# Patient Record
Sex: MALE | Race: WHITE | Employment: OTHER | URBAN - METROPOLITAN AREA
[De-identification: names, ages, dates, MRNs, and addresses within clinical notes are randomized per-mention and may not be internally consistent; named-entity substitution may affect disease eponyms.]

---

## 2017-01-19 RX ORDER — PREDNISONE 1 MG/1
5 TABLET ORAL DAILY
Qty: 30 TABLET | Refills: 0 | Status: SHIPPED | OUTPATIENT
Start: 2017-01-19 | End: 2017-02-18

## 2017-01-19 RX ORDER — ALLOPURINOL 100 MG/1
100 TABLET ORAL DAILY
Qty: 30 TABLET | Refills: 0 | Status: SHIPPED | OUTPATIENT
Start: 2017-01-19 | End: 2017-12-28 | Stop reason: SDUPTHER

## 2017-01-19 RX ORDER — FUROSEMIDE 40 MG/1
40 TABLET ORAL 2 TIMES DAILY
Qty: 60 TABLET | Refills: 0 | Status: SHIPPED | OUTPATIENT
Start: 2017-01-19 | End: 2017-10-27 | Stop reason: SDUPTHER

## 2017-04-05 ENCOUNTER — OFFICE VISIT (OUTPATIENT)
Dept: PULMONOLOGY | Age: 72
End: 2017-04-05

## 2017-04-05 VITALS
SYSTOLIC BLOOD PRESSURE: 111 MMHG | HEART RATE: 109 BPM | DIASTOLIC BLOOD PRESSURE: 64 MMHG | BODY MASS INDEX: 41.82 KG/M2 | OXYGEN SATURATION: 88 % | WEIGHT: 315 LBS

## 2017-04-05 DIAGNOSIS — J44.9 COPD WITHOUT EXACERBATION (HCC): ICD-10-CM

## 2017-04-05 DIAGNOSIS — I10 ESSENTIAL HYPERTENSION: Chronic | ICD-10-CM

## 2017-04-05 DIAGNOSIS — J44.9 COPD, VERY SEVERE (HCC): Primary | ICD-10-CM

## 2017-04-05 DIAGNOSIS — G47.33 OSA (OBSTRUCTIVE SLEEP APNEA): Chronic | ICD-10-CM

## 2017-04-05 DIAGNOSIS — E11.9 TYPE 2 DIABETES MELLITUS WITHOUT COMPLICATION, WITHOUT LONG-TERM CURRENT USE OF INSULIN (HCC): ICD-10-CM

## 2017-04-05 PROCEDURE — 99214 OFFICE O/P EST MOD 30 MIN: CPT | Performed by: INTERNAL MEDICINE

## 2017-04-05 RX ORDER — ALBUTEROL SULFATE 90 UG/1
2 AEROSOL, METERED RESPIRATORY (INHALATION) EVERY 6 HOURS PRN
Qty: 1 INHALER | Refills: 6 | Status: SHIPPED | OUTPATIENT
Start: 2017-04-05 | End: 2018-02-20 | Stop reason: SDUPTHER

## 2017-05-10 ENCOUNTER — TELEPHONE (OUTPATIENT)
Dept: ADMINISTRATIVE | Age: 72
End: 2017-05-10

## 2017-05-25 RX ORDER — IPRATROPIUM BROMIDE AND ALBUTEROL SULFATE 2.5; .5 MG/3ML; MG/3ML
SOLUTION RESPIRATORY (INHALATION)
Qty: 1080 ML | Refills: 11 | Status: SHIPPED | OUTPATIENT
Start: 2017-05-25 | End: 2018-04-06 | Stop reason: SDUPTHER

## 2017-06-05 ENCOUNTER — TELEPHONE (OUTPATIENT)
Dept: PULMONOLOGY | Age: 72
End: 2017-06-05

## 2017-06-30 ENCOUNTER — OFFICE VISIT (OUTPATIENT)
Dept: INTERNAL MEDICINE CLINIC | Age: 72
End: 2017-06-30

## 2017-06-30 VITALS
OXYGEN SATURATION: 92 % | BODY MASS INDEX: 41.03 KG/M2 | DIASTOLIC BLOOD PRESSURE: 66 MMHG | SYSTOLIC BLOOD PRESSURE: 114 MMHG | HEART RATE: 110 BPM | WEIGHT: 311 LBS

## 2017-06-30 DIAGNOSIS — J44.9 COPD, VERY SEVERE (HCC): Primary | ICD-10-CM

## 2017-06-30 DIAGNOSIS — Z13.9 SCREENING: ICD-10-CM

## 2017-06-30 DIAGNOSIS — E78.5 HYPERLIPIDEMIA, UNSPECIFIED HYPERLIPIDEMIA TYPE: ICD-10-CM

## 2017-06-30 DIAGNOSIS — E11.9 TYPE 2 DIABETES MELLITUS WITHOUT COMPLICATION, WITHOUT LONG-TERM CURRENT USE OF INSULIN (HCC): ICD-10-CM

## 2017-06-30 PROCEDURE — 99204 OFFICE O/P NEW MOD 45 MIN: CPT | Performed by: INTERNAL MEDICINE

## 2017-07-02 ASSESSMENT — ENCOUNTER SYMPTOMS
HOARSE VOICE: 0
EYE PAIN: 0
EYE REDNESS: 0
FACIAL SWELLING: 0
COUGH: 1

## 2017-07-02 ASSESSMENT — COPD QUESTIONNAIRES: COPD: 1

## 2017-07-13 DIAGNOSIS — E11.9 TYPE 2 DIABETES MELLITUS WITHOUT COMPLICATION, WITHOUT LONG-TERM CURRENT USE OF INSULIN (HCC): ICD-10-CM

## 2017-07-13 DIAGNOSIS — Z13.9 SCREENING: ICD-10-CM

## 2017-07-13 DIAGNOSIS — E78.5 HYPERLIPIDEMIA, UNSPECIFIED HYPERLIPIDEMIA TYPE: ICD-10-CM

## 2017-07-13 LAB
A/G RATIO: 2.1 (ref 1.1–2.2)
ALBUMIN SERPL-MCNC: 4.2 G/DL (ref 3.4–5)
ALP BLD-CCNC: 43 U/L (ref 40–129)
ALT SERPL-CCNC: 15 U/L (ref 10–40)
ANION GAP SERPL CALCULATED.3IONS-SCNC: 14 MMOL/L (ref 3–16)
AST SERPL-CCNC: 12 U/L (ref 15–37)
BILIRUB SERPL-MCNC: 0.4 MG/DL (ref 0–1)
BUN BLDV-MCNC: 25 MG/DL (ref 7–20)
CALCIUM SERPL-MCNC: 9 MG/DL (ref 8.3–10.6)
CHLORIDE BLD-SCNC: 100 MMOL/L (ref 99–110)
CHOLESTEROL, TOTAL: 146 MG/DL (ref 0–199)
CO2: 27 MMOL/L (ref 21–32)
CREAT SERPL-MCNC: 1 MG/DL (ref 0.8–1.3)
CREATININE URINE: 61.9 MG/DL (ref 39–259)
GFR AFRICAN AMERICAN: >60
GFR NON-AFRICAN AMERICAN: >60
GLOBULIN: 2 G/DL
GLUCOSE BLD-MCNC: 88 MG/DL (ref 70–99)
HDLC SERPL-MCNC: 62 MG/DL (ref 40–60)
LDL CHOLESTEROL CALCULATED: 61 MG/DL
MICROALBUMIN UR-MCNC: <1.2 MG/DL
MICROALBUMIN/CREAT UR-RTO: NORMAL MG/G (ref 0–30)
POTASSIUM SERPL-SCNC: 4.5 MMOL/L (ref 3.5–5.1)
SODIUM BLD-SCNC: 141 MMOL/L (ref 136–145)
TOTAL PROTEIN: 6.2 G/DL (ref 6.4–8.2)
TRIGL SERPL-MCNC: 114 MG/DL (ref 0–150)
VLDLC SERPL CALC-MCNC: 23 MG/DL

## 2017-07-14 LAB
ESTIMATED AVERAGE GLUCOSE: 105.4 MG/DL
HBA1C MFR BLD: 5.3 %
HEPATITIS C ANTIBODY INTERPRETATION: NORMAL

## 2017-07-14 RX ORDER — LANCETS
EACH MISCELLANEOUS
Qty: 102 EACH | Refills: 11 | Status: SHIPPED | OUTPATIENT
Start: 2017-07-14 | End: 2018-10-07 | Stop reason: SDUPTHER

## 2017-07-25 RX ORDER — PREDNISONE 1 MG/1
5 TABLET ORAL DAILY
Qty: 30 TABLET | Refills: 3 | Status: SHIPPED | OUTPATIENT
Start: 2017-07-25 | End: 2017-11-17 | Stop reason: SDUPTHER

## 2017-08-04 RX ORDER — ALBUTEROL SULFATE 90 UG/1
2 AEROSOL, METERED RESPIRATORY (INHALATION) EVERY 6 HOURS PRN
Qty: 1 INHALER | Refills: 3 | Status: SHIPPED | OUTPATIENT
Start: 2017-08-04 | End: 2018-02-28 | Stop reason: ALTCHOICE

## 2017-09-14 LAB — DIABETIC RETINOPATHY: NEGATIVE

## 2017-10-06 ENCOUNTER — OFFICE VISIT (OUTPATIENT)
Dept: PULMONOLOGY | Age: 72
End: 2017-10-06

## 2017-10-06 VITALS — DIASTOLIC BLOOD PRESSURE: 78 MMHG | SYSTOLIC BLOOD PRESSURE: 133 MMHG | OXYGEN SATURATION: 91 % | HEART RATE: 100 BPM

## 2017-10-06 DIAGNOSIS — E11.9 TYPE 2 DIABETES MELLITUS WITHOUT COMPLICATION, WITHOUT LONG-TERM CURRENT USE OF INSULIN (HCC): ICD-10-CM

## 2017-10-06 DIAGNOSIS — E66.01 MORBID OBESITY DUE TO EXCESS CALORIES (HCC): Chronic | ICD-10-CM

## 2017-10-06 DIAGNOSIS — G47.33 OSA (OBSTRUCTIVE SLEEP APNEA): Chronic | ICD-10-CM

## 2017-10-06 DIAGNOSIS — J44.9 COPD, VERY SEVERE (HCC): Primary | ICD-10-CM

## 2017-10-06 DIAGNOSIS — J96.11 CHRONIC RESPIRATORY FAILURE WITH HYPOXIA (HCC): ICD-10-CM

## 2017-10-06 PROCEDURE — 99214 OFFICE O/P EST MOD 30 MIN: CPT | Performed by: INTERNAL MEDICINE

## 2017-10-06 NOTE — PROGRESS NOTES
[facility]  Progress Note  Destinee Schultz MD       Emely Tobar   YOB: 1945    Date of Visit:  10/6/2017    Assessment/Plan:  1. COPD, very severe ([address]  PFT 3/13:  INTERPRETATION: Spirometry showed decreased FVC of 3.32 L, 65% predicted and  decreased FEV1 of 1.52 L 37% predicted. FEV1:FVC ratio was decreased. No  response to bronchodilators demonstrated. Lung volumes showed normal total  lung capacity of 94% predicted. Diffusion capacity showed decreased DLCO of  49% predicted.      IMPRESSION: Very severe obstructive defect with no response to  bronchodilators. Normal lung volumes with moderate to severe decrease in  diffusion capacity noted. In comparison to the test that was done in  February of 2011 no significant change in FVC noted but FEV1 has decreased by  22% and total lung capacity by 20% as well as DLCO by 10%. Lauren Vivas    Now has a scooter which has helped greatly with his mobility. 2. Chronic respiratory failure with hypoxia (HCC)  O2 sats are acceptable on supplemental O2. The patient benefits from the use of supplemental O2.      3. Type 2 diabetes mellitus without complication, without long-term current use of insulin (HCC)  Well controlled    4. STEF (obstructive sleep apnea)  On CPAP    5. Morbid obesity due to excess calories ([facility])  Contributes to his SOB    6. Essential hypertension  BP is ok      FOLLOW UP: 6 months for pulmonary. Dr Manish Calderon is now his PCP      Chief Complaint   Patient presents with    6 Month Follow-Up       HPI  The patient presents with a chief complaint of DUNCAN which has been stable. He takes Advair and duoneb for very severe COPD of many years duration. He does use O2 to sleep. No Chest pain, Nausea or vomiting reported      Review of Systems  As documented in HPI     Physical Exam:  Well developed, well nourished  Alert and oriented  Sclera is clear  No cervical adenopathy  No JVD.   Chest BP Readings from Last 3 Encounters:   10/06/17 133/78   06/30/17 114/66   04/05/17 111/64        History   Smoking Status    Former Smoker    Packs/day: 1.00    Years: 50.00    Types: Cigarettes    Quit date: 2/1/2012   Smokeless Tobacco    Never Used     Comment: H.O. smoking 1 p.p.d. Quit 9 months ago

## 2017-10-13 RX ORDER — GLYBURIDE 2.5 MG/1
TABLET ORAL
Qty: 180 TABLET | Refills: 2 | OUTPATIENT
Start: 2017-10-13

## 2017-10-16 RX ORDER — LISINOPRIL 2.5 MG/1
2.5 TABLET ORAL DAILY
Qty: 90 TABLET | Refills: 3 | Status: SHIPPED | OUTPATIENT
Start: 2017-10-16 | End: 2018-10-07 | Stop reason: SDUPTHER

## 2017-10-16 RX ORDER — GLYBURIDE 2.5 MG/1
2.5 TABLET ORAL 2 TIMES DAILY WITH MEALS
Qty: 180 TABLET | Refills: 3 | Status: SHIPPED | OUTPATIENT
Start: 2017-10-16 | End: 2018-10-07 | Stop reason: SDUPTHER

## 2017-10-16 NOTE — TELEPHONE ENCOUNTER
Patient called and is requesting refills on the following medications:    metFORMIN (GLUCOPHAGE) 1000 MG tablet    glyBURIDE (DIABETA) 2.5 MG tablet    lisinopril (PRINIVIL;ZESTRIL) 2.5 MG tablet     [address], [address] [place] [address] -  [phone]    Chloe had sent refill request to the wrong Doctor and know the patient is going to be out of medication after today.

## 2017-10-30 ENCOUNTER — OFFICE VISIT (OUTPATIENT)
Dept: INTERNAL MEDICINE CLINIC | Age: 72
End: 2017-10-30

## 2017-10-30 VITALS
BODY MASS INDEX: 42.09 KG/M2 | DIASTOLIC BLOOD PRESSURE: 60 MMHG | SYSTOLIC BLOOD PRESSURE: 120 MMHG | HEART RATE: 100 BPM | OXYGEN SATURATION: 86 % | WEIGHT: 315 LBS

## 2017-10-30 DIAGNOSIS — I10 ESSENTIAL HYPERTENSION: Chronic | ICD-10-CM

## 2017-10-30 DIAGNOSIS — J44.9 CHRONIC OBSTRUCTIVE PULMONARY DISEASE, UNSPECIFIED COPD TYPE (HCC): Chronic | ICD-10-CM

## 2017-10-30 DIAGNOSIS — Z13.6 SCREENING FOR AAA (ABDOMINAL AORTIC ANEURYSM): ICD-10-CM

## 2017-10-30 DIAGNOSIS — E11.9 TYPE 2 DIABETES MELLITUS WITHOUT COMPLICATION, WITHOUT LONG-TERM CURRENT USE OF INSULIN (HCC): Primary | ICD-10-CM

## 2017-10-30 PROCEDURE — 99214 OFFICE O/P EST MOD 30 MIN: CPT | Performed by: INTERNAL MEDICINE

## 2017-10-30 RX ORDER — FUROSEMIDE 40 MG/1
40 TABLET ORAL 2 TIMES DAILY
Qty: 180 TABLET | Refills: 0 | Status: SHIPPED | OUTPATIENT
Start: 2017-10-30 | End: 2018-02-12 | Stop reason: SDUPTHER

## 2017-10-31 NOTE — PROGRESS NOTES
Date    APPENDECTOMY      TOTAL HIP ARTHROPLASTY      bilateral     Family History   Problem Relation Age of Onset    High Blood Pressure Mother     High Cholesterol Mother     Heart Disease Mother     Stroke Mother     Diabetes Mother     Depression Mother     Mental Illness Mother     Cancer Father      Social History     Social History    Marital status:      Spouse name:     Number of children: 2    Years of education: N/A     Occupational History    retired      Social History Main Topics    Smoking status: Former Smoker     Packs/day: 1.00     Years: 50.00     Types: Cigarettes     Quit date: 2012    Smokeless tobacco: Never Used      Comment: H.O. smoking 1 p.p.d. Quit 9 months ago     Alcohol use 8.4 oz/week     14 Shots of liquor per week      Comment: 3 drinks a evening    Drug use: Unknown    Sexual activity: Not Currently     Other Topics Concern    Not on file     Social History Narrative    No narrative on file      Vitals:    10/30/17 1545   BP: 120/60   Pulse: 100   SpO2: (!) 86%   Weight: (!) 319 lb (144.7 kg)      Wt Readings from Last 3 Encounters:   10/30/17 (!) 319 lb (144.7 kg)   17 (!) 311 lb (141.1 kg)   17 (!) 317 lb (143.8 kg)     BP Readings from Last 3 Encounters:   10/30/17 120/60   10/06/17 133/78   17 114/66     Body mass index is 42.09 kg/m². Facility age limit for growth percentiles is 20 years. Objective:   Physical Exam   Constitutional: He appears well-nourished. No distress. HENT:   Right Ear: External ear normal.   Left Ear: External ear normal.   Mouth/Throat: No oropharyngeal exudate. Eyes: EOM are normal. Pupils are equal, round, and reactive to light. Right eye exhibits no discharge. Left eye exhibits no discharge. Neck: Normal range of motion. Neck supple. No tracheal deviation present. No thyromegaly present. Cardiovascular: Normal rate. Exam reveals no friction rub. No murmur heard.   Pulmonary/Chest: No respiratory distress. He has no wheezes. Abdominal: Soft. Bowel sounds are normal. He exhibits no distension. There is no tenderness. There is no rebound. Musculoskeletal: Normal range of motion. He exhibits no edema or deformity. Assessment/Plan:  Kt Nuno was seen today for diabetes. Diagnoses and all orders for this visit:    Type 2 diabetes mellitus without complication, without long-term current use of insulin ([facility])  -     Comprehensive Metabolic Panel  -     Hemoglobin A1C    Essential hypertension    Chronic obstructive pulmonary disease, unspecified COPD type ([facility]    Screening for AAA (abdominal aortic aneurysm)  -     US Abdominal Aorta Limited; Future      Return in about 4 months (around 2/28/2018) for diabetes 30 min.

## 2017-11-14 LAB
A/G RATIO: 2.2 (ref 1.1–2.2)
ALBUMIN SERPL-MCNC: 4.4 G/DL (ref 3.4–5)
ALP BLD-CCNC: 47 U/L (ref 40–129)
ALT SERPL-CCNC: 15 U/L (ref 10–40)
ANION GAP SERPL CALCULATED.3IONS-SCNC: 15 MMOL/L (ref 3–16)
AST SERPL-CCNC: 14 U/L (ref 15–37)
BILIRUB SERPL-MCNC: 0.3 MG/DL (ref 0–1)
BUN BLDV-MCNC: 29 MG/DL (ref 7–20)
CALCIUM SERPL-MCNC: 9.5 MG/DL (ref 8.3–10.6)
CHLORIDE BLD-SCNC: 102 MMOL/L (ref 99–110)
CO2: 28 MMOL/L (ref 21–32)
CREAT SERPL-MCNC: 1.2 MG/DL (ref 0.8–1.3)
GFR AFRICAN AMERICAN: >60
GFR NON-AFRICAN AMERICAN: 60
GLOBULIN: 2 G/DL
GLUCOSE BLD-MCNC: 131 MG/DL (ref 70–99)
POTASSIUM SERPL-SCNC: 5.3 MMOL/L (ref 3.5–5.1)
SODIUM BLD-SCNC: 145 MMOL/L (ref 136–145)
TOTAL PROTEIN: 6.4 G/DL (ref 6.4–8.2)

## 2017-11-15 LAB
ESTIMATED AVERAGE GLUCOSE: 111.2 MG/DL
HBA1C MFR BLD: 5.5 %

## 2017-11-20 RX ORDER — PREDNISONE 1 MG/1
5 TABLET ORAL DAILY
Qty: 90 TABLET | Refills: 3 | Status: SHIPPED | OUTPATIENT
Start: 2017-11-20 | End: 2018-11-16 | Stop reason: SDUPTHER

## 2017-12-28 ENCOUNTER — TELEPHONE (OUTPATIENT)
Dept: INTERNAL MEDICINE CLINIC | Age: 72
End: 2017-12-28

## 2017-12-28 RX ORDER — ALLOPURINOL 100 MG/1
100 TABLET ORAL DAILY
Qty: 30 TABLET | Refills: 5 | Status: SHIPPED | OUTPATIENT
Start: 2017-12-28 | End: 2018-02-28 | Stop reason: SDUPTHER

## 2018-02-14 RX ORDER — FUROSEMIDE 40 MG/1
TABLET ORAL
Qty: 180 TABLET | Refills: 0 | Status: SHIPPED | OUTPATIENT
Start: 2018-02-14 | End: 2018-05-24 | Stop reason: SDUPTHER

## 2018-02-19 DIAGNOSIS — J44.9 COPD WITHOUT EXACERBATION (HCC): ICD-10-CM

## 2018-02-21 RX ORDER — ALBUTEROL SULFATE 90 UG/1
2 AEROSOL, METERED RESPIRATORY (INHALATION) EVERY 6 HOURS PRN
Qty: 1 INHALER | Refills: 6 | Status: SHIPPED | OUTPATIENT
Start: 2018-02-21 | End: 2018-02-22 | Stop reason: SDUPTHER

## 2018-02-22 ENCOUNTER — TELEPHONE (OUTPATIENT)
Dept: PHARMACY | Facility: CLINIC | Age: 73
End: 2018-02-22

## 2018-02-22 ENCOUNTER — SCHEDULED TELEPHONE ENCOUNTER (OUTPATIENT)
Dept: PHARMACY | Facility: CLINIC | Age: 73
End: 2018-02-22

## 2018-02-28 ENCOUNTER — OFFICE VISIT (OUTPATIENT)
Dept: INTERNAL MEDICINE CLINIC | Age: 73
End: 2018-02-28

## 2018-02-28 VITALS
SYSTOLIC BLOOD PRESSURE: 118 MMHG | WEIGHT: 315 LBS | BODY MASS INDEX: 41.69 KG/M2 | HEART RATE: 105 BPM | OXYGEN SATURATION: 90 % | DIASTOLIC BLOOD PRESSURE: 66 MMHG

## 2018-02-28 DIAGNOSIS — E11.9 TYPE 2 DIABETES MELLITUS WITHOUT COMPLICATION, WITHOUT LONG-TERM CURRENT USE OF INSULIN (HCC): Primary | ICD-10-CM

## 2018-02-28 DIAGNOSIS — E78.5 HYPERLIPIDEMIA, UNSPECIFIED HYPERLIPIDEMIA TYPE: ICD-10-CM

## 2018-02-28 DIAGNOSIS — I10 ESSENTIAL HYPERTENSION: Chronic | ICD-10-CM

## 2018-02-28 PROCEDURE — 99214 OFFICE O/P EST MOD 30 MIN: CPT | Performed by: INTERNAL MEDICINE

## 2018-02-28 RX ORDER — ALBUTEROL SULFATE 90 UG/1
2 AEROSOL, METERED RESPIRATORY (INHALATION) EVERY 8 HOURS
COMMUNITY
End: 2020-02-11

## 2018-02-28 RX ORDER — ALLOPURINOL 100 MG/1
100 TABLET ORAL DAILY
Qty: 90 TABLET | Refills: 3 | Status: SHIPPED | OUTPATIENT
Start: 2018-02-28 | End: 2019-04-25 | Stop reason: SDUPTHER

## 2018-02-28 ASSESSMENT — PATIENT HEALTH QUESTIONNAIRE - PHQ9
2. FEELING DOWN, DEPRESSED OR HOPELESS: 0
SUM OF ALL RESPONSES TO PHQ QUESTIONS 1-9: 0
1. LITTLE INTEREST OR PLEASURE IN DOING THINGS: 0
SUM OF ALL RESPONSES TO PHQ9 QUESTIONS 1 & 2: 0

## 2018-03-01 NOTE — PROGRESS NOTES
Subjective:      Patient ID: Clarice Servin is a 81 y.o. male. HPI Patient comes in for diabetes, htn, and hdl    Treatment Adherence:   Medication compliance:  compliant most of the time  Diet compliance:  compliant most of the time  Weight trend: stable  Current exercise: no regular exercise  Barriers: lack of motivation    Diabetes Mellitus Type 2: Current symptoms/problems include none. Home blood sugar records: fasting range:   Any episodes of hypoglycemia? no  Eye exam current (within one year): yes  Tobacco history: He  reports that he quit smoking about 6 years ago. His smoking use included Cigarettes. He has a 50.00 pack-year smoking history. He has never used smokeless tobacco.   Daily Aspirin? Yes    Hypertension:  Home blood pressure monitoring: No.  He is adherent to a low sodium diet. Patient denies chest pain, lightheadedness and blurred vision. Antihypertensive medication side effects: no medication side effects noted. Use of agents associated with hypertension: none. Hyperlipidemia:  No new myalgias or GI upset on no lipid lowering therapy.        Lab Results   Component Value Date    LABA1C 5.5 11/14/2017    LABA1C 5.3 07/13/2017    LABA1C 5.0 05/11/2015     Lab Results   Component Value Date    LABMICR <1.20 07/13/2017    CREATININE 1.2 11/14/2017     Lab Results   Component Value Date    ALT 15 11/14/2017    AST 14 (L) 11/14/2017     Lab Results   Component Value Date    CHOL 146 07/13/2017    TRIG 114 07/13/2017    HDL 62 (H) 07/13/2017    LDLCALC 61 07/13/2017          Review of Systems   Past Medical History:   Diagnosis Date    Anemia     COPD (chronic obstructive pulmonary disease) ([address]     Diabetes mellitus (HCC)     Edema     GI (gastrointestinal bleed)     Gout     Hypertension     Morbid obesity (HCC)     Neuropathy ([address]     Obstructive sleep apnea     Peripheral vascular disease ([facility]      Past Surgical History:   Procedure Laterality Date    APPENDECTOMY

## 2018-03-22 ENCOUNTER — OFFICE VISIT (OUTPATIENT)
Dept: CARDIOLOGY CLINIC | Age: 73
End: 2018-03-22

## 2018-03-22 VITALS
DIASTOLIC BLOOD PRESSURE: 70 MMHG | HEART RATE: 110 BPM | WEIGHT: 315 LBS | HEIGHT: 73 IN | SYSTOLIC BLOOD PRESSURE: 126 MMHG | BODY MASS INDEX: 41.75 KG/M2 | OXYGEN SATURATION: 86 %

## 2018-03-22 DIAGNOSIS — G47.33 OSA (OBSTRUCTIVE SLEEP APNEA): Chronic | ICD-10-CM

## 2018-03-22 DIAGNOSIS — I10 ESSENTIAL HYPERTENSION: Primary | Chronic | ICD-10-CM

## 2018-03-22 DIAGNOSIS — I87.2 VENOUS INSUFFICIENCY: ICD-10-CM

## 2018-03-22 DIAGNOSIS — I51.89 DIASTOLIC DYSFUNCTION: ICD-10-CM

## 2018-03-22 PROCEDURE — 99212 OFFICE O/P EST SF 10 MIN: CPT | Performed by: INTERNAL MEDICINE

## 2018-03-22 RX ORDER — ACETAMINOPHEN 325 MG/1
650 TABLET ORAL EVERY 6 HOURS PRN
COMMUNITY

## 2018-04-06 ENCOUNTER — OFFICE VISIT (OUTPATIENT)
Dept: PULMONOLOGY | Age: 73
End: 2018-04-06

## 2018-04-06 VITALS
OXYGEN SATURATION: 90 % | WEIGHT: 315 LBS | HEART RATE: 107 BPM | SYSTOLIC BLOOD PRESSURE: 138 MMHG | DIASTOLIC BLOOD PRESSURE: 73 MMHG | BODY MASS INDEX: 42.09 KG/M2

## 2018-04-06 DIAGNOSIS — G47.33 OSA (OBSTRUCTIVE SLEEP APNEA): Chronic | ICD-10-CM

## 2018-04-06 DIAGNOSIS — J96.11 CHRONIC RESPIRATORY FAILURE WITH HYPOXIA (HCC): ICD-10-CM

## 2018-04-06 DIAGNOSIS — E66.01 MORBID OBESITY (HCC): Chronic | ICD-10-CM

## 2018-04-06 DIAGNOSIS — J44.9 COPD, VERY SEVERE (HCC): Primary | ICD-10-CM

## 2018-04-06 PROCEDURE — 99214 OFFICE O/P EST MOD 30 MIN: CPT | Performed by: INTERNAL MEDICINE

## 2018-04-06 RX ORDER — IPRATROPIUM BROMIDE AND ALBUTEROL SULFATE 2.5; .5 MG/3ML; MG/3ML
SOLUTION RESPIRATORY (INHALATION)
Qty: 1080 ML | Refills: 11 | Status: SHIPPED | OUTPATIENT
Start: 2018-04-06 | End: 2018-08-01 | Stop reason: SDUPTHER

## 2018-04-30 ENCOUNTER — TELEPHONE (OUTPATIENT)
Dept: PULMONOLOGY | Age: 73
End: 2018-04-30

## 2018-04-30 RX ORDER — FLUTICASONE FUROATE AND VILANTEROL 200; 25 UG/1; UG/1
1 POWDER RESPIRATORY (INHALATION) DAILY
COMMUNITY
End: 2019-01-23 | Stop reason: SDUPTHER

## 2018-05-17 ENCOUNTER — OFFICE VISIT (OUTPATIENT)
Dept: INTERNAL MEDICINE CLINIC | Age: 73
End: 2018-05-17

## 2018-05-17 VITALS
SYSTOLIC BLOOD PRESSURE: 124 MMHG | DIASTOLIC BLOOD PRESSURE: 60 MMHG | OXYGEN SATURATION: 93 % | WEIGHT: 315 LBS | BODY MASS INDEX: 42.22 KG/M2 | HEART RATE: 103 BPM

## 2018-05-17 DIAGNOSIS — L02.232: Primary | ICD-10-CM

## 2018-05-17 PROCEDURE — 99213 OFFICE O/P EST LOW 20 MIN: CPT | Performed by: INTERNAL MEDICINE

## 2018-05-17 RX ORDER — SULFAMETHOXAZOLE AND TRIMETHOPRIM 800; 160 MG/1; MG/1
1 TABLET ORAL 2 TIMES DAILY
Qty: 20 TABLET | Refills: 0 | Status: SHIPPED | OUTPATIENT
Start: 2018-05-17 | End: 2018-05-27

## 2018-05-17 RX ORDER — CHLORHEXIDINE GLUCONATE 4 G/100ML
SOLUTION TOPICAL
Qty: 236 ML | Refills: 0 | Status: SHIPPED | OUTPATIENT
Start: 2018-05-17 | End: 2018-05-31

## 2018-05-17 RX ORDER — DICLOFENAC SODIUM 75 MG/1
75 TABLET, DELAYED RELEASE ORAL 2 TIMES DAILY
Qty: 60 TABLET | Refills: 1 | Status: SHIPPED | OUTPATIENT
Start: 2018-05-17 | End: 2019-05-19 | Stop reason: SDUPTHER

## 2018-05-18 ASSESSMENT — ENCOUNTER SYMPTOMS: SHORTNESS OF BREATH: 0

## 2018-05-21 ENCOUNTER — OFFICE VISIT (OUTPATIENT)
Dept: INTERNAL MEDICINE CLINIC | Age: 73
End: 2018-05-21

## 2018-05-21 ENCOUNTER — OFFICE VISIT (OUTPATIENT)
Dept: SURGERY | Age: 73
End: 2018-05-21

## 2018-05-21 VITALS
DIASTOLIC BLOOD PRESSURE: 66 MMHG | WEIGHT: 315 LBS | OXYGEN SATURATION: 90 % | TEMPERATURE: 98.3 F | HEART RATE: 84 BPM | BODY MASS INDEX: 42.88 KG/M2 | SYSTOLIC BLOOD PRESSURE: 120 MMHG

## 2018-05-21 VITALS — WEIGHT: 315 LBS | BODY MASS INDEX: 42.88 KG/M2

## 2018-05-21 DIAGNOSIS — L72.3 INFECTED SEBACEOUS CYST: Primary | ICD-10-CM

## 2018-05-21 DIAGNOSIS — L08.9 INFECTED SEBACEOUS CYST: Primary | ICD-10-CM

## 2018-05-21 DIAGNOSIS — L02.212 ABSCESS OF BACK: Primary | ICD-10-CM

## 2018-05-21 PROCEDURE — 99213 OFFICE O/P EST LOW 20 MIN: CPT | Performed by: INTERNAL MEDICINE

## 2018-05-21 PROCEDURE — 10060 I&D ABSCESS SIMPLE/SINGLE: CPT | Performed by: SURGERY

## 2018-05-21 ASSESSMENT — ENCOUNTER SYMPTOMS
EYE PAIN: 0
COUGH: 0
CHOKING: 0

## 2018-05-25 RX ORDER — FUROSEMIDE 40 MG/1
TABLET ORAL
Qty: 180 TABLET | Refills: 0 | Status: SHIPPED | OUTPATIENT
Start: 2018-05-25 | End: 2018-09-11 | Stop reason: SDUPTHER

## 2018-06-04 ENCOUNTER — OFFICE VISIT (OUTPATIENT)
Dept: SURGERY | Age: 73
End: 2018-06-04

## 2018-06-04 VITALS — DIASTOLIC BLOOD PRESSURE: 64 MMHG | SYSTOLIC BLOOD PRESSURE: 118 MMHG

## 2018-06-04 DIAGNOSIS — L72.3 SEBACEOUS CYST: Primary | ICD-10-CM

## 2018-06-04 PROCEDURE — 11403 EXC TR-EXT B9+MARG 2.1-3CM: CPT | Performed by: SURGERY

## 2018-06-04 PROCEDURE — 12032 INTMD RPR S/A/T/EXT 2.6-7.5: CPT | Performed by: SURGERY

## 2018-06-18 ENCOUNTER — OFFICE VISIT (OUTPATIENT)
Dept: SURGERY | Age: 73
End: 2018-06-18

## 2018-06-18 VITALS — DIASTOLIC BLOOD PRESSURE: 72 MMHG | SYSTOLIC BLOOD PRESSURE: 122 MMHG

## 2018-06-18 DIAGNOSIS — L72.3 SEBACEOUS CYST: Primary | ICD-10-CM

## 2018-06-18 PROCEDURE — 99024 POSTOP FOLLOW-UP VISIT: CPT | Performed by: SURGERY

## 2018-06-25 ENCOUNTER — CARE COORDINATION (OUTPATIENT)
Dept: CARE COORDINATION | Age: 73
End: 2018-06-25

## 2018-07-05 ENCOUNTER — CARE COORDINATION (OUTPATIENT)
Dept: CARE COORDINATION | Age: 73
End: 2018-07-05

## 2018-07-16 NOTE — PROGRESS NOTES
Abnormalities Noted  Neurological/Psychiatric:  · Alert and oriented in all spheres  · Moves all extremities well  · Exhibits normal gait balance and coordination  · No abnormalities of mood, affect, memory, mentation, or behavior are noted    Labs: - 7/2017 TC- 146, TG- 114, HDL- 62, LDL- 61. Liver enzymes normal 11/2017. No statin    Cardiac cath 2/13/09:  Normal coronaries  LVEF 70%    RA-mean 12  RV-45/2, 10  PA-56/10 mean 35  PW-mean 30  LV-128/-9, 13  Ao-121/71    Echocardiogram 1/13/09:  Sub-optimal images due to body habitus. Probable normal LVEF, sub-optimal images to evaluate valves, possible AR enlargement    Assessment:     1. Essential hypertension     2. Diastolic dysfunction     3. Venous insufficiency     4. STEF (obstructive sleep apnea)           Hypertension:  - Blood pressure 126/70, pulse 110, height 6' 1\" (1.854 m), weight (!) 316 lb (143.3 kg), SpO2 (!) 86 %. - stable on current medications    Diastolic heart failure:  - admitted with heart failure symptoms 2009. C showed normal coronaries. Has been medically managed since then with no further decompensation   - treated with furosemide and Lisinopril  - compensated on exam    Venous insufficiency:  - on furosemide, has trace lower extremity edema. STEF:  - treated with CPAP    COPD:  - short of breath today walking without oxygen supplementation (left in car)  - wheezing on exam - is due to take pulmonary inhalers  - follows with Dr. Dawayne Kocher:  1. No change in medications  2. Labs monitored by PCP  3. Follow up in one year or sooner if needed. Thank you for allowing me to participate in the care of this individual.      Pat Alan.  Kuldeep Franco M.D., [facility] [place]
7/2018

## 2018-07-18 ENCOUNTER — CARE COORDINATION (OUTPATIENT)
Dept: CARE COORDINATION | Age: 73
End: 2018-07-18

## 2018-07-18 ENCOUNTER — OFFICE VISIT (OUTPATIENT)
Dept: INTERNAL MEDICINE CLINIC | Age: 73
End: 2018-07-18

## 2018-07-18 VITALS
OXYGEN SATURATION: 94 % | DIASTOLIC BLOOD PRESSURE: 68 MMHG | HEART RATE: 74 BPM | BODY MASS INDEX: 41.96 KG/M2 | WEIGHT: 315 LBS | SYSTOLIC BLOOD PRESSURE: 130 MMHG

## 2018-07-18 DIAGNOSIS — Z23 NEED FOR VACCINATION AGAINST STREPTOCOCCUS PNEUMONIAE: ICD-10-CM

## 2018-07-18 DIAGNOSIS — J44.9 COPD, VERY SEVERE (HCC): ICD-10-CM

## 2018-07-18 DIAGNOSIS — E11.9 TYPE 2 DIABETES MELLITUS WITHOUT COMPLICATION, WITHOUT LONG-TERM CURRENT USE OF INSULIN (HCC): Primary | ICD-10-CM

## 2018-07-18 DIAGNOSIS — E13.40 NEUROPATHY DUE TO SECONDARY DIABETES (HCC): ICD-10-CM

## 2018-07-18 DIAGNOSIS — I10 ESSENTIAL HYPERTENSION: Chronic | ICD-10-CM

## 2018-07-18 LAB
A/G RATIO: 2 (ref 1.1–2.2)
ALBUMIN SERPL-MCNC: 4.3 G/DL (ref 3.4–5)
ALP BLD-CCNC: 42 U/L (ref 40–129)
ALT SERPL-CCNC: 14 U/L (ref 10–40)
ANION GAP SERPL CALCULATED.3IONS-SCNC: 12 MMOL/L (ref 3–16)
AST SERPL-CCNC: 13 U/L (ref 15–37)
BILIRUB SERPL-MCNC: 0.4 MG/DL (ref 0–1)
BUN BLDV-MCNC: 30 MG/DL (ref 7–20)
CALCIUM SERPL-MCNC: 9 MG/DL (ref 8.3–10.6)
CHLORIDE BLD-SCNC: 102 MMOL/L (ref 99–110)
CHOLESTEROL, TOTAL: 165 MG/DL (ref 0–199)
CO2: 26 MMOL/L (ref 21–32)
CREAT SERPL-MCNC: 1 MG/DL (ref 0.8–1.3)
CREATININE URINE: 79.5 MG/DL (ref 39–259)
ESTIMATED AVERAGE GLUCOSE: 99.7 MG/DL
GFR AFRICAN AMERICAN: >60
GFR NON-AFRICAN AMERICAN: >60
GLOBULIN: 2.2 G/DL
GLUCOSE BLD-MCNC: 95 MG/DL (ref 70–99)
HBA1C MFR BLD: 5.1 %
HDLC SERPL-MCNC: 77 MG/DL (ref 40–60)
LDL CHOLESTEROL CALCULATED: 70 MG/DL
MICROALBUMIN UR-MCNC: <1.2 MG/DL
MICROALBUMIN/CREAT UR-RTO: NORMAL MG/G (ref 0–30)
POTASSIUM SERPL-SCNC: 4.9 MMOL/L (ref 3.5–5.1)
SODIUM BLD-SCNC: 140 MMOL/L (ref 136–145)
TOTAL PROTEIN: 6.5 G/DL (ref 6.4–8.2)
TRIGL SERPL-MCNC: 88 MG/DL (ref 0–150)
VLDLC SERPL CALC-MCNC: 18 MG/DL

## 2018-07-18 PROCEDURE — 99214 OFFICE O/P EST MOD 30 MIN: CPT | Performed by: INTERNAL MEDICINE

## 2018-07-18 PROCEDURE — G0009 ADMIN PNEUMOCOCCAL VACCINE: HCPCS | Performed by: INTERNAL MEDICINE

## 2018-07-18 PROCEDURE — 90732 PPSV23 VACC 2 YRS+ SUBQ/IM: CPT | Performed by: INTERNAL MEDICINE

## 2018-07-18 RX ORDER — GABAPENTIN 300 MG/1
300 CAPSULE ORAL 3 TIMES DAILY
Qty: 90 CAPSULE | Refills: 2 | Status: SHIPPED | OUTPATIENT
Start: 2018-07-18 | End: 2018-10-16 | Stop reason: SDUPTHER

## 2018-07-18 ASSESSMENT — ENCOUNTER SYMPTOMS
SHORTNESS OF BREATH: 0
COUGH: 0
EYE REDNESS: 0
EYE PAIN: 0

## 2018-08-01 DIAGNOSIS — J44.9 CHRONIC OBSTRUCTIVE PULMONARY DISEASE, UNSPECIFIED COPD TYPE (HCC): Primary | ICD-10-CM

## 2018-08-01 RX ORDER — IPRATROPIUM BROMIDE AND ALBUTEROL SULFATE 2.5; .5 MG/3ML; MG/3ML
SOLUTION RESPIRATORY (INHALATION)
Qty: 1080 ML | Refills: 3 | Status: SHIPPED | OUTPATIENT
Start: 2018-08-01 | End: 2019-07-22 | Stop reason: SDUPTHER

## 2018-08-02 ENCOUNTER — CARE COORDINATION (OUTPATIENT)
Dept: CARE COORDINATION | Age: 73
End: 2018-08-02

## 2018-08-02 RX ORDER — IPRATROPIUM BROMIDE AND ALBUTEROL SULFATE 2.5; .5 MG/3ML; MG/3ML
SOLUTION RESPIRATORY (INHALATION)
Qty: 1080 ML | Refills: 11 | Status: SHIPPED | OUTPATIENT
Start: 2018-08-02 | End: 2020-08-17 | Stop reason: SDUPTHER

## 2018-08-16 ENCOUNTER — CARE COORDINATION (OUTPATIENT)
Dept: CARE COORDINATION | Age: 73
End: 2018-08-16

## 2018-08-16 NOTE — CARE COORDINATION
Ambulatory Care Coordination Note  8/16/2018  CM Risk Score: 3  Diana Mortality Risk Score: 7.97    ACC: Allyssa Sebastian RN    Summary Note: Met with patient to establish care. Ambulatory Care Coordination Assessment    Care Coordination Protocol  Program Enrollment:  Rising Risk  Referral from Primary Care Provider:  No  Week 1 - Initial Assessment     Do you have all of your prescriptions and are they filled?:  Yes  Barriers to medication adherence:  None  Are you able to afford your medications?:  Yes  How often do you have trouble taking your medications the way you have been told to take them?:  I always take them as prescribed. Do you have Home O2 Therapy?:  Yes   Oxygen Regimen:  Continuous Flow - Enter rate/FIO2:  3   Method of Delivery:  Nasal Cannula   CPAP Use:  CPAP      Ability to seek help/take action for Emergent Urgent situations i.e. fire, crime, inclement weather or health crisis. :  Independent  Ability to ambulate to restroom:  Independent  Ability handle personal hygeine needs (bathing/dressing/grooming): Independent  Ability to manage Medications: Independent  Ability to prepare Food Preparation:  Independent  Ability to maintain home (clean home, laundry): Independent  Ability to drive and/or has transportation:  Independent  Ability to do shopping:  Independent  Ability to manage finances:   Independent  Is patient able to live independently?:  Yes     Current Housing:  Private Residence        Per the Fall Risk Screening, did the patient have 2 or more falls or 1 fall with injury in the past year?:  No     Frequent urination at night?:  No  Do you use rails/bars?:  Yes  Do you have a non-slip tub mat?:  Yes     Are you experiencing loss of meaning?:  No  Are you experiencing loss of hope and peace?:  No     Thinking about your patient's physical health needs, are there any symptoms or problems (risk indicators) you are unsure about that require further investigation?:  Mild vague physical symptoms or problems; but do not impact on daily life or are not of concern to patient   Are the patients physical health problems impacting on their mental well-being?:  No identified areas of concern   Are there any problems with your patients lifestyle behaviors (alcohol, drugs, diet, exercise) that are impacting on physical or mental well-being?:  No identified areas of concern   Do you have any other concerns about your patients mental well-being? How would you rate their severity and impact on the patient?:  No identified areas of concern   How would you rate their home environment in terms of safety and stability (including domestic violence, insecure housing, neighbor harassment)?:  Consistently safe, supportive, stable, no identified problems   How do daily activities impact on the patient's well-being? (include current or anticipated unemployment, work, caregiving, access to transportation or other):  No identified problems or perceived positive benefits   How would you rate their social network (family, work, friends)?:  Good participation with social networks   How would you rate their financial resources (including ability to afford all required medical care)?:  Financially secure, resources adequate, no identified problems   How wells does the patient now understand their health and well-being (symptoms, signs or risk factors) and what they need to do to manage their health?:  Reasonable to good understanding and already engages in managing health or is willing to undertake better management   How well do you think your patient can engage in healthcare discussions?  (Barriers include language, deafness, aphasia, alcohol or drug problems, learning difficulties, concentration):  Clear and open communication, no identified barriers   Do other services need to be involved to help this patient?:  Other care/services not required at this time   Are current services involved with this patient tablet by mouth daily 10/16/17   Alla Membreno MD   glucose blood VI test strips (ACCU-CHEK CLARISSA PLUS) strip Check blood sugars daily 9/8/17   Alla Membreno MD   Accu-Chek Multiclix Lancets MISC Check blood sugars twice daily 7/14/17   Alla Membreno MD       Future Appointments  Date Time Provider Lizzie Marifer   10/10/2018 1:00 PM Shannan Landon MD PULM & CC [facility]   11/21/2018 11:15 AM Alla Membreno MD [facility]

## 2018-09-12 RX ORDER — FUROSEMIDE 40 MG/1
TABLET ORAL
Qty: 180 TABLET | Refills: 0 | Status: SHIPPED | OUTPATIENT
Start: 2018-09-12 | End: 2018-12-27 | Stop reason: SDUPTHER

## 2018-10-01 ENCOUNTER — CARE COORDINATION (OUTPATIENT)
Dept: CARE COORDINATION | Age: 73
End: 2018-10-01

## 2018-10-08 RX ORDER — LISINOPRIL 2.5 MG/1
TABLET ORAL
Qty: 90 TABLET | Refills: 2 | Status: SHIPPED | OUTPATIENT
Start: 2018-10-08 | End: 2019-07-05 | Stop reason: SDUPTHER

## 2018-10-08 RX ORDER — LANCETS
EACH MISCELLANEOUS
Qty: 102 EACH | Refills: 10 | Status: SHIPPED | OUTPATIENT
Start: 2018-10-08 | End: 2019-12-23

## 2018-10-08 RX ORDER — GLYBURIDE 2.5 MG/1
TABLET ORAL
Qty: 180 TABLET | Refills: 2 | Status: SHIPPED | OUTPATIENT
Start: 2018-10-08 | End: 2019-03-03 | Stop reason: ALTCHOICE

## 2018-10-08 RX ORDER — BLOOD SUGAR DIAGNOSTIC
STRIP MISCELLANEOUS
Qty: 100 STRIP | Refills: 9 | Status: SHIPPED | OUTPATIENT
Start: 2018-10-08 | End: 2019-11-08 | Stop reason: SDUPTHER

## 2018-10-10 ENCOUNTER — OFFICE VISIT (OUTPATIENT)
Dept: PULMONOLOGY | Age: 73
End: 2018-10-10
Payer: MEDICARE

## 2018-10-10 VITALS
HEART RATE: 109 BPM | OXYGEN SATURATION: 90 % | DIASTOLIC BLOOD PRESSURE: 79 MMHG | BODY MASS INDEX: 42.75 KG/M2 | WEIGHT: 315 LBS | SYSTOLIC BLOOD PRESSURE: 133 MMHG

## 2018-10-10 DIAGNOSIS — I10 ESSENTIAL HYPERTENSION: Chronic | ICD-10-CM

## 2018-10-10 DIAGNOSIS — G47.33 OSA (OBSTRUCTIVE SLEEP APNEA): Chronic | ICD-10-CM

## 2018-10-10 DIAGNOSIS — J96.11 CHRONIC RESPIRATORY FAILURE WITH HYPOXIA (HCC): ICD-10-CM

## 2018-10-10 DIAGNOSIS — J44.9 COPD, VERY SEVERE (HCC): Primary | ICD-10-CM

## 2018-10-10 DIAGNOSIS — E66.01 MORBID OBESITY (HCC): Chronic | ICD-10-CM

## 2018-10-10 PROCEDURE — 99214 OFFICE O/P EST MOD 30 MIN: CPT | Performed by: INTERNAL MEDICINE

## 2018-10-10 NOTE — PROGRESS NOTES
inhaler Inhale 2 puffs into the lungs every 8 hours  Historical Provider, MD   allopurinol (ZYLOPRIM) 100 MG tablet Take 1 tablet by mouth daily  Ana Forbes MD   predniSONE (DELTASONE) 5 MG tablet Take 1 tablet by mouth daily  Ana Forbes MD       Vitals:    10/10/18 1303   BP: 133/79   Pulse: 109   SpO2: 90%   Weight: (!) 324 lb (147 kg)     Body mass index is 42.75 kg/m².      Wt Readings from Last 3 Encounters:   10/10/18 (!) 324 lb (147 kg)   07/18/18 (!) 318 lb (144.2 kg)   05/21/18 (!) 325 lb (147.4 kg)     BP Readings from Last 3 Encounters:   10/10/18 133/79   07/18/18 130/68   06/18/18 122/72        History   Smoking Status    Former Smoker    Packs/day: 1.00    Years: 50.00    Types: Cigarettes    Quit date: 2/1/2012   Smokeless Tobacco    Never Used     Comment: H.O. smoking 1 p.p.d. Quit 9 months ago

## 2018-10-16 DIAGNOSIS — E13.40 NEUROPATHY DUE TO SECONDARY DIABETES (HCC): ICD-10-CM

## 2018-10-16 RX ORDER — GABAPENTIN 300 MG/1
CAPSULE ORAL
Qty: 90 CAPSULE | Refills: 2 | Status: SHIPPED | OUTPATIENT
Start: 2018-10-16 | End: 2019-02-17 | Stop reason: SDUPTHER

## 2018-11-01 ENCOUNTER — CARE COORDINATION (OUTPATIENT)
Dept: CARE COORDINATION | Age: 73
End: 2018-11-01

## 2018-11-12 ENCOUNTER — TELEPHONE (OUTPATIENT)
Dept: PULMONOLOGY | Age: 73
End: 2018-11-12

## 2018-11-23 RX ORDER — PREDNISONE 1 MG/1
TABLET ORAL
Qty: 90 TABLET | Refills: 2 | Status: SHIPPED | OUTPATIENT
Start: 2018-11-23 | End: 2019-08-13 | Stop reason: SDUPTHER

## 2018-12-07 ENCOUNTER — CARE COORDINATION (OUTPATIENT)
Dept: CARE COORDINATION | Age: 73
End: 2018-12-07

## 2018-12-20 ENCOUNTER — CARE COORDINATION (OUTPATIENT)
Dept: CARE COORDINATION | Age: 73
End: 2018-12-20

## 2018-12-28 RX ORDER — FUROSEMIDE 40 MG/1
TABLET ORAL
Qty: 180 TABLET | Refills: 0 | Status: SHIPPED | OUTPATIENT
Start: 2018-12-28 | End: 2019-04-16 | Stop reason: SDUPTHER

## 2019-01-18 ENCOUNTER — CARE COORDINATION (OUTPATIENT)
Dept: CARE COORDINATION | Age: 74
End: 2019-01-18

## 2019-02-06 ENCOUNTER — TELEPHONE (OUTPATIENT)
Dept: RHEUMATOLOGY | Age: 74
End: 2019-02-06

## 2019-02-13 ENCOUNTER — CARE COORDINATION (OUTPATIENT)
Dept: CARE COORDINATION | Age: 74
End: 2019-02-13

## 2019-02-17 DIAGNOSIS — E13.40 NEUROPATHY DUE TO SECONDARY DIABETES (HCC): ICD-10-CM

## 2019-02-25 ENCOUNTER — TELEPHONE (OUTPATIENT)
Dept: INTERNAL MEDICINE CLINIC | Age: 74
End: 2019-02-25

## 2019-02-25 RX ORDER — GABAPENTIN 300 MG/1
CAPSULE ORAL
Qty: 90 CAPSULE | Refills: 1 | Status: SHIPPED | OUTPATIENT
Start: 2019-02-25 | End: 2019-04-28 | Stop reason: SDUPTHER

## 2019-03-12 ENCOUNTER — CARE COORDINATION (OUTPATIENT)
Dept: CARE COORDINATION | Age: 74
End: 2019-03-12

## 2019-03-21 ENCOUNTER — CARE COORDINATION (OUTPATIENT)
Dept: CARE COORDINATION | Age: 74
End: 2019-03-21

## 2019-03-21 ENCOUNTER — OFFICE VISIT (OUTPATIENT)
Dept: INTERNAL MEDICINE CLINIC | Age: 74
End: 2019-03-21
Payer: MEDICARE

## 2019-03-21 VITALS
DIASTOLIC BLOOD PRESSURE: 60 MMHG | BODY MASS INDEX: 42.66 KG/M2 | HEIGHT: 72 IN | TEMPERATURE: 98.1 F | SYSTOLIC BLOOD PRESSURE: 120 MMHG | HEART RATE: 87 BPM | OXYGEN SATURATION: 94 % | WEIGHT: 315 LBS

## 2019-03-21 DIAGNOSIS — Z00.00 ROUTINE GENERAL MEDICAL EXAMINATION AT A HEALTH CARE FACILITY: ICD-10-CM

## 2019-03-21 DIAGNOSIS — Z13.6 SCREENING FOR AAA (ABDOMINAL AORTIC ANEURYSM): ICD-10-CM

## 2019-03-21 DIAGNOSIS — Z23 NEED FOR PROPHYLACTIC VACCINATION AND INOCULATION AGAINST VARICELLA: ICD-10-CM

## 2019-03-21 DIAGNOSIS — Z87.891 PERSONAL HISTORY OF TOBACCO USE: ICD-10-CM

## 2019-03-21 PROCEDURE — G0439 PPPS, SUBSEQ VISIT: HCPCS | Performed by: INTERNAL MEDICINE

## 2019-03-21 ASSESSMENT — PATIENT HEALTH QUESTIONNAIRE - PHQ9
SUM OF ALL RESPONSES TO PHQ QUESTIONS 1-9: 2
SUM OF ALL RESPONSES TO PHQ QUESTIONS 1-9: 2

## 2019-03-21 ASSESSMENT — LIFESTYLE VARIABLES
HOW OFTEN DURING THE LAST YEAR HAVE YOU HAD A FEELING OF GUILT OR REMORSE AFTER DRINKING: 0
HAS A RELATIVE, FRIEND, DOCTOR, OR ANOTHER HEALTH PROFESSIONAL EXPRESSED CONCERN ABOUT YOUR DRINKING OR SUGGESTED YOU CUT DOWN: 0
HOW OFTEN DURING THE LAST YEAR HAVE YOU FOUND THAT YOU WERE NOT ABLE TO STOP DRINKING ONCE YOU HAD STARTED: 0
AUDIT TOTAL SCORE: 5
HOW OFTEN DO YOU HAVE SIX OR MORE DRINKS ON ONE OCCASION: 0
HAVE YOU OR SOMEONE ELSE BEEN INJURED AS A RESULT OF YOUR DRINKING: 0
HOW OFTEN DURING THE LAST YEAR HAVE YOU FAILED TO DO WHAT WAS NORMALLY EXPECTED FROM YOU BECAUSE OF DRINKING: 0
HOW OFTEN DURING THE LAST YEAR HAVE YOU NEEDED AN ALCOHOLIC DRINK FIRST THING IN THE MORNING TO GET YOURSELF GOING AFTER A NIGHT OF HEAVY DRINKING: 0
AUDIT-C TOTAL SCORE: 5
HOW MANY STANDARD DRINKS CONTAINING ALCOHOL DO YOU HAVE ON A TYPICAL DAY: 1
HOW OFTEN DO YOU HAVE A DRINK CONTAINING ALCOHOL: 4
HOW OFTEN DURING THE LAST YEAR HAVE YOU BEEN UNABLE TO REMEMBER WHAT HAPPENED THE NIGHT BEFORE BECAUSE YOU HAD BEEN DRINKING: 0

## 2019-03-21 ASSESSMENT — ANXIETY QUESTIONNAIRES: GAD7 TOTAL SCORE: 1

## 2019-04-10 ENCOUNTER — TELEPHONE (OUTPATIENT)
Dept: INTERNAL MEDICINE CLINIC | Age: 74
End: 2019-04-10

## 2019-04-10 DIAGNOSIS — J44.9 CHRONIC OBSTRUCTIVE PULMONARY DISEASE, UNSPECIFIED COPD TYPE (HCC): Primary | Chronic | ICD-10-CM

## 2019-04-10 DIAGNOSIS — E08.00 DIABETES MELLITUS DUE TO UNDERLYING CONDITION WITH HYPEROSMOLARITY WITHOUT COMA, WITHOUT LONG-TERM CURRENT USE OF INSULIN (HCC): ICD-10-CM

## 2019-04-10 DIAGNOSIS — E78.2 MIXED HYPERLIPIDEMIA: ICD-10-CM

## 2019-04-11 ENCOUNTER — HOSPITAL ENCOUNTER (OUTPATIENT)
Dept: CT IMAGING | Age: 74
Discharge: HOME | End: 2019-04-11
Payer: MEDICARE

## 2019-04-11 DIAGNOSIS — Z87.891 PERSONAL HISTORY OF TOBACCO USE: ICD-10-CM

## 2019-04-11 PROCEDURE — G0297 LDCT FOR LUNG CA SCREEN: HCPCS

## 2019-04-11 PROCEDURE — 76706 US ABDL AORTA SCREEN AAA: CPT

## 2019-04-15 NOTE — PROGRESS NOTES
jewelry or piercings on day of surgery. All body piercing jewelry must be removed. 11. If you have ___dentures, they will be removed before going to the OR; we will provide you a container. If you wear ___contact lenses or ___glasses, they will be removed; please bring a case for them. 12. Please see your family doctor/pediatrician for a history & physical and/or concerning medications. Bring any test results/reports from your physician's office. PCP__________________Phone___________H&P Appt. Date________             13 If you  have a Living Will and Durable Power of  for Healthcare, please bring in a copy. 15. Notify your Surgeon if you develop any illness between now and surgery  time, cough, cold, fever, sore throat, nausea, vomiting, etc.  Please notify your surgeon if you experience dizziness, shortness of breath or blurred vision between now & the time of your surgery             15. DO NOT shave your operative site 96 hours prior to surgery. For face & neck surgery, men may use an electric razor 48 hours prior to surgery. 16. Shower the night before surgery with ___Antibacterial soap ___Hibiclens             17. To provide excellent care visitors will be limited to one in the room at any given time. 18.  Please bring picture ID and insurance card. 19.  Visit our web site for additional information:  [email or web address]              20.During flu season no children under the age of 15 are permitted in the hospital for the safety of all patients. 21. If you take a long acting insulin in the evening only  take half of your usual  dose the night  before your procedure              22. If you use a c-pap please bring DOS if staying overnight,             23.For your convenience [facility] has a pharmacy on site to fill your prescriptions.              24. If you use oxygen and have a portable tank please bring it  with you the DOS             25. Bring a complete list of all your medications with name and dose include any supplements. 26. Other__________________________________________   *Please call pre admission testing if you any further questions   [facility]   [address]    [place]  [phone]   [address] [place] [address]       All above information reviewed with patient in person or by phone. Patient verbalizes understanding. All questions and concerns addressed.                                                                                                  Patient/Rep____________________                                                                                                                                    PRE OP INSTRUCTIONS

## 2019-04-16 ENCOUNTER — HOSPITAL ENCOUNTER (OUTPATIENT)
Dept: ULTRASOUND IMAGING | Age: 74
Discharge: HOME | End: 2019-04-11
Payer: MEDICARE

## 2019-04-16 ENCOUNTER — ANESTHESIA EVENT (OUTPATIENT)
Dept: ENDOSCOPY | Age: 74
End: 2019-04-16
Payer: MEDICARE

## 2019-04-16 DIAGNOSIS — Z13.6 SCREENING FOR AAA (ABDOMINAL AORTIC ANEURYSM): ICD-10-CM

## 2019-04-16 NOTE — PROGRESS NOTES
Per DR Mila Garvey patient needs to be moved to IP Endo R/T pulmonary status office called Stella Benavides at [facility] made aware

## 2019-04-18 ENCOUNTER — OFFICE VISIT (OUTPATIENT)
Dept: PULMONOLOGY | Age: 74
End: 2019-04-18
Payer: MEDICARE

## 2019-04-18 VITALS
BODY MASS INDEX: 43.94 KG/M2 | DIASTOLIC BLOOD PRESSURE: 77 MMHG | SYSTOLIC BLOOD PRESSURE: 127 MMHG | OXYGEN SATURATION: 93 % | HEART RATE: 92 BPM | WEIGHT: 315 LBS

## 2019-04-18 DIAGNOSIS — I10 ESSENTIAL HYPERTENSION: Chronic | ICD-10-CM

## 2019-04-18 DIAGNOSIS — G47.33 OSA (OBSTRUCTIVE SLEEP APNEA): Chronic | ICD-10-CM

## 2019-04-18 DIAGNOSIS — J96.11 CHRONIC RESPIRATORY FAILURE WITH HYPOXIA (HCC): ICD-10-CM

## 2019-04-18 DIAGNOSIS — J44.9 COPD, VERY SEVERE (HCC): Primary | ICD-10-CM

## 2019-04-18 PROCEDURE — 99213 OFFICE O/P EST LOW 20 MIN: CPT | Performed by: INTERNAL MEDICINE

## 2019-04-18 RX ORDER — FUROSEMIDE 40 MG/1
TABLET ORAL
Qty: 180 TABLET | Refills: 0 | Status: SHIPPED | OUTPATIENT
Start: 2019-04-18 | End: 2019-08-05 | Stop reason: SDUPTHER

## 2019-04-18 NOTE — PROGRESS NOTES
[facility] [place]  Progress Note  Naty Jean MD       Marycarmen Riley   YOB: 1945    Date of Visit:  4/18/2019    Assessment/Plan:  1. COPD, very severe ([address]  PFT 3/13:  INTERPRETATION: Spirometry showed decreased FVC of 3.32 L, 65% predicted and  decreased FEV1 of 1.52 L 37% predicted. FEV1:FVC ratio was decreased. No  response to bronchodilators demonstrated. Lung volumes showed normal total  lung capacity of 94% predicted. Diffusion capacity showed decreased DLCO of  49% predicted.      IMPRESSION: Very severe obstructive defect with no response to  bronchodilators. Normal lung volumes with moderate to severe decrease in  diffusion capacity noted. In comparison to the test that was done in  February of 2011 no significant change in FVC noted but FEV1 has decreased by  22% and total lung capacity by 20% as well as DLCO by 10%. Breo -- he likes this better than Advair  Has Ventolin which he likes  Duoneb    He had LDCT chest which shows emphysema but no worrisome lesions. Recommend next imaging in one year. 2. Chronic respiratory failure with hypoxia (HCC)  O2 sats are acceptable on supplemental O2. The patient benefits from the use of supplemental O2.    3. Type 2 diabetes mellitus without complication, without long-term current use of insulin (HCC)  Well controlled    4. STEF (obstructive sleep apnea)  On CPAP    5. Morbid obesity due to excess calories (HCC)  Contributes to his SOB    6. Essential hypertension  BP is ok      FOLLOW UP: 6 months for pulmonary. Chief Complaint   Patient presents with    COPD     6 months    Results     ct screening       HPI  The patient presents with a chief complaint of DUNCAN which has been stable. He takes Breo (which he finds better than Advair) and duoneb for very severe COPD of many years duration. He does use O2 to sleep.  No Chest pain, Nausea or vomiting reported      Review of Systems  As documented in HPI     Physical Exam:  Well developed, well nourished  Alert and oriented  Sclera is clear  No cervical adenopathy  No JVD. Chest examination is fine wheezing. Cardiac examination reveals regular rate and rhythm without murmur, gallop or rub. The abdomen is soft, nontender and nondistended. There is no clubbing, cyanosis or edema of the extremities. There is no obvious skin rash. No focal neuro deficicts  Normal mood and affect    No Known Allergies  Prior to Visit Medications    Medication Sig Taking? Authorizing Provider   furosemide (LASIX) 40 MG tablet TAKE ONE TABLET BY MOUTH TWICE A DAY Yes Laura Martinez MD   gabapentin (NEURONTIN) 300 MG capsule TAKE ONE CAPSULE BY MOUTH THREE TIMES A DAY Yes Laura Martinez MD   BREO ELLIPTA 200-25 MCG/INH AEPB INHALE ONE DOSE BY MOUTH DAILY Yes Milagro Hassan MD   predniSONE (DELTASONE) 5 MG tablet TAKE ONE TABLET BY MOUTH DAILY Yes Laura Martinez MD   metFORMIN (GLUCOPHAGE) 1000 MG tablet TAKE ONE TABLET BY MOUTH TWICE A DAY WITH MEALS Yes Laura Martinez MD   lisinopril (PRINIVIL;ZESTRIL) 2.5 MG tablet TAKE ONE TABLET BY MOUTH DAILY Yes Laura Martinez MD   ACCU-CHEK CLARISSA PLUS strip USE TO CHECK BLOOD SUGAR DAILY Yes Laura Martinez MD   [facility] CHECK BLOOD SUGAR TWO TIMES A DAY Yes Laura Martinez MD   ipratropium-albuterol (DUONEB) 0.5-2.5 (3) MG/3ML SOLN nebulizer solution INHALE 3 MILLILITERS BY NEBULIZATION FOUR TIMES A DAY.  DX:COPD J44.9 Yes Laura Martinez MD   ipratropium-albuterol (DUONEB) 0.5-2.5 (3) MG/3ML SOLN nebulizer solution INHALE ONE VIAL (3MLS) VIA NEBULIZATION BY MOUTH FOUR TIMES A DAY Yes [place] Notice, APRN - CNP   diclofenac (VOLTAREN) 75 MG EC tablet Take 1 tablet by mouth 2 times daily Yes Laura Martinez MD   acetaminophen (TYLENOL) 325 MG tablet Take 650 mg by mouth every 6 hours as needed for Pain Yes Historical Provider, MD   albuterol sulfate HFA (VENTOLIN HFA) 108 (90 Base) MCG/ACT inhaler Inhale 2 puffs into the lungs every 8 hours Yes Historical Provider, MD   allopurinol (ZYLOPRIM) 100 MG tablet Take 1 tablet by mouth daily  Jayden Nuñez MD       Vitals:    19 1340   BP: 127/77   Pulse: 92   SpO2: 93%   Weight: (!) 324 lb (147 kg)     Body mass index is 43.94 kg/m².      Wt Readings from Last 3 Encounters:   19 (!) 324 lb (147 kg)   19 (!) 322 lb (146.1 kg)   10/10/18 (!) 324 lb (147 kg)     BP Readings from Last 3 Encounters:   19 127/77   19 120/60   10/10/18 133/79        Social History     Tobacco Use   Smoking Status Former Smoker    Packs/day: 1.00    Years: 50.00    Pack years: 50.00    Types: Cigarettes    Last attempt to quit: 2012    Years since quittin.2   Smokeless Tobacco Never Used   Tobacco Comment    H.O. smoking 1 p.p.d. Quit 9 months ago

## 2019-04-23 ENCOUNTER — CARE COORDINATION (OUTPATIENT)
Dept: CARE COORDINATION | Age: 74
End: 2019-04-23

## 2019-04-23 ASSESSMENT — ENCOUNTER SYMPTOMS: DYSPNEA ASSOCIATED WITH: EXERTION

## 2019-04-23 NOTE — CARE COORDINATION
Ambulatory Care Coordination Note  4/23/2019  CM Risk Score: 3  Diana Mortality Risk Score: 9    ACC: Philipp Artis RN    Summary Note: Called patient to follow up with care. Patient says he is doing well. Discussed with patient bs, patient says his bs are doing well. He says he was concerned because he is no longer taking the glyburide. Patient says his blood sugars are about the same. Diabetes Assessment    Medic Alert ID:  No  Meal Planning:  Avoidance of concentrated sweets   How often do you test your blood sugar?:  Bedtime (Comment: 100-120)       No patient-reported symptoms        COPD Assessment    Does the patient understand envrionmental exposure?:  Yes  Is the patient able to verbalize Rescue vs. Long Acting medications?:  Yes  Does the patient have a nebulizer?:  Yes  Does the patient use a space with inhaled medications?:  Yes     No patient-reported symptoms         Symptoms:      Breathlessness:  exertion  Change in chronic cough?:  No/At Baseline  Change in sputum?:  No/At Baseline       Care Coordination Interventions    Program Enrollment:  Rising Risk  Referral from Primary Care Provider:  No  Suggested Interventions and Community Resources  Zone Management Tools:  Completed  Other Services or Interventions:  Literature sent to patient:  COPD Exacerbation Plan, COPD Flare-Ups:  Care Instructions and Zone Management Tools       Goals Addressed                 This Visit's Progress       Patient Stated     Patient Stated (pt-stated)   On track     Continue to be active with the [employer]. Healthy    Barriers: impairment:  physical: COPD  Plan for overcoming my barriers: Breo inhaler has helped   Confidence: 7/10  Anticipated Goal Completion Date:  On going            Prior to Admission medications    Medication Sig Start Date End Date Taking?  Authorizing Provider   furosemide (LASIX) 40 MG tablet TAKE ONE TABLET BY MOUTH TWICE A DAY 4/18/19   Janis Casanova MD gabapentin (NEURONTIN) 300 MG capsule TAKE ONE CAPSULE BY MOUTH THREE TIMES A DAY 2/25/19 5/28/19  Jayden Nuñez MD   BREO ELLIPTA 200-25 MCG/INH AEPB INHALE ONE DOSE BY MOUTH DAILY 1/24/19   Alphonso Genao MD   predniSONE (DELTASONE) 5 MG tablet TAKE ONE TABLET BY MOUTH DAILY 11/23/18   Jayden Nuñez MD   metFORMIN (GLUCOPHAGE) 1000 MG tablet TAKE ONE TABLET BY MOUTH TWICE A DAY WITH MEALS 10/8/18   Jayden Nuñez MD   lisinopril (PRINIVIL;ZESTRIL) 2.5 MG tablet TAKE ONE TABLET BY MOUTH DAILY 10/8/18   Jayden Nuñez MD   ACCU-CHEK CLARISSA PLUS strip USE TO CHECK BLOOD SUGAR DAILY 10/8/18   Jayden Nuñez MD   ACCU-CHEK MULTICLIX LANCETS MISC CHECK BLOOD SUGAR TWO TIMES A DAY 10/8/18   Jayden Nuñez MD   ipratropium-albuterol (DUONEB) 0.5-2.5 (3) MG/3ML SOLN nebulizer solution INHALE 3 MILLILITERS BY NEBULIZATION FOUR TIMES A DAY.  DX:COPD J44.9 8/2/18   Jayden Nuñez MD   ipratropium-albuterol (DUONEB) 0.5-2.5 (3) MG/3ML SOLN nebulizer solution INHALE ONE VIAL (3MLS) VIA NEBULIZATION BY MOUTH FOUR TIMES A DAY 8/1/18   Yaz Drop, APRN - CNP   diclofenac (VOLTAREN) 75 MG EC tablet Take 1 tablet by mouth 2 times daily 5/17/18   Jayden Nuñez MD   acetaminophen (TYLENOL) 325 MG tablet Take 650 mg by mouth every 6 hours as needed for Pain    Historical Provider, MD   albuterol sulfate HFA (VENTOLIN HFA) 108 (90 Base) MCG/ACT inhaler Inhale 2 puffs into the lungs every 8 hours    Historical Provider, MD   allopurinol (ZYLOPRIM) 100 MG tablet Take 1 tablet by mouth daily 2/28/18 7/18/18  Jayden Nuñez MD       Future Appointments   Date Time Provider Lizzie Caicedo   5/1/2019 11:30 AM Jeanette Rubinstein, MD [facility]   8/22/2019 10:15 AM MD EBENEZER Leon  MMA   10/21/2019  1:15 PM Alphonso Genao MD PULM & [facility]

## 2019-04-25 RX ORDER — ALLOPURINOL 100 MG/1
TABLET ORAL
Qty: 90 TABLET | Refills: 2 | Status: SHIPPED | OUTPATIENT
Start: 2019-04-25 | End: 2020-01-24

## 2019-04-28 DIAGNOSIS — E13.40 NEUROPATHY DUE TO SECONDARY DIABETES (HCC): ICD-10-CM

## 2019-04-28 PROBLEM — I71.40 ANEURYSM OF ABDOMINAL AORTA (HCC): Status: ACTIVE | Noted: 2019-04-28

## 2019-04-29 NOTE — PROGRESS NOTES
[address]    2019    Joie Gonzales (:  1945) is a 69 y.o. male who is here for follow up on his history of hypertension, hyperlipidemia, and diastolic dysfunction. Referring Provider: Usha Molina MD    HISTORY:  Mr. Paul Buchanan has a history of HTN, Hyperlipidemia, diastolic dysfunction/heart failure, sleep apnea treated with CPAP. He has a history of DM and severe COPD (followed by Dr. Jesus Ritter). He quit smoking in  (he was up to three packs a day). In  he had an angiogram that showed normal coronaries with an EF of 70%. He was at that time diuresed and treated with natrecor for diastolic heart failure. He has since remained very stable with medical management. 2019 screening ultrasound of abdominal aorta measured proximal aorta at 3.2 cm. Today, he is doing well from the cardiac standpoint. He denies exertional chest pain, palpitations, dizziness, or light headedness. He has a stable pattern of shortness of breath, part of which he believes is related to seasonal allergies. He uses supplemental oxygen when he is active, but he did not bring oxygen with him to the visit today. He has trace lower extremity edema which is stable for him. He was seeing a podiatrist weekly for a small scrape on his foot, stopped seeing him as he felt he was not being treated appropriately (frequent visits for very small wound). He is interested in getting established with another podiatrist  in the area. Patient is compliant with medications for HTN and HLD and is tolerating them well without side effects. He is also compliant with CPAP machine. REVIEW OF SYSTEMS:  A complete review of systems was reviewed and is negative except as noted in the history of present illness. Prior to Visit Medications    Medication Sig Taking?  Authorizing Provider   allopurinol (ZYLOPRIM) 100 MG tablet TAKE ONE TABLET BY MOUTH DAILY Yes Usha Molina MD   furosemide (LASIX) 40 MG tablet TAKE ONE TABLET BY MOUTH TWICE A DAY Yes Jessica Brito MD   gabapentin (NEURONTIN) 300 MG capsule TAKE ONE CAPSULE BY MOUTH THREE TIMES A DAY Yes Jessica Brito MD   BREO ELLIPTA 200-25 MCG/INH AEPB INHALE ONE DOSE BY MOUTH DAILY Yes Nick Teague MD   predniSONE (DELTASONE) 5 MG tablet TAKE ONE TABLET BY MOUTH DAILY Yes Jessica Brito MD   metFORMIN (GLUCOPHAGE) 1000 MG tablet TAKE ONE TABLET BY MOUTH TWICE A DAY WITH MEALS Yes Jessica Brito MD   lisinopril (PRINIVIL;ZESTRIL) 2.5 MG tablet TAKE ONE TABLET BY MOUTH DAILY Yes Jessica Brito MD   ACCU-CHEK CLARISSA PLUS strip USE TO CHECK BLOOD SUGAR DAILY Yes MD Emeka Cantu Everardo LANCETS MISC CHECK BLOOD SUGAR TWO TIMES A DAY Yes Jessica Brito MD   ipratropium-albuterol (DUONEB) 0.5-2.5 (3) MG/3ML SOLN nebulizer solution INHALE 3 MILLILITERS BY NEBULIZATION FOUR TIMES A DAY.  DX:COPD J44.9 Yes Jessica Brito MD   ipratropium-albuterol (DUONEB) 0.5-2.5 (3) MG/3ML SOLN nebulizer solution INHALE ONE VIAL (3MLS) VIA NEBULIZATION BY MOUTH FOUR TIMES A DAY Yes THU Bergeron - MARYLU   diclofenac (VOLTAREN) 75 MG EC tablet Take 1 tablet by mouth 2 times daily Yes Jessica Brito MD   acetaminophen (TYLENOL) 325 MG tablet Take 650 mg by mouth every 6 hours as needed for Pain Yes Historical Provider, MD   albuterol sulfate HFA (VENTOLIN HFA) 108 (90 Base) MCG/ACT inhaler Inhale 2 puffs into the lungs every 8 hours Yes Historical Provider, MD        No Known Allergies    Past Medical History:   Diagnosis Date    Anemia     [facility]     [facility]     Edema     GI (gastrointestinal bleed)     Gout     Hypertension     [facility]     Neuropathy     Obstructive sleep apnea     uses CPAP    Peripheral vascular disease [facility]        Past Surgical History:   Procedure Laterality Date    APPENDECTOMY      TOTAL HIP ARTHROPLASTY      bilateral       Social History     Tobacco Use    Smoking status: Former Smoker     Packs/day: 1.00     Years: 50.00     Pack years: 50.00     Types: Cigarettes     Last attempt to quit: 2012     Years since quittin.2    Smokeless tobacco: Never Used    Tobacco comment: H.O. smoking 1 p.p.d. Quit 9 months ago    Substance Use Topics    Alcohol use: Yes     Alcohol/week: 8.4 oz     Types: 14 Shots of liquor per week     Comment: 3 drinks a evening        Family History   Problem Relation Age of Onset    High Blood Pressure Mother     High Cholesterol Mother     Heart Disease Mother     Stroke Mother     Diabetes Mother     Depression Mother     Mental Illness Mother     Cancer Father        PHYSICAL EXAMINATION:  Vitals:    19 1136   BP: 120/82   Site: Right Upper Arm   Position: Sitting   Cuff Size: Large Adult   Pulse: 92   SpO2: 93%   Weight: (!) 325 lb 12.8 oz (147.8 kg)   Height: 6' 1\" (1.854 m)     Estimated body mass index is 42.98 kg/m² as calculated from the following:    Height as of this encounter: 6' 1\" (1.854 m). Weight as of this encounter: 325 lb 12.8 oz (147.8 kg). General Appearance: No apparent distress, obese  Eyes:  · Conjunctiva clear  · Pupils equal, round, reactive to light  ENT:  · External Ears and Nose unremarkable  · Oral mucosa is moist  Respiratory:  · Normal excursion and expansion without use of accessory muscles  · Resp Auscultation: Normal breath sounds without dullness  Cardiovascular:  · JVD is normal  · The carotid upstroke is normal in amplitude and contour without delay or bruit  · Apical impulse is not displaced  · Normal S1, S2. No S3.   No Murmur  · Trace edema below the knee bilaterally   · Pedal Pulses: 2+ and equal   Abdomen:  · No masses or tenderness  · Liver/Spleen: No Abnormalities Noted  Musculoskeletal:  · Fingers without clubbing or cyanosis  · Normal Gait  Skin:  · No rash  · Normal skin che   Neurologic/Psychiatric:  · Alert and oriented in all spheres  · Normal mood and affect  · Memory and mentation intact     I have reviewed all pertinent lab results and diagnostic testing. Lab Results   Component Value Date    CHOL 165 07/18/2018    TRIG 88 07/18/2018    HDL 77 07/18/2018    HDL 76 10/25/2010    LDLCALC 70 07/18/2018     Lab Results   Component Value Date     07/18/2018    K 4.9 07/18/2018     07/18/2018    CO2 26 07/18/2018    GLUCOSE 95 07/18/2018    BUN 30 07/18/2018    CREATININE 1.0 07/18/2018    CALCIUM 9.0 07/18/2018    GFR >60 04/17/2013    GFRAA >60 07/18/2018    GFRAA >60 04/17/2013     Lab Results   Component Value Date    ALT 14 07/18/2018    AST 13 07/18/2018       Ultrasound screening for AAA 4/16/19:  Study somewhat limited secondary to patient body habitus and motion.       Proximal aorta measures 3.2 cm in size.           Cardiac cath 2/13/09:  Normal coronaries  LVEF 70%     RA-mean 12  RV-45/2, 10  PA-56/10 mean 35  PW-mean 30  LV-128/-9, 13  Ao-121/71     Echocardiogram 1/13/09:  Sub-optimal images due to body habitus. Probable normal LVEF, sub-optimal images to evaluate valves, possible AR enlargement      ASSESSMENT/PLAN:    Hypertension:  ~ Blood pressure 120/82, pulse 92, height 6' 1\" (1.854 m), weight (!) 325 lb 12.8 oz (147.8 kg), SpO2 93 %.  ~ tx with Lisinopril. 7/2018 -- K+ 4.9, BUN- 30, Creat 1.0    Plan > stable, no change in medication. Diastolic heart failure:  - admitted with heart failure symptoms 2009. C showed normal coronaries. Has been medically managed since then with no further decompensation   - treated with furosemide and Lisinopril  ~ stable pattern of shortness of breath and appears compensated on exam    Plan > continue present management, monitor for worsening shortness of breath or edema    Hyperlipidemia:  - 7/2017 TC- 146, TG- 114, HDL- 62, LDL- 61. Liver enzymes normal 11/2017.   No statin    Plan> suggest

## 2019-05-01 ENCOUNTER — OFFICE VISIT (OUTPATIENT)
Dept: CARDIOLOGY CLINIC | Age: 74
End: 2019-05-01
Payer: MEDICARE

## 2019-05-01 VITALS
HEIGHT: 73 IN | BODY MASS INDEX: 41.75 KG/M2 | WEIGHT: 315 LBS | OXYGEN SATURATION: 93 % | SYSTOLIC BLOOD PRESSURE: 120 MMHG | DIASTOLIC BLOOD PRESSURE: 82 MMHG | HEART RATE: 92 BPM

## 2019-05-01 DIAGNOSIS — I51.89 DIASTOLIC DYSFUNCTION: ICD-10-CM

## 2019-05-01 DIAGNOSIS — E78.5 HYPERLIPIDEMIA, UNSPECIFIED HYPERLIPIDEMIA TYPE: ICD-10-CM

## 2019-05-01 DIAGNOSIS — I10 ESSENTIAL HYPERTENSION: Primary | Chronic | ICD-10-CM

## 2019-05-01 DIAGNOSIS — I71.40 ABDOMINAL AORTIC ANEURYSM (AAA) WITHOUT RUPTURE: ICD-10-CM

## 2019-05-01 DIAGNOSIS — I87.2 VENOUS INSUFFICIENCY: ICD-10-CM

## 2019-05-01 PROCEDURE — 93000 ELECTROCARDIOGRAM COMPLETE: CPT | Performed by: INTERNAL MEDICINE

## 2019-05-01 PROCEDURE — 99212 OFFICE O/P EST SF 10 MIN: CPT | Performed by: INTERNAL MEDICINE

## 2019-05-01 NOTE — PATIENT INSTRUCTIONS
Dr. Rangel Smart - [phone] (podiatrist)     1. No change in medications  2. Fasting lipids and liver enzymes in July-August 2019 with Dr. Jose Mathews  3.   Follow up in one year

## 2019-05-01 NOTE — LETTER
[address] [address] [address] [address]  Phone: [phone]  Fax: [phone]    Michael Castillo MD        May 5, 2019     Jasson Wyman, [address] [facility] [address]    Patient: Siobhan Cid  MR Number: [id number]  YOB: 1945  Date of Visit: 2019    Dear Dr. Jasson Wyman:    Below are the relevant portions of my assessment and plan of care. [address]    2019    Siobhan Cid (:  1945) is a 69 y.o. male who is here for follow up on his history of hypertension, hyperlipidemia, and diastolic dysfunction. Referring Provider: Jasson Wyman MD    HISTORY:  Mr. Olimpia Torres has a history of HTN, Hyperlipidemia, diastolic dysfunction/heart failure, sleep apnea treated with CPAP. He has a history of DM and severe COPD (followed by Dr. Christy Wesley). He quit smoking in  (he was up to three packs a day). In  he had an angiogram that showed normal coronaries with an EF of 70%. He was at that time diuresed and treated with natrecor for diastolic heart failure. He has since remained very stable with medical management. 2019 screening ultrasound of abdominal aorta measured proximal aorta at 3.2 cm. Today, he is doing well from the cardiac standpoint. He denies exertional chest pain, palpitations, dizziness, or light headedness. He has a stable pattern of shortness of breath, part of which he believes is related to seasonal allergies. He uses supplemental oxygen when he is active, but he did not bring oxygen with him to the visit today. He has trace lower extremity edema which is stable for him. He was seeing a podiatrist weekly for a small scrape on his foot, stopped seeing him as he felt he was not being treated appropriately (frequent visits for very small wound).    He is interested in getting established with another podiatrist in the area. Patient is compliant with medications for HTN and HLD and is tolerating them well without side effects. He is also compliant with CPAP machine. REVIEW OF SYSTEMS:  A complete review of systems was reviewed and is negative except as noted in the history of present illness. Prior to Visit Medications    Medication Sig Taking? Authorizing Provider   allopurinol (ZYLOPRIM) 100 MG tablet TAKE ONE TABLET BY MOUTH DAILY Yes August Dunn MD   furosemide (LASIX) 40 MG tablet TAKE ONE TABLET BY MOUTH TWICE A DAY Yes August Dunn MD   gabapentin (NEURONTIN) 300 MG capsule TAKE ONE CAPSULE BY MOUTH THREE TIMES A DAY Yes August Dunn MD   BREO ELLIPTA 200-25 MCG/INH AEPB INHALE ONE DOSE BY MOUTH DAILY Yes Jason Parrish MD   predniSONE (DELTASONE) 5 MG tablet TAKE ONE TABLET BY MOUTH DAILY Yes August Dunn MD   metFORMIN (GLUCOPHAGE) 1000 MG tablet TAKE ONE TABLET BY MOUTH TWICE A DAY WITH MEALS Yes August Dunn MD   lisinopril (PRINIVIL;ZESTRIL) 2.5 MG tablet TAKE ONE TABLET BY MOUTH DAILY Yes August Dunn MD   ACCU-CHEK CLARISSA PLUS strip USE TO CHECK BLOOD SUGAR DAILY Yes August Dunn MD   [facility] CHECK BLOOD SUGAR TWO TIMES A DAY Yes August Dunn MD   ipratropium-albuterol (DUONEB) 0.5-2.5 (3) MG/3ML SOLN nebulizer solution INHALE 3 MILLILITERS BY NEBULIZATION FOUR TIMES A DAY.  DX:COPD J44.9 Yes August Dunn MD   ipratropium-albuterol (DUONEB) 0.5-2.5 (3) MG/3ML SOLN nebulizer solution INHALE ONE VIAL (3MLS) VIA NEBULIZATION BY MOUTH FOUR TIMES A DAY Yes THU De Luna - CNP   diclofenac (VOLTAREN) 75 MG EC tablet Take 1 tablet by mouth 2 times daily Yes August Dunn MD   acetaminophen (TYLENOL) 325 MG tablet Take 650 mg by mouth every 6 hours as needed for Pain Yes Radha Frazier MD   albuterol sulfate HFA (VENTOLIN HFA) 108 (90 Base) MCG/ACT inhaler Inhale 2 puffs into the lungs every 8 hours Yes Historical Provider, MD        No Known Allergies    Past Medical History:   Diagnosis Date    Anemia     [facility]     Diabetes mellitus [facility]     Edema     GI (gastrointestinal bleed)     Gout     Hypertension     Morbid obesity [facility]     Neuropathy     Obstructive sleep apnea     uses CPAP    Peripheral vascular disease ([facility])        Past Surgical History:   Procedure Laterality Date    APPENDECTOMY      TOTAL HIP ARTHROPLASTY      bilateral       Social History     Tobacco Use    Smoking status: Former Smoker     Packs/day: 1.00     Years: 50.00     Pack years: 50.00     Types: Cigarettes     Last attempt to quit: 2012     Years since quittin.2    Smokeless tobacco: Never Used    Tobacco comment: H.O. smoking 1 p.p.d. Quit 9 months ago    Substance Use Topics    Alcohol use: Yes     Alcohol/week: 8.4 oz     Types: 14 Shots of liquor per week     Comment: 3 drinks a evening        Family History   Problem Relation Age of Onset    High Blood Pressure Mother     High Cholesterol Mother     Heart Disease Mother     Stroke Mother     Diabetes Mother     Depression Mother     Mental Illness Mother     Cancer Father        PHYSICAL EXAMINATION:  Vitals:    19 1136   BP: 120/82   Site: Right Upper Arm   Position: Sitting   Cuff Size: Large Adult   Pulse: 92   SpO2: 93%   Weight: (!) 325 lb 12.8 oz (147.8 kg)   Height: 6' 1\" (1.854 m)     Estimated body mass index is 42.98 kg/m² as calculated from the following:    Height as of this encounter: 6' 1\" (1.854 m). Weight as of this encounter: 325 lb 12.8 oz (147.8 kg).     General Appearance: No apparent distress, obese  Eyes:  · Conjunctiva clear  · Pupils equal, round, reactive to light  ENT:  · External Ears and Nose unremarkable  · Oral mucosa is moist  Respiratory:  · Normal excursion and expansion without use of accessory muscles · Resp Auscultation: Normal breath sounds without dullness  Cardiovascular:  · JVD is normal  · The carotid upstroke is normal in amplitude and contour without delay or bruit  · Apical impulse is not displaced  · Normal S1, S2. No S3. No Murmur  · Trace edema below the knee bilaterally   · Pedal Pulses: 2+ and equal   Abdomen:  · No masses or tenderness  · Liver/Spleen: No Abnormalities Noted  Musculoskeletal:  · Fingers without clubbing or cyanosis  · Normal Gait  Skin:  · No rash  · Normal skin turgor   Neurologic/Psychiatric:  · Alert and oriented in all spheres  · Normal mood and affect  · Memory and mentation intact     I have reviewed all pertinent lab results and diagnostic testing. Lab Results   Component Value Date    CHOL 165 07/18/2018    TRIG 88 07/18/2018    HDL 77 07/18/2018    HDL 76 10/25/2010    LDLCALC 70 07/18/2018     Lab Results   Component Value Date     07/18/2018    K 4.9 07/18/2018     07/18/2018    CO2 26 07/18/2018    GLUCOSE 95 07/18/2018    BUN 30 07/18/2018    CREATININE 1.0 07/18/2018    CALCIUM 9.0 07/18/2018    GFR >60 04/17/2013    GFRAA >60 07/18/2018    GFRAA >60 04/17/2013     Lab Results   Component Value Date    ALT 14 07/18/2018    AST 13 07/18/2018       Ultrasound screening for AAA 4/16/19:  Study somewhat limited secondary to patient body habitus and motion.       Proximal aorta measures 3.2 cm in size.           Cardiac cath 2/13/09:  Normal coronaries  LVEF 70%     RA-mean 12  RV-45/2, 10  PA-56/10 mean 35  PW-mean 30  LV-128/-9, 13  Ao-121/71     Echocardiogram 1/13/09:  Sub-optimal images due to body habitus. Probable normal LVEF, sub-optimal images to evaluate valves, possible AR enlargement      ASSESSMENT/PLAN:    Hypertension:  ~ Blood pressure 120/82, pulse 92, height 6' 1\" (1.854 m), weight (!) 325 lb 12.8 oz (147.8 kg), SpO2 93 %.  ~ tx with Lisinopril. 7/2018 -- K+ 4.9, BUN- 30, Creat 1.0    Plan > stable, no change in medication. Diastolic heart failure:  - admitted with heart failure symptoms 2009. [facility] showed normal coronaries. Has been medically managed since then with no further decompensation   - treated with furosemide and Lisinopril  ~ stable pattern of shortness of breath and appears compensated on exam    Plan > continue present management, monitor for worsening shortness of breath or edema    Hyperlipidemia:  - 7/2017 TC- 146, TG- 114, HDL- 62, LDL- 61. Liver enzymes normal 11/2017. No statin    Plan> suggest fasting lipids and liver enzymes with other labs from Dr. Petey Mckay (sees him in August)     Venous insufficiency:  ~ on furosemide, has trace edema lower extremities    Plan > continue present management. STEF:  ~ treated with CPAP    COPD:  ~ followed by Dr. Odilia Mai  ~ uses oxygen supplementation      Plan :  1. No change in medications  2. Fasting lipids and liver enzymes in July-August 2019 with Dr. Petey Mckay  3. Follow up in one year or sooner if having a change in symptoms. Scribe's attestation: This note was scribed in the presence of Pio Meyer M.D. by Chuckie Cardenas RN    Physician Attestation: The scribe's documentation has been prepared under my direction and personally reviewed by me in its entirety. I confirm that the note above accurately reflects all work, treatment, procedures, and medical decision making performed by me. An  electronic signature was used to authenticate this note. Tona Damico MD, Kiara Ba      If you have questions, please do not hesitate to call me. I look forward to following Layne Hilton along with you.     Sincerely,        Pio Meyer MD

## 2019-05-06 ENCOUNTER — ANESTHESIA (OUTPATIENT)
Dept: ENDOSCOPY | Age: 74
End: 2019-05-06
Payer: MEDICARE

## 2019-05-07 ENCOUNTER — HOSPITAL ENCOUNTER (OUTPATIENT)
Age: 74
Setting detail: OUTPATIENT SURGERY
Discharge: HOME | End: 2019-05-07
Attending: INTERNAL MEDICINE | Admitting: INTERNAL MEDICINE
Payer: MEDICARE

## 2019-05-07 VITALS
RESPIRATION RATE: 16 BRPM | BODY MASS INDEX: 42.53 KG/M2 | DIASTOLIC BLOOD PRESSURE: 70 MMHG | HEART RATE: 77 BPM | TEMPERATURE: 97.7 F | HEIGHT: 72 IN | WEIGHT: 314 LBS | OXYGEN SATURATION: 97 % | SYSTOLIC BLOOD PRESSURE: 119 MMHG

## 2019-05-07 VITALS
DIASTOLIC BLOOD PRESSURE: 57 MMHG | RESPIRATION RATE: 14 BRPM | SYSTOLIC BLOOD PRESSURE: 117 MMHG | OXYGEN SATURATION: 98 %

## 2019-05-07 DIAGNOSIS — E13.40 NEUROPATHY DUE TO SECONDARY DIABETES (HCC): ICD-10-CM

## 2019-05-07 LAB
GLUCOSE BLD-MCNC: 100 MG/DL (ref 70–99)
GLUCOSE BLD-MCNC: 109 MG/DL (ref 70–99)
PERFORMED ON: ABNORMAL
PERFORMED ON: ABNORMAL

## 2019-05-07 PROCEDURE — 2580000003 HC RX 258: Performed by: ANESTHESIOLOGY

## 2019-05-07 PROCEDURE — 3700000001 HC ADD 15 MINUTES (ANESTHESIA): Performed by: INTERNAL MEDICINE

## 2019-05-07 PROCEDURE — 7100000011 HC PHASE II RECOVERY - ADDTL 15 MIN: Performed by: INTERNAL MEDICINE

## 2019-05-07 PROCEDURE — 2709999900 HC NON-CHARGEABLE SUPPLY: Performed by: INTERNAL MEDICINE

## 2019-05-07 PROCEDURE — 7100000000 HC PACU RECOVERY - FIRST 15 MIN: Performed by: INTERNAL MEDICINE

## 2019-05-07 PROCEDURE — 7100000001 HC PACU RECOVERY - ADDTL 15 MIN: Performed by: INTERNAL MEDICINE

## 2019-05-07 PROCEDURE — 2500000003 HC RX 250 WO HCPCS: Performed by: NURSE ANESTHETIST, CERTIFIED REGISTERED

## 2019-05-07 PROCEDURE — 7100000010 HC PHASE II RECOVERY - FIRST 15 MIN: Performed by: INTERNAL MEDICINE

## 2019-05-07 PROCEDURE — 3700000000 HC ANESTHESIA ATTENDED CARE: Performed by: INTERNAL MEDICINE

## 2019-05-07 PROCEDURE — 3609009500 HC COLONOSCOPY DIAGNOSTIC OR SCREENING: Performed by: INTERNAL MEDICINE

## 2019-05-07 PROCEDURE — 6360000002 HC RX W HCPCS: Performed by: NURSE ANESTHETIST, CERTIFIED REGISTERED

## 2019-05-07 PROCEDURE — 6370000000 HC RX 637 (ALT 250 FOR IP): Performed by: INTERNAL MEDICINE

## 2019-05-07 PROCEDURE — 2500000003 HC RX 250 WO HCPCS: Performed by: INTERNAL MEDICINE

## 2019-05-07 RX ORDER — GABAPENTIN 300 MG/1
CAPSULE ORAL
Qty: 90 CAPSULE | Refills: 0 | Status: SHIPPED | OUTPATIENT
Start: 2019-05-07 | End: 2019-05-07 | Stop reason: SDUPTHER

## 2019-05-07 RX ORDER — SODIUM CHLORIDE 0.9 % (FLUSH) 0.9 %
10 SYRINGE (ML) INJECTION PRN
Status: DISCONTINUED | OUTPATIENT
Start: 2019-05-07 | End: 2019-05-07 | Stop reason: HOSPADM

## 2019-05-07 RX ORDER — SODIUM CHLORIDE 9 MG/ML
INJECTION, SOLUTION INTRAVENOUS CONTINUOUS
Status: DISCONTINUED | OUTPATIENT
Start: 2019-05-07 | End: 2019-05-07 | Stop reason: HOSPADM

## 2019-05-07 RX ORDER — PROPOFOL 10 MG/ML
INJECTION, EMULSION INTRAVENOUS PRN
Status: DISCONTINUED | OUTPATIENT
Start: 2019-05-07 | End: 2019-05-07 | Stop reason: SDUPTHER

## 2019-05-07 RX ORDER — LIDOCAINE HYDROCHLORIDE 10 MG/ML
1 INJECTION, SOLUTION EPIDURAL; INFILTRATION; INTRACAUDAL; PERINEURAL
Status: DISCONTINUED | OUTPATIENT
Start: 2019-05-07 | End: 2019-05-07 | Stop reason: HOSPADM

## 2019-05-07 RX ORDER — SODIUM CHLORIDE 0.9 % (FLUSH) 0.9 %
10 SYRINGE (ML) INJECTION EVERY 12 HOURS SCHEDULED
Status: DISCONTINUED | OUTPATIENT
Start: 2019-05-07 | End: 2019-05-07 | Stop reason: HOSPADM

## 2019-05-07 RX ORDER — LIDOCAINE HYDROCHLORIDE 20 MG/ML
INJECTION, SOLUTION EPIDURAL; INFILTRATION; INTRACAUDAL; PERINEURAL PRN
Status: DISCONTINUED | OUTPATIENT
Start: 2019-05-07 | End: 2019-05-07 | Stop reason: SDUPTHER

## 2019-05-07 RX ADMIN — PROPOFOL 50 MG: 10 INJECTION, EMULSION INTRAVENOUS at 09:12

## 2019-05-07 RX ADMIN — PROPOFOL 50 MG: 10 INJECTION, EMULSION INTRAVENOUS at 09:08

## 2019-05-07 RX ADMIN — PROPOFOL 50 MG: 10 INJECTION, EMULSION INTRAVENOUS at 09:05

## 2019-05-07 RX ADMIN — SODIUM CHLORIDE: 9 INJECTION, SOLUTION INTRAVENOUS at 08:53

## 2019-05-07 RX ADMIN — PROPOFOL 50 MG: 10 INJECTION, EMULSION INTRAVENOUS at 09:02

## 2019-05-07 RX ADMIN — PROPOFOL 50 MG: 10 INJECTION, EMULSION INTRAVENOUS at 09:00

## 2019-05-07 RX ADMIN — PROPOFOL 50 MG: 10 INJECTION, EMULSION INTRAVENOUS at 08:57

## 2019-05-07 RX ADMIN — LIDOCAINE HYDROCHLORIDE 100 MG: 20 INJECTION, SOLUTION EPIDURAL; INFILTRATION; INTRACAUDAL; PERINEURAL at 08:56

## 2019-05-07 RX ADMIN — PROPOFOL 50 MG: 10 INJECTION, EMULSION INTRAVENOUS at 08:56

## 2019-05-07 RX ADMIN — SODIUM CHLORIDE: 9 INJECTION, SOLUTION INTRAVENOUS at 07:53

## 2019-05-07 ASSESSMENT — PULMONARY FUNCTION TESTS
PIF_VALUE: 0

## 2019-05-07 ASSESSMENT — COPD QUESTIONNAIRES: CAT_SEVERITY: MODERATE

## 2019-05-07 ASSESSMENT — PAIN - FUNCTIONAL ASSESSMENT: PAIN_FUNCTIONAL_ASSESSMENT: 0-10

## 2019-05-07 NOTE — ANESTHESIA PRE PROCEDURE
Refill    gabapentin (NEURONTIN) 300 MG capsule TAKE ONE CAPSULE BY MOUTH THREE TIMES A DAY 90 capsule 1    BREO ELLIPTA 200-25 MCG/INH AEPB INHALE ONE DOSE BY MOUTH DAILY 1 each 5    predniSONE (DELTASONE) 5 MG tablet TAKE ONE TABLET BY MOUTH DAILY 90 tablet 2    metFORMIN (GLUCOPHAGE) 1000 MG tablet TAKE ONE TABLET BY MOUTH TWICE A DAY WITH MEALS 180 tablet 2    lisinopril (PRINIVIL;ZESTRIL) 2.5 MG tablet TAKE ONE TABLET BY MOUTH DAILY 90 tablet 2    ipratropium-albuterol (DUONEB) 0.5-2.5 (3) MG/3ML SOLN nebulizer solution INHALE 3 MILLILITERS BY NEBULIZATION FOUR TIMES A DAY.  DX:COPD J44.9 1080 mL 11    acetaminophen (TYLENOL) 325 MG tablet Take 650 mg by mouth every 6 hours as needed for Pain      albuterol sulfate HFA (VENTOLIN HFA) 108 (90 Base) MCG/ACT inhaler Inhale 2 puffs into the lungs every 8 hours      ACCU-CHEK CLARISSA PLUS strip USE TO CHECK BLOOD SUGAR DAILY 100 strip 9    ACCU-CHEK MULTICLIX LANCETS MISC CHECK BLOOD SUGAR TWO TIMES A  each 10    ipratropium-albuterol (DUONEB) 0.5-2.5 (3) MG/3ML SOLN nebulizer solution INHALE ONE VIAL (3MLS) VIA NEBULIZATION BY MOUTH FOUR TIMES A DAY 1080 mL 3    diclofenac (VOLTAREN) 75 MG EC tablet Take 1 tablet by mouth 2 times daily 60 tablet 1     Current Facility-Administered Medications   Medication Dose Route Frequency Provider Last Rate Last Dose    sodium chloride flush 0.9 % injection 10 mL  10 mL Intravenous 2 times per day Lisa Aragon MD        sodium chloride flush 0.9 % injection 10 mL  10 mL Intravenous PRN Lisa Aragon MD        lidocaine PF 1 % injection 1 mL  1 mL Intradermal Once PRN Lisa Aragon MD        0.9 % sodium chloride infusion   Intravenous Continuous Lisa Aragon MD         Vital Signs (Current)   Vitals:    19 0728   BP: (!) 140/84   Pulse: 88   Resp: 18   Temp: 96.8 °F (36 °C)   SpO2: 97%     Vital Signs Statistics (for past 48 hrs)     Temp  Av.8 °F (36 °C)  Min: 96.8 °F (36 °C)   Min taken time: 19  Max: 96.8 °F (36 °C)   Max taken time: 19  Pulse  Av  Min: 88   Min taken time: 19  Max: 88   Max taken time: 19  Resp  Av  Min: 25   Min taken time: 19  Max: 18   Max taken time: 19  BP  Min: 140/84   Min taken time: 19  Max: 140/84   Max taken time: 19  SpO2  Av %  Min: 97 %   Min taken time: 19  Max: 97 %   Max taken time: 19    BP Readings from Last 3 Encounters:   19 (!) 140/84   19 120/82   19 127/77     BMI  Body mass index is 42.59 kg/m². Estimated body mass index is 42.59 kg/m² as calculated from the following:    Height as of this encounter: 6' (1.829 m). Weight as of this encounter: 314 lb (142.4 kg). CBC   Lab Results   Component Value Date    WBC 6.7 2016    RBC 4.01 2016    HGB 13.8 2016    HCT 41.2 2016    .6 2016    RDW 13.4 2016     2016     CMP    Lab Results   Component Value Date     2018    K 4.9 2018     2018    CO2 26 2018    BUN 30 2018    CREATININE 1.0 2018    GFRAA >60 2018    GFRAA >60 2013    AGRATIO 2.0 2018    LABGLOM >60 2018    GLUCOSE 95 2018    PROT 6.5 2018    PROT 7.0 10/25/2010    CALCIUM 9.0 2018    BILITOT 0.4 2018    ALKPHOS 42 2018    AST 13 2018    ALT 14 2018     BMP    Lab Results   Component Value Date     2018    K 4.9 2018     2018    CO2 26 2018    BUN 30 2018    CREATININE 1.0 2018    CALCIUM 9.0 2018    GFRAA >60 2018    GFRAA >60 2013    LABGLOM >60 2018    GLUCOSE 95 2018     POCGlucose  No results for input(s): GLUCOSE in the last 72 hours.    Coags  No results found for: PROTIME, INR, APTT  HCG (If Applicable) No results found for: PREGTESTUR, PREGSERUM, HCG, HCGQUANT   ABGs No results found for: PHART, PO2ART, ZOV1BVF, VBC1TNN, BEART, E4VQBKGZ   Type & Screen (If Applicable)  No results found for: LABABO, LABRH                         BMI: Wt Readings from Last 3 Encounters:       NPO Status:   Date of last liquid consumption: 05/07/19   Time of last liquid consumption: 0330   Date of last solid food consumption: 05/05/19      Time of last solid consumption: 2300       Anesthesia Evaluation  Patient summary reviewed no history of anesthetic complications:   Airway: Mallampati: III  TM distance: >3 FB   Neck ROM: full   Dental: normal exam         Pulmonary:normal exam    (+) COPD (2L continuous): moderate,  sleep apnea:                            ROS comment: Prednisone yesterday   Cardiovascular:  Exercise tolerance: poor (<4 METS),   (+) hypertension:,         Rhythm: regular  Rate: normal           Beta Blocker:  Not on Beta Blocker         Neuro/Psych:               GI/Hepatic/Renal:   (+) GERD:, PUD, morbid obesity          Endo/Other:    (+) DiabetesType II DM, , .                 Abdominal:   (+) obese,         Vascular:   + PVD, aortic or cerebral (AAA), . Anesthesia Plan      general     ASA 3       Induction: intravenous. Anesthetic plan and risks discussed with patient. Plan discussed with CRNA. This pre-anesthesia assessment may be used as a history and physical.    DOS STAFF ADDENDUM:    Pt seen and examined, chart reviewed (including anesthesia, drug and allergy history). No interval changes to history and physical examination. Anesthetic plan, risks, benefits, alternatives, and personnel involved discussed with patient. Patient verbalized an understanding and agrees to proceed.       Chyna Jerez MD  May 7, 2019  7:50 AM

## 2019-05-07 NOTE — PROGRESS NOTES
Awake alert & oriented. SOB with exertion. Home O2 on at 2lpm. Patient discharged per wheelchair to the care of responsible party. No additional questions voiced related to discharge information. Patient discharged with all personal items.

## 2019-05-07 NOTE — ANESTHESIA POSTPROCEDURE EVALUATION
[facility] Department of Anesthesiology  Post-Anesthesia Note       Name:  Gary Andrea                                  Age:  69 y.o. MRN:  [id number]     Last Vitals & Oxygen Saturation: /70   Pulse 77   Temp 97.7 °F (36.5 °C) (Temporal)   Resp 16   Ht 6' (1.829 m)   Wt (!) 314 lb (142.4 kg)   SpO2 97%   BMI 42.59 kg/m²   Patient Vitals for the past 4 hrs:   BP Temp Temp src Pulse Resp SpO2 Height Weight   05/07/19 0945 119/70 -- -- 77 16 97 % -- --   05/07/19 0935 -- 97.7 °F (36.5 °C) Temporal -- 15 -- -- --   05/07/19 0930 111/79 -- -- 78 16 96 % -- --   05/07/19 0925 105/65 -- -- 80 16 97 % -- --   05/07/19 0923 (!) 99/55 97.3 °F (36.3 °C) Temporal 79 16 96 % -- --   05/07/19 0728 (!) 140/84 96.8 °F (36 °C) Temporal 88 18 97 % 6' (1.829 m) (!) 314 lb (142.4 kg)       Level of consciousness:  Awake, alert to baseline    Respiratory: Respirations easy, no distress. Stable. Cardiovascular: Hemodynamically stable. Hydration: Adequate. PONV: Adequately managed. Post-op pain: Adequately controlled. Post-op assessment: Tolerated anesthetic well without complication. Complications:  None.     Sisi Morris MD  May 7, 2019   9:54 AM

## 2019-05-07 NOTE — H&P
[address] [place] [address]    Pre-operative History and Physical    Patient: Gary Andrea  : 1945  CSN:     History Obtained From:  patient, electronic medical record    HISTORY OF PRESENT ILLNESS:    The patient is a 69 y.o. male with significant past medical history of COPD who presents with previous adenomatous polyp for high risk colon cancer screening. Last colonoscopy in . Past Medical History:        Diagnosis Date    Anemia     [facility]ronic obstructive pulmonary [facility]) [facility]     Diabetes mellitus [facility] [address]     Edema     GI (gastrointestinal bleed)     Gout     Hypertension     Morbid obesity ([facility] [address]     Neuropathy     Obstructive sleep apnea     uses CPAP    Peripheral vascular disease (HCC)      Past Surgical History:        Procedure Laterality Date    APPENDECTOMY      TOTAL HIP ARTHROPLASTY      bilateral     Medications Prior to Admission:   No current facility-administered medications on file prior to encounter. Current Outpatient Medications on File Prior to Encounter   Medication Sig Dispense Refill    gabapentin (NEURONTIN) 300 MG capsule TAKE ONE CAPSULE BY MOUTH THREE TIMES A DAY 90 capsule 1    BREO ELLIPTA 200-25 MCG/INH AEPB INHALE ONE DOSE BY MOUTH DAILY 1 each 5    predniSONE (DELTASONE) 5 MG tablet TAKE ONE TABLET BY MOUTH DAILY 90 tablet 2    metFORMIN (GLUCOPHAGE) 1000 MG tablet TAKE ONE TABLET BY MOUTH TWICE A DAY WITH MEALS 180 tablet 2    lisinopril (PRINIVIL;ZESTRIL) 2.5 MG tablet TAKE ONE TABLET BY MOUTH DAILY 90 tablet 2    ACCU-CHEK CLARISSA PLUS strip USE TO CHECK BLOOD SUGAR DAILY 100 strip 9    ACCU-CHEK MULTICLIX LANCETS MISC CHECK BLOOD SUGAR TWO TIMES A  each 10    ipratropium-albuterol (DUONEB) 0.5-2.5 (3) MG/3ML SOLN nebulizer solution INHALE 3 MILLILITERS BY NEBULIZATION FOUR TIMES A DAY.  DX:COPD J44.9 1080 mL 11    ipratropium-albuterol (DUONEB) 0.5-2.5 (3) MG/3ML SOLN nebulizer solution INHALE ONE VIAL (3MLS) VIA NEBULIZATION BY MOUTH FOUR TIMES A DAY 1080 mL 3    diclofenac (VOLTAREN) 75 MG EC tablet Take 1 tablet by mouth 2 times daily 60 tablet 1    acetaminophen (TYLENOL) 325 MG tablet Take 650 mg by mouth every 6 hours as needed for Pain      albuterol sulfate HFA (VENTOLIN HFA) 108 (90 Base) MCG/ACT inhaler Inhale 2 puffs into the lungs every 8 hours          Allergies:  Patient has no known allergies. History of allergic reaction to anesthesia:  No    Social History:   TOBACCO:   reports that he quit smoking about 7 years ago. His smoking use included cigarettes. He has a 50.00 pack-year smoking history. He has never used smokeless tobacco.  ETOH:   reports that he drinks about 8.4 oz of alcohol per week. DRUGS:   has no drug history on file. Family History:       Problem Relation Age of Onset    High Blood Pressure Mother     High Cholesterol Mother     Heart Disease Mother     Stroke Mother     Diabetes Mother     Depression Mother     Mental Illness Mother     Cancer Father        PHYSICAL EXAM:      /70   Pulse 77   Temp 97.7 °F (36.5 °C) (Temporal)   Resp 16   Ht 6' (1.829 m)   Wt (!) 314 lb (142.4 kg)   SpO2 97%   BMI 42.59 kg/m²  I        Heart:  Normal apical impulse, regular rate and rhythm, normal S1 and S2, no S3 or S4, and no murmur noted    Lungs:  No increased work of breathing, good air exchange, clear to auscultation bilaterally, no crackles or wheezing    Abdomen:  Normal bowel sounds, soft, non-distended, non-tender, no masses palpated, no hepatosplenomegally      ASA Grade:  ASA 3 - Patient with moderate systemic disease with functional limitations    Mallampati Class:  Class I: Soft palate, uvula, fauces, pillars visible  __________  Class II: Soft palate, uvula, fauces visible  __________   Class III: Soft palate, base of uvula visible  ____X_____  Class IV: Hard palate only visible   __________        ASSESSMENT AND PLAN:    1.   Patient is a 69 y.o. male here for colonoscopy with anesthesia  2. Procedure options, risks and benefits reviewed with patient. Patient expresses understanding.      Rekha Gaytan MD  [address]  5/7/2019

## 2019-05-07 NOTE — OP NOTE
[address] [place] [address]  Endoscopy Note    Patient: Red Renner  : 1945  CSN: [id number]    Procedure: Colonoscopy with terminal ileum intubation    Date:  2019    Surgeon:  Bridger Carbajal MD    Referring Physician:   Tom Velasquez    Preoperative Diagnosis:   High risk colon cancer screening    Postoperative Diagnosis:   Moderate left colon diverticulosis    Anesthesia:  TIVA/MAC per anesthesia     EBL: <50 mL    Indications: This is a 69y.o. year old male who presents today with previous adenomatous polyp for high risk screening for colon cancer. Procedure: An informed consent was obtained from the patient after explanation of indications, benefits, possible risks and complications of the procedure. The patient was then taken to the endoscopy suite, placed in the left lateral decubitus position, and the above IV anesthesia was administered. A digital rectal examination was performed and was negative without mass, lesions or tenderness. The Olympus CFQ-180-AL video colonoscope was placed in the patient's rectum under digital direction and advanced to the cecum. The cecum was identified by characteristic anatomy and ballottment. The prep was fair. The ileocecal valve was identified and intubated     Visualization of the terminal ileum demonstrated normal mucosa. The scope was then withdrawn back through the cecum, ascending, transverse, descending and sigmoid colons. Carefull circumferential examination of the mucosa in these areas demonstrated:  1) Multiple diverticula noted throughout distal transverse colon, descending and sigmoid colons. 2) Otherwise, normal colonic mucosa throughout. The scope was then withdrawn into the rectum and retroflexed. The retroflexed view of the anal verge and rectum demonstrates no abnormalities. The scope was straightened, the colon was decompressed and the scope was withdrawn from the patient.   The patient tolerated the procedure well and was taken to the PACU in good condition. Impression:    1) Moderate left colon diverticulosis     2) Otherwise normal colon. 3) Normal terminal ileum. Recommendations:   1) Resume meds and diet. 2) Recall colonoscopy in 5 years.      3) Follow up with referring MD.    Nadira Blanchard, [address] [facility] [address]  5/7/2019

## 2019-05-08 RX ORDER — GABAPENTIN 300 MG/1
CAPSULE ORAL
Qty: 90 CAPSULE | Refills: 2 | Status: SHIPPED | OUTPATIENT
Start: 2019-05-08 | End: 2019-08-30 | Stop reason: SDUPTHER

## 2019-05-19 DIAGNOSIS — L02.232: ICD-10-CM

## 2019-05-20 RX ORDER — DICLOFENAC SODIUM 75 MG/1
TABLET, DELAYED RELEASE ORAL
Qty: 60 TABLET | Refills: 0 | Status: SHIPPED | OUTPATIENT
Start: 2019-05-20 | End: 2020-03-13

## 2019-05-30 ENCOUNTER — CARE COORDINATION (OUTPATIENT)
Dept: CARE COORDINATION | Age: 74
End: 2019-05-30

## 2019-06-14 ENCOUNTER — CARE COORDINATION (OUTPATIENT)
Dept: CARE COORDINATION | Age: 74
End: 2019-06-14

## 2019-07-05 RX ORDER — LISINOPRIL 2.5 MG/1
TABLET ORAL
Qty: 90 TABLET | Refills: 1 | Status: SHIPPED | OUTPATIENT
Start: 2019-07-05 | End: 2019-12-31

## 2019-07-08 ENCOUNTER — CARE COORDINATION (OUTPATIENT)
Dept: CARE COORDINATION | Age: 74
End: 2019-07-08

## 2019-07-22 DIAGNOSIS — J44.9 CHRONIC OBSTRUCTIVE PULMONARY DISEASE, UNSPECIFIED COPD TYPE (HCC): ICD-10-CM

## 2019-07-22 RX ORDER — IPRATROPIUM BROMIDE AND ALBUTEROL SULFATE 2.5; .5 MG/3ML; MG/3ML
1 SOLUTION RESPIRATORY (INHALATION) EVERY 6 HOURS PRN
Qty: 1080 ML | Refills: 3 | Status: SHIPPED | OUTPATIENT
Start: 2019-07-22 | End: 2020-07-16

## 2019-08-05 ENCOUNTER — CARE COORDINATION (OUTPATIENT)
Dept: CARE COORDINATION | Age: 74
End: 2019-08-05

## 2019-08-05 RX ORDER — FUROSEMIDE 40 MG/1
TABLET ORAL
Qty: 180 TABLET | Refills: 0 | Status: SHIPPED | OUTPATIENT
Start: 2019-08-05 | End: 2019-11-29 | Stop reason: SDUPTHER

## 2019-08-22 ENCOUNTER — OFFICE VISIT (OUTPATIENT)
Dept: INTERNAL MEDICINE CLINIC | Age: 74
End: 2019-08-22
Payer: MEDICARE

## 2019-08-22 VITALS
BODY MASS INDEX: 44.35 KG/M2 | HEART RATE: 94 BPM | DIASTOLIC BLOOD PRESSURE: 80 MMHG | SYSTOLIC BLOOD PRESSURE: 130 MMHG | OXYGEN SATURATION: 94 % | WEIGHT: 315 LBS

## 2019-08-22 DIAGNOSIS — E78.5 HYPERLIPIDEMIA, UNSPECIFIED HYPERLIPIDEMIA TYPE: ICD-10-CM

## 2019-08-22 DIAGNOSIS — E11.9 TYPE 2 DIABETES MELLITUS WITHOUT COMPLICATION, WITHOUT LONG-TERM CURRENT USE OF INSULIN (HCC): Primary | ICD-10-CM

## 2019-08-22 DIAGNOSIS — I10 ESSENTIAL HYPERTENSION: ICD-10-CM

## 2019-08-22 PROCEDURE — 99214 OFFICE O/P EST MOD 30 MIN: CPT | Performed by: INTERNAL MEDICINE

## 2019-08-22 ASSESSMENT — ENCOUNTER SYMPTOMS
VOMITING: 0
SHORTNESS OF BREATH: 0
DIARRHEA: 0
COUGH: 0
WHEEZING: 1
ABDOMINAL PAIN: 1
NAUSEA: 0
BLOOD IN STOOL: 0

## 2019-08-22 NOTE — PROGRESS NOTES
CAPSULE BY MOUTH THREE TIMES A DAY  Barron Whitten MD        Social History     Tobacco Use    Smoking status: Former Smoker     Packs/day: 1.00     Years: 50.00     Pack years: 50.00     Types: Cigarettes     Last attempt to quit: 2012     Years since quittin.5    Smokeless tobacco: Never Used    Tobacco comment: H.O. smoking 1 p.p.d. Quit 9 months ago    Substance Use Topics    Alcohol use: Yes     Alcohol/week: 14.0 standard drinks     Types: 14 Shots of liquor per week     Comment: 3 drinks a evening            Vitals:    19 1028   BP: 130/80   Site: Left Upper Arm   Position: Sitting   Cuff Size: Large Adult   Pulse: 94   SpO2: 94%   Weight: (!) 327 lb (148.3 kg)     Body mass index is 44.35 kg/m². Wt Readings from Last 3 Encounters:   19 (!) 327 lb (148.3 kg)   19 (!) 314 lb (142.4 kg)   19 (!) 325 lb 12.8 oz (147.8 kg)     BP Readings from Last 3 Encounters:   19 130/80   19 119/70   19 (!) 117/57       Physical Exam   Constitutional: He appears well-developed and well-nourished. No distress. HENT:   Head: Normocephalic. Right Ear: External ear normal.   Left Ear: External ear normal.   Mouth/Throat: No oropharyngeal exudate. Eyes: Pupils are equal, round, and reactive to light. Conjunctivae are normal. Right eye exhibits no discharge. Neck: Normal range of motion. No tracheal deviation present. No thyromegaly present. Cardiovascular: Normal rate, regular rhythm, normal heart sounds and intact distal pulses. Exam reveals no gallop and no friction rub. No murmur heard. Pulmonary/Chest: Effort normal and breath sounds normal. No stridor. No respiratory distress. He has no wheezes. Abdominal: Soft. He exhibits no mass. There is no tenderness. There is no guarding. Musculoskeletal: Normal range of motion. Skin: He is not diaphoretic. ASSESSMENT/PLAN:   70 yo male presents for follow up for diabetes.     1. Diabetes Mellitus  - Patient is compliant with medications and measures fasting and postprandial blood glucose. - Will return for  A1C check    2. Hypertension  - Patient is compliant with medications. - BP within normal limits  - Continue medication as prescribed. - Will return for lipid panel      3. Leg Cramps  - Likely hypokalemia  - Will return for CMP     4. Hyperlipidemia  -  Currently not on a statin  -  Cholesterol is at goal  -  Check levels today - if elevated, start a statin. An electronic signature was used to authenticate this note.     --Enrike Combs MD on 8/22/2019 at 10:29 AM

## 2019-08-23 LAB
A/G RATIO: 2 (ref 1.1–2.2)
ALBUMIN SERPL-MCNC: 4.3 G/DL (ref 3.4–5)
ALP BLD-CCNC: 41 U/L (ref 40–129)
ALT SERPL-CCNC: 14 U/L (ref 10–40)
ANION GAP SERPL CALCULATED.3IONS-SCNC: 13 MMOL/L (ref 3–16)
AST SERPL-CCNC: 12 U/L (ref 15–37)
BILIRUB SERPL-MCNC: 0.3 MG/DL (ref 0–1)
BUN BLDV-MCNC: 20 MG/DL (ref 7–20)
CALCIUM SERPL-MCNC: 9.1 MG/DL (ref 8.3–10.6)
CHLORIDE BLD-SCNC: 102 MMOL/L (ref 99–110)
CHOLESTEROL, TOTAL: 160 MG/DL (ref 0–199)
CO2: 26 MMOL/L (ref 21–32)
CREAT SERPL-MCNC: 1.1 MG/DL (ref 0.8–1.3)
CREATININE URINE: 75.8 MG/DL (ref 39–259)
GFR AFRICAN AMERICAN: >60
GFR NON-AFRICAN AMERICAN: >60
GLOBULIN: 2.1 G/DL
GLUCOSE BLD-MCNC: 93 MG/DL (ref 70–99)
HDLC SERPL-MCNC: 73 MG/DL (ref 40–60)
LDL CHOLESTEROL CALCULATED: 68 MG/DL
MICROALBUMIN UR-MCNC: <1.2 MG/DL
MICROALBUMIN/CREAT UR-RTO: NORMAL MG/G (ref 0–30)
POTASSIUM SERPL-SCNC: 4.6 MMOL/L (ref 3.5–5.1)
SODIUM BLD-SCNC: 141 MMOL/L (ref 136–145)
TOTAL PROTEIN: 6.4 G/DL (ref 6.4–8.2)
TRIGL SERPL-MCNC: 93 MG/DL (ref 0–150)
VLDLC SERPL CALC-MCNC: 19 MG/DL

## 2019-08-24 LAB
ESTIMATED AVERAGE GLUCOSE: 114 MG/DL
HBA1C MFR BLD: 5.6 %

## 2019-08-30 DIAGNOSIS — E13.40 NEUROPATHY DUE TO SECONDARY DIABETES (HCC): ICD-10-CM

## 2019-08-30 RX ORDER — GABAPENTIN 300 MG/1
CAPSULE ORAL
Qty: 90 CAPSULE | Refills: 1 | Status: SHIPPED | OUTPATIENT
Start: 2019-08-30 | End: 2019-11-05 | Stop reason: SDUPTHER

## 2019-09-18 ENCOUNTER — CARE COORDINATION (OUTPATIENT)
Dept: CARE COORDINATION | Age: 74
End: 2019-09-18

## 2019-10-25 ENCOUNTER — OFFICE VISIT (OUTPATIENT)
Dept: PULMONOLOGY | Age: 74
End: 2019-10-25
Payer: MEDICARE

## 2019-10-25 ENCOUNTER — CARE COORDINATION (OUTPATIENT)
Dept: CARE COORDINATION | Age: 74
End: 2019-10-25

## 2019-10-25 VITALS — DIASTOLIC BLOOD PRESSURE: 79 MMHG | SYSTOLIC BLOOD PRESSURE: 132 MMHG | HEART RATE: 94 BPM | OXYGEN SATURATION: 94 %

## 2019-10-25 DIAGNOSIS — J44.9 COPD, VERY SEVERE (HCC): Primary | ICD-10-CM

## 2019-10-25 DIAGNOSIS — G47.33 OSA (OBSTRUCTIVE SLEEP APNEA): Chronic | ICD-10-CM

## 2019-10-25 DIAGNOSIS — E66.01 MORBID OBESITY (HCC): Chronic | ICD-10-CM

## 2019-10-25 DIAGNOSIS — J96.11 CHRONIC RESPIRATORY FAILURE WITH HYPOXIA (HCC): ICD-10-CM

## 2019-10-25 PROCEDURE — 99213 OFFICE O/P EST LOW 20 MIN: CPT | Performed by: INTERNAL MEDICINE

## 2019-11-05 DIAGNOSIS — E13.40 NEUROPATHY DUE TO SECONDARY DIABETES (HCC): ICD-10-CM

## 2019-11-05 RX ORDER — GABAPENTIN 300 MG/1
CAPSULE ORAL
Qty: 90 CAPSULE | Refills: 3 | Status: SHIPPED | OUTPATIENT
Start: 2019-11-05 | End: 2020-03-10

## 2019-11-11 RX ORDER — BLOOD SUGAR DIAGNOSTIC
STRIP MISCELLANEOUS
Qty: 50 STRIP | Refills: 8 | Status: SHIPPED | OUTPATIENT
Start: 2019-11-11 | End: 2019-12-23 | Stop reason: SDUPTHER

## 2019-12-04 RX ORDER — FUROSEMIDE 40 MG/1
TABLET ORAL
Qty: 180 TABLET | Refills: 0 | Status: SHIPPED | OUTPATIENT
Start: 2019-12-04 | End: 2020-03-13

## 2019-12-23 ENCOUNTER — OFFICE VISIT (OUTPATIENT)
Dept: INTERNAL MEDICINE CLINIC | Age: 74
End: 2019-12-23
Payer: MEDICARE

## 2019-12-23 VITALS — DIASTOLIC BLOOD PRESSURE: 60 MMHG | SYSTOLIC BLOOD PRESSURE: 118 MMHG | HEART RATE: 96 BPM | OXYGEN SATURATION: 96 %

## 2019-12-23 DIAGNOSIS — J44.9 COPD, VERY SEVERE (HCC): Primary | ICD-10-CM

## 2019-12-23 DIAGNOSIS — E11.9 TYPE 2 DIABETES MELLITUS WITHOUT COMPLICATION, WITHOUT LONG-TERM CURRENT USE OF INSULIN (HCC): ICD-10-CM

## 2019-12-23 PROCEDURE — 99213 OFFICE O/P EST LOW 20 MIN: CPT | Performed by: INTERNAL MEDICINE

## 2019-12-23 RX ORDER — BLOOD-GLUCOSE METER
1 EACH MISCELLANEOUS DAILY
Qty: 1 KIT | Refills: 0 | Status: SHIPPED | OUTPATIENT
Start: 2019-12-23

## 2019-12-23 RX ORDER — BLOOD-GLUCOSE METER
EACH MISCELLANEOUS
COMMUNITY
End: 2019-12-23 | Stop reason: SDUPTHER

## 2019-12-23 ASSESSMENT — ENCOUNTER SYMPTOMS
EYE PAIN: 0
COUGH: 0
EYE REDNESS: 0
CHOKING: 0

## 2019-12-31 RX ORDER — LISINOPRIL 2.5 MG/1
TABLET ORAL
Qty: 90 TABLET | Refills: 0 | Status: SHIPPED | OUTPATIENT
Start: 2019-12-31 | End: 2020-01-02

## 2020-01-02 RX ORDER — LISINOPRIL 2.5 MG/1
TABLET ORAL
Qty: 90 TABLET | Refills: 0 | Status: SHIPPED | OUTPATIENT
Start: 2020-01-02 | End: 2020-03-30

## 2020-01-26 RX ORDER — ALLOPURINOL 100 MG/1
TABLET ORAL
Qty: 90 TABLET | Refills: 3 | Status: SHIPPED
Start: 2020-01-26 | End: 2020-04-23 | Stop reason: DRUGHIGH

## 2020-03-02 RX ORDER — PREDNISONE 1 MG/1
TABLET ORAL
Qty: 90 TABLET | Refills: 0 | Status: SHIPPED | OUTPATIENT
Start: 2020-03-02 | End: 2020-06-01

## 2020-03-10 RX ORDER — GABAPENTIN 300 MG/1
CAPSULE ORAL
Qty: 90 CAPSULE | Refills: 5 | Status: SHIPPED | OUTPATIENT
Start: 2020-03-10 | End: 2020-09-09

## 2020-03-13 RX ORDER — FUROSEMIDE 40 MG/1
TABLET ORAL
Qty: 180 TABLET | Refills: 3 | Status: SHIPPED | OUTPATIENT
Start: 2020-03-13 | End: 2020-03-18

## 2020-03-13 RX ORDER — DICLOFENAC SODIUM 75 MG/1
TABLET, DELAYED RELEASE ORAL
Qty: 180 TABLET | Refills: 0 | Status: ON HOLD | OUTPATIENT
Start: 2020-03-13 | End: 2022-09-04

## 2020-03-18 RX ORDER — FUROSEMIDE 40 MG/1
TABLET ORAL
Qty: 180 TABLET | Refills: 0 | Status: SHIPPED | OUTPATIENT
Start: 2020-03-18 | End: 2021-04-15

## 2020-03-31 RX ORDER — LISINOPRIL 2.5 MG/1
TABLET ORAL
Qty: 90 TABLET | Refills: 1 | Status: SHIPPED | OUTPATIENT
Start: 2020-03-31 | End: 2020-06-26

## 2020-04-21 ENCOUNTER — TELEPHONE (OUTPATIENT)
Dept: INTERNAL MEDICINE CLINIC | Age: 75
End: 2020-04-21

## 2020-04-21 ENCOUNTER — OFFICE VISIT (OUTPATIENT)
Dept: INTERNAL MEDICINE CLINIC | Age: 75
End: 2020-04-21
Payer: MEDICARE

## 2020-04-21 VITALS
SYSTOLIC BLOOD PRESSURE: 130 MMHG | HEART RATE: 80 BPM | DIASTOLIC BLOOD PRESSURE: 70 MMHG | TEMPERATURE: 97.5 F | BODY MASS INDEX: 45.16 KG/M2 | OXYGEN SATURATION: 96 % | WEIGHT: 315 LBS

## 2020-04-21 DIAGNOSIS — M1A.09X0 CHRONIC GOUT OF MULTIPLE SITES, UNSPECIFIED CAUSE: ICD-10-CM

## 2020-04-21 DIAGNOSIS — R60.0 LEG EDEMA, RIGHT: ICD-10-CM

## 2020-04-21 LAB
ALBUMIN SERPL-MCNC: 4.4 G/DL (ref 3.4–5)
ANION GAP SERPL CALCULATED.3IONS-SCNC: 12 MMOL/L (ref 3–16)
BANDED NEUTROPHILS RELATIVE PERCENT: 1 % (ref 0–7)
BASOPHILS ABSOLUTE: 0.1 K/UL (ref 0–0.2)
BASOPHILS RELATIVE PERCENT: 1 %
BUN BLDV-MCNC: 26 MG/DL (ref 7–20)
CALCIUM SERPL-MCNC: 9.4 MG/DL (ref 8.3–10.6)
CHLORIDE BLD-SCNC: 103 MMOL/L (ref 99–110)
CO2: 28 MMOL/L (ref 21–32)
CREAT SERPL-MCNC: 1.2 MG/DL (ref 0.8–1.3)
D DIMER: <200 NG/ML DDU (ref 0–229)
EOSINOPHILS ABSOLUTE: 0.2 K/UL (ref 0–0.6)
EOSINOPHILS RELATIVE PERCENT: 4 %
GFR AFRICAN AMERICAN: >60
GFR NON-AFRICAN AMERICAN: 59
GLUCOSE BLD-MCNC: 111 MG/DL (ref 70–99)
HCT VFR BLD CALC: 42.6 % (ref 40.5–52.5)
HEMOGLOBIN: 13.8 G/DL (ref 13.5–17.5)
LYMPHOCYTES ABSOLUTE: 1.2 K/UL (ref 1–5.1)
LYMPHOCYTES RELATIVE PERCENT: 21 %
MCH RBC QN AUTO: 33.9 PG (ref 26–34)
MCHC RBC AUTO-ENTMCNC: 32.3 G/DL (ref 31–36)
MCV RBC AUTO: 104.8 FL (ref 80–100)
MONOCYTES ABSOLUTE: 0.3 K/UL (ref 0–1.3)
MONOCYTES RELATIVE PERCENT: 5 %
NEUTROPHILS ABSOLUTE: 3.9 K/UL (ref 1.7–7.7)
NEUTROPHILS RELATIVE PERCENT: 68 %
PDW BLD-RTO: 13.5 % (ref 12.4–15.4)
PHOSPHORUS: 3.6 MG/DL (ref 2.5–4.9)
PLATELET # BLD: 266 K/UL (ref 135–450)
PMV BLD AUTO: 7.6 FL (ref 5–10.5)
POTASSIUM SERPL-SCNC: 4.8 MMOL/L (ref 3.5–5.1)
RBC # BLD: 4.06 M/UL (ref 4.2–5.9)
RBC # BLD: NORMAL 10*6/UL
SEDIMENTATION RATE, ERYTHROCYTE: 27 MM/HR (ref 0–20)
SODIUM BLD-SCNC: 143 MMOL/L (ref 136–145)
URIC ACID, SERUM: 7.5 MG/DL (ref 3.5–7.2)
WBC # BLD: 5.7 K/UL (ref 4–11)

## 2020-04-21 PROCEDURE — 99213 OFFICE O/P EST LOW 20 MIN: CPT | Performed by: INTERNAL MEDICINE

## 2020-04-21 ASSESSMENT — PATIENT HEALTH QUESTIONNAIRE - PHQ9
1. LITTLE INTEREST OR PLEASURE IN DOING THINGS: 0
SUM OF ALL RESPONSES TO PHQ QUESTIONS 1-9: 0
SUM OF ALL RESPONSES TO PHQ QUESTIONS 1-9: 0
2. FEELING DOWN, DEPRESSED OR HOPELESS: 0
SUM OF ALL RESPONSES TO PHQ9 QUESTIONS 1 & 2: 0

## 2020-04-21 NOTE — PATIENT INSTRUCTIONS
Check blood work for blood clots, signs of infection, gout flare  Your symptoms are likely a result of venous stasis  To help this, take an extra dose of lasix tomorrow and the next day (spaced by 6 hours)   Keep legs elevated  No salt!   Limit beers to two per day

## 2020-04-23 RX ORDER — ALLOPURINOL 100 MG/1
200 TABLET ORAL DAILY
Qty: 180 TABLET | Refills: 1 | Status: SHIPPED | OUTPATIENT
Start: 2020-04-23 | End: 2020-10-16

## 2020-06-01 RX ORDER — PREDNISONE 1 MG/1
TABLET ORAL
Qty: 90 TABLET | Refills: 0 | Status: SHIPPED | OUTPATIENT
Start: 2020-06-01 | End: 2020-08-31

## 2020-06-26 RX ORDER — LISINOPRIL 2.5 MG/1
TABLET ORAL
Qty: 90 TABLET | Refills: 3 | Status: SHIPPED | OUTPATIENT
Start: 2020-06-26 | End: 2021-09-28 | Stop reason: SDUPTHER

## 2020-07-16 ENCOUNTER — OFFICE VISIT (OUTPATIENT)
Dept: INTERNAL MEDICINE CLINIC | Age: 75
End: 2020-07-16
Payer: MEDICARE

## 2020-07-16 VITALS
OXYGEN SATURATION: 93 % | TEMPERATURE: 97.5 F | SYSTOLIC BLOOD PRESSURE: 110 MMHG | DIASTOLIC BLOOD PRESSURE: 70 MMHG | HEART RATE: 89 BPM

## 2020-07-16 PROCEDURE — 99214 OFFICE O/P EST MOD 30 MIN: CPT | Performed by: INTERNAL MEDICINE

## 2020-07-16 RX ORDER — IPRATROPIUM BROMIDE AND ALBUTEROL SULFATE 2.5; .5 MG/3ML; MG/3ML
1 SOLUTION RESPIRATORY (INHALATION) EVERY 6 HOURS PRN
Qty: 120 VIAL | Refills: 6
Start: 2020-07-16 | End: 2021-05-13

## 2020-07-16 NOTE — PROGRESS NOTES
2020     Renita Faulkner (:  1945) is a 70 y.o. male, here for evaluation of the following medical concerns:    HPI  Treatment Adherence:   Medication compliance:  compliant most of the time  Diet compliance:  compliant most of the time  Weight trend: increasing  Current exercise: no regular exercise  Barriers: none    Diabetes Mellitus Type 2: Current symptoms/problems include none. Home blood sugar records: fasting range: 100-130  Any episodes of hypoglycemia? no  Eye exam current (within one year): no  Tobacco history: He  reports that he quit smoking about 8 years ago. His smoking use included cigarettes. He has a 50.00 pack-year smoking history. He has never used smokeless tobacco.   Daily Aspirin? Yes    Hypertension:  Home blood pressure monitoring: No.  He is adherent to a low sodium diet. Patient denies chest pain, shortness of breath, headache and lightheadedness. Antihypertensive medication side effects: no medication side effects noted. Use of agents associated with hypertension: none. Hyperlipidemia:  No new myalgias or GI upset on no medications. Lab Results   Component Value Date    LABA1C 5.6 2019    LABA1C 5.1 2018    LABA1C 5.5 2017     Lab Results   Component Value Date    LABMICR <1.20 2019    CREATININE 1.2 2020     Lab Results   Component Value Date    ALT 14 2019    AST 12 (L) 2019     Lab Results   Component Value Date    CHOL 160 2019    TRIG 93 2019    HDL 73 (H) 2019    LDLCALC 68 2019          Review of Systems   Constitutional: Negative for diaphoresis and fatigue. HENT: Negative for drooling and ear discharge. Eyes: Negative for pain and redness. Respiratory: Positive for shortness of breath. Negative for choking and chest tightness. Prior to Visit Medications    Medication Sig Taking?  Authorizing Provider   ipratropium-albuterol (DUONEB) 0.5-2.5 (3) MG/3ML SOLN nebulizer Neurological:      Mental Status: He is alert. Gait: Gait normal.         ASSESSMENT/PLAN:  1. Type 2 diabetes mellitus without complication, without long-term current use of insulin (HCC)  Stable  -  Continue metformin  -  Reduce carbohydrate intake    2. Hyperlipidemia, unspecified hyperlipidemia type  Stable  -  Currently not on a statin  -  Lipids are stable off therapy  -  Patient wants to hold off on statin for now    3. Essential hypertension  Stable  -  Continue ace and lasix      Return in about 5 months (around 12/16/2020) for Medicare Wellness 45 min. An electronic signature was used to authenticate this note.     --Geo Santiago MD on 7/26/2020 at 9:39 PM

## 2020-07-26 ASSESSMENT — ENCOUNTER SYMPTOMS
EYE REDNESS: 0
CHEST TIGHTNESS: 0
SHORTNESS OF BREATH: 1
EYE PAIN: 0
CHOKING: 0

## 2020-08-17 ENCOUNTER — OFFICE VISIT (OUTPATIENT)
Dept: PULMONOLOGY | Age: 75
End: 2020-08-17
Payer: MEDICARE

## 2020-08-17 VITALS — DIASTOLIC BLOOD PRESSURE: 74 MMHG | HEART RATE: 94 BPM | OXYGEN SATURATION: 92 % | SYSTOLIC BLOOD PRESSURE: 137 MMHG

## 2020-08-17 PROBLEM — Z87.891 PERSONAL HISTORY OF TOBACCO USE, PRESENTING HAZARDS TO HEALTH: Status: ACTIVE | Noted: 2020-08-17

## 2020-08-17 PROCEDURE — 99214 OFFICE O/P EST MOD 30 MIN: CPT | Performed by: INTERNAL MEDICINE

## 2020-08-17 RX ORDER — IPRATROPIUM BROMIDE AND ALBUTEROL SULFATE 2.5; .5 MG/3ML; MG/3ML
SOLUTION RESPIRATORY (INHALATION)
Qty: 1080 ML | Refills: 2 | Status: SHIPPED
Start: 2020-08-17 | End: 2020-08-17 | Stop reason: CLARIF

## 2020-08-17 RX ORDER — IPRATROPIUM BROMIDE AND ALBUTEROL SULFATE 2.5; .5 MG/3ML; MG/3ML
1 SOLUTION RESPIRATORY (INHALATION) EVERY 4 HOURS
Qty: 1080 ML | Refills: 3 | Status: SHIPPED | OUTPATIENT
Start: 2020-08-17 | End: 2021-05-13

## 2020-08-17 NOTE — PROGRESS NOTES
[facility]  Progress Note  Fariba Hampton MD       Lisbeth Sherman   YOB: 1945    Date of Visit:  8/17/2020    Assessment/Plan:  1. COPD, very severe ([address]  PFT 3/13:  INTERPRETATION: Spirometry showed decreased FVC of 3.32 L, 65% predicted and  decreased FEV1 of 1.52 L 37% predicted. FEV1:FVC ratio was decreased. No  response to bronchodilators demonstrated. Lung volumes showed normal total  lung capacity of 94% predicted. Diffusion capacity showed decreased DLCO of  49% predicted.      IMPRESSION: Very severe obstructive defect with no response to  bronchodilators. Normal lung volumes with moderate to severe decrease in  diffusion capacity noted. In comparison to the test that was done in  February of 2011 no significant change in FVC noted but FEV1 has decreased by  22% and total lung capacity by 20% as well as DLCO by 10%. Breo -- he likes this better than Advair  Has Ventolin which he likes  Duoneb    He had LDCT chest which shows emphysema but no worrisome lesions. Recommend repeat imaging now. 2. Chronic respiratory failure with hypoxia (HCC)  O2 sats are acceptable on supplemental O2. The patient benefits from the use of supplemental O2.    3. Type 2 diabetes mellitus without complication, without long-term current use of insulin (HCC)  Well controlled    4. STEF (obstructive sleep apnea)  On CPAP    5. Morbid obesity due to excess calories (HCC)  Contributes to his SOB    6. Essential hypertension  BP is ok      FOLLOW UP: 6 months for pulmonary. Chief Complaint   Patient presents with    Shortness of Breath     6 month f.u.       HPI  The patient presents with a chief complaint of moderate shortness of breath related to very severe COPD of many years duration. He has mild associated cough. Exertion is a modifying factor. He takes Breo (which he finds better than Advair) and duoneb for very severe COPD of many years duration.  He does use O2 to sleep. No Chest pain, Nausea or vomiting reported. Review of Systems  As documented in HPI     Physical Exam:  Well developed, well nourished  Alert and oriented  Sclera is clear  No cervical adenopathy  No JVD. Chest examination is fine wheezing. Cardiac examination reveals regular rate and rhythm without murmur, gallop or rub. The abdomen is soft, nontender and nondistended. There is no clubbing, cyanosis or edema of the extremities. There is no obvious skin rash. No focal neuro deficicts  Normal mood and affect    No Known Allergies  Prior to Visit Medications    Medication Sig Taking? Authorizing Provider   metFORMIN (GLUCOPHAGE) 1000 MG tablet TAKE ONE TABLET BY MOUTH TWICE A DAY WITH MEALS  Brandee Roman MD   ipratropium-albuterol (DUONEB) 0.5-2.5 (3) MG/3ML SOLN nebulizer solution Take 3 mLs by nebulization every 6 hours as needed for Shortness of Breath Dx: COPD   ICD-10: J44.9  Fariba Hampton MD   lisinopril (PRINIVIL;ZESTRIL) 2.5 MG tablet TAKE ONE TABLET BY MOUTH DAILY  Brandee Roman MD   predniSONE (DELTASONE) 5 MG tablet TAKE ONE TABLET BY MOUTH DAILY  Brandee Roman MD   allopurinol (ZYLOPRIM) 100 MG tablet Take 2 tablets by mouth daily  Gildardo Mackay MD   BREO ELLIPTA 200-25 MCG/INH AEPB inhaler INHALE ONE DOSE BY MOUTH DAILY  Fariba Hampton MD   furosemide (LASIX) 40 MG tablet TAKE ONE TABLET BY MOUTH TWICE A DAY  Brandee Roman MD   diclofenac (VOLTAREN) 75 MG EC tablet TAKE ONE TABLET BY MOUTH TWICE A DAY  Brandee Roman MD   gabapentin (NEURONTIN) 300 MG capsule TAKE ONE CAPSULE BY MOUTH THREE TIMES A DAY  Brandee Roman MD   VENTOLIN  (90 Base) MCG/ACT inhaler INHALE TWO PUFFS BY MOUTH EVERY 6 HOURS AS NEEDED  Fariba Hampton MD   blood glucose test strips (ACCU-CHEK CLARISSA PLUS) strip USE TO CHECK BLOOD SUGAR DAILY.  Please dispense Dedrick Hams test strips  Brandee Roman MD   Lancets 30G MISC 1 each by Does not apply route daily Please dispense Mayank Salas lancmaci Rincon MD   Blood Glucose Monitoring Suppl (ACCU-CHEK CLARISSA PLUS) w/Device KIT 1 each by Does not apply route daily  Minor Rincon MD   ipratropium-albuterol (DUONEB) 0.5-2.5 (3) MG/3ML SOLN nebulizer solution INHALE 3 MILLILITERS BY NEBULIZATION FOUR TIMES A DAY. DX:COPD J44.9  Minor Rincon MD   acetaminophen (TYLENOL) 325 MG tablet Take 650 mg by mouth every 6 hours as needed for Pain  Historical Provider, MD       Vitals:    20 1314   BP: 137/74   Pulse: 94   SpO2: 92%     There is no height or weight on file to calculate BMI.      Wt Readings from Last 3 Encounters:   20 (!) 333 lb (151 kg)   19 (!) 327 lb (148.3 kg)   19 (!) 314 lb (142.4 kg)     BP Readings from Last 3 Encounters:   20 137/74   20 110/70   20 130/70        Social History     Tobacco Use   Smoking Status Former Smoker    Packs/day: 1.00    Years: 50.00    Pack years: 50.00    Types: Cigarettes    Last attempt to quit: 2012    Years since quittin.5   Smokeless Tobacco Never Used   Tobacco Comment    H.O. smoking 1 p.p.d. Quit 9 months ago

## 2020-08-31 RX ORDER — PREDNISONE 1 MG/1
TABLET ORAL
Qty: 90 TABLET | Refills: 0 | Status: SHIPPED | OUTPATIENT
Start: 2020-08-31 | End: 2020-11-30

## 2020-09-09 RX ORDER — GABAPENTIN 300 MG/1
CAPSULE ORAL
Qty: 90 CAPSULE | Refills: 5 | Status: SHIPPED | OUTPATIENT
Start: 2020-09-09 | End: 2021-03-05

## 2020-10-08 ENCOUNTER — TELEPHONE (OUTPATIENT)
Dept: PULMONOLOGY | Age: 75
End: 2020-10-08

## 2020-10-08 NOTE — TELEPHONE ENCOUNTER
Lissette with [facility] called in stating that Pt is coming in for a mask fitting and to get new supplies on 10/15. Preet Albarado is needing updated office notes and an order for supplies.      Preet Albarado fax # [phone]

## 2020-10-15 ENCOUNTER — TELEPHONE (OUTPATIENT)
Dept: PULMONOLOGY | Age: 75
End: 2020-10-15

## 2020-10-15 NOTE — TELEPHONE ENCOUNTER
Angeles Ba with [facility] called in stating that Pt's insurance is set-up that he needs a download every time he needs PAP supplies. Angeles Ba stated the problem is Pt's machine is so old the SD card will no longer allow them to get the downloads. Pt is needing an appt for notes that a new machine is needed and he would like this done sooner than when Spike Garcia is back in office. Can Pt be seen by Angela Roth or  to get this done?     Pt # [phone]

## 2020-10-15 NOTE — TELEPHONE ENCOUNTER
I spoke with the pt to offer him an appt with Dr Rylee Chappell or Dr Hershel Halsted but he wants to wait until Dr Vargas Fitting returns so he made an appt on 10/30/20 at 4 pm - pt states that he does not need supplies at this time

## 2020-10-16 RX ORDER — ALLOPURINOL 100 MG/1
TABLET ORAL
Qty: 180 TABLET | Refills: 1 | Status: SHIPPED | OUTPATIENT
Start: 2020-10-16 | End: 2020-10-19

## 2020-10-19 RX ORDER — ALLOPURINOL 100 MG/1
TABLET ORAL
Qty: 180 TABLET | Refills: 1 | Status: SHIPPED | OUTPATIENT
Start: 2020-10-19 | End: 2021-10-13

## 2020-11-10 ENCOUNTER — OFFICE VISIT (OUTPATIENT)
Dept: PULMONOLOGY | Age: 75
End: 2020-11-10
Payer: MEDICARE

## 2020-11-10 VITALS — DIASTOLIC BLOOD PRESSURE: 85 MMHG | OXYGEN SATURATION: 95 % | HEART RATE: 81 BPM | SYSTOLIC BLOOD PRESSURE: 138 MMHG

## 2020-11-10 PROCEDURE — 99213 OFFICE O/P EST LOW 20 MIN: CPT | Performed by: INTERNAL MEDICINE

## 2020-11-10 NOTE — PROGRESS NOTES
[facility]  Progress Note  Maria Victoria Galvan MD       Manish Flatness   YOB: 1945    Date of Visit:  11/10/2020    Assessment/Plan:  1. COPD, very severe ([address]  PFT 3/13:  INTERPRETATION: Spirometry showed decreased FVC of 3.32 L, 65% predicted and  decreased FEV1 of 1.52 L 37% predicted. FEV1:FVC ratio was decreased. No  response to bronchodilators demonstrated. Lung volumes showed normal total  lung capacity of 94% predicted. Diffusion capacity showed decreased DLCO of  49% predicted.      IMPRESSION: Very severe obstructive defect with no response to  bronchodilators. Normal lung volumes with moderate to severe decrease in  diffusion capacity noted. In comparison to the test that was done in  February of 2011 no significant change in FVC noted but FEV1 has decreased by  22% and total lung capacity by 20% as well as DLCO by 10%. Breo -- he likes this better than Advair  Has Ventolin which he likes  Duoneb  His HHN is broken beyond repair and needs to be replaced  He benefits from [facility]    He had LDCT chest which shows emphysema but no worrisome lesions. Recommend repeat imaging now. 2. Chronic respiratory failure with hypoxia (HCC)  O2 sats are acceptable on supplemental O2. The patient benefits from the use of supplemental O2.    3. Type 2 diabetes mellitus without complication, without long-term current use of insulin (HCC)  Well controlled    4. STEF (obstructive sleep apnea)  His CPAP is broken beyond repair  This is >8years old  Get his CPAP repalced. He benefits from CPAP. [place] 5. Morbid obesity due to excess calories (HCC)  Contributes to his SOB    6. Essential hypertension  BP is ok    He has had his flu shot    FOLLOW UP: 6 months for pulmonary. Chief Complaint   Patient presents with    Shortness of Breath     in need of a new nebulizer    Sleep Apnea     told by [facility] he needed to be seen in order to get a new cpap and supplies. his machine in > 8years old. HPI  The patient presents with a chief complaint of moderate shortness of breath related to very severe COPD of many years duration. He has mild associated cough. Exertion is a modifying factor. He takes Breo (which he finds better than Advair) and duoneb for very severe COPD of many years duration. He does use O2 to sleep. No Chest pain, Nausea or vomiting reported. Review of Systems  As documented in HPI     Physical Exam:  Well developed, well nourished  Alert and oriented  Sclera is clear  No cervical adenopathy  No JVD. Chest examination is fine wheezing. Cardiac examination reveals regular rate and rhythm without murmur, gallop or rub. The abdomen is soft, nontender and nondistended. There is no clubbing, cyanosis or edema of the extremities. There is no obvious skin rash. No focal neuro deficicts  Normal mood and affect    No Known Allergies  Prior to Visit Medications    Medication Sig Taking?  Authorizing Provider   allopurinol (ZYLOPRIM) 100 MG tablet TAKE TWO TABLETS BY MOUTH DAILY  Mati Tam MD   gabapentin (NEURONTIN) 300 MG capsule TAKE ONE CAPSULE BY MOUTH THREE TIMES A DAY  Mati Tam MD   predniSONE (DELTASONE) 5 MG tablet TAKE ONE TABLET BY MOUTH DAILY  Mati Tam MD   metFORMIN (GLUCOPHAGE) 1000 MG tablet TAKE ONE TABLET BY MOUTH TWICE A DAY WITH MEALS  Mati Tam MD   ipratropium-albuterol (DUONEB) 0.5-2.5 (3) MG/3ML SOLN nebulizer solution Inhale 3 mLs into the lungs every 4 hours DX:COPD J44.9  Marcela Acuña MD   ipratropium-albuterol (DUONEB) 0.5-2.5 (3) MG/3ML SOLN nebulizer solution Take 3 mLs by nebulization every 6 hours as needed for Shortness of Breath Dx: COPD   ICD-10: J44.9  Marcela Acuña MD   lisinopril (PRINIVIL;ZESTRIL) 2.5 MG tablet TAKE ONE TABLET BY MOUTH DAILY  Mati Tam MD   BREO ELLIPTA 200-25 MCG/INH AEPB inhaler INHALE ONE DOSE BY MOUTH DAILY  Serenity Judge MD Primo   furosemide (LASIX) 40 MG tablet TAKE ONE TABLET BY MOUTH TWICE A DAY  Yinka Boucher MD   diclofenac (VOLTAREN) 75 MG EC tablet TAKE ONE TABLET BY MOUTH TWICE A DAY  Yinka Boucher MD   VENTOLIN  (90 Base) MCG/ACT inhaler INHALE TWO PUFFS BY MOUTH EVERY 6 HOURS AS NEEDED  Martita Anne MD   blood glucose test strips (ACCU-CHEK CLARISSA PLUS) strip USE TO CHECK BLOOD SUGAR DAILY. Please dispense Tomás Wesley test strips  Yinka Boucher MD   Lancets 30G MISC 1 each by Does not apply route daily Please dispense Tomás Wesley lancets  Yinka Boucher MD   Blood Glucose Monitoring Suppl (ACCU-CHEK CLARISSA PLUS) w/Device KIT 1 each by Does not apply route daily  Yinka Boucher MD   acetaminophen (TYLENOL) 325 MG tablet Take 650 mg by mouth every 6 hours as needed for Pain  Historical Provider, MD       Vitals:    11/10/20 1131   BP: 138/85   Pulse: 81   SpO2: 95%     There is no height or weight on file to calculate BMI.      Wt Readings from Last 3 Encounters:   20 (!) 333 lb (151 kg)   19 (!) 327 lb (148.3 kg)   19 (!) 314 lb (142.4 kg)     BP Readings from Last 3 Encounters:   11/10/20 138/85   20 137/74   20 110/70        Social History     Tobacco Use   Smoking Status Former Smoker    Packs/day: 1.00    Years: 50.00    Pack years: 50.00    Types: Cigarettes    Last attempt to quit: 2012    Years since quittin.7   Smokeless Tobacco Never Used   Tobacco Comment    H.O. smoking 1 p.p.d. Quit 9 months ago

## 2020-11-30 RX ORDER — PREDNISONE 1 MG/1
TABLET ORAL
Qty: 90 TABLET | Refills: 0 | Status: SHIPPED | OUTPATIENT
Start: 2020-11-30 | End: 2021-02-23

## 2020-12-16 ENCOUNTER — OFFICE VISIT (OUTPATIENT)
Dept: INTERNAL MEDICINE CLINIC | Age: 75
End: 2020-12-16
Payer: MEDICARE

## 2020-12-16 VITALS
HEART RATE: 94 BPM | OXYGEN SATURATION: 94 % | DIASTOLIC BLOOD PRESSURE: 72 MMHG | WEIGHT: 315 LBS | BODY MASS INDEX: 42.99 KG/M2 | TEMPERATURE: 97.4 F | SYSTOLIC BLOOD PRESSURE: 124 MMHG

## 2020-12-16 PROCEDURE — G0439 PPPS, SUBSEQ VISIT: HCPCS | Performed by: INTERNAL MEDICINE

## 2020-12-16 ASSESSMENT — PATIENT HEALTH QUESTIONNAIRE - PHQ9
SUM OF ALL RESPONSES TO PHQ9 QUESTIONS 1 & 2: 0
SUM OF ALL RESPONSES TO PHQ QUESTIONS 1-9: 0
2. FEELING DOWN, DEPRESSED OR HOPELESS: 0
1. LITTLE INTEREST OR PLEASURE IN DOING THINGS: 0

## 2020-12-16 ASSESSMENT — LIFESTYLE VARIABLES
HOW MANY STANDARD DRINKS CONTAINING ALCOHOL DO YOU HAVE ON A TYPICAL DAY: 1
AUDIT-C TOTAL SCORE: 5
HOW OFTEN DURING THE LAST YEAR HAVE YOU NEEDED AN ALCOHOLIC DRINK FIRST THING IN THE MORNING TO GET YOURSELF GOING AFTER A NIGHT OF HEAVY DRINKING: 0
HAVE YOU OR SOMEONE ELSE BEEN INJURED AS A RESULT OF YOUR DRINKING: 0
HAS A RELATIVE, FRIEND, DOCTOR, OR ANOTHER HEALTH PROFESSIONAL EXPRESSED CONCERN ABOUT YOUR DRINKING OR SUGGESTED YOU CUT DOWN: 0
HOW OFTEN DO YOU HAVE SIX OR MORE DRINKS ON ONE OCCASION: 0
HOW OFTEN DURING THE LAST YEAR HAVE YOU FOUND THAT YOU WERE NOT ABLE TO STOP DRINKING ONCE YOU HAD STARTED: 0
AUDIT TOTAL SCORE: 5
HOW OFTEN DURING THE LAST YEAR HAVE YOU BEEN UNABLE TO REMEMBER WHAT HAPPENED THE NIGHT BEFORE BECAUSE YOU HAD BEEN DRINKING: 0
HOW OFTEN DO YOU HAVE A DRINK CONTAINING ALCOHOL: 4
HOW OFTEN DURING THE LAST YEAR HAVE YOU FAILED TO DO WHAT WAS NORMALLY EXPECTED FROM YOU BECAUSE OF DRINKING: 0
HOW OFTEN DURING THE LAST YEAR HAVE YOU HAD A FEELING OF GUILT OR REMORSE AFTER DRINKING: 0

## 2020-12-16 NOTE — PROGRESS NOTES
Advance Care Planning   Advanced Care Planning: Discussed the patients choices for care and treatment in case of a health event that adversely affects decision-making abilities. Also discussed the patients long-term treatment options. Reviewed with the patient the [address] [place] [address] of [address] [place] [address] Declaration forms  Reviewed the process of designating a competent adult as an Agent (or -in-fact) that could take make health care decisions for the patient if incompetent. Patient was asked to complete the declaration forms, either acknowledge the forms by a public notary or an eligible witness and provide a signed copy to the practice office. Time spent (minutes): 5 min     Medicare Annual Wellness Visit  Name: Geri Grief Date: 2020   MRN: [id number] Sex: Male   Age: 70 y.o. Ethnicity: Non-/Non    : 1945 Race: Colleen Goldsmith is here for Medicare AWV    Screenings for behavioral, psychosocial and functional/safety risks, and cognitive dysfunction are all negative except as indicated below. These results, as well as other patient data from the [address] form, are documented in Flowsheets linked to this Encounter. No Known Allergies      Prior to Visit Medications    Medication Sig Taking?  Authorizing Provider   BREO ELLIPTA 200-25 MCG/INH AEPB inhaler INHALE ONE DOSE BY MOUTH DAILY Yes Martita Anne MD   predniSONE (DELTASONE) 5 MG tablet TAKE ONE TABLET BY MOUTH DAILY Yes Yinka Boucher MD   allopurinol (ZYLOPRIM) 100 MG tablet TAKE TWO TABLETS BY MOUTH DAILY Yes Yinka Boucher MD   gabapentin (NEURONTIN) 300 MG capsule TAKE ONE CAPSULE BY MOUTH THREE TIMES A DAY Yes Yinka Boucher MD   metFORMIN (GLUCOPHAGE) 1000 MG tablet Shira Alfredo Yes Yinka Boucher MD ipratropium-albuterol (DUONEB) 0.5-2.5 (3) MG/3ML SOLN nebulizer solution Inhale 3 mLs into the lungs every 4 hours DX:COPD J44.9 Yes Gregg Drake MD   ipratropium-albuterol (DUONEB) 0.5-2.5 (3) MG/3ML SOLN nebulizer solution Take 3 mLs by nebulization every 6 hours as needed for Shortness of Breath Dx: COPD   ICD-10: J44.9 Yes Gregg Drake MD   lisinopril (PRINIVIL;ZESTRIL) 2.5 MG tablet TAKE ONE TABLET BY MOUTH DAILY Yes Say Bonilla MD   furosemide (LASIX) 40 MG tablet TAKE ONE TABLET BY MOUTH TWICE A DAY Yes Say Bonilla MD   diclofenac (VOLTAREN) 75 MG EC tablet TAKE ONE TABLET BY MOUTH TWICE A DAY Yes Say Bonilla MD   VENTOLIN  (90 Base) MCG/ACT inhaler INHALE TWO PUFFS BY MOUTH EVERY 6 HOURS AS NEEDED Yes Gregg Drake MD   blood glucose test strips (ACCU-CHEK LULU PLUS) strip USE TO CHECK BLOOD SUGAR DAILY.  Please dispense Accu-chek Lulu Plus test strips Yes Say Bonilla MD   Lancets 30G MISC 1 each by Does not apply route daily Please dispense Accu-chek Lulu Plus lancets Yes Say Bonilla MD   Blood Glucose Monitoring Suppl (ACCU-CHEK LULU PLUS) w/Device KIT 1 each by Does not apply route daily Yes Say Bonilla MD   acetaminophen (TYLENOL) 325 MG tablet Take 650 mg by mouth every 6 hours as needed for Pain Yes Radha Frazier MD         Past Medical History:   Diagnosis Date    Anemia     [facility] obstructive [facility] disease) [facility]     Diabetes mellitus ([facility]     Edema     GI (gastrointestinal bleed)     Gout     Hypertension     Morbid obesity ([facility]     Neuropathy     Obstructive sleep apnea     uses CPAP    Peripheral vascular disease ([facility]        Past Surgical History:   Procedure Laterality Date    APPENDECTOMY      COLONOSCOPY N/A 5/7/2019    COLONOSCOPY DIAGNOSTIC performed by Devon Lema MD at [facility]      bilateral         Family History Problem Relation Age of Onset    High Blood Pressure Mother     High Cholesterol Mother     Heart Disease Mother     Stroke Mother     Diabetes Mother     Depression Mother     Mental Illness Mother     Cancer Father        CareTeam (Including outside providers/suppliers regularly involved in providing care):   Patient Care Team:  Jami Greer MD as PCP - General (Pediatrics)  Jami Greer MD as PCP - REHABILITATION [facility] Empaneled Provider  Rocio Buchanan MD (Internal Medicine)  Kenia Ruiz MD as Consulting Physician (Cardiology)  Noemi Lowe MD (Bariatrics)  [place] [id number] Chandrakant Galvan (Physician Assistant)  Ulysses Stade, MD as Consulting Physician (General Surgery)    Wt Readings from Last 3 Encounters:   12/16/20 (!) 317 lb (143.8 kg)   04/21/20 (!) 333 lb (151 kg)   08/22/19 (!) 327 lb (148.3 kg)     Vitals:    12/16/20 1209   BP: 124/72   Site: Left Upper Arm   Position: Sitting   Cuff Size: Large Adult   Pulse: 94   Temp: 97.4 °F (36.3 °C)   TempSrc: Infrared   SpO2: 94%   Weight: (!) 317 lb (143.8 kg)     Body mass index is 42.99 kg/m². Based upon direct observation of the patient, evaluation of cognition reveals recent and remote memory intact.     General Appearance: alert and oriented to person, place and time, well developed and well- nourished, in no acute distress  Skin: warm and dry, no rash or erythema  Head: normocephalic and atraumatic  Eyes: pupils equal, round, and reactive to light, extraocular eye movements intact, conjunctivae normal  ENT: tympanic membrane, external ear and ear canal normal bilaterally, nose without deformity, nasal mucosa and turbinates normal without polyps  Neck: supple and non-tender without mass, no thyromegaly or thyroid nodules, no cervical lymphadenopathy  Pulmonary/Chest: clear to auscultation bilaterally- no wheezes, rales or rhonchi, normal air movement, no respiratory distress Cardiovascular: normal rate, regular rhythm, normal S1 and S2, no murmurs, rubs, clicks, or gallops, distal pulses intact, no carotid bruits  Abdomen: soft, non-tender, non-distended, normal bowel sounds, no masses or organomegaly  Extremities: no cyanosis, clubbing or edema  Musculoskeletal: normal range of motion, no joint swelling, deformity or tenderness  Neurologic: reflexes normal and symmetric, no cranial nerve deficit, gait, coordination and speech normal    Patient's complete Health Risk Assessment and screening values have been reviewed and are found in Flowsheets. The following problems were reviewed today and where indicated follow up appointments were made and/or referrals ordered. Positive Risk Factor Screenings with Interventions:            General Health and ACP:  General  In general, how would you say your health is?: Good  In the past 7 days, have you experienced any of the following?  New or Increased Pain, New or Increased Fatigue, Loneliness, Social Isolation, Stress or Anger?: (!) Loneliness  Do you get the social and emotional support that you need?: Yes  Do you have a Living Will?: Yes  Advance Directives     Power of [address] [place] [address] Will ACP-Advance Directive ACP-Power of     Not on File Not on File Not on File Not on File      General Health Risk Interventions:  · Loneliness: patient declines any further intervention for this issue    Health Habits/Nutrition:  Health Habits/Nutrition  Do you exercise for at least 20 minutes 2-3 times per week?: (!) No  Have you lost any weight without trying in the past 3 months?: No  Do you eat fewer than 2 meals per day?: No  Have you seen a dentist within the past year?: Yes     Health Habits/Nutrition Interventions:  · Inadequate physical activity:  patient agrees to exercise for at least 150 minutes/week       Personalized Preventive Plan   Current Health Maintenance Status  Immunization History   Administered Date(s) Administered  DTaP 10/31/2014    Influenza Vaccine, unspecified formulation 09/06/2016    Influenza Virus Vaccine 09/06/2016    Influenza, High Dose (Fluzone 65 yrs and older) 10/04/2017, 10/04/2019    Influenza, High-dose, Quadv, 65 yrs +, IM (Fluzone) 10/21/2020    Pneumococcal Conjugate 13-valent ([id number]) 04/21/2015    Pneumococcal Polysaccharide ([id number]) 07/18/2018    Zoster Live (Zostavax) 09/06/2016        Health Maintenance   Topic Date Due    Diabetic foot exam  03/07/1955    Shingles Vaccine (2 of 3) 11/01/2016    Annual Wellness Visit (AWV)  05/29/2019    Low dose CT lung screening  04/11/2020    A1C test (Diabetic or Prediabetic)  08/23/2020    Diabetic microalbuminuria test  08/23/2020    Lipid screen  08/23/2020    Potassium monitoring  04/21/2021    Creatinine monitoring  04/21/2021    Diabetic retinal exam  09/25/2021    DTaP/Tdap/Td vaccine (2 - Tdap) 10/31/2024    Colon cancer screen colonoscopy  05/07/2029    Flu vaccine  Completed    Pneumococcal 65+ years Vaccine  Completed    Hepatitis C screen  Completed    Hepatitis A vaccine  Aged Out    Hib vaccine  Aged Out    Meningococcal (ACWY) vaccine  Aged Out     Recommendations for [employer] Due: see orders and patient instructions/AVS.  . Recommended screening schedule for the next 5-10 years is provided to the patient in written form: see Patient Instructions/AVS.    Yanna Morales was seen today for medicare awv.     Diagnoses and all orders for this visit:    Routine general medical examination at a health care facility    Type 2 diabetes mellitus without complication, without long-term current use of insulin (HCC)  -     Comprehensive Metabolic Panel  -     Hemoglobin A1C    Hyperlipidemia, unspecified hyperlipidemia type  -     Lipid Panel    Need for prophylactic vaccination and inoculation against varicella

## 2020-12-16 NOTE — PATIENT INSTRUCTIONS
A heart-healthy diet is one that limits sodium , certain types of fat , and cholesterol . This type of diet is recommended for:   People with any form of cardiovascular disease (eg, coronary heart disease , peripheral vascular disease , previous heart attack , previous stroke )   People with risk factors for cardiovascular disease, such as high blood pressure , high cholesterol , or diabetes   Anyone who wants to lower their risk of developing cardiovascular disease   Sodium    Sodium is a mineral found in many foods. In general, most people consume much more sodium than they need. Diets high in sodium can increase blood pressure and lead to edema (water retention). On a heart-healthy diet, you should consume no more than 2,300 mg (milligrams) of sodium per dayabout the amount in one teaspoon of table salt. The foods highest in sodium include table salt (about 50% sodium), processed foods, convenience foods, and preserved foods. Cholesterol    Cholesterol is a fat-like, waxy substance in your blood. Our bodies make some cholesterol. It is also found in animal products, with the highest amounts in fatty meat, egg yolks, whole milk, cheese, shellfish, and organ meats. On a heart-healthy diet, you should limit your cholesterol intake to less than 200 mg per day. It is normal and important to have some cholesterol in your bloodstream. But too much cholesterol can cause plaque to build up within your arteries, which can eventually lead to a heart attack or stroke. The two types of cholesterol that are most commonly referred to are:   Low-density lipoprotein (LDL) cholesterol  Also known as bad cholesterol, this is the cholesterol that tends to build up along your arteries. Bad cholesterol levels are increased by eating fats that are saturated or hydrogenated. Optimal level of this cholesterol is less than 100. Over 130 starts to get risky for heart disease. High-density lipoprotein (HDL) cholesterol  Also known as good cholesterol, this type of cholesterol actually carries cholesterol away from your arteries and may, therefore, help lower your risk of having a heart attack. You want this level to be high (ideally greater than 60). It is a risk to have a level less than 40. You can raise this good cholesterol by eating olive oil, canola oil, avocados, or nuts. Exercise raises this level, too. Fat    Fat is calorie dense and packs a lot of calories into a small amount of food. Even though fats should be limited due to their high calorie content, not all fats are bad. In fact, some fats are quite healthful. Fat can be broken down into four main types.    The good-for-you fats are:   Monounsaturated fat  found in oils such as olive and canola, avocados, and nuts and natural nut butters; can decrease cholesterol levels, while keeping levels of HDL cholesterol high   Polyunsaturated fat  found in oils such as safflower, sunflower, soybean, corn, and sesame; can decrease total cholesterol and LDL cholesterol   Omega-3 fatty acids  particularly those found in fatty fish (such as salmon, trout, tuna, mackerel, herring, and sardines); can decrease risk of arrhythmias, decrease triglyceride levels, and slightly lower blood pressure   The fats that you want to limit are:   Saturated fat  found in animal products, many fast foods, and a few vegetables; increases total blood cholesterol, including LDL levels   Animal fats that are saturated include: butter, lard, whole-milk dairy products, meat fat, and poultry skin   Vegetable fats that are saturated include: hydrogenated shortening, palm oil, coconut oil, cocoa butter   Hydrogenated or trans fat  found in margarine and vegetable shortening, most shelf stable snack foods, and fried foods; increases LDL and decreases HDL Lean cuts of fresh or frozen beef, veal, lamb, or pork (look for the word loin) Fresh or frozen poultry without the skin Fresh or frozen fish and some shellfish Egg whites and egg substitutes (Limit whole eggs to three per week) Tofu Nuts or seeds (unsalted, dry-roasted), low-sodium peanut butter Dried peas, beans, and lentils   Any smoked, cured, salted, or canned meat, fish, or poultry (including leo, chipped beef, cold cuts, hot dogs, sausages, sardines, and anchovies) Poultry skins Breaded and/or fried fish or meats Canned peas, beans, and lentils Salted nuts   Fats and Oils   Olive oil and canola oil Low-sodium, low-fat salad dressings and mayonnaise   Butter, margarine, coconut and palm oils, leo fat   Snacks, Sweets, and Condiments   Low-sodium or unsalted versions of broths, soups, soy sauce, and condiments Pepper, herbs, and spices; vinegar, lemon, or lime juice Low-fat frozen desserts (yogurt, sherbet, fruit bars) Sugar, cocoa powder, honey, syrup, jam, and preserves Low-fat, trans-fat free cookies, cakes, and pies Neal and animal crackers, fig bars, inocente snaps   High-fat desserts Broth, soups, gravies, and sauces, made from instant mixes or other high-sodium ingredients Salted snack foods Canned olives Meat tenderizers, seasoning salt, and most flavored vinegars   Beverages   Low-sodium carbonated beverages Tea and coffee in moderation Soy milk   Commercially softened water   Suggestions   Make whole grains, fruits, and vegetables the base of your diet. Choose heart-healthy fats such as canola, olive, and flaxseed oil, and foods high in heart-healthy fats, such as nuts, seeds, soybeans, tofu, and fish. Eat fish at least twice per week; the fish highest in omega-3 fatty acids and lowest in mercury include salmon, herring, mackerel, sardines, and canned chunk light tuna. If you eat fish less than twice per week or have high triglycerides, talk to your doctor about taking fish oil supplements. Read food labels. For products low in fat and cholesterol, look for fat free, low-fat, cholesterol free, saturated fat free, and trans fat freeAlso scan the Nutrition Facts Label, which lists saturated fat, trans fat, and cholesterol amounts. For products low in sodium, look for sodium free, very low sodium, low sodium, no added salt, and unsalted   Skip the salt when cooking or at the table; if food needs more flavor, get creative and try out different herbs and spices. Garlic and onion also add substantial flavor to foods. Trim any visible fat off meat and poultry before cooking, and drain the fat off after quiñonez. Use cooking methods that require little or no added fat, such as grilling, boiling, baking, poaching, broiling, roasting, steaming, stir-frying, and sauting. Avoid fast food and convenience food. They tend to be high in saturated and trans fat and have a lot of added salt. Talk to a registered dietitian for individualized diet advice. Last Reviewed: March 2011 Hilton Bo MS, MPH, RD   Updated: 3/29/2011   ·     Heart-Healthy Diet   Sodium, Fat, and Cholesterol Controlled Diet       What Is a Heart Healthy Diet? A heart-healthy diet is one that limits sodium , certain types of fat , and cholesterol .  This type of diet is recommended for:   People with any form of cardiovascular disease (eg, coronary heart disease , peripheral vascular disease , previous heart attack , previous stroke )   People with risk factors for cardiovascular disease, such as high blood pressure , high cholesterol , or diabetes   Anyone who wants to lower their risk of developing cardiovascular disease   Sodium Sodium is a mineral found in many foods. In general, most people consume much more sodium than they need. Diets high in sodium can increase blood pressure and lead to edema (water retention). On a heart-healthy diet, you should consume no more than 2,300 mg (milligrams) of sodium per dayabout the amount in one teaspoon of table salt. The foods highest in sodium include table salt (about 50% sodium), processed foods, convenience foods, and preserved foods. Cholesterol    Cholesterol is a fat-like, waxy substance in your blood. Our bodies make some cholesterol. It is also found in animal products, with the highest amounts in fatty meat, egg yolks, whole milk, cheese, shellfish, and organ meats. On a heart-healthy diet, you should limit your cholesterol intake to less than 200 mg per day. It is normal and important to have some cholesterol in your bloodstream. But too much cholesterol can cause plaque to build up within your arteries, which can eventually lead to a heart attack or stroke. The two types of cholesterol that are most commonly referred to are:   Low-density lipoprotein (LDL) cholesterol  Also known as bad cholesterol, this is the cholesterol that tends to build up along your arteries. Bad cholesterol levels are increased by eating fats that are saturated or hydrogenated. Optimal level of this cholesterol is less than 100. Over 130 starts to get risky for heart disease. High-density lipoprotein (HDL) cholesterol  Also known as good cholesterol, this type of cholesterol actually carries cholesterol away from your arteries and may, therefore, help lower your risk of having a heart attack. You want this level to be high (ideally greater than 60). It is a risk to have a level less than 40. You can raise this good cholesterol by eating olive oil, canola oil, avocados, or nuts. Exercise raises this level, too.    Fat Fat is calorie dense and packs a lot of calories into a small amount of food. Even though fats should be limited due to their high calorie content, not all fats are bad. In fact, some fats are quite healthful. Fat can be broken down into four main types. The good-for-you fats are:   Monounsaturated fat  found in oils such as olive and canola, avocados, and nuts and natural nut butters; can decrease cholesterol levels, while keeping levels of HDL cholesterol high   Polyunsaturated fat  found in oils such as safflower, sunflower, soybean, corn, and sesame; can decrease total cholesterol and LDL cholesterol   Omega-3 fatty acids  particularly those found in fatty fish (such as salmon, trout, tuna, mackerel, herring, and sardines); can decrease risk of arrhythmias, decrease triglyceride levels, and slightly lower blood pressure   The fats that you want to limit are:   Saturated fat  found in animal products, many fast foods, and a few vegetables; increases total blood cholesterol, including LDL levels   Animal fats that are saturated include: butter, lard, whole-milk dairy products, meat fat, and poultry skin   Vegetable fats that are saturated include: hydrogenated shortening, palm oil, coconut oil, cocoa butter   Hydrogenated or trans fat  found in margarine and vegetable shortening, most shelf stable snack foods, and fried foods; increases LDL and decreases HDL     It is generally recommended that you limit your total fat for the day to less than 30% of your total calories. If you follow an 1800-calorie heart healthy diet, for example, this would mean 60 grams of fat or less per day. Saturated fat and trans fat in your diet raises your blood cholesterol the most, much more than dietary cholesterol does. For this reason, on a heart-healthy diet, less than 7% of your calories should come from saturated fat and ideally 0% from trans fat. On an 1800-calorie diet, this translates into less than 14 grams of saturated fat per day, leaving 46 grams of fat to come from mono- and polyunsaturated fats.    Food Choices on a Heart Healthy Diet   Food Category   Foods Recommended   Foods to Avoid   Grains   Breads and rolls without salted tops Most dry and cooked cereals Unsalted crackers and breadsticks Low-sodium or homemade breadcrumbs or stuffing All rice and pastas   Breads, rolls, and crackers with salted tops High-fat baked goods (eg, muffins, donuts, pastries) Quick breads, self-rising flour, and biscuit mixes Regular bread crumbs Instant hot cereals Commercially prepared rice, pasta, or stuffing mixes   Vegetables   Most fresh, frozen, and low-sodium canned vegetables Low-sodium and salt-free vegetable juices Canned vegetables if unsalted or rinsed   Regular canned vegetables and juices, including sauerkraut and pickled vegetables Frozen vegetables with sauces Commercially prepared potato and vegetable mixes   Fruits   Most fresh, frozen, and canned fruits All fruit juices   Fruits processed with salt or sodium   Milk   Nonfat or low-fat (1%) milk Nonfat or low-fat yogurt Cottage cheese, low-fat ricotta, cheeses labeled as low-fat and low-sodium   Whole milk Reduced-fat (2%) milk Malted and chocolate milk Full fat yogurt Most cheeses (unless low-fat and low salt) Buttermilk (no more than 1 cup per week)   Meats and Beans Eat fish at least twice per week; the fish highest in omega-3 fatty acids and lowest in mercury include salmon, herring, mackerel, sardines, and canned chunk light tuna. If you eat fish less than twice per week or have high triglycerides, talk to your doctor about taking fish oil supplements. Read food labels. For products low in fat and cholesterol, look for fat free, low-fat, cholesterol free, saturated fat free, and trans fat freeAlso scan the Nutrition Facts Label, which lists saturated fat, trans fat, and cholesterol amounts. For products low in sodium, look for sodium free, very low sodium, low sodium, no added salt, and unsalted   Skip the salt when cooking or at the table; if food needs more flavor, get creative and try out different herbs and spices. Garlic and onion also add substantial flavor to foods. Trim any visible fat off meat and poultry before cooking, and drain the fat off after quiñonez. Use cooking methods that require little or no added fat, such as grilling, boiling, baking, poaching, broiling, roasting, steaming, stir-frying, and sauting. Avoid fast food and convenience food. They tend to be high in saturated and trans fat and have a lot of added salt. Talk to a registered dietitian for individualized diet advice. Last Reviewed: March 2011 Beth Christianson MS, MPH, RD   Updated: 3/29/2011   ·     Preventing Osteoporosis: After Your Visit  Your Care Instructions  Osteoporosis means the bones are weak and thin enough that they can break easily. The older you are, the more likely you are to get osteoporosis. But with plenty of calcium, vitamin D, and exercise, you can help prevent osteoporosis. The preteen and teen years are a key time for bone building. With the help of calcium, vitamin D, and exercise in those early years and beyond, the bones reach their peak density and strength by age 27. After age 27, your bones naturally start to thin and weaken. The stronger your bones are at around age 27, the lower your risk for osteoporosis. But no matter what your age and risk are, your bones still need calcium, vitamin D, and exercise to stay strong. Also avoid smoking, and limit alcohol. Smoking and heavy alcohol use can make your bones thinner. Talk to your doctor about any special risks you might have, such as having a close relative with osteoporosis or taking a medicine that can weaken bones. Your doctor can tell you the best ways to protect your bones from thinning. Follow-up care is a key part of your treatment and safety. Be sure to make and go to all appointments, and call your doctor if you are having problems. It's also a good idea to know your test results and keep a list of the medicines you take. How can you care for yourself at home? Get enough calcium and vitamin D. The [place] of [employer] recommends adults younger than age 46 need 1,000 mg of calcium and 600 IU of vitamin D each day. Women ages 46 to 79 need 1,200 mg of calcium and 600 IU of vitamin D each day. Men ages 46 to 79 need 1,000 mg of calcium and 600 IU of vitamin D each day. Adults 71 and older need 1,200 mg of calcium and 800 IU of vitamin D each day. Eat foods rich in calcium, like yogurt, cheese, milk, and dark green vegetables. Eat foods rich in vitamin D, like eggs, fatty fish, cereal, and fortified milk. Get some sunshine. Your body uses sunshine to make its own vitamin D. The safest time to be out in the sun is before 10 a.m. or after 3 p.m. Avoid getting sunburned. Sunburn can increase your risk of skin cancer. Talk to your doctor about taking a calcium plus vitamin D supplement. Ask about what type of calcium is right for you, and how much to take at a time. Adults ages 23 to 48 should not get more than 2,500 mg of calcium and 4,000 IU of vitamin D each day, whether it is from supplements and/or food. Adults ages 46 and older should not get more than 2,000 mg of calcium and 4,000 IU of vitamin D each day from supplements and/or food. Get regular bone-building exercise. Weight-bearing and resistance exercises keep bones healthy by working the muscles and bones against gravity. Start out at an exercise level that feels right for you. Add a little at a time until you can do the following:  Do 30 minutes of weight-bearing exercise on most days of the week. Walking, jogging, stair climbing, and dancing are good choices. Do resistance exercises with weights or elastic bands 2 to 3 days a week. Limit alcohol. Drink no more than 1 alcohol drink a day if you are a woman. Drink no more than 2 alcohol drinks a day if you are a man. Do not smoke. Smoking can make bones thin faster. If you need help quitting, talk to your doctor about stop-smoking programs and medicines. These can increase your chances of quitting for good. When should you call for help? Watch closely for changes in your health, and be sure to contact your doctor if:  You need help with a healthy eating plan. You need help with an exercise plan    © 1586-2055 [employer]. Care instructions adapted under license by [facility]. This care instruction is for use with your licensed healthcare professional. If you have questions about a medical condition or this instruction, always ask your healthcare professional. [address] any warranty or liability for your use of this information. Content Version: 9.4.91370;  Last Revised: June 20, 2011              ·     DASH Diet: After Your Visit  Your Care Instructions Eat 7 to 8 servings of grains each day. A serving is 1 slice of bread, 1 ounce of dry cereal, or ½ cup of cooked rice, pasta, or cooked cereal. Try to choose whole-grain products as much as possible. Limit lean meat, poultry, and fish to 6 ounces each day. Six ounces is about the size of two decks of cards. Eat 4 to 5 servings of nuts, seeds, and legumes (cooked dried beans, lentils, and split peas) each week. A serving is 1/3 cup of nuts, 2 tablespoons of seeds, or ½ cup cooked dried beans or peas. Limit sweets and added sugars to 5 servings or less a week. A serving is 1 tablespoon jelly or jam, ½ cup sorbet, or 1 cup of lemonade. Tips for success  Start small. Do not try to make dramatic changes to your diet all at once. You might feel that you are missing out on your favorite foods and then be more likely to not follow the plan. Make small changes, and stick with them. Once those changes become habit, add a few more changes. Try some of the following:  Make it a goal to eat a fruit or vegetable at every meal and at snacks. This will make it easy to get the recommended amount of fruits and vegetables each day. Try yogurt topped with fruit and nuts for a snack or healthy dessert. Add lettuce, tomato, cucumber, and onion to sandwiches. Combine a ready-made pizza crust with low-fat mozzarella cheese and lots of vegetable toppings. Try using tomatoes, squash, spinach, broccoli, carrots, cauliflower, and onions. Have a variety of cut-up vegetables with a low-fat dip as an appetizer instead of chips and dip. Sprinkle sunflower seeds or chopped almonds over salads. Or try adding chopped walnuts or almonds to cooked vegetables. Try some vegetarian meals using beans and peas. Add garbanzo or kidney beans to salads.  Make burritos and tacos with mashed low beans or black beans © 9918-1386 [employer]. Care instructions adapted under license by [facility]. This care instruction is for use with your licensed healthcare professional. If you have questions about a medical condition or this instruction, always ask your healthcare professional. [address] any warranty or liability for your use of this information. Content Version: 9.4.60518; Last Revised: March 15, 2012              ·     Keep Your Memory Teresa Duverney       Many factors can affect your ability to remembera hectic lifestyle, aging, stress, chronic disease, and certain medicines. But, there are steps you can take to sharpen your mind and help preserve your memory. Challenge Your Brain   Regularly challenging your mind may help keeps it in top shape. Good mental exercises include:   Crossword puzzlesUse a dictionary if you need it; you will learn more that way. Brainteasers Try some! Crafts, such as wood working and sewing   Hobbies, such as gardening and building model airplanes   SocializingVisit old friends or join groups to meet new ones. Reading   Learning a new language   Taking a class, whether it be art history or asiya chi   TravelingExperience the food, history, and culture of your destination   Learning to use a computer   Going to museums, the theater, or thought-provoking movies   Changing things in your daily life, such as reversing your pattern in the grocery store or brushing your teeth using your nondominant hand   Use Memory Aids   There is no need to remember every detail on your own. These memory aids can help:   Calendars and day planners   Electronic organizers to store all sorts of helpful informationThese devices can \"beep\" to remind you of appointments.    A book of days to record birthdays, anniversaries, and other occasions that occur on the same date every year   Detailed \"to-do\" lists and strategically placed sticky notes Quick \"study\" sessionsBefore a gathering, review who will be there so their names will be fresh in your mind. Establish routinesFor example, keep your keys, wallet, and umbrella in the same place all the time or take medicine with your 8:00 AM glass of juice   Live a Healthy Life   Many actions that will keep your body strong will do the same for your mind. For example:   Talk to Your Doctor About Herbs and Supplements    Malnutrition and vitamin deficiencies can impair your mental function. For example, vitamin B12 deficiency can cause a range of symptoms, including confusion. But, what if your nutritional needs are being met? Can herbs and supplements still offer a benefit? Researchers have investigated a range of natural remedies, such as ginkgo , ginseng , and the supplement phosphatidylserine (PS). So far, though, the evidence is inconsistent as to whether these products can improve memory or thinking. If you are interested in taking herbs and supplements, talk to your doctor first because they may interact with other medicines that you are taking. Exercise Regularly    Among the many benefits of regular exercise are increased blood flow to the brain and decreased risk of certain diseases that can interfere with memory function. One study found that even moderate exercise has a beneficial effect. Examples of \"moderate\" exercise include:   Playing 18 holes of golf once a week, without a cart   Playing tennis twice a week   Walking one mile per day   Manage Stress    It can be tough to remember what is important when your mind is cluttered. Make time for relaxation. Choose activities that calm you down, and make it routine.    Manage Chronic Conditions After 72 hours, the body is virtually free of nicotine. Bronchial tubes relax and breathing becomes easier. After 2 to 12 weeks, lungs can hold more air. Exercise becomes easier and circulation improves. Quitting will reduce your risk of having a heart attack, stroke, cancer, or lung disease:  After 1 year, the risk of coronary heart disease is cut in half. After 5 years, the risk of stroke falls to the same as a nonsmoker. After 10 years, the risk of lung cancer is cut in half and the risk of other cancers decreases significantly. After 15 years, the risk of coronary heart disease drops, usually to the level of a nonsmoker. If you are pregnant, quitting smoking will improve your chances of having a healthy baby. The people you live with, especially your children, will be healthier. You will have extra money to spend on things other than cigarettes. FIVE KEYS TO QUITTING  Studies have shown that these 5 steps will help you quit smoking and quit for good. You have the best chances of quitting if you use them together:  Get ready. Get support and encouragement. Learn new skills and behaviors. Get medicine to reduce your nicotine addiction and use it correctly. Be prepared for relapse or difficult situations. Be determined to continue trying to quit, even if you do not succeed at first.  1. GET READY  Set a quit date. Change your environment. Get rid of ALL cigarettes, ashtrays, matches, and lighters in your home, car, and place of work. Do not let people smoke in your home. Review your past attempts to quit. Think about what worked and what did not. Once you quit, do not smoke. NOT EVEN A PUFF! 2. GET SUPPORT AND ENCOURAGEMENT  Studies have shown that you have a better chance of being successful if you have help. You can get support in many ways. Tell your family, friends, and coworkers that you are going to quit and need their support. Ask them not to smoke around you. Nicotine skin patches (transdermal): Available by prescription and over-the-counter. Antidepressant medicine (helps people abstain from smoking, but how this works is unknown): Bupropion sustained-release (SR) tablets: Available by prescription. Nicotinic receptor partial agonist (simulates the effect of nicotine in your brain):  Varenicline tartrate tablets: Available by prescription. Ask your caregiver for advice about which medicines to use and how to use them. Carefully read the information on the package. Everyone who is trying to quit may benefit from using a medicine. If you are pregnant or trying to become pregnant, nursing an infant, you are under age 25, or you smoke fewer than 10 cigarettes per day, talk to your caregiver before taking any nicotine replacement medicines. You should stop using a nicotine replacement product and call your caregiver if you experience nausea, dizziness, weakness, vomiting, fast or irregular heartbeat, mouth problems with the lozenge or gum, or redness or swelling of the skin around the patch that does not go away. Do not use any other product containing nicotine while using a nicotine replacement product. Talk to your caregiver before using these products if you have diabetes, heart disease, asthma, stomach ulcers, you had a recent heart attack, you have high blood pressure that is not controlled with medicine, a history of irregular heartbeat, or you have been prescribed medicine to help you quit smoking. 5. BE PREPARED FOR RELAPSE OR DIFFICULT SITUATIONS  Most relapses occur within the first 3 months after quitting. Do not be discouraged if you start smoking again. Remember, most people try several times before they finally quit. You may have symptoms of withdrawal because your body is used to nicotine. You may crave cigarettes, be irritable, feel very hungry, cough often, get headaches, or have difficulty concentrating. The withdrawal symptoms are only temporary. They are strongest when you first quit, but they will go away within 10 to 14 days. Here are some difficult situations to watch for:  Alcohol. Avoid drinking alcohol. Drinking lowers your chances of successfully quitting. Caffeine. Try to reduce the amount of caffeine you consume. It also lowers your chances of successfully quitting. Other smokers. Being around smoking can make you want to smoke. Avoid smokers. Weight gain. Many smokers will gain weight when they quit, usually less than 10 pounds. Eat a healthy diet and stay active. Do not let weight gain distract you from your main goal, quitting smoking. Some medicines that help you quit smoking may also help delay weight gain. You can always lose the weight gained after you quit. Bad mood or depression. There are a lot of ways to improve your mood other than smoking. If you are having problems with any of these situations, talk to your caregiver. SPECIAL SITUATIONS AND CONDITIONS  Studies suggest that everyone can quit smoking. Your situation or condition can give you a special reason to quit. Pregnant women/new mothers: By quitting, you protect your baby's health and your own. Hospitalized patients: By quitting, you reduce health problems and help healing. Heart attack patients: By quitting, you reduce your risk of a second heart attack. Lung, head, and neck cancer patients: By quitting, you reduce your chance of a second cancer. Parents of children and adolescents: By quitting, you protect your children from illnesses caused by secondhand smoke. QUESTIONS TO THINK ABOUT  Think about the following questions before you try to stop smoking. You may want to talk about your answers with your caregiver. Why do you want to quit? If you tried to quit in the past, what helped and what did not? What will be the most difficult situations for you after you quit? How will you plan to handle them? Who can help you through the tough times? Your family? Friends? Caregiver? What pleasures do you get from smoking? What ways can you still get pleasure if you quit? Here are some questions to ask your caregiver: How can you help me to be successful at quitting? What medicine do you think would be best for me and how should I take it? What should I do if I need more help? What is smoking withdrawal like? How can I get information on withdrawal?  Quitting takes hard work and a lot of effort, but you can quit smoking. FOR MORE INFORMATION   Smokefree. gov ([employer]) provides free, accurate, evidence-based information and professional assistance to help support the immediate and long-term needs of people trying to quit smoking. Document Released: 12/12/2002 Document Revised: 12/06/2012 Document Reviewed: 10/04/2010  [facility] Patient Information ©2012 [employer].     ·

## 2020-12-18 LAB
A/G RATIO: 1.8 (ref 1.1–2.2)
ALBUMIN SERPL-MCNC: 4.2 G/DL (ref 3.4–5)
ALP BLD-CCNC: 52 U/L (ref 40–129)
ALT SERPL-CCNC: 27 U/L (ref 10–40)
ANION GAP SERPL CALCULATED.3IONS-SCNC: 9 MMOL/L (ref 3–16)
AST SERPL-CCNC: 16 U/L (ref 15–37)
BILIRUB SERPL-MCNC: 0.4 MG/DL (ref 0–1)
BUN BLDV-MCNC: 30 MG/DL (ref 7–20)
CALCIUM SERPL-MCNC: 9.3 MG/DL (ref 8.3–10.6)
CHLORIDE BLD-SCNC: 101 MMOL/L (ref 99–110)
CHOLESTEROL, TOTAL: 179 MG/DL (ref 0–199)
CO2: 32 MMOL/L (ref 21–32)
CREAT SERPL-MCNC: 1.3 MG/DL (ref 0.8–1.3)
GFR AFRICAN AMERICAN: >60
GFR NON-AFRICAN AMERICAN: 54
GLOBULIN: 2.3 G/DL
GLUCOSE BLD-MCNC: 98 MG/DL (ref 70–99)
HDLC SERPL-MCNC: 88 MG/DL (ref 40–60)
LDL CHOLESTEROL CALCULATED: 76 MG/DL
POTASSIUM SERPL-SCNC: 4.6 MMOL/L (ref 3.5–5.1)
SODIUM BLD-SCNC: 142 MMOL/L (ref 136–145)
TOTAL PROTEIN: 6.5 G/DL (ref 6.4–8.2)
TRIGL SERPL-MCNC: 76 MG/DL (ref 0–150)
VLDLC SERPL CALC-MCNC: 15 MG/DL

## 2020-12-19 LAB
ESTIMATED AVERAGE GLUCOSE: 111.2 MG/DL
HBA1C MFR BLD: 5.5 %

## 2020-12-28 RX ORDER — LISINOPRIL 2.5 MG/1
TABLET ORAL
Qty: 90 TABLET | Refills: 0 | OUTPATIENT
Start: 2020-12-28

## 2021-02-04 DIAGNOSIS — E11.9 TYPE 2 DIABETES MELLITUS WITHOUT COMPLICATION, WITHOUT LONG-TERM CURRENT USE OF INSULIN (HCC): ICD-10-CM

## 2021-02-05 RX ORDER — BLOOD SUGAR DIAGNOSTIC
STRIP MISCELLANEOUS
Qty: 100 STRIP | Refills: 2 | Status: SHIPPED | OUTPATIENT
Start: 2021-02-05 | End: 2022-01-20

## 2021-02-05 RX ORDER — LANCETS
EACH MISCELLANEOUS
Qty: 100 EACH | Refills: 2 | Status: SHIPPED | OUTPATIENT
Start: 2021-02-05

## 2021-02-18 ENCOUNTER — VIRTUAL VISIT (OUTPATIENT)
Dept: PULMONOLOGY | Age: 76
End: 2021-02-18
Payer: MEDICARE

## 2021-02-18 DIAGNOSIS — J96.11 CHRONIC RESPIRATORY FAILURE WITH HYPOXIA (HCC): Primary | ICD-10-CM

## 2021-02-18 DIAGNOSIS — J44.9 COPD, VERY SEVERE (HCC): ICD-10-CM

## 2021-02-18 DIAGNOSIS — E11.9 TYPE 2 DIABETES MELLITUS WITHOUT COMPLICATION, WITHOUT LONG-TERM CURRENT USE OF INSULIN (HCC): ICD-10-CM

## 2021-02-18 DIAGNOSIS — G47.33 OSA (OBSTRUCTIVE SLEEP APNEA): ICD-10-CM

## 2021-02-18 PROCEDURE — 99214 OFFICE O/P EST MOD 30 MIN: CPT | Performed by: NURSE PRACTITIONER

## 2021-02-18 ASSESSMENT — ENCOUNTER SYMPTOMS
COLOR CHANGE: 0
CONSTIPATION: 0
COUGH: 0
ABDOMINAL PAIN: 0
SHORTNESS OF BREATH: 1

## 2021-02-18 NOTE — PROGRESS NOTES
[facility] Follow Up Note  Pulmonology Video Visit    Pursuant to the emergency declaration under the [facility] waiver authority and the Coronavirus Preparedness and Response Supplemental Appropriations Act this Video Visit was insisted, with patient's consent, to reduce the patient's risk of exposure to COVID-19 and provide continuity of care for an established patient. The patient was at home, while the provider was at the clinic. Services were provided through a synchronous discussion through a Video Visit to substitute for in-person clinic visit, and coded as such. Subjective:   CHIEF COMPLAINT / HPI: VS OLD, STEF   The patient is 70 y.o. male who presents today for a routine follow up visit related to the above mentioned issues. There is a PMH significant for other conditions including DM, HTN, obesity and PVD. He was last evaluated by Dr. Aliya Francois in November and at that time his pulmonary symptoms were relatively stable. Presently he reports his DUNCAN has been stable. He notices this most when he walks a distance. There is no associated cough. He denies LE swelling. He does feel like he has gained some weight since his last visit but reports his HgbA1C remains in good range. Presently he reports compliance with nighttime CPAP. He is wearing O2 at 2-3L with activity and is on RA at rest. He reports compliance with daily Breo and is using Duoneb QID and PRN. He denies daytime sleepiness or morning headache.      Past Medical History:   Diagnosis Date    Anemia     [facility]     Diabetes mellitus [facility]     Edema     GI (gastrointestinal bleed)     Gout     Hypertension     Morbid obesity [facility] [facility]     Neuropathy     Obstructive sleep apnea     uses CPAP    Peripheral vascular disease (HCC)      Social History:    Social History     Tobacco Use   Smoking Status Former Smoker    Packs/day: 1.00 Review of Systems   Constitutional: Negative for chills and fever. HENT: Negative for congestion and postnasal drip. Respiratory: Positive for shortness of breath. Negative for cough. Cardiovascular: Negative for chest pain and leg swelling. Gastrointestinal: Negative for abdominal pain and constipation. Musculoskeletal: Negative for arthralgias and joint swelling. Skin: Negative for color change and pallor. Allergic/Immunologic: Negative for environmental allergies and food allergies. Psychiatric/Behavioral: Negative for agitation and confusion. Objective:       VITALS:  There were no vitals taken for this visit. on RA    Physical exam:  No in-personal physical exam was conducted as visit was done via video. The patient was alert and oriented throughout our conversation. He had good coloration. He was not SOB with speaking or otherwise distressed. He did not cough while we spoke. DATA:      Radiology Review:  Pertinent images / reports were reviewed as a part of this visit. LDCT chest done 2019 reveals the following:  RECOMMENDATIONS: Per ACR Lung-RADS Version 1.0   Category 1, Negative (No nodules and definitely benign nodules). Management:Continue annual lung screening with LDCT in 12 months.(probability of malignancy <1%). Last PFTs done 2013:  Spirometry showed decreased FVC of 3.32 L, 65% predicted and  decreased FEV1 of 1.52 L 37% predicted. FEV1:FVC ratio was decreased. No  response to bronchodilators demonstrated. Lung volumes showed normal total  lung capacity of 94% predicted. Diffusion capacity showed decreased DLCO of  49% predicted. IMPRESSION:  Very severe obstructive defect with no response to  bronchodilators. Normal lung volumes with moderate to severe decrease in  diffusion capacity noted.   In comparison to the test that was done in  February of 2011 no significant change in FVC noted but FEV1 has decreased by  22% and total lung capacity by 20% as well as DLCO by 10%. Assessment / Plan:   1. Chronic respiratory failure with hypoxia (HCC)  - Reports good benefit from supplemental O2 when he is walking  - He knows when to titrate O2 up based on sats and symptoms    2. COPD, very severe ([address]  - His SOB has been well controlled on Breo, continue this  - Continue Duoneb  - He has gotten good benefit from his new nebulizer device  - LDCT was done in 2019 and did not reveal suspicious nodules, recommend f/u LDCT when COVID less of a concern  - He plans to get COVID vaccination when this is available to him    3. STEF (obstructive sleep apnea)  - He reports compliance with CPAP and continues to benefit from the device  - Download of his machine also shows compliance with average use exceeding 5 hours per night  - AHI average is 3  - He denies worsening daytime sleepiness    4. Type 2 diabetes mellitus without complication, without long-term current use of insulin ([facility])  - He reports he has gained some weight but reports A1C remains in range  - He continues Prednisone at 5 mg daily to manage his COPD and feels this helps him  - Monitor sugar, if becomes difficult to control while SOB remains stable consider weaning Prednisone down or off    Return in about 6 months (around 8/18/2021). RTC sooner if symptoms worsen.     Mathieu Man MSN APRN-ACNP CCRN

## 2021-02-23 RX ORDER — PREDNISONE 1 MG/1
TABLET ORAL
Qty: 90 TABLET | Refills: 0 | Status: SHIPPED | OUTPATIENT
Start: 2021-02-23 | End: 2021-05-28

## 2021-03-05 DIAGNOSIS — E13.40 NEUROPATHY DUE TO SECONDARY DIABETES (HCC): ICD-10-CM

## 2021-03-05 RX ORDER — GABAPENTIN 300 MG/1
CAPSULE ORAL
Qty: 90 CAPSULE | Refills: 4 | Status: SHIPPED | OUTPATIENT
Start: 2021-03-05 | End: 2021-08-04

## 2021-04-16 RX ORDER — FUROSEMIDE 40 MG/1
TABLET ORAL
Qty: 180 TABLET | Refills: 1 | Status: SHIPPED | OUTPATIENT
Start: 2021-04-16 | End: 2021-11-11 | Stop reason: SDUPTHER

## 2021-05-13 RX ORDER — IPRATROPIUM BROMIDE AND ALBUTEROL SULFATE 2.5; .5 MG/3ML; MG/3ML
SOLUTION RESPIRATORY (INHALATION)
Qty: 120 VIAL | Refills: 5 | Status: SHIPPED | OUTPATIENT
Start: 2021-05-13 | End: 2021-11-05

## 2021-05-27 NOTE — TELEPHONE ENCOUNTER
Patient is requesting a refill of their prescription.     Recent Visits  Date Type Provider Dept   12/16/20 Office Visit aRmo Luo MD [facility]   07/16/20 Office Visit Ramo Luo MD [facility]   04/21/20 Office Visit David Horvath MD [facility]   12/23/19 Office Visit Ramo Luo MD [facility]   Showing recent visits within past 540 days with a meds authorizing provider and meeting all other requirements  Future Appointments  Date Type Provider Dept   06/29/21 Appointment Ramo Luo MD [facility]   Showing future appointments within next 150 days with a meds authorizing provider and meeting all other requirements     12/16/2020     Requested Prescriptions     Pending Prescriptions Disp Refills    predniSONE (DELTASONE) 5 MG tablet [Pharmacy Med Name: predniSONE 5 MG TABLET] 90 tablet 0     Sig: TAKE ONE TABLET BY MOUTH DAILY

## 2021-05-28 RX ORDER — PREDNISONE 1 MG/1
TABLET ORAL
Qty: 90 TABLET | Refills: 0 | Status: SHIPPED | OUTPATIENT
Start: 2021-05-28 | End: 2021-08-24

## 2021-06-01 ENCOUNTER — TELEPHONE (OUTPATIENT)
Dept: PULMONOLOGY | Age: 76
End: 2021-06-01

## 2021-06-01 NOTE — TELEPHONE ENCOUNTER
Pt called and said he to get his Duoneb and they said it can't be filled until Thursday and is will be out today. He's not sure what happened but he needs it to be filled today. Call him to discuss.

## 2021-06-29 ENCOUNTER — OFFICE VISIT (OUTPATIENT)
Dept: INTERNAL MEDICINE CLINIC | Age: 76
End: 2021-06-29
Payer: MEDICARE

## 2021-06-29 VITALS
HEART RATE: 97 BPM | WEIGHT: 315 LBS | DIASTOLIC BLOOD PRESSURE: 62 MMHG | OXYGEN SATURATION: 93 % | TEMPERATURE: 98 F | SYSTOLIC BLOOD PRESSURE: 132 MMHG | BODY MASS INDEX: 44.08 KG/M2

## 2021-06-29 DIAGNOSIS — E78.5 HYPERLIPIDEMIA, UNSPECIFIED HYPERLIPIDEMIA TYPE: ICD-10-CM

## 2021-06-29 DIAGNOSIS — J44.9 COPD, VERY SEVERE (HCC): ICD-10-CM

## 2021-06-29 DIAGNOSIS — E11.9 TYPE 2 DIABETES MELLITUS WITHOUT COMPLICATION, WITHOUT LONG-TERM CURRENT USE OF INSULIN (HCC): Primary | ICD-10-CM

## 2021-06-29 DIAGNOSIS — I10 ESSENTIAL HYPERTENSION: ICD-10-CM

## 2021-06-29 LAB
A/G RATIO: 1.8 (ref 1.1–2.2)
ALBUMIN SERPL-MCNC: 4.2 G/DL (ref 3.4–5)
ALP BLD-CCNC: 71 U/L (ref 40–129)
ALT SERPL-CCNC: 10 U/L (ref 10–40)
ANION GAP SERPL CALCULATED.3IONS-SCNC: 14 MMOL/L (ref 3–16)
AST SERPL-CCNC: 12 U/L (ref 15–37)
BILIRUB SERPL-MCNC: 0.4 MG/DL (ref 0–1)
BUN BLDV-MCNC: 37 MG/DL (ref 7–20)
CALCIUM SERPL-MCNC: 9.1 MG/DL (ref 8.3–10.6)
CHLORIDE BLD-SCNC: 98 MMOL/L (ref 99–110)
CHOLESTEROL, TOTAL: 184 MG/DL (ref 0–199)
CO2: 28 MMOL/L (ref 21–32)
CREAT SERPL-MCNC: 1.3 MG/DL (ref 0.8–1.3)
GFR AFRICAN AMERICAN: >60
GFR NON-AFRICAN AMERICAN: 54
GLOBULIN: 2.4 G/DL
GLUCOSE BLD-MCNC: 108 MG/DL (ref 70–99)
HDLC SERPL-MCNC: 82 MG/DL (ref 40–60)
LDL CHOLESTEROL CALCULATED: 86 MG/DL
POTASSIUM SERPL-SCNC: 4.9 MMOL/L (ref 3.5–5.1)
SODIUM BLD-SCNC: 140 MMOL/L (ref 136–145)
TOTAL PROTEIN: 6.6 G/DL (ref 6.4–8.2)
TRIGL SERPL-MCNC: 80 MG/DL (ref 0–150)
VLDLC SERPL CALC-MCNC: 16 MG/DL

## 2021-06-29 PROCEDURE — 99214 OFFICE O/P EST MOD 30 MIN: CPT | Performed by: INTERNAL MEDICINE

## 2021-06-29 ASSESSMENT — PATIENT HEALTH QUESTIONNAIRE - PHQ9
2. FEELING DOWN, DEPRESSED OR HOPELESS: 1
SUM OF ALL RESPONSES TO PHQ QUESTIONS 1-9: 1
1. LITTLE INTEREST OR PLEASURE IN DOING THINGS: 0
SUM OF ALL RESPONSES TO PHQ QUESTIONS 1-9: 1
SUM OF ALL RESPONSES TO PHQ9 QUESTIONS 1 & 2: 1
SUM OF ALL RESPONSES TO PHQ QUESTIONS 1-9: 1

## 2021-06-29 ASSESSMENT — ENCOUNTER SYMPTOMS
FACIAL SWELLING: 0
COUGH: 0
EYE PAIN: 0
CHOKING: 0
EYE REDNESS: 0

## 2021-06-29 NOTE — PROGRESS NOTES
Renetta Aponte (:  1945) is a 78 y.o. male,Established patient, here for evaluation of the following chief complaint(s):  Diabetes         ASSESSMENT/PLAN:  1. Type 2 diabetes mellitus without complication, without long-term current use of insulin (HCC)  -     Hemoglobin A1C  2. Essential hypertension  -     Comprehensive Metabolic Panel  3. Hyperlipidemia, unspecified hyperlipidemia type  -     Lipid Panel  4. COPD, very severe ([address]      No follow-ups on file. Subjective   SUBJECTIVE/OBJECTIVE:  HPI   COPD:  Current treatment includes combined beta agonist/steroid inhaler, combined beta agonist/antichoinergic inhaler. Residual symptoms: chronic dyspnea, inability to climb stairs and wheezing. He denies any other symptoms. He requires his rescue inhaler 1 time(s) per day. Treatment Adherence:   Medication compliance:  compliant most of the time  Diet compliance:  compliant most of the time  Weight trend: increasing  Current exercise: no regular exercise  Barriers: none    Diabetes Mellitus Type 2: Current symptoms/problems include none. Home blood sugar records: fasting range:   Any episodes of hypoglycemia? no  Eye exam current (within one year): no  Tobacco history: He  reports that he quit smoking about 9 years ago. His smoking use included cigarettes. He has a 50.00 pack-year smoking history. He has never used smokeless tobacco.   Daily Aspirin? Yes    Hypertension:  Home blood pressure monitoring: No.  He is adherent to a low sodium diet. Patient denies chest pain, shortness of breath and headache. Antihypertensive medication side effects: no medication side effects noted. Use of agents associated with hypertension: none. Hyperlipidemia:  No new myalgias or GI upset on no medications.        Lab Results   Component Value Date    LABA1C 5.5 2020    LABA1C 5.6 2019    LABA1C 5.1 2018     Lab Results   Component Value Date    LABMICR <1.20 2019 CREATININE 1.3 12/18/2020     Lab Results   Component Value Date    ALT 27 12/18/2020    AST 16 12/18/2020     Lab Results   Component Value Date    CHOL 179 12/18/2020    TRIG 76 12/18/2020    HDL 88 (H) 12/18/2020    LDLCALC 76 12/18/2020          Review of Systems   Constitutional: Negative for diaphoresis and fatigue. HENT: Negative for facial swelling and hearing loss. Eyes: Negative for pain and redness. Respiratory: Negative for cough and choking. Cardiovascular: Negative for chest pain. Objective    Vitals:    06/29/21 0859 06/29/21 0901   BP:  132/62   Site:  Left Upper Arm   Position:  Sitting   Cuff Size:  Large Adult   Pulse:  97   Temp:  98 °F (36.7 °C)   TempSrc:  Infrared   SpO2:  93%   Weight: (!) 325 lb (147.4 kg) (!) 325 lb (147.4 kg)      Wt Readings from Last 3 Encounters:   06/29/21 (!) 325 lb (147.4 kg)   12/16/20 (!) 317 lb (143.8 kg)   04/21/20 (!) 333 lb (151 kg)     BP Readings from Last 3 Encounters:   06/29/21 132/62   12/16/20 124/72   11/10/20 138/85     Body mass index is 44.08 kg/m². Facility age limit for growth percentiles is 20 years. Physical Exam  Constitutional:       General: He is not in acute distress. Appearance: Normal appearance. He is not ill-appearing. HENT:      Right Ear: Tympanic membrane and ear canal normal.      Nose: Nose normal. No congestion or rhinorrhea. Mouth/Throat:      Mouth: Mucous membranes are moist.      Pharynx: No oropharyngeal exudate or posterior oropharyngeal erythema. Eyes:      General:         Right eye: No discharge. Left eye: No discharge. Extraocular Movements: Extraocular movements intact. Pupils: Pupils are equal, round, and reactive to light. Cardiovascular:      Rate and Rhythm: Normal rate and regular rhythm. Pulmonary:      Effort: Prolonged expiration present. No bradypnea or retractions. Breath sounds: Examination of the right-upper field reveals wheezing.  Examination of the left-upper field reveals wheezing. Examination of the right-lower field reveals decreased breath sounds. Examination of the left-lower field reveals decreased breath sounds. Decreased breath sounds and wheezing present. Abdominal:      General: Abdomen is flat. There is no distension. Palpations: Abdomen is soft. There is no mass. Tenderness: There is no abdominal tenderness. There is no guarding. Hernia: No hernia is present. Musculoskeletal:      Cervical back: Neck supple. Neurological:      Mental Status: He is alert. An electronic signature was used to authenticate this note.     --Kenny Matta MD

## 2021-06-30 LAB
ESTIMATED AVERAGE GLUCOSE: 114 MG/DL
HBA1C MFR BLD: 5.6 %

## 2021-08-03 DIAGNOSIS — E13.40 NEUROPATHY DUE TO SECONDARY DIABETES (HCC): ICD-10-CM

## 2021-08-04 RX ORDER — GABAPENTIN 300 MG/1
CAPSULE ORAL
Qty: 90 CAPSULE | Refills: 3 | Status: SHIPPED | OUTPATIENT
Start: 2021-08-04 | End: 2021-11-30

## 2021-08-19 ENCOUNTER — OFFICE VISIT (OUTPATIENT)
Dept: PULMONOLOGY | Age: 76
End: 2021-08-19
Payer: MEDICARE

## 2021-08-19 VITALS — OXYGEN SATURATION: 92 % | HEART RATE: 78 BPM

## 2021-08-19 DIAGNOSIS — Z87.891 PERSONAL HISTORY OF TOBACCO USE: Primary | ICD-10-CM

## 2021-08-19 DIAGNOSIS — J44.9 COPD, VERY SEVERE (HCC): ICD-10-CM

## 2021-08-19 DIAGNOSIS — J96.11 CHRONIC RESPIRATORY FAILURE WITH HYPOXIA (HCC): ICD-10-CM

## 2021-08-19 PROCEDURE — 99213 OFFICE O/P EST LOW 20 MIN: CPT | Performed by: NURSE PRACTITIONER

## 2021-08-19 ASSESSMENT — ENCOUNTER SYMPTOMS
SHORTNESS OF BREATH: 1
COLOR CHANGE: 0
COUGH: 0
ABDOMINAL PAIN: 0
CONSTIPATION: 0

## 2021-08-19 NOTE — PROGRESS NOTES
[facility] Follow Up Note    Subjective:   CHIEF COMPLAINT / HPI: VS OLD, STEF   The patient is 78 y.o. male who presents today for a routine follow up visit related to the above mentioned issues. There is a PMH significant for other conditions including DM, HTN, obesity and PVD. He was last evaluated by me in  and at that time his pulmonary symptoms were relatively stable. Presently he reports his DUNCAN has been stable. He notices this most when he walks a distance. There is no associated cough. He denies LE swelling. HgbA1C remains in good range as do daily BG measurements. Presently he reports compliance with nighttime CPAP. He is wearing O2 at 2-3L with activity and is on RA at rest. He reports compliance with daily Breo and is using Duoneb QID and PRN. He denies daytime sleepiness or morning headache.      Past Medical History:   Diagnosis Date    Anemia     [facility]     Diabetes mellitus [facility] [facility]     Edema     GI (gastrointestinal bleed)     Gout     Hypertension     Morbid obesity [facility] [facility]     Neuropathy     Obstructive sleep apnea     uses CPAP    Peripheral vascular disease (HCC)      Social History:    Social History     Tobacco Use   Smoking Status Former Smoker    Packs/day: 1.00    Years: 50.00    Pack years: 50.00    Types: Cigarettes    Quit date: 2012    Years since quittin.5   Smokeless Tobacco Never Used   Tobacco Comment    H.O. smoking 1 p.p.d. Quit 9 months ago      Current Medications:     Current Outpatient Medications on File Prior to Visit   Medication Sig Dispense Refill    gabapentin (NEURONTIN) 300 MG capsule TAKE ONE CAPSULE BY MOUTH THREE TIMES A DAY 90 capsule 3    predniSONE (DELTASONE) 5 MG tablet TAKE ONE TABLET BY MOUTH DAILY 90 tablet 0    ipratropium-albuterol (DUONEB) 0.5-2.5 (3) MG/3ML SOLN nebulizer solution INHALE THREE MILLILITERS VIA NEBULIZATION BY MOUTH FOUR TIMES A DAY 120 vial 5    furosemide (LASIX) 40 MG tablet TAKE ONE TABLET BY MOUTH TWICE A  tablet 1    blood glucose test strips (ACCU-CHEK CLARISSA PLUS) strip USE ONE STRIP TO TEST DAILY 100 strip 2    Accu-Chek Softclix Lancets MISC USE ONE LANCET TO TEST DAILY 100 each 2    BREO ELLIPTA 200-25 MCG/INH AEPB inhaler INHALE ONE DOSE BY MOUTH DAILY 1 each 11    allopurinol (ZYLOPRIM) 100 MG tablet TAKE TWO TABLETS BY MOUTH DAILY 180 tablet 1    metFORMIN (GLUCOPHAGE) 1000 MG tablet TAKE ONE TABLET BY MOUTH TWICE A DAY WITH MEALS 180 tablet 3    lisinopril (PRINIVIL;ZESTRIL) 2.5 MG tablet TAKE ONE TABLET BY MOUTH DAILY 90 tablet 3    diclofenac (VOLTAREN) 75 MG EC tablet TAKE ONE TABLET BY MOUTH TWICE A  tablet 0    VENTOLIN  (90 Base) MCG/ACT inhaler INHALE TWO PUFFS BY MOUTH EVERY 6 HOURS AS NEEDED 18 g 11    Blood Glucose Monitoring Suppl (ACCU-CHEK CLARISSA PLUS) w/Device KIT 1 each by Does not apply route daily 1 kit 0    acetaminophen (TYLENOL) 325 MG tablet Take 650 mg by mouth every 6 hours as needed for Pain       No current facility-administered medications on file prior to visit. Review of Systems   Constitutional: Negative for chills and fever. HENT: Negative for congestion and postnasal drip. Respiratory: Positive for shortness of breath. Negative for cough. Cardiovascular: Negative for chest pain and leg swelling. Gastrointestinal: Negative for abdominal pain and constipation. Musculoskeletal: Negative for arthralgias and joint swelling. Skin: Negative for color change and pallor. Allergic/Immunologic: Negative for environmental allergies and food allergies. Psychiatric/Behavioral: Negative for agitation and confusion. Objective:       VITALS:  Pulse 78   SpO2 92% Comment: 2 liters at rest on RA    Physical Exam  Vitals reviewed. Constitutional:       General: He is not in acute distress. Appearance: He is well-developed. He is not diaphoretic.    HENT: Head: Normocephalic and atraumatic. Eyes:      General:         Right eye: No discharge. Left eye: No discharge. Neck:      Trachea: No tracheal deviation. Cardiovascular:      Rate and Rhythm: Normal rate and regular rhythm. Pulmonary:      Effort: No respiratory distress. Breath sounds: No stridor. Wheezing present. No rales. Chest:      Chest wall: No tenderness. Abdominal:      General: There is no distension. Tenderness: There is no abdominal tenderness. Skin:     General: Skin is warm and dry. Findings: No rash. Neurological:      Mental Status: He is alert and oriented to person, place, and time. Psychiatric:         Behavior: Behavior normal.       DATA:      Radiology Review:  Pertinent images / reports were reviewed as a part of this visit. LDCT chest done 2019 reveals the following:  RECOMMENDATIONS: Per ACR Lung-RADS Version 1.0   Category 1, Negative (No nodules and definitely benign nodules). Management:Continue annual lung screening with LDCT in 12 months.(probability of malignancy <1%). Last PFTs done 2013:  Spirometry showed decreased FVC of 3.32 L, 65% predicted and  decreased FEV1 of 1.52 L 37% predicted. FEV1:FVC ratio was decreased. No  response to bronchodilators demonstrated. Lung volumes showed normal total  lung capacity of 94% predicted. Diffusion capacity showed decreased DLCO of  49% predicted. IMPRESSION:  Very severe obstructive defect with no response to  bronchodilators. Normal lung volumes with moderate to severe decrease in  diffusion capacity noted. In comparison to the test that was done in  February of 2011 no significant change in FVC noted but FEV1 has decreased by  22% and total lung capacity by 20% as well as DLCO by 10%. Assessment / Plan:   1.  Chronic respiratory failure with hypoxia (HCC) / STEF  - He continues to benefit from supplemental O2 with activity  - He continues to benefit from CPAP with sleep    2. COPD, very severe ([address]  - Stable but significant DUNCAN  - Continue Breo, samples provided   - He does utilize 5 mg of Prednisone daily  - This has helped him and does not appear to be negatively impacted glucose control    3. Personal history of tobacco use  - He is a former heavy smoker  - Last CT from 2019 reviewed and without nodular change  - Recommend repeat Low Dose Chest CT--pulmonologist/radiologist use only; now    Return in about 6 months (around 2/19/2022). RTC sooner if symptoms worsen.     [place] MSN APRN-ACNP CCRN

## 2021-08-24 RX ORDER — PREDNISONE 1 MG/1
TABLET ORAL
Qty: 90 TABLET | Refills: 0 | Status: SHIPPED | OUTPATIENT
Start: 2021-08-24 | End: 2021-11-11 | Stop reason: SDUPTHER

## 2021-09-28 DIAGNOSIS — I10 ESSENTIAL HYPERTENSION: ICD-10-CM

## 2021-09-28 NOTE — TELEPHONE ENCOUNTER
Patient is requesting a refill of their prescription.     Requested Prescriptions     Pending Prescriptions Disp Refills    lisinopril (PRINIVIL;ZESTRIL) 2.5 MG tablet 90 tablet 3     Sig: TAKE ONE TABLET BY MOUTH DAILY        Recent Visits  Date Type Provider Dept   06/29/21 Office Visit Rachael Martin MD [facility]   12/16/20 Office Visit [facility]   07/16/20 Office Visit R[facility]ael Martin [facility]   04/21/20 Office Visit Josef Seaman MD [facility]   Showing recent visits within past 540 days with a meds authorizing provider and meeting all other requirements  Future Appointments  Date Type Provider Dept   11/11/21 Appointment Rachael Martin MD [facility]   Showing future appointments within next 150 days with a meds authorizing provider and meeting all other requirements     6/29/2021

## 2021-09-28 NOTE — TELEPHONE ENCOUNTER
Patient is requesting a refill of their prescription.     Requested Prescriptions     Pending Prescriptions Disp Refills    lisinopril (PRINIVIL;ZESTRIL) 2.5 MG tablet [Pharmacy Med Name: LISINOPRIL 2.5 MG TABLET] 90 tablet 3     Sig: TAKE ONE TABLET BY MOUTH DAILY        Recent Visits  Date Type Provider Dept   06/29/21 Office Visit Vandana Guevara MD [facility]   12/16/20 Office Visit Vandana Guevara MD [facility]   07/16/20 Office Visit Vandana Guevara MD [facility]   04/21/20 Office Visit Debbie Moon MD [facility]   Showing recent visits within past 540 days with a meds authorizing provider and meeting all other requirements  Future Appointments  Date Type Provider Dept   11/11/21 Appointment Vandana Guevara MD [facility]   Showing future appointments within next 150 days with a meds authorizing provider and meeting all other requirements     6/29/2021

## 2021-09-29 RX ORDER — LISINOPRIL 2.5 MG/1
TABLET ORAL
Qty: 90 TABLET | Refills: 3 | Status: SHIPPED
Start: 2021-09-29 | End: 2022-04-11 | Stop reason: SDUPTHER

## 2021-09-29 RX ORDER — LISINOPRIL 2.5 MG/1
TABLET ORAL
Qty: 90 TABLET | Refills: 3 | Status: SHIPPED | OUTPATIENT
Start: 2021-09-29 | End: 2022-10-26 | Stop reason: SDUPTHER

## 2021-09-30 NOTE — TELEPHONE ENCOUNTER
Patient is requesting a refill of their prescription.     Requested Prescriptions     Pending Prescriptions Disp Refills    metFORMIN (GLUCOPHAGE) 1000 MG tablet [Pharmacy Med Name: metFORMIN HCL 1,000 MG TABLET] 180 tablet 3     Sig: TAKE ONE TABLET BY MOUTH TWICE A DAY WITH MEALS        Recent Visits  Date Type Provider Dept   06/29/21 Office Visit James Felix MD [facility]   12/16/20 Office Visit James Felix MD [facility]   07/16/20 Office Visit James Felix MD [facility]   04/21/20 Office Visit Rani Reis MD [facility]   Showing recent visits within past 540 days with a meds authorizing provider and meeting all other requirements  Future Appointments  Date Type Provider Dept   11/11/21 Appointment James Felix MD [facility]   Showing future appointments within next 150 days with a meds authorizing provider and meeting all other requirements     6/29/2021

## 2021-10-12 RX ORDER — ALBUTEROL SULFATE 90 UG/1
AEROSOL, METERED RESPIRATORY (INHALATION)
Qty: 18 G | Refills: 11 | Status: SHIPPED | OUTPATIENT
Start: 2021-10-12

## 2021-10-13 RX ORDER — ALLOPURINOL 100 MG/1
TABLET ORAL
Qty: 180 TABLET | Refills: 1 | Status: SHIPPED | OUTPATIENT
Start: 2021-10-13 | End: 2022-04-11

## 2021-10-13 NOTE — TELEPHONE ENCOUNTER
Patient is requesting a refill of their prescription.     Requested Prescriptions     Pending Prescriptions Disp Refills    allopurinol (ZYLOPRIM) 100 MG tablet [Pharmacy Med Name: ALLOPURINOL 100 MG TABLET] 180 tablet 1     Sig: TAKE TWO TABLETS BY MOUTH DAILY        Recent Visits  Date Type Provider Dept   06/29/21 Office Visit Gilmar Germain MD [facility]   12/16/20 Office Visit Gilmar Germain MD [facility]   07/16/20 Office Visit Gilmar Germain MD [facility]   04/21/20 Office Visit Najma De Guzman MD [facility]   Showing recent visits within past 540 days with a meds authorizing provider and meeting all other requirements  Future Appointments  Date Type Provider Dept   11/11/21 Appointment Gilmar Germain MD [facility]   Showing future appointments within next 150 days with a meds authorizing provider and meeting all other requirements     6/29/2021

## 2021-11-10 ENCOUNTER — TELEPHONE (OUTPATIENT)
Dept: INTERNAL MEDICINE CLINIC | Age: 76
End: 2021-11-10

## 2021-11-10 NOTE — TELEPHONE ENCOUNTER
----- Message from Fatuma Mayer sent at 11/10/2021 12:42 PM EST -----  Subject: Message to Provider    QUESTIONS  Information for Provider? pt states he has COPD and usually when he comes   he cannot wear a mask, he usually [place]osa in the first handicap spot and   calls the  and they bring him straight back to the room. pt   requesting a call back to make sure this is still acceptable for his appt   tomorrow at 2:15 PM.   ---------------------------------------------------------------------------  --------------  CALL BACK INFO  What is the best way for the office to contact you? OK to leave message on   voicemail  Preferred Call Back Phone Number? [phone]  ---------------------------------------------------------------------------  --------------  SCRIPT ANSWERS  Relationship to Patient?  Self

## 2021-11-11 ENCOUNTER — OFFICE VISIT (OUTPATIENT)
Dept: INTERNAL MEDICINE CLINIC | Age: 76
End: 2021-11-11
Payer: MEDICARE

## 2021-11-11 VITALS
SYSTOLIC BLOOD PRESSURE: 136 MMHG | BODY MASS INDEX: 42.66 KG/M2 | DIASTOLIC BLOOD PRESSURE: 84 MMHG | TEMPERATURE: 97.9 F | HEART RATE: 98 BPM | OXYGEN SATURATION: 95 % | WEIGHT: 315 LBS | HEIGHT: 72 IN

## 2021-11-11 DIAGNOSIS — I10 PRIMARY HYPERTENSION: ICD-10-CM

## 2021-11-11 DIAGNOSIS — J44.9 COPD, VERY SEVERE (HCC): ICD-10-CM

## 2021-11-11 DIAGNOSIS — E11.9 TYPE 2 DIABETES MELLITUS WITHOUT COMPLICATION, WITHOUT LONG-TERM CURRENT USE OF INSULIN (HCC): Primary | ICD-10-CM

## 2021-11-11 PROCEDURE — 99214 OFFICE O/P EST MOD 30 MIN: CPT | Performed by: INTERNAL MEDICINE

## 2021-11-11 RX ORDER — PREDNISONE 1 MG/1
TABLET ORAL
Qty: 90 TABLET | Refills: 1 | Status: SHIPPED | OUTPATIENT
Start: 2021-11-11 | End: 2022-05-20

## 2021-11-11 RX ORDER — FUROSEMIDE 40 MG/1
TABLET ORAL
Qty: 180 TABLET | Refills: 1 | Status: SHIPPED | OUTPATIENT
Start: 2021-11-11 | End: 2022-06-10

## 2021-11-11 NOTE — PROGRESS NOTES
Jelena Morales (:  1945) is a 78 y.o. male,Established patient, here for evaluation of the following chief complaint(s):  Diabetes         ASSESSMENT/PLAN:  1. Type 2 diabetes mellitus without complication, without long-term current use of insulin ([facility])  -     Comprehensive Metabolic Panel; Future  -     Hemoglobin A1C; Future  2. COPD, very severe ([address]  -     predniSONE (DELTASONE) 5 MG tablet; TAKE ONE TABLET BY MOUTH DAILY, Disp-90 tablet, R-1Normal  3. Primary hypertension  -     furosemide (LASIX) 40 MG tablet; TAKE ONE TABLET BY MOUTH TWICE A DAY, Disp-180 tablet, R-1Normal  -     Lipid Panel; Future      Return in about 5 months (around 2022) for Medicare Wellness 45 min (fasting labs prior to visti). Subjective   SUBJECTIVE/OBJECTIVE:  HPI   Treatment Adherence:   Medication compliance:  compliant most of the time  Diet compliance:  compliant most of the time  Weight trend: stable  Current exercise: no regular exercise  Barriers: none    Diabetes Mellitus Type 2: Current symptoms/problems include none. Home blood sugar records: patient does not test  Any episodes of hypoglycemia? no  Eye exam current (within one year): no  Tobacco history: He  reports that he quit smoking about 9 years ago. His smoking use included cigarettes. He has a 50.00 pack-year smoking history. He has never used smokeless tobacco.   Daily Aspirin? Yes    Hypertension:  Home blood pressure monitoring: No.  He is adherent to a low sodium diet. Patient denies chest pain, shortness of breath and headache. Antihypertensive medication side effects: no medication side effects noted. Use of agents associated with hypertension: none. Hyperlipidemia:  No new myalgias or GI upset on no medications.        Lab Results   Component Value Date    LABA1C 5.6 2021    LABA1C 5.5 2020    LABA1C 5.6 2019     Lab Results   Component Value Date    LABMICR <1.20 2019    CREATININE 1.3 2021 Lab Results   Component Value Date    ALT 10 06/29/2021    AST 12 (L) 06/29/2021     Lab Results   Component Value Date    CHOL 184 06/29/2021    TRIG 80 06/29/2021    HDL 82 (H) 06/29/2021    LDLCALC 86 06/29/2021          Review of Systems       Objective    Vitals:    11/11/21 1424   BP: 136/84   Site: Right Upper Arm   Position: Sitting   Cuff Size: Large Adult   Pulse: 98   Temp: 97.9 °F (36.6 °C)   TempSrc: Infrared   SpO2: 95%   Weight: (!) 325 lb (147.4 kg)   Height: 6' (1.829 m)      Wt Readings from Last 3 Encounters:   11/11/21 (!) 325 lb (147.4 kg)   06/29/21 (!) 325 lb (147.4 kg)   12/16/20 (!) 317 lb (143.8 kg)     BP Readings from Last 3 Encounters:   11/11/21 136/84   06/29/21 132/62   12/16/20 124/72     Body mass index is 44.08 kg/m². Facility age limit for growth percentiles is 20 years. Physical Exam  Constitutional:       General: He is not in acute distress. Appearance: Normal appearance. HENT:      Head: Normocephalic. Right Ear: Tympanic membrane normal.      Left Ear: Tympanic membrane normal.      Nose: Nose normal. No congestion. Mouth/Throat:      Mouth: Mucous membranes are moist.      Pharynx: No oropharyngeal exudate or posterior oropharyngeal erythema. Eyes:      General:         Right eye: No discharge. Left eye: No discharge. Pupils: Pupils are equal, round, and reactive to light. Cardiovascular:      Rate and Rhythm: Normal rate and regular rhythm. Pulmonary:      Effort: Pulmonary effort is normal. No respiratory distress. Breath sounds: No stridor. No wheezing or rhonchi. Abdominal:      General: Abdomen is flat. There is no distension. Palpations: There is no mass. Tenderness: There is no abdominal tenderness. Hernia: No hernia is present. Musculoskeletal:      Cervical back: Normal range of motion. Neurological:      Mental Status: He is alert.               An electronic signature was used to authenticate this note.    --Franc Martinez MD

## 2021-11-30 DIAGNOSIS — E13.40 NEUROPATHY DUE TO SECONDARY DIABETES (HCC): ICD-10-CM

## 2021-11-30 RX ORDER — GABAPENTIN 300 MG/1
CAPSULE ORAL
Qty: 90 CAPSULE | Refills: 3 | Status: SHIPPED | OUTPATIENT
Start: 2021-11-30 | End: 2022-04-01

## 2021-11-30 NOTE — TELEPHONE ENCOUNTER
Patient is requesting a refill of their prescription.     Requested Prescriptions     Pending Prescriptions Disp Refills    gabapentin (NEURONTIN) 300 MG capsule [Pharmacy Med Name: GABAPENTIN 300 MG CAPSULE] 90 capsule 3     Sig: TAKE ONE CAPSULE BY MOUTH THREE TIMES A DAY        Recent Visits  Date Type Provider Dept   11/11/21 Office Visit Jimmy Kiser MD [facility]   06/29/21 Office Visit Jimmy Kiser MD [facility]   12/16/20 Office Visit Jimmy Kiser MD [facility]   07/16/20 Office Visit Jimmy Kiser MD [facility]   Showing recent visits within past 540 days with a meds authorizing provider and meeting all other requirements  Future Appointments  Date Type Provider Dept   04/11/22 Appointment Jimmy Kiser MD [facility]   Showing future appointments within next 150 days with a meds authorizing provider and meeting all other requirements     11/11/2021

## 2022-01-20 DIAGNOSIS — E11.9 TYPE 2 DIABETES MELLITUS WITHOUT COMPLICATION, WITHOUT LONG-TERM CURRENT USE OF INSULIN (HCC): ICD-10-CM

## 2022-01-20 RX ORDER — BLOOD SUGAR DIAGNOSTIC
STRIP MISCELLANEOUS
Qty: 50 STRIP | Refills: 5 | Status: SHIPPED | OUTPATIENT
Start: 2022-01-20

## 2022-01-20 NOTE — TELEPHONE ENCOUNTER
Recent Visits  Date Type Provider Dept   11/11/21 Office Visit Ramírez Valerio MD [facility]   06/29/21 Office Visit [facility]   12/16/20 Office Visit Ramírez Valerio MD [facility]   Showing recent visits within past 540 days with a meds authorizing provider and meeting all other requirements  Future Appointments  Date Type Provider Dept   04/11/22 Appointment Ramírez Valerio MD [facility]   Showing future appointments within next 150 days with a meds authorizing provider and meeting all other requirements     11/11/2021

## 2022-02-25 ENCOUNTER — OFFICE VISIT (OUTPATIENT)
Dept: PULMONOLOGY | Age: 77
End: 2022-02-25
Payer: MEDICARE

## 2022-02-25 VITALS — OXYGEN SATURATION: 91 % | HEART RATE: 79 BPM

## 2022-02-25 DIAGNOSIS — G47.33 OSA (OBSTRUCTIVE SLEEP APNEA): Chronic | ICD-10-CM

## 2022-02-25 DIAGNOSIS — J96.11 CHRONIC RESPIRATORY FAILURE WITH HYPOXIA (HCC): ICD-10-CM

## 2022-02-25 DIAGNOSIS — J44.9 COPD, VERY SEVERE (HCC): Primary | ICD-10-CM

## 2022-02-25 DIAGNOSIS — E66.01 MORBID OBESITY (HCC): Chronic | ICD-10-CM

## 2022-02-25 PROCEDURE — 99213 OFFICE O/P EST LOW 20 MIN: CPT | Performed by: INTERNAL MEDICINE

## 2022-02-25 NOTE — PROGRESS NOTES
[facility]  Progress Note  MD Angela Solorio   YOB: 1945    Date of Visit:  2/25/2022    Assessment/Plan:  1. COPD, very severe ([address]  PFT 3/13:  INTERPRETATION: Spirometry showed decreased FVC of 3.32 L, 65% predicted and  decreased FEV1 of 1.52 L 37% predicted. FEV1:FVC ratio was decreased. No  response to bronchodilators demonstrated. Lung volumes showed normal total  lung capacity of 94% predicted. Diffusion capacity showed decreased DLCO of  49% predicted.      IMPRESSION: Very severe obstructive defect with no response to  bronchodilators. Normal lung volumes with moderate to severe decrease in  diffusion capacity noted. In comparison to the test that was done in  February of 2011 no significant change in FVC noted but FEV1 has decreased by  22% and total lung capacity by 20% as well as DLCO by 10%. Breo -- he likes this better than Advair  Has Ventolin which he likes  Duoneb  Has a new N    LDCT chest ordered bu he has not had this done. 2. Chronic respiratory failure with hypoxia (HCC)  O2 sats are acceptable on supplemental O2. The patient benefits from the use of supplemental O2. Not really using this now. 3. Type 2 diabetes mellitus without complication, without long-term current use of insulin (HCC)  Well controlled    4. STEF (obstructive sleep apnea)  His CPAP is broken beyond repair  This is >8years old  Get his CPAP repalced. He benefits from CPAP. Ever Brittle 5. Morbid obesity due to excess calories (HCC)  Contributes to his SOB      FOLLOW UP: 6 months for pulmonary. Chief Complaint   Patient presents with    Shortness of Breath     6 month       HPI  The patient presents with a chief complaint of moderate shortness of breath related to very severe COPD of many years duration. He has mild associated cough. Exertion is a modifying factor.  He takes Breo (which he finds better than Advair) and duoneb for very severe COPD of many years duration. He does use O2 to sleep. No Chest pain, Nausea or vomiting reported. Review of Systems  As documented in HPI     Physical Exam:  Well developed, well nourished  Alert and oriented  Sclera is clear  No cervical adenopathy  No JVD. Chest examination is fine wheezing. Cardiac examination reveals regular rate and rhythm without murmur, gallop or rub. The abdomen is soft, nontender and nondistended. There is no clubbing, cyanosis or edema of the extremities. There is no obvious skin rash. No focal neuro deficicts  Normal mood and affect    No Known Allergies  Prior to Visit Medications    Medication Sig Taking?  Authorizing Provider   lisinopril (PRINIVIL;ZESTRIL) 2.5 MG tablet TAKE ONE TABLET BY MOUTH DAILY  Geovanna Cabrales MD   BREO ELLIPTA 200-25 MCG/INH AEPB inhaler INHALE ONE DOSE BY MOUTH DAILY  Urbano Clancy MD   [address] [facility] strip USE ONE STRIP TO TEST DAILY  Geovanna Cabrales MD   gabapentin (NEURONTIN) 300 MG capsule TAKE ONE CAPSULE BY MOUTH THREE TIMES A DAY  Geovanna Cabrales MD   predniSONE (DELTASONE) 5 MG tablet TAKE ONE TABLET BY MOUTH DAILY  Geovanna Cabrales MD   furosemide (LASIX) 40 MG tablet TAKE ONE TABLET BY MOUTH TWICE A DAY  Geovanna Cabrales MD   ipratropium-albuterol (Arnold Seton) 0.5-2.5 (3) MG/3ML SOLN nebulizer solution INHALE ONE VIAL (3MLS) VIA NEBULIZATION BY MOUTH FOUR TIMES A DAY  Urbano Clancy MD   allopurinol (ZYLOPRIM) 100 MG tablet TAKE TWO TABLETS BY MOUTH DAILY  Geovanna Cabrales MD   albuterol sulfate  (90 Base) MCG/ACT inhaler INHALE TWO PUFFS BY MOUTH EVERY 6 HOURS AS NEEDED  Urbano Clancy MD   metFORMIN (GLUCOPHAGE) 1000 MG tablet TAKE ONE TABLET BY MOUTH TWICE A DAY WITH MEALS  Geovanna Cabrales MD   lisinopril (PRINIVIL;ZESTRIL) 2.5 MG tablet TAKE ONE TABLET BY MOUTH DAILY  Geovanna Cabrales MD   Accu-Chek Softclix Lancets MISC USE ONE LANCET TO TEST DAILY  Genita Searing Claus De Jesus MD   diclofenac (VOLTAREN) 75 MG EC tablet TAKE ONE TABLET BY MOUTH TWICE A DAY  Minal Wade MD   Blood Glucose Monitoring Suppl (ACCU-CHEK CLARISSA PLUS) w/Device KIT 1 each by Does not apply route daily  Minal Wade MD   acetaminophen (TYLENOL) 325 MG tablet Take 650 mg by mouth every 6 hours as needed for Pain  Historical Provider, MD       Vitals:    02/25/22 1300   Pulse: 79   SpO2: 91%     There is no height or weight on file to calculate BMI.      Wt Readings from Last 3 Encounters:   11/11/21 (!) 325 lb (147.4 kg)   06/29/21 (!) 325 lb (147.4 kg)   12/16/20 (!) 317 lb (143.8 kg)     BP Readings from Last 3 Encounters:   11/11/21 136/84   06/29/21 132/62   12/16/20 124/72        Social History     Tobacco Use   Smoking Status Former Smoker    Packs/day: 1.00    Years: 50.00    Pack years: 50.00    Types: Cigarettes    Quit date: 2/1/2012    Years since quitting: 10.0   Smokeless Tobacco Never Used   Tobacco Comment    H.O. smoking 1 p.p.d. Quit 9 months ago

## 2022-04-01 DIAGNOSIS — E13.40 NEUROPATHY DUE TO SECONDARY DIABETES (HCC): ICD-10-CM

## 2022-04-01 RX ORDER — GABAPENTIN 300 MG/1
CAPSULE ORAL
Qty: 90 CAPSULE | Refills: 3 | Status: SHIPPED | OUTPATIENT
Start: 2022-04-01 | End: 2022-08-01

## 2022-04-01 NOTE — TELEPHONE ENCOUNTER
Patient is requesting a refill of their prescription.     Requested Prescriptions     Pending Prescriptions Disp Refills    gabapentin (NEURONTIN) 300 MG capsule [Pharmacy Med Name: GABAPENTIN 300 MG CAPSULE] 90 capsule 3     Sig: TAKE ONE CAPSULE BY MOUTH THREE TIMES A DAY        Recent Visits  Date Type Provider Dept   11/11/21 Office Visit Arcelia Chaparro MD [facility]   06/29/21 Office Visit Arcelia Chaparro MD [facility]   12/16/20 Office Visit Arcelia Chaparro MD [facility]   Showing recent visits within past 540 days with a meds authorizing provider and meeting all other requirements  Future Appointments  Date Type Provider Dept   04/11/22 Appointment Arcelia Chaparro MD [facility]   Showing future appointments within next 150 days with a meds authorizing provider and meeting all other requirements     11/11/2021

## 2022-04-04 DIAGNOSIS — I10 PRIMARY HYPERTENSION: ICD-10-CM

## 2022-04-04 DIAGNOSIS — E11.9 TYPE 2 DIABETES MELLITUS WITHOUT COMPLICATION, WITHOUT LONG-TERM CURRENT USE OF INSULIN (HCC): ICD-10-CM

## 2022-04-04 LAB
A/G RATIO: 1.8 (ref 1.1–2.2)
ALBUMIN SERPL-MCNC: 4.2 G/DL (ref 3.4–5)
ALP BLD-CCNC: 59 U/L (ref 40–129)
ALT SERPL-CCNC: 12 U/L (ref 10–40)
ANION GAP SERPL CALCULATED.3IONS-SCNC: 17 MMOL/L (ref 3–16)
AST SERPL-CCNC: 11 U/L (ref 15–37)
BILIRUB SERPL-MCNC: 0.4 MG/DL (ref 0–1)
BUN BLDV-MCNC: 46 MG/DL (ref 7–20)
CALCIUM SERPL-MCNC: 9.2 MG/DL (ref 8.3–10.6)
CHLORIDE BLD-SCNC: 103 MMOL/L (ref 99–110)
CHOLESTEROL, TOTAL: 168 MG/DL (ref 0–199)
CO2: 23 MMOL/L (ref 21–32)
CREAT SERPL-MCNC: 1.7 MG/DL (ref 0.8–1.3)
GFR AFRICAN AMERICAN: 48
GFR NON-AFRICAN AMERICAN: 39
GLUCOSE BLD-MCNC: 88 MG/DL (ref 70–99)
HDLC SERPL-MCNC: 81 MG/DL (ref 40–60)
LDL CHOLESTEROL CALCULATED: 70 MG/DL
POTASSIUM SERPL-SCNC: 5.4 MMOL/L (ref 3.5–5.1)
SODIUM BLD-SCNC: 143 MMOL/L (ref 136–145)
TOTAL PROTEIN: 6.6 G/DL (ref 6.4–8.2)
TRIGL SERPL-MCNC: 87 MG/DL (ref 0–150)
VLDLC SERPL CALC-MCNC: 17 MG/DL

## 2022-04-05 LAB
ESTIMATED AVERAGE GLUCOSE: 119.8 MG/DL
HBA1C MFR BLD: 5.8 %

## 2022-04-11 ENCOUNTER — OFFICE VISIT (OUTPATIENT)
Dept: INTERNAL MEDICINE CLINIC | Age: 77
End: 2022-04-11
Payer: MEDICARE

## 2022-04-11 VITALS
OXYGEN SATURATION: 94 % | BODY MASS INDEX: 42.66 KG/M2 | TEMPERATURE: 97.8 F | HEIGHT: 72 IN | DIASTOLIC BLOOD PRESSURE: 78 MMHG | SYSTOLIC BLOOD PRESSURE: 124 MMHG | WEIGHT: 315 LBS | HEART RATE: 94 BPM

## 2022-04-11 DIAGNOSIS — M19.019 SHOULDER ARTHRITIS: ICD-10-CM

## 2022-04-11 DIAGNOSIS — Z00.00 MEDICARE ANNUAL WELLNESS VISIT, SUBSEQUENT: Primary | ICD-10-CM

## 2022-04-11 DIAGNOSIS — Z23 NEED FOR PROPHYLACTIC VACCINATION AND INOCULATION AGAINST VARICELLA: ICD-10-CM

## 2022-04-11 PROCEDURE — G0439 PPPS, SUBSEQ VISIT: HCPCS | Performed by: INTERNAL MEDICINE

## 2022-04-11 RX ORDER — CELECOXIB 100 MG/1
100 CAPSULE ORAL 2 TIMES DAILY
Qty: 60 CAPSULE | Refills: 5 | Status: ON HOLD
Start: 2022-04-11 | End: 2022-09-09 | Stop reason: HOSPADM

## 2022-04-11 RX ORDER — ALLOPURINOL 100 MG/1
TABLET ORAL
Qty: 180 TABLET | Refills: 1 | Status: SHIPPED | OUTPATIENT
Start: 2022-04-11 | End: 2022-10-06

## 2022-04-11 SDOH — ECONOMIC STABILITY: FOOD INSECURITY: WITHIN THE PAST 12 MONTHS, YOU WORRIED THAT YOUR FOOD WOULD RUN OUT BEFORE YOU GOT MONEY TO BUY MORE.: NEVER TRUE

## 2022-04-11 SDOH — ECONOMIC STABILITY: INCOME INSECURITY: IN THE LAST 12 MONTHS, WAS THERE A TIME WHEN YOU WERE NOT ABLE TO PAY THE MORTGAGE OR RENT ON TIME?: NO

## 2022-04-11 SDOH — ECONOMIC STABILITY: TRANSPORTATION INSECURITY
IN THE PAST 12 MONTHS, HAS THE LACK OF TRANSPORTATION KEPT YOU FROM MEDICAL APPOINTMENTS OR FROM GETTING MEDICATIONS?: NO

## 2022-04-11 SDOH — ECONOMIC STABILITY: HOUSING INSECURITY: IN THE LAST 12 MONTHS, HOW MANY PLACES HAVE YOU LIVED?: 1

## 2022-04-11 SDOH — ECONOMIC STABILITY: TRANSPORTATION INSECURITY
IN THE PAST 12 MONTHS, HAS LACK OF TRANSPORTATION KEPT YOU FROM MEETINGS, WORK, OR FROM GETTING THINGS NEEDED FOR DAILY LIVING?: NO

## 2022-04-11 SDOH — ECONOMIC STABILITY: FOOD INSECURITY: WITHIN THE PAST 12 MONTHS, THE FOOD YOU BOUGHT JUST DIDN'T LAST AND YOU DIDN'T HAVE MONEY TO GET MORE.: NEVER TRUE

## 2022-04-11 SDOH — ECONOMIC STABILITY: HOUSING INSECURITY
IN THE LAST 12 MONTHS, WAS THERE A TIME WHEN YOU DID NOT HAVE A STEADY PLACE TO SLEEP OR SLEPT IN A SHELTER (INCLUDING NOW)?: NO

## 2022-04-11 ASSESSMENT — LIFESTYLE VARIABLES
HOW OFTEN DURING THE LAST YEAR HAVE YOU FOUND THAT YOU WERE NOT ABLE TO STOP DRINKING ONCE YOU HAD STARTED: 0
HOW OFTEN DURING THE LAST YEAR HAVE YOU NEEDED AN ALCOHOLIC DRINK FIRST THING IN THE MORNING TO GET YOURSELF GOING AFTER A NIGHT OF HEAVY DRINKING: 0
HOW OFTEN DURING THE LAST YEAR HAVE YOU HAD A FEELING OF GUILT OR REMORSE AFTER DRINKING: 0
HAVE YOU OR SOMEONE ELSE BEEN INJURED AS A RESULT OF YOUR DRINKING: 0
HOW OFTEN DO YOU HAVE A DRINK CONTAINING ALCOHOL: 4 OR MORE TIMES A WEEK
HOW OFTEN DURING THE LAST YEAR HAVE YOU FAILED TO DO WHAT WAS NORMALLY EXPECTED FROM YOU BECAUSE OF DRINKING: 0
HOW OFTEN DURING THE LAST YEAR HAVE YOU BEEN UNABLE TO REMEMBER WHAT HAPPENED THE NIGHT BEFORE BECAUSE YOU HAD BEEN DRINKING: 0
HOW MANY STANDARD DRINKS CONTAINING ALCOHOL DO YOU HAVE ON A TYPICAL DAY: 1 OR 2
HAS A RELATIVE, FRIEND, DOCTOR, OR ANOTHER HEALTH PROFESSIONAL EXPRESSED CONCERN ABOUT YOUR DRINKING OR SUGGESTED YOU CUT DOWN: 0

## 2022-04-11 ASSESSMENT — PATIENT HEALTH QUESTIONNAIRE - PHQ9
SUM OF ALL RESPONSES TO PHQ QUESTIONS 1-9: 0
SUM OF ALL RESPONSES TO PHQ9 QUESTIONS 1 & 2: 0
2. FEELING DOWN, DEPRESSED OR HOPELESS: 0
1. LITTLE INTEREST OR PLEASURE IN DOING THINGS: 0
SUM OF ALL RESPONSES TO PHQ QUESTIONS 1-9: 0

## 2022-04-11 ASSESSMENT — SOCIAL DETERMINANTS OF HEALTH (SDOH): HOW HARD IS IT FOR YOU TO PAY FOR THE VERY BASICS LIKE FOOD, HOUSING, MEDICAL CARE, AND HEATING?: NOT VERY HARD

## 2022-04-11 NOTE — PATIENT INSTRUCTIONS
Personalized Preventive Plan for Irene Leonard - 4/11/2022  Medicare offers a range of preventive health benefits. Some of the tests and screenings are paid in full while other may be subject to a deductible, co-insurance, and/or copay. Some of these benefits include a comprehensive review of your medical history including lifestyle, illnesses that may run in your family, and various assessments and screenings as appropriate. After reviewing your medical record and screening and assessments performed today your provider may have ordered immunizations, labs, imaging, and/or referrals for you. A list of these orders (if applicable) as well as your Preventive Care list are included within your After Visit Summary for your review. Other Preventive Recommendations:    · A preventive eye exam performed by an eye specialist is recommended every 1-2 years to screen for glaucoma; cataracts, macular degeneration, and other eye disorders. · A preventive dental visit is recommended every 6 months. · Try to get at least 150 minutes of exercise per week or 10,000 steps per day on a pedometer . · Order or download the FREE \"Exercise & Physical Activity: Your Everyday Guide\" from [employer]. Call 5-407.773.3379 or search [employer] online. · You need 7465-7433 mg of calcium and 9336-8730 IU of vitamin D per day. It is possible to meet your calcium requirement with diet alone, but a vitamin D supplement is usually necessary to meet this goal.  · When exposed to the sun, use a sunscreen that protects against both UVA and UVB radiation with an SPF of 30 or greater. Reapply every 2 to 3 hours or after sweating, drying off with a towel, or swimming. · Always wear a seat belt when traveling in a car. Always wear a helmet when riding a bicycle or motorcycle. Heart-Healthy Diet   Sodium, Fat, and Cholesterol Controlled Diet       What Is a Heart Healthy Diet?    A heart-healthy diet is one that limits sodium , certain types of fat , and cholesterol . This type of diet is recommended for:   People with any form of cardiovascular disease (eg, coronary heart disease , peripheral vascular disease , previous heart attack , previous stroke )   People with risk factors for cardiovascular disease, such as high blood pressure , high cholesterol , or diabetes   Anyone who wants to lower their risk of developing cardiovascular disease   Sodium    Sodium is a mineral found in many foods. In general, most people consume much more sodium than they need. Diets high in sodium can increase blood pressure and lead to edema (water retention). On a heart-healthy diet, you should consume no more than 2,300 mg (milligrams) of sodium per dayabout the amount in one teaspoon of table salt. The foods highest in sodium include table salt (about 50% sodium), processed foods, convenience foods, and preserved foods. Cholesterol    Cholesterol is a fat-like, waxy substance in your blood. Our bodies make some cholesterol. It is also found in animal products, with the highest amounts in fatty meat, egg yolks, whole milk, cheese, shellfish, and organ meats. On a heart-healthy diet, you should limit your cholesterol intake to less than 200 mg per day. It is normal and important to have some cholesterol in your bloodstream. But too much cholesterol can cause plaque to build up within your arteries, which can eventually lead to a heart attack or stroke. The two types of cholesterol that are most commonly referred to are:   Low-density lipoprotein (LDL) cholesterol  Also known as bad cholesterol, this is the cholesterol that tends to build up along your arteries. Bad cholesterol levels are increased by eating fats that are saturated or hydrogenated. Optimal level of this cholesterol is less than 100. Over 130 starts to get risky for heart disease.    High-density lipoprotein (HDL) cholesterol  Also known as good cholesterol, this type of cholesterol actually carries cholesterol away from your arteries and may, therefore, help lower your risk of having a heart attack. You want this level to be high (ideally greater than 60). It is a risk to have a level less than 40. You can raise this good cholesterol by eating olive oil, canola oil, avocados, or nuts. Exercise raises this level, too. Fat    Fat is calorie dense and packs a lot of calories into a small amount of food. Even though fats should be limited due to their high calorie content, not all fats are bad. In fact, some fats are quite healthful. Fat can be broken down into four main types. The good-for-you fats are:   Monounsaturated fat  found in oils such as olive and canola, avocados, and nuts and natural nut butters; can decrease cholesterol levels, while keeping levels of HDL cholesterol high   Polyunsaturated fat  found in oils such as safflower, sunflower, soybean, corn, and sesame; can decrease total cholesterol and LDL cholesterol   Omega-3 fatty acids  particularly those found in fatty fish (such as salmon, trout, tuna, mackerel, herring, and sardines); can decrease risk of arrhythmias, decrease triglyceride levels, and slightly lower blood pressure   The fats that you want to limit are:   Saturated fat  found in animal products, many fast foods, and a few vegetables; increases total blood cholesterol, including LDL levels   Animal fats that are saturated include: butter, lard, whole-milk dairy products, meat fat, and poultry skin   Vegetable fats that are saturated include: hydrogenated shortening, palm oil, coconut oil, cocoa butter   Hydrogenated or trans fat  found in margarine and vegetable shortening, most shelf stable snack foods, and fried foods; increases LDL and decreases HDL     It is generally recommended that you limit your total fat for the day to less than 30% of your total calories.  If you follow an 1800-calorie heart healthy diet, for example, this would mean 60 grams of fat or less per day. Saturated fat and trans fat in your diet raises your blood cholesterol the most, much more than dietary cholesterol does. For this reason, on a heart-healthy diet, less than 7% of your calories should come from saturated fat and ideally 0% from trans fat. On an 1800-calorie diet, this translates into less than 14 grams of saturated fat per day, leaving 46 grams of fat to come from mono- and polyunsaturated fats.    Food Choices on a Heart Healthy Diet   Food Category   Foods Recommended   Foods to Avoid   Grains   Breads and rolls without salted tops Most dry and cooked cereals Unsalted crackers and breadsticks Low-sodium or homemade breadcrumbs or stuffing All rice and pastas   Breads, rolls, and crackers with salted tops High-fat baked goods (eg, muffins, donuts, pastries) Quick breads, self-rising flour, and biscuit mixes Regular bread crumbs Instant hot cereals Commercially prepared rice, pasta, or stuffing mixes   Vegetables   Most fresh, frozen, and low-sodium canned vegetables Low-sodium and salt-free vegetable juices Canned vegetables if unsalted or rinsed   Regular canned vegetables and juices, including sauerkraut and pickled vegetables Frozen vegetables with sauces Commercially prepared potato and vegetable mixes   Fruits   Most fresh, frozen, and canned fruits All fruit juices   Fruits processed with salt or sodium   Milk   Nonfat or low-fat (1%) milk Nonfat or low-fat yogurt Cottage cheese, low-fat ricotta, cheeses labeled as low-fat and low-sodium   Whole milk Reduced-fat (2%) milk Malted and chocolate milk Full fat yogurt Most cheeses (unless low-fat and low salt) Buttermilk (no more than 1 cup per week)   Meats and Beans   Lean cuts of fresh or frozen beef, veal, lamb, or pork (look for the word loin) Fresh or frozen poultry without the skin Fresh or frozen fish and some shellfish Egg whites and egg substitutes (Limit whole eggs to three per week) Tofu Nuts or seeds (unsalted, dry-roasted), low-sodium peanut butter Dried peas, beans, and lentils   Any smoked, cured, salted, or canned meat, fish, or poultry (including leo, chipped beef, cold cuts, hot dogs, sausages, sardines, and anchovies) Poultry skins Breaded and/or fried fish or meats Canned peas, beans, and lentils Salted nuts   Fats and Oils   Olive oil and canola oil Low-sodium, low-fat salad dressings and mayonnaise   Butter, margarine, coconut and palm oils, leo fat   Snacks, Sweets, and Condiments   Low-sodium or unsalted versions of broths, soups, soy sauce, and condiments Pepper, herbs, and spices; vinegar, lemon, or lime juice Low-fat frozen desserts (yogurt, sherbet, fruit bars) Sugar, cocoa powder, honey, syrup, jam, and preserves Low-fat, trans-fat free cookies, cakes, and pies Neal and animal crackers, fig bars, inocente snaps   High-fat desserts Broth, soups, gravies, and sauces, made from instant mixes or other high-sodium ingredients Salted snack foods Canned olives Meat tenderizers, seasoning salt, and most flavored vinegars   Beverages   Low-sodium carbonated beverages Tea and coffee in moderation Soy milk   Commercially softened water   Suggestions   Make whole grains, fruits, and vegetables the base of your diet. Choose heart-healthy fats such as canola, olive, and flaxseed oil, and foods high in heart-healthy fats, such as nuts, seeds, soybeans, tofu, and fish. Eat fish at least twice per week; the fish highest in omega-3 fatty acids and lowest in mercury include salmon, herring, mackerel, sardines, and canned chunk light tuna. If you eat fish less than twice per week or have high triglycerides, talk to your doctor about taking fish oil supplements. Read food labels.    For products low in fat and cholesterol, look for fat free, low-fat, cholesterol free, saturated fat free, and trans fat freeAlso scan the Nutrition Facts Label, which lists saturated fat, trans fat, and cholesterol amounts. For products low in sodium, look for sodium free, very low sodium, low sodium, no added salt, and unsalted   Skip the salt when cooking or at the table; if food needs more flavor, get creative and try out different herbs and spices. Garlic and onion also add substantial flavor to foods. Trim any visible fat off meat and poultry before cooking, and drain the fat off after quiñonez. Use cooking methods that require little or no added fat, such as grilling, boiling, baking, poaching, broiling, roasting, steaming, stir-frying, and sauting. Avoid fast food and convenience food. They tend to be high in saturated and trans fat and have a lot of added salt. Talk to a registered dietitian for individualized diet advice. Last Reviewed: March 2011 Arden Singh MS, MPH, RD   Updated: 3/29/2011   ·     Heart-Healthy Diet   Sodium, Fat, and Cholesterol Controlled Diet       What Is a Heart Healthy Diet? A heart-healthy diet is one that limits sodium , certain types of fat , and cholesterol . This type of diet is recommended for:   People with any form of cardiovascular disease (eg, coronary heart disease , peripheral vascular disease , previous heart attack , previous stroke )   People with risk factors for cardiovascular disease, such as high blood pressure , high cholesterol , or diabetes   Anyone who wants to lower their risk of developing cardiovascular disease   Sodium    Sodium is a mineral found in many foods. In general, most people consume much more sodium than they need. Diets high in sodium can increase blood pressure and lead to edema (water retention). On a heart-healthy diet, you should consume no more than 2,300 mg (milligrams) of sodium per dayabout the amount in one teaspoon of table salt. The foods highest in sodium include table salt (about 50% sodium), processed foods, convenience foods, and preserved foods.    Cholesterol    Cholesterol is a fat-like, waxy substance in your blood. Our bodies make some cholesterol. It is also found in animal products, with the highest amounts in fatty meat, egg yolks, whole milk, cheese, shellfish, and organ meats. On a heart-healthy diet, you should limit your cholesterol intake to less than 200 mg per day. It is normal and important to have some cholesterol in your bloodstream. But too much cholesterol can cause plaque to build up within your arteries, which can eventually lead to a heart attack or stroke. The two types of cholesterol that are most commonly referred to are:   Low-density lipoprotein (LDL) cholesterol  Also known as bad cholesterol, this is the cholesterol that tends to build up along your arteries. Bad cholesterol levels are increased by eating fats that are saturated or hydrogenated. Optimal level of this cholesterol is less than 100. Over 130 starts to get risky for heart disease. High-density lipoprotein (HDL) cholesterol  Also known as good cholesterol, this type of cholesterol actually carries cholesterol away from your arteries and may, therefore, help lower your risk of having a heart attack. You want this level to be high (ideally greater than 60). It is a risk to have a level less than 40. You can raise this good cholesterol by eating olive oil, canola oil, avocados, or nuts. Exercise raises this level, too. Fat    Fat is calorie dense and packs a lot of calories into a small amount of food. Even though fats should be limited due to their high calorie content, not all fats are bad. In fact, some fats are quite healthful. Fat can be broken down into four main types.    The good-for-you fats are:   Monounsaturated fat  found in oils such as olive and canola, avocados, and nuts and natural nut butters; can decrease cholesterol levels, while keeping levels of HDL cholesterol high   Polyunsaturated fat  found in oils such as safflower, sunflower, soybean, corn, and sesame; can decrease total cholesterol and LDL cholesterol   Omega-3 fatty acids  particularly those found in fatty fish (such as salmon, trout, tuna, mackerel, herring, and sardines); can decrease risk of arrhythmias, decrease triglyceride levels, and slightly lower blood pressure   The fats that you want to limit are:   Saturated fat  found in animal products, many fast foods, and a few vegetables; increases total blood cholesterol, including LDL levels   Animal fats that are saturated include: butter, lard, whole-milk dairy products, meat fat, and poultry skin   Vegetable fats that are saturated include: hydrogenated shortening, palm oil, coconut oil, cocoa butter   Hydrogenated or trans fat  found in margarine and vegetable shortening, most shelf stable snack foods, and fried foods; increases LDL and decreases HDL     It is generally recommended that you limit your total fat for the day to less than 30% of your total calories. If you follow an 1800-calorie heart healthy diet, for example, this would mean 60 grams of fat or less per day. Saturated fat and trans fat in your diet raises your blood cholesterol the most, much more than dietary cholesterol does. For this reason, on a heart-healthy diet, less than 7% of your calories should come from saturated fat and ideally 0% from trans fat. On an 1800-calorie diet, this translates into less than 14 grams of saturated fat per day, leaving 46 grams of fat to come from mono- and polyunsaturated fats.    Food Choices on a Heart Healthy Diet   Food Category   Foods Recommended   Foods to Avoid   Grains   Breads and rolls without salted tops Most dry and cooked cereals Unsalted crackers and breadsticks Low-sodium or homemade breadcrumbs or stuffing All rice and pastas   Breads, rolls, and crackers with salted tops High-fat baked goods (eg, muffins, donuts, pastries) Quick breads, self-rising flour, and biscuit mixes Regular bread crumbs Instant hot cereals Commercially prepared rice, pasta, or stuffing mixes Vegetables   Most fresh, frozen, and low-sodium canned vegetables Low-sodium and salt-free vegetable juices Canned vegetables if unsalted or rinsed   Regular canned vegetables and juices, including sauerkraut and pickled vegetables Frozen vegetables with sauces Commercially prepared potato and vegetable mixes   Fruits   Most fresh, frozen, and canned fruits All fruit juices   Fruits processed with salt or sodium   Milk   Nonfat or low-fat (1%) milk Nonfat or low-fat yogurt Cottage cheese, low-fat ricotta, cheeses labeled as low-fat and low-sodium   Whole milk Reduced-fat (2%) milk Malted and chocolate milk Full fat yogurt Most cheeses (unless low-fat and low salt) Buttermilk (no more than 1 cup per week)   Meats and Beans   Lean cuts of fresh or frozen beef, veal, lamb, or pork (look for the word loin) Fresh or frozen poultry without the skin Fresh or frozen fish and some shellfish Egg whites and egg substitutes (Limit whole eggs to three per week) Tofu Nuts or seeds (unsalted, dry-roasted), low-sodium peanut butter Dried peas, beans, and lentils   Any smoked, cured, salted, or canned meat, fish, or poultry (including leo, chipped beef, cold cuts, hot dogs, sausages, sardines, and anchovies) Poultry skins Breaded and/or fried fish or meats Canned peas, beans, and lentils Salted nuts   Fats and Oils   Olive oil and canola oil Low-sodium, low-fat salad dressings and mayonnaise   Butter, margarine, coconut and palm oils, leo fat   Snacks, Sweets, and Condiments   Low-sodium or unsalted versions of broths, soups, soy sauce, and condiments Pepper, herbs, and spices; vinegar, lemon, or lime juice Low-fat frozen desserts (yogurt, sherbet, fruit bars) Sugar, cocoa powder, honey, syrup, jam, and preserves Low-fat, trans-fat free cookies, cakes, and pies Neal and animal crackers, fig bars, inocente snaps   High-fat desserts Broth, soups, gravies, and sauces, made from instant mixes or other high-sodium ingredients Salted snack foods Canned olives Meat tenderizers, seasoning salt, and most flavored vinegars   Beverages   Low-sodium carbonated beverages Tea and coffee in moderation Soy milk   Commercially softened water   Suggestions   Make whole grains, fruits, and vegetables the base of your diet. Choose heart-healthy fats such as canola, olive, and flaxseed oil, and foods high in heart-healthy fats, such as nuts, seeds, soybeans, tofu, and fish. Eat fish at least twice per week; the fish highest in omega-3 fatty acids and lowest in mercury include salmon, herring, mackerel, sardines, and canned chunk light tuna. If you eat fish less than twice per week or have high triglycerides, talk to your doctor about taking fish oil supplements. Read food labels. For products low in fat and cholesterol, look for fat free, low-fat, cholesterol free, saturated fat free, and trans fat freeAlso scan the Nutrition Facts Label, which lists saturated fat, trans fat, and cholesterol amounts. For products low in sodium, look for sodium free, very low sodium, low sodium, no added salt, and unsalted   Skip the salt when cooking or at the table; if food needs more flavor, get creative and try out different herbs and spices. Garlic and onion also add substantial flavor to foods. Trim any visible fat off meat and poultry before cooking, and drain the fat off after quiñonez. Use cooking methods that require little or no added fat, such as grilling, boiling, baking, poaching, broiling, roasting, steaming, stir-frying, and sauting. Avoid fast food and convenience food. They tend to be high in saturated and trans fat and have a lot of added salt. Talk to a registered dietitian for individualized diet advice. Last Reviewed: March 2011 Natalia Preciado MS, MPH, RD   Updated: 3/29/2011   ·     DASH Diet: After Your Visit  Your Care Instructions  The DASH diet is an eating plan that can help lower your blood pressure.  DASH stands for Dietary Approaches to Stop Hypertension. Hypertension is high blood pressure. The DASH diet focuses on eating foods that are high in calcium, potassium, and magnesium. These nutrients can lower blood pressure. The foods that are highest in these nutrients are fruits, vegetables, low-fat dairy products, nuts, seeds, and legumes. But taking calcium, potassium, and magnesium supplements instead of eating foods that are high in those nutrients does not have the same effect. The DASH diet also includes whole grains, fish, and poultry. The DASH diet is one of several lifestyle changes your doctor may recommend to lower your high blood pressure. Your doctor may also want you to decrease the amount of sodium in your diet. Lowering sodium while following the DASH diet can lower blood pressure even further than just the DASH diet alone. Follow-up care is a key part of your treatment and safety. Be sure to make and go to all appointments, and call your doctor if you are having problems. Its also a good idea to know your test results and keep a list of the medicines you take. How can you care for yourself at home? Following the DASH diet  Eat 4 to 5 servings of fruit each day. A serving is 1 medium-sized piece of fruit, ½ cup chopped or canned fruit, 1/4 cup dried fruit, or 4 ounces (½ cup) of fruit juice. Choose fruit more often than fruit juice. Eat 4 to 5 servings of vegetables each day. A serving is 1 cup of lettuce or raw leafy vegetables, ½ cup of chopped or cooked vegetables, or 4 ounces (½ cup) of vegetable juice. Choose vegetables more often than vegetable juice. Get 2 to 3 servings of low-fat and fat-free dairy each day. A serving is 8 ounces of milk, 1 cup of yogurt, or 1 ½ ounces of cheese. Eat 7 to 8 servings of grains each day. A serving is 1 slice of bread, 1 ounce of dry cereal, or ½ cup of cooked rice, pasta, or cooked cereal. Try to choose whole-grain products as much as possible.   Limit lean meat, poultry, and fish to 6 ounces each day. Six ounces is about the size of two decks of cards. Eat 4 to 5 servings of nuts, seeds, and legumes (cooked dried beans, lentils, and split peas) each week. A serving is 1/3 cup of nuts, 2 tablespoons of seeds, or ½ cup cooked dried beans or peas. Limit sweets and added sugars to 5 servings or less a week. A serving is 1 tablespoon jelly or jam, ½ cup sorbet, or 1 cup of lemonade. Tips for success  Start small. Do not try to make dramatic changes to your diet all at once. You might feel that you are missing out on your favorite foods and then be more likely to not follow the plan. Make small changes, and stick with them. Once those changes become habit, add a few more changes. Try some of the following:  Make it a goal to eat a fruit or vegetable at every meal and at snacks. This will make it easy to get the recommended amount of fruits and vegetables each day. Try yogurt topped with fruit and nuts for a snack or healthy dessert. Add lettuce, tomato, cucumber, and onion to sandwiches. Combine a ready-made pizza crust with low-fat mozzarella cheese and lots of vegetable toppings. Try using tomatoes, squash, spinach, broccoli, carrots, cauliflower, and onions. Have a variety of cut-up vegetables with a low-fat dip as an appetizer instead of chips and dip. Sprinkle sunflower seeds or chopped almonds over salads. Or try adding chopped walnuts or almonds to cooked vegetables. Try some vegetarian meals using beans and peas. Add garbanzo or kidney beans to salads. Make burritos and tacos with mashed low beans or black beans    © 1524-6572 [employer]. Care instructions adapted under license by [facility].  This care instruction is for use with your licensed healthcare professional. If you have questions about a medical condition or this instruction, always ask your healthcare professional. [employer] disclaims any warranty or liability for your use of this information. Content Version: 9.4.96292; Last Revised: March 15, 2012              ·     Alcohol Abuse and Alcoholism   (Alcohol Dependence; Alcohol Use Disorder)       Definition   Alcohol abuse is excessive or problematic alcohol consumption. It can progress to alcoholism. Alcoholism is chronic alcohol abuse that results in a physical dependence on alcohol (withdrawal symptoms) and an inability to stop or limit drinking. Causes   Several factors can contribute to alcohol abuse and alcoholism, including:   Genes   Brain chemicals that may be different than normal   Social pressure   Emotional stress   Pain   Depression and other mental health problems   Problem drinking behaviors learned from family or friends   Risk Factors   These factors increase your chance of developing alcoholism. Tell your doctor if you have any of these risk factors:   Sex: male   Family members who abuse alcohol (especially men whose fathers or brothers are alcoholic)   Starting to use alcohol at an early age (younger than 15)   Using illicit drugs or non-medical use of prescription drugs   Peer pressure   Easy access to alcoholic beverages   Psychiatric disorders, such as depression or anxiety   Smoking   Symptoms   It is common to deny an alcohol problem. Alcohol abuse can occur without physical dependence.    Alcohol abuse symptoms include:   Repeated work, school, or home problems due to drinking   Risking physical safety   Recurring trouble with the law, often including drinking and driving   Continuing to drink despite alcohol-related difficulties   Symptoms of alcoholism include:   Craving a drink   Unable to stop or limit drinking   Needing greater amounts of alcohol to feel the same effect   Giving up activities in order to drink or recover from alcohol   Drinking that continues even when it causes or worsens health problems   Wanting to stop or reduce drinking, but not being able   Withdrawal symptoms if alcohol is stopped include:   Nausea   Sweating   Shaking   Anxiety   Increased blood pressure   Seizures ( delirium tremens [DTs])   The brain, nervous system, heart, liver, stomach, gastrointestinal tract, and pancreas can all be damaged by alcoholism. Some of the Organs Damaged in Alcohol Abuse        2011 [facility].   Diagnosis   Doctors ask a series of questions to assess possible alcohol-related problems, including:   Have you tried to reduce your drinking? Have you felt bad about drinking? Have you been annoyed by another person's criticism of your drinking? Do you drink in the morning to steady your nerves or cure a hangover? Do you have problems with a job, your family, or the law? Do you drive under the influence of alcohol? Blood tests may be done to:   Look at the size of your red blood cells and to check for a substance called carbohydrate-deficient transferrin   Check for alcohol-related liver disease and other health problems   Treatment   Treatment for alcohol abuse or dependence is aimed at teaching patients how to manage the disease. Most professionals believe that this means giving up alcohol completely and permanently. The first and most important step is recognizing a problem exists. Successful treatment depends on your desire to change. Your doctor can help you withdraw from alcohol safely. This could require hospitalization in a detoxification center. They will carefully monitor you for side effects. You may need medication while you are undergoing detoxification. Treatments include:   Medications    Drugs can help relieve some of the symptoms of withdrawal and help prevent relapse. The doctor may prescribe medication to reduce cravings for alcohol.    Medications used to treat alcoholism and to try to prevent drinking include:   Naltrexone (ReVia, Vivitrol)blocks the high that makes you crave alcohol   Disulfiram (Antabuse)makes you very sick if you drink alcohol   Acamprosate (Campral)reduces your craving for alcohol   A study showed that an anticonvulsant drug, topiramate (Topamax), may reduce alcohol dependence. Education and Counseling    Therapy helps you to recognize alcohol's dangers. Education raises awareness of underlying issues and lifestyles that promote drinking. In therapy, you work to improve coping skills and learn other ways of dealing with stress or pain. Mentoring and Community Help    Alcoholics Anonymous (AA) helps many people to stop drinking and stay sober. Members meet regularly and support each other. Your family members may also benefit from attending meetings of [employer]. Living with an alcoholic can be a painful, stressful situation. Here are some general statistics on treatment outcomes of individuals one year after attempting to stop drinkin/3 remained abstinent   1/3 resumed drinking but at a lower level   1/3 relapsed completely   If you are diagnosed with alcohol abuse or alcoholism, follow your doctor's instructions . Prevention   Realizing that alcohol causes problems helps some people avoid it. Suggestions to decrease the risk of alcohol abuse and dependence include:   Socialize without alcohol. Avoid going to bars. Do not keep alcohol in your home. Avoid situations and people that encourage drinking. Make new nondrinking friends. Do fun things that do not involve alcohol. Avoid reaching for a drink when stressed or upset. Limit your alcohol intake to a moderate level. Moderate is two or fewer drinks per day for men and one or fewer for women and older adults   A 12-ounce bottle of beer, a five-ounce glass of wine, or 1.5 ounces of liquor is considered one drink   If you are a parent, having a good relationship with your children may reduce their risk of alcohol abuse. Most professionals who treat alcohol abuse and dependence believe that complete abstinence is the only effective prevention. Last Reviewed: September 2010 Enid Gonzalez MD, PhD, MPH   Updated: 9/20/2010   ·

## 2022-04-11 NOTE — PROGRESS NOTES
Obesity Counseling: Assessed behavioral health risks and factors affecting choice of behavior. Suggested weight control approaches, including dietary changes behavioral modification and follow up plan. Provided educational and support documentation. Patient does walk occassionally  Time spent (minutes): 5 min    Medicare Annual Wellness Visit    Sujata Munoz is here for Medicare AWV (arthritis)    Assessment & Plan   Medicare annual wellness visit, subsequent  Need for prophylactic vaccination and inoculation against varicella  -     zoster recombinant adjuvanted vaccine (SHINGRIX) 50 MCG/0.5ML SUSR injection; 50 MCG IM then repeat 2-6 months., Disp-0.5 mL, R-1Normal  Shoulder arthritis  -     celecoxib (CELEBREX) 100 MG capsule; Take 1 capsule by mouth 2 times daily, Disp-60 capsule, R-5Normal      Recommendations for Preventive Services Due: see orders and patient instructions/AVS.  Recommended screening schedule for the next 5-10 years is provided to the patient in written form: see Patient Instructions/AVS.     Return in 6 months (on 10/11/2022) for type 2 diabetes 30 min. Subjective   Patient's complete Health Risk Assessment and screening values have been reviewed and are found in Flowsheets. The following problems were reviewed today and where indicated follow up appointments were made and/or referrals ordered. Positive Risk Factor Screenings with Interventions:        Alcohol Screening:  AUDIT Total Score: 4    A score of 8 or more is associated with harmful or hazardous drinking. A score of 13 or more in women, and 15 or more in men, is likely to indicate alcohol dependence.     Substance Abuse - Alcohol Interventions:  educational materials provided          General Health and ACP:  General  In general, how would you say your health is?: Fair  In the past 7 days, have you experienced any of the following: New or Increased Pain, New or Increased Fatigue, Loneliness, Social Isolation, Stress or Anger?: (!) Yes  Select all that apply: (!) New or Increased Pain  Do you get the social and emotional support that you need?: Yes  Do you have a Living Will?: (!) No    Advance Directives     Power of  Living Will ACP-Advance Directive ACP-Power of     Not on File Not on File Not on File Not on File      General Health Risk Interventions:  · Pain issues: home exercises provided, provided him with some celebrex  · No Living Will: patient has one and will bring it in    Health Habits/Nutrition:     Physical Activity: Unknown    Days of Exercise per Week: 0 days    Minutes of Exercise per Session: Not on file     Have you lost any weight without trying in the past 3 months?: No  Body mass index: (!) 43.69  Have you seen the dentist within the past year?: Yes    Health Habits/Nutrition Interventions:  · Nutritional issues:  educational materials for healthy, well-balanced diet provided, educational materials to promote weight loss provided             Objective   Vitals:    04/11/22 1310   BP: 124/78   Site: Right Upper Arm   Position: Sitting   Cuff Size: Large Adult   Pulse: 94   Temp: 97.8 °F (36.6 °C)   TempSrc: Infrared   SpO2: 94%   Weight: (!) 322 lb 3.2 oz (146.1 kg)   Height: 6' (1.829 m)      Body mass index is 43.7 kg/m². No Known Allergies  Prior to Visit Medications    Medication Sig Taking? Authorizing Provider   allopurinol (ZYLOPRIM) 100 MG tablet TAKE TWO TABLETS BY MOUTH DAILY Yes Rick Ricketts MD   zoster recombinant adjuvanted vaccine [facility]) 50 MCG/0.5ML SUSR injection 50 MCG IM then repeat 2-6 months.  Yes Rick Ricketts MD   celecoxib (CELEBREX) 100 MG capsule Take 1 capsule by mouth 2 times daily Yes Rick Ricketts MD   gabapentin (NEURONTIN) 300 MG capsule TAKE ONE CAPSULE BY MOUTH THREE TIMES A DAY Yes Rick Ricketts MD   BREO ELLIPTA 200-25 MCG/INH AEPB inhaler INHALE ONE DOSE BY MOUTH DAILY Yes Vita Ross MD   ACCU-CHEK CLARISSA PLUS strip USE ONE STRIP TO TEST DAILY Yes Marsha Greco MD   predniSONE (DELTASONE) 5 MG tablet TAKE ONE TABLET BY MOUTH DAILY Yes Marsha Greco MD   furosemide (LASIX) 40 MG tablet TAKE ONE TABLET BY MOUTH TWICE A DAY Yes Marsha Greco MD   ipratropium-albuterol (DUONEB) 0.5-2.5 (3) MG/3ML SOLN nebulizer solution INHALE ONE VIAL (3MLS) VIA NEBULIZATION BY MOUTH FOUR TIMES A DAY Yes Deni Jenkins MD   albuterol sulfate  (90 Base) MCG/ACT inhaler INHALE TWO PUFFS BY MOUTH EVERY 6 HOURS AS NEEDED Yes Deni Jenkins MD   metFORMIN (GLUCOPHAGE) 1000 MG tablet TAKE ONE TABLET BY MOUTH TWICE A DAY WITH MEALS Yes Marsha Greco MD   lisinopril (PRINIVIL;ZESTRIL) 2.5 MG tablet TAKE ONE TABLET BY MOUTH DAILY Yes Marsha Greco MD   Accu-Chek Softclix Lancets MISC USE ONE LANCET TO TEST DAILY Yes Marsha Greco MD   diclofenac (VOLTAREN) 75 MG EC tablet TAKE ONE TABLET BY MOUTH TWICE A DAY Yes Marsha Greco MD   Blood Glucose Monitoring Suppl (ACCU-CHEK CLARISSA PLUS) w/Device KIT 1 each by Does not apply route daily Yes Marsha Greco MD   acetaminophen (TYLENOL) 325 MG tablet Take 650 mg by mouth every 6 hours as needed for Pain Yes Historical Provider, MD Shaikh (Including outside providers/suppliers regularly involved in providing care):   Patient Care Team:  Marsha Greco MD as PCP - General (Pediatrics)  Marsha Greco MD as PCP - [facility]  Siobhan Whitehead MD (Internal Medicine)  Ashley Smith MD as Consulting Physician (Cardiology)  Megan Rodgers MD (Bariatrics)  [facility], [address] (Physician Assistant)  Lizzy Painter MD as Consulting Physician (General Surgery)    Reviewed and updated this visit:  Tobacco  Allergies  Meds  Med Hx  Surg Hx  Soc Hx  Fam Hx

## 2022-04-11 NOTE — TELEPHONE ENCOUNTER
Patient is requesting a refill of their prescription. Requested Prescriptions     Pending Prescriptions Disp Refills    allopurinol (ZYLOPRIM) 100 MG tablet [Pharmacy Med Name: ALLOPURINOL 100 MG TABLET] 180 tablet 1     Sig: TAKE TWO TABLETS BY MOUTH DAILY        Recent Visits  Date Type Provider Dept   11/11/21 Office Visit Liyah Sigala MD [facility]   06/29/21 Office Visit Liyah Sigala MD [facility]   12/16/20 Office Visit Liyah Sigala MD [facility]   Showing recent visits within past 540 days with a meds authorizing provider and meeting all other requirements  Today's Visits  Date Type Provider Dept   04/11/22 Office Visit Liyah Sigala MD [facility]   Showing today's visits with a meds authorizing provider and meeting all other requirements  Future Appointments  No visits were found meeting these conditions.   Showing future appointments within next 150 days with a meds authorizing provider and meeting all other requirements     11/11/2021

## 2022-04-12 ASSESSMENT — PATIENT HEALTH QUESTIONNAIRE - PHQ9
SUM OF ALL RESPONSES TO PHQ QUESTIONS 1-9: 0
1. LITTLE INTEREST OR PLEASURE IN DOING THINGS: 0
SUM OF ALL RESPONSES TO PHQ9 QUESTIONS 1 & 2: 0
SUM OF ALL RESPONSES TO PHQ QUESTIONS 1-9: 0
2. FEELING DOWN, DEPRESSED OR HOPELESS: 0

## 2022-05-19 DIAGNOSIS — J44.9 COPD, VERY SEVERE (HCC): ICD-10-CM

## 2022-05-20 RX ORDER — PREDNISONE 1 MG/1
TABLET ORAL
Qty: 90 TABLET | Refills: 1 | Status: SHIPPED | OUTPATIENT
Start: 2022-05-20 | End: 2022-10-17

## 2022-05-20 NOTE — TELEPHONE ENCOUNTER
Patient is requesting a refill of their prescription.     Requested Prescriptions     Pending Prescriptions Disp Refills    predniSONE (DELTASONE) 5 MG tablet [Pharmacy Med Name: predniSONE 5 MG TABLET] 90 tablet 1     Sig: TAKE ONE TABLET BY MOUTH DAILY        Recent Visits  Date Type Provider Dept   04/11/22 Office Visit Say Bonilla MD [facility]   11/11/21 Office Visit Say Bonilla MD [facility]   06/29/21 Office Visit Say Bonilla MD [facility]   12/16/20 Office Visit Say Bonilla MD [facility]   Showing recent visits within past 540 days with a meds authorizing provider and meeting all other requirements  Future Appointments  Date Type Provider Dept   10/03/22 Appointment Say Bonilla MD [facility]   Showing future appointments within next 150 days with a meds authorizing provider and meeting all other requirements     4/11/2022

## 2022-05-30 ENCOUNTER — TELEPHONE (OUTPATIENT)
Dept: PULMONOLOGY | Age: 77
End: 2022-05-30

## 2022-05-30 RX ORDER — DOXYCYCLINE HYCLATE 100 MG
100 TABLET ORAL 2 TIMES DAILY
Qty: 20 TABLET | Refills: 0 | Status: SHIPPED | OUTPATIENT
Start: 2022-05-30 | End: 2022-06-09

## 2022-05-30 NOTE — TELEPHONE ENCOUNTER
Patient feels he is coming down with a respiratory infection.    He is requesting Abx    Doxycycline 100mg, 10 days BID,

## 2022-06-10 DIAGNOSIS — I10 PRIMARY HYPERTENSION: ICD-10-CM

## 2022-06-10 RX ORDER — FUROSEMIDE 40 MG/1
TABLET ORAL
Qty: 180 TABLET | Refills: 1 | Status: ON HOLD
Start: 2022-06-10 | End: 2022-09-21 | Stop reason: HOSPADM

## 2022-06-10 NOTE — TELEPHONE ENCOUNTER
Recent Visits  Date Type Provider Dept   04/11/22 Office Visit Sandra Dewey MD [facility]   11/11/21 Office Visit Sandra Dewey MD [facility]   06/29/21 Office Visit Sandra Dewey MD [facility]   Showing recent visits within past 540 days with a meds authorizing provider and meeting all other requirements  Future Appointments  Date Type Provider Dept   10/03/22 Appointment Sandra Dewey MD [facility]   Showing future appointments within next 150 days with a meds authorizing provider and meeting all other requirements     4/11/2022

## 2022-07-31 DIAGNOSIS — E13.40 NEUROPATHY DUE TO SECONDARY DIABETES (HCC): ICD-10-CM

## 2022-08-01 RX ORDER — GABAPENTIN 300 MG/1
CAPSULE ORAL
Qty: 90 CAPSULE | Refills: 3 | Status: SHIPPED | OUTPATIENT
Start: 2022-08-01 | End: 2022-11-01

## 2022-08-01 NOTE — TELEPHONE ENCOUNTER
Recent Visits  Date Type Provider Dept   04/11/22 Office Visit Gilmar Germain MD [facility]   11/11/21 Office Visit Gilmar Germain MD [facility]   06/29/21 Office Visit Gilmar Germain MD [facility]   Showing recent visits within past 540 days with a meds authorizing provider and meeting all other requirements  Future Appointments  Date Type Provider Dept   10/03/22 Appointment Gilmar Germain MD [facility]   Showing future appointments within next 150 days with a meds authorizing provider and meeting all other requirements     4/11/2022

## 2022-08-26 ENCOUNTER — OFFICE VISIT (OUTPATIENT)
Dept: PULMONOLOGY | Age: 77
End: 2022-08-26
Payer: MEDICARE

## 2022-08-26 VITALS — HEART RATE: 94 BPM | OXYGEN SATURATION: 92 %

## 2022-08-26 DIAGNOSIS — J44.9 COPD, VERY SEVERE (HCC): Primary | ICD-10-CM

## 2022-08-26 DIAGNOSIS — J96.11 CHRONIC RESPIRATORY FAILURE WITH HYPOXIA (HCC): ICD-10-CM

## 2022-08-26 DIAGNOSIS — G47.33 OSA (OBSTRUCTIVE SLEEP APNEA): Chronic | ICD-10-CM

## 2022-08-26 DIAGNOSIS — E66.01 MORBID OBESITY (HCC): Chronic | ICD-10-CM

## 2022-08-26 PROCEDURE — 99214 OFFICE O/P EST MOD 30 MIN: CPT | Performed by: INTERNAL MEDICINE

## 2022-08-26 PROCEDURE — 1123F ACP DISCUSS/DSCN MKR DOCD: CPT | Performed by: INTERNAL MEDICINE

## 2022-08-26 NOTE — PROGRESS NOTES
[facility]  Progress Note  Aman Bird MD       Kristina Lucia   YOB: 1945    Date of Visit:  8/26/2022    Assessment/Plan:  1. COPD, very severe ([address]  PFT 3/13:  INTERPRETATION: Spirometry showed decreased FVC of 3.32 L, 65% predicted and  decreased FEV1 of 1.52 L 37% predicted. FEV1:FVC ratio was decreased. No  response to bronchodilators demonstrated. Lung volumes showed normal total  lung capacity of 94% predicted. Diffusion capacity showed decreased DLCO of  49% predicted. IMPRESSION: Very severe obstructive defect with no response to  bronchodilators. Normal lung volumes with moderate to severe decrease in  diffusion capacity noted. In comparison to the test that was done in  February of 2011 no significant change in FVC noted but FEV1 has decreased by  22% and total lung capacity by 20% as well as DLCO by 10%. Breo -- he likes this better than Advair  Has Ventolin which he likes  Allieb  Has a new N    LDCT chest ordered bu he has not had this done. 2. Chronic respiratory failure with hypoxia (HCC)  O2 sats are acceptable on supplemental O2. The patient benefits from the use of supplemental O2. Not really using this now. 3. Type 2 diabetes mellitus without complication, without long-term current use of insulin (HCC)  Well controlled    4. STEF (obstructive sleep apnea)  His CPAP is broken beyond repair  This is >8years old  Get his CPAP repalced. He benefits from CPAP. Alonzo Montiel 5. Morbid obesity due to excess calories (HCC)  Contributes to his SOB      FOLLOW UP: 6 months for pulmonary. Chief Complaint   Patient presents with    Shortness of Breath     6 month Did have COVID earlier this summer Did not have to go to the hospital       HPI  The patient presents with a chief complaint of moderate shortness of breath related to very severe COPD of many years duration. He has mild associated cough. Exertion is a modifying factor. He takes Breo (which he finds better than Advair) and duoneb for very severe COPD of many years duration. He does use O2. He had COVID in June '22. Seems to have recovered. Review of Systems  No Chest pain, Nausea or vomiting reported      Physical Exam:  Well developed, well nourished  Alert and oriented  Sclera is clear  No cervical adenopathy  No JVD. Chest examination is fine wheezing. Cardiac examination reveals regular rate and rhythm without murmur, gallop or rub. The abdomen is soft, nontender and nondistended. There is no clubbing, cyanosis or edema of the extremities. There is no obvious skin rash. No focal neuro deficicts  Normal mood and affect    No Known Allergies  Prior to Visit Medications    Medication Sig Taking? Authorizing Provider   gabapentin (NEURONTIN) 300 MG capsule TAKE ONE CAPSULE BY MOUTH THREE TIMES A DAY  Jimmy Kiser MD   furosemide (LASIX) 40 MG tablet TAKE ONE TABLET BY MOUTH TWICE A DAY  Jimmy Kiser MD   predniSONE (DELTASONE) 5 MG tablet TAKE ONE TABLET BY MOUTH DAILY  Jimmy Kiser MD   allopurinol (ZYLOPRIM) 100 MG tablet TAKE TWO TABLETS BY MOUTH DAILY  Jimmy Kiser MD   zoster recombinant adjuvanted vaccine [facility]) 50 MCG/0.5ML SUSR injection 50 MCG IM then repeat 2-6 months.   Jimmy Kiser MD   celecoxib (CELEBREX) 100 MG capsule Take 1 capsule by mouth 2 times daily  Jimmy Kiser MD   BREO ELLIPTA 200-25 MCG/INH AEPB inhaler INHALE ONE DOSE BY MOUTH DAILY  Kylee Early MD   ACCU-CHEK CLARISSA PLUS strip USE ONE STRIP TO TEST DAILY  Jimmy Kiser MD   ipratropium-albuterol (DUONEB) 0.5-2.5 (3) MG/3ML SOLN nebulizer solution INHALE ONE VIAL (3MLS) VIA NEBULIZATION BY MOUTH FOUR TIMES A DAY  Kylee Early MD   albuterol sulfate  (90 Base) MCG/ACT inhaler INHALE TWO PUFFS BY MOUTH EVERY 6 HOURS AS NEEDED  Kylee Early MD   metFORMIN (GLUCOPHAGE) 1000 MG tablet TAKE ONE TABLET BY MOUTH Adin Rashid MD   lisinopril (PRINIVIL;ZESTRIL) 2.5 MG tablet TAKE ONE TABLET BY MOUTH DAILY  Crow Nelson MD   Accu-Chek Softclix Lancets MISC USE ONE LANCET TO TEST DAILY  Crow Nelson MD   diclofenac (VOLTAREN) 75 MG EC tablet TAKE ONE TABLET BY MOUTH TWICE A DAY  Crow Nelson MD   Blood Glucose Monitoring Suppl (ACCU-CHEK CLARISSA PLUS) w/Device KIT 1 each by Does not apply route daily  Crow Nelson MD   acetaminophen (TYLENOL) 325 MG tablet Take 650 mg by mouth every 6 hours as needed for Pain  Historical Provider, MD       Vitals:    08/26/22 1317   Pulse: 94   SpO2: 92%     There is no height or weight on file to calculate BMI.      Wt Readings from Last 3 Encounters:   04/11/22 (!) 322 lb 3.2 oz (146.1 kg)   11/11/21 (!) 325 lb (147.4 kg)   06/29/21 (!) 325 lb (147.4 kg)     BP Readings from Last 3 Encounters:   04/11/22 124/78   11/11/21 136/84   06/29/21 132/62        Social History     Tobacco Use   Smoking Status Former    Packs/day: 1.00    Years: 50.00    Pack years: 50.00    Types: Cigarettes    Quit date: 2/1/2012    Years since quitting: 10.5   Smokeless Tobacco Never   Tobacco Comments    H.O. smoking 1 p.p.d. Quit 9 months ago

## 2022-09-03 ENCOUNTER — APPOINTMENT (OUTPATIENT)
Dept: GENERAL RADIOLOGY | Age: 77
DRG: 064 | End: 2022-09-03
Payer: MEDICARE

## 2022-09-03 ENCOUNTER — HOSPITAL ENCOUNTER (INPATIENT)
Age: 77
LOS: 6 days | Discharge: NURSING, REHAB OR LONG-TERM CARE | DRG: 064 | End: 2022-09-09
Attending: EMERGENCY MEDICINE | Admitting: FAMILY MEDICINE
Payer: MEDICARE

## 2022-09-03 ENCOUNTER — APPOINTMENT (OUTPATIENT)
Dept: CT IMAGING | Age: 77
DRG: 064 | End: 2022-09-03
Payer: MEDICARE

## 2022-09-03 DIAGNOSIS — R41.82 ALTERED MENTAL STATUS, UNSPECIFIED ALTERED MENTAL STATUS TYPE: Primary | ICD-10-CM

## 2022-09-03 DIAGNOSIS — G93.41 METABOLIC ENCEPHALOPATHY: ICD-10-CM

## 2022-09-03 PROBLEM — G93.40 ENCEPHALOPATHY: Status: ACTIVE | Noted: 2022-09-03

## 2022-09-03 LAB
A/G RATIO: 1.2 (ref 1.1–2.2)
ALBUMIN SERPL-MCNC: 3.8 G/DL (ref 3.4–5)
ALP BLD-CCNC: 65 U/L (ref 40–129)
ALT SERPL-CCNC: <5 U/L (ref 10–40)
AMMONIA: 11 UMOL/L (ref 16–60)
AMPHETAMINE SCREEN, URINE: NORMAL
ANION GAP SERPL CALCULATED.3IONS-SCNC: 9 MMOL/L (ref 3–16)
AST SERPL-CCNC: 8 U/L (ref 15–37)
BARBITURATE SCREEN URINE: NORMAL
BASE EXCESS VENOUS: 0.3 MMOL/L (ref -3–3)
BASOPHILS ABSOLUTE: 0 K/UL (ref 0–0.2)
BASOPHILS RELATIVE PERCENT: 0.5 %
BENZODIAZEPINE SCREEN, URINE: NORMAL
BILIRUB SERPL-MCNC: 0.4 MG/DL (ref 0–1)
BILIRUBIN URINE: NEGATIVE
BLOOD, URINE: NEGATIVE
BUN BLDV-MCNC: 35 MG/DL (ref 7–20)
CALCIUM SERPL-MCNC: 9.3 MG/DL (ref 8.3–10.6)
CANNABINOID SCREEN URINE: NORMAL
CARBOXYHEMOGLOBIN: 2.6 % (ref 0–1.5)
CHLORIDE BLD-SCNC: 102 MMOL/L (ref 99–110)
CLARITY: CLEAR
CO2: 28 MMOL/L (ref 21–32)
COCAINE METABOLITE SCREEN URINE: NORMAL
COLOR: YELLOW
CREAT SERPL-MCNC: 1.1 MG/DL (ref 0.8–1.3)
EOSINOPHILS ABSOLUTE: 0.1 K/UL (ref 0–0.6)
EOSINOPHILS RELATIVE PERCENT: 1.1 %
ETHANOL: NORMAL MG/DL (ref 0–0.08)
FENTANYL SCREEN, URINE: NORMAL
GFR AFRICAN AMERICAN: >60
GFR NON-AFRICAN AMERICAN: >60
GLUCOSE BLD-MCNC: 111 MG/DL (ref 70–99)
GLUCOSE URINE: NEGATIVE MG/DL
HCO3 VENOUS: 28.5 MMOL/L (ref 23–29)
HCT VFR BLD CALC: 38.6 % (ref 40.5–52.5)
HEMOGLOBIN, VEN, REDUCED: 6 %
HEMOGLOBIN: 12.5 G/DL (ref 13.5–17.5)
KETONES, URINE: NEGATIVE MG/DL
LACTIC ACID, SEPSIS: 1.2 MMOL/L (ref 0.4–1.9)
LEUKOCYTE ESTERASE, URINE: NEGATIVE
LYMPHOCYTES ABSOLUTE: 0.7 K/UL (ref 1–5.1)
LYMPHOCYTES RELATIVE PERCENT: 12.3 %
Lab: NORMAL
MCH RBC QN AUTO: 34 PG (ref 26–34)
MCHC RBC AUTO-ENTMCNC: 32.3 G/DL (ref 31–36)
MCV RBC AUTO: 105.1 FL (ref 80–100)
METHADONE SCREEN, URINE: NORMAL
METHEMOGLOBIN VENOUS: 0.2 %
MICROSCOPIC EXAMINATION: NORMAL
MONOCYTES ABSOLUTE: 0.4 K/UL (ref 0–1.3)
MONOCYTES RELATIVE PERCENT: 8.2 %
NEUTROPHILS ABSOLUTE: 4.3 K/UL (ref 1.7–7.7)
NEUTROPHILS RELATIVE PERCENT: 77.9 %
NITRITE, URINE: NEGATIVE
O2 CONTENT, VEN: 16 VOL %
O2 SAT, VEN: 94 %
O2 THERAPY: ABNORMAL
OPIATE SCREEN URINE: NORMAL
OXYCODONE URINE: NORMAL
PCO2, VEN: 62 MMHG (ref 40–50)
PDW BLD-RTO: 14.9 % (ref 12.4–15.4)
PH UA: 5
PH UA: 5 (ref 5–8)
PH VENOUS: 7.27 (ref 7.35–7.45)
PHENCYCLIDINE SCREEN URINE: NORMAL
PLATELET # BLD: 282 K/UL (ref 135–450)
PMV BLD AUTO: 6.8 FL (ref 5–10.5)
PO2, VEN: 78.7 MMHG (ref 25–40)
POTASSIUM REFLEX MAGNESIUM: 5 MMOL/L (ref 3.5–5.1)
PRO-BNP: 334 PG/ML (ref 0–449)
PROTEIN UA: NEGATIVE MG/DL
RBC # BLD: 3.67 M/UL (ref 4.2–5.9)
SARS-COV-2, NAAT: NOT DETECTED
SODIUM BLD-SCNC: 139 MMOL/L (ref 136–145)
SPECIFIC GRAVITY UA: 1.01 (ref 1–1.03)
TCO2 CALC VENOUS: 68 MMOL/L
TOTAL PROTEIN: 6.9 G/DL (ref 6.4–8.2)
TROPONIN: <0.01 NG/ML
URINE REFLEX TO CULTURE: NORMAL
URINE TYPE: NORMAL
UROBILINOGEN, URINE: 0.2 E.U./DL
WBC # BLD: 5.5 K/UL (ref 4–11)

## 2022-09-03 PROCEDURE — 82306 VITAMIN D 25 HYDROXY: CPT

## 2022-09-03 PROCEDURE — 80307 DRUG TEST PRSMV CHEM ANLYZR: CPT

## 2022-09-03 PROCEDURE — 36415 COLL VENOUS BLD VENIPUNCTURE: CPT

## 2022-09-03 PROCEDURE — 82803 BLOOD GASES ANY COMBINATION: CPT

## 2022-09-03 PROCEDURE — 83880 ASSAY OF NATRIURETIC PEPTIDE: CPT

## 2022-09-03 PROCEDURE — 82607 VITAMIN B-12: CPT

## 2022-09-03 PROCEDURE — 6370000000 HC RX 637 (ALT 250 FOR IP): Performed by: FAMILY MEDICINE

## 2022-09-03 PROCEDURE — 85025 COMPLETE CBC W/AUTO DIFF WBC: CPT

## 2022-09-03 PROCEDURE — 2500000003 HC RX 250 WO HCPCS: Performed by: EMERGENCY MEDICINE

## 2022-09-03 PROCEDURE — 99285 EMERGENCY DEPT VISIT HI MDM: CPT

## 2022-09-03 PROCEDURE — 81003 URINALYSIS AUTO W/O SCOPE: CPT

## 2022-09-03 PROCEDURE — 93005 ELECTROCARDIOGRAM TRACING: CPT | Performed by: PHYSICIAN ASSISTANT

## 2022-09-03 PROCEDURE — 5A09557 ASSISTANCE WITH RESPIRATORY VENTILATION, GREATER THAN 96 CONSECUTIVE HOURS, CONTINUOUS POSITIVE AIRWAY PRESSURE: ICD-10-PCS | Performed by: INTERNAL MEDICINE

## 2022-09-03 PROCEDURE — 83605 ASSAY OF LACTIC ACID: CPT

## 2022-09-03 PROCEDURE — 2580000003 HC RX 258: Performed by: EMERGENCY MEDICINE

## 2022-09-03 PROCEDURE — 84443 ASSAY THYROID STIM HORMONE: CPT

## 2022-09-03 PROCEDURE — 94640 AIRWAY INHALATION TREATMENT: CPT

## 2022-09-03 PROCEDURE — 84425 ASSAY OF VITAMIN B-1: CPT

## 2022-09-03 PROCEDURE — 70450 CT HEAD/BRAIN W/O DYE: CPT

## 2022-09-03 PROCEDURE — 82140 ASSAY OF AMMONIA: CPT

## 2022-09-03 PROCEDURE — 87040 BLOOD CULTURE FOR BACTERIA: CPT

## 2022-09-03 PROCEDURE — 2060000000 HC ICU INTERMEDIATE R&B

## 2022-09-03 PROCEDURE — 82077 ASSAY SPEC XCP UR&BREATH IA: CPT

## 2022-09-03 PROCEDURE — 96365 THER/PROPH/DIAG IV INF INIT: CPT

## 2022-09-03 PROCEDURE — 87635 SARS-COV-2 COVID-19 AMP PRB: CPT

## 2022-09-03 PROCEDURE — 6360000002 HC RX W HCPCS: Performed by: FAMILY MEDICINE

## 2022-09-03 PROCEDURE — 6360000002 HC RX W HCPCS: Performed by: EMERGENCY MEDICINE

## 2022-09-03 PROCEDURE — 84484 ASSAY OF TROPONIN QUANT: CPT

## 2022-09-03 PROCEDURE — 80053 COMPREHEN METABOLIC PANEL: CPT

## 2022-09-03 PROCEDURE — 71045 X-RAY EXAM CHEST 1 VIEW: CPT

## 2022-09-03 PROCEDURE — 82746 ASSAY OF FOLIC ACID SERUM: CPT

## 2022-09-03 PROCEDURE — 2580000003 HC RX 258: Performed by: FAMILY MEDICINE

## 2022-09-03 PROCEDURE — 94660 CPAP INITIATION&MGMT: CPT

## 2022-09-03 RX ORDER — FUROSEMIDE 40 MG/1
40 TABLET ORAL 2 TIMES DAILY
Status: DISCONTINUED | OUTPATIENT
Start: 2022-09-03 | End: 2022-09-07

## 2022-09-03 RX ORDER — ENOXAPARIN SODIUM 100 MG/ML
40 INJECTION SUBCUTANEOUS NIGHTLY
Status: DISCONTINUED | OUTPATIENT
Start: 2022-09-03 | End: 2022-09-09 | Stop reason: HOSPADM

## 2022-09-03 RX ORDER — ACETAMINOPHEN 650 MG/1
650 SUPPOSITORY RECTAL EVERY 6 HOURS PRN
Status: DISCONTINUED | OUTPATIENT
Start: 2022-09-03 | End: 2022-09-09 | Stop reason: HOSPADM

## 2022-09-03 RX ORDER — SODIUM CHLORIDE 9 MG/ML
INJECTION, SOLUTION INTRAVENOUS PRN
Status: DISCONTINUED | OUTPATIENT
Start: 2022-09-03 | End: 2022-09-09 | Stop reason: HOSPADM

## 2022-09-03 RX ORDER — POLYETHYLENE GLYCOL 3350 17 G/17G
17 POWDER, FOR SOLUTION ORAL DAILY PRN
Status: DISCONTINUED | OUTPATIENT
Start: 2022-09-03 | End: 2022-09-09 | Stop reason: HOSPADM

## 2022-09-03 RX ORDER — ALBUTEROL SULFATE 90 UG/1
1 AEROSOL, METERED RESPIRATORY (INHALATION) EVERY 6 HOURS PRN
Status: DISCONTINUED | OUTPATIENT
Start: 2022-09-03 | End: 2022-09-09 | Stop reason: HOSPADM

## 2022-09-03 RX ORDER — ACETAMINOPHEN 325 MG/1
650 TABLET ORAL EVERY 6 HOURS PRN
Status: DISCONTINUED | OUTPATIENT
Start: 2022-09-03 | End: 2022-09-09 | Stop reason: HOSPADM

## 2022-09-03 RX ORDER — SODIUM CHLORIDE 0.9 % (FLUSH) 0.9 %
5-40 SYRINGE (ML) INJECTION PRN
Status: DISCONTINUED | OUTPATIENT
Start: 2022-09-03 | End: 2022-09-09 | Stop reason: HOSPADM

## 2022-09-03 RX ORDER — IPRATROPIUM BROMIDE AND ALBUTEROL SULFATE 2.5; .5 MG/3ML; MG/3ML
1 SOLUTION RESPIRATORY (INHALATION) EVERY 4 HOURS PRN
Status: DISCONTINUED | OUTPATIENT
Start: 2022-09-03 | End: 2022-09-09 | Stop reason: HOSPADM

## 2022-09-03 RX ORDER — LISINOPRIL 5 MG/1
2.5 TABLET ORAL DAILY
Status: DISCONTINUED | OUTPATIENT
Start: 2022-09-04 | End: 2022-09-09 | Stop reason: HOSPADM

## 2022-09-03 RX ORDER — FLUTICASONE FUROATE AND VILANTEROL 200; 25 UG/1; UG/1
1 POWDER RESPIRATORY (INHALATION) DAILY
Status: DISCONTINUED | OUTPATIENT
Start: 2022-09-03 | End: 2022-09-03 | Stop reason: CLARIF

## 2022-09-03 RX ORDER — SODIUM CHLORIDE 0.9 % (FLUSH) 0.9 %
5-40 SYRINGE (ML) INJECTION EVERY 12 HOURS SCHEDULED
Status: DISCONTINUED | OUTPATIENT
Start: 2022-09-03 | End: 2022-09-09 | Stop reason: HOSPADM

## 2022-09-03 RX ORDER — ONDANSETRON 2 MG/ML
4 INJECTION INTRAMUSCULAR; INTRAVENOUS EVERY 6 HOURS PRN
Status: DISCONTINUED | OUTPATIENT
Start: 2022-09-03 | End: 2022-09-09 | Stop reason: HOSPADM

## 2022-09-03 RX ORDER — ONDANSETRON 4 MG/1
4 TABLET, ORALLY DISINTEGRATING ORAL EVERY 8 HOURS PRN
Status: DISCONTINUED | OUTPATIENT
Start: 2022-09-03 | End: 2022-09-09 | Stop reason: HOSPADM

## 2022-09-03 RX ADMIN — THIAMINE HYDROCHLORIDE: 100 INJECTION, SOLUTION INTRAMUSCULAR; INTRAVENOUS at 14:54

## 2022-09-03 RX ADMIN — FUROSEMIDE 40 MG: 40 TABLET ORAL at 21:23

## 2022-09-03 RX ADMIN — ENOXAPARIN SODIUM 40 MG: 100 INJECTION SUBCUTANEOUS at 21:23

## 2022-09-03 RX ADMIN — Medication 10 ML: at 21:23

## 2022-09-03 RX ADMIN — IPRATROPIUM BROMIDE AND ALBUTEROL SULFATE 1 AMPULE: 2.5; .5 SOLUTION RESPIRATORY (INHALATION) at 23:23

## 2022-09-03 ASSESSMENT — ENCOUNTER SYMPTOMS
VOMITING: 0
RHINORRHEA: 0
DIARRHEA: 0
NAUSEA: 0
SHORTNESS OF BREATH: 0
COUGH: 0
ABDOMINAL PAIN: 0
SORE THROAT: 0
BACK PAIN: 0
EYE PAIN: 0
CONSTIPATION: 0

## 2022-09-03 NOTE — ED PROVIDER NOTES
In addition to the advanced practice provider, I personally saw Ion Armendariz and performed a substantive portion of the visit including all aspects of the medical decision making. Medical Decision Making  72-year-old gentleman brought in by EMS today after being found by his home care nurse and with altered mental status. Not clear with last known well. Patient cannot him with going on he is just repeating himself and seems to be confabulating a bit. Heart regular lungs clear abdomen soft neuro is nonfocal save that he cannot tell me today's date or exact location or why he is here and is just repeating himself saying \"I am fine. \" He does not have any aphasia or dysarthria he is making good conversation and just does not last very long. Suspicion for stroke metabolic encephalopathy thiamine deficiency Warnicke's hyponatremia or other derangements. Given banana bag to start with some thiamine. Get a stroke work-up and admit him. Labs reviewed and are fairly benign. CT is negative as well. Will admit for metabolic encephalopathy. [unfilled]    EKG  EKG is read by myself. Dated today 56. Rate 92 sinus rhythm with a first-degree blockade. I see no change from 5/1/2019. SEP-1  Is this patient to be included in the SEP-1 Core Measure due to severe sepsis or septic shock? No   Exclusion criteria - the patient is NOT to be included for SEP-1 Core Measure due to: Infection is not suspected    Screenings  NIH Stroke Scale  Interval: Baseline  Level of Consciousness (1a): Alert  LOC Questions (1b): Answers neither question correctly  LOC Commands (1c): Performs one task correctly  Best Gaze (2): Normal  Visual (3): No visual loss  Facial Palsy (4): Normal symmetrical movement  Motor Arm, Left (5a): No drift  Motor Arm, Right (5b): No drift  Motor Leg, Left (6a): No drift  Motor Leg, Right (6b):  No drift  Limb Ataxia (7): Absent  Sensory (8): Normal  Best Language (9): Severe

## 2022-09-03 NOTE — PROGRESS NOTES
Pt admitted to room 3364. Assessment done, VSS. Admission done with pt family. Currently oriented to person, keeps saying, \"I'm fine\" to any questions. Bed alarm on, camera in room for safety.

## 2022-09-03 NOTE — ED PROVIDER NOTES
[address]        Pt Name: Orly Hicks  MRN: [id number]  [place] 1945  Date of evaluation: 9/3/2022  Provider: LYN Flores  PCP: Maryann Johnson MD  Note Started: 2:15 PM EDT        I have seen and evaluated this patient with my supervising physician Elton Reagan MD.    [address] [place] [address]       Chief Complaint   Patient presents with    Altered Mental Status     Found Altered by caregiver who called [facility]. NIH was 5 on exam; Lives by himself; cannot follow commands; FSBS in squad was 110. HISTORY OF PRESENT ILLNESS   (Location, Timing/Onset, Context/Setting, Quality, Duration, Modifying Factors, Severity, Associated Signs and Symptoms)  Note limiting factors. Chief Complaint: Altered mental status    Orly Hicks is a 78 y.o. male who presents to the emergency department via EMS due to home nurse stating that he was acting differently than normal today. Unsure of when last normal was. EMS stated that the home nurse stated that he kept saying the same sentence over and over that he was fine which prompted them to call 9 1 to have him brought to the emergency department for further evaluation. Patient has a history of COPD, diabetes, peripheral vascular disease, anemia, hypertension and obstructive sleep apnea. Patient is wearing oxygen and does wear oxygen at home. Patient cannot tell me why he is here and when asked he states he just forgot to take my. .. And cannot recall what he forgot to take. Patient is able to follow my commands during my exam.  Patient does not have any complaints at this time. Patient appears well and in no acute distress. Patient lives by himself. Nursing Notes were all reviewed and agreed with or any disagreements were addressed in the HPI.     REVIEW OF SYSTEMS    (2-9 systems for level 4, 10 or more for level 5)     Review of Systems   Constitutional:  Negative for chills, diaphoresis and fever. HENT:  Negative for congestion, rhinorrhea and sore throat. Eyes:  Negative for pain and visual disturbance. Respiratory:  Negative for cough and shortness of breath. Cardiovascular:  Negative for chest pain and leg swelling. Gastrointestinal:  Negative for abdominal pain, constipation, diarrhea, nausea and vomiting. Genitourinary:  Negative for difficulty urinating, dysuria and frequency. Musculoskeletal:  Negative for back pain and neck pain. Skin:  Negative for rash and wound. Neurological:  Negative for dizziness and light-headedness. Positives and Pertinent negatives as per HPI. Except as noted above in the ROS, all other systems were reviewed and negative.        PAST MEDICAL HISTORY     Past Medical History:   Diagnosis Date    Anemia     COPD (chronic obstructive pulmonary disease) (HCC)     Diabetes mellitus ([facility] [address]     Edema     GI (gastrointestinal bleed)     Gout     Hypertension     Morbid obesity ([facility] [address]     Neuropathy     Obstructive sleep apnea     uses CPAP    Peripheral vascular disease ([facility] [address]          SURGICAL HISTORY     Past Surgical History:   Procedure Laterality Date    APPENDECTOMY      COLONOSCOPY N/A 5/7/2019    COLONOSCOPY DIAGNOSTIC performed by Abisai Monique MD at [facility]      bilateral         CURRENTMEDICATIONS       Previous Medications    ACCU-CHEK CLARISSA PLUS STRIP    USE ONE STRIP TO TEST DAILY    ACCU-CHEK SOFTCLIX LANCETS MISC    USE ONE LANCET TO TEST DAILY    ACETAMINOPHEN (TYLENOL) 325 MG TABLET    Take 650 mg by mouth every 6 hours as needed for Pain    ALBUTEROL SULFATE  (90 BASE) MCG/ACT INHALER    INHALE TWO PUFFS BY MOUTH EVERY 6 HOURS AS NEEDED    ALLOPURINOL (ZYLOPRIM) 100 MG TABLET    TAKE TWO TABLETS BY MOUTH DAILY    BLOOD GLUCOSE MONITORING SUPPL (ACCU-CHEK CLARISSA PLUS) W/DEVICE KIT    1 each by Does not apply route daily    BREO ELLIPTA 200-25 MCG/INH AEPB INHALER INHALE ONE DOSE BY MOUTH DAILY    CELECOXIB (CELEBREX) 100 MG CAPSULE    Take 1 capsule by mouth 2 times daily    DICLOFENAC (VOLTAREN) 75 MG EC TABLET    TAKE ONE TABLET BY MOUTH TWICE A DAY    FUROSEMIDE (LASIX) 40 MG TABLET    TAKE ONE TABLET BY MOUTH TWICE A DAY    GABAPENTIN (NEURONTIN) 300 MG CAPSULE    TAKE ONE CAPSULE BY MOUTH THREE TIMES A DAY    IPRATROPIUM-ALBUTEROL (DUONEB) 0.5-2.5 (3) MG/3ML SOLN NEBULIZER SOLUTION    INHALE ONE VIAL (3MLS) VIA NEBULIZATION BY MOUTH FOUR TIMES A DAY    LISINOPRIL (PRINIVIL;ZESTRIL) 2.5 MG TABLET    TAKE ONE TABLET BY MOUTH DAILY    METFORMIN (GLUCOPHAGE) 1000 MG TABLET    TAKE ONE TABLET BY MOUTH TWICE A DAY WITH MEALS    PREDNISONE (DELTASONE) 5 MG TABLET    TAKE ONE TABLET BY MOUTH DAILY    ZOSTER RECOMBINANT ADJUVANTED VACCINE (SHINGRIX) 50 MCG/0.5ML SUSR INJECTION    50 MCG IM then repeat 2-6 months. ALLERGIES     Patient has no known allergies. FAMILYHISTORY       Family History   Problem Relation Age of Onset    High Blood Pressure Mother     High Cholesterol Mother     Heart Disease Mother     Stroke Mother     Diabetes Mother     Depression Mother     Mental Illness Mother     Cancer Father           SOCIAL HISTORY       Social History     Tobacco Use    Smoking status: Former     Packs/day: 1.00     Years: 50.00     Pack years: 50.00     Types: Cigarettes     Quit date: 2/1/2012     Years since quitting: 10.5    Smokeless tobacco: Never    Tobacco comments:     H.O. smoking 1 p.p.d. Quit 9 months ago    Vaping Use    Vaping Use: Never used   Substance Use Topics    Alcohol use:  Yes     Alcohol/week: 14.0 standard drinks     Types: 14 Shots of liquor per week     Comment: 3 drinks a evening    Drug use: Never       SCREENINGS             PHYSICAL EXAM    (up to 7 for level 4, 8 or more for level 5)     ED Triage Vitals [09/03/22 1409]   BP Temp Temp Source Heart Rate Resp SpO2 Height Weight   (!) 151/83 97.7 °F (36.5 °C) Oral 96 19 100 % -- -- Comments: Cannot tell me his date of birth or what month were answered. Psychiatric:         Mood and Affect: Mood normal. Mood is not anxious or depressed. Behavior: Behavior is not agitated. Comments: Patient is able to compensate with me but when asked why he is here he states \"that I am fine\" repeatedly and states that \"I just forgot to take my. Alonzo Montiel \" Cannot remember what he forgot to take. DIAGNOSTIC RESULTS   LABS:    Labs Reviewed   COMPREHENSIVE METABOLIC PANEL W/ REFLEX TO MG FOR LOW K - Abnormal; Notable for the following components:       Result Value    Glucose 111 (*)     BUN 35 (*)     ALT <5 (*)     AST 8 (*)     All other components within normal limits   CBC WITH AUTO DIFFERENTIAL - Abnormal; Notable for the following components:    RBC 3.67 (*)     Hemoglobin 12.5 (*)     Hematocrit 38.6 (*)     .1 (*)     Lymphocytes Absolute 0.7 (*)     All other components within normal limits   AMMONIA - Abnormal; Notable for the following components:    Ammonia 11 (*)     All other components within normal limits   BLOOD GAS, VENOUS - Abnormal; Notable for the following components:    pH, Los 7.271 (*)     pCO2, Los 62.0 (*)     pO2, Los 78.7 (*)     Carboxyhemoglobin 2.6 (*)     All other components within normal limits   COVID-19, RAPID   CULTURE, BLOOD 2   CULTURE, BLOOD 1   URINALYSIS WITH REFLEX TO CULTURE   BRAIN NATRIURETIC PEPTIDE   TROPONIN   LACTATE, SEPSIS   URINE DRUG SCREEN   ETHANOL   VITAMIN B1   VITAMIN B12 & FOLATE       When ordered only abnormal lab results are displayed. All other labs were within normal range or not returned as of this dictation. EKG: When ordered, EKG's are interpreted by the Emergency Department Physician in the absence of a cardiologist.  Please see their note for interpretation of EKG.     RADIOLOGY:   Non-plain film images such as CT, Ultrasound and MRI are read by the radiologist. Plain radiographic images are visualized and preliminarily interpreted by the ED Provider with the below findings:        Interpretation per the Radiologist below, if available at the time of this note:    CT HEAD WO CONTRAST   Final Result   Atrophy and small-vessel ischemic change. No hemorrhage or mass identified      Paranasal sinus disease         XR CHEST PORTABLE   Final Result   Left basilar opacity may reflect underlying atelectasis. Correlate   clinically with signs of pneumonia. No results found. PROCEDURES   Unless otherwise noted below, none     Procedures    CRITICAL CARE TIME       CONSULTS:  None      EMERGENCY DEPARTMENT COURSE and DIFFERENTIAL DIAGNOSIS/MDM:   Vitals:    Vitals:    09/03/22 1409 09/03/22 1410 09/03/22 1430 09/03/22 1515   BP: (!) 151/83 135/69 (!) 145/97 (!) 144/69   Pulse: 96 95 88 92   Resp: 19 23 20 20   Temp: 97.7 °F (36.5 °C)      TempSrc: Oral      SpO2: 100% 100% 97% 97%       Patient was given the following medications:  Medications   sodium chloride 0.9 % 2,540 mL with folic acid 1 mg, adult multi-vitamin with vitamin k 10 mL, thiamine 300 mg ( IntraVENous New Bag 9/3/22 1454)         Is this patient to be included in the SEP-1 Core Measure due to severe sepsis or septic shock? No   Exclusion criteria - the patient is NOT to be included for SEP-1 Core Measure due to: Infection is not suspected    81-year-old male presents emergency department via EMS due to altered mental status. Unsure of last known normal. patient was found by his home nurse this morning to be repeating his words over and over again. On arrival patient appears well does not have any focal neurological deficits but does repeat words often. Altered mental status work-up was completed here patient has a history of COPD and wears oxygen at home and is on 4 L nasal cannula here. Patient is able to converse with me, does not have any complaints. Patient with VBG respiratory acidosis most likely related to his COPD and sleep apnea.  Urine analysis and urine drug screen unremarkable. CBC with a hemoglobin of 12.5. CMP BUN of 35 at baseline with creatinine of 1.1. BNP and troponin within normal limit. Ammonia at 11 alcohol undetectable COVID-19 testing negative CT scan of the head was ordered due to his altered mentation. CT scan did not show any acute hemorrhage or mass identified. Chest x-ray shows left basilar opacity may reflect underlying atelectasis. Patient not having fevers or chills and lungs sound clear on auscultation. Less likely pneumonia to be causing his altered mental status. Thiamine and folate were also ordered and are pending at this time. Patient was given IV thiamine in case this is a thiamine deficiency causing his altered mental status. Patient will be admitted to the hospital for further evaluation for his altered mental status and for metabolic encephalopathy    FINAL IMPRESSION      1. Altered mental status, unspecified altered mental status type    2. Metabolic encephalopathy          DISPOSITION/PLAN   DISPOSITION Admitted 09/03/2022 04:08:30 PM      PATIENT REFERRED TO:  No follow-up provider specified.     DISCHARGE MEDICATIONS:  New Prescriptions    No medications on file       DISCONTINUED MEDICATIONS:  Discontinued Medications    No medications on file              (Please note that portions of this note were completed with a voice recognition program.  Efforts were made to edit the dictations but occasionally words are mis-transcribed.)    LYN Aguila (electronically signed)            LYN Aguila  09/03/22 1212

## 2022-09-03 NOTE — PROGRESS NOTES
Daughter at the bedside, pt trying to ask something and cannot get his words out, daughter upset and crying, wants to talk to someone now. Unhappy that an MRI would not be done until Tuesday due to holiday weekend. MD notified.

## 2022-09-04 LAB
ANION GAP SERPL CALCULATED.3IONS-SCNC: 10 MMOL/L (ref 3–16)
BUN BLDV-MCNC: 26 MG/DL (ref 7–20)
CALCIUM SERPL-MCNC: 9.4 MG/DL (ref 8.3–10.6)
CHLORIDE BLD-SCNC: 105 MMOL/L (ref 99–110)
CO2: 27 MMOL/L (ref 21–32)
CREAT SERPL-MCNC: 1 MG/DL (ref 0.8–1.3)
EKG ATRIAL RATE: 92 BPM
EKG DIAGNOSIS: NORMAL
EKG P AXIS: 37 DEGREES
EKG P-R INTERVAL: 234 MS
EKG Q-T INTERVAL: 376 MS
EKG QRS DURATION: 98 MS
EKG QTC CALCULATION (BAZETT): 464 MS
EKG R AXIS: -49 DEGREES
EKG T AXIS: 63 DEGREES
EKG VENTRICULAR RATE: 92 BPM
FOLATE: 16.79 NG/ML (ref 4.78–24.2)
GFR AFRICAN AMERICAN: >60
GFR NON-AFRICAN AMERICAN: >60
GLUCOSE BLD-MCNC: 113 MG/DL (ref 70–99)
GLUCOSE BLD-MCNC: 116 MG/DL (ref 70–99)
GLUCOSE BLD-MCNC: 124 MG/DL (ref 70–99)
GLUCOSE BLD-MCNC: 127 MG/DL (ref 70–99)
HCT VFR BLD CALC: 38.2 % (ref 40.5–52.5)
HEMOGLOBIN: 12.2 G/DL (ref 13.5–17.5)
MCH RBC QN AUTO: 33.8 PG (ref 26–34)
MCHC RBC AUTO-ENTMCNC: 31.9 G/DL (ref 31–36)
MCV RBC AUTO: 106 FL (ref 80–100)
PDW BLD-RTO: 14.9 % (ref 12.4–15.4)
PERFORMED ON: ABNORMAL
PLATELET # BLD: 293 K/UL (ref 135–450)
PMV BLD AUTO: 6.6 FL (ref 5–10.5)
POTASSIUM SERPL-SCNC: 4.5 MMOL/L (ref 3.5–5.1)
PROCALCITONIN: 0.06 NG/ML (ref 0–0.15)
RBC # BLD: 3.6 M/UL (ref 4.2–5.9)
SODIUM BLD-SCNC: 142 MMOL/L (ref 136–145)
TSH REFLEX: 3.18 UIU/ML (ref 0.27–4.2)
VITAMIN B-12: 218 PG/ML (ref 211–911)
VITAMIN D 25-HYDROXY: 22.7 NG/ML
WBC # BLD: 5.4 K/UL (ref 4–11)

## 2022-09-04 PROCEDURE — 6370000000 HC RX 637 (ALT 250 FOR IP): Performed by: FAMILY MEDICINE

## 2022-09-04 PROCEDURE — 93010 ELECTROCARDIOGRAM REPORT: CPT | Performed by: INTERNAL MEDICINE

## 2022-09-04 PROCEDURE — 85027 COMPLETE CBC AUTOMATED: CPT

## 2022-09-04 PROCEDURE — 2060000000 HC ICU INTERMEDIATE R&B

## 2022-09-04 PROCEDURE — 6360000002 HC RX W HCPCS: Performed by: FAMILY MEDICINE

## 2022-09-04 PROCEDURE — 92526 ORAL FUNCTION THERAPY: CPT

## 2022-09-04 PROCEDURE — 94640 AIRWAY INHALATION TREATMENT: CPT

## 2022-09-04 PROCEDURE — 6370000000 HC RX 637 (ALT 250 FOR IP): Performed by: NURSE PRACTITIONER

## 2022-09-04 PROCEDURE — 92523 SPEECH SOUND LANG COMPREHEN: CPT

## 2022-09-04 PROCEDURE — 36415 COLL VENOUS BLD VENIPUNCTURE: CPT

## 2022-09-04 PROCEDURE — 2580000003 HC RX 258: Performed by: FAMILY MEDICINE

## 2022-09-04 PROCEDURE — 94761 N-INVAS EAR/PLS OXIMETRY MLT: CPT

## 2022-09-04 PROCEDURE — 80048 BASIC METABOLIC PNL TOTAL CA: CPT

## 2022-09-04 PROCEDURE — 92610 EVALUATE SWALLOWING FUNCTION: CPT

## 2022-09-04 PROCEDURE — 84145 PROCALCITONIN (PCT): CPT

## 2022-09-04 RX ORDER — ROSUVASTATIN CALCIUM 10 MG/1
5 TABLET, COATED ORAL NIGHTLY
Status: DISCONTINUED | OUTPATIENT
Start: 2022-09-04 | End: 2022-09-04

## 2022-09-04 RX ORDER — OYSTER SHELL CALCIUM WITH VITAMIN D 500; 200 MG/1; [IU]/1
1 TABLET, FILM COATED ORAL DAILY
Status: DISCONTINUED | OUTPATIENT
Start: 2022-09-04 | End: 2022-09-06 | Stop reason: RX

## 2022-09-04 RX ORDER — ALLOPURINOL 100 MG/1
200 TABLET ORAL DAILY
Status: DISCONTINUED | OUTPATIENT
Start: 2022-09-04 | End: 2022-09-09 | Stop reason: HOSPADM

## 2022-09-04 RX ORDER — PREDNISONE 1 MG/1
5 TABLET ORAL DAILY
Status: DISCONTINUED | OUTPATIENT
Start: 2022-09-04 | End: 2022-09-09 | Stop reason: HOSPADM

## 2022-09-04 RX ORDER — ASPIRIN 81 MG/1
81 TABLET, CHEWABLE ORAL DAILY
Status: DISCONTINUED | OUTPATIENT
Start: 2022-09-04 | End: 2022-09-09 | Stop reason: HOSPADM

## 2022-09-04 RX ORDER — ROSUVASTATIN CALCIUM 20 MG/1
20 TABLET, COATED ORAL NIGHTLY
Status: DISCONTINUED | OUTPATIENT
Start: 2022-09-04 | End: 2022-09-09 | Stop reason: HOSPADM

## 2022-09-04 RX ORDER — CELECOXIB 100 MG/1
100 CAPSULE ORAL 2 TIMES DAILY
Status: DISCONTINUED | OUTPATIENT
Start: 2022-09-04 | End: 2022-09-07

## 2022-09-04 RX ORDER — GABAPENTIN 300 MG/1
300 CAPSULE ORAL 3 TIMES DAILY
Status: DISCONTINUED | OUTPATIENT
Start: 2022-09-04 | End: 2022-09-09 | Stop reason: HOSPADM

## 2022-09-04 RX ORDER — GAUZE BANDAGE 2" X 2"
100 BANDAGE TOPICAL DAILY
Status: DISCONTINUED | OUTPATIENT
Start: 2022-09-04 | End: 2022-09-09 | Stop reason: HOSPADM

## 2022-09-04 RX ADMIN — ALLOPURINOL 200 MG: 100 TABLET ORAL at 14:28

## 2022-09-04 RX ADMIN — GABAPENTIN 300 MG: 300 CAPSULE ORAL at 14:28

## 2022-09-04 RX ADMIN — GABAPENTIN 300 MG: 300 CAPSULE ORAL at 21:30

## 2022-09-04 RX ADMIN — ENOXAPARIN SODIUM 40 MG: 100 INJECTION SUBCUTANEOUS at 21:30

## 2022-09-04 RX ADMIN — PREDNISONE 5 MG: 5 TABLET ORAL at 14:33

## 2022-09-04 RX ADMIN — CELECOXIB 100 MG: 100 CAPSULE ORAL at 14:28

## 2022-09-04 RX ADMIN — Medication 10 ML: at 21:30

## 2022-09-04 RX ADMIN — FUROSEMIDE 40 MG: 40 TABLET ORAL at 12:06

## 2022-09-04 RX ADMIN — Medication 2 PUFF: at 08:31

## 2022-09-04 RX ADMIN — ACETAMINOPHEN 650 MG: 325 TABLET ORAL at 10:03

## 2022-09-04 RX ADMIN — Medication 100 MG: at 12:06

## 2022-09-04 RX ADMIN — CELECOXIB 100 MG: 100 CAPSULE ORAL at 22:10

## 2022-09-04 RX ADMIN — FUROSEMIDE 40 MG: 40 TABLET ORAL at 19:09

## 2022-09-04 RX ADMIN — LISINOPRIL 2.5 MG: 5 TABLET ORAL at 12:06

## 2022-09-04 RX ADMIN — Medication 10 ML: at 12:09

## 2022-09-04 RX ADMIN — Medication 2 PUFF: at 21:28

## 2022-09-04 RX ADMIN — CALCIUM CARBONATE-VITAMIN D TAB 500 MG-200 UNIT 1 TABLET: 500-200 TAB at 12:07

## 2022-09-04 RX ADMIN — ROSUVASTATIN CALCIUM 20 MG: 20 TABLET, FILM COATED ORAL at 21:30

## 2022-09-04 RX ADMIN — ASPIRIN 81 MG 81 MG: 81 TABLET ORAL at 12:06

## 2022-09-04 ASSESSMENT — PAIN DESCRIPTION - DESCRIPTORS: DESCRIPTORS: ACHING

## 2022-09-04 ASSESSMENT — PAIN DESCRIPTION - LOCATION: LOCATION: HEAD

## 2022-09-04 ASSESSMENT — PAIN SCALES - GENERAL: PAINLEVEL_OUTOF10: 6

## 2022-09-04 NOTE — PROGRESS NOTES
Called MRI to inquire about possible time frame for patient's MRI. Did the questionnaire with patient and patient's daughter Isra Neves. Daughter Sincere English now at bedside. Message left with MRI with RN's name and phone number for a return call.

## 2022-09-04 NOTE — H&P
HOSPITALISTS HISTORY AND PHYSICAL    9/3/2022 9:14 PM    Patient Information:  Esthela Whitten is a 78 y.o. male [id number]  PCP:  Luis E Mendoza MD (Tel: [phone] )    Chief complaint:    Chief Complaint   Patient presents with    Altered Mental Status     Found Altered by caregiver who called [facility]. NIH was 5 on exam; Lives by himself; cannot follow commands; FSBS in squad was 110. History of Present Illness:  Kamari Rogers is a 78 y.o. male with , DM , HTN , Obesity, COPD , STEF, chronic respiratory failure, AAA, PVD, current daily alcohol use, Nicotine use presented with altered mental state. Step daughter at bedside states the pt became confused today. He was unable to recognize people . He is alert and follows command. But states he cant remember any thing. Per step daughter he is active at baseline, volunteers . He lives alone since his wife passed several years ago. He drinks beer daily. Has been depressed since his dog passed away a few months ago . No h/o Stroke or Seizures. No recent illness. But he is unable to remember any thing short term or long term. Unable to tell me the name of the hospital or state, Who is family member visiting him, whether he was  . The pt denies pain. No focal muscle weakness, numbness or tingling, denies headache, no slurred speech passed Swallow screen  ED work up showed   CT head showed atrophy and small vessel ischemia. UA , Urine drug screen unremarkable. Blood Etoh level is undetectable . Ammonia level is normal 11  Blood sugar 111        REVIEW OF SYSTEMS:   Constitutional: Negative for fever,chills or night sweats  ENT: Negative for rhinorrhea, epistaxis, hoarseness, sore throat.   Respiratory: Negative for shortness of breath,wheezing  Cardiovascular: Negative for chest pain, palpitations   Gastrointestinal: Negative for nausea, vomiting, diarrhea  Genitourinary: Negative for polyuria, dysuria   Hematologic/Lymphatic: Negative for bleeding tendency, easy bruising  Musculoskeletal: Negative for myalgias and arthralgias  Neurologic: +ve for confusion,-ve dysarthria. Skin: Negative for itching,rash  Psychiatric: Negative for depression,anxiety, agitation. Endocrine: Negative for polydipsia,polyuria,heat /cold intolerance. Past Medical History:   has a past medical history of Anemia, COPD (chronic obstructive pulmonary disease) ([facility] [address] Diabetes mellitus ([facility] [address] Edema, GI (gastrointestinal bleed), Gout, Hypertension, Morbid obesity ([facility] [address] Neuropathy, Obstructive sleep apnea, and Peripheral vascular disease ([facility] [address] Past Surgical History:   has a past surgical history that includes Appendectomy; Total hip arthroplasty; and Colonoscopy (N/A, 5/7/2019). Medications:  No current facility-administered medications on file prior to encounter. Current Outpatient Medications on File Prior to Encounter   Medication Sig Dispense Refill    gabapentin (NEURONTIN) 300 MG capsule TAKE ONE CAPSULE BY MOUTH THREE TIMES A DAY 90 capsule 3    furosemide (LASIX) 40 MG tablet TAKE ONE TABLET BY MOUTH TWICE A  tablet 1    predniSONE (DELTASONE) 5 MG tablet TAKE ONE TABLET BY MOUTH DAILY 90 tablet 1    allopurinol (ZYLOPRIM) 100 MG tablet TAKE TWO TABLETS BY MOUTH DAILY 180 tablet 1    zoster recombinant adjuvanted vaccine (SHINGRIX) 50 MCG/0.5ML SUSR injection 50 MCG IM then repeat 2-6 months.  0.5 mL 1    celecoxib (CELEBREX) 100 MG capsule Take 1 capsule by mouth 2 times daily 60 capsule 5    BREO ELLIPTA 200-25 MCG/INH AEPB inhaler INHALE ONE DOSE BY MOUTH DAILY 1 each 11    ACCU-CHEK CLARISSA PLUS strip USE ONE STRIP TO TEST DAILY 50 strip 5    ipratropium-albuterol (DUONEB) 0.5-2.5 (3) MG/3ML SOLN nebulizer solution INHALE ONE VIAL (3MLS) VIA NEBULIZATION BY MOUTH FOUR TIMES A  mL 11    albuterol sulfate  (90 Base) MCG/ACT inhaler INHALE TWO PUFFS BY MOUTH EVERY 6 HOURS AS NEEDED 18 g 11    metFORMIN (GLUCOPHAGE) 1000 MG tablet TAKE ONE TABLET BY MOUTH TWICE A DAY WITH MEALS 180 tablet 3    lisinopril (PRINIVIL;ZESTRIL) 2.5 MG tablet TAKE ONE TABLET BY MOUTH DAILY 90 tablet 3    Accu-Chek Softclix Lancets MISC USE ONE LANCET TO TEST DAILY 100 each 2    diclofenac (VOLTAREN) 75 MG EC tablet TAKE ONE TABLET BY MOUTH TWICE A  tablet 0    Blood Glucose Monitoring Suppl (ACCU-CHEK CLARISSA PLUS) w/Device KIT 1 each by Does not apply route daily 1 kit 0    acetaminophen (TYLENOL) 325 MG tablet Take 650 mg by mouth every 6 hours as needed for Pain       Current Facility-Administered Medications   Medication Dose Route Frequency Provider Last Rate Last Admin    lisinopril (PRINIVIL;ZESTRIL) tablet 2.5 mg  2.5 mg Oral Daily Kacey Woods MD        ipratropium-albuterol (DUONEB) nebulizer solution 1 ampule  1 ampule Inhalation Q4H PRN Joan Newell MD   1 ampule at 09/03/22 2323    furosemide (LASIX) tablet 40 mg  40 mg Oral BID Joan Newell MD   40 mg at 09/03/22 2123    albuterol sulfate HFA (PROVENTIL;VENTOLIN;PROAIR) 108 (90 Base) MCG/ACT inhaler 1 puff  1 puff Inhalation Q6H PRN Kacey Woods MD        sodium chloride flush 0.9 % injection 5-40 mL  5-40 mL IntraVENous 2 times per day Joan Newell MD   10 mL at 09/03/22 2123    sodium chloride flush 0.9 % injection 5-40 mL  5-40 mL IntraVENous PRN Kacey Rojas MD        0.9 % sodium chloride infusion   IntraVENous PRN Joan Newell MD        enoxaparin (LOVENOX) injection 40 mg  40 mg SubCUTAneous Nightly Kacey Woods MD   40 mg at 09/03/22 2123    ondansetron (ZOFRAN-ODT) disintegrating tablet 4 mg  4 mg Oral Q8H PRN Joan Newell MD        Or    ondansetron (ZOFRAN) injection 4 mg  4 mg IntraVENous Q6H PRN Kacey Woods MD        polyethylene glycol (GLYCOLAX) packet 17 g  17 g Oral Daily PRN Joan Newell MD        acetaminophen (TYLENOL) tablet 650 mg  650 mg Oral Q6H PRN Kacey Stepan Velasco MD        Or    acetaminophen (TYLENOL) suppository 650 mg  650 mg Rectal Q6H PRN Keith Hendrix MD        mometasone-formoterol (DULERA) 200-5 MCG/ACT inhaler 2 puff  2 puff Inhalation BID Keith Hendrix MD            Allergies:  No Known Allergies     Social History:  Patient Lives    reports that he quit smoking about 10 years ago. His smoking use included cigarettes. He has a 50.00 pack-year smoking history. He has never used smokeless tobacco. He reports current alcohol use of about 14.0 standard drinks per week. He reports that he does not use drugs. Family History:  family history includes Cancer in his father; Depression in his mother; Diabetes in his mother; Heart Disease in his mother; High Blood Pressure in his mother; High Cholesterol in his mother; Mental Illness in his mother; Stroke in his mother. ,     Physical Exam:  BP (!) 141/77   Pulse 97   Temp 98.3 °F (36.8 °C) (Axillary)   Resp 18   SpO2 95%     General appearance:  Appears comfortable. Well nourished  Eyes: Sclera clear, pupils equal  ENT: Moist mucus membranes, no thrush. Trachea midline. Cardiovascular: Regular rhythm, normal S1, S2. No murmur, gallop, rub. No edema in lower extremities  Respiratory: Clear to auscultation bilaterally, no wheeze, good inspiratory effort  Gastrointestinal: Abdomen soft, non-tender, not distended, normal bowel sounds  Musculoskeletal: No cyanosis in digits, neck supple  Neurology: Cranial nerves grossly intact. Alert  No speech or motor deficits  Psychiatry: Appropriate affect.  Not agitated, confused   Skin: Warm, dry, normal turgor, no rash  Brisk capillary refill, peripheral pulses palpable   Labs:  CBC:   Lab Results   Component Value Date/Time    WBC 5.5 09/03/2022 02:26 PM    RBC 3.67 09/03/2022 02:26 PM    HGB 12.5 09/03/2022 02:26 PM    HCT 38.6 09/03/2022 02:26 PM    .1 09/03/2022 02:26 PM    MCH 34.0 09/03/2022 02:26 PM    MCHC 32.3 09/03/2022 02:26 PM    RDW 14.9 09/03/2022 02:26 PM     09/03/2022 02:26 PM    MPV 6.8 09/03/2022 02:26 PM     BMP:    Lab Results   Component Value Date/Time     09/03/2022 02:26 PM    K 5.0 09/03/2022 02:26 PM     09/03/2022 02:26 PM    CO2 28 09/03/2022 02:26 PM    BUN 35 09/03/2022 02:26 PM    CREATININE 1.1 09/03/2022 02:26 PM    CALCIUM 9.3 09/03/2022 02:26 PM    GFRAA >60 09/03/2022 02:26 PM    GFRAA >60 04/17/2013 08:55 AM    LABGLOM >60 09/03/2022 02:26 PM    GLUCOSE 111 09/03/2022 02:26 PM     CT HEAD WO CONTRAST   Final Result   Atrophy and small-vessel ischemic change. No hemorrhage or mass identified      Paranasal sinus disease         XR CHEST PORTABLE   Final Result   Left basilar opacity may reflect underlying atelectasis. Correlate   clinically with signs of pneumonia. MRI BRAIN WO CONTRAST    (Results Pending)     Chest Xray:   EKG:    I visualized CXR images and EKG strips    Discussed case  with     Problem List  Principal Problem:    Encephalopathy  Resolved Problems:    * No resolved hospital problems. *        Assessment/Plan:   Acute encephalopathy  Admit to r/our CVA vs Wernicke encephalopathy   h/o long term alcohol use  Pt received multivitamin infusion   Vitamin B12, TSH,  level pending  MRI brain ordered   Cont thiamine   Passed swallow eval  Started on Statin and ASA  Neurology consulted   D/t long weekend holiday MRI wont get done until Tues  Family is interested in getting pt transferred out to [facility] if the pt can get an MRI sooner . Does not want to transfer to  ( where apparently wife passed away)  Agreed to wait tonight   will discuss with her sister will get back to us tomorrow with transfer decision       DVT prophylaxis   Code status   Diet   IV access   Albarado Catheter    Admit as inpatient I anticipate hospitalization spanning more than two midnights for investigation and treatment of the above medically necessary diagnoses.     Please note that some part of this chart was generated using Dragon dictation software. Although every effort was made to ensure the accuracy of this automated transcription, some errors in transcription may have occurred inadvertently. If you may need any clarification, please do not hesitate to contact me through [facility].        Lilliana Clark MD    9/3/2022 9:14 PM

## 2022-09-04 NOTE — PROGRESS NOTES
Speech Language Pathology  Facility/Department: [address]  SLP Clinical Swallow Evaluation and Speech Language Cognitive Assessment     Patient: Emeterio Ward   : 1945   MRN: [id number]      Evaluation Date: 2022      Admitting Dx: Metabolic encephalopathy [R99.49]  Encephalopathy [G93.40]  Altered mental status, unspecified altered mental status type [R41.82]  Pain: Does not report pain at this time. H&P:     \"Ishmael Pandey is a 78 y.o. male who presents to the emergency department via EMS due to home nurse stating that he was acting differently than normal today. Unsure of when last normal was. EMS stated that the home nurse stated that he kept saying the same sentence over and over that he was fine which prompted them to call 9 1 to have him brought to the emergency department for further evaluation. Patient has a history of COPD, diabetes, peripheral vascular disease, anemia, hypertension and obstructive sleep apnea. Patient is wearing oxygen and does wear oxygen at home. Patient cannot tell me why he is here and when asked he states he just forgot to take my. .. And cannot recall what he forgot to take. Patient is able to follow my commands during my exam.  Patient does not have any complaints at this time. Patient appears well and in no acute distress. Patient lives by himself. \"        Imaging:  Chest X-ray:     Impression   Left basilar opacity may reflect underlying atelectasis. Correlate   clinically with signs of pneumonia. Head CT:     Impression   Atrophy and small-vessel ischemic change. No hemorrhage or mass identified       Paranasal sinus disease             History/Prior Level of Function:   Living Status: Home independently  Prior Dysphagia History: No prior history of dysphagia per chart review. Patient unable to provide history secondary to cognitive and language deficits at this time.   Prior Speech History: None per chart.    Reason for referral: SLP evaluation orders received due to altered mental status, difficulty communicating , and suspected aphasia . DYSPHAGIA BEDSIDE SWALLOW EVALUATION   Dysphagia Impressions/Dysphagia Diagnosis: Oropharyngeal Dysphagia   SLP Eval and Treat orders received. RN cleared SLP for entry. Patient alert & upright in recliner chair on 3L O2 via NC. Patient with immediate anomia in conversation and difficulty communicating, though agreeable to evaluation. Limited OME performed due to patient's inconsistent ability to follow commands at this time. Intact dentition noted. Patient accepting of Thin liquids via straw and Regular solids. No overt s/s of aspiration noted, however limited presentation. Recommend continue current diet with follow-up to ensure tolerance. If clinical s/s of aspiration arise, recommend hold PO and immediately notify SLP for re-evaluation. Recommended Diet and Intervention:  Diet Solids Recommendation:  Reguarl Liquid Consistency Recommendation: Thin Recommended form of Meds: Whole with water, as able   Whole in puree        Dysphagia Therapeutic Intervention:  Diet Tolerance Monitoring , Patient/Family Education , Therapeutic Trials with SLP     Compensatory Swallowing Strategies: Alternate solids/liquids , Upright as possible with all PO intake , Small bites/sips , Eat/feed slowly, Remain upright 30-45 min     Oral Mechanism Exam:  []WFL []Mild   [] Moderate  []Severe  [x]To be assessed      SHORT TERM DYSPHAGIA GOALS  Pt will functionally tolerate recommended diet with no overt clinical s/s of aspiration     Patient Positioning: Upright in chair      SPEECH LANGUAGE COGNITIVE ASSESSMENT:     Speech Diagnosis:   Expressive Aphasia , Receptive Aphasia , Cognitive-Linguistic Deficits     Impressions:  Upon SLP entry, patient with immediate anomia noted as patient, saying \"Hand me, hand me. Nicky Quintero \" but unable to complete utterance secondary to troubles with word-retrieval. Patient able to communicate need via pointing; SLP to determine patient requesting pillow to place behind head. Patient very perseverative on \"medications at home\", though largely redirectable. Continued to repeat this utterance throughout evaluation. Patient unable to orient to general location or month given F:2 options. Patient did spontaneously state that today is \"Sunday\". Patient followed simple, single-step commands with 1/5 accuracy, increased to 4/5 given models from SLP. At times, difficult to discern receptive language deficits versus suspected hard of hearing. Patient answered simple Yes/No questions with 1/3 accuracy. Confrontation Naming with items in room resulted in 3/5 accuracy. Patient required initial cueing to complete Automatic Speech Tasks (counting, stating CINDY), as SLP \"started patient off\" by stating, \"1, 2. Alonzo Power Alonzo Power \", etc. for patient to finish task. At one point, patient stated \"Everything's gone\" and exhibited frustration with current expressive language deficits. Required MAX assist in communicating wants/needs throughout session, as patient stated \"I want hot, hot. Alonzo Power Alonzo Power \", then stated \"Yes\" when SLP offered coffee. Overall, patient presents with non-fluent expressive aphasia, as well as receptive aphasia. Requires ongoing skilled services to address these deficits. COMPREHENSION  Auditory Comprehension: Moderate   Impaired Yes/no questions  Impaired Basic questions  Impaired One step commands  Impaired Conversation    EXPRESSION  Verbal Expression: Moderate   Impaired Confrontational Naming  Impaired Conversation  Paraphasias   Perseveration   Anomia         MOTOR SPEECH  Motor Speech: Within functional limits       VOICE  Voice: Within functional limits       COGNITION    Overall Orientation : Suspect expressive/receptive aphasia impacting  Oriented to self     Attention: To be assessed      Memory: To be assessed      Problem Solving: To be assessed      Safety/Judgement:  To be assessed        GOALS:  Short Term Speech/Language/Cognitive Goals:   Pt will improve auditory processing/comprehension of commands and questions to 80%, via graded tasks   Pt will improve verbal expression for functional expression via graded tasks to 80%  Pt will participate in ongoing cognitive assessment with goals to be established as indicated     Plan of care: 1-2 times to ensure diet tolerance. 2-3 times per week for cognitive-linguistic skills. Discharge Recommendations:  Recommend ongoing SLP for speech therapy upon discharge from the hospital     EDUCATION:   Provided education regarding role of SLP, results of assessment, recommendations and general speech pathology plan of care. [] Pt verbalized understanding and agreement   [x] Pt requires ongoing learning   [x] No evidence of comprehension     If patient discharges prior to next visit, this note will serve as discharge.      Time code minutes: 0 minutes  Total Time: 27 minutes    Electronically signed by:     Edi Mack M.A., [address] [place] [address]  Speech-Language Pathologist

## 2022-09-04 NOTE — PROGRESS NOTES
[facility] PROGRESS NOTE    9/4/2022 7:18 AM        Name: Julissa Lopez . Admitted: 9/3/2022  Primary Care Provider: Bairon Sanford MD (Tel: [phone])      Subjective:  . Seen this am while up in the chair with daughter at bedside    Pt is alert and oriented to his surroundings, however he is frustrated regarding the events of yesterday. ..frustrated with his memory lapse    Daughter at bedside and feels her father is much improved today.   \"Almost at baseline\"    Reviewed interval ancillary notes    Current Medications  thiamine mononitrate tablet 100 mg, Daily  rosuvastatin (CRESTOR) tablet 20 mg, Nightly  aspirin chewable tablet 81 mg, Daily  calcium-vitamin D (OSCAL-500) 500-200 MG-UNIT per tablet 1 tablet, Daily  lisinopril (PRINIVIL;ZESTRIL) tablet 2.5 mg, Daily  ipratropium-albuterol (DUONEB) nebulizer solution 1 ampule, Q4H PRN  furosemide (LASIX) tablet 40 mg, BID  albuterol sulfate HFA (PROVENTIL;VENTOLIN;PROAIR) 108 (90 Base) MCG/ACT inhaler 1 puff, Q6H PRN  sodium chloride flush 0.9 % injection 5-40 mL, 2 times per day  sodium chloride flush 0.9 % injection 5-40 mL, PRN  0.9 % sodium chloride infusion, PRN  enoxaparin (LOVENOX) injection 40 mg, Nightly  ondansetron (ZOFRAN-ODT) disintegrating tablet 4 mg, Q8H PRN   Or  ondansetron (ZOFRAN) injection 4 mg, Q6H PRN  polyethylene glycol (GLYCOLAX) packet 17 g, Daily PRN  acetaminophen (TYLENOL) tablet 650 mg, Q6H PRN   Or  acetaminophen (TYLENOL) suppository 650 mg, Q6H PRN  mometasone-formoterol (DULERA) 200-5 MCG/ACT inhaler 2 puff, BID        Objective:  /67   Pulse 87   Temp 98 °F (36.7 °C) (Oral)   Resp 18   SpO2 96%   No intake or output data in the 24 hours ending 09/04/22 0718   Wt Readings from Last 3 Encounters:   04/11/22 (!) 322 lb 3.2 oz (146.1 kg)   11/11/21 (!) 325 lb (147.4 kg)   06/29/21 (!) 325 lb (147.4 kg) General appearance:  Appears chronically ill. Speech clear. Alert and cooperative. Pleasant   Eyes: Sclera clear. Pupils equal.  ENT: Moist oral mucosa. Trachea midline, no adenopathy. Cardiovascular: Regular rhythm, normal S1, S2. No murmur. ++ edema in lower extremities  Respiratory: Not using accessory muscles. Decreased in bases R > L rare soft expiratory wheezes noted  GI: Abdomen soft, no tenderness, not distended, normal bowel sounds  Musculoskeletal: No cyanosis in digits, neck supple  Neurology: CN 2-12 grossly intact. No speech or motor deficits  Psych: Normal affect. Alert and oriented in time, place and person  Skin: Warm, dry, normal turgor    Labs and Tests:  CBC:   Recent Labs     09/03/22  1426 09/04/22  0630   WBC 5.5 5.4   HGB 12.5* 12.2*    293     BMP:    Recent Labs     09/03/22  1426 09/04/22  0630    142   K 5.0 4.5    105   CO2 28 27   BUN 35* 26*   CREATININE 1.1 1.0   GLUCOSE 111* 116*     Hepatic:   Recent Labs     09/03/22  1426   AST 8*   ALT <5*   BILITOT 0.4   ALKPHOS 72     BC pending     CT head:  Atrophy and small-vessel ischemic change. No hemorrhage or mass identified       Paranasal sinus disease     Chest xray:    Left basilar opacity may reflect underlying atelectasis. Correlate   clinically with signs of pneumonia         Problem List  Principal Problem:    Encephalopathy  Resolved Problems:    * No resolved hospital problems. *       Assessment & Plan:   Encephalopathy:  resolving. Almost at baseline. Etiology is currently unknown. Ct with no acute findings. MRI has been ordered. Neuro consult pending. ?? Transient Global Amnesia  Chronic hypoxic respiratory failure due to mesothelioma:  at his baseline oxygen requirement,  appears stable on current inhaled therapy. Follows with Primo    Chest xray reviewed: doubt any PNA,  pro calcitonin added   Pt drinks beer daily but etoh level was negative.   No hx of withdrawal.  B12 thiamine and ammonia levels in range   STEF:  on cpap  Daughter reports increased depression since the death of his dog 2 months ago and death of his wife 2 years ago. Diet: ADULT DIET;  Regular  Code:DNR-CC  DVT PPX      THU Casanova - CNP   9/4/2022 7:18 AM

## 2022-09-05 LAB
ANION GAP SERPL CALCULATED.3IONS-SCNC: 9 MMOL/L (ref 3–16)
BUN BLDV-MCNC: 24 MG/DL (ref 7–20)
CALCIUM SERPL-MCNC: 9.8 MG/DL (ref 8.3–10.6)
CHLORIDE BLD-SCNC: 103 MMOL/L (ref 99–110)
CO2: 29 MMOL/L (ref 21–32)
CREAT SERPL-MCNC: 1.1 MG/DL (ref 0.8–1.3)
GFR AFRICAN AMERICAN: >60
GFR NON-AFRICAN AMERICAN: >60
GLUCOSE BLD-MCNC: 105 MG/DL (ref 70–99)
GLUCOSE BLD-MCNC: 112 MG/DL (ref 70–99)
GLUCOSE BLD-MCNC: 118 MG/DL (ref 70–99)
GLUCOSE BLD-MCNC: 136 MG/DL (ref 70–99)
HCT VFR BLD CALC: 38.8 % (ref 40.5–52.5)
HEMOGLOBIN: 12.5 G/DL (ref 13.5–17.5)
MCH RBC QN AUTO: 34 PG (ref 26–34)
MCHC RBC AUTO-ENTMCNC: 32.3 G/DL (ref 31–36)
MCV RBC AUTO: 105.4 FL (ref 80–100)
PDW BLD-RTO: 14.6 % (ref 12.4–15.4)
PERFORMED ON: ABNORMAL
PLATELET # BLD: 295 K/UL (ref 135–450)
PMV BLD AUTO: 6.3 FL (ref 5–10.5)
POTASSIUM SERPL-SCNC: 4.7 MMOL/L (ref 3.5–5.1)
RBC # BLD: 3.68 M/UL (ref 4.2–5.9)
SODIUM BLD-SCNC: 141 MMOL/L (ref 136–145)
WBC # BLD: 5.2 K/UL (ref 4–11)

## 2022-09-05 PROCEDURE — 80048 BASIC METABOLIC PNL TOTAL CA: CPT

## 2022-09-05 PROCEDURE — 6370000000 HC RX 637 (ALT 250 FOR IP): Performed by: FAMILY MEDICINE

## 2022-09-05 PROCEDURE — 94640 AIRWAY INHALATION TREATMENT: CPT

## 2022-09-05 PROCEDURE — 6370000000 HC RX 637 (ALT 250 FOR IP): Performed by: NURSE PRACTITIONER

## 2022-09-05 PROCEDURE — 36415 COLL VENOUS BLD VENIPUNCTURE: CPT

## 2022-09-05 PROCEDURE — 94761 N-INVAS EAR/PLS OXIMETRY MLT: CPT

## 2022-09-05 PROCEDURE — 2580000003 HC RX 258: Performed by: FAMILY MEDICINE

## 2022-09-05 PROCEDURE — 2700000000 HC OXYGEN THERAPY PER DAY

## 2022-09-05 PROCEDURE — 99223 1ST HOSP IP/OBS HIGH 75: CPT | Performed by: PSYCHIATRY & NEUROLOGY

## 2022-09-05 PROCEDURE — 85027 COMPLETE CBC AUTOMATED: CPT

## 2022-09-05 PROCEDURE — 6360000002 HC RX W HCPCS: Performed by: FAMILY MEDICINE

## 2022-09-05 PROCEDURE — 2060000000 HC ICU INTERMEDIATE R&B

## 2022-09-05 RX ADMIN — ASPIRIN 81 MG 81 MG: 81 TABLET ORAL at 09:52

## 2022-09-05 RX ADMIN — CELECOXIB 100 MG: 100 CAPSULE ORAL at 09:56

## 2022-09-05 RX ADMIN — GABAPENTIN 300 MG: 300 CAPSULE ORAL at 09:50

## 2022-09-05 RX ADMIN — PREDNISONE 5 MG: 5 TABLET ORAL at 09:51

## 2022-09-05 RX ADMIN — GABAPENTIN 300 MG: 300 CAPSULE ORAL at 22:05

## 2022-09-05 RX ADMIN — GABAPENTIN 300 MG: 300 CAPSULE ORAL at 14:59

## 2022-09-05 RX ADMIN — Medication 100 MG: at 09:51

## 2022-09-05 RX ADMIN — ALLOPURINOL 200 MG: 100 TABLET ORAL at 09:52

## 2022-09-05 RX ADMIN — Medication 10 ML: at 09:53

## 2022-09-05 RX ADMIN — ENOXAPARIN SODIUM 40 MG: 100 INJECTION SUBCUTANEOUS at 22:05

## 2022-09-05 RX ADMIN — Medication 2 PUFF: at 09:23

## 2022-09-05 RX ADMIN — FUROSEMIDE 40 MG: 40 TABLET ORAL at 17:09

## 2022-09-05 RX ADMIN — ROSUVASTATIN CALCIUM 20 MG: 20 TABLET, FILM COATED ORAL at 22:05

## 2022-09-05 RX ADMIN — LISINOPRIL 2.5 MG: 5 TABLET ORAL at 09:51

## 2022-09-05 RX ADMIN — FUROSEMIDE 40 MG: 40 TABLET ORAL at 09:52

## 2022-09-05 RX ADMIN — Medication 10 ML: at 21:50

## 2022-09-05 ASSESSMENT — PAIN SCALES - GENERAL: PAINLEVEL_OUTOF10: 0

## 2022-09-05 NOTE — PROGRESS NOTES
[facility]    9/5/2022 7:52 AM        Name: Peter Valera . Admitted: 9/3/2022  Primary Care Provider: Dana Mason MD (Tel: [phone])      Subjective:  . Seen this am while up in the chair no family at bedside  Still frustrated with his memory loss.   Appears to have some word finding difficulty today       Reviewed interval ancillary notes    Current Medications  thiamine mononitrate tablet 100 mg, Daily  rosuvastatin (CRESTOR) tablet 20 mg, Nightly  aspirin chewable tablet 81 mg, Daily  calcium-vitamin D (OSCAL-500) 500-200 MG-UNIT per tablet 1 tablet, Daily  allopurinol (ZYLOPRIM) tablet 200 mg, Daily  celecoxib (CELEBREX) capsule 100 mg, BID  predniSONE (DELTASONE) tablet 5 mg, Daily  gabapentin (NEURONTIN) capsule 300 mg, TID  lisinopril (PRINIVIL;ZESTRIL) tablet 2.5 mg, Daily  ipratropium-albuterol (DUONEB) nebulizer solution 1 ampule, Q4H PRN  furosemide (LASIX) tablet 40 mg, BID  albuterol sulfate HFA (PROVENTIL;VENTOLIN;PROAIR) 108 (90 Base) MCG/ACT inhaler 1 puff, Q6H PRN  sodium chloride flush 0.9 % injection 5-40 mL, 2 times per day  sodium chloride flush 0.9 % injection 5-40 mL, PRN  0.9 % sodium chloride infusion, PRN  enoxaparin (LOVENOX) injection 40 mg, Nightly  ondansetron (ZOFRAN-ODT) disintegrating tablet 4 mg, Q8H PRN   Or  ondansetron (ZOFRAN) injection 4 mg, Q6H PRN  polyethylene glycol (GLYCOLAX) packet 17 g, Daily PRN  acetaminophen (TYLENOL) tablet 650 mg, Q6H PRN   Or  acetaminophen (TYLENOL) suppository 650 mg, Q6H PRN  mometasone-formoterol (DULERA) 200-5 MCG/ACT inhaler 2 puff, BID      Objective:  /75   Pulse 84   Temp 97.9 °F (36.6 °C) (Oral)   Resp 18   Ht 6' 1\" (1.854 m)   SpO2 97%   BMI 42.51 kg/m²   No intake or output data in the 24 hours ending 09/05/22 0752   Wt Readings from Last 3 Encounters:   04/11/22 (!) 322 lb 3.2 oz (146.1 kg) 11/11/21 (!) 325 lb (147.4 kg)   06/29/21 (!) 325 lb (147.4 kg)       General appearance:  Appears chronically ill. Speech clear, but occasionally has word finding issues. Alert and cooperative. Pleasant and obese   Eyes: Sclera clear. Pupils equal.  ENT: Moist oral mucosa. Trachea midline, no adenopathy. Cardiovascular: Regular rhythm, normal S1, S2. No murmur. ++ edema in lower extremities  Respiratory: Not using accessory muscles. Decreased in bases R > L rare soft expiratory wheezes noted  GI: Abdomen soft, no tenderness, not distended, normal bowel sounds  Musculoskeletal: No cyanosis in digits, neck supple  Neurology: CN 2-12 grossly intact. No speech or motor deficits  Psych: Normal affect. Alert and oriented in time, place and person  Skin: Warm, dry, normal turgor    Labs and Tests:  CBC:   Recent Labs     09/03/22  1426 09/04/22  0630 09/05/22  0557   WBC 5.5 5.4 5.2   HGB 12.5* 12.2* 12.5*    293 295       BMP:    Recent Labs     09/03/22  1426 09/04/22  0630 09/05/22  0557    142 141   K 5.0 4.5 4.7    105 103   CO2 28 27 29   BUN 35* 26* 24*   CREATININE 1.1 1.0 1.1   GLUCOSE 111* 116* 118*       Hepatic:   Recent Labs     09/03/22  1426   AST 8*   ALT <5*   BILITOT 0.4   ALKPHOS 72       BC pending     CT head:  Atrophy and small-vessel ischemic change. No hemorrhage or mass identified       Paranasal sinus disease     Chest xray:    Left basilar opacity may reflect underlying atelectasis. Correlate   clinically with signs of pneumonia      Latest Reference Range & Units Most Recent 9/4/22 16:22 9/4/22 20:05 9/5/22 07:32 9/5/22 11:29   POC Glucose 70 - 99 mg/dl 136 (H)  9/5/22 11:29 113 (H) 124 (H) 105 (H) 136 (H)   AIC 5.8 in April     Problem List  Principal Problem:    Encephalopathy  Resolved Problems:    * No resolved hospital problems. *       Assessment & Plan:   Encephalopathy:  resolving. Etiology is currently unknown. Ct with no acute findings.   MRI has been ordered. Neuro consult pending. Transient Global Amnesia vs possible CVA. On ASA and statin therapy   Chronic hypoxic respiratory failure due to mesothelioma:  at his baseline oxygen requirement,  appears stable on current inhaled therapy. Follows with Primo    Chest xray reviewed: doubt any PNA,  pro calcitonin was low. Pt drinks beer daily but etoh level was negative. No hx of withdrawal.  B12 thiamine and ammonia levels in range   STEF:  on cpap  Daughter reports increased depression since the death of his dog 2 months ago and death of his wife 2 years ago. Diet: ADULT DIET;  Regular  Code:DNR-CC  DVT PPX      Anjali Couch, APRN - CNP   9/5/2022 7:52 AM

## 2022-09-05 NOTE — PROGRESS NOTES
[facility] PROGRESS NOTE    9/6/2022 10:50 AM        Name: Tiffanie Talbert . Admitted: 9/3/2022  Primary Care Provider: Emory Covarrubias MD (Tel: [phone])      Brief History: Presented with acute confusion. Unable to recall events of the day. CT head with no acute findings. No evidence of infection. Subjective:  Presently up in bedside chair, grand daughter visiting. Patient  reports improvement in memory and now able to relate events of the day, still with some word finding difficulties. [address] daughter reports patient much improved and near baseline. Denies chest pain, shortness of breath, HA, dizziness.      Reviewed interval ancillary notes    Current Medications  perflutren lipid microspheres (DEFINITY) injection 1.65 mg, ONCE PRN  thiamine mononitrate tablet 100 mg, Daily  rosuvastatin (CRESTOR) tablet 20 mg, Nightly  aspirin chewable tablet 81 mg, Daily  calcium-vitamin D (OSCAL-500) 500-200 MG-UNIT per tablet 1 tablet, Daily  allopurinol (ZYLOPRIM) tablet 200 mg, Daily  celecoxib (CELEBREX) capsule 100 mg, BID  predniSONE (DELTASONE) tablet 5 mg, Daily  gabapentin (NEURONTIN) capsule 300 mg, TID  lisinopril (PRINIVIL;ZESTRIL) tablet 2.5 mg, Daily  ipratropium-albuterol (DUONEB) nebulizer solution 1 ampule, Q4H PRN  furosemide (LASIX) tablet 40 mg, BID  albuterol sulfate HFA (PROVENTIL;VENTOLIN;PROAIR) 108 (90 Base) MCG/ACT inhaler 1 puff, Q6H PRN  sodium chloride flush 0.9 % injection 5-40 mL, 2 times per day  sodium chloride flush 0.9 % injection 5-40 mL, PRN  0.9 % sodium chloride infusion, PRN  enoxaparin (LOVENOX) injection 40 mg, Nightly  ondansetron (ZOFRAN-ODT) disintegrating tablet 4 mg, Q8H PRN   Or  ondansetron (ZOFRAN) injection 4 mg, Q6H PRN  polyethylene glycol (GLYCOLAX) packet 17 g, Daily PRN  acetaminophen (TYLENOL) tablet 650 mg, Q6H PRN   Or  acetaminophen (TYLENOL) suppository 650 mg, Q6H PRN  mometasone-formoterol (DULERA) 200-5 MCG/ACT inhaler 2 puff, BID      Objective:  /72   Pulse 84   Temp 97.8 °F (36.6 °C) (Oral)   Resp 18   Ht 6' 1\" (1.854 m)   Wt (!) 317 lb (143.8 kg)   SpO2 96%   BMI 41.82 kg/m²   No intake or output data in the 24 hours ending 09/05/22 1617   Wt Readings from Last 3 Encounters:   09/06/22 (!) 317 lb (143.8 kg)   04/11/22 (!) 322 lb 3.2 oz (146.1 kg)   11/11/21 (!) 325 lb (147.4 kg)       General appearance:  Appears comfortable  Eyes: Sclera clear. Pupils equal.  ENT: Moist oral mucosa. Trachea midline, no adenopathy. Cardiovascular: Regular rhythm, normal S1, S2. No murmur. 2+ edema in lower extremities  Respiratory: Not using accessory muscles. Good inspiratory effort. Diminished in bases, no wheeze or crackles. GI: Abdomen soft, no tenderness, not distended, normal bowel sounds  Musculoskeletal: No cyanosis in digits, neck supple  Neurology: CN 2-12 grossly intact. No speech or motor deficits  Psych: Normal affect. Alert and oriented in time, place and person  Skin: Warm, dry, normal turgor    Labs and Tests:  CBC:   Recent Labs     09/04/22  0630 09/05/22  0557 09/06/22  0532   WBC 5.4 5.2 6.2   HGB 12.2* 12.5* 12.5*    295 299     BMP:    Recent Labs     09/04/22  0630 09/05/22  0557 09/06/22  0532    141 143   K 4.5 4.7 5.1    103 105   CO2 27 29 29   BUN 26* 24* 32*   CREATININE 1.0 1.1 1.2   GLUCOSE 116* 118* 129*     Hepatic:   Recent Labs     09/03/22  1426   AST 8*   ALT <5*   BILITOT 0.4   ALKPHOS 65     CXR 9/3/2022:  Left basilar opacity may reflect underlying atelectasis. Correlate   clinically with signs of pneumonia. CT Head 9/3/2022: Atrophy and small-vessel ischemic change. No hemorrhage or mass identified       Paranasal sinus disease     Carotid doppler 9/6/2022:  Summary        No hemodynamically significant stenosis noted in the internal carotid artery    bilaterally.      Echo 9/6/2022:  Summary   A bubble study was performed and shows evidence of a trivial amount of right to left shunting consistent with a tiny patent foramen ovale or atrial septal defect. Irregular rhythm. Left ventricular systolic function is normal with ejection fraction   estimated at 55 %. No regional wall motion abnormalities are noted. Normal left ventricular wall thickness. Diastolic dysfunction grade and filing pressure are indeterminate. The right ventricle is mildly enlarged. The right atrium is mildly dilated. The aortic root is moderately dilated. The ascending aorta is moderately dilated. Mild mitral regurgitation. Problem List  Principal Problem:    Encephalopathy  Resolved Problems:    * No resolved hospital problems. *       Assessment & Plan:   Encephalopathy. Improved. Etiology is currently unknown. CT head with no acute findings. Carotid doppler with no significant stenosis. Ammonia level 11, vitamin B12 & folate WNL, TSH 3.18. Urine drug screen negative, ETOH negative. Neuro evaluated, suspect possible transient global amnesia vs CVA. MRI brain pending. Continue asa and statin therapy   Chronic hypoxic respiratory failure. Hx very severe COPD, on chronic prednisone therapy. CXR with left basilar opacity which may reflect underlying atelectasis. Low suspicion for pneumonia as patient afebrile, no leukocytosis, procalcitonin only 0.06.  Currently at his baseline oxygen requirement, 3 liters. Nursing reports O2 sats drop into 70s. Respiratory to perform desaturation screen. Check desaturations. Possibly contributing factor to confusion. Follows with Primo. Continue Dulera. DM2. A1c 5.6. Takes metformin at home, held on admission. BG values controlled. HTN. Controlled. Continue lisinopril. ETOH use. Pt drinks beer daily, etoh level was negative. No hx of withdrawal.  B12, thiamine and ammonia levels in range. STEF. Continue CPAP. Disposition: Anticipate home on DC, awaiting MRI. Consult PT/OT. Diet: ADULT DIET;  Regular  Code:DNR-CC  DVT PPX: enoxaparin      THU Russo CNP   9/6/2022 10:51 AM

## 2022-09-05 NOTE — CONSULTS
In patient Neurology consult        [facility] Neurology      Napoleon Mayer MD      William Stephennoam  1945    Date of Service: 9/5/2022    Referring Physician: Annalee Cedeno MD      Reason for the consult and CC: Acute amnesia and acute confusion    HPI:   The patient is a 78y.o.  years old male with history of diabetes, hypertension and COPD who was admitted to the hospital on 9-3-2022 with acute confusion. Symptoms started hours prior to admission. Description difficulties with the following direction and confusion. He cannot recall the events from the same day. Degree was severe. Duration was hours. No other relieving or aggravating factors. He could not remember his way to the hospital.  No headache or dysphagia or dysarthria. No chest pain. No visual changes or falling or injury or passing out. By the time he came to the emergency room, symptoms improved gradually. Initial work-up with CT head showed no acute findings. He was admitted to the hospital.  Today he denies any residual deficit. He feels back to his baseline but comes frustrated about his lack of memory. No prior history of similar illness. Other review of system was unremarkable.       Family History   Problem Relation Age of Onset    High Blood Pressure Mother     High Cholesterol Mother     Heart Disease Mother     Stroke Mother     Diabetes Mother     Depression Mother     Mental Illness Mother     Cancer Father      Past Surgical History:   Procedure Laterality Date    APPENDECTOMY      COLONOSCOPY N/A 5/7/2019    COLONOSCOPY DIAGNOSTIC performed by Elva Herrera MD at [facility]      bilateral        Past Medical History:   Diagnosis Date    Anemia     COPD (chronic obstructive pulmonary disease) ([address]     Diabetes mellitus ([address]     Edema     GI (gastrointestinal bleed)     Gout     Hypertension     Morbid obesity ([address]     Neuropathy     Obstructive sleep apnea     uses CPAP Peripheral vascular disease [address]      Social History     Tobacco Use    Smoking status: Former     Packs/day: 1.00     Years: 50.00     Pack years: 50.00     Types: Cigarettes     Quit date: 2/1/2012     Years since quitting: 10.6    Smokeless tobacco: Never    Tobacco comments:     H.O. smoking 1 p.p.d. Quit 9 months ago    Vaping Use    Vaping Use: Never used   Substance Use Topics    Alcohol use:  Yes     Alcohol/week: 14.0 standard drinks     Types: 14 Shots of liquor per week     Comment: 3 drinks a evening    Drug use: Never     No Known Allergies  Current Facility-Administered Medications   Medication Dose Route Frequency Provider Last Rate Last Admin    thiamine mononitrate tablet 100 mg  100 mg Oral Daily Kacey Woods MD   100 mg at 09/05/22 0951    rosuvastatin (CRESTOR) tablet 20 mg  20 mg Oral Nightly J Carlos Morales MD   20 mg at 09/04/22 2130    aspirin chewable tablet 81 mg  81 mg Oral Daily J Carlos Morales MD   81 mg at 09/05/22 7096    calcium-vitamin D (OSCAL-500) 500-200 MG-UNIT per tablet 1 tablet  1 tablet Oral Daily J Carlos Morales MD   1 tablet at 09/04/22 1207    allopurinol (ZYLOPRIM) tablet 200 mg  200 mg Oral Daily THU Simpson - CNP   200 mg at 09/05/22 0876    celecoxib (CELEBREX) capsule 100 mg  100 mg Oral BID Anjali Couch APRN - CNP   100 mg at 09/05/22 0956    predniSONE (DELTASONE) tablet 5 mg  5 mg Oral Daily THU Simpson - CNP   5 mg at 09/05/22 0951    gabapentin (NEURONTIN) capsule 300 mg  300 mg Oral TID THU Simpson - CNP   300 mg at 09/05/22 0950    lisinopril (PRINIVIL;ZESTRIL) tablet 2.5 mg  2.5 mg Oral Daily J Carlos Morales MD   2.5 mg at 09/05/22 0951    ipratropium-albuterol (DUONEB) nebulizer solution 1 ampule  1 ampule Inhalation Q4H PRN J Carlos Morales MD   1 ampule at 09/03/22 2323    furosemide (LASIX) tablet 40 mg  40 mg Oral BID J Carlos Morales MD   40 mg at 09/05/22 0957    albuterol sulfate HFA (PROVENTIL;VENTOLIN;PROAIR) 108 (90 Base) MCG/ACT inhaler 1 puff  1 puff Inhalation Q6H PRN Kacey Woods MD        sodium chloride flush 0.9 % injection 5-40 mL  5-40 mL IntraVENous 2 times per day Judie Galloway MD   10 mL at 09/05/22 0953    sodium chloride flush 0.9 % injection 5-40 mL  5-40 mL IntraVENous PRN Judie Galloway MD        0.9 % sodium chloride infusion   IntraVENous PRN Judie Galloway MD        enoxaparin (LOVENOX) injection 40 mg  40 mg SubCUTAneous Nightly Judie Galloway MD   40 mg at 09/04/22 2130    ondansetron (ZOFRAN-ODT) disintegrating tablet 4 mg  4 mg Oral Q8H PRN Judie Galloway MD        Or    ondansetron (ZOFRAN) injection 4 mg  4 mg IntraVENous Q6H PRN Judie Galloway MD        polyethylene glycol (GLYCOLAX) packet 17 g  17 g Oral Daily PRN Judie Galloway MD        acetaminophen (TYLENOL) tablet 650 mg  650 mg Oral Q6H PRN Judie Galloway MD   650 mg at 09/04/22 1003    Or    acetaminophen (TYLENOL) suppository 650 mg  650 mg Rectal Q6H PRN Judie Galloway MD        mometasone-formoterol (DULERA) 200-5 MCG/ACT inhaler 2 puff  2 puff Inhalation BID Judie Galloway MD   2 puff at 09/05/22 0923       ROS : A 10-14 system review of constitutional, cardiovascular, respiratory, eyes, musculoskeletal, endocrine, GI, ENT, skin, hematological, genitourinary, psychiatric and neurologic systems was obtained and updated today and is unremarkable except as mentioned in my HPI      Exam:     Constitutional:   Vitals:    09/05/22 0402 09/05/22 0715 09/05/22 0927 09/05/22 1137   BP:  126/75  112/63   Pulse:  84 93 95   Resp:  18 18 18   Temp:  97.9 °F (36.6 °C)  97.7 °F (36.5 °C)   TempSrc:  Oral  Oral   SpO2: 96% 97% 93% 95%   Height:           General appearance and observation: Normal development and appear in no acute distress. Eye:  Fundus: No blurring of optic disc. Neck: supple  Cardiovascular: +2  lower leg edema with good pulsation. Mental Status:   Oriented to person, place, problem, and time. Memory: poor immediate recall. Intact remote memory  Normal attention span and concentration. Language: intact naming, repeating and fluency   Poor fund of Knowledge. unaware of current events and vocabulary   Cranial Nerves:   II: Visual fields: Full. Pupils: equal, round, reactive to light  III,IV,VI: Extra Ocular Movements are intact. No nystagmus  V: Facial sensation is intact  VII: Facial strength and movements: intact and symmetric  VIII: Hearing: Intact  IX: Palate elevation is symmetric  XI: Shoulder shrug is intact  XII: Tongue movements are normal  Musculoskeletal: 5/5 in upper extremity. Chronic lower extremity weakness 4/5 per patient. .     Tone: Normal tone. Reflexes: Symmetric 2+ in the arms and 1+ in the legs   Planters: flexor bilaterally. Coordination: no pronator drift, no dysmetria with FNF in upper extremities. Normal REM. Sensation: normal to all modalities in both arms and legs. Gait/Posture: steady gait and normal posturing and station.      Data:  LABS:   Lab Results   Component Value Date/Time     09/05/2022 05:57 AM    K 4.7 09/05/2022 05:57 AM    K 5.0 09/03/2022 02:26 PM     09/05/2022 05:57 AM    CO2 29 09/05/2022 05:57 AM    BUN 24 09/05/2022 05:57 AM    CREATININE 1.1 09/05/2022 05:57 AM    GFRAA >60 09/05/2022 05:57 AM    GFRAA >60 04/17/2013 08:55 AM    LABGLOM >60 09/05/2022 05:57 AM    GLUCOSE 118 09/05/2022 05:57 AM    PHOS 3.6 04/21/2020 04:09 PM    MG 2.0 02/17/2010 02:05 PM    CALCIUM 9.8 09/05/2022 05:57 AM     Lab Results   Component Value Date/Time    WBC 5.2 09/05/2022 05:57 AM    RBC 3.68 09/05/2022 05:57 AM    HGB 12.5 09/05/2022 05:57 AM    HCT 38.8 09/05/2022 05:57 AM    .4 09/05/2022 05:57 AM    RDW 14.6 09/05/2022 05:57 AM     09/05/2022 05:57 AM   No results found for: INR, PROTIME    Neuroimaging was independently reviewed by myself and discussed results with the patient  Reviewed notes from different physicians  Reviewed lab and blood testing    Impression:  Acute encephalopathy and amnesia. Possible transient global amnesia versus CVA  Hypertension  Hyperlipidemia  Diabetes  COPD      Recommendation:  MRI brain  Carotid Doppler  Echo  PT and OT  Speech evaluation  Telemetry  DVT and GI prophylaxis  Aspirin  Statin  Continue blood pressure control on current medications  Avoid blood pressure below 120-80  Stroke education and prevention discussed  A1c   Insulin sliding scale  Blood sugar control  lipid panel  Respiratory support  DC planning after the above work-up        Thank you for referring such patient. If you have any questions regarding my consult note, please don't hesitate to call me. May Griffin MD  [phone]    This dictation was generated by voice recognition computer software.  Although all attempts are made to edit the dictation for accuracy, there may be errors in the  transcription that are not intended

## 2022-09-06 ENCOUNTER — APPOINTMENT (OUTPATIENT)
Dept: MRI IMAGING | Age: 77
DRG: 064 | End: 2022-09-06
Payer: MEDICARE

## 2022-09-06 LAB
ANION GAP SERPL CALCULATED.3IONS-SCNC: 9 MMOL/L (ref 3–16)
BUN BLDV-MCNC: 32 MG/DL (ref 7–20)
CALCIUM SERPL-MCNC: 9.2 MG/DL (ref 8.3–10.6)
CHLORIDE BLD-SCNC: 105 MMOL/L (ref 99–110)
CHOLESTEROL, TOTAL: 121 MG/DL (ref 0–199)
CO2: 29 MMOL/L (ref 21–32)
CREAT SERPL-MCNC: 1.2 MG/DL (ref 0.8–1.3)
ESTIMATED AVERAGE GLUCOSE: 114 MG/DL
GFR AFRICAN AMERICAN: >60
GFR NON-AFRICAN AMERICAN: 59
GLUCOSE BLD-MCNC: 105 MG/DL (ref 70–99)
GLUCOSE BLD-MCNC: 129 MG/DL (ref 70–99)
GLUCOSE BLD-MCNC: 98 MG/DL (ref 70–99)
HBA1C MFR BLD: 5.6 %
HCT VFR BLD CALC: 39.4 % (ref 40.5–52.5)
HDLC SERPL-MCNC: 62 MG/DL (ref 40–60)
HEMOGLOBIN: 12.5 G/DL (ref 13.5–17.5)
LDL CHOLESTEROL CALCULATED: 43 MG/DL
LV EF: 55 %
LVEF MODALITY: NORMAL
MCH RBC QN AUTO: 33.8 PG (ref 26–34)
MCHC RBC AUTO-ENTMCNC: 31.8 G/DL (ref 31–36)
MCV RBC AUTO: 106.6 FL (ref 80–100)
PDW BLD-RTO: 14.8 % (ref 12.4–15.4)
PERFORMED ON: ABNORMAL
PERFORMED ON: NORMAL
PLATELET # BLD: 299 K/UL (ref 135–450)
PMV BLD AUTO: 6.6 FL (ref 5–10.5)
POTASSIUM SERPL-SCNC: 5.1 MMOL/L (ref 3.5–5.1)
RBC # BLD: 3.7 M/UL (ref 4.2–5.9)
SODIUM BLD-SCNC: 143 MMOL/L (ref 136–145)
TRIGL SERPL-MCNC: 81 MG/DL (ref 0–150)
VLDLC SERPL CALC-MCNC: 16 MG/DL
WBC # BLD: 6.2 K/UL (ref 4–11)

## 2022-09-06 PROCEDURE — 80048 BASIC METABOLIC PNL TOTAL CA: CPT

## 2022-09-06 PROCEDURE — 85027 COMPLETE CBC AUTOMATED: CPT

## 2022-09-06 PROCEDURE — 2580000003 HC RX 258: Performed by: FAMILY MEDICINE

## 2022-09-06 PROCEDURE — 94660 CPAP INITIATION&MGMT: CPT

## 2022-09-06 PROCEDURE — 83036 HEMOGLOBIN GLYCOSYLATED A1C: CPT

## 2022-09-06 PROCEDURE — 2060000000 HC ICU INTERMEDIATE R&B

## 2022-09-06 PROCEDURE — C8929 TTE W OR WO FOL WCON,DOPPLER: HCPCS

## 2022-09-06 PROCEDURE — 93880 EXTRACRANIAL BILAT STUDY: CPT

## 2022-09-06 PROCEDURE — 6370000000 HC RX 637 (ALT 250 FOR IP): Performed by: NURSE PRACTITIONER

## 2022-09-06 PROCEDURE — 70551 MRI BRAIN STEM W/O DYE: CPT

## 2022-09-06 PROCEDURE — 80061 LIPID PANEL: CPT

## 2022-09-06 PROCEDURE — 92526 ORAL FUNCTION THERAPY: CPT

## 2022-09-06 PROCEDURE — 94640 AIRWAY INHALATION TREATMENT: CPT

## 2022-09-06 PROCEDURE — 6360000002 HC RX W HCPCS: Performed by: FAMILY MEDICINE

## 2022-09-06 PROCEDURE — 94761 N-INVAS EAR/PLS OXIMETRY MLT: CPT

## 2022-09-06 PROCEDURE — 92507 TX SP LANG VOICE COMM INDIV: CPT

## 2022-09-06 PROCEDURE — 2700000000 HC OXYGEN THERAPY PER DAY

## 2022-09-06 PROCEDURE — 94680 O2 UPTK RST&XERS DIR SIMPLE: CPT

## 2022-09-06 PROCEDURE — 6370000000 HC RX 637 (ALT 250 FOR IP): Performed by: FAMILY MEDICINE

## 2022-09-06 PROCEDURE — 6360000004 HC RX CONTRAST MEDICATION: Performed by: NURSE PRACTITIONER

## 2022-09-06 PROCEDURE — 36415 COLL VENOUS BLD VENIPUNCTURE: CPT

## 2022-09-06 RX ADMIN — CELECOXIB 100 MG: 100 CAPSULE ORAL at 20:50

## 2022-09-06 RX ADMIN — LISINOPRIL 2.5 MG: 5 TABLET ORAL at 09:55

## 2022-09-06 RX ADMIN — ROSUVASTATIN CALCIUM 20 MG: 20 TABLET, FILM COATED ORAL at 20:50

## 2022-09-06 RX ADMIN — ALLOPURINOL 200 MG: 100 TABLET ORAL at 09:58

## 2022-09-06 RX ADMIN — Medication 2 PUFF: at 20:57

## 2022-09-06 RX ADMIN — Medication 10 ML: at 20:50

## 2022-09-06 RX ADMIN — PERFLUTREN 1.65 MG: 6.52 INJECTION, SUSPENSION INTRAVENOUS at 11:37

## 2022-09-06 RX ADMIN — GABAPENTIN 300 MG: 300 CAPSULE ORAL at 09:57

## 2022-09-06 RX ADMIN — Medication 10 ML: at 09:59

## 2022-09-06 RX ADMIN — Medication 100 MG: at 09:58

## 2022-09-06 RX ADMIN — PREDNISONE 5 MG: 5 TABLET ORAL at 09:59

## 2022-09-06 RX ADMIN — ENOXAPARIN SODIUM 40 MG: 100 INJECTION SUBCUTANEOUS at 20:50

## 2022-09-06 RX ADMIN — FUROSEMIDE 40 MG: 40 TABLET ORAL at 18:13

## 2022-09-06 RX ADMIN — FUROSEMIDE 40 MG: 40 TABLET ORAL at 09:57

## 2022-09-06 RX ADMIN — ASPIRIN 81 MG 81 MG: 81 TABLET ORAL at 09:59

## 2022-09-06 RX ADMIN — GABAPENTIN 300 MG: 300 CAPSULE ORAL at 20:50

## 2022-09-06 RX ADMIN — GABAPENTIN 300 MG: 300 CAPSULE ORAL at 15:53

## 2022-09-06 RX ADMIN — CELECOXIB 100 MG: 100 CAPSULE ORAL at 09:59

## 2022-09-06 ASSESSMENT — PAIN SCALES - GENERAL
PAINLEVEL_OUTOF10: 0

## 2022-09-06 NOTE — PROGRESS NOTES
PT requesting washcloth to wash his face, and warm water suggestions to shave his face. Advised pt that hot water will more typically come from the sink closest to the hallway, and pt was given soap and washcloth.

## 2022-09-06 NOTE — PLAN OF CARE
Problem: Discharge Planning  Goal: Discharge to home or other facility with appropriate resources  Outcome: Progressing     Problem: Safety - Adult  Goal: Free from fall injury  Outcome: Progressing     Problem: Neurosensory - Adult  Goal: Achieves maximal functionality and self care  Outcome: Progressing     Problem: Respiratory - Adult  Goal: Achieves optimal ventilation and oxygenation  Outcome: Progressing     Problem: Cardiovascular - Adult  Goal: Maintains optimal cardiac output and hemodynamic stability  Outcome: Progressing  Goal: Absence of cardiac dysrhythmias or at baseline  Outcome: Progressing     Problem: Skin/Tissue Integrity - Adult  Goal: Skin integrity remains intact  Outcome: Progressing     Problem: Musculoskeletal - Adult  Goal: Return mobility to safest level of function  Outcome: Progressing     Problem: Gastrointestinal - Adult  Goal: Minimal or absence of nausea and vomiting  Outcome: Progressing  Note: Pt has no nausea or vomiting this shift

## 2022-09-06 NOTE — PROGRESS NOTES
Facility/Department: [address]  Speech Language Pathology   Dysphagia and Speech Language/Cognitive Treatment Note    Patient: Edin Caro   : 1945   MRN: [id number]      Evaluation Date: 2022      Admitting Dx: Metabolic encephalopathy [Z43.38]  Encephalopathy [G93.40]  Altered mental status, unspecified altered mental status type [R41.82]  Treatment Diagnosis: Expressive Aphasia , Receptive Aphasia , Cognitive-Linguistic Deficits , Oropharyngeal Dysphagia   Pain: Denies                                                Subjective:  Pt alert and receptive, step daughter present in room. Pt and step daughter reports significant improvement in communication/cognition since admission on Saturday. Deny difficulty with swallowing. Dysphagia Treatment:   Diet and Treatment Recommendations 2022:  Diet Solids Recommendation:  Regular texture diet  Liquid Consistency Recommendation: Thin liquids  Recommended form of Meds: Whole with water or Meds in puree      Compensatory strategies: Alternate solids/liquids , Upright as possible with all PO intake , Small bites/sips , Eat/feed slowly, Remain upright 30-45 min     Assessment of Texture Tolerance:  Diet level prior to treatment: Regular texture diet , Thin liquids   Tolerance of Current Diet Level:Per chart, no noted difficulty with current diet level      -Impressions: Pt was positioned Upright in chair, awake and alert. Currently on  3L O2 via nasal cannula . Trials of thin liquids, soft solids, and regular solids  were provided to assess swallow function. SLP provided set up assistance so that pt could self feed. Oral phase characterized by adequate oral acceptance, functional oral manipulation and A-P transit. No significant oral residue, no anterior spillage. No overt clinical s/s aspiration across PO intake. Pt demonstrates increased risk for aspiration due to cognitive state  and co morbidities .  Based on today's assessment recommend continuation of regular diet/thin liquids with use of compensatory swallow strategies and ongoing intervention for diet tolerance. Dysphagia Goals:  Pt will functionally tolerate recommended diet with no overt clinical s/s of aspiration (Ongoing 09/06/22)  NEW GOALS:  Pt will demonstrate understanding of aspiration risk and precautions via education/demonstration with occasional prompting (Ongoing 09/06/22)  If clinical s/s of aspiration/penetration continue to be noted, Pt will participate in Modified Barium Swallow Study (Ongoing 09/06/22)      Speech Language/Cognitive Treatment:  Impressions: This date pt demonstrating improvements in expressive language, good participation in simple conversational speech. During discourse, pt with word-finding difficulties and intermittent paraphasias (both phonemic and semantic), reduced topic maintenance and thought organization noted. Paraphasias also present during speech repetition, pt with reduced awareness of errors. Pt answered basic and biographical questions accurately but benefited from repetition of verbal directions. Increased cueing for complex questions and multi-step directions, with increased paraphasias noted as language became more complex. Question some impact of hearing impairment on auditory comprehension, step daughter reports noticing some hearing difficulty prior to admission. Pt oriented to person, place, and situation, independently used phone to verbalize date. Reduced immediate recall of new information; following verbal presentation of simple story, pt with recall of 0/4 details. Although expression is more fluent this date, pt continues to present with deficits in expressive language, receptive language, and cognitive-linguistic skills. Continue ongoing skilled intervention.      Speech Language Short Term Goals:  Pt will improve auditory processing/comprehension of commands and questions to 80%, via graded tasks (Ongoing 09/06/22)  Pt will improve verbal expression for functional expression via graded tasks to 80% (Ongoing 09/06/22)  Pt will participate in ongoing cognitive assessment with goals to be established as indicated (Ongoing 09/06/22)      Assessment: Patient progressing toward goals    Plan: 3-5 times per week during acute care stay for speech language cognition; 1-2 times to ensure diet tolerance    Patient/Family Education:  Provided education regarding role of SLP, recommendations and general speech pathology plan of care. [x] Pt verbalized understanding and agreement   [x] Pt requires ongoing learning   [] No evidence of comprehension     Discharge Recommendations:  Recommend ongoing SLP for speech therapy upon discharge from the hospital     Treatment time  Timed Code Treatment Minutes: 0  Total Treatment time: 37    If patient discharges prior to next session this note will serve as a discharge summary.       Signature:   Marianne Vazquez, [id number] [facility]  Speech-Language Pathologist  [place]. [id number]

## 2022-09-06 NOTE — PROGRESS NOTES
Pt up to bed, cpap applied and started. Explained bed alarm to pt and turned alarm on.  Pt resting quietly

## 2022-09-07 ENCOUNTER — APPOINTMENT (OUTPATIENT)
Dept: GENERAL RADIOLOGY | Age: 77
DRG: 064 | End: 2022-09-07
Payer: MEDICARE

## 2022-09-07 PROBLEM — I50.32 CHRONIC HEART FAILURE WITH PRESERVED EJECTION FRACTION (HCC): Status: ACTIVE | Noted: 2022-09-07

## 2022-09-07 PROBLEM — J96.22 ACUTE ON CHRONIC RESPIRATORY FAILURE WITH HYPOXIA AND HYPERCAPNIA (HCC): Status: ACTIVE | Noted: 2022-09-07

## 2022-09-07 PROBLEM — I49.3 VENTRICULAR ECTOPY: Status: ACTIVE | Noted: 2022-09-07

## 2022-09-07 PROBLEM — R41.82 ALTERED MENTAL STATUS: Status: ACTIVE | Noted: 2022-09-07

## 2022-09-07 PROBLEM — J96.21 ACUTE ON CHRONIC RESPIRATORY FAILURE WITH HYPOXIA AND HYPERCAPNIA (HCC): Status: ACTIVE | Noted: 2022-09-07

## 2022-09-07 PROBLEM — I63.312 CEREBROVASCULAR ACCIDENT (CVA) DUE TO THROMBOSIS OF LEFT MIDDLE CEREBRAL ARTERY (HCC): Status: ACTIVE | Noted: 2022-09-07

## 2022-09-07 PROBLEM — I63.9 ACUTE ISCHEMIC STROKE (HCC): Status: ACTIVE | Noted: 2022-09-07

## 2022-09-07 PROBLEM — M79.89 LEG SWELLING: Status: ACTIVE | Noted: 2022-09-07

## 2022-09-07 LAB
BASE EXCESS ARTERIAL: 4 MMOL/L (ref -3–3)
BLOOD CULTURE, ROUTINE: NORMAL
CARBOXYHEMOGLOBIN ARTERIAL: 0.8 % (ref 0–1.5)
CULTURE, BLOOD 2: NORMAL
GLUCOSE BLD-MCNC: 128 MG/DL (ref 70–99)
GLUCOSE BLD-MCNC: 133 MG/DL (ref 70–99)
GLUCOSE BLD-MCNC: 89 MG/DL (ref 70–99)
HCO3 ARTERIAL: 31.3 MMOL/L (ref 21–29)
HEMOGLOBIN, ART, EXTENDED: 12.2 G/DL (ref 13.5–17.5)
METHEMOGLOBIN ARTERIAL: 0.2 %
O2 SAT, ARTERIAL: 93.7 %
O2 THERAPY: ABNORMAL
PCO2 ARTERIAL: 58.4 MMHG (ref 35–45)
PERFORMED ON: ABNORMAL
PERFORMED ON: ABNORMAL
PERFORMED ON: NORMAL
PH ARTERIAL: 7.34 (ref 7.35–7.45)
PO2 ARTERIAL: 73.3 MMHG (ref 75–108)
TCO2 ARTERIAL: 74.1 MMOL/L

## 2022-09-07 PROCEDURE — 97535 SELF CARE MNGMENT TRAINING: CPT

## 2022-09-07 PROCEDURE — 6370000000 HC RX 637 (ALT 250 FOR IP): Performed by: INTERNAL MEDICINE

## 2022-09-07 PROCEDURE — 97116 GAIT TRAINING THERAPY: CPT

## 2022-09-07 PROCEDURE — 71045 X-RAY EXAM CHEST 1 VIEW: CPT

## 2022-09-07 PROCEDURE — 6360000002 HC RX W HCPCS: Performed by: FAMILY MEDICINE

## 2022-09-07 PROCEDURE — 2060000000 HC ICU INTERMEDIATE R&B

## 2022-09-07 PROCEDURE — 97530 THERAPEUTIC ACTIVITIES: CPT

## 2022-09-07 PROCEDURE — 99223 1ST HOSP IP/OBS HIGH 75: CPT | Performed by: INTERNAL MEDICINE

## 2022-09-07 PROCEDURE — 92507 TX SP LANG VOICE COMM INDIV: CPT

## 2022-09-07 PROCEDURE — 94640 AIRWAY INHALATION TREATMENT: CPT

## 2022-09-07 PROCEDURE — 94761 N-INVAS EAR/PLS OXIMETRY MLT: CPT

## 2022-09-07 PROCEDURE — 97166 OT EVAL MOD COMPLEX 45 MIN: CPT

## 2022-09-07 PROCEDURE — 99233 SBSQ HOSP IP/OBS HIGH 50: CPT | Performed by: PSYCHIATRY & NEUROLOGY

## 2022-09-07 PROCEDURE — 94660 CPAP INITIATION&MGMT: CPT

## 2022-09-07 PROCEDURE — 97161 PT EVAL LOW COMPLEX 20 MIN: CPT

## 2022-09-07 PROCEDURE — 2580000003 HC RX 258: Performed by: FAMILY MEDICINE

## 2022-09-07 PROCEDURE — 6370000000 HC RX 637 (ALT 250 FOR IP): Performed by: FAMILY MEDICINE

## 2022-09-07 PROCEDURE — 2700000000 HC OXYGEN THERAPY PER DAY

## 2022-09-07 PROCEDURE — 92526 ORAL FUNCTION THERAPY: CPT

## 2022-09-07 PROCEDURE — 36600 WITHDRAWAL OF ARTERIAL BLOOD: CPT

## 2022-09-07 PROCEDURE — 6370000000 HC RX 637 (ALT 250 FOR IP): Performed by: NURSE PRACTITIONER

## 2022-09-07 PROCEDURE — 82803 BLOOD GASES ANY COMBINATION: CPT

## 2022-09-07 RX ORDER — FUROSEMIDE 10 MG/ML
80 INJECTION INTRAMUSCULAR; INTRAVENOUS DAILY
Status: DISCONTINUED | OUTPATIENT
Start: 2022-09-08 | End: 2022-09-09 | Stop reason: HOSPADM

## 2022-09-07 RX ORDER — METOPROLOL SUCCINATE 25 MG/1
12.5 TABLET, EXTENDED RELEASE ORAL DAILY
Status: DISCONTINUED | OUTPATIENT
Start: 2022-09-07 | End: 2022-09-09 | Stop reason: HOSPADM

## 2022-09-07 RX ADMIN — Medication 100 MG: at 08:24

## 2022-09-07 RX ADMIN — LISINOPRIL 2.5 MG: 5 TABLET ORAL at 08:11

## 2022-09-07 RX ADMIN — Medication 10 ML: at 22:47

## 2022-09-07 RX ADMIN — GABAPENTIN 300 MG: 300 CAPSULE ORAL at 08:11

## 2022-09-07 RX ADMIN — CELECOXIB 100 MG: 100 CAPSULE ORAL at 08:24

## 2022-09-07 RX ADMIN — ALLOPURINOL 200 MG: 100 TABLET ORAL at 08:11

## 2022-09-07 RX ADMIN — FUROSEMIDE 40 MG: 40 TABLET ORAL at 08:12

## 2022-09-07 RX ADMIN — FUROSEMIDE 40 MG: 40 TABLET ORAL at 18:25

## 2022-09-07 RX ADMIN — GABAPENTIN 300 MG: 300 CAPSULE ORAL at 22:47

## 2022-09-07 RX ADMIN — Medication 2 PUFF: at 19:42

## 2022-09-07 RX ADMIN — ENOXAPARIN SODIUM 40 MG: 100 INJECTION SUBCUTANEOUS at 22:47

## 2022-09-07 RX ADMIN — Medication 2 PUFF: at 09:23

## 2022-09-07 RX ADMIN — ASPIRIN 81 MG 81 MG: 81 TABLET ORAL at 08:11

## 2022-09-07 RX ADMIN — ROSUVASTATIN CALCIUM 20 MG: 20 TABLET, FILM COATED ORAL at 22:47

## 2022-09-07 RX ADMIN — GABAPENTIN 300 MG: 300 CAPSULE ORAL at 16:45

## 2022-09-07 RX ADMIN — Medication 10 ML: at 08:13

## 2022-09-07 RX ADMIN — PREDNISONE 5 MG: 5 TABLET ORAL at 08:12

## 2022-09-07 RX ADMIN — METOPROLOL SUCCINATE 12.5 MG: 25 TABLET, EXTENDED RELEASE ORAL at 22:46

## 2022-09-07 ASSESSMENT — PAIN SCALES - GENERAL
PAINLEVEL_OUTOF10: 0

## 2022-09-07 NOTE — PROGRESS NOTES
Facility/Department: [address]  Speech Language Pathology   Dysphagia and Speech Language/Cognitive Treatment Note    Patient: Maria Del Carmen Peterson   : 1945   MRN: [id number]      Evaluation Date: 2022      Admitting Dx: Metabolic encephalopathy [D79.00]  Encephalopathy [G93.40]  Altered mental status, unspecified altered mental status type [R41.82]  Treatment Diagnosis: Expressive Aphasia , Receptive Aphasia , Cognitive-Linguistic Deficits , Oropharyngeal Dysphagia   Pain: Denies                                                Subjective:  Pt alert and receptive to intervention this date, sitting upright in chair at bedside. MRI Brain: 2022  Impression   1. 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 Sulcal effacement in this region of infarct without mass   effect or midline shift. 2. Otherwise, no acute intracranial abnormality. 3. Minimal global parenchymal volume loss with minimal chronic microvascular   ischemic changes. These results were sent to the [employer] [address] ([address] [place] [address]) on 2022   at 7:38 pm to be communicated to the referring/covering health care   provider/office. Dysphagia Treatment:   Diet and Treatment Recommendations 2022:  Diet Solids Recommendation:  Regular texture diet  Liquid Consistency Recommendation: Thin liquids  Recommended form of Meds: Whole with water or Meds in puree      Compensatory strategies: Alternate solids/liquids , Upright as possible with all PO intake , Small bites/sips , Eat/feed slowly, Remain upright 30-45 min     Assessment of Texture Tolerance:  Diet level prior to treatment: Regular texture diet , Thin liquids   Tolerance of Current Diet Level:Per chart, no noted difficulty with current diet level      -Impressions: Pt was positioned Upright in chair, awake and alert. Currently on  3L O2 via nasal cannula .  Trials of thin liquids and regular solids  were provided to assess swallow function, pt independently fed himself. Pt agreeable to limited intake this session. Oral phase characterized by adequate oral acceptance, functional oral manipulation and A-P transit. No significant oral residue, no anterior spillage. No overt clinical s/s aspiration across PO intake. Pt demonstrates increased risk for aspiration due to cognitive state , co morbidities , and new CVA . Based on today's assessment recommend continuation of regular diet/thin liquids with aspiration precautions and use of compensatory swallow strategies. Dysphagia Goals:  Pt will functionally tolerate recommended diet with no overt clinical s/s of aspiration (Ongoing 09/07/22)  Pt will demonstrate understanding of aspiration risk and precautions via education/demonstration with occasional prompting (Ongoing 09/07/22)  If clinical s/s of aspiration/penetration continue to be noted, Pt will participate in Modified Barium Swallow Study (Ongoing 09/07/22)      Speech Language/Cognitive Treatment:  Impressions: This date session focused on functional multimodality expression. Confrontational naming with 83% accuracy (5/6), intermittent phonemic paraphasias noted. Divergent concrete naming was accurate with min-mod cues for comprehension of task, pt verbose and with poor topic maintenance during task and benefited from frequent redirection. Pt participated in structured descriptive discourse, pt with appropriate fluency but continued word-finding difficulties and intermittent paraphasias (both phonemic and semantic). Written expression trialed, pt with adequate legibility but with impaired spelling and reduced attention for structured writing. In verbal reasoning, pt verbalized problems presented visually but with reduced expression for solving the problems.  Continue to question some impact of hearing impairment on auditory comprehension, although pt required less cues/verbal repetitions this date during structured tasks. Pt appeared to benefit from environmental modifications to minimize distractions and loud, slow speech. Pt oriented to person, place, situation, and date. Although expression is more fluent this date, pt continues to present with deficits in expressive language, receptive language, and cognitive-linguistic skills. Continue ongoing skilled intervention. Speech Language Short Term Goals:  Pt will improve auditory processing/comprehension of commands and questions to 80%, via graded tasks (Ongoing 09/07/22)  Pt will improve verbal expression for functional expression via graded tasks to 80% (Ongoing 09/07/22)  Pt will participate in ongoing cognitive assessment with goals to be established as indicated (Ongoing 09/07/22)      Assessment: Patient progressing toward goals    Plan: 3-5 times per week during acute care stay for speech language cognition; 1-2 times to ensure diet tolerance    Patient/Family Education:  Provided education regarding role of SLP, recommendations and general speech pathology plan of care. [x] Pt verbalized understanding and agreement   [x] Pt requires ongoing learning   [] No evidence of comprehension     Discharge Recommendations:  Recommend ongoing SLP for speech therapy upon discharge from the hospital     Treatment time  Timed Code Treatment Minutes: 0  Total Treatment time: 26    If patient discharges prior to next session this note will serve as a discharge summary.       Signature:   Tha Link, [address] [facility]  Speech-Language Pathologist  [place]. [id number]

## 2022-09-07 NOTE — ACP (ADVANCE CARE PLANNING)
ADVANCED CARE PLANNING    Name:Ishmael Ferrera       :  1945              MRN:  [id number]      Purpose of Encounter: Advanced care planning in light of altered mental status and chronic hypercapnic respiratory failure. Parties in attendance: :Monica Nelson MD, Family members: Shey Morris. Decisional Capacity:Yes    Subjective/Patient Story: Patient/family understand(s) that  his function continues to deteriorate. Patient/family wish(es) to continue further interventions, patient but declines resuscitation: Yes      Objective/Medical Story: Patient with severe COPD and STEF, presents with altered mental status and has now been noted to have left MCA stroke. Overall clinical decline noted with worsening hypoxia/hypercapnia. Goals of Care Determinations: DNR-CC, family not ready for hospice care    Plan: Will notify Aneta Olmos MD of change in care plan. Will look at further interventions as needed. Code Status:  At this time patient wishes to be DNR-CC    Time Spent on Advanced Planning Discussion: 15 minutes    Electronically signed by Tami Nelson MD on 2022 at 4:22 PM

## 2022-09-07 NOTE — CONSULTS
[facility]   Consult Note      Reason for Consult: Increasing O2 needs  Requesting Physician: Josue Yuan    Subjective:   CHIEF COMPLAINT: Altered mental status and shortness of breath     HPI: Patient originally hospitalized for altered mental status, with no focal neurologic deficits. History was mostly obtained from patient's daughter, who stated that lately he has been more cognitively impaired with poor memory and lately he has not been able to recognize people. Further investigation including an MRI of the brain revealed acute CVA. Patient also has a history of severe COPD on 3 L O2 at baseline, during this hospitalization he has been noted to have increased O2 needs up to 8 L with activity. Hence pulmonary consultation has been requested by the hospitalist.    Upon speaking with the patient directly, he states that he has no shortness of breath or cough and appears to be comfortable on 3 L O2. He is alert and oriented at this time. History of severe COPD, FEV1 1.52 L [37% predicted] from March 2022, follows with Dr. Rajesh Nelson. He also has STEF on CPAP. Former smoker, also has history of alcohol use.        The patient is a 78 y.o. male with significant past medical history of:      Diagnosis Date    Anemia     COPD (chronic obstructive pulmonary disease) (HCC)     Diabetes mellitus ([address]     Edema     GI (gastrointestinal bleed)     Gout     Hypertension     Morbid obesity ([address]     Neuropathy     Obstructive sleep apnea     uses CPAP    Peripheral vascular disease ([facility]         Past Surgical History:        Procedure Laterality Date    APPENDECTOMY      COLONOSCOPY N/A 5/7/2019    COLONOSCOPY DIAGNOSTIC performed by Jesse Barnard MD at [facility]     Current Medications:    Current Facility-Administered Medications: thiamine mononitrate tablet 100 mg, 100 mg, Oral, Daily  rosuvastatin (CRESTOR) tablet 20 mg, 20 mg, Oral, Nightly  aspirin chewable tablet 81 mg, 81 mg, Oral, Daily  allopurinol (ZYLOPRIM) tablet 200 mg, 200 mg, Oral, Daily  celecoxib (CELEBREX) capsule 100 mg, 100 mg, Oral, BID  predniSONE (DELTASONE) tablet 5 mg, 5 mg, Oral, Daily  gabapentin (NEURONTIN) capsule 300 mg, 300 mg, Oral, TID  lisinopril (PRINIVIL;ZESTRIL) tablet 2.5 mg, 2.5 mg, Oral, Daily  ipratropium-albuterol (DUONEB) nebulizer solution 1 ampule, 1 ampule, Inhalation, Q4H PRN  furosemide (LASIX) tablet 40 mg, 40 mg, Oral, BID  albuterol sulfate HFA (PROVENTIL;VENTOLIN;PROAIR) 108 (90 Base) MCG/ACT inhaler 1 puff, 1 puff, Inhalation, Q6H PRN  sodium chloride flush 0.9 % injection 5-40 mL, 5-40 mL, IntraVENous, 2 times per day  sodium chloride flush 0.9 % injection 5-40 mL, 5-40 mL, IntraVENous, PRN  0.9 % sodium chloride infusion, , IntraVENous, PRN  enoxaparin (LOVENOX) injection 40 mg, 40 mg, SubCUTAneous, Nightly  ondansetron (ZOFRAN-ODT) disintegrating tablet 4 mg, 4 mg, Oral, Q8H PRN **OR** ondansetron (ZOFRAN) injection 4 mg, 4 mg, IntraVENous, Q6H PRN  polyethylene glycol (GLYCOLAX) packet 17 g, 17 g, Oral, Daily PRN  acetaminophen (TYLENOL) tablet 650 mg, 650 mg, Oral, Q6H PRN **OR** acetaminophen (TYLENOL) suppository 650 mg, 650 mg, Rectal, Q6H PRN  mometasone-formoterol (DULERA) 200-5 MCG/ACT inhaler 2 puff, 2 puff, Inhalation, BID    No Known Allergies    Social History:    TOBACCO:   reports that he quit smoking about 10 years ago. His smoking use included cigarettes. He has a 50.00 pack-year smoking history. He has never used smokeless tobacco.  ETOH:   reports current alcohol use of about 14.0 standard drinks per week.   Patient currently lives independently    Family History:       Problem Relation Age of Onset    High Blood Pressure Mother     High Cholesterol Mother     Heart Disease Mother     Stroke Mother     Diabetes Mother     Depression Mother     Mental Illness Mother     Cancer Father        REVIEW OF SYSTEMS:    Constitutional: negative for fatigue, fevers, malaise and weight loss  Ears, nose, mouth, throat: negative for ear drainage, epistaxis, hoarseness, nasal congestion, sore throat and voice change  Respiratory: negative except for shortness of breath  Cardiovascular: negative for chest pain, chest pressure/discomfort, irregular heart beat, lower extremity edema and palpitations  Gastrointestinal: negative for abdominal pain, constipation, diarrhea, jaundice, melena, odynophagia, reflux symptoms and vomiting  Hematologic/lymphatic: negative for bleeding, easy bruising, lymphadenopathy and petechiae  Musculoskeletal:negative for arthralgias, bone pain, muscle weakness, neck pain and stiff joints  Neurological: negative for dizziness, gait problems, headaches, seizures, speech problems, tremors and weakness  Behavioral/Psych: negative for anxiety, behavior problems, depression, fatigue and sleep disturbance  Endocrine: negative for diabetic symptoms including none, neuropathy, polyphagia, polyuria, polydipsia, vomiting and diarrhea and temperature intolerance  Allergic/Immunologic: negative for anaphylaxis, angioedema, hay fever and urticaria      Objective:   Patient Vitals for the past 8 hrs:   BP Temp Temp src Pulse Resp SpO2   09/07/22 1225 -- -- -- 91 18 94 %   09/07/22 1155 121/67 98.2 °F (36.8 °C) Oral 87 18 94 %   09/07/22 0923 -- -- -- -- 16 97 %     I/O last 3 completed shifts: In: 720 [P.O.:720]  Out: -   I/O this shift: In: 200 [P.O.:420;  I.V.:10]  Out: -     Physical Exam:  General Appearance: alert and oriented to person, place and time, well developed and well- nourished, in no acute distress  Skin: warm and dry, no rash or erythema  Head: normocephalic and atraumatic  Eyes: pupils equal, round, and reactive to light, extraocular eye movements intact, conjunctivae normal  ENT: external ear and ear canal normal bilaterally, nose without deformity, nasal mucosa and turbinates normal  Neck: supple and non-tender without mass, no cervical lymphadenopathy  Pulmonary/Chest: clear to auscultation bilaterally- no wheezes, rales or rhonchi, normal air movement, no respiratory distress  Cardiovascular: normal rate, regular rhythm,  no murmurs, rubs, distal pulses intact, no carotid bruits  Abdomen: soft, non-tender, non-distended, normal bowel sounds, no masses or organomegaly  Lymph Nodes: Cervical, supraclavicular normal  Extremities: no cyanosis, clubbing or edema  Musculoskeletal: normal range of motion, no joint swelling, deformity or tenderness  Neurologic: alert, no focal neurologic deficits    Data Review:  CBC:   Lab Results   Component Value Date/Time    WBC 6.2 09/06/2022 05:32 AM    RBC 3.70 09/06/2022 05:32 AM     BMP:   Lab Results   Component Value Date/Time    GLUCOSE 129 09/06/2022 05:32 AM    CO2 29 09/06/2022 05:32 AM    BUN 32 09/06/2022 05:32 AM    CREATININE 1.2 09/06/2022 05:32 AM    CALCIUM 9.2 09/06/2022 05:32 AM     ABG:   Lab Results   Component Value Date/Time    EOT3TJD 31.3 09/07/2022 12:23 PM    BEART 4.0 09/07/2022 12:23 PM    V4MDROXH 93.7 09/07/2022 12:23 PM    PHART 7.337 09/07/2022 12:23 PM    JQB0UHW 58.4 09/07/2022 12:23 PM    PO2ART 73.3 09/07/2022 12:23 PM    KJO4INL 74.1 09/07/2022 12:23 PM       Radiology: All pertinent images / reports were reviewed as a part of this visit. Narrative   EXAMINATION:   ONE XRAY VIEW OF THE CHEST       9/7/2022 10:13 am       COMPARISON:   Chest x-ray dated 3 September 2022       HISTORY:   ORDERING SYSTEM PROVIDED HISTORY: to evaluate for infiltrates   TECHNOLOGIST PROVIDED HISTORY:   Reason for exam:->to evaluate for infiltrates   Reason for Exam: to evaluate for infiltrates       FINDINGS:   Blunting of the left costophrenic angle appears stable. The right lung is   clear. No pneumothorax. Stable cardiomediastinal silhouette           Impression   Blunting of the left costophrenic angle appears unchanged.   This could   represent underlying atelectasis or an infiltrate in the appropriate clinical   setting. Problem List:   Altered mental status  Acute on chronic hypoxic/hypercapnic respiratory failure  COPD, severe  CVA-left MCA/parietal stroke    Assessment/Plan:     Patient's altered mental status, most likely related to CVA. Patient also has history of severe COPD. Could also be contributed by the fact that he has chronic hypercapnia, PCO2 58 noted from ABG. Currently not in exacerbation. Patient will benefit from NIV/AVAPS mode of ventilation instead of CPAP. Patient has chronic respiratory failure as a consequence of COPD. Due to his severe respiratory disease state, shown with an elevated PaCO2, BiPAP is not clinically appropriate. Patient requires targeted volumes, backup battery with alarms to alert caregivers of increased respiratory rate, mouthpiece ventilation to help aid in ADLs and encourage mobility, and respiratory therapist in home to help with compliance. NIV is required to decrease hospital readmissions; the patient is at high risk for exacerbation leading quickly to decline and harm without this therapy. Patient will also benefit from high flow O2 concentrator at home, whereby we will be able to titrate O2 up to 10 L. Long discussion with patient's daughter, who agrees with institution of NIV at home which might prevent rehospitalization's. Also clarified patient's goals of care, since he is at high risk for rehospitalization due to multiple comorbidities. She would like to continue with current care and is not ready for hospice or palliative care evaluation at this time.     Jagruti Rendon MD

## 2022-09-07 NOTE — PROGRESS NOTES
Patients head to toe assessment completed. Vital signs WNL. chair alarm engaged, call light within reach. Scheduled medications given per MAR. Patient denies any pain at the moment. Patient up in a chair. Will continue to monitor.

## 2022-09-07 NOTE — PROGRESS NOTES
[facility] PROGRESS NOTE    9/7/2022 1:14 PM        Name: Taylor Ribera . Admitted: 9/3/2022  Primary Care Provider: Gilmar Germain MD (Tel: [phone])      Brief History: Presented with acute confusion. Unable to recall events of the day. CT head with no acute findings. No evidence of infection. Subjective:  Up in bedside chair, no family members present. Continues to report difficulties with memory, \"I've been writing things down. \" Occasional word find difficulty. MRI revealed acute moderate sized infarct in left temporal lobe. Reports breathing at baseline, O2 3 liters at rest, will desaturate to 70% with ambulation. Denies HA, dizziness, chest pain, dizziness, abdominal pain, nausea, constipation.      Reviewed interval ancillary notes    Current Medications  thiamine mononitrate tablet 100 mg, Daily  rosuvastatin (CRESTOR) tablet 20 mg, Nightly  aspirin chewable tablet 81 mg, Daily  allopurinol (ZYLOPRIM) tablet 200 mg, Daily  celecoxib (CELEBREX) capsule 100 mg, BID  predniSONE (DELTASONE) tablet 5 mg, Daily  gabapentin (NEURONTIN) capsule 300 mg, TID  lisinopril (PRINIVIL;ZESTRIL) tablet 2.5 mg, Daily  ipratropium-albuterol (DUONEB) nebulizer solution 1 ampule, Q4H PRN  furosemide (LASIX) tablet 40 mg, BID  albuterol sulfate HFA (PROVENTIL;VENTOLIN;PROAIR) 108 (90 Base) MCG/ACT inhaler 1 puff, Q6H PRN  sodium chloride flush 0.9 % injection 5-40 mL, 2 times per day  sodium chloride flush 0.9 % injection 5-40 mL, PRN  0.9 % sodium chloride infusion, PRN  enoxaparin (LOVENOX) injection 40 mg, Nightly  ondansetron (ZOFRAN-ODT) disintegrating tablet 4 mg, Q8H PRN   Or  ondansetron (ZOFRAN) injection 4 mg, Q6H PRN  polyethylene glycol (GLYCOLAX) packet 17 g, Daily PRN  acetaminophen (TYLENOL) tablet 650 mg, Q6H PRN   Or  acetaminophen (TYLENOL) suppository 650 mg, Q6H PRN  mometasone-formoterol (DULERA) 200-5 MCG/ACT inhaler 2 puff, BID      Objective:  /67   Pulse 91   Temp 98.2 °F (36.8 °C) (Oral)   Resp 18   Ht 6' 1\" (1.854 m)   Wt (!) 317 lb (143.8 kg)   SpO2 94%   BMI 41.82 kg/m²     Intake/Output Summary (Last 24 hours) at 9/7/2022 1314  Last data filed at 9/7/2022 0911  Gross per 24 hour   Intake 1150 ml   Output --   Net 1150 ml      Wt Readings from Last 3 Encounters:   09/06/22 (!) 317 lb (143.8 kg)   04/11/22 (!) 322 lb 3.2 oz (146.1 kg)   11/11/21 (!) 325 lb (147.4 kg)     General:  Awake, alert, oriented in NAD  Skin:  Warm and dry. No unusual bruising or rash  Neck:  Supple. No JVD appreciated  Chest:  Normal effort. Diminished in bases, scattered wheezes  Cardiovascular:  RRR, normal S1/S2, no murmur/gallop/rub  Abdomen:  Soft, nontender, +bowel sounds  Extremities:  2+ BLE edema  Neurological: Moves all extremities, some word find difficulties  Psychological: Normal mood and affect      Labs and Tests:  CBC:   Recent Labs     09/05/22  0557 09/06/22  0532   WBC 5.2 6.2   HGB 12.5* 12.5*    299     BMP:    Recent Labs     09/05/22  0557 09/06/22  0532    143   K 4.7 5.1    105   CO2 29 29   BUN 24* 32*   CREATININE 1.1 1.2   GLUCOSE 118* 129*     Hepatic:   No results for input(s): AST, ALT, ALB, BILITOT, ALKPHOS in the last 72 hours. CXR 9/3/2022:  Left basilar opacity may reflect underlying atelectasis. Correlate   clinically with signs of pneumonia. CT Head 9/3/2022: Atrophy and small-vessel ischemic change. No hemorrhage or mass identified       Paranasal sinus disease     Carotid doppler 9/6/2022:  Summary        No hemodynamically significant stenosis noted in the internal carotid artery    bilaterally. MRI Brain 9/6/2022:  1. 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 Sulcal effacement in this region of infarct without mass   effect or midline shift.    2. Otherwise, no acute intracranial abnormality. 3. Minimal global parenchymal volume loss with minimal chronic microvascular   ischemic changes. Echo 9/6/2022:  Summary   A bubble study was performed and shows evidence of a trivial amount of right to left shunting consistent with a tiny patent foramen ovale or atrial septal defect. Irregular rhythm. Left ventricular systolic function is normal with ejection fraction   estimated at 55 %. No regional wall motion abnormalities are noted. Normal left ventricular wall thickness. Diastolic dysfunction grade and filing pressure are indeterminate. The right ventricle is mildly enlarged. The right atrium is mildly dilated. The aortic root is moderately dilated. The ascending aorta is moderately dilated. Mild mitral regurgitation. CXR 9/7/2022:  Blunting of the left costophrenic angle appears unchanged. This could   represent underlying atelectasis or an infiltrate in the appropriate clinical   setting. Problem List  Principal Problem:    Encephalopathy  Resolved Problems:    * No resolved hospital problems. *       Assessment & Plan:   Acute CA. Presented with memory loss. CT head with no acute findings. Carotid doppler with no significant stenosis. Ammonia level 11, vitamin B12 & folate WNL, TSH 3.18. Urine drug screen negative, ETOH negative. MRI of brain revealed moderate sized acute infarct involving left temporal lobe. Echo with normal EF and evidence tiny PFO or patent ASD. Neuro evaluated, recommend continue asa and statin, consider cardiology consult for PFO. PT/OT consults pending. Acute on chronic hypoxic respiratory failure . Has hx very severe COPD, on chronic prednisone therapy and O2 therapy at 3 liters. CXR with left basilar opacity which may reflect underlying atelectasis.  Low suspicion for pneumonia as patient afebrile, no leukocytosis, procalcitonin only 0.06.  Currently at his baseline oxygen requirement, 3 liters but O2 sat noted to drop to 70% with ambulation requiring up to 8 liters to recover. Pulmonary consult pending. Continue Dulera. DM2. A1c 5.6. Takes metformin at home, held on admission. BG values controlled. HTN. Controlled. Continue lisinopril. ETOH use. Pt drinks beer daily, etoh level was negative. No hx of withdrawal.  B12, thiamine and ammonia levels in range. STEF. Continue CPAP. Disposition: Found to have acute CVA, PT/OT consults pending. Diet: ADULT DIET;  Regular  Code:[id number]-CC  DVT PPX: enoxaparin      THU Guajardo - CNP   9/7/2022 1:14 PM

## 2022-09-07 NOTE — PLAN OF CARE
Problem: Discharge Planning  Goal: Discharge to home or other facility with appropriate resources  Outcome: Progressing     Problem: Safety - Adult  Goal: Free from fall injury  Outcome: Progressing  Flowsheets (Taken 9/7/2022 1817)  Free From Fall Injury: Instruct family/caregiver on patient safety  Note: No falls. Patient calling out appropriately this shift. Cardiology consulted as well as physical medicine rehab. Pulmonary saw patient. ABG ordered and drawn, CXR completed. Patients daughter at the bedside this evening requesting for MRI results to be explained to her. Dr. Juan Friedman who is the covering physician to come to bedside to talk to patients family this evening. No further issues.       Problem: Neurosensory - Adult  Goal: Achieves maximal functionality and self care  Outcome: Progressing     Problem: Respiratory - Adult  Goal: Achieves optimal ventilation and oxygenation  Outcome: Progressing     Problem: Cardiovascular - Adult  Goal: Maintains optimal cardiac output and hemodynamic stability  Outcome: Progressing  Goal: Absence of cardiac dysrhythmias or at baseline  Outcome: Progressing     Problem: Skin/Tissue Integrity - Adult  Goal: Skin integrity remains intact  Outcome: Progressing     Problem: Musculoskeletal - Adult  Goal: Return mobility to safest level of function  Outcome: Progressing right

## 2022-09-07 NOTE — PROGRESS NOTES
[facility]   Phone: [phone]    Occupational Therapy    [x] Initial Evaluation            [] Daily Treatment Note         [] Discharge Summary      Patient: Sydni Najera   : 1945   MRN: [id number]   Date of Service:  2022    Admitting Diagnosis:  Encephalopathy  Current Admission Summary: Per ED note, \"Ishmael Norris is a 78 y.o. male who presents to the emergency department via EMS due to home nurse stating that he was acting differently than normal today. Unsure of when last normal was. EMS stated that the home nurse stated that he kept saying the same sentence over and over that he was fine which prompted them to call 9 1 to have him brought to the emergency department for further evaluation. Patient has a history of COPD, diabetes, peripheral vascular disease, anemia, hypertension and obstructive sleep apnea. Patient is wearing oxygen and does wear oxygen at home. Patient cannot tell me why he is here and when asked he states he just forgot to take my. .. And cannot recall what he forgot to take. Patient is able to follow my commands during my exam.  Patient does not have any complaints at this time. Patient appears well and in no acute distress. Patient lives by himself. \".     Past Medical History:  has a past medical history of Anemia, COPD (chronic obstructive pulmonary disease) ([address] Diabetes mellitus ([address] Edema, GI (gastrointestinal bleed), Gout, Hypertension, Morbid obesity ([address] Neuropathy, Obstructive sleep apnea, and Peripheral vascular disease ([address] Past Surgical History:  has a past surgical history that includes Appendectomy; Total hip arthroplasty; and Colonoscopy (N/A, 2019). Discharge Recommendations: Sydni Najera scored a 19/24 on the AM-PAC ADL Inpatient form. Current research shows that an AM-PAC score of 17 or less is typically not associated with a discharge to the patient's home setting.  Based on the patient's AM-PAC score and their current ADL deficits, it is recommended that the patient have 5-7 sessions per week of Occupational Therapy at d/c to increase the patient's independence. At this time, this patient demonstrates complex nursing, medical, and rehabilitative needs, and would benefit from intensive rehabilitation services upon discharge from the Inpatient setting. This patient demonstrates the ability to participate in and benefit from an intensive therapy program with a coordinated interdisciplinary team approach to foster frequent, structured, and documented communication among disciplines, who will work together to establish, prioritize, and achieve treatment goals. Please see assessment section for further patient specific details. Pt not safe at this time to return home alone at this time d/t decreased cognition, decreased activity tolerance,  and decreased O2 saturation level on 3L O2. If patient discharges prior to next session this note will serve as a discharge summary. Please see below for the latest assessment towards goals. DME Required For Discharge: patient has all required DME for discharge    Precautions/Restrictions: high fall risk  Weight Bearing Restrictions: no restrictions  [] Right Upper Extremity  [] Left Upper Extremity [] Right Lower Extremity  [] Left Lower Extremity     Required Braces/Orthotics: no braces required   [] Right  [] Left  Positional Restrictions:no positional restrictions    Pre-Admission Information   Lives With: alone    Type of Home: house  Home Layout: two level, able to live on main level  Home Access:  1 step to enter with handrail. Handrails are located on R side. Bathroom Layout: walk in shower  Bathroom Equipment: grab bars in shower, shower chair, hand held shower head  Toilet Height: elevated height  Home Equipment: rolling walker, single point cane, manual wheelchair, reacher, sock aide, long handled shoe horn, oxygen, .   Comment: transport w/c, on 3L O2 all the time, even though Dr. Kyler Floyd recommended 2 L  (wooden cane)  Transfer Assistance: Independent without use of device  Ambulation Assistance:modified independent with use of SPC; uses walker when work in garden  ADL Assistance: independent with all ADL's  IADL Assistance: independent with homemaking tasks  Active :        [x] Yes  [] No  Hand Dominance: [] Left  [x] Right  Current Employment: . Comment: Retired  Hobbies: Enjoys cooking, baking bread, volunteer at [employer]. [employer]  Recent Falls: No recent falls  Pt sleeps in a recliner. 2 daughters in area. 1 dtr is an RN and granddaughter becoming an RN. Examination   Vision:   Vision Gross Assessment: Impaired, Vision Corrective Device: wears glasses for reading, and Visual History: cataracts, corrective eye surgery, other - implants  Hearing:   hard of hearing  Perception:   WFL  Sensation:   reports numbness and tingling in all extremities  Comment: neuropathy in fingers and feet  Proprioception:    WFL  Tone:   Normotonic  Coordination Testing:   WFL    ROM:   (B) UE AROM WFL  Strength:   (L) Elbow: 5     (R) Elbow: 5  (L) Hand: 5      (R) Hand: 5  5/5 L shoulder; NT R shouldr d/t painful    Decision Making: medium complexity  Clinical Presentation: evolving      Subjective  General: Pt seated in recliner, pleasant and agreeable to OT eval.Pt having significant word finding issues. Pain: 5/10. Location: R shoulder  (chronic)  Pain Interventions: RN notified        Activities of Daily Living  Basic Activities of Daily Living  Grooming: setup assistance . Comment: comb hair seated in recliner  Grooming Comments: OT washed pt's hair while pt stood at sink  Lower Extremity Dressing: supervision . Comment: don/doff socks w/AE  Dressing Equipment: reacher, sock aide  Instrumental Activities of Daily Living  No IADL completed on this date.     Functional Mobility  Bed Mobility  Bed mobility not completed on this date.  Comments:  Transfers  Sit to stand transfer:stand by assistance  Stand to sit transfer: stand by assistance  Toilet transfer: stand by assistance  Toilet transfer equipment: standard toilet, grab bars, cane  Comments:  Functional Mobility:  Standing Balance: contact guard assistance, . Comment CGA progressing to SBA. Functional Mobility: .  stand by assistance, contact guard assistance, . Comment CGA progressing to SBA  Functional Mobility Activity: to/from bathroom, to/from bathroom for toilet transfer, to bathroom to wash hair at sink, functional mobility around room  Functional Mobility Device Use: single point cane  Comment: Pt stood about 3 min for functional mobility in room and toilet transfer, then back to recliner. Stood ~4 more min for washing hair in bathroom and back to recliner. Other Therapeutic Interventions    Functional Outcomes  -PAC Inpatient Daily Activity Raw Score: 19    Cognition  Overall Cognitive Status: Impaired  Arousal/Alterness: appropriate responses to stimuli  Following Commands: follows all commands without difficulty  Attention Span: appears intact  Memory: decreased short term memory  Safety Judgement: good awareness of safety precautions  Problem Solving: able to problem solve independently  Insights: decreased awareness of deficits  Initiation: does not require cues  Sequencing: does not require cues  Comments:  Pt has word finding deficits, which he is aware of; however, he repeats himself, such as saying, \"Thank you very much.  It feels so good\" multiple times after OT assisted pt in washing his hair  Orientation:    alert and oriented x 4  Command Following:   [facility]     Education  Barriers To Learning: none  Patient Education: patient educated on OT role and benefits, plan of care, discharge recommendations  Learning Assessment:  patient verbalizes understanding, would benefit from continued reinforcement    Assessment  Activity Tolerance: Pt tolerated standing ~3 min for functional mobility in room and toilet transfer. SPO2 down to 79% on 3L. About 1 min to recover to 92% on 4L. Pt tolerated standign 4 min for functional mobility to/from bathroom to stand at sink for washing hair on 5L O2. SPO2 91% after back to recliner  Impairments Requiring Therapeutic Intervention: decreased functional mobility, decreased ADL status, decreased cognition, decreased endurance, decreased balance  Prognosis: good  Clinical Assessment: Pt is below his baseline level of occupational function. He requires SBA for functional transfers, CGA progressing to SBA for functional mobility w/SPC. Pt desatting to 79% on 3L O2, after standing ~3 min. Safety Interventions: patient left in chair, chair alarm in place, call light within reach, gait belt, patient at risk for falls, telesitter in use, and nurse notified    Plan  Frequency: 7 x/week  Current Treatment Recommendations: functional mobility training, transfer training, endurance training, ADL/self-care training, and safety education    Goals  Patient Goals: Not stated   Short Term Goals:  Time Frame: Discharge    Patient will complete upper body ADL at supervision   Patient will complete lower body ADL at supervision, . Comment with AE   Patient will complete toileting at supervision   Patient will complete grooming at supervision, . Comment in stance at sink   Patient will complete functional transfers at supervision, . Comment /w/SPC   Patient will complete functional mobility at supervision, .   Comment /w/SPC     Therapy Session Time     Individual Group Co-treatment   Time In    1350   Time Out    1444   Minutes    54        Timed Code Treatment Minutes:   39 min  Total Treatment Minutes:  54 min       Electronically Signed By: Smiley Briceño, YORDY Gan., OTR/L, [id number]

## 2022-09-07 NOTE — PROGRESS NOTES
[facility]   Phone: [phone]    Physical Therapy    [x] Initial Evaluation            [] Daily Treatment Note         [] Discharge Summary      Patient: Sydni Najera   : 1945   MRN: [id number]   Date of Service:  2022  Admitting Diagnosis: Encephalopathy  Current Admission Summary: Sydni Najera is a 78 y.o. male who presents to the emergency department via EMS due to home nurse stating that he was acting differently than normal today. Unsure of when last normal was. EMS stated that the home nurse stated that he kept saying the same sentence over and over that he was fine which prompted them to call 9 1 to have him brought to the emergency department for further evaluation. Patient has a history of COPD, diabetes, peripheral vascular disease, anemia, hypertension and obstructive sleep apnea. Patient is wearing oxygen and does wear oxygen at home. Patient cannot tell me why he is here and when asked he states he just forgot to take my. .. And cannot recall what he forgot to take. Patient is able to follow my commands during my exam.  Patient does not have any complaints at this time. Patient appears well and in no acute distress. Patient lives by himself. 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 Past Surgical History:  has a past surgical history that includes Appendectomy; Total hip arthroplasty; and Colonoscopy (N/A, 2019). Discharge Recommendations: Sydni Najera scored a 19/24 on the AM-PAC short mobility form.  Current research shows that an AM-PAC score of 17 or less is typically not associated with a discharge to the patient's home setting. Based on the patient's AM-PAC score and their current functional mobility deficits, it is recommended that the patient have 5-7 sessions per week of Physical Therapy at d/c to increase the patient's independence. At this time, this patient demonstrates complex nursing, medical, and rehabilitative needs, and would benefit from intensive rehabilitation services upon discharge from the Inpatient setting. This patient demonstrates the ability to participate in and benefit from an intensive therapy program with a coordinated interdisciplinary team approach to foster frequent, structured, and documented communication among disciplines, who will work together to establish, prioritize, and achieve treatment goals. Please see assessment section for further patient specific details. If patient discharges prior to next session this note will serve as a discharge summary. Please see below for the latest assessment towards goals. Despite higher AMPAC score, pt presents with significant cognitive deficits and decreased safety awareness, requires increased O2 demand during session, and cannot tolerate ambulating household distances at this time which present as barriers to safe discharge home alone.  If pt is able to be provided with 24/7 supervision at home, recommend HHPT S3.     DME Required For Discharge: patient has all required DME for discharge  Precautions/Restrictions: high fall risk  Weight Bearing Restrictions: no restrictions  [] Right Upper Extremity  [] Left Upper Extremity [] Right Lower Extremity  [] Left Lower Extremity     Required Braces/Orthotics: no braces required   [] Right  [] Left  Positional Restrictions:no positional restrictions    Pre-Admission Information   Lives With: alone                     Type of Home: house  Home Layout: two level, able to live on main level  Home Access:  1 step to enter with handrail. Handrails are located on R side. Bathroom Layout: walk in shower  Bathroom Equipment: grab bars in shower, shower chair, hand held shower head  Toilet Height: elevated height  Home Equipment: rolling walker, single point cane, manual wheelchair, reacher, sock aide, long handled shoe horn, oxygen, . Comment: transport w/c, on 3L O2 all the time, even though Dr. Rosalinda Lou recommended 2 L  (wooden cane)  Transfer Assistance: Independent without use of device  Ambulation Assistance:modified independent with use of SPC; uses walker when work in garden  ADL Assistance: independent with all ADL's  IADL Assistance: independent with homemaking tasks  Active :        [x] Yes                 [] No  Hand Dominance: [] Left                 [x] Right  Hobbies: Enjoys cooking, baking bread, volunteer at [employer]. Healthy museum  Recent Falls: No recent falls  Pt sleeps in a recliner. 2 daughters in area. 1 dtr is an RN and granddaughter becoming an RN. Examination   Vision:   Vision Gross Assessment: Impaired, Vision Corrective Device: wears glasses for reading, and Visual History: cataracts, corrective eye surgery, other - implants  Hearing:   hard of hearing  Observation:   General Observation:  Swelling B LE   Posture: Forward head, rounded shoulders   Sensation:   reports numbness and tingling in all extremities  Comment: neuropathy in fingers and feet  ROM:   (B) LE AROM WFL  Strength:   (B) LE strength grossly WFL  Decision Making: low complexity  Clinical Presentation: stable      Subjective  General: Pt seated in recliner upon arrival. Pt agreeable to PT/OT eval. Pt on 3L O2 resting at Spo2 96%. Pain: 5/10. Location: R shoulder and R knee   Pain Interventions: RN notified       Functional Mobility  Bed Mobility  Bed mobility not completed on this date.   Comments: Pt in recliner at beginning and EOS   Transfers  Sit to stand transfer: stand by assistance, contact guard assistance  Stand to sit transfer: stand by assistance, contact guard assistance  Comments: CGA progressing to SBA with transfers. Transfers from EOB and toilet   Ambulation  Surface:level surface  Assistive Device: pts own personal walking stick   Assistance: stand by assistance, contact guard assistance  Distance: 30ft x2   Gait Mechanics: wide ZACH, decreased step length, decreased step height   Comments:  Pt ambulates 30ft x2 with walking stick with SBA progressing to CGA, several change of direction with no LOB   Stair Mobility  Stair mobility not completed on this date. Comments:  Wheelchair Mobility:  No w/c mobility completed on this date. Comments:  Balance  Static Sitting Balance: good: independent with functional balance in unsupported position  Dynamic Sitting Balance: good: independent with functional balance in unsupported position  Static Standing Balance: fair (-): maintains balance at SBA with use of UE support  Dynamic Standing Balance: fair (-): maintains balance at CGA with use of UE support  Comments: Pt able to stand at sink for ~4 mins with SBA while completing hair care with OT (see OT notes)     Other Therapeutic Interventions    Functional Outcomes  AM-PAC Inpatient Mobility Raw Score : 19              Cognition  Overall Cognitive Status: Impaired  Arousal/Alterness: appropriate responses to stimuli  Following Commands: follows all commands without difficulty  Attention Span: appears intact  Memory: decreased short term memory  Safety Judgement: good awareness of safety precautions  Problem Solving: able to problem solve independently  Insights: decreased awareness of deficits  Initiation: does not require cues  Sequencing: does not require cues  Comments:  Pt has word finding deficits, which he is aware of; however, he repeats himself, such as saying, \"Thank you very much.  It feels so good\" multiple times after assisted pt in washing his hair  Orientation:    alert and oriented x 4  Command Following: [facility]    Education  Barriers To Learning: cognition and hearing  Patient Education: patient educated on goals, PT role and benefits, plan of care, discharge recommendations  Learning Assessment:  patient verbalizes understanding, would benefit from continued reinforcement    Assessment  Activity Tolerance: Pt on 3L O2 at start of session resting at Spo2 96% however after first trial of ambulation pt's Spo2 drops to 79% and recovers to 90% after ~1-2 mins of seated rest and PLB. O2 increased to 5L for second attempt of ambulation with Spo2 remaining above 92%. Pt weened back to 3L O2 at end of session with Spo2 resting at 96%. Impairments Requiring Therapeutic Intervention: decreased functional mobility, decreased safety awareness, decreased cognition, decreased endurance, decreased sensation, decreased balance  Prognosis: good  Clinical Assessment: Pt presents to hospital with AMS. 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 Pt lives alone in one story home and reports prior level of mod I with functional mobility with use of walking stick. At this time, pt presents with significant cognitive deficits and decreased safety awareness and requires CGA-SBA to ambulate short distances with walking stick. Pt required increased O2 demands during activity, with Spo2 dropping to 79% on 3L O2 requiring pt to be on 5L for remainder of session. Pt would be benefit from continued therapy to address above deficits and safely return to PLOF.    Safety Interventions: patient left in chair, chair alarm in place, call light within reach, gait belt, patient at risk for falls, and nurse notified    Plan  Frequency: 5-7 x/week  Current Treatment Recommendations: strengthening, balance training, functional mobility training, transfer training, gait training, endurance training, and neuromuscular re-education    Goals  Patient Goals: none stated    Short Term Goals:  Time Frame: upon discharge Patient will complete bed mobility at Independent   Patient will complete transfers at [address]   Patient will ambulate 150 ft with use of LRAD at modified [facility]  Patient will ascend/descend 1 stairs with (R) ascending handrail at modified independent    Therapy Session Time      Individual Group Co-treatment   Time In     1350   Time Out     1445   Minutes     55     Timed Code Treatment Minutes:   40  Total Treatment Minutes:  54       Electronically Signed By: Julianne Serna [address] [place] [id number], [place], [address]

## 2022-09-07 NOTE — PROGRESS NOTES
Julissa Jessica  Neurology Follow-up  [facility] Neurology    Date of Service: 9/7/2022    Subjective:   CC: Follow up today regarding: Acute confusion and new stroke    Events noted. Chart and lab reviewed. The patient had his MRI of the brain which showed acute left hemispheric CVA more MCA stroke with mass-effect. He is about the same. He is currently by himself. He still has some paraphasic error and mild aphasia. No headache or chest pain or focal weakness. Carotid Doppler showed no severe ICA stenosis. Echo showed small PFO. Other review of system was unremarkable. ROS : A 10-12 system review obtained and updated today and is unremarkable except as mentioned  in my interval history. family history includes Cancer in his father; Depression in his mother; Diabetes in his mother; Heart Disease in his mother; High Blood Pressure in his mother; High Cholesterol in his mother; Mental Illness in his mother; Stroke in his mother.     Past Medical History:   Diagnosis Date    Anemia     COPD (chronic obstructive pulmonary disease) (HCC)     Diabetes mellitus (HCC)     Edema     GI (gastrointestinal bleed)     Gout     Hypertension     Morbid obesity (HCC)     Neuropathy     Obstructive sleep apnea     uses CPAP    Peripheral vascular disease (HCC)      Current Facility-Administered Medications   Medication Dose Route Frequency Provider Last Rate Last Admin    thiamine mononitrate tablet 100 mg  100 mg Oral Daily Kacey Woods MD   100 mg at 09/07/22 0824    rosuvastatin (CRESTOR) tablet 20 mg  20 mg Oral Nightly Kacey Woods MD   20 mg at 09/06/22 2050    aspirin chewable tablet 81 mg  81 mg Oral Daily Misty Connelly MD   81 mg at 09/07/22 0811    allopurinol (ZYLOPRIM) tablet 200 mg  200 mg Oral Daily THU Simpson CNP   200 mg at 09/07/22 0811    celecoxib (CELEBREX) capsule 100 mg  100 mg Oral BID THU Simpson CNP   100 mg at 09/07/22 0824    predniSONE (DELTASONE) tablet 5 mg  5 mg Oral Daily Bernya L THU Couch CNP   5 mg at 09/07/22 2282    gabapentin (NEURONTIN) capsule 300 mg  300 mg Oral TID Usha THU Paitno - CNP   300 mg at 09/07/22 0811    lisinopril (PRINIVIL;ZESTRIL) tablet 2.5 mg  2.5 mg Oral Daily Joan Newell MD   2.5 mg at 09/07/22 0811    ipratropium-albuterol (DUONEB) nebulizer solution 1 ampule  1 ampule Inhalation Q4H PRN Joan Newell MD   1 ampule at 09/03/22 2323    furosemide (LASIX) tablet 40 mg  40 mg Oral BID Joan Newell MD   40 mg at 09/07/22 0812    albuterol sulfate HFA (PROVENTIL;VENTOLIN;PROAIR) 108 (90 Base) MCG/ACT inhaler 1 puff  1 puff Inhalation Q6H PRN Kacey Woods MD        sodium chloride flush 0.9 % injection 5-40 mL  5-40 mL IntraVENous 2 times per day Joan Newell MD   10 mL at 09/07/22 0813    sodium chloride flush 0.9 % injection 5-40 mL  5-40 mL IntraVENous PRN Kacey Woods MD        0.9 % sodium chloride infusion   IntraVENous PRN Joan Newell MD        enoxaparin (LOVENOX) injection 40 mg  40 mg SubCUTAneous Nightly Kacey Woods MD   40 mg at 09/06/22 2050    ondansetron (ZOFRAN-ODT) disintegrating tablet 4 mg  4 mg Oral Q8H PRN Joan Newell MD        Or    ondansetron (ZOFRAN) injection 4 mg  4 mg IntraVENous Q6H PRN Kacey Woods MD        polyethylene glycol (GLYCOLAX) packet 17 g  17 g Oral Daily PRN Joan Newell MD        acetaminophen (TYLENOL) tablet 650 mg  650 mg Oral Q6H PRN Joan Newell MD   650 mg at 09/04/22 1003    Or    acetaminophen (TYLENOL) suppository 650 mg  650 mg Rectal Q6H PRN Joan Newell MD        mometasone-formoterol (DULERA) 200-5 MCG/ACT inhaler 2 puff  2 puff Inhalation BID Joan Newell MD   2 puff at 09/07/22 0916     No Known Allergies   reports that he quit smoking about 10 years ago. His smoking use included cigarettes. He has a 50.00 pack-year smoking history.  He has never used smokeless tobacco. He reports current alcohol use of about 14.0 standard drinks per week. He reports that he does not use drugs. Objective:  Exam:   Constitutional:   Vitals:    09/07/22 0716 09/07/22 0923 09/07/22 1155 09/07/22 1225   BP: 122/75  121/67    Pulse: 74  87 91   Resp: 18 16 18 18   Temp: 97.4 °F (36.3 °C)  98.2 °F (36.8 °C)    TempSrc: Oral  Oral    SpO2: 99% 97% 94% 94%   Weight:       Height:         General appearance:  Normal development and appear in no acute distress. Mental Status:   Oriented to person, place, problem, and time. Memory: Good immediate recall. Intact remote memory  Normal attention span and concentration. Language: Fluent aphasia with paraphasic error    Poor  fund of Knowledge. Cranial Nerves:   II: Visual fields: Full. Pupils: equal, round, reactive to light  III,IV,VI: Extra Ocular Movements are intact. No nystagmus  V: Facial sensation is intact  VII: Facial strength and movements: intact and symmetric  IX: Palate elevation is symmetric  XI: Shoulder shrug is intact  XII: Tongue movements are normal  Musculoskeletal: 5/5 in all 4 extremities. Tone: Normal tone. Reflexes: Symmetric 2+ in both arms and 1  legs. Coordination: no pronator drift, no dysmetria with FNF  Sensation: normal to all modalities in both arms and legs.   Gait/Posture: unsteady gait        Data:  LABS:   Lab Results   Component Value Date/Time     09/06/2022 05:32 AM    K 5.1 09/06/2022 05:32 AM    K 5.0 09/03/2022 02:26 PM     09/06/2022 05:32 AM    CO2 29 09/06/2022 05:32 AM    BUN 32 09/06/2022 05:32 AM    CREATININE 1.2 09/06/2022 05:32 AM    GFRAA >60 09/06/2022 05:32 AM    GFRAA >60 04/17/2013 08:55 AM    LABGLOM 59 09/06/2022 05:32 AM    GLUCOSE 129 09/06/2022 05:32 AM    PHOS 3.6 04/21/2020 04:09 PM    MG 2.0 02/17/2010 02:05 PM    CALCIUM 9.2 09/06/2022 05:32 AM     Lab Results   Component Value Date/Time    WBC 6.2 09/06/2022 05:32 AM    RBC 3.70 09/06/2022 05:32 AM    HGB 12.5 09/06/2022 05:32 AM    HCT 39.4 09/06/2022 05:32

## 2022-09-07 NOTE — PLAN OF CARE
How Severe Are Your Spot(S)?: mild Problem: Discharge Planning  Goal: Discharge to home or other facility with appropriate resources  Outcome: Progressing  Problem: Safety - Adult  Goal: Free from fall injury  Outcome: Progressing     Problem: Neurosensory - Adult  Goal: Achieves maximal functionality and self care  Outcome: Progressing     Problem: Respiratory - Adult  Goal: Achieves optimal ventilation and oxygenation  Outcome: Progressing     Problem: Cardiovascular - Adult  Goal: Maintains optimal cardiac output and hemodynamic stability  Outcome: Progressing  Goal: Absence of cardiac dysrhythmias or at baseline  Outcome: Progressing     Problem: Skin/Tissue Integrity - Adult  Goal: Skin integrity remains intact  Outcome: Progressing     Problem: Musculoskeletal - Adult  Goal: Return mobility to safest level of function  Outcome: Progressing    Problem: Chronic Conditions and Co-morbidities  Goal: Patient's chronic conditions and co-morbidity symptoms are monitored and maintained or improved  Outcome: Progressing  Care Plan - Patient's Chronic Conditions and Co-Morbidity Symptoms are Monitored and Maintained or Improved: Monitor and assess patient's chronic conditions and comorbid symptoms for stability, deterioration, or improvement What Type Of Note Output Would You Prefer (Optional)?: Standard Output What Is The Reason For Today's Visit?: Full Body Skin Examination What Is The Reason For Today's Visit? (Being Monitored For X): concerning skin lesions on an annual basis

## 2022-09-08 PROBLEM — G45.4 TGA (TRANSIENT GLOBAL AMNESIA): Status: ACTIVE | Noted: 2022-09-08

## 2022-09-08 LAB
GLUCOSE BLD-MCNC: 127 MG/DL (ref 70–99)
GLUCOSE BLD-MCNC: 130 MG/DL (ref 70–99)
GLUCOSE BLD-MCNC: 144 MG/DL (ref 70–99)
GLUCOSE BLD-MCNC: 92 MG/DL (ref 70–99)
MAGNESIUM: 2.5 MG/DL (ref 1.8–2.4)
PERFORMED ON: ABNORMAL
PERFORMED ON: NORMAL
PRO-BNP: 86 PG/ML (ref 0–449)
REASON FOR REJECTION: NORMAL
REJECTED TEST: NORMAL

## 2022-09-08 PROCEDURE — 83735 ASSAY OF MAGNESIUM: CPT

## 2022-09-08 PROCEDURE — 2700000000 HC OXYGEN THERAPY PER DAY

## 2022-09-08 PROCEDURE — 84425 ASSAY OF VITAMIN B-1: CPT

## 2022-09-08 PROCEDURE — 92507 TX SP LANG VOICE COMM INDIV: CPT

## 2022-09-08 PROCEDURE — 92526 ORAL FUNCTION THERAPY: CPT

## 2022-09-08 PROCEDURE — 99232 SBSQ HOSP IP/OBS MODERATE 35: CPT | Performed by: PSYCHIATRY & NEUROLOGY

## 2022-09-08 PROCEDURE — 94761 N-INVAS EAR/PLS OXIMETRY MLT: CPT

## 2022-09-08 PROCEDURE — 99232 SBSQ HOSP IP/OBS MODERATE 35: CPT | Performed by: INTERNAL MEDICINE

## 2022-09-08 PROCEDURE — 93970 EXTREMITY STUDY: CPT

## 2022-09-08 PROCEDURE — 6370000000 HC RX 637 (ALT 250 FOR IP): Performed by: NURSE PRACTITIONER

## 2022-09-08 PROCEDURE — 36415 COLL VENOUS BLD VENIPUNCTURE: CPT

## 2022-09-08 PROCEDURE — 2580000003 HC RX 258: Performed by: FAMILY MEDICINE

## 2022-09-08 PROCEDURE — 2060000000 HC ICU INTERMEDIATE R&B

## 2022-09-08 PROCEDURE — 6370000000 HC RX 637 (ALT 250 FOR IP): Performed by: INTERNAL MEDICINE

## 2022-09-08 PROCEDURE — 94660 CPAP INITIATION&MGMT: CPT

## 2022-09-08 PROCEDURE — 99233 SBSQ HOSP IP/OBS HIGH 50: CPT | Performed by: INTERNAL MEDICINE

## 2022-09-08 PROCEDURE — 6370000000 HC RX 637 (ALT 250 FOR IP): Performed by: FAMILY MEDICINE

## 2022-09-08 PROCEDURE — 83880 ASSAY OF NATRIURETIC PEPTIDE: CPT

## 2022-09-08 PROCEDURE — 6360000002 HC RX W HCPCS: Performed by: FAMILY MEDICINE

## 2022-09-08 PROCEDURE — 94640 AIRWAY INHALATION TREATMENT: CPT

## 2022-09-08 PROCEDURE — 6360000002 HC RX W HCPCS: Performed by: INTERNAL MEDICINE

## 2022-09-08 RX ADMIN — Medication 2 PUFF: at 20:28

## 2022-09-08 RX ADMIN — METOPROLOL SUCCINATE 12.5 MG: 25 TABLET, EXTENDED RELEASE ORAL at 09:49

## 2022-09-08 RX ADMIN — GABAPENTIN 300 MG: 300 CAPSULE ORAL at 14:26

## 2022-09-08 RX ADMIN — Medication 10 ML: at 09:50

## 2022-09-08 RX ADMIN — FUROSEMIDE 80 MG: 10 INJECTION, SOLUTION INTRAMUSCULAR; INTRAVENOUS at 09:50

## 2022-09-08 RX ADMIN — PREDNISONE 5 MG: 5 TABLET ORAL at 09:50

## 2022-09-08 RX ADMIN — Medication 2 PUFF: at 09:29

## 2022-09-08 RX ADMIN — GABAPENTIN 300 MG: 300 CAPSULE ORAL at 21:46

## 2022-09-08 RX ADMIN — ALLOPURINOL 200 MG: 100 TABLET ORAL at 09:50

## 2022-09-08 RX ADMIN — GABAPENTIN 300 MG: 300 CAPSULE ORAL at 09:50

## 2022-09-08 RX ADMIN — Medication 10 ML: at 21:47

## 2022-09-08 RX ADMIN — ACETAMINOPHEN 650 MG: 325 TABLET ORAL at 14:26

## 2022-09-08 RX ADMIN — LISINOPRIL 2.5 MG: 5 TABLET ORAL at 09:49

## 2022-09-08 RX ADMIN — Medication 100 MG: at 10:02

## 2022-09-08 RX ADMIN — ENOXAPARIN SODIUM 40 MG: 100 INJECTION SUBCUTANEOUS at 21:46

## 2022-09-08 RX ADMIN — ASPIRIN 81 MG 81 MG: 81 TABLET ORAL at 09:50

## 2022-09-08 RX ADMIN — ROSUVASTATIN CALCIUM 20 MG: 20 TABLET, FILM COATED ORAL at 21:46

## 2022-09-08 ASSESSMENT — PAIN DESCRIPTION - LOCATION: LOCATION: HEAD

## 2022-09-08 ASSESSMENT — PAIN DESCRIPTION - ORIENTATION: ORIENTATION: POSTERIOR

## 2022-09-08 ASSESSMENT — PAIN SCALES - GENERAL
PAINLEVEL_OUTOF10: 0
PAINLEVEL_OUTOF10: 5

## 2022-09-08 ASSESSMENT — PAIN DESCRIPTION - DESCRIPTORS: DESCRIPTORS: ACHING;THROBBING

## 2022-09-08 NOTE — CONSULTS
[facility]  [phone]      Chief Complaint   Patient presents with    Altered Mental Status     Found Altered by caregiver who called [facility]. NIH was 5 on exam; Lives by himself; cannot follow commands; FSBS in squad was 110. History of Present Illness:  Marian Caldera is a 78 y.o. patient who presented to the hospital with complaints of confusion and aphasia. I have been asked to provide consultation regarding further management and testing. The history was provided by the patient and his daughter. They report that he recently had covid infection and his legs have swollen. They report that he has been on oxygen for many years and is relatively sedentary. He came to [facility] with the sudden inability to name a person who came to the door. The symptoms were severe and associated with confusion. They were not associated with weakness or palpitations. Cardiology was consulted as echo demonstrated an atrial level shunt. Past Medical History:   has a past medical history of Anemia, COPD (chronic obstructive pulmonary disease) ([address] Diabetes mellitus ([address] Edema, GI (gastrointestinal bleed), Gout, Hypertension, Morbid obesity ([address] Neuropathy, Obstructive sleep apnea, and Peripheral vascular disease ([address] Surgical History:   has a past surgical history that includes Appendectomy; Total hip arthroplasty; and Colonoscopy (N/A, 5/7/2019). Social History:   reports that he quit smoking about 10 years ago. His smoking use included cigarettes. He has a 50.00 pack-year smoking history. He has never used smokeless tobacco. He reports current alcohol use of about 14.0 standard drinks per week. He reports that he does not use drugs. Family History:  No family history of premature coronary artery disease, aortic disease, or valve disease. Home Medications:  Were reviewed and are listed in nursing record.  and/or listed below  Prior to Admission medications Medication Sig Start Date End Date Taking? Authorizing Provider   gabapentin (NEURONTIN) 300 MG capsule TAKE ONE CAPSULE BY MOUTH THREE TIMES A DAY 8/1/22 11/1/22  Ellery Duverney, MD   furosemide (LASIX) 40 MG tablet TAKE ONE TABLET BY MOUTH TWICE A DAY 6/10/22   Ellery Duverney, MD   predniSONE (DELTASONE) 5 MG tablet TAKE ONE TABLET BY MOUTH DAILY 5/20/22   Ellery Duverney, MD   allopurinol (ZYLOPRIM) 100 MG tablet TAKE TWO TABLETS BY MOUTH DAILY 4/11/22   Ellery Duverney, MD   zoster recombinant adjuvanted vaccine [facility]) 50 MCG/0.5ML SUSR injection 50 MCG IM then repeat 2-6 months.  4/11/22 4/11/23  Ellery Duverney, MD   celecoxib (CELEBREX) 100 MG capsule Take 1 capsule by mouth 2 times daily 4/11/22   Ellery Duverney, MD   BREO ELLIPTA 869-57 MCG/INH AEPB inhaler INHALE ONE DOSE BY MOUTH DAILY 2/9/22   Marquis Anastasia MD   ACCU-CHEK CLARISSA PLUS strip USE ONE STRIP TO TEST DAILY 1/20/22   Ellery Duverney, MD   ipratropium-albuterol (DUONEB) 0.5-2.5 (3) MG/3ML SOLN nebulizer solution INHALE ONE VIAL (3MLS) VIA NEBULIZATION BY MOUTH FOUR TIMES A DAY 11/5/21   Marquis Anastasia MD   albuterol sulfate  (90 Base) MCG/ACT inhaler INHALE TWO PUFFS BY MOUTH EVERY 6 HOURS AS NEEDED 10/12/21   Marquis Anastasia MD   metFORMIN (GLUCOPHAGE) 1000 MG tablet TAKE ONE TABLET BY MOUTH TWICE A DAY WITH MEALS 9/30/21   Ellery Duverney, MD   lisinopril (PRINIVIL;ZESTRIL) 2.5 MG tablet TAKE ONE TABLET BY MOUTH DAILY 9/29/21   Ellery Duverney, MD   Accu-Chek Softclix Lancets MISC USE ONE LANCET TO TEST DAILY 2/5/21   Ellery Duverney, MD   diclofenac (VOLTAREN) 75 MG EC tablet TAKE ONE TABLET BY MOUTH TWICE A DAY 3/13/20 9/4/22  Ellery Duverney, MD   Blood Glucose Monitoring Suppl (ACCU-CHEK CLARISSA PLUS) w/Device KIT 1 each by Does not apply route daily 12/23/19   Ellery Duverney, MD   acetaminophen (TYLENOL) 325 MG tablet Take 650 mg by mouth every 6 hours as needed for Pain    Historical Provider, MD        Current Medications:  Current Facility-Administered Medications   Medication Dose Route Frequency Provider Last Rate Last Admin    thiamine mononitrate tablet 100 mg  100 mg Oral Daily Kacey Woods MD   100 mg at 09/07/22 0824    rosuvastatin (CRESTOR) tablet 20 mg  20 mg Oral Nightly Ronnie Robertson MD   20 mg at 09/06/22 2050    aspirin chewable tablet 81 mg  81 mg Oral Daily Ronnie Robertson MD   81 mg at 09/07/22 2454    allopurinol (ZYLOPRIM) tablet 200 mg  200 mg Oral Daily Anjali Couch APRN - CNP   200 mg at 09/07/22 3060    celecoxib (CELEBREX) capsule 100 mg  100 mg Oral BID THU Simpson - CNP   100 mg at 09/07/22 0824    predniSONE (DELTASONE) tablet 5 mg  5 mg Oral Daily Anjali Couch APRN - CNP   5 mg at 09/07/22 9067    gabapentin (NEURONTIN) capsule 300 mg  300 mg Oral TID Anjali Couch APRN - CNP   300 mg at 09/07/22 1645    lisinopril (PRINIVIL;ZESTRIL) tablet 2.5 mg  2.5 mg Oral Daily Ronnie Robertson MD   2.5 mg at 09/07/22 0811    ipratropium-albuterol (DUONEB) nebulizer solution 1 ampule  1 ampule Inhalation Q4H PRN Ronnie Robertson MD   1 ampule at 09/03/22 2323    furosemide (LASIX) tablet 40 mg  40 mg Oral BID Ronnie Robertson MD   40 mg at 09/07/22 1825    albuterol sulfate HFA (PROVENTIL;VENTOLIN;PROAIR) 108 (90 Base) MCG/ACT inhaler 1 puff  1 puff Inhalation Q6H PRN Kacey Woods MD        sodium chloride flush 0.9 % injection 5-40 mL  5-40 mL IntraVENous 2 times per day Ronnie Robertson MD   10 mL at 09/07/22 0813    sodium chloride flush 0.9 % injection 5-40 mL  5-40 mL IntraVENous PRN Kacey Woods MD        0.9 % sodium chloride infusion   IntraVENous PRN Ronnie Robertson MD        enoxaparin (LOVENOX) injection 40 mg  40 mg SubCUTAneous Nightly Kacey Woods MD   40 mg at 09/06/22 2050    ondansetron (ZOFRAN-ODT) disintegrating tablet 4 mg  4 mg Oral Q8H PRN Ronnie Robertson MD        Or    ondansetron [facility]) injection 4 mg  4 mg IntraVENous Q6H PRN Kacey Woods MD        polyethylene glycol (GLYCOLAX) packet 17 g  17 g Oral Daily PRN Navi Dubon MD        acetaminophen (TYLENOL) tablet 650 mg  650 mg Oral Q6H PRN Navi Dubon MD   650 mg at 09/04/22 1003    Or    acetaminophen (TYLENOL) suppository 650 mg  650 mg Rectal Q6H PRN Navi Dubon MD        mometasone-formoterol (DULERA) 200-5 MCG/ACT inhaler 2 puff  2 puff Inhalation BID Navi Dubon MD   2 puff at 09/07/22 1942        Allergies:  Patient has no known allergies. Review of Systems:     Constitutional: there has been no unanticipated weight loss. +weight gain  Eyes: No visual changes or diplopia. No scleral icterus. ENT: No Headaches, hearing loss or vertigo. No mouth sores or sore throat. Cardiovascular: Reviewed in HPI  Respiratory: No cough or wheezing, no sputum production. No hematemesis. Gastrointestinal: No abdominal pain, appetite loss, blood in stools. No change in bowel or bladder habits. Genitourinary: No dysuria, trouble voiding, or hematuria. Musculoskeletal:  No gait disturbance, weakness or joint complaints. Integumentary: No rash or pruritis. Neurological: No headache, diplopia, change in muscle strength, numbness or tingling. +altered memory  Psychiatric: No anxiety, no depression. Endocrine: No malaise, fatigue or temperature intolerance. No excessive thirst, fluid intake, or urination. No tremor. Hematologic/Lymphatic: No abnormal bruising or bleeding, blood clots or swollen lymph nodes. Allergic/Immunologic: No nasal congestion or hives.       Physical Examination:    Vitals:    09/07/22 1943   BP:    Pulse: 84   Resp: 18   Temp:    SpO2: 94%    Weight: (!) 317 lb (143.8 kg)         General Appearance:  Alert, cooperative, no distress, appears stated age   Head:  Normocephalic, without obvious abnormality, atraumatic   Eyes:  PERRL, conjunctiva/corneas clear       Nose: Nares normal, no drainage or sinus tenderness   Throat: Lips, mucosa, and tongue normal   Neck: Supple, symmetrical, trachea midline, no adenopathy, thyroid: not enlarged, symmetric, no tenderness/mass/nodules, JVD 14 cm       Lungs:   Clear to auscultation bilaterally, respirations unlabored   Chest Wall:  No tenderness or deformity   Heart:  Regular rate and rhythm, S1, S2 normal, 2/6 systolic murmur   Abdomen:   Soft, non-tender, bowel sounds active all four quadrants,  no masses, no organomegaly           Extremities: Extremities normal, atraumatic, +2 edema   Pulses: 2+ and symmetric   Skin: Skin color, texture, turgor normal, no rashes or lesions   Pysch: Normal mood and affect            Labs  CBC:   Lab Results   Component Value Date/Time    WBC 6.2 09/06/2022 05:32 AM    RBC 3.70 09/06/2022 05:32 AM    HGB 12.5 09/06/2022 05:32 AM    HCT 39.4 09/06/2022 05:32 AM    .6 09/06/2022 05:32 AM    RDW 14.8 09/06/2022 05:32 AM     09/06/2022 05:32 AM     CMP:    Lab Results   Component Value Date/Time     09/06/2022 05:32 AM    K 5.1 09/06/2022 05:32 AM    K 5.0 09/03/2022 02:26 PM     09/06/2022 05:32 AM    CO2 29 09/06/2022 05:32 AM    BUN 32 09/06/2022 05:32 AM    CREATININE 1.2 09/06/2022 05:32 AM    GFRAA >60 09/06/2022 05:32 AM    GFRAA >60 04/17/2013 08:55 AM    AGRATIO 1.2 09/03/2022 02:26 PM    LABGLOM 59 09/06/2022 05:32 AM    GLUCOSE 129 09/06/2022 05:32 AM    PROT 6.9 09/03/2022 02:26 PM    PROT 7.0 10/25/2010 09:23 AM    CALCIUM 9.2 09/06/2022 05:32 AM    BILITOT 0.4 09/03/2022 02:26 PM    ALKPHOS 65 09/03/2022 02:26 PM    AST 8 09/03/2022 02:26 PM    ALT <5 09/03/2022 02:26 PM     PT/INR:  No results found for: PTINR  Lab Results   Component Value Date    TROPONINI <0.01 09/03/2022       EKG:  I have reviewed EKG with the following interpretation:  Impression:  sinus rhythm with 1st degree av block and pvcs    Echo:  A bubble study was performed and shows evidence of a trivial amount of right   to left shunting consistent with a tiny patent foramen ovale or atrial   septal defect. Irregular rhythm. Left ventricular systolic function is normal with ejection fraction   estimated at 55 %. No regional wall motion abnormalities are noted. Normal left ventricular wall thickness. Diastolic dysfunction grade and filing pressure are indeterminate. The right ventricle is mildly enlarged. The right atrium is mildly dilated. The aortic root is moderately dilated. The ascending aorta is moderately dilated. Mild mitral regurgitation. Stress: none  Cath: 2009 Cardiac cath 2/13/09:  Normal coronaries  LVEF 70%     RA-mean 12  RV-45/2, 10  PA-56/10 mean 35  PW-mean 30  LV-128/-9, 13  Ao-121/71  MRI/EP/Other: none    Old notes reviewed  Telemetry reviewd  Ekg personally reviewed  Chest xray personally reviewed  Echo, cath, and   Medications and labs reviewed  high complexity/medical decision making due to extensive data review, extensive history review, independent review of data  high risk due to acute illness, evaluation of drug-drug interactions, medication management and diagnostic interventions      Assessment  Patient Active Problem List   Diagnosis    HTN (hypertension)    Syncope    DM (diabetes mellitus) ([address]    Arthritis    Chronic respiratory failure (HCC)    STEF (obstructive sleep apnea)    Venous insufficiency    Acute sinusitis    Morbid obesity ([address]    Vitamin D deficiency    Open back wound    COPD, severe ([address]    HLD (hyperlipidemia)    Diastolic dysfunction    Aneurysm of abdominal aorta (HCC)    Personal history of tobacco use, presenting hazards to health    Encephalopathy    Altered mental status    Cerebrovascular accident (CVA) due to thrombosis of left middle cerebral artery ([address]    Acute on chronic respiratory failure with hypoxia and hypercapnia ([address]         Plan:    I had the opportunity to review the clinical symptoms and presentation of Giana Farrar. Assessment/Plan:  Acute ischemic stroke  - new problem  Plan:  - per neurology  - unlikely to be from atrial level shunt at 79 yo, consider cta head and neck as likely intracranial atherosclerotic stenosis   - 30 day monitor at dc to evaluate for afib, if negative pursue linq monitor implant  - stop celebrex as associated with mi and stroke    2. Atrial level shunt  - unlikely cause of stroke  - new problem  Plan:  - can perform DARION with anesthesia/cardiac MRI as outpatient when he recovers from stroke. Patient would like to follow up with Pau Toro MD    3. Hefpef  - new problem  Plan:  - bnp  - IV diuresis     4. Leg swelling  - new problem  Plan:  - doppler to rule out DVTs    5. Chronic respiratory failure     6. Ventricular ectopy  - new problem  Plan:  - start metoprolol succinate 12.5 mg    7. Recent covid    8. STEF on cpap    I will address the patient's cardiac risk factors and adjusted pharmacologic treatment as needed. In addition, I have reinforced the need for patient directed risk factor modification. Tobacco use was discussed with the patient and educated on the negative effects. I have asked the patient to not utilize these agents. Thank you for allowing to us to participate in the care or Sydni Najera. Further evaluation will be based upon the patient's clinical course and testing results. All questions and concerns were addressed to the patient/family. Alternatives to my treatment were discussed. The note was completed using EMR. Every effort was made to ensure accuracy; however, inadvertent computerized transcription errors may be present. All questions and concerns were addressed to the patient/family. Alternatives to my treatment were discussed. The note was completed using EMR. Every effort was made to ensure accuracy; however, inadvertent computerized transcription errors may be present.   Erin Ahuja DO, MD 9/7/2022 9:35 PM

## 2022-09-08 NOTE — PROGRESS NOTES
[facility] Pulmonary/CCM Progress note      Admit Date: 9/3/2022    Chief Complaint: Altered mental status and shortness of breath    Subjective: Interval History: Pleasantly confused, but able to answer all questions appropriately. Remains on 3 L O2. Denies any shortness of breath, cough or chest pain.     Scheduled Meds:   furosemide  80 mg IntraVENous Daily    metoprolol succinate  12.5 mg Oral Daily    thiamine mononitrate  100 mg Oral Daily    rosuvastatin  20 mg Oral Nightly    aspirin  81 mg Oral Daily    allopurinol  200 mg Oral Daily    predniSONE  5 mg Oral Daily    gabapentin  300 mg Oral TID    lisinopril  2.5 mg Oral Daily    sodium chloride flush  5-40 mL IntraVENous 2 times per day    enoxaparin  40 mg SubCUTAneous Nightly    mometasone-formoterol  2 puff Inhalation BID     Continuous Infusions:   sodium chloride       PRN Meds:ipratropium-albuterol, albuterol sulfate HFA, sodium chloride flush, sodium chloride, ondansetron **OR** ondansetron, polyethylene glycol, acetaminophen **OR** acetaminophen    Review of Systems  Constitutional: negative for fatigue, fevers, malaise and weight loss  Ears, nose, mouth, throat: negative for ear drainage, epistaxis, hoarseness, nasal congestion, sore throat and voice change  Respiratory: negative for shortness of breath, cough, phlegm or pleurisy  Cardiovascular: negative for chest pain, chest pressure/discomfort, irregular heart beat, lower extremity edema and palpitations  Gastrointestinal: negative for abdominal pain, constipation, diarrhea, jaundice, melena, odynophagia, reflux symptoms and vomiting  Hematologic/lymphatic: negative for bleeding, easy bruising, lymphadenopathy and petechiae  Musculoskeletal:negative for arthralgias, bone pain, muscle weakness, neck pain and stiff joints  Neurological: negative for dizziness, gait problems, headaches, seizures, speech problems, tremors and weakness  Behavioral/Psych: negative for anxiety, behavior problems, depression, fatigue and sleep disturbance  Endocrine: negative for diabetic symptoms including none, neuropathy, polyphagia, polyuria, polydipsia, vomiting and diarrhea and temperature intolerance  Allergic/Immunologic: negative for anaphylaxis, angioedema, hay fever and urticaria    Objective:     Patient Vitals for the past 8 hrs:   BP Temp Temp src Pulse Resp SpO2 Weight   09/08/22 0930 -- -- -- 88 18 94 % --   09/08/22 0728 -- -- -- -- -- -- (!) 320 lb 3.2 oz (145.2 kg)   09/08/22 0723 127/74 -- -- 88 18 93 % --   09/08/22 0430 -- -- -- -- -- 97 % --   09/08/22 0415 106/65 98.4 °F (36.9 °C) Temporal 73 20 96 % --     I/O last 3 completed shifts: In: 1630 [P.O.:1620;  I.V.:10]  Out: -   I/O this shift:  In: 10 [I.V.:10]  Out: -     General Appearance: alert and oriented to person, place and time, well developed and well- nourished, in no acute distress  Skin: warm and dry, no rash or erythema  Head: normocephalic and atraumatic  Eyes: pupils equal, round, and reactive to light, extraocular eye movements intact, conjunctivae normal  ENT: external ear and ear canal normal bilaterally, nose without deformity, nasal mucosa and turbinates normal  Neck: supple and non-tender without mass, no cervical lymphadenopathy  Pulmonary/Chest: clear to auscultation bilaterally- no wheezes, rales or rhonchi, normal air movement, no respiratory distress  Cardiovascular: normal rate, regular rhythm,  no murmurs, rubs, distal pulses intact, no carotid bruits  Abdomen: soft, non-tender, non-distended, normal bowel sounds, no masses or organomegaly  Lymph Nodes: Cervical, supraclavicular normal  Extremities: no cyanosis, clubbing or edema  Musculoskeletal: normal range of motion, no joint swelling, deformity or tenderness  Neurologic: alert, no focal neurologic deficits    Data Review:  CBC:   Lab Results   Component Value Date/Time    WBC 6.2 09/06/2022 05:32 AM    RBC 3.70 09/06/2022 05:32 AM     BMP:   Lab Results   Component Value

## 2022-09-08 NOTE — CONSULTS
Patient: Edin Caro  [id number]  Date: 9/8/2022      Chief Complaint: AMS    History of Present Illness/Hospital Course:  28-year-old male with a history of COPD, diabetes, gout, hypertension, and STEF who was admitted on 9/3 with altered mental status. CT head was unremarkable for acute findings. 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 Echo with a normal EF and positive bubble study. Cardiology evaluated and suggested a 30-day monitor and outpatient work-up. He was evaluated by therapy and suggested to continue in an inpatient setting prior to returning home. He is strongly interested in returning to home where he lives alone and was caring for himself independently. Prior Level of Function:  Independent and living alone    Current Level of Function:  CGA     has a past medical history of Anemia, COPD (chronic obstructive pulmonary disease) ([address] Diabetes mellitus ([address] Edema, GI (gastrointestinal bleed), Gout, Hypertension, Morbid obesity ([address] Neuropathy, Obstructive sleep apnea, and Peripheral vascular disease ([address] has a past surgical history that includes Appendectomy; Total hip arthroplasty; and Colonoscopy (N/A, 5/7/2019). reports that he quit smoking about 10 years ago. His smoking use included cigarettes. He has a 50.00 pack-year smoking history. He has never used smokeless tobacco. He reports current alcohol use of about 14.0 standard drinks per week. He reports that he does not use drugs. family history includes Cancer in his father; Depression in his mother; Diabetes in his mother; Heart Disease in his mother; High Blood Pressure in his mother; High Cholesterol in his mother; Mental Illness in his mother; Stroke in his mother. REVIEW OF SYSTEMS:   CONSTITUTIONAL: negative for fevers, chills, diaphoresis, appetite change, night sweats and unexpected weight change.    HEENT: negative for hearing loss, tinnitus, ear drainage, sinus pressure, nasal congestion, epistaxis and snoring. RESPIRATORY: Negative for hemoptysis, cough, sputum production. CARDIOVASCULAR: negative for chest pain, palpitations, exertional chest pressure/discomfort, edema, syncope. GASTROINTESTINAL: negative for nausea, vomiting, diarrhea, constipation, blood in stool and abdominal pain. GENITOURINARY: negative for frequency, dysuria, urinary incontinence, decreased urine volume, and hematuria. HEMATOLOGIC/LYMPHATIC: negative for easy bruising, bleeding and lymphadenopathy. ALLERGIC/IMMUNOLOGIC: negative for recurrent infections, angioedema, anaphylaxis and drug reactions. ENDOCRINE: negative for weight changes and diabetic symptoms including polyuria, polydipsia and polyphagia. MUSCULOSKELETAL: negative for pain, joint swelling, decreased range of motion and muscle weakness. NEUROLOGICAL: negative for headaches, slurred speech, unilateral weakness. PSYCHIATRIC/BEHAVIORAL: negative for hallucinations, behavioral problems, confusion and agitation. All pertinent positives are noted in the HPI. Physical Examination:  Vitals: Patient Vitals for the past 24 hrs:   BP Temp Temp src Pulse Resp SpO2 Weight   09/08/22 0930 -- -- -- 88 18 94 % --   09/08/22 0728 -- -- -- -- -- -- (!) 320 lb 3.2 oz (145.2 kg)   09/08/22 0723 127/74 -- -- 88 18 93 % --   09/08/22 0430 -- -- -- -- -- 97 % --   09/08/22 0415 106/65 98.4 °F (36.9 °C) Temporal 73 20 96 % --   09/08/22 0018 -- -- -- 80 22 97 % --   09/07/22 2355 (!) 106/51 98.1 °F (36.7 °C) Oral 77 20 97 % --   09/07/22 2247 -- -- -- -- 20 96 % --   09/07/22 1943 -- -- -- 84 18 94 % --   09/07/22 1930 100/63 98 °F (36.7 °C) Oral 82 18 97 % --   09/07/22 1645 (!) 103/56 98.3 °F (36.8 °C) Oral 80 18 96 % --   09/07/22 1225 -- -- -- 91 18 94 % --   09/07/22 1155 121/67 98.2 °F (36.8 °C) Oral 87 18 94 % --     Psych: Stable mood, normal judgement, normal affect.    Const: No distress, morbid obesity  Eyes: Conjunctiva noninjected, no icterus noted; pupils equal, round. HENT: Atraumatic, normocephalic; Oral mucosa moist  Neck: Trachea midline, neck supple. No thyromegaly noted. CV: No audible murmurs  Resp: No increased WOB, no audible wheezing   GI: Nondistended   Neuro: Alert, oriented, appropriate. Skin: No visible abnormalities  MSK: No joint abnormalities noted. Ext: No significant edema appreciated. No varicosities. Lab Results   Component Value Date    WBC 6.2 09/06/2022    HGB 12.5 (L) 09/06/2022    HCT 39.4 (L) 09/06/2022    .6 (H) 09/06/2022     09/06/2022     No results found for: INR, PROTIME  Lab Results   Component Value Date    CREATININE 1.2 09/06/2022    BUN 32 (H) 09/06/2022     09/06/2022    K 5.1 09/06/2022     09/06/2022    CO2 29 09/06/2022     Lab Results   Component Value Date    ALT <5 (L) 09/03/2022    AST 8 (L) 09/03/2022    ALKPHOS 65 09/03/2022    BILITOT 0.4 09/03/2022       Most recent echocardiogram revealed   Type of Study      TTE procedure:ECHOCARDIOGRAM COMPLETE WITH BUBBLE STUDY. Procedure Date  Date: 09/06/2022 Start: 11:05 AM     Study Location: [facility] - Echo Lab  Technical Quality: Limited visualization due to body habitus. Indications:TIA. Additional Indications:Confusion. Patient Status: Routine     Contrast Medium: Definity. Amount - 2.75 ml     Height: 73 inches Weight: 317.01 pounds BSA: 2.62 m2 BMI: 41.82 kg/m2     BP: 121/72 mmHg      Conclusions      Summary   A bubble study was performed and shows evidence of a trivial amount of right   to left shunting consistent with a tiny patent foramen ovale or atrial   septal defect. Irregular rhythm. Left ventricular systolic function is normal with ejection fraction   estimated at 55 %. No regional wall motion abnormalities are noted. Normal left ventricular wall thickness. Diastolic dysfunction grade and filing pressure are indeterminate.    The right ventricle is mildly enlarged. The right atrium is mildly dilated. The aortic root is moderately dilated. The ascending aorta is moderately dilated. Mild mitral regurgitation. Most recent imaging studies revealed   EXAMINATION:   MRI OF THE BRAIN WITHOUT CONTRAST  9/6/2022 6:48 pm       TECHNIQUE:   Multiplanar multisequence MRI of the brain was performed without the   administration of intravenous contrast.       COMPARISON:   None. HISTORY:   ORDERING SYSTEM PROVIDED HISTORY: altered mental state   TECHNOLOGIST PROVIDED HISTORY:   Reason for exam:->altered mental state   Decision Support Exception - unselect if not a suspected or confirmed   emergency medical condition->Emergency Medical Condition (MA)   Reason for Exam: confusion       Initial evaluation. FINDINGS:   INTRACRANIAL STRUCTURES/VENTRICLES: There is a moderate sized acute infarct   involving the left temporal lobe within the left MCA territory. Sulcal   effacement is seen within these area of infarct without significant mass   effect or midline shift. No evidence of an acute intracranial hemorrhage. Areas of T2 FLAIR hyperintensity are seen in the periventricular and   subcortical white matter, which are nonspecific, but may represent chronic   microvascular ischemic change. There is mild global parenchymal volume loss. The sellar/suprasellar regions appear unremarkable. The normal signal voids   within the major intracranial vessels appear maintained. ORBITS: The visualized portion of the orbits demonstrate no acute abnormality. SINUSES: The visualized paranasal sinuses and mastoid air cells demonstrate   no acute abnormality. BONES/SOFT TISSUES: The bone marrow signal intensity appears normal. The soft   tissues demonstrate no acute abnormality. Impression   1. 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   Sulcal effacement in this region of infarct without mass   effect or midline shift. 2. Otherwise, no acute intracranial abnormality. 3. Minimal global parenchymal volume loss with minimal chronic microvascular   ischemic changes. The above laboratory data have been reviewed. The above imaging data have been reviewed. The above medical testing have been reviewed. Body mass index is 42.25 kg/m². Assessment and Plan:  CVA: ASA, statin. PT/OT/SLP  PFO: MCOT with OP workup  HTN  HLD  DM  Neuropathy  Hx Gout  COPD  STEF    Dispo: Patient is an appropriate ARU candidate. Able to tolerate the required 3 hours of therapy in an ARU setting. Previously living alone and independent. Patient is strongly hopeful to return to that scenario. Pre-CERT to be initiated for ARU today. We will continue to follow. Thank you for the consultation. Jay Solis MD 9/8/2022, 10:15 AM     * This document was created using dictation software. While all precautions were taken to ensure accuracy, errors may have occurred. Please disregard any typographical errors.

## 2022-09-08 NOTE — CARE COORDINATION
Met with patient and called his daughter Kinsey Romero to offer assistance with home care. They have no home care agency preference. Referred to [facility].

## 2022-09-08 NOTE — PROGRESS NOTES
Physical/Occupational Therapy  [facility]    Attempted to see pt for PT/OT treatment. Chart reviewed. Patient with pending LE dopplers. Will hold therapy at this time and will follow up with pt pending results of imaging.  Thanks, Stephanie Ba, PT, DPT [id number], Navjot Daniels, OTR/L [id number]

## 2022-09-08 NOTE — PROGRESS NOTES
Kristina Myers  Neurology Follow-up  [facility] Neurology    Date of Service: 9/8/2022    Subjective:   CC: Follow up today regarding: Acute confusion and new stroke    Events noted. Chart and lab reviewed. The patient is currently by himself. He is about the same. Mixed aphasia but denies any focal weakness. No chest pain or headache. Seen by cardiology and plan for event monitor. He denies any headache, neck or back pain, chest pain, dysphagia. Other review of system was unremarkable. ROS : A 10-12 system review obtained and updated today and is unremarkable except as mentioned  in my interval history. family history includes Cancer in his father; Depression in his mother; Diabetes in his mother; Heart Disease in his mother; High Blood Pressure in his mother; High Cholesterol in his mother; Mental Illness in his mother; Stroke in his mother.     Past Medical History:   Diagnosis Date    Anemia     COPD (chronic obstructive pulmonary disease) (HCC)     Diabetes mellitus (HCC)     Edema     GI (gastrointestinal bleed)     Gout     Hypertension     Morbid obesity (HCC)     Neuropathy     Obstructive sleep apnea     uses CPAP    Peripheral vascular disease (HCC)      Current Facility-Administered Medications   Medication Dose Route Frequency Provider Last Rate Last Admin    furosemide (LASIX) injection 80 mg  80 mg IntraVENous Daily Lucio Khand, DO        metoprolol succinate (TOPROL XL) extended release tablet 12.5 mg  12.5 mg Oral Daily Lucio Hastings DO   12.5 mg at 09/07/22 2246    thiamine mononitrate tablet 100 mg  100 mg Oral Daily Kacey Woods MD   100 mg at 09/07/22 0824    rosuvastatin (CRESTOR) tablet 20 mg  20 mg Oral Nightly Junior Hernandez MD   20 mg at 09/07/22 2247    aspirin chewable tablet 81 mg  81 mg Oral Daily Junior Hernandez MD   81 mg at 09/07/22 0811    allopurinol (ZYLOPRIM) tablet 200 mg  200 mg Oral Daily THU Simpson - CNP   200 mg at 09/07/22 0934 predniSONE (DELTASONE) tablet 5 mg  5 mg Oral Daily Anjali BERG THU Couch - CNP   5 mg at 09/07/22 0653    gabapentin (NEURONTIN) capsule 300 mg  300 mg Oral TID Marloe Bence THU Couch CNP   300 mg at 09/07/22 2247    lisinopril (PRINIVIL;ZESTRIL) tablet 2.5 mg  2.5 mg Oral Daily Дмитрий Ferro MD   2.5 mg at 09/07/22 0811    ipratropium-albuterol (DUONEB) nebulizer solution 1 ampule  1 ampule Inhalation Q4H PRN Дмитрий Ferro MD   1 ampule at 09/03/22 2323    albuterol sulfate HFA (PROVENTIL;VENTOLIN;PROAIR) 108 (90 Base) MCG/ACT inhaler 1 puff  1 puff Inhalation Q6H PRN Kacey Woods MD        sodium chloride flush 0.9 % injection 5-40 mL  5-40 mL IntraVENous 2 times per day Дмитрий Ferro MD   10 mL at 09/07/22 2247    sodium chloride flush 0.9 % injection 5-40 mL  5-40 mL IntraVENous PRN Kacey Woods MD        0.9 % sodium chloride infusion   IntraVENous PRN Дмитрий Ferro MD        enoxaparin (LOVENOX) injection 40 mg  40 mg SubCUTAneous Nightly Kacey Woods MD   40 mg at 09/07/22 2247    ondansetron (ZOFRAN-ODT) disintegrating tablet 4 mg  4 mg Oral Q8H PRN Дмитрий Ferro MD        Or    ondansetron (ZOFRAN) injection 4 mg  4 mg IntraVENous Q6H PRN Kacey Woods MD        polyethylene glycol (GLYCOLAX) packet 17 g  17 g Oral Daily PRN Дмитрий Ferro MD        acetaminophen (TYLENOL) tablet 650 mg  650 mg Oral Q6H PRN Дмитрий Ferro MD   650 mg at 09/04/22 1003    Or    acetaminophen (TYLENOL) suppository 650 mg  650 mg Rectal Q6H PRN Дмитрий Ferro MD        mometasone-formoterol (DULERA) 200-5 MCG/ACT inhaler 2 puff  2 puff Inhalation BID Дмитрий Ferro MD   2 puff at 09/08/22 0929     No Known Allergies   reports that he quit smoking about 10 years ago. His smoking use included cigarettes. He has a 50.00 pack-year smoking history. He has never used smokeless tobacco. He reports current alcohol use of about 14.0 standard drinks per week. He reports that he does not use drugs.        Objective:  Exam: Constitutional:   Vitals:    09/08/22 0430 09/08/22 0723 09/08/22 0728 09/08/22 0930   BP:  127/74     Pulse:  88  88   Resp:  18  18   Temp:       TempSrc:       SpO2: 97% 93%  94%   Weight:   (!) 320 lb 3.2 oz (145.2 kg)    Height:         General appearance:  Normal development and appear in no acute distress. Mental Status:   Oriented to person, place, problem, and time. Memory: Good immediate recall. Intact remote memory  Normal attention span and concentration. Language: Fluent aphasia with paraphasic error    Poor  fund of Knowledge. Cranial Nerves:   II: Visual fields: Full. Pupils: equal, round, reactive to light  III,IV,VI: Extra Ocular Movements are intact.  No nystagmus  V: Facial sensation is intact  VII: Facial strength and movements: intact and symmetric  XII: Tongue movements are normal  Musculoskeletal: No focal weakness  Normal tone  Distal edema  Diminished DTRs distally  Walk with his cane, unsteady  No change in exam    Data:  LABS:   Lab Results   Component Value Date/Time     09/06/2022 05:32 AM    K 5.1 09/06/2022 05:32 AM    K 5.0 09/03/2022 02:26 PM     09/06/2022 05:32 AM    CO2 29 09/06/2022 05:32 AM    BUN 32 09/06/2022 05:32 AM    CREATININE 1.2 09/06/2022 05:32 AM    GFRAA >60 09/06/2022 05:32 AM    GFRAA >60 04/17/2013 08:55 AM    LABGLOM 59 09/06/2022 05:32 AM    GLUCOSE 129 09/06/2022 05:32 AM    PHOS 3.6 04/21/2020 04:09 PM    MG 2.50 09/08/2022 08:00 AM    CALCIUM 9.2 09/06/2022 05:32 AM     Lab Results   Component Value Date/Time    WBC 6.2 09/06/2022 05:32 AM    RBC 3.70 09/06/2022 05:32 AM    HGB 12.5 09/06/2022 05:32 AM    HCT 39.4 09/06/2022 05:32 AM    .6 09/06/2022 05:32 AM    RDW 14.8 09/06/2022 05:32 AM     09/06/2022 05:32 AM   No results found for: INR, PROTIME    Neuroimaging was independently reviewed by me and discussed results with the patient  I reviewed blood testing and other test results and discussed results with the patient    No changes  Impression:  Acute aphasia secondary to new ischemic left MCA stroke. Thromboembolic versus cardioembolic  Hypertension  Hyperlipidemia  Diabetes  PFO--- likely conservative measures for now, consider cardiology consult for evaluation. Recommendation  Continue aspirin statin  Follow-up with cardiology outpatient for his PFO and event monitor  PT, OT and speech  Diabetic control  DVT and GI prophylaxis  ASA and Statin  Continue current blood pressure medications  DT precautions  DC planning  No further recommendation   I will sign off-----           Justin Ramirez MD   [phone]      This dictation was generated by voice recognition computer software. Although all attempts are made to edit the dictation for accuracy, there may be errors in the transcription that are not intended.

## 2022-09-08 NOTE — PROGRESS NOTES
Facility/Department: [address]  Speech Language Pathology   Dysphagia and Speech Language/Cognitive Treatment Note    Patient: Cyndi Barnes   : 1945   MRN: [id number]      Evaluation Date: 2022      Admitting Dx: Metabolic encephalopathy [W40.59]  Encephalopathy [G93.40]  Altered mental status, unspecified altered mental status type [R41.82]  Treatment Diagnosis: Expressive Aphasia , Receptive Aphasia , Cognitive-Linguistic Deficits , Oropharyngeal Dysphagia   Pain: Denies                                                Subjective:  Pt alert and receptive to intervention this date, sitting upright in chair. Pt's daughter and son-in-law present for portion of session. MRI Brain: 2022  Impression   1. 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 Sulcal effacement in this region of infarct without mass   effect or midline shift. 2. Otherwise, no acute intracranial abnormality. 3. Minimal global parenchymal volume loss with minimal chronic microvascular   ischemic changes. These results were sent to the [facility] [address] ([address] [place] [address]) on 2022   at 7:38 pm to be communicated to the referring/covering health care   provider/office. Dysphagia Treatment:   Diet and Treatment Recommendations 2022:  Diet Solids Recommendation:  Regular texture diet  Liquid Consistency Recommendation: Thin liquids  Recommended form of Meds: Whole with water or Meds in puree      Compensatory strategies: Alternate solids/liquids , Upright as possible with all PO intake , Small bites/sips , Eat/feed slowly, Remain upright 30-45 min     Assessment of Texture Tolerance:  Diet level prior to treatment: Regular texture diet , Thin liquids   Tolerance of Current Diet Level:Per chart, no noted difficulty with current diet level      -Impressions: Pt was positioned Upright in chair, awake and alert. Currently on  3L O2 via nasal cannula .  Trials of thin liquids and regular solids  were provided to assess swallow function, pt independently fed himself. Pt reported no difficulty with meals this date. Trials of thin liquids revealed suspected premature bolus loss to pharynx with mildly delayed swallow initiation. Adequate mastication and oral clearing noted with regular solids. No overt clinical s/s of aspiration were assessed this date. Pt demonstrates increased risk for aspiration due to cognitive state , co morbidities , and new CVA . Based on today's assessment recommend continuation of regular diet/thin liquids with aspiration precautions and use of compensatory swallow strategies. Dysphagia Goals:  Pt will functionally tolerate recommended diet with no overt clinical s/s of aspiration (Ongoing 09/08/22)  Pt will demonstrate understanding of aspiration risk and precautions via education/demonstration with occasional prompting (Ongoing 09/08/22)  If clinical s/s of aspiration/penetration continue to be noted, Pt will participate in Modified Barium Swallow Study (Ongoing 09/08/22)      Speech Language/Cognitive Treatment:  Impressions: This date session focused on improved expressive and receptive language skills. Confrontational naming task completed with 75% accuracy. Pt intermittently noted to describe items with eventually getting to target word. Pt with tangential speech with redirection required at times. Divergent naming task completed with min-mod cues for comprehension of task, pt verbose and with poor topic maintenance during task and benefited from frequent redirection. Pt oriented to self, facility, current month, and year. Intermittent anomia and paraphasic errors noted during session with reduced awareness. Pt intermittently able to self-correct. Continue ongoing skilled intervention.      Speech Language Short Term Goals:  Pt will improve auditory processing/comprehension of commands and questions to 80%, via graded tasks (Ongoing 09/08/22)  Pt will improve verbal expression for functional expression via graded tasks to 80% (Ongoing 09/08/22)  Pt will participate in ongoing cognitive assessment with goals to be established as indicated (Ongoing 09/08/22)    Assessment: Patient progressing toward goals    Plan: 3-5 times per week during acute care stay for speech language cognition; 1-2 times to ensure diet tolerance    Patient/Family Education:  Provided education regarding role of SLP, recommendations and general speech pathology plan of care. [x] Pt verbalized understanding and agreement   [x] Pt requires ongoing learning   [] No evidence of comprehension     Discharge Recommendations:  Recommend ongoing SLP for speech therapy upon discharge from the hospital     Treatment time  Timed Code Treatment Minutes: 0  Total Treatment time: 25    If patient discharges prior to next session this note will serve as a discharge summary.       Signature:   Maylin Angeles M.A., [address] [place] [address]  Speech-Language Pathologist

## 2022-09-08 NOTE — DISCHARGE INSTR - COC
Continuity of Care Form    Patient Name: All Parra   :  1945  MRN:  [id number]    Admit date:  9/3/2022  Discharge date:  ***    Code Status Order: DNR-CC   Advance Directives:     Admitting Physician:  Pio Robertson MD  PCP: Franc Martinez MD    Discharging Nurse: [facility] Unit/Room#: 5WJ-5704/8118-22  Discharging Unit Phone Number: ***    Emergency Contact:   Extended Emergency Contact Information  Primary Emergency Contact: Makayla Urban  Home Phone: [phone]  Relation: Child  Secondary Emergency Contact: BroMakayla simmons  [place] Phone: [phone]  Relation: Child    Past Surgical History:  Past Surgical History:   Procedure Laterality Date    APPENDECTOMY      COLONOSCOPY N/A 2019    COLONOSCOPY DIAGNOSTIC performed by Francisca Bañuelos MD at [facility]       Immunization History:   Immunization History   Administered Date(s) Administered    COVID-19, PFIZER PURPLE top, DILUTE for use, (age 15 y+), 30mcg/0.3mL 2021, 2021, 10/06/2021    DTaP 10/31/2014    Influenza Vaccine, unspecified formulation 2016    Influenza Virus Vaccine 2016    Influenza, FLUZONE (age 72 y+), High Dose, 0.7mL 10/21/2020    Influenza, High Dose (Fluzone 65 yrs and older) 10/04/2017, 10/04/2019    Pneumococcal Conjugate 13-valent (Eqrlvvf81) 2015    Pneumococcal Polysaccharide (Lisrzvjqz65) 2018    Zoster Live (Zostavax) 2016       Active Problems:  Patient Active Problem List   Diagnosis Code    HTN (hypertension), benign I10    Syncope R55    DM (diabetes mellitus), type 2, uncontrolled with complications ([address] J36.0, E11.65    Arthritis M19.90    Chronic respiratory failure ([address] J96.10    STEF (obstructive sleep apnea) G47.33    Venous insufficiency I87.2    Acute sinusitis J01.90    Morbid obesity ([address] E66.01    Vitamin D deficiency E55.9    Open back wound S21.209A    COPD, severe ([address] J44.9    HLD (hyperlipidemia) K46.8    Diastolic dysfunction P76.53    Aneurysm of abdominal aorta (HCC) I71.4    Personal history of tobacco use, presenting hazards to health Z87.891    Encephalopathy G93.40    Altered mental status R41.82    Cerebrovascular accident (CVA) due to thrombosis of left middle cerebral artery (HCC) I63.312    Acute on chronic respiratory failure with hypoxia and hypercapnia (HCC) J96.21, J96.22    Cerebrovascular accident (CVA) ([address] I63.9    Chronic heart failure with preserved ejection fraction (HCC) I50.32    Ventricular ectopy I49.3    Leg swelling M79.89    TGA (transient global amnesia) G45.4       Isolation/Infection:   Isolation            No Isolation          Patient Infection Status       Infection Onset Added Last Indicated Last Indicated By Review Planned Expiration Resolved Resolved By    None active    Resolved    COVID-19 (Rule Out) 09/03/22 09/03/22 09/03/22 COVID-19, Rapid (Ordered)   09/03/22 Rule-Out Test Resulted            Nurse Assessment:  Last Vital Signs: /66   Pulse 88   Temp 98.5 °F (36.9 °C) (Oral)   Resp 18   Ht 6' 1\" (1.854 m)   Wt (!) 320 lb 3.2 oz (145.2 kg)   SpO2 95%   BMI 42.25 kg/m²     Last documented pain score (0-10 scale): Pain Level: 5  Last Weight:   Wt Readings from Last 1 Encounters:   09/08/22 (!) 320 lb 3.2 oz (145.2 kg)     Mental Status:  {IP PT MENTAL STATUS:94155}    IV Access:  { ABRAN IV ACCESS:832138886}    Nursing Mobility/ADLs:  Walking   {[facility] DME EHMO:153970020}  Transfer  {[facility] DME OFRW:598598365}  Bathing  {[facility] DME WMTX:159706887}  Dressing  {[facility] DME EEEX:618281489}  Toileting  {[facility] DME LLIL:128495254}  Feeding  {[facility] DME XFHZ:519392195}  Med Admin  {[facility] DME NGCC:695037513}  Med Delivery   { ABRAN MED Delivery:114075676}    Wound Care Documentation and Therapy:  Wound 07/07/14 Other (Comment) Back Mid #1 (Active)   Number of days: 2985        Elimination:  Continence:    Bowel: {YES / HB:54756}  Bladder: {YES / KR:60562}  Urinary Catheter: {Urinary Catheter:477135250}   Colostomy/Ileostomy/Ileal Conduit: {YES / LA:87404}       Date of Last BM: ***    Intake/Output Summary (Last 24 hours) at 2022 1444  Last data filed at 2022 0949  Gross per 24 hour   Intake 490 ml   Output --   Net 490 ml     I/O last 3 completed shifts: In: 1630 [P.O.:1620;  I.V.:10]  Out: -     Safety Concerns:     { ABRAN Safety Concerns:737700191}    Impairments/Disabilities:      { ABRAN Impairments/Disabilities:323171232}    Nutrition Therapy:  Current Nutrition Therapy:   [address] ABRAN Diet List:389145408}    Routes of Feeding: {[facility] Other Feedings:671612915}  Liquids: {Slp liquid thickness:57900}  Daily Fluid Restriction: {[facility] DME Yes amt example:349514944}  Last Modified Barium Swallow with Video (Video Swallowing Test): {Done Not Done LMID:510086768}    Treatments at the Time of Hospital Discharge:   Respiratory Treatments: ***  Oxygen Therapy:  {Therapy; copd oxygen:20019}  Ventilator:    {[facility] Vent ZDWK:321252518}    Rehab Therapies: SN, PT, OT,ST  Weight Bearing Status/Restrictions: {[facility] Weight Bearin}  Other Medical Equipment (for information only, NOT a DME order):  {EQUIPMENT:174436336}  Other Treatments: ***    Patient's personal belongings (please select all that are sent with patient):  {[facility] Belongings:074664712}    RN SIGNATURE:  {Esignature:926951198}    CASE MANAGEMENT/SOCIAL WORK SECTION    Inpatient Status Date: ***    Readmission Risk Assessment Score:  Readmission Risk              Risk of Unplanned Readmission:  11           Discharging to Facility/ Agency   Name: [facility] will call for Appointment  Phone: [phone]  Fax: [phone]     Dialysis Facility (if applicable)   Name:  Address:  Dialysis Schedule:  Phone:  Fax:    / signature: {Esignature:563654836}    PHYSICIAN SECTION    Prognosis: {Prognosis:6454625765}    Condition at Discharge: [address] Patient Condition:369312912}    Rehab Potential (if transferring to Rehab): {Prognosis:1887325954}    Recommended Labs or Other Treatments After Discharge: ***    Physician Certification: I certify the above information and transfer of Noemy Martin  is necessary for the continuing treatment of the diagnosis listed and that he requires {Admit to Appropriate Level of Care:94509} for {GREATER/LESS:294436910} 30 days.      Update Admission H&P: {[facility] DME Changes in GGFCX:687431704}    PHYSICIAN SIGNATURE:  {Esignature:625407387}

## 2022-09-08 NOTE — PROGRESS NOTES
[address] Daily Progress Note      Admit Date:  9/3/2022    Chief Complaint: CVA    Subjective:  Mr. Briseida Connor . Patient is being seen by me today for the first time this admission. He denies chest discomfort. Breathing is stable. Still some trouble with memory. Family at bedside.     Objective:   BP (!) 102/57   Pulse 89   Temp 98.2 °F (36.8 °C) (Oral)   Resp 18   Ht 6' 1\" (1.854 m)   Wt (!) 320 lb 3.2 oz (145.2 kg)   SpO2 92%   BMI 42.25 kg/m²     Intake/Output Summary (Last 24 hours) at 9/8/2022 1920  Last data filed at 9/8/2022 1800  Gross per 24 hour   Intake 970 ml   Output --   Net 970 ml       Physical Exam:  General:  Awake, alert, oriented x 3, NAD  Skin:  Warm and dry  Neck:  JVD is difficult to assess  Chest:  decreased air exchange bilaterally  Cardiovascular:  RRR S1S2, no S3,   Abdomen:  Soft, ND, NT, No HSM  Extremities:  2+ edema    Medications:    furosemide  80 mg IntraVENous Daily    metoprolol succinate  12.5 mg Oral Daily    thiamine mononitrate  100 mg Oral Daily    rosuvastatin  20 mg Oral Nightly    aspirin  81 mg Oral Daily    allopurinol  200 mg Oral Daily    predniSONE  5 mg Oral Daily    gabapentin  300 mg Oral TID    lisinopril  2.5 mg Oral Daily    sodium chloride flush  5-40 mL IntraVENous 2 times per day    enoxaparin  40 mg SubCUTAneous Nightly    mometasone-formoterol  2 puff Inhalation BID      sodium chloride       ipratropium-albuterol, albuterol sulfate HFA, sodium chloride flush, sodium chloride, ondansetron **OR** ondansetron, polyethylene glycol, acetaminophen **OR** acetaminophen    TELEMETRY (Personally reviewed by me): Sinus     Lab Data:  CBC:   Recent Labs     09/06/22  0532   WBC 6.2   HGB 12.5*   HCT 39.4*   .6*        BMP:   Recent Labs     09/06/22  0532      K 5.1      CO2 29   BUN 32*   CREATININE 1.2     LIVER PROFILE: No results for input(s): AST, ALT, LIPASE, BILIDIR, BILITOT, ALKPHOS in the last 72 hours.    Invalid input(s): AMYLASE,  ALB  PT/INR: No results for input(s): PROTIME, INR in the last 72 hours. APTT: No results for input(s): APTT in the last 72 hours. BNP:  No results for input(s): BNP in the last 72 hours. Imaging/Procedures:   Echo 9/6/2022:  Summary   A bubble study was performed and shows evidence of a trivial amount of right to left shunting consistent with a tiny patent foramen ovale or atrial septal defect. Irregular rhythm. Left ventricular systolic function is normal with ejection fraction   estimated at 55 %. No regional wall motion abnormalities are noted. Normal left ventricular wall thickness. Diastolic dysfunction grade and filing pressure are indeterminate. The right ventricle is mildly enlarged. The right atrium is mildly dilated. The aortic root is moderately dilated. The ascending aorta is moderately dilated. Mild mitral regurgitation. MRI brain without contrast 9/6/2022:  1. 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 Sulcal effacement in this region of infarct without mass   effect or midline shift. 2. Otherwise, no acute intracranial abnormality. 3. Minimal global parenchymal volume loss with minimal chronic microvascular   ischemic changes. Assessment/Plan:  Principal Problem:    Encephalopathy  Plan: Multifactorial.  Slowly improving. Active Problems:    Altered mental status  Plan: Multifactorial including acute CVA. Cerebrovascular accident (CVA) due to thrombosis of left middle cerebral artery ([address]  Plan: Etiology unclear. Doubt PFO culprit but will discuss with structural team.  Agree with 30-day cardiac MCOT or ILR. Acute on chronic respiratory failure with hypoxia and hypercapnia (HCC)  Plan: Per pulmonary. Chronic heart failure with preserved ejection fraction (HCC)  Plan: Decompensated? Trend proBNP. Diuresis as tolerated.        Leg swelling  Plan: Multifactorial.  Diuresis as tolerated. TGA (transient global amnesia)  Plan: Slowly improving. HTN (hypertension), benign  Plan: Improving. DM (diabetes mellitus), type 2, uncontrolled with complications ([address]  Plan: Per primary service. COPD, severe ([facility]  Plan: Chronic. On continuous O2.             Hermelindo Charles MD, MD 9/8/2022 7:20 PM

## 2022-09-08 NOTE — PLAN OF CARE
Pt remained involved and care & treatment throughout evening. Pt used call light appropriately to request needs and prior to ambulation. Pt will benefit from reinforced education related to Toprol XL & discontinuation of Celebrex. Pt is able to ambulate with use of cane and remains steady on feet. VSS; standard safety precautions in place.        Problem: Discharge Planning  Goal: Discharge to home or other facility with appropriate resources  Outcome: Progressing     Problem: Safety - Adult  Goal: Free from fall injury  Outcome: Progressing     Problem: Neurosensory - Adult  Goal: Achieves maximal functionality and self care  Outcome: Progressing     Problem: Respiratory - Adult  Goal: Achieves optimal ventilation and oxygenation  Outcome: Progressing     Problem: Cardiovascular - Adult  Goal: Maintains optimal cardiac output and hemodynamic stability  Outcome: Progressing  Goal: Absence of cardiac dysrhythmias or at baseline  Outcome: Progressing     Problem: Skin/Tissue Integrity - Adult  Goal: Skin integrity remains intact  Outcome: Progressing     Problem: Musculoskeletal - Adult  Goal: Return mobility to safest level of function  Outcome: Progressing     Problem: Gastrointestinal - Adult  Goal: Minimal or absence of nausea and vomiting  Outcome: Progressing     Problem: Chronic Conditions and Co-morbidities  Goal: Patient's chronic conditions and co-morbidity symptoms are monitored and maintained or improved  Outcome: Progressing

## 2022-09-08 NOTE — CARE COORDINATION
Notified patient and family  Per Megan Marroquin,  Patient approved for ARU. Admit date start tomorrow for finalization of testing results. Patient/Family aware of and agreeable to the discharge plan. Daughter,  Marisa Hays is requesting frequent updates and care conference reports as she is concerned patient will be unsafe cognitively. Jeffperla Conner (Child)   [phone]     [facility] info provided with a COA pamphlet. [facility], however daughter may prefer [facility] due to a personal contact.

## 2022-09-08 NOTE — PLAN OF CARE
Problem: Discharge Planning  Goal: Discharge to home or other facility with appropriate resources  9/8/2022 1823 by Vik De Souza RN  Outcome: Progressing  9/8/2022 0904 by Yanira Zheng RN  Outcome: Progressing     Problem: Safety - Adult  Goal: Free from fall injury  9/8/2022 1823 by Vik De Souza RN  Outcome: Progressing  Flowsheets (Taken 9/8/2022 1823)  Free From Fall Injury: Instruct family/caregiver on patient safety  Note: No acute issues this shift. Venous duplex negative for DVTs. Remains on 3L NC. VSS. Patient approved for ARU admission tomorrow. Headache X1 after venous duplex completed. Resolved with tylenol.  Worked with PT/OT/SLP.   9/8/2022 0904 by Yanira Zheng RN  Outcome: Progressing     Problem: Neurosensory - Adult  Goal: Achieves maximal functionality and self care  9/8/2022 1823 by Vik De Souza RN  Outcome: Progressing  9/8/2022 0904 by Yanira Zheng RN  Outcome: Progressing     Problem: Respiratory - Adult  Goal: Achieves optimal ventilation and oxygenation  9/8/2022 1823 by Vik De Souza RN  Outcome: Progressing  9/8/2022 0904 by Yanira Zheng RN  Outcome: Progressing     Problem: Cardiovascular - Adult  Goal: Maintains optimal cardiac output and hemodynamic stability  9/8/2022 1823 by Vik De Souza RN  Outcome: Progressing  9/8/2022 0904 by Yanira Zheng RN  Outcome: Progressing  Goal: Absence of cardiac dysrhythmias or at baseline  9/8/2022 1823 by Vik De Souza RN  Outcome: Progressing  9/8/2022 0904 by Yanira Zheng RN  Outcome: Progressing     Problem: Skin/Tissue Integrity - Adult  Goal: Skin integrity remains intact  9/8/2022 1823 by Vik De Souza RN  Outcome: Progressing  9/8/2022 0904 by Yanira Zheng RN  Outcome: Progressing     Problem: Musculoskeletal - Adult  Goal: Return mobility to safest level of function  9/8/2022 1823 by Vik De Souza RN  Outcome: Progressing  9/8/2022 0904 by Yanira Zheng RN  Outcome: Progressing     Problem: Gastrointestinal - Adult  Goal: Minimal or absence of nausea and vomiting  9/8/2022 1823 by Arthur Bass RN  Outcome: Progressing  9/8/2022 0904 by Bella Smith RN  Outcome: Progressing     Problem: Chronic Conditions and Co-morbidities  Goal: Patient's chronic conditions and co-morbidity symptoms are monitored and maintained or improved  9/8/2022 1823 by Arthur Bass RN  Outcome: Progressing  9/8/2022 0904 by Bella Smith RN  Outcome: Progressing

## 2022-09-08 NOTE — CARE COORDINATION
Pre certification for ARU initiated via [employer]. [employer]. Request #[id number]. ARU will continue to follow progress and update discharge plan as needed. KIAH Solis, .[phone]    Addendum:    Patient approved for ARU. Admit date start tomorrow for finalization 9of testing results. ARU will continue to follow progress and update discharge plan as needed.     KIAH Solis, .[phone]

## 2022-09-08 NOTE — PROGRESS NOTES
[facility]    9/8/2022 2:29 PM        Name: Julissa Lopez . Admitted: 9/3/2022  Primary Care Provider: Bairon Sanford MD (Tel: [phone])      Brief History: Presented with acute confusion. Unable to recall events of the day. CT head with no acute findings. No evidence of infection. Subjective:  Presently up in bedside chair. Says he feels okay, offers no complaints. Still with memory and word finding difficulties but takes notes to help him remember. Denies chest pain. Reports breathing stable. Assisted up to w/c to go for venous doppler study, somewhat unsteady in gait.      Reviewed interval ancillary notes    Current Medications  furosemide (LASIX) injection 80 mg, Daily  metoprolol succinate (TOPROL XL) extended release tablet 12.5 mg, Daily  thiamine mononitrate tablet 100 mg, Daily  rosuvastatin (CRESTOR) tablet 20 mg, Nightly  aspirin chewable tablet 81 mg, Daily  allopurinol (ZYLOPRIM) tablet 200 mg, Daily  predniSONE (DELTASONE) tablet 5 mg, Daily  gabapentin (NEURONTIN) capsule 300 mg, TID  lisinopril (PRINIVIL;ZESTRIL) tablet 2.5 mg, Daily  ipratropium-albuterol (DUONEB) nebulizer solution 1 ampule, Q4H PRN  albuterol sulfate HFA (PROVENTIL;VENTOLIN;PROAIR) 108 (90 Base) MCG/ACT inhaler 1 puff, Q6H PRN  sodium chloride flush 0.9 % injection 5-40 mL, 2 times per day  sodium chloride flush 0.9 % injection 5-40 mL, PRN  0.9 % sodium chloride infusion, PRN  enoxaparin (LOVENOX) injection 40 mg, Nightly  ondansetron (ZOFRAN-ODT) disintegrating tablet 4 mg, Q8H PRN   Or  ondansetron (ZOFRAN) injection 4 mg, Q6H PRN  polyethylene glycol (GLYCOLAX) packet 17 g, Daily PRN  acetaminophen (TYLENOL) tablet 650 mg, Q6H PRN   Or  acetaminophen (TYLENOL) suppository 650 mg, Q6H PRN  mometasone-formoterol (DULERA) 200-5 MCG/ACT inhaler 2 puff, BID      Objective:  /66   Pulse 88   Temp 98.5 °F (36.9 °C) (Oral)   Resp 18   Ht 6' 1\" (1.854 m)   Wt (!) 320 lb 3.2 oz (145.2 kg)   SpO2 95%   BMI 42.25 kg/m²     Intake/Output Summary (Last 24 hours) at 9/8/2022 1429  Last data filed at 9/8/2022 0949  Gross per 24 hour   Intake 490 ml   Output --   Net 490 ml      Wt Readings from Last 3 Encounters:   09/08/22 (!) 320 lb 3.2 oz (145.2 kg)   04/11/22 (!) 322 lb 3.2 oz (146.1 kg)   11/11/21 (!) 325 lb (147.4 kg)     General:  Awake, alert, oriented in NAD  Skin:  Warm and dry. No unusual bruising or rash  Neck:  Supple. No JVD appreciated  Chest:  Normal effort. Clear to auscultation, no wheezes/rhonchi/rales  Cardiovascular:  RRR, normal S1/S2, no murmur/gallop/rub  Abdomen:  Soft, nontender, +bowel sounds  Extremities:  2+ BLE edema  Neurological: Moves all extremities, unsteady with ambulation  Psychological: Normal mood and affect      Labs and Tests:  CBC:   Recent Labs     09/06/22  0532   WBC 6.2   HGB 12.5*        BMP:    Recent Labs     09/06/22  0532      K 5.1      CO2 29   BUN 32*   CREATININE 1.2   GLUCOSE 129*     Hepatic:   No results for input(s): AST, ALT, ALB, BILITOT, ALKPHOS in the last 72 hours. CXR 9/3/2022:  Left basilar opacity may reflect underlying atelectasis. Correlate   clinically with signs of pneumonia. CT Head 9/3/2022: Atrophy and small-vessel ischemic change. No hemorrhage or mass identified       Paranasal sinus disease     Carotid doppler 9/6/2022:  Summary        No hemodynamically significant stenosis noted in the internal carotid artery    bilaterally. MRI Brain 9/6/2022:  1. 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 Sulcal effacement in this region of infarct without mass   effect or midline shift. 2. Otherwise, no acute intracranial abnormality. 3. Minimal global parenchymal volume loss with minimal chronic microvascular   ischemic changes.      Echo 9/6/2022:  Summary   A bubble study was performed and shows evidence of a trivial amount of right to left shunting consistent with a tiny patent foramen ovale or atrial septal defect. Irregular rhythm. Left ventricular systolic function is normal with ejection fraction   estimated at 55 %. No regional wall motion abnormalities are noted. Normal left ventricular wall thickness. Diastolic dysfunction grade and filing pressure are indeterminate. The right ventricle is mildly enlarged. The right atrium is mildly dilated. The aortic root is moderately dilated. The ascending aorta is moderately dilated. Mild mitral regurgitation. CXR 9/7/2022:  Blunting of the left costophrenic angle appears unchanged. This could   represent underlying atelectasis or an infiltrate in the appropriate clinical   setting. Problem List  Principal Problem:    Encephalopathy  Active Problems:    Altered mental status    Cerebrovascular accident (CVA) due to thrombosis of left middle cerebral artery (HCC)    Acute on chronic respiratory failure with hypoxia and hypercapnia (HCC)    Cerebrovascular accident (CVA) ([address]    Chronic heart failure with preserved ejection fraction (HCC)    Ventricular ectopy    Leg swelling    TGA (transient global amnesia)    HTN (hypertension), benign    DM (diabetes mellitus), type 2, uncontrolled with complications (HCC)    COPD, severe ([address]  Resolved Problems:    * No resolved hospital problems. *       Assessment & Plan:   Acute CVA. Presented with memory loss. CT head with no acute findings. Carotid doppler with no significant stenosis. Ammonia level 11, vitamin B12 & folate WNL, TSH 3.18. Urine drug screen negative, ETOH negative. MRI of brain revealed moderate sized acute infarct involving left temporal lobe. Echo with normal EF and evidence tiny PFO or patent ASD. Neuro evaluated, recommend continue asa and statin. Neuro has signed off.  PT/OT recommend ARU on Dc for continued therapy. PMR evaluated and found to be appropriate candidate for ARU, precert initiated. Acute on chronic hypoxic/hypercapnic respiratory failure / very severe COPD. On chronic prednisone therapy and O2 therapy at 3 liters. CXR with left basilar opacity which may reflect underlying atelectasis. No evidence of exacerbation, low suspicion for pneumonia as patient afebrile, no leukocytosis, procalcitonin only 0.06.  Currently at his baseline oxygen requirement, 3 liters but O2 sat noted to drop to 70% with ambulation requiring up to 8 liters to recover. Pulmonary evaluated and recommend NIV on DC and high flow O2 concentrator at home which can be titrated up to 10 liters. Continue Dulera. Appreciate pulmonary recs. DM2. A1c 5.6. Takes metformin at home, held on admission. BG values controlled. HTN. Controlled. Continue lisinopril. ETOH use. Pt drinks beer daily, etoh level was negative. No s/s of withdrawal. B12, thiamine and ammonia levels in range. STEF. Will require NIV on DC    Atrial level shunt / acute ischemic stroke. Noted on echo. Evaluated by cardiology and felt unlikely to be cause of stroke. Venous doppler pending to assess for DVT. Recommends 30 day event monitor on DC to assess for PAF and if negative then consideration for ILR. Leg edema. Echo with EF 24%, diastolic function indeterminate, mildly enlarged RV. No evidence pulmonary edema on CXR, patient weight unchanged compared to PCP office visits. Edema likely multifactorial, secondary to immobility, legs in dependent position and possible right heart failure from severe COPD. Cardiology started on IV Lasix. Venous doppler pending. Disposition: Found to have acute CVA. Evaluated by PMR and considered appropriate candidate for ARU, precert has been initiated. Diet: ADULT DIET;  Regular  Code:DNR-CC  DVT PPX: enoxaparin      Lupillo Edwards APRN - CNP   9/8/2022 2:29 PM

## 2022-09-09 ENCOUNTER — HOSPITAL ENCOUNTER (INPATIENT)
Age: 77
LOS: 12 days | Discharge: HOME HEALTH | DRG: 056 | End: 2022-09-21
Attending: PHYSICAL MEDICINE & REHABILITATION | Admitting: PHYSICAL MEDICINE & REHABILITATION
Payer: MEDICARE

## 2022-09-09 VITALS
BODY MASS INDEX: 41.75 KG/M2 | OXYGEN SATURATION: 92 % | SYSTOLIC BLOOD PRESSURE: 106 MMHG | TEMPERATURE: 98.2 F | RESPIRATION RATE: 18 BRPM | HEART RATE: 74 BPM | WEIGHT: 315 LBS | HEIGHT: 73 IN | DIASTOLIC BLOOD PRESSURE: 65 MMHG

## 2022-09-09 DIAGNOSIS — Q21.12 PFO (PATENT FORAMEN OVALE): ICD-10-CM

## 2022-09-09 DIAGNOSIS — I48.91 ATRIAL FIBRILLATION, UNSPECIFIED TYPE (HCC): Primary | ICD-10-CM

## 2022-09-09 PROBLEM — G93.41 METABOLIC ENCEPHALOPATHY: Status: ACTIVE | Noted: 2022-09-09

## 2022-09-09 LAB
GLUCOSE BLD-MCNC: 110 MG/DL (ref 70–99)
GLUCOSE BLD-MCNC: 114 MG/DL (ref 70–99)
GLUCOSE BLD-MCNC: 131 MG/DL (ref 70–99)
GLUCOSE BLD-MCNC: 162 MG/DL (ref 70–99)
PERFORMED ON: ABNORMAL

## 2022-09-09 PROCEDURE — 1280000000 HC REHAB R&B

## 2022-09-09 PROCEDURE — 94640 AIRWAY INHALATION TREATMENT: CPT

## 2022-09-09 PROCEDURE — 6370000000 HC RX 637 (ALT 250 FOR IP): Performed by: PHYSICAL MEDICINE & REHABILITATION

## 2022-09-09 PROCEDURE — 94660 CPAP INITIATION&MGMT: CPT

## 2022-09-09 PROCEDURE — 99232 SBSQ HOSP IP/OBS MODERATE 35: CPT | Performed by: INTERNAL MEDICINE

## 2022-09-09 PROCEDURE — 97530 THERAPEUTIC ACTIVITIES: CPT

## 2022-09-09 PROCEDURE — 93270 REMOTE 30 DAY ECG REV/REPORT: CPT | Performed by: INTERNAL MEDICINE

## 2022-09-09 PROCEDURE — 6360000002 HC RX W HCPCS: Performed by: INTERNAL MEDICINE

## 2022-09-09 PROCEDURE — 6370000000 HC RX 637 (ALT 250 FOR IP): Performed by: NURSE PRACTITIONER

## 2022-09-09 PROCEDURE — 2580000003 HC RX 258: Performed by: FAMILY MEDICINE

## 2022-09-09 PROCEDURE — 92507 TX SP LANG VOICE COMM INDIV: CPT

## 2022-09-09 PROCEDURE — 6370000000 HC RX 637 (ALT 250 FOR IP): Performed by: INTERNAL MEDICINE

## 2022-09-09 PROCEDURE — 6370000000 HC RX 637 (ALT 250 FOR IP): Performed by: FAMILY MEDICINE

## 2022-09-09 PROCEDURE — 99223 1ST HOSP IP/OBS HIGH 75: CPT | Performed by: INTERNAL MEDICINE

## 2022-09-09 PROCEDURE — 2700000000 HC OXYGEN THERAPY PER DAY

## 2022-09-09 PROCEDURE — 2580000003 HC RX 258: Performed by: PHYSICAL MEDICINE & REHABILITATION

## 2022-09-09 PROCEDURE — 6360000002 HC RX W HCPCS: Performed by: PHYSICAL MEDICINE & REHABILITATION

## 2022-09-09 PROCEDURE — 97116 GAIT TRAINING THERAPY: CPT

## 2022-09-09 PROCEDURE — 92526 ORAL FUNCTION THERAPY: CPT

## 2022-09-09 PROCEDURE — 94761 N-INVAS EAR/PLS OXIMETRY MLT: CPT

## 2022-09-09 RX ORDER — ONDANSETRON 2 MG/ML
4 INJECTION INTRAMUSCULAR; INTRAVENOUS EVERY 6 HOURS PRN
Status: DISCONTINUED | OUTPATIENT
Start: 2022-09-09 | End: 2022-09-21 | Stop reason: HOSPADM

## 2022-09-09 RX ORDER — FUROSEMIDE 10 MG/ML
80 INJECTION INTRAMUSCULAR; INTRAVENOUS DAILY
Status: DISCONTINUED | OUTPATIENT
Start: 2022-09-10 | End: 2022-09-12

## 2022-09-09 RX ORDER — SODIUM CHLORIDE 0.9 % (FLUSH) 0.9 %
5-40 SYRINGE (ML) INJECTION PRN
Status: DISCONTINUED | OUTPATIENT
Start: 2022-09-09 | End: 2022-09-21 | Stop reason: HOSPADM

## 2022-09-09 RX ORDER — POLYETHYLENE GLYCOL 3350 17 G/17G
17 POWDER, FOR SOLUTION ORAL DAILY PRN
Status: DISCONTINUED | OUTPATIENT
Start: 2022-09-09 | End: 2022-09-09

## 2022-09-09 RX ORDER — POLYETHYLENE GLYCOL 3350 17 G/17G
17 POWDER, FOR SOLUTION ORAL DAILY PRN
Status: CANCELLED | OUTPATIENT
Start: 2022-09-09

## 2022-09-09 RX ORDER — SODIUM CHLORIDE 0.9 % (FLUSH) 0.9 %
5-40 SYRINGE (ML) INJECTION PRN
Status: CANCELLED | OUTPATIENT
Start: 2022-09-09

## 2022-09-09 RX ORDER — ONDANSETRON 2 MG/ML
4 INJECTION INTRAMUSCULAR; INTRAVENOUS EVERY 6 HOURS PRN
Status: CANCELLED | OUTPATIENT
Start: 2022-09-09

## 2022-09-09 RX ORDER — ACETAMINOPHEN 325 MG/1
650 TABLET ORAL EVERY 6 HOURS PRN
Status: DISCONTINUED | OUTPATIENT
Start: 2022-09-09 | End: 2022-09-09

## 2022-09-09 RX ORDER — HYDRALAZINE HYDROCHLORIDE 25 MG/1
50 TABLET, FILM COATED ORAL EVERY 6 HOURS PRN
Status: DISCONTINUED | OUTPATIENT
Start: 2022-09-09 | End: 2022-09-21 | Stop reason: HOSPADM

## 2022-09-09 RX ORDER — ONDANSETRON 4 MG/1
4 TABLET, ORALLY DISINTEGRATING ORAL EVERY 8 HOURS PRN
Status: CANCELLED | OUTPATIENT
Start: 2022-09-09

## 2022-09-09 RX ORDER — ASPIRIN 81 MG/1
81 TABLET, CHEWABLE ORAL DAILY
Status: CANCELLED | OUTPATIENT
Start: 2022-09-10

## 2022-09-09 RX ORDER — ALLOPURINOL 100 MG/1
200 TABLET ORAL DAILY
Status: DISCONTINUED | OUTPATIENT
Start: 2022-09-10 | End: 2022-09-21 | Stop reason: HOSPADM

## 2022-09-09 RX ORDER — METOPROLOL SUCCINATE 25 MG/1
12.5 TABLET, EXTENDED RELEASE ORAL DAILY
Status: CANCELLED | OUTPATIENT
Start: 2022-09-10

## 2022-09-09 RX ORDER — METOPROLOL SUCCINATE 25 MG/1
12.5 TABLET, EXTENDED RELEASE ORAL DAILY
Status: DISCONTINUED | OUTPATIENT
Start: 2022-09-10 | End: 2022-09-19

## 2022-09-09 RX ORDER — HYDRALAZINE HYDROCHLORIDE 25 MG/1
50 TABLET, FILM COATED ORAL EVERY 6 HOURS PRN
Status: CANCELLED | OUTPATIENT
Start: 2022-09-09

## 2022-09-09 RX ORDER — ASPIRIN 81 MG/1
81 TABLET, CHEWABLE ORAL DAILY
Qty: 30 TABLET | Refills: 3 | Status: ON HOLD
Start: 2022-09-10 | End: 2022-09-21 | Stop reason: HOSPADM

## 2022-09-09 RX ORDER — ALLOPURINOL 100 MG/1
200 TABLET ORAL DAILY
Status: CANCELLED | OUTPATIENT
Start: 2022-09-10

## 2022-09-09 RX ORDER — ASPIRIN 81 MG/1
81 TABLET, CHEWABLE ORAL DAILY
Status: DISCONTINUED | OUTPATIENT
Start: 2022-09-10 | End: 2022-09-21 | Stop reason: HOSPADM

## 2022-09-09 RX ORDER — GABAPENTIN 300 MG/1
300 CAPSULE ORAL 3 TIMES DAILY
Status: CANCELLED | OUTPATIENT
Start: 2022-09-09

## 2022-09-09 RX ORDER — METOPROLOL SUCCINATE 25 MG/1
12.5 TABLET, EXTENDED RELEASE ORAL DAILY
Qty: 30 TABLET | Refills: 1 | Status: ON HOLD
Start: 2022-09-10 | End: 2022-09-21 | Stop reason: HOSPADM

## 2022-09-09 RX ORDER — ENOXAPARIN SODIUM 100 MG/ML
30 INJECTION SUBCUTANEOUS 2 TIMES DAILY
Status: DISCONTINUED | OUTPATIENT
Start: 2022-09-09 | End: 2022-09-21 | Stop reason: HOSPADM

## 2022-09-09 RX ORDER — SODIUM CHLORIDE 9 MG/ML
INJECTION, SOLUTION INTRAVENOUS PRN
Status: CANCELLED | OUTPATIENT
Start: 2022-09-09

## 2022-09-09 RX ORDER — GAUZE BANDAGE 2" X 2"
100 BANDAGE TOPICAL DAILY
Status: DISCONTINUED | OUTPATIENT
Start: 2022-09-10 | End: 2022-09-21 | Stop reason: HOSPADM

## 2022-09-09 RX ORDER — ACETAMINOPHEN 325 MG/1
650 TABLET ORAL EVERY 4 HOURS PRN
Status: DISCONTINUED | OUTPATIENT
Start: 2022-09-09 | End: 2022-09-21 | Stop reason: HOSPADM

## 2022-09-09 RX ORDER — SODIUM CHLORIDE 0.9 % (FLUSH) 0.9 %
5-40 SYRINGE (ML) INJECTION EVERY 12 HOURS SCHEDULED
Status: CANCELLED | OUTPATIENT
Start: 2022-09-09

## 2022-09-09 RX ORDER — FUROSEMIDE 10 MG/ML
80 INJECTION INTRAMUSCULAR; INTRAVENOUS DAILY
Status: CANCELLED | OUTPATIENT
Start: 2022-09-10

## 2022-09-09 RX ORDER — SODIUM CHLORIDE 0.9 % (FLUSH) 0.9 %
5-40 SYRINGE (ML) INJECTION EVERY 12 HOURS SCHEDULED
Status: DISCONTINUED | OUTPATIENT
Start: 2022-09-09 | End: 2022-09-21 | Stop reason: HOSPADM

## 2022-09-09 RX ORDER — ROSUVASTATIN CALCIUM 20 MG/1
20 TABLET, COATED ORAL NIGHTLY
Status: DISCONTINUED | OUTPATIENT
Start: 2022-09-09 | End: 2022-09-21 | Stop reason: HOSPADM

## 2022-09-09 RX ORDER — ENOXAPARIN SODIUM 100 MG/ML
30 INJECTION SUBCUTANEOUS 2 TIMES DAILY
Status: CANCELLED | OUTPATIENT
Start: 2022-09-09

## 2022-09-09 RX ORDER — POLYETHYLENE GLYCOL 3350 17 G/17G
17 POWDER, FOR SOLUTION ORAL DAILY PRN
Status: DISCONTINUED | OUTPATIENT
Start: 2022-09-09 | End: 2022-09-21 | Stop reason: HOSPADM

## 2022-09-09 RX ORDER — IPRATROPIUM BROMIDE AND ALBUTEROL SULFATE 2.5; .5 MG/3ML; MG/3ML
1 SOLUTION RESPIRATORY (INHALATION) EVERY 4 HOURS PRN
Status: CANCELLED | OUTPATIENT
Start: 2022-09-09

## 2022-09-09 RX ORDER — IPRATROPIUM BROMIDE AND ALBUTEROL SULFATE 2.5; .5 MG/3ML; MG/3ML
1 SOLUTION RESPIRATORY (INHALATION) EVERY 4 HOURS PRN
Status: DISCONTINUED | OUTPATIENT
Start: 2022-09-09 | End: 2022-09-21 | Stop reason: HOSPADM

## 2022-09-09 RX ORDER — ACETAMINOPHEN 325 MG/1
650 TABLET ORAL EVERY 4 HOURS PRN
Status: CANCELLED | OUTPATIENT
Start: 2022-09-09

## 2022-09-09 RX ORDER — ACETAMINOPHEN 650 MG/1
650 SUPPOSITORY RECTAL EVERY 6 HOURS PRN
Status: CANCELLED | OUTPATIENT
Start: 2022-09-09

## 2022-09-09 RX ORDER — ACETAMINOPHEN 325 MG/1
650 TABLET ORAL EVERY 6 HOURS PRN
Status: CANCELLED | OUTPATIENT
Start: 2022-09-09

## 2022-09-09 RX ORDER — LISINOPRIL 5 MG/1
2.5 TABLET ORAL DAILY
Status: CANCELLED | OUTPATIENT
Start: 2022-09-10

## 2022-09-09 RX ORDER — LISINOPRIL 5 MG/1
2.5 TABLET ORAL DAILY
Status: DISCONTINUED | OUTPATIENT
Start: 2022-09-10 | End: 2022-09-21 | Stop reason: HOSPADM

## 2022-09-09 RX ORDER — ALBUTEROL SULFATE 90 UG/1
1 AEROSOL, METERED RESPIRATORY (INHALATION) EVERY 6 HOURS PRN
Status: CANCELLED | OUTPATIENT
Start: 2022-09-09

## 2022-09-09 RX ORDER — TRAZODONE HYDROCHLORIDE 50 MG/1
50 TABLET ORAL NIGHTLY PRN
Status: DISCONTINUED | OUTPATIENT
Start: 2022-09-09 | End: 2022-09-21 | Stop reason: HOSPADM

## 2022-09-09 RX ORDER — PREDNISONE 1 MG/1
5 TABLET ORAL DAILY
Status: DISCONTINUED | OUTPATIENT
Start: 2022-09-10 | End: 2022-09-21 | Stop reason: HOSPADM

## 2022-09-09 RX ORDER — ALBUTEROL SULFATE 90 UG/1
1 AEROSOL, METERED RESPIRATORY (INHALATION) EVERY 6 HOURS PRN
Status: DISCONTINUED | OUTPATIENT
Start: 2022-09-09 | End: 2022-09-21 | Stop reason: HOSPADM

## 2022-09-09 RX ORDER — ROSUVASTATIN CALCIUM 20 MG/1
20 TABLET, COATED ORAL NIGHTLY
Qty: 30 TABLET | Refills: 2 | Status: ON HOLD
Start: 2022-09-09 | End: 2022-09-21 | Stop reason: HOSPADM

## 2022-09-09 RX ORDER — ROSUVASTATIN CALCIUM 20 MG/1
20 TABLET, COATED ORAL NIGHTLY
Status: CANCELLED | OUTPATIENT
Start: 2022-09-09

## 2022-09-09 RX ORDER — ACETAMINOPHEN 650 MG/1
650 SUPPOSITORY RECTAL EVERY 6 HOURS PRN
Status: DISCONTINUED | OUTPATIENT
Start: 2022-09-09 | End: 2022-09-21 | Stop reason: HOSPADM

## 2022-09-09 RX ORDER — ONDANSETRON 4 MG/1
4 TABLET, ORALLY DISINTEGRATING ORAL EVERY 8 HOURS PRN
Status: DISCONTINUED | OUTPATIENT
Start: 2022-09-09 | End: 2022-09-21 | Stop reason: HOSPADM

## 2022-09-09 RX ORDER — SODIUM CHLORIDE 9 MG/ML
INJECTION, SOLUTION INTRAVENOUS PRN
Status: DISCONTINUED | OUTPATIENT
Start: 2022-09-09 | End: 2022-09-21 | Stop reason: HOSPADM

## 2022-09-09 RX ORDER — GABAPENTIN 300 MG/1
300 CAPSULE ORAL 3 TIMES DAILY
Status: DISCONTINUED | OUTPATIENT
Start: 2022-09-09 | End: 2022-09-21 | Stop reason: HOSPADM

## 2022-09-09 RX ORDER — PREDNISONE 1 MG/1
5 TABLET ORAL DAILY
Status: CANCELLED | OUTPATIENT
Start: 2022-09-10

## 2022-09-09 RX ORDER — GAUZE BANDAGE 2" X 2"
100 BANDAGE TOPICAL DAILY
Status: CANCELLED | OUTPATIENT
Start: 2022-09-10

## 2022-09-09 RX ORDER — TRAZODONE HYDROCHLORIDE 50 MG/1
50 TABLET ORAL NIGHTLY PRN
Status: CANCELLED | OUTPATIENT
Start: 2022-09-09

## 2022-09-09 RX ADMIN — ASPIRIN 81 MG 81 MG: 81 TABLET ORAL at 08:23

## 2022-09-09 RX ADMIN — Medication 2 PUFF: at 09:33

## 2022-09-09 RX ADMIN — METOPROLOL SUCCINATE 12.5 MG: 25 TABLET, EXTENDED RELEASE ORAL at 08:23

## 2022-09-09 RX ADMIN — Medication 10 ML: at 08:24

## 2022-09-09 RX ADMIN — Medication 100 MG: at 08:23

## 2022-09-09 RX ADMIN — LISINOPRIL 2.5 MG: 5 TABLET ORAL at 08:22

## 2022-09-09 RX ADMIN — ROSUVASTATIN 20 MG: 20 TABLET, FILM COATED ORAL at 22:00

## 2022-09-09 RX ADMIN — ALLOPURINOL 200 MG: 100 TABLET ORAL at 08:22

## 2022-09-09 RX ADMIN — FUROSEMIDE 80 MG: 10 INJECTION, SOLUTION INTRAMUSCULAR; INTRAVENOUS at 08:21

## 2022-09-09 RX ADMIN — GABAPENTIN 300 MG: 300 CAPSULE ORAL at 22:00

## 2022-09-09 RX ADMIN — Medication 2 PUFF: at 20:55

## 2022-09-09 RX ADMIN — GABAPENTIN 300 MG: 300 CAPSULE ORAL at 08:23

## 2022-09-09 RX ADMIN — ACETAMINOPHEN 650 MG: 325 TABLET ORAL at 08:22

## 2022-09-09 RX ADMIN — PREDNISONE 5 MG: 5 TABLET ORAL at 08:24

## 2022-09-09 RX ADMIN — ENOXAPARIN SODIUM 30 MG: 100 INJECTION SUBCUTANEOUS at 21:59

## 2022-09-09 RX ADMIN — Medication 10 ML: at 22:00

## 2022-09-09 RX ADMIN — GABAPENTIN 300 MG: 300 CAPSULE ORAL at 13:00

## 2022-09-09 ASSESSMENT — PAIN DESCRIPTION - LOCATION: LOCATION: HEAD

## 2022-09-09 ASSESSMENT — PAIN DESCRIPTION - DESCRIPTORS: DESCRIPTORS: ACHING

## 2022-09-09 ASSESSMENT — PAIN SCALES - GENERAL
PAINLEVEL_OUTOF10: 0
PAINLEVEL_OUTOF10: 2
PAINLEVEL_OUTOF10: 0

## 2022-09-09 NOTE — PROGRESS NOTES
Clinical Pharmacy Note:  STROKE CORE MEASURES     Taylor Ribera is a 78 y.o. male with stroke  The patient is receiving the appropriate medications to meet the STROKE core measure.     Within 48 hours of stroke  Venous thromboembolism prophylaxis by Day 2 (enoxaparin, heparin, warfarin, xarelto, eliquis) enoxaparin  Antithrombotic therapy by Day 2 (aspirin, plavix, or aggrenox)  aspirin      At Discharge    Antithrombotic prescribed at discharge: pending discharge    Anticoagulation for patients with Atrial Fibrillation or Atrial Flutter: pending discharge    Stain prescribed at discharge, High intensity statin recommended: stacey NguyễnD

## 2022-09-09 NOTE — PROGRESS NOTES
Comprehensive Nutrition Assessment    Type and Reason for Visit:  Initial    Nutrition Recommendations/Plan:   Continue current diet: Regular     Malnutrition Assessment:  Malnutrition Status:  No malnutrition (09/09/22 1159)    Context:  Acute Illness       Nutrition Assessment:    LOS nutrition assessment. Pt reports good appetite. Denies any recent weight loss. Noted meal intake of %.  lbs. No recent weight loss per EMR. Nutrition status adequate at present. Will continue to monitor adequacy of intake. Nutrition Related Findings:    No edema. Glu 137. (receiving Thiamine) Wound Type: None       Current Nutrition Intake & Therapies:    Average Meal Intake: %  Average Supplements Intake: None Ordered  ADULT DIET; Regular    Anthropometric Measures:  Height: 6' 1\" (185.4 cm)  Ideal Body Weight (IBW): 184 lbs (84 kg)       Current Body Weight: 321 lb (145.6 kg), 174.5 % IBW. Current BMI (kg/m2): 42.4                 Nutrition Diagnosis:   No nutrition diagnosis at this time       Nutrition Interventions:   Food and/or Nutrient Delivery: Continue Current Diet  Nutrition Education/Counseling: No recommendation at this time  Coordination of Nutrition Care: Continue to monitor while inpatient       Goals:     Goals: PO intake 75% or greater       Nutrition Monitoring and Evaluation:   Behavioral-Environmental Outcomes: None Identified  Food/Nutrient Intake Outcomes: Food and Nutrient Intake  Physical Signs/Symptoms Outcomes: Weight    Discharge Planning:     Too soon to determine     Constantino Daniels, [address]  Contact: [phone]

## 2022-09-09 NOTE — PROGRESS NOTES
[facility] Pulmonary/CCM Progress note      Admit Date: 9/3/2022    Chief Complaint: Altered mental status and shortness of breath    Subjective: Interval History: Appears comfortable, mild confusion but able to answer all questions appropriately. Remains on 3 L O2. Currently using BiPAP at nighttime.     Scheduled Meds:   furosemide  80 mg IntraVENous Daily    metoprolol succinate  12.5 mg Oral Daily    thiamine mononitrate  100 mg Oral Daily    rosuvastatin  20 mg Oral Nightly    aspirin  81 mg Oral Daily    allopurinol  200 mg Oral Daily    predniSONE  5 mg Oral Daily    gabapentin  300 mg Oral TID    lisinopril  2.5 mg Oral Daily    sodium chloride flush  5-40 mL IntraVENous 2 times per day    enoxaparin  40 mg SubCUTAneous Nightly    mometasone-formoterol  2 puff Inhalation BID     Continuous Infusions:   sodium chloride       PRN Meds:ipratropium-albuterol, albuterol sulfate HFA, sodium chloride flush, sodium chloride, ondansetron **OR** ondansetron, polyethylene glycol, acetaminophen **OR** acetaminophen    Review of Systems  Constitutional: negative for fatigue, fevers, malaise and weight loss  Ears, nose, mouth, throat: negative for ear drainage, epistaxis, hoarseness, nasal congestion, sore throat and voice change  Respiratory: negative for shortness of breath, cough, phlegm or pleurisy  Cardiovascular: negative for chest pain, chest pressure/discomfort, irregular heart beat, lower extremity edema and palpitations  Gastrointestinal: negative for abdominal pain, constipation, diarrhea, jaundice, melena, odynophagia, reflux symptoms and vomiting  Hematologic/lymphatic: negative for bleeding, easy bruising, lymphadenopathy and petechiae  Musculoskeletal:negative for arthralgias, bone pain, muscle weakness, neck pain and stiff joints  Neurological: negative for dizziness, gait problems, headaches, seizures, speech problems, tremors and weakness  Behavioral/Psych: negative for anxiety, behavior problems, depression, fatigue and sleep disturbance  Endocrine: negative for diabetic symptoms including none, neuropathy, polyphagia, polyuria, polydipsia, vomiting and diarrhea and temperature intolerance  Allergic/Immunologic: negative for anaphylaxis, angioedema, hay fever and urticaria    Objective:     Patient Vitals for the past 8 hrs:   BP Temp Temp src Pulse Resp SpO2 Weight   09/09/22 0935 -- -- -- 79 18 92 % --   09/09/22 0822 104/72 -- -- 77 -- -- --   09/09/22 0803 -- -- -- -- -- -- (!) 321 lb (145.6 kg)   09/09/22 0653 113/69 97.9 °F (36.6 °C) Oral 87 20 92 % --   09/09/22 0400 112/64 97.6 °F (36.4 °C) Temporal 85 20 91 % --       I/O last 3 completed shifts: In: 1 [P.O.:960; I.V.:10]  Out: -   No intake/output data recorded.     General Appearance: alert and oriented to person, place and time, well developed and well- nourished, in no acute distress  Skin: warm and dry, no rash or erythema  Head: normocephalic and atraumatic  Eyes: pupils equal, round, and reactive to light, extraocular eye movements intact, conjunctivae normal  ENT: external ear and ear canal normal bilaterally, nose without deformity, nasal mucosa and turbinates normal  Neck: supple and non-tender without mass, no cervical lymphadenopathy  Pulmonary/Chest: clear to auscultation bilaterally- no wheezes, rales or rhonchi, normal air movement, no respiratory distress  Cardiovascular: normal rate, regular rhythm,  no murmurs, rubs, distal pulses intact, no carotid bruits  Abdomen: soft, non-tender, non-distended, normal bowel sounds, no masses or organomegaly  Lymph Nodes: Cervical, supraclavicular normal  Extremities: no cyanosis, clubbing or edema  Musculoskeletal: normal range of motion, no joint swelling, deformity or tenderness  Neurologic: alert, no focal neurologic deficits    Data Review:  CBC:   Lab Results   Component Value Date/Time    WBC 6.2 09/06/2022 05:32 AM    RBC 3.70 09/06/2022 05:32 AM     BMP:   Lab Results   Component Value Date/Time    GLUCOSE 129 09/06/2022 05:32 AM    CO2 29 09/06/2022 05:32 AM    BUN 32 09/06/2022 05:32 AM    CREATININE 1.2 09/06/2022 05:32 AM    CALCIUM 9.2 09/06/2022 05:32 AM     ABG:   Lab Results   Component Value Date/Time    LRP3PLD 31.3 09/07/2022 12:23 PM    BEART 4.0 09/07/2022 12:23 PM    X5IHVMUE 93.7 09/07/2022 12:23 PM    PHART 7.337 09/07/2022 12:23 PM    PJC5ZBU 58.4 09/07/2022 12:23 PM    PO2ART 73.3 09/07/2022 12:23 PM    EHN6WCN 74.1 09/07/2022 12:23 PM       Radiology: All pertinent images / reports were reviewed as a part of this visit. Narrative   EXAMINATION:   ONE XRAY VIEW OF THE CHEST       9/7/2022 10:13 am       COMPARISON:   Chest x-ray dated 3 September 2022       HISTORY:   ORDERING SYSTEM PROVIDED HISTORY: to evaluate for infiltrates   TECHNOLOGIST PROVIDED HISTORY:   Reason for exam:->to evaluate for infiltrates   Reason for Exam: to evaluate for infiltrates       FINDINGS:   Blunting of the left costophrenic angle appears stable. The right lung is   clear. No pneumothorax. Stable cardiomediastinal silhouette           Impression   Blunting of the left costophrenic angle appears unchanged. This could   represent underlying atelectasis or an infiltrate in the appropriate clinical   setting. Problem List:     Altered mental status  Acute on chronic hypoxic/hypercapnic respiratory failure  COPD, severe  CVA-left MCA/parietal stroke    Assessment/Plan:     Altered mental status related to CVA + chronic hypoxia/hypercapnia. Patient will benefit from NIV/AVAPS mode of ventilation, which will be organized at the time of discharge. He would also benefit from higher flow home concentrator, which can go up to 10 L. Patient is currently planned to go to ARU for rehabilitation and therefore these issues need to be addressed at the time of discharge home from ARU. CVA with PFO, lower extremity Dopplers were negative for DVT.   Seen by cardiology, no plans made for closure due to questionable benefit. Currently does not have COPD exacerbation, continue current inhaler regime. Pulmonary will sign off. We will organize follow-up with Dr. Rosalinda Lou in 1 month.     Suzette Qureshi MD

## 2022-09-09 NOTE — PROGRESS NOTES
Patient was admitted to room 4903 at 1807. Patient was oriented to the Call Light, Phone, TV, Thermostat, Bed Controls, Bathroom and Emergency Cord. Patient verbalized and demonstrated understanding of all. Patient was also given an over view of Unit Routines for Acute Rehab. Patient states that their normal bowel regime is 1-2 days. Meal times were explained, including how to order food. The white board, (which is posted on the wall by the door is used for communication) has the Therapy Scheduled that is posted each day along with the name of your doctor, nurse, and therapist for your convenience. We recommend any family that will be care givers or any care givers the patient has, take part in therapy. We have no set visiting hours, we suggest non-caregiver friends and family visitors come after therapy (at 4 PM or later) to allow patient to rest in between sessions.       In conjunction with the patient and patients family, this nurse worked to establish a tailored Fall TIPS plan to ensure patient safety and compliance:    Falls TIPS Completion    Patient identified as increased risk for harm if fall:  [] Yes     Fall Risks  History of Falls:    [] Yes   Medication Side Effects:   [] Yes   Walking Aid:    [x] Yes   IV Pole or Equipment:   [] Yes   Unsteady Walk:     [] Yes   May Forget or Choose Not to Call: [] Yes     Fall Interventions   Communicate Recent Fall and/or Risk of Harm: [] Yes   Walking Aids:  Crutches: [] Yes   Cane: [x] Yes   Walker: [] Yes   IV Assistance When Walking: [] Yes   Toileting Schedule: Every 2 Hours  Bedpan:   [] Yes   Assist to Commode: [] Yes   Assist to Bathroom: [x] Yes   Bed Alarm On: [x] Yes   Assistance Out of Bed:  Bedrest: [] Yes   1 Person: [x] Yes   2 People: [] Yes

## 2022-09-09 NOTE — H&P
(DUONEB) nebulizer solution 1 ampule  1 ampule Inhalation Q4H PRN Julian Trinidad MD        albuterol sulfate HFA (PROVENTIL;VENTOLIN;PROAIR) 108 (90 Base) MCG/ACT inhaler 1 puff  1 puff Inhalation Q6H PRN Julian Trinidad MD        sodium chloride flush 0.9 % injection 5-40 mL  5-40 mL IntraVENous 2 times per day Julian Trinidad MD        sodium chloride flush 0.9 % injection 5-40 mL  5-40 mL IntraVENous PRN Julian Trinidad MD        0.9 % sodium chloride infusion   IntraVENous PRN Julian Trinidad MD        ondansetron (ZOFRAN-ODT) disintegrating tablet 4 mg  4 mg Oral Q8H PRN Julian Trinidad MD        Or    [facility]ron [facility] injection 4 mg  4 mg IntraVENous Q6H PRN Julian Trinidad MD        polyethylene glycol (GLYCOLAX) packet 17 g  17 g Oral Daily PRN Julian Trinidad MD        acetaminophen (TYLENOL) suppository 650 mg  650 mg Rectal Q6H PRN Julian Trinidad MD        mometasone-formoterol [facility]) 200-5 MCG/ACT inhaler 2 puff  2 puff Inhalation BID MD Lucinda Purcell Mask ON 9/10/2022] thiamine mononitrate tablet 100 mg  100 mg Oral Daily Julian Trinidad MD        rosuvastatin (CRESTOR) tablet 20 mg  20 mg Oral Nightly Julian Trinidad MD        [START ON 9/10/2022] aspirin chewable tablet 81 mg  81 mg Oral Daily Julian Trinidad MD        [START ON 9/10/2022] allopurinol (ZYLOPRIM) tablet 200 mg  200 mg Oral Daily MD Lucinda Purcell Mask ON 9/10/2022] predniSONE (DELTASONE) tablet 5 mg  5 mg Oral Daily Julian Trinidad MD        gabapentin (NEURONTIN) capsule 300 mg  300 mg Oral TID Julian Trinidad MD        [START ON 9/10/2022] furosemide (LASIX) injection 80 mg  80 mg IntraVENous Daily MD Lucinda Purcell Mask ON 9/10/2022] metoprolol succinate (TOPROL XL) extended release tablet 12.5 mg  12.5 mg Oral Daily Julian Trinidad MD        traZODone (DESYREL) tablet 50 mg  50 mg Oral Nightly PRN Julian Trinidad MD        hydrALAZINE (APRESOLINE) tablet 50 mg  50 mg Oral Q6H PRN Lennox Sicilian, MD        acetaminophen (TYLENOL) tablet 650 mg  650 mg Oral Q4H PRN Lennox Sicilian, MD        enoxaparin Sodium (LOVENOX) injection 30 mg  30 mg SubCUTAneous BID Lennox Sicilian, MD           No Known Allergies    Current Facility-Administered Medications   Medication Dose Route Frequency Provider Last Rate Last Admin    [START ON 9/10/2022] lisinopril (PRINIVIL;ZESTRIL) tablet 2.5 mg  2.5 mg Oral Daily Lennox Sicilian, MD        ipratropium-albuterol (DUONEB) nebulizer solution 1 ampule  1 ampule Inhalation Q4H PRN Lennox Sicilian, MD        albuterol sulfate HFA (PROVENTIL;VENTOLIN;PROAIR) 108 (90 Base) MCG/ACT inhaler 1 puff  1 puff Inhalation Q6H PRN Lennox Sicilian, MD        sodium chloride flush 0.9 % injection 5-40 mL  5-40 mL IntraVENous 2 times per day Lennox Sicilian, MD        sodium chloride flush 0.9 % injection 5-40 mL  5-40 mL IntraVENous PRN Lennox Sicilian, MD        0.9 % sodium chloride infusion   IntraVENous PRN Lennox Sicilian, MD        ondansetron (ZOFRAN-ODT) disintegrating tablet 4 mg  4 mg Oral Q8H PRN Lennox Sicilian, MD        Or    ondansetron [facility]) injection 4 mg  4 mg IntraVENous Q6H PRN Lennox Sicilian, MD        polyethylene glycol (GLYCOLAX) packet 17 g  17 g Oral Daily PRN Lennox Sicilian, MD        acetaminophen (TYLENOL) suppository 650 mg  650 mg Rectal Q6H PRN Lennox Sicilian, MD        mometasone-formoterol [facility]) 200-5 MCG/ACT inhaler 2 puff  2 puff Inhalation BID Lennox Sicilian, MD Alveda Laura ON 9/10/2022] thiamine mononitrate tablet 100 mg  100 mg Oral Daily Lennox Sicilian, MD        rosuvastatin (CRESTOR) tablet 20 mg  20 mg Oral Nightly Lennox Sicilian, MD        [START ON 9/10/2022] aspirin chewable tablet 81 mg  81 mg Oral Daily Lennox Sicilian, MD        [START ON 9/10/2022] allopurinol (ZYLOPRIM) tablet 200 mg  200 mg Oral Daily Lennox Sicilian, MD        [START ON 9/10/2022] predniSONE (DELTASONE) tablet 5 mg  5 mg Oral Daily Bhavana Hernandez MD        gabapentin (NEURONTIN) capsule 300 mg  300 mg Oral TID Bhavana Hernandez MD        [START ON 9/10/2022] furosemide (LASIX) injection 80 mg  80 mg IntraVENous Daily Bhavana Hernandez MD        Mirian Mcgregorus ON 9/10/2022] metoprolol succinate (TOPROL XL) extended release tablet 12.5 mg  12.5 mg Oral Daily Bhavana Hernandez MD        traZODone (DESYREL) tablet 50 mg  50 mg Oral Nightly PRN Bhavana Hernandez MD        hydrALAZINE (APRESOLINE) tablet 50 mg  50 mg Oral Q6H PRN Bhavana Hernandez MD        acetaminophen (TYLENOL) tablet 650 mg  650 mg Oral Q4H PRN Bhavana Hernandez MD        enoxaparin Sodium (LOVENOX) injection 30 mg  30 mg SubCUTAneous BID Bhavana Hernandez MD             REVIEW OF SYSTEMS:   CONSTITUTIONAL: negative for fevers, chills, diaphoresis, activity change, appetite change, fatigue, night sweats and unexpected weight change. EYES: negative for blurred vision, eye discharge, visual disturbance and icterus. HEENT: negative for hearing loss, tinnitus, ear drainage, sinus pressure, nasal congestion, epistaxis and snoring. RESPIRATORY: Negative for hemoptysis, cough, sputum production. CARDIOVASCULAR: negative for chest pain, palpitations, exertional chest pressure/discomfort, edema, syncope. GASTROINTESTINAL: negative for nausea, vomiting, diarrhea, constipation, blood in stool and abdominal pain. GENITOURINARY: negative for frequency, dysuria, urinary incontinence, decreased urine volume, and hematuria. HEMATOLOGIC/LYMPHATIC: negative for easy bruising, bleeding and lymphadenopathy. ALLERGIC/IMMUNOLOGIC: negative for recurrent infections, angioedema, anaphylaxis and drug reactions. ENDOCRINE: negative for weight changes and diabetic symptoms including polyuria, polydipsia and polyphagia. MUSCULOSKELETAL: negative for pain, joint swelling, decreased range of motion and muscle weakness.    NEUROLOGICAL: negative for headaches, slurred speech, unilateral weakness. PSYCHIATRIC/BEHAVIORAL: negative for hallucinations, behavioral problems, confusion and agitation. All pertinent positives are noted in the HPI. Physical Examination:  Vitals: Patient Vitals for the past 24 hrs:   BP Temp Temp src Pulse Resp Height   09/09/22 1800 -- -- -- -- -- 6' 1\" (1.854 m)   09/09/22 1748 (!) 107/57 97.5 °F (36.4 °C) Oral 71 18 --     Psych: Stable mood, normal judgement, normal affect   Const: No distress  Eyes: Conjunctiva noninjected, no icterus noted; pupils equal, round, and reactive to light. HENT: Atraumatic, normocephalic; Oral mucosa moist  Neck: Trachea midline, neck supple. No thyromegaly noted. CV: Regular rate and rhythm, no murmur rub or gallop noted  Resp: Lungs clear to auscultation bilaterally, no rales wheezes or ronchi, no retractions. Respirations unlabored. GI: Soft, nontender, nondistended. Normal bowel sounds. No palpable masses. Neuro: Alert, oriented, appropriate. No cranial nerve deficits appreciated. Sensation intact to light touch. Motor examination reveals normal strength in all four limbs diffusely. Skin: Normal temperature and turgor  MSK: No joint abnormalities noted. Ext: No significant edema appreciated. No varicosities. Lab Results   Component Value Date    WBC 6.2 09/06/2022    HGB 12.5 (L) 09/06/2022    HCT 39.4 (L) 09/06/2022    .6 (H) 09/06/2022     09/06/2022     No results found for: INR, PROTIME  Lab Results   Component Value Date    CREATININE 1.2 09/06/2022    BUN 32 (H) 09/06/2022     09/06/2022    K 5.1 09/06/2022     09/06/2022    CO2 29 09/06/2022     Lab Results   Component Value Date    ALT <5 (L) 09/03/2022    AST 8 (L) 09/03/2022    ALKPHOS 65 09/03/2022    BILITOT 0.4 09/03/2022     Type of Study      TTE procedure:ECHOCARDIOGRAM COMPLETE WITH BUBBLE STUDY.      Procedure Date  Date: 09/06/2022 Start: 11:05 AM     Study Location: [facility]) [place] [employer]: Limited visualization due to body habitus. Indications:TIA. Additional Indications:Confusion. Patient Status: Routine     Contrast Medium: Definity. Amount - 2.75 ml     Height: 73 inches Weight: 317.01 pounds BSA: 2.62 m2 BMI: 41.82 kg/m2     BP: 121/72 mmHg      Conclusions      Summary   A bubble study was performed and shows evidence of a trivial amount of right   to left shunting consistent with a tiny patent foramen ovale or atrial   septal defect. Irregular rhythm. Left ventricular systolic function is normal with ejection fraction   estimated at 55 %. No regional wall motion abnormalities are noted. Normal left ventricular wall thickness. Diastolic dysfunction grade and filing pressure are indeterminate. The right ventricle is mildly enlarged. The right atrium is mildly dilated. The aortic root is moderately dilated. The ascending aorta is moderately dilated. Mild mitral regurgitation. Most recent imaging studies revealed   EXAMINATION:   MRI OF THE BRAIN WITHOUT CONTRAST  9/6/2022 6:48 pm       TECHNIQUE:   Multiplanar multisequence MRI of the brain was performed without the   administration of intravenous contrast.       COMPARISON:   None. HISTORY:   ORDERING SYSTEM PROVIDED HISTORY: altered mental state   TECHNOLOGIST PROVIDED HISTORY:   Reason for exam:->altered mental state   Decision Support Exception - unselect if not a suspected or confirmed   emergency medical condition->Emergency Medical Condition (MA)   Reason for Exam: confusion       Initial evaluation. FINDINGS:   INTRACRANIAL STRUCTURES/VENTRICLES: There is a moderate sized acute infarct   involving the left temporal lobe within the left MCA territory. Sulcal   effacement is seen within these area of infarct without significant mass   effect or midline shift. No evidence of an acute intracranial hemorrhage.    Areas of T2 FLAIR hyperintensity are seen in the periventricular and   subcortical white matter, which are nonspecific, but may represent chronic   microvascular ischemic change. There is mild global parenchymal volume loss. The sellar/suprasellar regions appear unremarkable. The normal signal voids   within the major intracranial vessels appear maintained. ORBITS: The visualized portion of the orbits demonstrate no acute abnormality. SINUSES: The visualized paranasal sinuses and mastoid air cells demonstrate   no acute abnormality. BONES/SOFT TISSUES: The bone marrow signal intensity appears normal. The soft   tissues demonstrate no acute abnormality. Impression   1. 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 Sulcal effacement in this region of infarct without mass   effect or midline shift. 2. Otherwise, no acute intracranial abnormality. 3. Minimal global parenchymal volume loss with minimal chronic microvascular   ischemic changes. The above laboratory data have been reviewed. The above imaging data have been reviewed. The above medical testing have been reviewed. Body mass index is 42.35 kg/m². Barriers to Discharge: weakness, balance, endurance, cognition    POST ADMISSION PHYSICIAN EVALUATION  The patient has agreed to being admitted to our comprehensive inpatient  rehabilitation facility consisting of at least 180 minutes of therapy a day,  5 out of 7 days a week. The patient/family has a good understanding of our discharge process. The  patient has potential to make improvement and is in need of at least two of  the following multidisciplinary therapies including but not limited to  physical, occupational, respiratory, and speech, nutritional services, wound care, and prosthetics and orthotics.  Given the patients complex condition  and risk of further medical complications, rehabilitation services cannot be  safely provided at a lower level of care such as a skilled nursing facility. I have compared the patients medical and functional status at the time of the  preadmission screening and the same on this date, and there are no significant changes. By signing this document, I acknowledge that I have personally performed a  full physical examination on this patient within 24 hours of admission to  this inpatient rehabilitation facility and have determined the patient to be  able to tolerate the above course of treatment at an intensive level for a  reasonable period of time. I will be completing a detailed individualized  Plan of Care for this patient by day four of the patients stay based upon the  Preadmission Screen, this Post-Admission Evaluation, and the therapy  evaluations. Rehabilitation Diagnosis:  Stroke, 1.2, Right Body (L Brain)    Assessment and Plan:  Acute left temporal lobe infarct  - medical management    Chronic hypoxia/hypercapnia  - NIV/AVAPs ventilation to be organized at the time of discharge. COPD  - inhalers as ordered      PFO/ASD  - Outpatient follow up    HTN  - lisinopril    DM  - consider resumption of metformin if needed    EOH abuse  - no evidence of withdrawal    Bowels: Schedule stool softener. Follow bowel movements. Enema or suppository if needed. Bladder: Check PVR x 3. [facility] if PVR > 200ml or if any volume is > 500 ml. Sleep: Trazodone provided prn. David Diego MD 9/9/2022, 6:23 PM

## 2022-09-09 NOTE — PLAN OF CARE
Pt continues to have some aphasia; pt is alert & orientated x4; pt uses call light appropriately. Pt is able to ambulate with cane; SBA. Pt understands care & treatment plan. VSS; standard safety precautions in place. Problem: Discharge Planning  Goal: Discharge to home or other facility with appropriate resources  9/8/2022 2112 by Demar Allen RN  Outcome: Progressing  9/8/2022 1823 by Jaylon Baltazar RN  Outcome: Progressing  9/8/2022 0904 by Demar Allen RN  Outcome: Progressing     Problem: Safety - Adult  Goal: Free from fall injury  9/8/2022 2112 by Demar Allen RN  Outcome: Progressing  9/8/2022 1823 by Jaylon Baltazar RN  Outcome: Progressing  Flowsheets (Taken 9/8/2022 1823)  Free From Fall Injury: Instruct family/caregiver on patient safety  Note: No acute issues this shift. Venous duplex negative for DVTs. Remains on 3L NC. VSS. Patient approved for ARU admission tomorrow. Headache X1 after venous duplex completed. Resolved with tylenol.  Worked with PT/OT/SLP.   9/8/2022 0904 by Demar Allen RN  Outcome: Progressing     Problem: Neurosensory - Adult  Goal: Achieves maximal functionality and self care  9/8/2022 2112 by Demar Allen RN  Outcome: Progressing  9/8/2022 1823 by Jaylon Baltazar RN  Outcome: Progressing  9/8/2022 0904 by Demar Allen RN  Outcome: Progressing     Problem: Respiratory - Adult  Goal: Achieves optimal ventilation and oxygenation  9/8/2022 2112 by Demar Allen RN  Outcome: Progressing  9/8/2022 1823 by Jaylon Baltazar RN  Outcome: Progressing  9/8/2022 0904 by Demar Allen RN  Outcome: Progressing     Problem: Cardiovascular - Adult  Goal: Maintains optimal cardiac output and hemodynamic stability  9/8/2022 2112 by Demar Allen RN  Outcome: Progressing  9/8/2022 1823 by Jaylon Baltazar RN  Outcome: Progressing  9/8/2022 0904 by Demar Allen RN  Outcome: Progressing  Goal: Absence of cardiac dysrhythmias or at baseline  9/8/2022 2112 by Yanira Zheng RN  Outcome: Progressing  9/8/2022 1823 by Vik De Souza RN  Outcome: Progressing  9/8/2022 0904 by Yanira Zheng RN  Outcome: Progressing     Problem: Skin/Tissue Integrity - Adult  Goal: Skin integrity remains intact  9/8/2022 2112 by Yanira Zheng RN  Outcome: Progressing  9/8/2022 1823 by Vik De Souza RN  Outcome: Progressing  9/8/2022 0904 by Yanira Zheng RN  Outcome: Progressing     Problem: Musculoskeletal - Adult  Goal: Return mobility to safest level of function  9/8/2022 2112 by Yanira Zheng RN  Outcome: Progressing  9/8/2022 1823 by Vik De Souza RN  Outcome: Progressing  9/8/2022 0904 by Yanira Zheng RN  Outcome: Progressing     Problem: Gastrointestinal - Adult  Goal: Minimal or absence of nausea and vomiting  9/8/2022 2112 by Yanira Zheng RN  Outcome: Progressing  9/8/2022 1823 by Vik De Souza RN  Outcome: Progressing  9/8/2022 0904 by Yanira Zheng RN  Outcome: Progressing     Problem: Chronic Conditions and Co-morbidities  Goal: Patient's chronic conditions and co-morbidity symptoms are monitored and maintained or improved  9/8/2022 2112 by Yanira Zheng RN  Outcome: Progressing  9/8/2022 1823 by Vik De Souza RN  Outcome: Progressing  9/8/2022 0904 by Yanira Zheng RN  Outcome: Progressing

## 2022-09-09 NOTE — PROGRESS NOTES
[facility] Department   Phone: [phone]    Physical Therapy    [x] Initial Evaluation            [] Daily Treatment Note         [] Discharge Summary      Patient: Tejas Moody   : 1945   MRN: [id number]   Date of Service:  2022  Admitting Diagnosis: Encephalopathy    Current Admission Summary: Tejas Moody is a 78 y.o. male who presents to the emergency department via EMS due to home nurse stating that he was acting differently than normal today. Unsure of when last normal was. EMS stated that the home nurse stated that he kept saying the same sentence over and over that he was fine which prompted them to call 9 1 to have him brought to the emergency department for further evaluation. Patient has a history of COPD, diabetes, peripheral vascular disease, anemia, hypertension and obstructive sleep apnea. Patient is wearing oxygen and does wear oxygen at home. Patient cannot tell me why he is here and when asked he states he just forgot to take my. .. And cannot recall what he forgot to take. Patient is able to follow my commands during my exam.  Patient does not have any complaints at this time. Patient appears well and in no acute distress. Patient lives by himself. 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 Past Surgical History:  has a past surgical history that includes Appendectomy; Total hip arthroplasty; and Colonoscopy (N/A, 2019). Discharge Recommendations: Tejas Moody scored a 19/24 on the AM-PAC short mobility form.  Current research shows that an AM-PAC score of 17 or less is typically not associated with a discharge to the patient's home setting. Based on the patient's AM-PAC score and their current functional mobility deficits, it is recommended that the patient have 5-7 sessions per week of Physical Therapy at d/c to increase the patient's independence. At this time, this patient demonstrates complex nursing, medical, and rehabilitative needs, and would benefit from intensive rehabilitation services upon discharge from the Inpatient setting. This patient demonstrates the ability to participate in and benefit from an intensive therapy program with a coordinated interdisciplinary team approach to foster frequent, structured, and documented communication among disciplines, who will work together to establish, prioritize, and achieve treatment goals. Please see assessment section for further patient specific details. If patient discharges prior to next session this note will serve as a discharge summary. Please see below for the latest assessment towards goals. Despite higher AMPAC score, pt presents with significant cognitive deficits and decreased safety awareness, requires increased O2 demand during session, and cannot tolerate ambulating household distances at this time which present as barriers to safe discharge home alone.  If pt is able to be provided with 24/7 supervision at home, recommend HHPT S3.     DME Required For Discharge: patient has all required DME for discharge  Precautions/Restrictions: high fall risk  Weight Bearing Restrictions: no restrictions  [] Right Upper Extremity  [] Left Upper Extremity [] Right Lower Extremity  [] Left Lower Extremity     Required Braces/Orthotics: no braces required   [] Right  [] Left  Positional Restrictions:no positional restrictions    Pre-Admission Information   Lives With: alone                     Type of Home: house  Home Layout: two level, able to live on main level  Home Access:  1 step to enter with handrail. Handrails are located on R side. Bathroom Layout: walk in shower  Bathroom Equipment: grab bars in shower, shower chair, hand held shower head  Toilet Height: elevated height  Home Equipment: rolling walker, single point cane, manual wheelchair, reacher, sock aide, long handled shoe horn, oxygen, . Comment: transport w/c, on 3L O2 all the time, even though Dr. Faye Doyle recommended 2 L  (wooden cane)  Transfer Assistance: Independent without use of device  Ambulation Assistance:modified independent with use of SPC; uses walker when work in garden  ADL Assistance: independent with all ADL's  IADL Assistance: independent with homemaking tasks  Active :        [x] Yes                 [] No  Hand Dominance: [] Left                 [x] Right  Hobbies: Enjoys cooking, baking bread, volunteer at [employer] [facility]. [employer]  Recent Falls: No recent falls  Pt sleeps in a recliner. 2 daughters in area. 1 dtr is an RN and granddaughter becoming an RN. Examination   Vision:   Vision Gross Assessment: Impaired, Vision Corrective Device: wears glasses for reading, and Visual History: cataracts, corrective eye surgery, other - implants  Hearing:   hard of hearing  Observation:   General Observation:  Swelling B LE   Posture: Forward head, rounded shoulders   Sensation:   reports numbness and tingling in all extremities  Comment: neuropathy in fingers and feet  ROM:   (B) LE AROM WFL  Strength:   (B) LE strength grossly WFL  Decision Making: low complexity  Clinical Presentation: stable      Subjective  General: Pt seated in recliner upon arrival. Pt agreeable to PT/OT eval. Pt on 3L O2 resting at Spo2 96%. Per family, patient supposed to be on 8L O2 during ambulation. Pain: 5/10. Location: R shoulder and R knee   Pain Interventions: RN notified       Functional Mobility  Bed Mobility  Bed mobility not completed on this date.   Comments: Pt in recliner at beginning and EOS   Transfers  Sit to stand transfer: stand by assistance, contact guard assistance  Stand to sit transfer: stand by assistance, contact guard assistance  Comments: Gait belt donned. Patient utilized cane. Ambulation  Surface:level surface  Assistive Device: pts own personal walking stick   Assistance: stand by assistance, contact guard assistance  Distance: 50', standing rest break, 50'  Gait Mechanics: wide ZACH, decreased step length, decreased step height   Comments:    Stair Mobility  Stair mobility not completed on this date. Comments:  Wheelchair Mobility:  No w/c mobility completed on this date. Comments:  Balance  Static Sitting Balance: good: independent with functional balance in unsupported position  Dynamic Sitting Balance: good: independent with functional balance in unsupported position  Static Standing Balance: fair (-): maintains balance at SBA with use of UE support  Dynamic Standing Balance: fair (-): maintains balance at CGA with use of UE support  Comments:     Other Therapeutic Interventions:  Assessed right shoulder for reported pain. Patient demonstrates limited RUE flexion/abduction compared to left w/ weakness/pain during resisted external rotation. Physical exam consistent with arthritic changes given patient's age and rotator cuff external rotator involvement. Advised family and patient to f/u w/ outpatient therapy if interested.     Functional Outcomes  AM-PAC Inpatient Mobility Raw Score : 19              Cognition  Overall Cognitive Status: Impaired  Arousal/Alterness: appropriate responses to stimuli  Following Commands: follows all commands without difficulty  Attention Span: appears intact  Memory: decreased short term memory  Safety Judgement: good awareness of safety precautions  Problem Solving: able to problem solve independently  Insights: decreased awareness of deficits  Initiation: does not require cues  Sequencing: does not require cues  Comments:  Pt has word finding deficits, which he is aware of; Treatment Minutes:  54 Minutes  Total Treatment Minutes:  55       Electronically Signed By: Sury Crockett, PT, DPT, ATC-R [id number]

## 2022-09-09 NOTE — PROGRESS NOTES
Facility/Department: [address]  Speech Language Pathology   Dysphagia and Speech Language/Cognitive Treatment Note    Patient: Rocael Allen   : 1945   MRN: [id number]      Evaluation Date: 2022      Admitting Dx: Metabolic encephalopathy [H80.23]  Encephalopathy [G93.40]  Altered mental status, unspecified altered mental status type [R41.82]  Treatment Diagnosis: Expressive Aphasia , Receptive Aphasia , Cognitive-Linguistic Deficits , Oropharyngeal Dysphagia   Pain: Did not state                                                Subjective:  Pt alert and upright in recliner with granddaughter present during treatment session. MRI Brain: 2022  Impression   1. 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 Sulcal effacement in this region of infarct without mass   effect or midline shift. 2. Otherwise, no acute intracranial abnormality. 3. Minimal global parenchymal volume loss with minimal chronic microvascular   ischemic changes. These results were sent to the [facility] [id number] ([address] [place] [address]) on 2022   at 7:38 pm to be communicated to the referring/covering health care   provider/office. Dysphagia Treatment:     Diet and Treatment Recommendations 2022:  Diet Solids Recommendation:  Regular texture diet  Liquid Consistency Recommendation: Thin liquids  Recommended form of Meds: Whole with water or Meds in puree      Compensatory strategies: Alternate solids/liquids , Upright as possible with all PO intake , Small bites/sips , Eat/feed slowly, Remain upright 30-45 min     Assessment of Texture Tolerance:  Diet level prior to treatment: Regular texture diet , Thin liquids   Tolerance of Current Diet Level:RN reported pt appears to be tolerating current diet level      -Impressions: Pt was positioned Upright in chair, awake and alert. Currently on  3L O2 via nasal cannula .  Pt accepting of thin liquids to assess swallow function, independently fed himself. Trials of thin liquids via straw revealed suspected decreased bolus control with a mildly delayed swallow initiation. Pt declined all solid trials this date. No overt clinical s/s of aspiration were assessed. A wet vocal quality noted later during speech-language treatment but did not appear related to oral intake. Pt demonstrates increased risk for aspiration due to cognitive state , co morbidities , and new CVA . Based on today's assessment recommend continuation of regular diet/thin liquids with aspiration precautions and use of compensatory swallow strategies. Dysphagia Goals:  Pt will functionally tolerate recommended diet with no overt clinical s/s of aspiration (Ongoing 09/09/22)  Pt will demonstrate understanding of aspiration risk and precautions via education/demonstration with occasional prompting (Ongoing 09/09/22)  If clinical s/s of aspiration/penetration continue to be noted, Pt will participate in Modified Barium Swallow Study (Ongoing 09/09/22)      Speech Language/Cognitive Treatment:  Impressions: This date session focused on expressive and receptive language tasks. Pt with tangential speech requiring intermittent redirection. Confrontational naming targeted via common items in room completed with 80% accuracy (8/10). Sentence completion completed with 60% accuracy (3/5) and responsive naming with 25% accuracy (1/4). Pt benefits from phonemic cues during episodes of anomia and paraphasic errors. Awareness of errors noted intermittently across session and pt able to repair in ~50% of opportunities. He independently utilizes pen/paper to enhance memory recall and maximize functional communication. Auditory processing continues to be delayed with pt requiring repetition of task directions and complex information. Recommend ongoing skilled intervention.      Speech Language Short Term Goals:  Pt will improve auditory processing/comprehension of commands and questions to 80%, via graded tasks (Ongoing 09/09/22)  Pt will improve verbal expression for functional expression via graded tasks to 80% (Ongoing 09/09/22)  Pt will participate in ongoing cognitive assessment with goals to be established as indicated (Ongoing 09/09/22)    Assessment: Patient progressing toward goals    Plan: 3-5 times per week during acute care stay for speech language cognition; 1-2 times to ensure diet tolerance    Patient/Family Education:  Provided education regarding role of SLP, recommendations and general speech pathology plan of care. [x] Pt verbalized understanding and agreement   [x] Pt requires ongoing learning   [] No evidence of comprehension     Discharge Recommendations:  Recommend ongoing SLP for speech therapy upon discharge from the hospital     Treatment time  Timed Code Treatment Minutes: 0  Total Treatment time: 26    If patient discharges prior to next session this note will serve as a discharge summary.       Signature:   Armen [id number]  Speech-Language Pathologist  9/9/2022 2:54 PM

## 2022-09-09 NOTE — PROGRESS NOTES
[facility] Admission Assessment    Ethnicity  \"Are you of , /a, or Samoan origin? \"  Check all that apply:  [x] A. No, not of , /a, or [place] Origin  [] B.  Yes, [place], [place] American, Chicano/a  [] C.  Yes, [address] [place] [address]  [] D.  Yes, [place]  [] E.  Yes, another , , or Samoan origin  [] X. Patient unable to respond    Race  \"What is your race? \"  Check all that apply:  [x] A. White  [] B. Black or   [] C. American [facility] or [place] Native  [] D.  [facility]  [] E. [place]  [] F. [place]ian  [] G. [place]  [] Sharmaine Soulier  [] I. [place]  [] J.  Other   [] K.   [] L. [place]ian or [place]  [] M. Yemeni  [] N. Other [place]  [] X. Patient unable to respond    Language  A. \"What is your preferred language? \"   English    B. \"Do you need or want an  to communicate with a doctor or health care staff? \"  Check only one:  [x] 0. No  [] 1. Yes  [] 9. Unable to determine    Transportation  \"Has lack of transportation kept you from medical appointments, meetings, work, or from getting things needed for daily living? \"Check all that apply:  [] A.  Yes, it has kept me from medical appointments or from getting my medications  [] B.  Yes, it has kept me from non-medical meetings, appointments, work, or from getting things that I need  [x] C.  No  [] X. Patient unable to respond    Hearing  Ability to hear (with hearing aid or hearing appliances if normally used)  [x]  0. Adequate - no difficulty in normal conversation, social interaction, listening to TV  []  1. Minimal difficulty - difficulty in some environments (e.g. when person speaks softly or setting is noisy)  []  2. Moderate difficulty - speaker has to increase volume and speak distinctly   []  3. Highly impaired - absence of useful hearing    Vision  Ability to see in adequate light (with glasses or other visual appliances)  [x]  0.   Adequate - sees fine detail, such as regular print in newspapers/books  []  1. Impaired - sees large print, but nto regular print in newspapers/books  []  2. Moderately impaired - limited vision; not able to see newspaper headlines but can identify objects  []  3. Highly impaired - object identification in question, but eyes appear to follow objects  []  4. Severely impaired - no vision or sees only light, colors, or shapes; eyes do not appear to follow objects    Health Literacy  \"How often do you need to have someone help you when you read instructions, pamphlets, or other written material from your doctor or pharmacy? \"  [x]  0. Never  []  1. Rarely  []  2. Sometimes  []  3. Often  []  4. Always  []  8. Patient unable to respond    BIMS - **Must be completed in the flowsheet at admission prior to proceeding with Delirium Assessment**  [x] BIMS completed in flowsheet at admission    Signs and Symptoms of Delirium  A. Acute Onset Mental Status Change - Is there evidence of an acute change in mental status from the patient's baseline? [x] 0. No  [] 1. Yes    B. Inattention - Did the patient have difficulty focusing attention, for example being easily distractible or having difficulty keeping track of what was being said? [x]  0. Behavior not present  []  1. Behavior continuously present, does not fluctuate  []  2. Behavior present, fluctuates (comes and goes, changes in severity)    C. Disorganized thinking - Was the patient's thinking disorganized or incoherent (rambling or irrelevant conversation, unclear or illogical flow of ideas, or unpredictable switching from subject to subject)? [x]  0. Behavior not present  []  1. Behavior continuously present, does not fluctuate  []  2. Behavior present, fluctuates (comes and goes, changes in severity)    D. Altered level of consciousness - Did the patient have altered level of consciousness as indicated by any of the following criteria?   Vigilant - startled easily to any sound or touch  Lethargic - repeatedly dozed off while being asked questions, but responded to voice or touch  Stuporous - very difficulty to arouse and keep aroused for the interview  Comatose - could not be aroused  [x]  0. Behavior not present  []  1. Behavior continuously present, does not fluctuate  []  2. Behavior present, fluctuates (comes and goes, changes in severity)    Mood    \"Over the last 2 weeks, have you been bothered by any of the following problems?\" 1. Symptom Presence    0 = No  1 = Yes  9 = No Response 2. Symptom Frequency    0 = Never or 1 day  1 = 2-6 days (several days)  2 = 7-11 days (half or more of the days)  3 = 12-14 days (nearly every day)  **Leave blank if 'No Reponse'**      Enter scores in boxes    Column 1 Column 2   Little interest or pleasure in doing things   0 0   Feeling down, depressed, or hopeless   0 0   **If either A or B in column 2 is coded 2 or 3, CONTINUE asking the questions below. If not, END the interview. **     Trouble falling or staying asleep, or sleeping too much       Feeling tired or having little energy       Poor appetite or overeating       Feeling bad about yourself - or that you are a failure or have let yourself or your family down       Trouble concentrating on things, such as reading the newspaper or watching television       Moving or speaking so slowly that other people could have noticed. Or the opposite- being so fidgety or restless that you have been moving around a lot more than usual.       Thoughts that you would be better off dead, or of hurting yourself in some way. Total Severity: Add scores for all frequency responses in column 2 (possible score 0-27, or enter 99 if unable to complete (if symptom frequency (column 2) is blank for 3 or more items). 0     Social Isolation  \"How often do you feel lonely or isolated from those around you? \"  [x] 0. Never  [] 1. Rarely  [] 2. Sometimes  [] 3. Often  [] 4. Always  [] 8. Patient unable to respond    Pain Effect on Sleep  \"Over the past 5 days, how much of the time has pain made it hard for you to sleep at night? \"  []  0. Does not apply - I have not had any pain or hurting in the past 5 days  []  1. Rarely or not at all  []  2. Occasionally  [x]  3. Frequently  []  4. Almost constantly  []  8. Unable to answer    **If the patient answers \"0. Does not apply\" to this question, skip the next two \"Pain Effect. Lawernce Roughen Lawernce Roughen \" questions**    Pain Interference with Therapy Activities  \"Over the past 5 days, how often have you limited your participation in rehabilitation therapy sessions due to pain? \"  []  0. Does not apply - I have not received rehabilitation therapy in the past 5 days  [x]  1. Rarely or not at all  []  2. Occasionally  []  3. Frequently  []  4. Almost constantly  []  8. Unable to answer    Pain Interference with Day-to-Day Activities: \"Over the past 5 days, how often have you limited your day-to-day activities (excluding rehabilitation therapy session)? \"  [x]  1. Rarely or not at all  []  2. Occasionally  []  3. Frequently  []  4. Almost constantly  []  8. Unable to answer    Nutritional Approaches  Check all of the following nutritional approaches that apply on admission:  []  A. Parenteral/IV feeding  []  B. Feeding tube (e.g., nasogastric or abdominal (PEG))  []  C. Mechanically altered diet - requires change in texture of food or liquids (e.g., pureed food, thickened liquids)  []  D. Therapeutic diet (e.g., low salt, diabetic, low cholesterol)  [x]  Z. None of the above    High Risk Drug Classes:  Use and Indication    Is taking: Check if the pt is taking any medications by pharmacological classification, not how it is used, in the following classes  Indication noted:  If column 1 is checked, check if there is an indication noted for all meds in the drug class Is taking  (check all that apply) Indication noted (check all that apply)   Antipsychotic [] []

## 2022-09-09 NOTE — PROGRESS NOTES
Occupational Therapy  Alex Horner declining therapy- stating he completed all ADLs with RN earlier this date. Declined mobility at this time secondary to not have started eating lunch and having a visitor. Pt verbalized understanding of importance of mobility. Asking therapy to return later this date.      Will continue to follow and re-attempt as schedule allows    -Marley Guidry, OTR/L #[id number]

## 2022-09-09 NOTE — PROGRESS NOTES
[address]  Daily Progress Note    Admit 9/3/2022   CC CVA, PFO/ASD, AscAA   Subjective Doing well. Continued issues with mild confusion. No other complaints. Exam /69   Pulse 87   Temp 97.9 °F (36.6 °C) (Oral)   Resp 20   Ht 6' 1\" (1.854 m)   Wt (!) 321 lb (145.6 kg)   SpO2 92%   BMI 42.35 kg/m²    Intake/Output Summary (Last 24 hours) at 9/9/2022 0824  Last data filed at 9/8/2022 1800  Gross per 24 hour   Intake 970 ml   Output --   Net 970 ml     Gen Alert, coop, no distress Heart  Rrr, no mrg   Head NC, AT, no abnorm Abd  Soft, NT, +BS, no mass, no OM   Eyes PER, conj/corn clear Ext  Ext nl, AT, no C/C/E   Nose Nares nl, no drain, NT Pulse 2+ and symmetric   Throat Lips, mucosa, tongue nl Skin Col/text/turg nl, no vis rash/les   Neck S/S, TM, NT, no bruit/JVD Psych Nl mood and affect   Lung CTA-B, unlabored, no DTP Lymph   No cervical or axillary LA   Ch wall NT, no deform Neuro  Nl gross M/S exam      Medications  furosemide  80 mg IntraVENous Daily    metoprolol succinate  12.5 mg Oral Daily    thiamine mononitrate  100 mg Oral Daily    rosuvastatin  20 mg Oral Nightly    aspirin  81 mg Oral Daily    allopurinol  200 mg Oral Daily    predniSONE  5 mg Oral Daily    gabapentin  300 mg Oral TID    lisinopril  2.5 mg Oral Daily    sodium chloride flush  5-40 mL IntraVENous 2 times per day    enoxaparin  40 mg SubCUTAneous Nightly    mometasone-formoterol  2 puff Inhalation BID      sodium chloride        Labs Relevant and available CV data reviewed   Telemetry Reviewed   Time More than 35 minutes spent reviewing patient chart (including but not limited to notes, labs, imaging and other testing), interviewing patient and/or family members, performing a physical exam, documentation of my findings above and subsequent follow-up of ordered testing. More than 50% of that time spent face to face with patient discussing clinical condition and counseling regarding treatment plan. Assessment and Plan  *PFO/ASD  Status Noted on echo in setting of CVA   Plan 30D monitor  Hypercoagulable workup  Closure unlikely given age, code status, patient wishes  FU with Dr. Aimee Hassan as outpatient  To ARU today   *ASC AA   Date Detail   Status  Stable   CTC  No recent   TTE 9/22 4.5   Repair  Sx, rupture, ED>5.5, AV surgery >4.5   Surv  Q12 months (3.5-4.5cm), Q6 months (4.5-5.4cm)   Plan  Enlarged by TTE  Noncon CT chest when recovers from CVA     Will sign off, call with questions

## 2022-09-09 NOTE — PLAN OF CARE
Problem: Discharge Planning  Goal: Discharge to home or other facility with appropriate resources  9/9/2022 1025 by Guillermo Delgado RN  Outcome: Progressing  Flowsheets (Taken 9/9/2022 3377)  Discharge to home or other facility with appropriate resources: Identify barriers to discharge with patient and caregiver  9/8/2022 2112 by Vik Schultz RN  Outcome: Progressing     Problem: Safety - Adult  Goal: Free from fall injury  9/9/2022 1025 by Guillermo Delgado RN  Outcome: Progressing  9/8/2022 2112 by Vik Schultz RN  Outcome: Progressing     Problem: Neurosensory - Adult  Goal: Achieves maximal functionality and self care  9/9/2022 1025 by Guillermo Delgado RN  Outcome: Progressing  9/8/2022 2112 by Vik Schultz RN  Outcome: Progressing     Problem: Respiratory - Adult  Goal: Achieves optimal ventilation and oxygenation  9/9/2022 1025 by Guillermo Delgado RN  Outcome: Progressing  9/8/2022 2112 by Vik Schultz RN  Outcome: Progressing

## 2022-09-09 NOTE — PROGRESS NOTES
Pt transported to ARU via wheelchair with all belongings. IV still in place as pt is receiving IV lasix. Family at bedside carried all of pt belongings. All questions and concerns addressed at this time. Tele returned to [facility].

## 2022-09-09 NOTE — DISCHARGE SUMMARY
[facility]    [facility] Demographics    Patient. Baldev Gonzalez  Date of Birth. 1945  MRN. [id number]     Primary care provider. Crow Nelson MD  (Tel: [phone])    Admit date: 9/3/2022    Discharge date (blank if same as Note Date): Note Date: 9/9/2022     Reason for Hospitalization. Chief Complaint   Patient presents with    Altered Mental Status     Found Altered by caregiver who called [facility]. NIH was 5 on exam; Lives by himself; cannot follow commands; FSBS in squad was 110. Significant Findings. Principal Problem:    Encephalopathy  Active Problems:    Altered mental status    Cerebrovascular accident (CVA) due to thrombosis of left middle cerebral artery (HCC)    Acute on chronic respiratory failure with hypoxia and hypercapnia (HCC)    Cerebrovascular accident (CVA) ([address]    Chronic heart failure with preserved ejection fraction (HCC)    Ventricular ectopy    Leg swelling    TGA (transient global amnesia)    HTN (hypertension), benign    DM (diabetes mellitus), type 2, uncontrolled with complications (HCC)    COPD, severe ([address]  Resolved Problems:    * No resolved hospital problems. *       Problems and results from this hospitalization that need follow up. Ascending aortic aneurysm. Cardiology recommend noncontrast CT chest when recovers from CVA. Acute CVA. 30 day event monitor on DC to assess for PAF. Cardiology office aware of DC to ARU. Chronic hypoxic/hypercapnic respiratory failure. Will require NIV and high flow O2 concentrator at home which can be titrated up to 10 liters on DC from ARU. Significant test results and incidental findings. CXR 9/3/2022:  Left basilar opacity may reflect underlying atelectasis. Correlate   clinically with signs of pneumonia. CT Head 9/3/2022: Atrophy and small-vessel ischemic change.   No hemorrhage or mass identified       Paranasal sinus disease      Carotid doppler 9/6/2022:  Summary        No hemodynamically significant stenosis noted in the internal carotid artery    bilaterally. MRI Brain 9/6/2022:  1. 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 Sulcal effacement in this region of infarct without mass   effect or midline shift. 2. Otherwise, no acute intracranial abnormality. 3. Minimal global parenchymal volume loss with minimal chronic microvascular   ischemic changes. Echo 9/6/2022:  Summary   A bubble study was performed and shows evidence of a trivial amount of right to left shunting consistent with a tiny patent foramen ovale or atrial septal defect. Irregular rhythm. Left ventricular systolic function is normal with ejection fraction   estimated at 55 %. No regional wall motion abnormalities are noted. Normal left ventricular wall thickness. Diastolic dysfunction grade and filing pressure are indeterminate. The right ventricle is mildly enlarged. The right atrium is mildly dilated. The aortic root is moderately dilated. The ascending aorta is moderately dilated. Mild mitral regurgitation. CXR 9/7/2022:  Blunting of the left costophrenic angle appears unchanged. This could   represent underlying atelectasis or an infiltrate in the appropriate clinical   setting. Venous Doppler 9/8/2022:  Tech Comments   Right   There is no evidence of deep or superficial venous thrombosis involving the   right lower extremity. Left   There is no evidence of deep or superficial venous thrombosis involving the   left lower extremity. Peroneal vein not well seen. Invasive procedures and treatments. None     [facility]. Presented with acute confusion. Unable to recall events of the day. CT head with no acute findings. No evidence of infection. Acute CVA. Presented with memory loss. CT head with no acute findings. Carotid doppler with no significant stenosis. Ammonia level 11, vitamin B12 & folate WNL, TSH 3.18. Urine drug screen negative, ETOH negative. MRI of brain revealed moderate sized acute infarct involving left temporal lobe. Echo with normal EF and evidence tiny PFO or patent ASD. Neuro evaluated, recommend continue asa and statin. Neuro has signed off. PT/OT recommend ARU on Dc for continued therapy. PMR evaluated and found to be appropriate candidate for ARU. Acute on chronic hypoxic/hypercapnic respiratory failure / very severe COPD. On chronic prednisone therapy and O2 therapy at 3 liters. CXR with left basilar opacity which may reflect underlying atelectasis. No evidence of exacerbation, low suspicion for pneumonia as patient afebrile, no leukocytosis, procalcitonin only 0.06.  Currently at his baseline oxygen requirement, 3 liters but O2 sat noted to drop to 70% with ambulation requiring up to 8 liters to recover. Pulmonary evaluated and recommend NIV on DC and high flow O2 concentrator at home which can be titrated up to 10 liters. Continue Dulera. DM2. A1c 5.6. Takes metformin at home, held on admission. BG values controlled. HTN. Controlled. Continue lisinopril. ETOH use. Pt drinks beer daily, etoh level was negative. No s/s of withdrawal. B12, thiamine and ammonia levels in range. STEF. Will require NIV on DC    Atrial level shunt / acute ischemic stroke. Noted on echo. Evaluated by cardiology and felt unlikely to be cause of stroke, not candidate for closure given age, code status and patient wishes. Recommend 30 day event monitor on DC to assess for PAF and if negative then consideration for ILR. Leg edema. Echo with EF 86%, diastolic function indeterminate, mildly enlarged RV. No evidence pulmonary edema on CXR, patient weight unchanged compared to PCP office visits.  Edema likely multifactorial, secondary to immobility, legs in dependent position and possible right heart failure from severe COPD. Venous doppler negative for DVT. Continue po Lasix. Patient discharged to ARU. Consults. IP CONSULT TO NEUROLOGY  IP CONSULT TO NEUROLOGY  IP CONSULT TO PULMONOLOGY  IP CONSULT TO CARDIOLOGY  IP CONSULT TO PHYSICAL MEDICINE REHAB  IP CONSULT TO DIETITIAN  IP CONSULT TO SOCIAL WORK    Physical examination on discharge day. /72   Pulse 79   Temp 97.9 °F (36.6 °C) (Oral)   Resp 18   Ht 6' 1\" (1.854 m)   Wt (!) 321 lb (145.6 kg)   SpO2 92%   BMI 42.35 kg/m²   General appearance. Alert. Looks comfortable. HEENT. Sclera clear. Moist mucus membranes. Cardiovascular. Regular rate and rhythm, normal S1, S2. No murmur. Respiratory. Not using accessory muscles. Clear to auscultation bilaterally, no wheeze. Gastrointestinal. Abdomen soft, non-tender, not distended, normal bowel sounds  Neurology. Facial symmetry. No speech deficits. Moving all extremities equally. Extremities. 2+ edema in lower extremities. Skin. Warm, dry, normal turgor    Condition at time of discharge stable    Medication instructions provided to patient at discharge.      Medication List        START taking these medications      aspirin 81 MG chewable tablet  Take 1 tablet by mouth daily  Start taking on: September 10, 2022     metoprolol succinate 25 MG extended release tablet  Commonly known as: TOPROL XL  Take 0.5 tablets by mouth daily  Start taking on: September 10, 2022     rosuvastatin 20 MG tablet  Commonly known as: CRESTOR  Take 1 tablet by mouth nightly            CONTINUE taking these medications      Accu-Chek Lulu Plus strip  Generic drug: blood glucose test strips  USE ONE STRIP TO TEST DAILY     Accu-Chek Lulu Plus w/Device Kit  1 each by Does not apply route daily     Accu-Chek Softclix Lancets Misc  USE ONE LANCET TO TEST DAILY     acetaminophen 325 MG tablet  Commonly known as: TYLENOL     albuterol sulfate  (90 Base) MCG/ACT inhaler  Commonly known as: PROVENTIL;VENTOLIN;PROAIR  INHALE TWO PUFFS BY MOUTH EVERY 6 HOURS AS NEEDED     allopurinol 100 MG tablet  Commonly known as: ZYLOPRIM  TAKE TWO TABLETS BY MOUTH DAILY     Breo Ellipta 200-25 MCG/INH Aepb inhaler  Generic drug: Fluticasone furoate-vilanterol  INHALE ONE DOSE BY MOUTH DAILY     furosemide 40 MG tablet  Commonly known as: LASIX  TAKE ONE TABLET BY MOUTH TWICE A DAY     gabapentin 300 MG capsule  Commonly known as: NEURONTIN  TAKE ONE CAPSULE BY MOUTH THREE TIMES A DAY     ipratropium-albuterol 0.5-2.5 (3) MG/3ML Soln nebulizer solution  Commonly known as: DUONEB  INHALE ONE VIAL (3MLS) VIA NEBULIZATION BY MOUTH FOUR TIMES A DAY     lisinopril 2.5 MG tablet  Commonly known as: PRINIVIL;ZESTRIL  TAKE ONE TABLET BY MOUTH DAILY     metFORMIN 1000 MG tablet  Commonly known as: GLUCOPHAGE  TAKE ONE TABLET BY MOUTH TWICE A DAY WITH MEALS     predniSONE 5 MG tablet  Commonly known as: DELTASONE  TAKE ONE TABLET BY MOUTH DAILY            STOP taking these medications      celecoxib 100 MG capsule  Commonly known as: CELEBREX     diclofenac 75 MG EC tablet  Commonly known as: VOLTAREN     zoster recombinant adjuvanted vaccine 50 MCG/0.5ML Susr injection  Commonly known as: Shingrix               Where to Get Your Medications        Information about where to get these medications is not yet available    Ask your nurse or doctor about these medications  aspirin 81 MG chewable tablet  metoprolol succinate 25 MG extended release tablet  rosuvastatin 20 MG tablet         Discharged to ARU. Spent > 30 minutes in discharge process.     Signed:  THU Copoer CNP     9/9/2022 11:13 AM

## 2022-09-10 LAB
A/G RATIO: 1.2 (ref 1.1–2.2)
ALBUMIN SERPL-MCNC: 3.7 G/DL (ref 3.4–5)
ALP BLD-CCNC: 66 U/L (ref 40–129)
ALT SERPL-CCNC: 6 U/L (ref 10–40)
ANION GAP SERPL CALCULATED.3IONS-SCNC: 9 MMOL/L (ref 3–16)
AST SERPL-CCNC: 8 U/L (ref 15–37)
BILIRUB SERPL-MCNC: <0.2 MG/DL (ref 0–1)
BUN BLDV-MCNC: 57 MG/DL (ref 7–20)
CALCIUM SERPL-MCNC: 9.3 MG/DL (ref 8.3–10.6)
CHLORIDE BLD-SCNC: 102 MMOL/L (ref 99–110)
CO2: 30 MMOL/L (ref 21–32)
CREAT SERPL-MCNC: 1.6 MG/DL (ref 0.8–1.3)
GFR AFRICAN AMERICAN: 51
GFR NON-AFRICAN AMERICAN: 42
GLUCOSE BLD-MCNC: 117 MG/DL (ref 70–99)
GLUCOSE BLD-MCNC: 148 MG/DL (ref 70–99)
PERFORMED ON: ABNORMAL
POTASSIUM REFLEX MAGNESIUM: 5.2 MMOL/L (ref 3.5–5.1)
SODIUM BLD-SCNC: 141 MMOL/L (ref 136–145)
TOTAL PROTEIN: 6.8 G/DL (ref 6.4–8.2)

## 2022-09-10 PROCEDURE — 94640 AIRWAY INHALATION TREATMENT: CPT

## 2022-09-10 PROCEDURE — 6360000002 HC RX W HCPCS: Performed by: PHYSICAL MEDICINE & REHABILITATION

## 2022-09-10 PROCEDURE — 97161 PT EVAL LOW COMPLEX 20 MIN: CPT

## 2022-09-10 PROCEDURE — 6370000000 HC RX 637 (ALT 250 FOR IP): Performed by: PHYSICAL MEDICINE & REHABILITATION

## 2022-09-10 PROCEDURE — 80053 COMPREHEN METABOLIC PANEL: CPT

## 2022-09-10 PROCEDURE — 94761 N-INVAS EAR/PLS OXIMETRY MLT: CPT

## 2022-09-10 PROCEDURE — 2580000003 HC RX 258: Performed by: PHYSICAL MEDICINE & REHABILITATION

## 2022-09-10 PROCEDURE — 97535 SELF CARE MNGMENT TRAINING: CPT

## 2022-09-10 PROCEDURE — 92526 ORAL FUNCTION THERAPY: CPT

## 2022-09-10 PROCEDURE — 97530 THERAPEUTIC ACTIVITIES: CPT

## 2022-09-10 PROCEDURE — 97116 GAIT TRAINING THERAPY: CPT

## 2022-09-10 PROCEDURE — 2700000000 HC OXYGEN THERAPY PER DAY

## 2022-09-10 PROCEDURE — 92523 SPEECH SOUND LANG COMPREHEN: CPT

## 2022-09-10 PROCEDURE — 94660 CPAP INITIATION&MGMT: CPT

## 2022-09-10 PROCEDURE — 1280000000 HC REHAB R&B

## 2022-09-10 PROCEDURE — 36415 COLL VENOUS BLD VENIPUNCTURE: CPT

## 2022-09-10 PROCEDURE — 97165 OT EVAL LOW COMPLEX 30 MIN: CPT

## 2022-09-10 PROCEDURE — 92610 EVALUATE SWALLOWING FUNCTION: CPT

## 2022-09-10 RX ORDER — IPRATROPIUM BROMIDE AND ALBUTEROL SULFATE 2.5; .5 MG/3ML; MG/3ML
1 SOLUTION RESPIRATORY (INHALATION) 4 TIMES DAILY
Status: DISCONTINUED | OUTPATIENT
Start: 2022-09-10 | End: 2022-09-21 | Stop reason: HOSPADM

## 2022-09-10 RX ADMIN — Medication 10 ML: at 08:14

## 2022-09-10 RX ADMIN — PREDNISONE 5 MG: 5 TABLET ORAL at 08:13

## 2022-09-10 RX ADMIN — Medication 1 PUFF: at 18:41

## 2022-09-10 RX ADMIN — ENOXAPARIN SODIUM 30 MG: 100 INJECTION SUBCUTANEOUS at 20:01

## 2022-09-10 RX ADMIN — ASPIRIN 81 MG 81 MG: 81 TABLET ORAL at 08:13

## 2022-09-10 RX ADMIN — GABAPENTIN 300 MG: 300 CAPSULE ORAL at 13:59

## 2022-09-10 RX ADMIN — ROSUVASTATIN 20 MG: 20 TABLET, FILM COATED ORAL at 20:02

## 2022-09-10 RX ADMIN — ALLOPURINOL 200 MG: 100 TABLET ORAL at 08:13

## 2022-09-10 RX ADMIN — ENOXAPARIN SODIUM 30 MG: 100 INJECTION SUBCUTANEOUS at 08:14

## 2022-09-10 RX ADMIN — Medication 10 ML: at 20:02

## 2022-09-10 RX ADMIN — Medication 100 MG: at 08:13

## 2022-09-10 RX ADMIN — GABAPENTIN 300 MG: 300 CAPSULE ORAL at 20:02

## 2022-09-10 RX ADMIN — GABAPENTIN 300 MG: 300 CAPSULE ORAL at 08:13

## 2022-09-10 RX ADMIN — Medication 2 PUFF: at 18:28

## 2022-09-10 RX ADMIN — FUROSEMIDE 80 MG: 10 INJECTION, SOLUTION INTRAMUSCULAR; INTRAVENOUS at 08:13

## 2022-09-10 ASSESSMENT — PAIN SCALES - GENERAL
PAINLEVEL_OUTOF10: 0
PAINLEVEL_OUTOF10: 0

## 2022-09-10 NOTE — PLAN OF CARE
Problem: Discharge Planning  Goal: Discharge to home or other facility with appropriate resources  9/10/2022 1032 by Nestor Garrison RN  Outcome: Progressing  Flowsheets (Taken 9/10/2022 8893)  Discharge to home or other facility with appropriate resources: Identify barriers to discharge with patient and caregiver  9/10/2022 0417 by Joel Loyola RN  Outcome: Progressing  Flowsheets  Taken 9/9/2022 2153 by Joel Loyola RN  Discharge to home or other facility with appropriate resources:   Identify barriers to discharge with patient and caregiver   Identify discharge learning needs (meds, wound care, etc)  Taken 9/9/2022 1821 by Coco Ramirez RN  Discharge to home or other facility with appropriate resources: Identify barriers to discharge with patient and caregiver     Problem: Safety - Adult  Goal: Free from fall injury  9/10/2022 1032 by Nestor Garrison RN  Outcome: Progressing  9/10/2022 0417 by Joel Loyola RN  Outcome: Progressing  Flowsheets (Taken 9/10/2022 0415)  Free From Fall Injury: Instruct family/caregiver on patient safety

## 2022-09-10 NOTE — PROGRESS NOTES
Facility/Department: [facility] ACUTE REHAB UNIT  Initial Assessment    Patient: Ion Armendariz   : 1945   MRN: [id number]    Evaluation Date: 9/10/2022         Medical Diagnosis Information:  has HTN (hypertension), benign; Syncope; DM (diabetes mellitus), type 2, uncontrolled with complications ([address] Arthritis; Chronic respiratory failure ([address] STEF (obstructive sleep apnea); Venous insufficiency; Acute sinusitis; Morbid obesity ([address] Vitamin D deficiency; Open back wound; COPD, severe ([address] HLD (hyperlipidemia); Diastolic dysfunction; Aneurysm of abdominal aorta ([address] Personal history of tobacco use, presenting hazards to health; Encephalopathy; Altered mental status; Cerebrovascular accident (CVA) due to thrombosis of left middle cerebral artery ([address] Acute on chronic respiratory failure with hypoxia and hypercapnia ([address] Cerebrovascular accident (CVA) ([address] Chronic heart failure with preserved ejection fraction ([address] Ventricular ectopy; Leg swelling; TGA (transient global amnesia); and Metabolic encephalopathy on their problem list.                                      ARU H&P:   \"Ishmael Deleon is a 78 y.o. male with , DM , HTN , Obesity, COPD , STEF, chronic respiratory failure, AAA, PVD, current daily alcohol use, Nicotine use presented with altered mental state. Step daughter at bedside states the pt became confused today. He was unable to recognize people . He is alert and follows command. But states he cant remember any thing. Per step daughter he is active at baseline, volunteers . He lives alone since his wife passed several years ago. He drinks beer daily. Has been depressed since his dog passed away a few months ago . No h/o Stroke or Seizures. No recent illness. But he is unable to remember any thing short term or long term. Unable to tell me the name of the hospital or state, Who is family member visiting him, whether he was  . The pt denies pain.  No focal muscle weakness, numbness or tingling, denies headache, no slurred speech passed Swallow screen. ED work up showed CT head showed atrophy and small vessel ischemia. UA , Urine drug screen unremarkable. Blood Etoh level is undetectable. Ammonia level is normal 11. Blood sugar 111\"    Recent Chest xray  9/7/2022  Impression   Blunting of the left costophrenic angle appears unchanged. This could   represent underlying atelectasis or an infiltrate in the appropriate clinical   setting. Recent MRI Brain  9/6/2022  Impression   1. 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 Sulcal effacement in this region of infarct without mass   effect or midline shift. 2. Otherwise, no acute intracranial abnormality. 3. Minimal global parenchymal volume loss with minimal chronic microvascular   ischemic changes. These results were sent to the [facility] [id number] ([address] [place] [address]) on 9/6/2022   at 7:38 pm to be communicated to the referring/covering health care   provider/office. Head CT:   9/3/2022  Impression   Atrophy and small-vessel ischemic change. No hemorrhage or mass identified       Paranasal sinus disease     History/Prior Level of Function:   Living Status: Lives alone  Occupation/School: , [employer]   Medication Management: :  [x]Primary   []Secondary []No  Finance Management: [x]Primary   []Secondary []No  Active :   []Yes         [x]No  Hearing:    WFL  Vision:    Vision Gross Assessment: WFL; Wears reading glasses    PAIN:  Pain Scale: 0/10  Pain Intervention:  not applicable            DYSPHAGIA BEDSIDE SWALLOW EVALUATION     Prior Dysphagia History:  Pt with no prior dysphagia history per chart review. Pt unable to provide history secondary to cognitive and language deficits. Patient Complaint:   No complaints when asked.     Patient Positioning:   Upright in chair    Respiratory Status:   []Room Air   [x]O2 via nasal cannula; on 3 L at baseline   []Other:    Current Diet:  Regular texture diet  with Thin liquids      Dentition:  Adequate dentition     Oral Hygiene:   Clean     Baseline Vocal Quality:  normal     Volitional Cough:  strong    Volitional Swallow:     Adequate     Oral Mechanism Exam:  Within functional limits     Oral Phase: Within functional limits     Pharyngeal Phase: Within functional limits   Other:     Dysphagia Impressions:  Pt seen sitting upright in recliner chair, on 3 L of O2 via NC, and willing to participate in dysphagia evaluation. Pt unable to provide history secondary to language and cognitive deficits. Provided pt with regular solids and thin liquids. Regular solids (tae cracker) revealed adequate mastication, adequate a-p propulsion, and adequate swallow initiation. Thin liquids via straw revealed adequate oral seal around the straw, adequate bolus control, adequate a-p propulsion, and adequate swallow initiation. Pt seemingly tolerated 100% of regular solids and thin liquids without overt s/s of aspiration. Recommend continuation of current diet of regular solids and thin liquids with aspiration precautions and compensatory strategies in place. Eating Assistance:  [x]Independent  []Setup or clean-up assistance   [] Supervision or touching assistance   [] Partial or moderate assistance   [] Substantial or maximal assistance  [] Dependent     Recommended Diet and Intervention:  Diet Solids Recommendation:   Liquid Consistency Recommendation:   Recommended form of Meds:   Regular texture diet     Thin liquids     Whole with water or in puree     Dysphagia Therapeutic Intervention:  Diet Tolerance Monitoring , Patient/Family Education     Compensatory Swallowing Strategies: Alternate solids/liquids , Small bites/sips , Eat/feed slowly    SHORT TERM DYSPHAGIA GOALS  Pt will tolerate recommended diet without overt s/s of aspiration.        SPEECH LANGUAGE COGNITIVE ASSESSMENT:     Patient complaint:   \"Brain is gone. Had a stroke. \"    Oral Mechanism Exam:  Within functional limits     COMPREHENSION  Auditory Comprehension: Moderate   Impaired Yes/no questions  Impaired Basic questions  Impaired Complex questions  Impaired Two step commands  Impaired Multi-step commands  Impaired Complex/Abstract commands  Impaired Conversation    Reading Comprehension: Mild     EXPRESSION  Primary Mode of Expression: Verbal  Verbal Expression: Moderate   Impaired Repetition  Impaired Convergent Naming  Impaired Divergent Naming  Impaired Responsive Naming  Impaired Conversation  Impaired Thought Organization   Paraphasias   Perseveration   Anomia     Written Expression: Mild      Pragmatics/Social Functioning: Moderate  Impaired Topic maintenance      MOTOR SPEECH  Motor Speech: Within functional limits     VOICE  Voice: Within functional limits     COGNITION  []Unable to be assessed secondary to Aphasia     Overall Orientation : Mild   Oriented to self  Oriented to place  Oriented to situation     Attention: Moderate   Impaired Sustained Attention  Impaired Alternating Attention  Impaired Divided Attention    Memory:  Moderate   Impaired Short-term Memory  Impaired Recall of New Learning   Impaired Immediate/working Memory    Problem Solving: Unable to assess   Impaired Simple Functional Tasks  Impaired Verbal Reasoning  Impaired Complex Tasks     Safety/Judgement: Unable to assess   Unable to self-monitor and self correct consistently   Impaired Insight  Impulsive     ADDITIONAL ASSESSMENT:    MS Aphasia Screening Test  Expressive Index     - Naming 10/10     - Automatic Speech 8/10     - Repetition 4/10     - Writing 10/10    Receptive Index     - Yes/No Accuracy 10/20     - Object Recognition 10/10     - Following Instructions 8/10     - Reading Instructions 8/10    [facility] Mental Status (UMS) Examination   [facility] Mental Status (UMS) Examination administered to pt with pt scoring 9/30 falling within the Normal Range (27/30); Mild Neurocognitive Disorder (21-26/30); Dementia rage (1-20). Orientation 3/3    Basic calculation 0/3    Word fluency 1/3 8 animals in 1 minute   Delayed recall 0/5 1/5 given min verbal cue   Working memory 0/2    Clock 2/4 Numbers okay, Hands on clock misplaced   Reasoning/Following Directions 1/2 X in square vs. 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 Pt also presents with moderate to severe cognitive deficits within the domains of basic calculation, divergent naming, immediate recall, delayed recall, STM, WM, executive functioning, and attention. When asked about his language and cognitive skills, pt stated, \"brain is gone\". Pt would benefit from speech therapy services to improve language and cognitive skills to ensure a safe return back to his prior level of functioning. Prognosis/Rehab Potential:      []Excellent   [x]Good    []Fair   []Poor    Tolerance of evaluation/treatment:    []Excellent   [x]Good    []Fair   []Poor    PLAN:    SLP treatment warranted:    [x] Yes  [] No      Frequency/Duration:   60 minutes/day; 5 days per week, as tolerated, until goals met, or discharged from ARU. Barriers to/and or personal factors that will affect rehab potential:  Confusion and Communication deficit    Discharge Recommendations:   TBD     EDUCATION:   Provided education regarding role of SLP, results of assessment, recommendations and general speech pathology plan of care.    [x] Pt verbalized understanding and agreement   [x] Pt requires ongoing learning   [] No evidence of comprehension     GOALS:  Patient stated goal:   \"To get back home\"    Short Term Speech/Language/Cognitive Goals:   Pt will complete auditory comprehension tasks with 80% accuracy. Pt will complete expressive language tasks with 80% accuracy or min cues   Patient will complete verbal description and word retrieval tasks with 80% accuracy or given min verbal cues. Patient will be instructed in memory strategies to utilize in order to promote recall of newly learned information with >80% min cues. Pt will demonstrate use of visual strategies for visual language processing with 80% accuracy or min cues. Time frame for short term goals: 1-2 weeks    Long Term Goals:   Pt will improve expressive/receptive language tasks to highest functional level to decreased burden of care upon discharge. and Pt will demonstrate adequate cognitive linguistic abilities for safe discharge to home with min supervision utilizing strategies to maximize safety and independence. If patient discharges prior to next visit, this note will serve as discharge. Therapy Time:   Individual   Time In 0930   Time Out 1030   Minutes 60     Timed Code Treatment Minutes: 0 minutes    Total Treatment Minutes: 60 minutes    Electronically signed by:     Zoey Moreira M.A, [id number] [facility]  Speech-Language Pathologist

## 2022-09-10 NOTE — DISCHARGE INSTRUCTIONS
We hope your stay on rehab has exceeded your expectations. Once again the entire Acute Rehab Staff at [id number] [facility] wish to thank you for allowing us the privilege to care for you. A few days after you are discharged from Rehab, you will receive a survey [employer]) in the mail. This is a nationally distributed survey sent to thousands of rehab patients throughout the nation. It is very important to the staff and Dr. Coni Toro to receive feedback based on your experience on the Rehab Unit. Thank you, we wish you good health always,         Acute Rehab Team      Hospital Preference:     [facility] [address] Diagnosis/Conditions    _______________________ (free text)    Emergency Contact:    ________________________________________Phone#________________________      Advanced Directives:    Code Status: ?  []  Full Code  ? []  DNR  ? [x]  [facility]  ? []  [facility] - Arrest    (as of date of discharge:  _________)      Medical POA: ?  []   Yes ______________________________ ? []   No                                       (Name and phone number)                     Living Will:   ?   []   Yes    ?  []   No        Insurance Information:    _______________________ (free text)      Individual Responsible  for the coordination of the discharge/follow up:    ______________________________________________________    Functional Status:    VISUAL DEFICITS:    Yes [x]  No  []       If yes, assisted device:   Wears Glasses Yes [x]  No  []  Wears Contacts  Yes []  No  []  Legally Blind Yes []  No  []    HEARING DEFICITS:    Yes []  No  [x]       If yes, assisted device:   Wears Hearing Aids Yes []  No  []  Pocket Talker  Yes []  No  []       Physical Therapist & Contact #: Renata Chase [phone]  OccupationalTherapist & Contact #:  Speech Therapist & Contact #: Sanam Cutler M.A.  [phone]       Activities of Daily Living:     ADL's - Adaptive Equipment used shower chair, grab bars      []  Independent ? []  Modified Independent ? [x]  Supervision                []  Minimal Assistance ? []  Moderate Assistance ? []  Maximal Assistance     IADL's      Cooking   []  Independent  ? [x]  Needs Assistance   Cleaning   []  Independent  ? [x]  Needs Assistance    Money Management  []  Independent  ? [x]  Needs Assistance  Health Management  []  Independent  ? [x]  Needs Assistance  Medication Management  []  Independent  ? [x]  Needs Assistance        Comments: Requires supervision/assist for daily activities - has limited awareness into need for rest breaks and increased 02 needs during activity - communication and attention limit independence in all daily activities        Driving Restriction:      [x]  YES   [] NO     Mobility:     Ambulation: Device ___rolling walker__________________ (free text)     []  Independent ? [x]  Modified Independent ? []  Supervision                []  Minimal Assistance ? []  Moderate Assistance ? []  Maximal Assistance     Stairs:  Device _____none________________ (free text)    Number of stairs: __12___________ (free text)    Handrails:    [] Right   [] Left      [x] Bilateral   []  Independent ? [x]  Modified Independent ? []  Supervision                []  Minimal Assistance ? []  Moderate Assistance ? []  Maximal Assistance    Maddy Alexander will need intermittent assistance to manage O2 line. He gets wrapped up in his line if there are a lot of turns or if he is distracted. Current Diet Consistency:?       [x]  Regular   [] Soft and Bite-Sized  ? [] Minced and Moist     ?[]  Puree     Current Liquid Level:?         [x] Thin Liquids     [] Mildly Thick (Nectar) ?     [] Moderately Thick (Honey)    [] Pudding Thick    [] [facility] Water Protocol     Dietary Restrictions:?      []  General Diet   []  Tube Feed, w/ Diet   []  Tube Feed, NPO   [x]  Carb Control   []  Cardiac   []  Renal    []  Other:

## 2022-09-10 NOTE — PLAN OF CARE
Problem: Discharge Planning  Goal: Discharge to home or other facility with appropriate resources  Outcome: Progressing  Flowsheets  Taken 9/9/2022 2153 by Winter Lin RN  Discharge to home or other facility with appropriate resources:   Identify barriers to discharge with patient and caregiver   Identify discharge learning needs (meds, wound care, etc)  Taken 9/9/2022 1821 by Georgie Brown RN  Discharge to home or other facility with appropriate resources: Identify barriers to discharge with patient and caregiver     Problem: Safety - Adult  Goal: Free from fall injury  Outcome: Progressing  Flowsheets (Taken 9/10/2022 0415)  Free From Fall Injury: Instruct family/caregiver on patient safety     Problem: ABCDS Injury Assessment  Goal: Absence of physical injury  Outcome: Progressing

## 2022-09-10 NOTE — PLAN OF CARE
[address], [address]  [phone]      Noemy Martin    : 1945  Acct #: [de-identified]  MRN: [id number]  PHYSICIAN:  Desirae Barclay MD  Primary Problem    Patient Active Problem List   Diagnosis    HTN (hypertension), benign    Syncope    DM (diabetes mellitus), type 2, uncontrolled with complications (HCC)    Arthritis    Chronic respiratory failure (HCC)    STEF (obstructive sleep apnea)    Venous insufficiency    Acute sinusitis    Morbid obesity ([address]    Vitamin D deficiency    Open back wound    COPD, severe ([address]    HLD (hyperlipidemia)    Diastolic dysfunction    Aneurysm of abdominal aorta (HCC)    Personal history of tobacco use, presenting hazards to health    Encephalopathy    Altered mental status    Cerebrovascular accident (CVA) due to thrombosis of left middle cerebral artery (HCC)    Acute on chronic respiratory failure with hypoxia and hypercapnia (HCC)    Cerebrovascular accident (CVA) ([address]    Chronic heart failure with preserved ejection fraction (HCC)    Ventricular ectopy    Leg swelling    TGA (transient global amnesia)    Metabolic encephalopathy       Rehabilitation Diagnosis:      Acute left temporal lobe infarct  - medical management     Chronic hypoxia/hypercapnia  - NIV/AVAPs ventilation to be organized at the time of discharge. COPD  - inhalers as ordered       PFO/ASD  - Outpatient follow up     HTN  - lisinopril     DM  - consider resumption of metformin if needed     EOH abuse  - no evidence of withdrawal    ADMIT DATE:2022    Patient Goals: To complete ADLs independently and go home. Admitting Impairments: Stroke - 1.2 - Right Body Involvement (Left Brain)  Activities: Impaired Hygiene, Toileting, Bathing, Dressing, Bed Mobility, Transfers, Ambulation, Stairs, and Endurance.   Participation: Prior to admission, patient was living at home alone, was independent with all mobility and activities, was an active .      CARE PLAN     NURSING:  [place] while on this unit will:  [x] Be continent of bowel and bladder     [x] Have an adequate number of bowel movements  [x] Urinate with no urinary retention >300ml in bladder  [x] Complete bladder protocol with fernandez removal  [x] Maintain O2 SATs at __98_%  [x] Have pain managed while on ARU       [x] Be pain free by discharge   [x] Have no skin breakdown while on ARU  [x] Have improved skin integrity via wound measurements  [x] Have no signs/symptoms of infection at the wound site  [x] Be free from injury during hospitalization   [x] Complete education with patient/family with understanding demonstrated for:  [] Adjustment   [] Other:     Nursing Interventions will include:  [x] bowel/bladder training   [x] education for medical assistive devices   [x] medication education   [x] O2 saturation management   [x] energy conservation   [x] stress management techniques   [x] fall prevention   [x] alarms protocol   [x] seating and positioning   [x] skin/wound care   [x] pressure relief instruction   [x] dressing changes     [x] infection protection   [x] DVT prophylaxis  [x] assistance with in room safety with transfers to bed, toilet, wheelchair, shower   [x] bathroom activities and hygiene  [x] Other:    Patient/Caregiver Education for:  [x] Disease/sustained injury/management     [x] Medication Use  [x] Surgical intervention  [x] Safety  [x] Body mechanics and or joint protection  [x] Health maintenance     [] Other:     PHYSICAL THERAPY:  Goals:                   Patient Goals: to go home   Short Term Goals:  Time Frame: to bet met in 7-10 days  Patient will complete stand step transfer at [facility]   Patient will ambulate 200 ft with use of LRAD at [facility]  Patient will ascend/descend 1 stairs with (R) ascending handrail at modified independent  Patient will complete car transfer at [facility] These goals were reviewed with this patient at the time of assessment and Richie Herron is in agreement. Plan of Care: Pt to be seen 5 out of 7 days per week per ARU protocol ( 60 minutes with PT)                     OCCUPATIONAL THERAPY:  Goals:               :Plan  Frequency: 5 x/week, 60 min/day  Current Treatment Recommendations: strengthening, balance training, functional mobility training, transfer training, endurance training, neuromuscular re-education, patient/caregiver education, ADL/self-care training, and IADL training    Goals  Patient Goals: pt reports being modest and wishes to complete ADLs independently    Short Term Goals:  Time Frame: 7-10 days   Patient will complete upper body ADL at [facility]   Patient will complete lower body ADL at modified independent, . Comment with use of AE PRN   Patient will complete toileting at [facility]   Patient will complete self-feeding at [facility]   Patient will complete grooming at [facility]   Patient will complete functional transfers at [facility]   Patient will complete functional mobility at modified independent   Patient to gather and transport IADL items at modified independent   Patient will demonstrate ability to perform simple IADL tasks (2-3 step tasks) with modified independence.    :  These goals were reviewed with this patient at the time of assessment and Richie Herron is in agreement    Plan of Care:  Pt to be seen 5 out of 7 days per week per ARU protocol ( 60 minutes with OT)     SPEECH THERAPY: Goals will be left blank if speech is not following this patient. Goals:  Short-term Goals  Goal 1: Pt will tolerate recommended diet without overt s/s of aspiration. Short-term Goals  Goal 1: Pt will complete auditory comprehension tasks with 80% accuracy. Goal 2: Pt will complete expressive language tasks with 80% accuracy or min cues.   Goal 3: Patient will complete verbal description and word retrieval tasks with 80% accuracy or given min verbal cues. Goal 4: Patient will be instructed in memory strategies to utilize in order to promote recall of newly learned information with >80% min cues. Goal 5: Pt will demonstrate use of visual strategies for visual language processing with 80% accuracy or min cues. Plan of Care:  Pt to be seen 5 out of 7 days per week per ARU protocol ( 60 minutes with SLP)    Therapy Treatments will include:  [x]  therapeutic exercises    [x]  gait training     [x]  neuromuscular re-ed                            [x]  transfer training             [x] community reintegration    [x] bed mobility                          []  w/c mobility and training  [x]  self care    []home mgmt    []  cognitive training            [x]  energy conservation        [x]  dysphagia tx    [x]  speech/language/communication therapy   []  group therapy    [x]  patient/family education    [] Other:    CASE MANAGEMENT:  Goals:   Assist patient/family with discharge planning, patient/family counseling, and coordination with insurance during ARU stay. Cyndi Barnes will be seen a minimum of 3 hours of therapy per day, a minimum of 5 out of 7 days per week  (please see above for specific treatment plan per PT/OT/SLP). [] In this rare instance due to the nature of this patient's medical involvement, this patient will be seen 15 hours per week (900 minutes within a 7 day period). In addition, dietician/nutritionist may monitor calorie count as well as intake and collaboratively work with SLP on dietary upgrades. Neuropsychology/Psychology may evaluate and provide necessary support.     Medical issues being managed closely and that require 24 hour availability of a physician:  [] Swallowing Precautions  [x] Bowel/Bladder Fx  [] Weight bearing precautions  [] Wound Care    [x] Pain Mgmt   [x] Infection Protection  [x] DVT Prophylaxis   [x] Fall Precautions  [x] Fluid/Electrolyte/Nutrition Balance  [] Voice Protection   [x] Respiratory  [] Other:    Medical Prognosis: [x] Good  [] Fair    [] Guarded   Total expected IRF days: 10  Anticipated discharge destination: Home  [x] Home Independently   [] Home with supervision    []SNF     [] Other                                           Physician anticipated functional outcomes:  By discharge, patient will progress to being independent with all mobility and activities. IPOC brief synthesis: 82-year-old male with a history of COPD, diabetes, gout, hypertension, and STEF who was admitted on 9/3 with altered mental status. CT head was unremarkable for acute findings. 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 Echo with a normal EF and positive bubble study. Cardiology evaluated and suggested a 30-day monitor and outpatient work-up. He was evaluated by therapy and suggested to continue in an inpatient setting prior to returning home. He is strongly interested in returning to home where he lives alone and was caring for himself independently. He was admitted with the above goal.        I have reviewed this initial plan of care and agree with its contents:    Title   Name    Date    Time    Physician: Electronically signed by James Biggs MD on 9/12/2022 at 10:01 AM      Case Mgmt: Francesca Donnelly MSW, LSW 9/12/2022 @ 8:08. OT: Jaren Arriola OTR/L [id number], 9/10/2022, 12:57 PM    PT: Jimena Choudhary PT, DPT [id number], 9/10/22 at 1302    RN: Luzma Hager RN 09/10/2022 @ 03:36am     ST: Dustin De La Garza M.A CCC-SLP 09/10/2022 @11:49 AM    :  Vic Miller PT, DPT [id number]  9/12/22  9:15 AM

## 2022-09-10 NOTE — PROGRESS NOTES
[facility] [facility]   Phone: [phone]    Physical Therapy    [x] Initial Evaluation            [] Daily Treatment Note         [] Discharge Summary      Patient: All Parra   : 1945   MRN: [id number]   Date of Service:  9/10/2022  Admitting Diagnosis: Metabolic encephalopathy  Current Admission Summary: 44-year-old male with a history of COPD, diabetes, gout, hypertension, and STEF who was admitted on 9/3 with altered mental status. CT head was unremarkable for acute findings. 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 Echo with a normal EF and positive bubble study. Cardiology evaluated and suggested a 30-day monitor and outpatient work-up. He was evaluated by therapy and suggested to continue in an inpatient setting prior to returning home. He is strongly interested in returning to home where he lives alone and was caring for himself independently. Past Medical History:  has a past medical history of Anemia, COPD (chronic obstructive pulmonary disease) ([address] Diabetes mellitus ([address] Edema, GI (gastrointestinal bleed), Gout, Hypertension, Morbid obesity ([address] Neuropathy, Obstructive sleep apnea, and Peripheral vascular disease ([address] Past Surgical History:  has a past surgical history that includes Appendectomy; Total hip arthroplasty; and Colonoscopy (N/A, 2019).   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 Handrails are located on R side. Bathroom Layout: walk in shower  Bathroom Equipment: grab bars in shower, shower chair, hand held shower head  Toilet Height: elevated height  Home Equipment: rolling walker, single point cane, manual wheelchair, reacher, sock aide, long handled shoe horn, oxygen, wooden cane. Comment: transport w/c, on 3L O2 all the time, even though Dr. Kyler Floyd recommended 2 L  Transfer Assistance: Independent without use of device  Ambulation Assistance:modified independent with use of SPC; uses walker when work in garden  ADL Assistance: independent with all ADL's  IADL Assistance: independent with homemaking tasks  Active :        [x] Yes                 [] No  Hand Dominance: [] Left                 [x] Right  Hobbies: Enjoys cooking, baking bread, volunteer at [employer]. [employer]  Recent Falls: No recent falls  Pt sleeps in a recliner. 2 daughters in area. 1 dtr is an RN and granddaughter becoming an RN. Per patient daughter reports that if patient is debilitated post rehabilitation that he could move in with her, living area in basement. Examination   Vision:   Vision Gross Assessment: Impaired, Vision Corrective Device: wears glasses for reading, and Visual History: cataracts, corrective eye surgery, other - lens implants  Hearing:   hard of hearing  Observation:   General Observation:  Swelling B LE   Posture: Forward head, rounded shoulders   Sensation:   reports numbness and tingling in all extremities--states that this has been going on for a while due to hx of diabetes   Comment: neuropathy in fingers and feet  Proprioception:    WFL  Tone:   Normotonic  Coordination Testing:    Alternating Toe Tapping: Impaired Bilaterally --potential cognitive processing element involved   ROM:   (B) LE AROM WFL  Strength:   (B) LE strength grossly WFL R=L, both testing 4/5  Decision Making: low complexity  Clinical Presentation: stable      Subjective  General: Patient is seated in recliner chair upon PT entry, denies pain though notes discomfort in R shoulder but is unable to describe or formally rate. Patient presents with word finding difficulty throughout duration of evaluation. Pain: 0/10  Pain Interventions: patient denies pain interventions       Functional Mobility  Bed Mobility  Bed mobility not completed on this date. Comments: Patient declines bed mobility this date, stating that even in the home setting he is unable to perform bed mobility without increased back pain or feeling lack of oxygen. Patient sleeps in recliner chair. Transfers  Sit to stand transfer: contact guard assistance  Stand to sit transfer: contact guard assistance  Stand step transfer: contact guard assistance  Car transfer: contact guard assistance  Comments: All transfers completed with use of walking stick as patient states this is home device   Ambulation  Surface:level surface  Assistive Device: LRAD--walking stick  Other Apparatus: MCOT heart monitor  Assistance: contact guard assistance  Distance: 10'+ 80' + 48' + 87' + intermittent short distances throughout duration of evaluation   Gait Mechanics: decreased roxana, decreased step through, mild path deviation to the R with cues for obstacle avoidance, widened ZACH   Comments:  initially trialed ambulating with 6L O2 per chart review of prior notes for 80' distance. During activity patient becomes visibly SOB though denies symptoms of desaturation. SPO2 reads 79% with 5 minute recovery time to 89% with cues for PLB. Throughout duration of session during ambulation patient increased to 8L O2 via use of NC, with SPO2 declining to 82-83% during mobility, able to recover with 2 minute rest breaks.     Ambulation Trial 2  Surface:carpet  Assistive Device: LRAD--walking stick  Other Apparatus: MCOT heart monitor  Assistance: contact guard assistance  Distance: 10'  Gait Mechanics: same mechanics as above though noted slow in roxana  Comments:      Stair Mobility  Number of Steps: 3  Step Height: 6 inch  Hand Rails: (R) ascending handrail  Assistance: contact guard assistance  Comments: step to pattern, leading with LLE ascending and RLE descending without verbal cuing   Wheelchair Mobility:  No w/c mobility completed on this date. Comments:  Balance  Static Sitting Balance: fair (+): maintains balance at SBA/supervision without use of UE support  Dynamic Sitting Balance: fair (+): maintains balance at SBA/supervision without use of UE support  Static Standing Balance: fair (-): maintains balance at CGA with use of UE support  Dynamic Standing Balance: fair (-): maintains balance at CGA with use of UE support  Comments:    Other Therapeutic Interventions  Patient states that he uses reacher in the home setting. Standing reaching for object retrieval completed with use of reacher with SBA and patient maintaining wide ZACH to maintain balance during task completion. Patient completes standing toileting with supervision assistance. Upon return to room, pt remains up in recliner with chair alarm and call light within reach. No new complaints following session.         Functional Outcomes                 Cognition  Overall Cognitive Status: Impaired  Arousal/Alterness: appropriate responses to stimuli  Following Commands: follows one step commands with repetition, follows one step commands with increased time  Safety Judgement: decreased awareness of need for assistance  Insights: decreased awareness of deficits  Initiation: requires cues for some  Sequencing: requires cues for some  Orientation:    alert and oriented x 4  Command Following:   accurately follows one step commands    Education  Barriers To Learning: cognition  Patient Education: patient educated on goals, PT role and benefits, plan of care, general safety, functional mobility training, proper use of assistive device/equipment, energy conservation, transfer training, discharge recommendations  Learning Assessment:  patient verbalizes understanding, would benefit from continued reinforcement    Assessment  Activity Tolerance: poor activity tolerance compared to baseline, see gait analysis for SPO2 saturation changes with functional mobility. Patient presents with decreased insight into decreased SPO2 saturation requires cues for rest breaks, unable to self initiate rest at this time. Physically SOB and diaphoretic during activity though patient declining fatigue. Impairments Requiring Therapeutic Intervention: decreased functional mobility, decreased strength, decreased safety awareness, decreased cognition, decreased endurance, decreased balance  Prognosis: good  Clinical Assessment: Patient is a 77 yo male presenting s/p L MCA CVA and tolerates therapy evaluation well this date. Patient presents with greatest limitations in safety awareness and activity tolerance at this time. Patient presents with decreased insight into balance deficits as is unable to self initiate rest breaks, despite physical signs of fatigue. Patient presents with mild balance deficits that can impede safety in the home setting and will benefit from continued skilled therapy services to promote return to baseline level of function.    Safety Interventions: patient left in chair, chair alarm in place, call light within reach, and nurse notified    Plan  Frequency: 5 x/week, 60 min/day  Current Treatment Recommendations: strengthening, balance training, functional mobility training, transfer training, gait training, stair training, endurance training, patient/caregiver education, home exercise program, safety education, and equipment evaluation/education    Goals  Patient Goals: to go home   Short Term Goals:  Time Frame: to bet met in 7-10 days  Patient will complete stand step transfer at modified independent   Patient will ambulate 200 ft with use of LRAD at [facility]  Patient will ascend/descend 1 stairs with (R) ascending handrail at modified independent  Patient will complete car transfer at [facility]    Therapy Session Time      Individual Group Co-treatment   Time In  0830       Time Out  0930       Minutes  60         Timed Code Treatment Minutes:   45 minutes  Total Treatment Minutes:  60 minutes       Electronically Signed By: Bianca Armas, [address] [facility], [address] [place] [address] [place]

## 2022-09-10 NOTE — RT PROTOCOL NOTE
RT Inhaler-Nebulizer Bronchodilator Protocol Note    There is a bronchodilator order in the chart from a provider indicating to follow the RT Bronchodilator Protocol and there is an Initiate RT Inhaler-Nebulizer Bronchodilator Protocol order as well (see protocol at bottom of note). CXR Findings:  No results found. The findings from the last RT Protocol Assessment were as follows:   History Pulmonary Disease: Chronic pulmonary disease  Respiratory Pattern: Dyspnea on exertion or RR 21-25 bpm  Breath Sounds: Slightly diminished and/or crackles  Cough: Strong, productive  Indication for Bronchodilator Therapy:    Bronchodilator Assessment Score: 7    Aerosolized bronchodilator medication orders have been revised according to the RT Inhaler-Nebulizer Bronchodilator Protocol below. Respiratory Therapist to perform RT Therapy Protocol Assessment initially then follow the protocol. Repeat RT Therapy Protocol Assessment PRN for score 0-3 or on second treatment, BID, and PRN for scores above 3. No Indications - adjust the frequency to every 6 hours PRN wheezing or bronchospasm, if no treatments needed after 48 hours then discontinue using Per Protocol order mode. If indication present, adjust the RT bronchodilator orders based on the Bronchodilator Assessment Score as indicated below. Use Inhaler orders unless patient has one or more of the following: on home nebulizer, not able to hold breath for 10 seconds, is not alert and oriented, cannot activate and use MDI correctly, or respiratory rate 25 breaths per minute or more, then use the equivalent nebulizer order(s) with same Frequency and PRN reasons based on the score. If a patient is on this medication at home then do not decrease Frequency below that used at home.     0-3 - enter or revise RT bronchodilator order(s) to equivalent RT Bronchodilator order with Frequency of every 4 hours PRN for wheezing or increased work of breathing using Per Protocol order mode. 4-6 - enter or revise RT Bronchodilator order(s) to two equivalent RT bronchodilator orders with one order with BID Frequency and one order with Frequency of every 4 hours PRN wheezing or increased work of breathing using Per Protocol order mode. 7-10 - enter or revise RT Bronchodilator order(s) to two equivalent RT bronchodilator orders with one order with TID Frequency and one order with Frequency of every 4 hours PRN wheezing or increased work of breathing using Per Protocol order mode. 11-13 - enter or revise RT Bronchodilator order(s) to one equivalent RT bronchodilator order with QID Frequency and an Albuterol order with Frequency of every 4 hours PRN wheezing or increased work of breathing using Per Protocol order mode. Greater than 13 - enter or revise RT Bronchodilator order(s) to one equivalent RT bronchodilator order with every 4 hours Frequency and an Albuterol order with Frequency of every 2 hours PRN wheezing or increased work of breathing using Per Protocol order mode. RT to enter RT Home Evaluation for COPD & MDI Assessment order using Per Protocol order mode.     Electronically signed by Devorah Aggarwal RCP on 9/10/2022 at 6:32 PM

## 2022-09-10 NOTE — PROGRESS NOTES
4 Eyes Skin Assessment     NAME:  Nico Charles  YOB: 1945  MEDICAL RECORD NUMBER:  [id number]    The patient is being assess for  Admission    I agree that 2 RN's have performed a thorough Head to Toe Skin Assessment on the patient. ALL assessment sites listed below have been assessed. Areas assessed by both nurses:    Head, Face, Ears, Shoulders, Back, Chest, Arms, Elbows, Hands, Sacrum. Buttock, Coccyx, Ischium, and Legs. Feet and Heels        Does the Patient have a Wound?  No noted wound(s)       Scattered ecchymosis     Hussain Prevention initiated:  No   Wound Care Orders initiated:  No    Pressure Injury (Stage 3,4, Unstageable, DTI, NWPT, and Complex wounds) if present place referral/consult order under [de-identified] No    New and Established Ostomies if present place consult order under : No      Nurse 1 eSignature: Electronically signed by Rishabh Engel RN on 9/9/22 at 8:19 PM EDT    **SHARE this note so that the co-signing nurse is able to place an eSignature**    Nurse 2 eSignature: Electronically signed by Gaston Farias RN on 9/10/22 at 4:19 AM EDT

## 2022-09-10 NOTE — PROGRESS NOTES
Morning assessment completed, vss, held bp meds bp on softer side, denies pain, The care plan and education has been reviewed and mutually agreed upon with the patient. Pt remains free from falls. Fall precautions in place--bed in lowest position, call light within reach, bed alarm in use. Pt aware to call for assistance before getting up.     1800: pt got up and started walking to the bathroom without calling, educated patient to call before going to the bathroom, he did not wanted the nurse to help him, and asked to close the bathroom door, pt also don't follow the direction to pull the bathroom cord after using the restroom.

## 2022-09-10 NOTE — PROGRESS NOTES
Assessment complete. Alert, oriented x4. Denies pain. Bipap in place. Patient wants to sleep in the chair tonight. The care plan and education has been reviewed and mutually agreed upon with the patient. In chair, alarm on, call light and table within reach. No further needs expressed at this time. 0000  Ambulated to bathroom using cane and standby assistance. 0230  Patient removed bipap, stating he is done wearing it for the night. Placed back on 3L per nasal cannula.  Provided cup of decaf coffee per patient request.

## 2022-09-10 NOTE — PROGRESS NOTES
[address] [facility],[address] Msb Department   Phone: [phone]    Occupational Therapy    [] Initial Evaluation            [] Daily Treatment Note         [] Discharge Summary      Patient: Orly Hicks   : 1945   MRN: [id number]   Date of Service:  9/10/2022    Admitting Diagnosis:  Metabolic encephalopathy/CVA  Current Admission Summary: 79-year-old male with a history of COPD, diabetes, gout, hypertension, and STEF who was admitted on 9/3 with altered mental status. CT head was unremarkable for acute findings. 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 Echo with a normal EF and positive bubble study. Cardiology evaluated and suggested a 30-day monitor and outpatient work-up. He was evaluated by therapy and suggested to continue in an inpatient setting prior to returning home. He is strongly interested in returning to home where he lives alone and was caring for himself independently. Past Medical History:  has a past medical history of Anemia, COPD (chronic obstructive pulmonary disease) ([address] Diabetes mellitus ([address] Edema, GI (gastrointestinal bleed), Gout, Hypertension, Morbid obesity ([address] Neuropathy, Obstructive sleep apnea, and Peripheral vascular disease ([address] Past Surgical History:  has a past surgical history that includes Appendectomy; Total hip arthroplasty; and Colonoscopy (N/A, 2019). Discharge Recommendations: home with possible need for 24 hour assist/intermittent checks     DME Required For Discharge: pt reports he owns a shower chair and reacher, my require a sock aid - already has home 02     Precautions/Restrictions: high fall risk, .   Comment: DNR-CC, regular diet   Weight Bearing Restrictions: no restrictions  [] Right Upper Extremity  [] Left Upper Extremity [] Right Lower Extremity  [] Left Lower Extremity     Required Braces/Orthotics: no braces required   [] Right  [] Left  Positional Restrictions:no positional restrictions    Pre-Admission Information - copied over from acute care notes - pt's word finding difficulties limit ability to explain home set up    Lives With: alone                     Type of Home: house  Home Layout: two level, able to live on main level  Home Access:  1 step to enter with handrail. Handrails are located on R side. Bathroom Layout: walk in shower  Bathroom Equipment: grab bars in shower, shower chair, hand held shower head  Toilet Height: elevated height  Home Equipment: rolling walker, single point cane, manual wheelchair, reacher, sock aide, long handled shoe horn, oxygen, . Comment: transport w/c, on 3L O2 all the time, even though Dr. Faye Doyle recommended 2 L  (wooden cane)  Transfer Assistance: Independent without use of device  Ambulation Assistance:modified independent with use of SPC; uses walker when work in garden  ADL Assistance: independent with all ADL's  IADL Assistance: independent with homemaking tasks  Active :        [x] Yes                 [] No  Hand Dominance: [] Left                 [x] Right  Hobbies: Enjoys cooking, baking bread, volunteer at [employer]  Recent Falls: No recent falls  Pt sleeps in a recliner. 2 daughters in area.  1 dtr is an RN and granddaughter     Examination   Vision:   Vision Gross Assessment: wears glasses for reading only   Hearing:   WFL  Perception:   WFL  Unilateral Attention: appears intact  Initiation: appears intact  Motor Planning: appears intact  Observation:   On 3L 02 - IV line R arm, covered to waterproof during shower   Posture:   WFL  Sensation:   Pt reports numbness in finger tips and toes   Proprioception:    WFL  Tone:   Normotonic  Coordination Testing:   Finger/Thumb Opposition: WFL    ROM:   (B) UE AROM WFL  Strength:   (B) UE strength grossly WFL    Decision Making: low complexity  Clinical Presentation: stable      Subjective  General: pt ambulating to bathroom on arrival   - 3L 02 at rest   Pain: Patient does not rate upon questioning  Pain Interventions: not applicable - reports pain in R knee         Activities of Daily Living  Basic Activities of Daily Living  Grooming: stand by assistance  Upper Extremity Bathing: stand by assistance  Lower Extremity Bathing: stand by assistance   Upper Extremity Dressing: minimal assistance  Lower Extremity Dressing: moderate assistance  Toileting: stand by assistance. Assist with socks, able to thread pants with reacher (which he uses at home) without cuing - stood at toilet with urinal supervision   Comments - pt completes shower in seated on tub bench in walk in shower - noted to be more SOB following shower - difficulty getting accurate reading on pulse ox, appeared to be in mid 70s once able to get a better reading, mild SOB noted - pt's 02 increased to 6L and returned to 90s.  02 saturation dropping to mid 80s with standing at sink on 6L, returned to 90s after 2 minutes seated and able to return 02 to 2L and maintain in low 90s at rest   Instrumental Activities of Daily Living  No IADL completed on this date. Functional Mobility  Bed Mobility  Bed mobility not completed on this date.   Comments:  Transfers  Sit to stand transfer:stand by assistance  Stand to sit transfer: stand by assistance  Shower transfer: contact guard assistance  Shower transfer equipment: tub transfer bench  Comments: use of cane and grab bars   Functional Mobility:  Functional Mobility: .  contact guard assistance  Functional Mobility Activity: to/from bathroom  Functional Mobility Device Use: single point cane    Other Therapeutic Interventions    Functional Outcomes       Cognition  Overall Cognitive Status: Impaired  Arousal/Alterness: appropriate responses to stimuli  Following Commands: follows one step commands with increased time  Attention Span: appears intact  Memory: appears intact  Safety Judgement: decreased awareness of need for safety  Problem Solving: assistance required to generate solutions, assistance required to implement solutions  Insights: decreased awareness of deficits  Comment - pt with decreased safety awareness, does not appear to be aware when 02 saturation is dropping, difficulty with word finding - appears to have mild receptive language difficulties as well  Orientation:    alert and oriented x 4  Command Following:   impaired accurately follows one step commands - following commands improves with in context and visual demonstration     Education  Barriers To Learning: cognition and language  Patient Education: patient educated on goals, OT role and benefits, plan of care, precautions, ADL adaptive strategies  Learning Assessment:  patient verbalizes understanding, would benefit from continued reinforcement, patient will require reinforcement due to cognitive deficits    Assessment  Activity Tolerance: difficulty getting accurate reading on pulse ox, appeared to be in mid 70s once able to get a better reading, mild SOB noted - pt's 02 increased to 6L and returned to 90s.  02 saturation dropping to mid 80s with standing at sink on 6L, returned to 90s after 2 minutes seated and able to return 02 to 2L and maintain in low 90s at rest   Impairments Requiring Therapeutic Intervention: decreased ADL status, decreased endurance, decreased IADL  Prognosis: good  Clinical Assessment: Patient presents below baseline function secondary to recent CVA. Patient will benefit from OT services to address the above deficits. Patient is typically indep with ADLs and functional mobility with cane and lives alone. Patient is primarily limited by endurance and receptive/expressive language deficits. Patient will benefit from OT services to maximize safety and independence in ADLs, transfers and functional mobility prior to returning home.  Patient currently requiring assistance : mod A LB ADLs and CGA with cane for functional mobility with increased 02 and frequent rest breaks to maintain 02 saturation above 90s. Safety Interventions: patient left in chair, chair alarm in place, and call light within reach    Plan  Frequency: 5 x/week, 60 min/day  Current Treatment Recommendations: strengthening, balance training, functional mobility training, transfer training, endurance training, neuromuscular re-education, patient/caregiver education, ADL/self-care training, and IADL training    Goals  Patient Goals: pt reports being modest and wishes to complete ADLs independently    Short Term Goals:  Time Frame: 7-10 days   Patient will complete upper body ADL at [facility]   Patient will complete lower body ADL at modified independent, . Comment with use of AE PRN   Patient will complete toileting at [facility]   Patient will complete self-feeding at [facility]   Patient will complete grooming at [facility]   Patient will complete functional transfers at [facility]   Patient will complete functional mobility at modified independent   Patient to gather and transport IADL items at modified independent   Patient will demonstrate ability to perform simple IADL tasks (2-3 step tasks) with [facility]. Therapy Session Time     Individual Group Co-treatment   Time In  1100      Time Out  1200      Minutes  60           Timed Code Treatment Minutes:   45  Total Treatment Minutes:  60       Electronically Signed By: YORDY Montalvo/OTIS [id number], 9/10/2022, 12:56 PM

## 2022-09-11 PROCEDURE — 6370000000 HC RX 637 (ALT 250 FOR IP): Performed by: PHYSICAL MEDICINE & REHABILITATION

## 2022-09-11 PROCEDURE — 94640 AIRWAY INHALATION TREATMENT: CPT

## 2022-09-11 PROCEDURE — 2580000003 HC RX 258: Performed by: PHYSICAL MEDICINE & REHABILITATION

## 2022-09-11 PROCEDURE — 2700000000 HC OXYGEN THERAPY PER DAY

## 2022-09-11 PROCEDURE — 1280000000 HC REHAB R&B

## 2022-09-11 PROCEDURE — 94761 N-INVAS EAR/PLS OXIMETRY MLT: CPT

## 2022-09-11 PROCEDURE — 6360000002 HC RX W HCPCS: Performed by: PHYSICAL MEDICINE & REHABILITATION

## 2022-09-11 PROCEDURE — 94660 CPAP INITIATION&MGMT: CPT

## 2022-09-11 RX ADMIN — IPRATROPIUM BROMIDE AND ALBUTEROL SULFATE 1 AMPULE: 2.5; .5 SOLUTION RESPIRATORY (INHALATION) at 12:43

## 2022-09-11 RX ADMIN — IPRATROPIUM BROMIDE AND ALBUTEROL SULFATE 1 AMPULE: 2.5; .5 SOLUTION RESPIRATORY (INHALATION) at 19:34

## 2022-09-11 RX ADMIN — METOPROLOL SUCCINATE 12.5 MG: 25 TABLET, EXTENDED RELEASE ORAL at 08:35

## 2022-09-11 RX ADMIN — ROSUVASTATIN 20 MG: 20 TABLET, FILM COATED ORAL at 22:23

## 2022-09-11 RX ADMIN — ENOXAPARIN SODIUM 30 MG: 100 INJECTION SUBCUTANEOUS at 08:34

## 2022-09-11 RX ADMIN — ASPIRIN 81 MG 81 MG: 81 TABLET ORAL at 08:35

## 2022-09-11 RX ADMIN — IPRATROPIUM BROMIDE AND ALBUTEROL SULFATE 1 AMPULE: 2.5; .5 SOLUTION RESPIRATORY (INHALATION) at 16:21

## 2022-09-11 RX ADMIN — PREDNISONE 5 MG: 5 TABLET ORAL at 08:36

## 2022-09-11 RX ADMIN — GABAPENTIN 300 MG: 300 CAPSULE ORAL at 13:46

## 2022-09-11 RX ADMIN — FUROSEMIDE 80 MG: 10 INJECTION, SOLUTION INTRAMUSCULAR; INTRAVENOUS at 08:35

## 2022-09-11 RX ADMIN — ALLOPURINOL 200 MG: 100 TABLET ORAL at 08:35

## 2022-09-11 RX ADMIN — GABAPENTIN 300 MG: 300 CAPSULE ORAL at 22:23

## 2022-09-11 RX ADMIN — Medication 10 ML: at 08:36

## 2022-09-11 RX ADMIN — Medication 2 PUFF: at 12:45

## 2022-09-11 RX ADMIN — LISINOPRIL 2.5 MG: 5 TABLET ORAL at 08:35

## 2022-09-11 RX ADMIN — GABAPENTIN 300 MG: 300 CAPSULE ORAL at 08:35

## 2022-09-11 RX ADMIN — Medication 2 PUFF: at 19:34

## 2022-09-11 RX ADMIN — ENOXAPARIN SODIUM 30 MG: 100 INJECTION SUBCUTANEOUS at 22:23

## 2022-09-11 RX ADMIN — Medication 10 ML: at 22:23

## 2022-09-11 RX ADMIN — Medication 100 MG: at 08:35

## 2022-09-11 ASSESSMENT — PAIN SCALES - GENERAL
PAINLEVEL_OUTOF10: 0
PAINLEVEL_OUTOF10: 0

## 2022-09-11 NOTE — PLAN OF CARE
Problem: Discharge Planning  Goal: Discharge to home or other facility with appropriate resources  Outcome: Progressing  Flowsheets (Taken 9/10/2022 1953)  Discharge to home or other facility with appropriate resources:   Identify barriers to discharge with patient and caregiver   Identify discharge learning needs (meds, wound care, etc)     Problem: Safety - Adult  Goal: Free from fall injury  Outcome: Progressing  Flowsheets (Taken 9/11/2022 0233)  Free From Fall Injury: Instruct family/caregiver on patient safety     Problem: ABCDS Injury Assessment  Goal: Absence of physical injury  Outcome: Progressing  Flowsheets (Taken 9/11/2022 0233)  Absence of Physical Injury: Implement safety measures based on patient assessment

## 2022-09-11 NOTE — PROGRESS NOTES
Assessment complete. Alert, oriented x4. Patient seated during assessment. No motor deficits noted related to CVA. Some word-finding noted. Speech otherwise clear. Denies pain. Cardiac monitor in place. Phone on . The care plan and education has been reviewed and mutually agreed upon with the patient. In bed, alarm on, bed in lowest position, call light and table within reach, camera at bedside. No further needs expressed at this time. 0130  Patient ambulated to the bathroom using his cane. Steady on his feet. Patient shut bathroom door, not allowing this RN to monitor him while toileting. This RN stood next to door awaiting patient to finish using the bathroom. Patient reports he voided and passed stool. Back in chair, opting to not wear bipap for the remainder of the night. Nasal cannula replaced, delivering 3L supplemental O2 per minute.

## 2022-09-11 NOTE — PLAN OF CARE
Problem: Discharge Planning  Goal: Discharge to home or other facility with appropriate resources  9/11/2022 0956 by Abby Diop RN  Outcome: Progressing  9/11/2022 0235 by Rani Desai RN  Outcome: Progressing  Flowsheets (Taken 9/10/2022 1953)  Discharge to home or other facility with appropriate resources:   Identify barriers to discharge with patient and caregiver   Identify discharge learning needs (meds, wound care, etc)     Problem: Safety - Adult  Goal: Free from fall injury  9/11/2022 0956 by Abby Diop RN  Outcome: Progressing  9/11/2022 0235 by Rani Desai RN  Outcome: Progressing  Flowsheets (Taken 9/11/2022 0233)  Free From Fall Injury: Instruct family/caregiver on patient safety

## 2022-09-11 NOTE — PROGRESS NOTES
Admission Period/Goal QM Codes for [facility]. QM Admit Code Goal Code   Eating 6 6   Oral Hygiene 4 6   Toileting Hygiene 4 6   Shower/Bathing 3 6   UB Dressing 4 6   LB Dressing 4 6   Putting on/off Footwear 2 6   Rolling Left and Right 6 6   Sit To Lying 6 6   Lying to Sitting on Bedside 6 6   Sit to Stand 4 6   Chair/Bed to Chair Transfer 4 6   Toilet Transfers 4 6   Car Transfers 4 6   Walk 10 Feet 4 6   Walk 50 Feet with Two Turns 4 6   Walk 150 Feet 88 6   Walk 10 Feet on Uneven Surfaces 4 6   1 Step (Curb) 4 6   4 Steps 88 6   12 Steps 80 6   Picking up Object from Floor 4 6   Wheel 50 Feet with 2 Turns 9 -   Type - -   Wheel 150 Feet 9 -   Type - -       Following discussion at the Quality Measure Huddle, the above codes were determined to be the patient's usual performance at admission. OT:  Jaren Doan OTR/L [id number], 9/14/2022, 7:21 AM    PT:  Yosvany Atkinson DPT [id number] 9/14/2022 727     RN:  Ifeoma Nazario RN 09/14/2022 5209     ST:  Rosa Reis MA CCC-SLP 9/14/2022 4888     :  Tracy Bowles PT, DPT [id number]  9/14/22  3:03 PM

## 2022-09-11 NOTE — PROGRESS NOTES
Morning assessment completed, vss, schedule meds given, educated patient about fall risk and using a call button, calls appropriately before getting up for the bathroom, The care plan and education has been reviewed and mutually agreed upon with the patient. Pt remains free from falls. Fall precautions in place--bed in lowest position, call light within reach, bed alarm in use. Pt aware to call for assistance before getting up.

## 2022-09-12 LAB
ANION GAP SERPL CALCULATED.3IONS-SCNC: 10 MMOL/L (ref 3–16)
BASOPHILS ABSOLUTE: 0 K/UL (ref 0–0.2)
BASOPHILS RELATIVE PERCENT: 0.4 %
BUN BLDV-MCNC: 63 MG/DL (ref 7–20)
CALCIUM SERPL-MCNC: 9.4 MG/DL (ref 8.3–10.6)
CHLORIDE BLD-SCNC: 100 MMOL/L (ref 99–110)
CO2: 29 MMOL/L (ref 21–32)
CREAT SERPL-MCNC: 1.4 MG/DL (ref 0.8–1.3)
EOSINOPHILS ABSOLUTE: 0.3 K/UL (ref 0–0.6)
EOSINOPHILS RELATIVE PERCENT: 3.8 %
GFR AFRICAN AMERICAN: 60
GFR NON-AFRICAN AMERICAN: 49
GLUCOSE BLD-MCNC: 150 MG/DL (ref 70–99)
HCT VFR BLD CALC: 33.1 % (ref 40.5–52.5)
HEMOGLOBIN: 11.1 G/DL (ref 13.5–17.5)
LYMPHOCYTES ABSOLUTE: 0.9 K/UL (ref 1–5.1)
LYMPHOCYTES RELATIVE PERCENT: 13.4 %
MCH RBC QN AUTO: 35.1 PG (ref 26–34)
MCHC RBC AUTO-ENTMCNC: 33.4 G/DL (ref 31–36)
MCV RBC AUTO: 105.2 FL (ref 80–100)
MONOCYTES ABSOLUTE: 0.7 K/UL (ref 0–1.3)
MONOCYTES RELATIVE PERCENT: 10.6 %
NEUTROPHILS ABSOLUTE: 4.9 K/UL (ref 1.7–7.7)
NEUTROPHILS RELATIVE PERCENT: 71.8 %
PDW BLD-RTO: 14.3 % (ref 12.4–15.4)
PLATELET # BLD: 275 K/UL (ref 135–450)
PMV BLD AUTO: 7.1 FL (ref 5–10.5)
POTASSIUM REFLEX MAGNESIUM: 5.1 MMOL/L (ref 3.5–5.1)
RBC # BLD: 3.15 M/UL (ref 4.2–5.9)
SODIUM BLD-SCNC: 139 MMOL/L (ref 136–145)
WBC # BLD: 6.8 K/UL (ref 4–11)

## 2022-09-12 PROCEDURE — 92507 TX SP LANG VOICE COMM INDIV: CPT

## 2022-09-12 PROCEDURE — 2580000003 HC RX 258: Performed by: PHYSICAL MEDICINE & REHABILITATION

## 2022-09-12 PROCEDURE — 97530 THERAPEUTIC ACTIVITIES: CPT

## 2022-09-12 PROCEDURE — 6370000000 HC RX 637 (ALT 250 FOR IP): Performed by: PHYSICAL MEDICINE & REHABILITATION

## 2022-09-12 PROCEDURE — 1280000000 HC REHAB R&B

## 2022-09-12 PROCEDURE — 80048 BASIC METABOLIC PNL TOTAL CA: CPT

## 2022-09-12 PROCEDURE — 97116 GAIT TRAINING THERAPY: CPT

## 2022-09-12 PROCEDURE — 97129 THER IVNTJ 1ST 15 MIN: CPT

## 2022-09-12 PROCEDURE — 94761 N-INVAS EAR/PLS OXIMETRY MLT: CPT

## 2022-09-12 PROCEDURE — 94640 AIRWAY INHALATION TREATMENT: CPT

## 2022-09-12 PROCEDURE — 36415 COLL VENOUS BLD VENIPUNCTURE: CPT

## 2022-09-12 PROCEDURE — 2700000000 HC OXYGEN THERAPY PER DAY

## 2022-09-12 PROCEDURE — 97130 THER IVNTJ EA ADDL 15 MIN: CPT

## 2022-09-12 PROCEDURE — 97535 SELF CARE MNGMENT TRAINING: CPT

## 2022-09-12 PROCEDURE — 6360000002 HC RX W HCPCS: Performed by: PHYSICAL MEDICINE & REHABILITATION

## 2022-09-12 PROCEDURE — 85025 COMPLETE CBC W/AUTO DIFF WBC: CPT

## 2022-09-12 RX ORDER — FUROSEMIDE 40 MG/1
40 TABLET ORAL 2 TIMES DAILY
Status: DISPENSED | OUTPATIENT
Start: 2022-09-12 | End: 2022-09-14

## 2022-09-12 RX ADMIN — PREDNISONE 5 MG: 5 TABLET ORAL at 09:04

## 2022-09-12 RX ADMIN — GABAPENTIN 300 MG: 300 CAPSULE ORAL at 09:04

## 2022-09-12 RX ADMIN — IPRATROPIUM BROMIDE AND ALBUTEROL SULFATE 1 AMPULE: 2.5; .5 SOLUTION RESPIRATORY (INHALATION) at 06:06

## 2022-09-12 RX ADMIN — ALLOPURINOL 200 MG: 100 TABLET ORAL at 09:04

## 2022-09-12 RX ADMIN — Medication 10 ML: at 21:44

## 2022-09-12 RX ADMIN — Medication 2 PUFF: at 21:50

## 2022-09-12 RX ADMIN — ROSUVASTATIN 20 MG: 20 TABLET, FILM COATED ORAL at 21:43

## 2022-09-12 RX ADMIN — Medication 10 ML: at 09:09

## 2022-09-12 RX ADMIN — ENOXAPARIN SODIUM 30 MG: 100 INJECTION SUBCUTANEOUS at 09:04

## 2022-09-12 RX ADMIN — IPRATROPIUM BROMIDE AND ALBUTEROL SULFATE 1 AMPULE: 2.5; .5 SOLUTION RESPIRATORY (INHALATION) at 15:32

## 2022-09-12 RX ADMIN — Medication 2 PUFF: at 06:06

## 2022-09-12 RX ADMIN — LISINOPRIL 2.5 MG: 5 TABLET ORAL at 09:03

## 2022-09-12 RX ADMIN — GABAPENTIN 300 MG: 300 CAPSULE ORAL at 21:43

## 2022-09-12 RX ADMIN — GABAPENTIN 300 MG: 300 CAPSULE ORAL at 14:47

## 2022-09-12 RX ADMIN — Medication 100 MG: at 09:03

## 2022-09-12 RX ADMIN — METOPROLOL SUCCINATE 12.5 MG: 25 TABLET, EXTENDED RELEASE ORAL at 09:04

## 2022-09-12 RX ADMIN — ASPIRIN 81 MG 81 MG: 81 TABLET ORAL at 09:04

## 2022-09-12 RX ADMIN — ENOXAPARIN SODIUM 30 MG: 100 INJECTION SUBCUTANEOUS at 21:43

## 2022-09-12 RX ADMIN — IPRATROPIUM BROMIDE AND ALBUTEROL SULFATE 1 AMPULE: 2.5; .5 SOLUTION RESPIRATORY (INHALATION) at 21:50

## 2022-09-12 RX ADMIN — IPRATROPIUM BROMIDE AND ALBUTEROL SULFATE 1 AMPULE: 2.5; .5 SOLUTION RESPIRATORY (INHALATION) at 11:37

## 2022-09-12 RX ADMIN — FUROSEMIDE 40 MG: 40 TABLET ORAL at 09:04

## 2022-09-12 ASSESSMENT — PAIN SCALES - GENERAL
PAINLEVEL_OUTOF10: 0
PAINLEVEL_OUTOF10: 0

## 2022-09-12 NOTE — PROGRESS NOTES
Nico Charles  9/12/2022  [id number]    Chief Complaint: Metabolic encephalopathy    Subjective:   No significant weekend events. No current complaints. Labs reviewed. Wanting to go home soon. ROS: No CP, SOB, dyspnea    Objective:  Patient Vitals for the past 24 hrs:   BP Temp Temp src Pulse Resp SpO2 Weight   09/12/22 0606 -- -- -- 75 18 93 % --   09/12/22 0438 -- -- -- -- -- -- (!) 327 lb 9.6 oz (148.6 kg)   09/12/22 0043 -- -- -- -- -- 94 % --   09/11/22 2300 -- -- -- 73 22 94 % --   09/11/22 2222 103/66 97.9 °F (36.6 °C) Oral 70 18 95 % --   09/11/22 1935 -- -- -- 78 16 96 % --   09/11/22 1247 -- -- -- 75 18 93 % --   09/11/22 0830 125/67 98 °F (36.7 °C) Oral 76 18 92 % --     Gen: No distress, pleasant. Resting in bedside chair  HEENT: Normocephalic, atraumatic. CV: Regular rate and rhythm. No MRG   Resp: No respiratory distress. CTAB O2 per NC  Abd: Soft, nontender nondistended  Ext: No edema. Neuro: Alert, oriented, appropriately interactive. Laboratory data: Available via EMR. Therapy progress:    PT    Supine to Sit:    Sit to Supine:     Sit to Stand: Supervision or touching assistance  Chair/Bed to Chair Transfer: Supervision or touching assistance  Car Transfer: Supervision or touching assistance  Ambulation 10 ft: Supervision or touching assistance  Ambulation 50 ft: Supervision or touching assistance  Ambulation 150 ft:    Stairs - 1 Step: Supervision or touching assistance  Stairs - 4 Step:    Stairs - 12 Step:      OT    Eating:    Oral Hygiene: Supervision or touching assistance  Bathing: Partial/moderate assistance  Upper Body Dressing: Supervision or touching assistance  Lower Body Dressing: Supervision or touching assistance  Toilet Transfer: Toilet Hygiene: Supervision or touching assistance    Speech Therapy         Body mass index is 43.22 kg/m².     Assessment:  Patient Active Problem List   Diagnosis    HTN (hypertension), benign    Syncope    DM (diabetes mellitus), type 2, uncontrolled with complications (HCC)    Arthritis    Chronic respiratory failure (HCC)    STEF (obstructive sleep apnea)    Venous insufficiency    Acute sinusitis    Morbid obesity (HCC)    Vitamin D deficiency    Open back wound    COPD, severe ([address]    HLD (hyperlipidemia)    Diastolic dysfunction    Aneurysm of abdominal aorta (HCC)    Personal history of tobacco use, presenting hazards to health    Encephalopathy    Altered mental status    Cerebrovascular accident (CVA) due to thrombosis of left middle cerebral artery (HCC)    Acute on chronic respiratory failure with hypoxia and hypercapnia (HCC)    Cerebrovascular accident (CVA) ([address]    Chronic heart failure with preserved ejection fraction (HCC)    Ventricular ectopy    Leg swelling    TGA (transient global amnesia)    Metabolic encephalopathy       Plan:   Acute left temporal lobe infarct  - ASA, statin. PT/OT/SLP     Chronic hypoxia/hypercapnia  - NIV/AVAPs ventilation to be organized at the time of discharge. COPD  - inhalers as ordered       PFO/ASD  - Outpatient follow up. [facility]     HTN  - lisinopril 2.5, toprol 12.5     DM  - DM diet. consider resumption of metformin if needed     EOH abuse  - no evidence of withdrawal    BLE edema  - lasix IV -> PO 40 BID    Bowel: Per protocol  Bladder: Per protocol  Sleep: Trazodone PRN  Pain: tylenol PRN  DVT PPx: lovenox  PAULINA: TBD    Rodrigo Flynn MD 9/12/2022, 8:25 AM    * This document was created using dictation software. While all precautions were taken to ensure accuracy, errors may have occurred. Please disregard any typographical errors.

## 2022-09-12 NOTE — PROGRESS NOTES
Ambulated to bathroom with cane and standby assistance. Voided and passed stool. 2230  Assessment complete. Alert, oriented x4. Denies pain. Wants to discharge as soon as possible. States that he has a lot to take care of at home. The care plan and education has been reviewed and mutually agreed upon with the patient. In bed, alarm on, bed in lowest position, call light and table within reach, camera in place. No further needs expressed at this time. 0400  Ambulated to bathroom. Voided.

## 2022-09-12 NOTE — PLAN OF CARE
Problem: Discharge Planning  Goal: Discharge to home or other facility with appropriate resources  9/12/2022 1028 by Teri Mendoza RN  Outcome: Progressing  9/12/2022 0518 by Deneen Velasco RN  Outcome: Progressing  Flowsheets (Taken 9/11/2022 2222)  Discharge to home or other facility with appropriate resources:   Identify barriers to discharge with patient and caregiver   Identify discharge learning needs (meds, wound care, etc)     Problem: Safety - Adult  Goal: Free from fall injury  9/12/2022 1028 by Teri Mendoza RN  Outcome: Progressing  9/12/2022 0518 by Deneen Velasco RN  Outcome: Progressing  Flowsheets (Taken 9/12/2022 0517)  Free From Fall Injury: Instruct family/caregiver on patient safety     Problem: ABCDS Injury Assessment  Goal: Absence of physical injury  9/12/2022 1028 by Teri Mendoza RN  Outcome: Progressing  9/12/2022 0518 by Deneen Velasco RN  Outcome: Progressing  Flowsheets (Taken 9/12/2022 0517)  Absence of Physical Injury: Implement safety measures based on patient assessment

## 2022-09-12 NOTE — PROGRESS NOTES
[facility]   Phone: [phone]    Physical Therapy    [] Initial Evaluation            [x] Daily Treatment Note         [] Discharge Summary      Patient: Rafael Desouza   : 1945   MRN: [id number]   Date of Service:  2022  Admitting Diagnosis: Metabolic encephalopathy  Current Admission Summary: 68-year-old male with a history of COPD, diabetes, gout, hypertension, and STEF who was admitted on 9/3 with altered mental status. CT head was unremarkable for acute findings. 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 Echo with a normal EF and positive bubble study. Cardiology evaluated and suggested a 30-day monitor and outpatient work-up. He was evaluated by therapy and suggested to continue in an inpatient setting prior to returning home. He is strongly interested in returning to home where he lives alone and was caring for himself independently. Past Medical History:  has a past medical history of Anemia, COPD (chronic obstructive pulmonary disease) ([address] Diabetes mellitus ([address] Edema, GI (gastrointestinal bleed), Gout, Hypertension, Morbid obesity ([address] Neuropathy, Obstructive sleep apnea, and Peripheral vascular disease ([address] Past Surgical History:  has a past surgical history that includes Appendectomy; Total hip arthroplasty; and Colonoscopy (N/A, 2019).   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 Handrails are located on R side. Bathroom Layout: walk in shower  Bathroom Equipment: grab bars in shower, shower chair, hand held shower head  Toilet Height: elevated height  Home Equipment: rolling walker, single point cane, manual wheelchair, reacher, sock aide, long handled shoe horn, oxygen, wooden cane. Comment: transport w/c, on 3L O2 all the time, even though Dr. Herrera Ferrell recommended 2 L  Transfer Assistance: Independent without use of device  Ambulation Assistance:modified independent with use of SPC; uses walker when work in garden  ADL Assistance: independent with all ADL's  IADL Assistance: independent with homemaking tasks  Active :        [x] Yes                 [] No  Hand Dominance: [] Left                 [x] Right  Hobbies: Enjoys cooking, baking bread, volunteer at [employer]. [employer]  Recent Falls: No recent falls  Pt sleeps in a recliner. 2 daughters in area. 1 dtr is an RN and granddaughter becoming an RN. Per patient daughter reports that if patient is debilitated post rehabilitation that he could move in with her, living area in basement. Subjective  General: Patient is seated in recliner chair upon PT entry. States he feels ready to go home. Pain: 0/10  Pain Interventions: patient denies pain interventions       Functional Mobility  Bed Mobility  Bed mobility not completed on this date. Comments: Patient declines bed mobility this date, stating that even in the home setting he is unable to perform bed mobility without increased back pain or feeling lack of oxygen. Patient sleeps in recliner chair. Transfers  Sit to stand transfer: contact guard assistance  Stand to sit transfer: contact guard assistance  Stand step transfer: contact guard assistance  Car transfer: contact guard assistance  Comments:  All transfers completed with use of walking stick as patient states this is home device   Ambulation  Surface:level surface  Assistive Device: LRAD--walking stick  Other Apparatus: MCOT heart monitor  Assistance: stand by assist  Distance: 209'  Gait Mechanics: decreased roxana, decreased step through, mild path deviation to the R with cues for obstacle avoidance, widened ZACH   Comments:  initially trialed ambulating with 3L O2. During activity patient becomes visibly SOB though denies symptoms of desaturation. SPO2 reads 68% with 2 minute recovery time to 71% with cues for PLB. Increased to 4L for 3 minute recovery to 90%. Throughout duration of session during ambulation patient increased to 4L O2 via use of NC, with SPO2 declining to 79-82% during mobility, able to recover with 3-4 minute rest breaks. Stair Mobility  Stair mobility not completed on this date. Comments: step to pattern, leading with LLE ascending and RLE descending without verbal cuing   Wheelchair Mobility:  No w/c mobility completed on this date. Comments:  Balance  Static Sitting Balance: fair (+): maintains balance at SBA/supervision without use of UE support  Dynamic Sitting Balance: fair (+): maintains balance at SBA/supervision without use of UE support  Static Standing Balance: fair (-): maintains balance at CGA with use of UE support  Dynamic Standing Balance: fair (-): maintains balance at CGA with use of UE support  Comments:    Other Therapeutic Interventions  Performed ambulation as documented above. Performed multiple STS from low mat surface with SBA-CGA. Performed seated cross body reaching X 8 cones R and L X 2 trials with increased SOB with activity. Performed standing ball OH and B chopping X 10 with sensory ball with increased SOB. Performed cone weaving with walking stick with pt kicking every cone with L foot without awareness. Performed standing star X 4 reps with increased R knee pain. Performed toileting with supervision. Pt with decreased awareness of O2 line and required cues for safety to decrease tripping hazard. Functional Outcomes                 Cognition  Overall Cognitive Status: Impaired  Arousal/Alterness: appropriate responses to stimuli  Following Commands: follows one step commands with repetition, follows one step commands with increased time  Safety Judgement: decreased awareness of need for assistance  Insights: decreased awareness of deficits  Initiation: requires cues for some  Sequencing: requires cues for some  Orientation:    alert and oriented x 4  Command Following:   accurately follows one step commands    Education  Barriers To Learning: cognition  Patient Education: patient educated on goals, PT role and benefits, plan of care, general safety, functional mobility training, proper use of assistive device/equipment, energy conservation, transfer training, discharge recommendations  Learning Assessment:  patient verbalizes understanding, would benefit from continued reinforcement    Assessment  Activity Tolerance: poor activity tolerance compared to baseline, see gait analysis for SPO2 saturation changes with functional mobility. Patient presents with decreased insight into decreased SPO2 saturation requires cues for rest breaks, unable to self initiate rest at this time. Physically SOB and diaphoretic during activity though patient declining fatigue. Impairments Requiring Therapeutic Intervention: decreased functional mobility, decreased strength, decreased safety awareness, decreased cognition, decreased endurance, decreased balance  Prognosis: good  Clinical Assessment: Pt requires significant rest breaks due to low O2 levels with standing activities. Pt with decreased safety awareness regarding low O2.   Safety Interventions: patient left in chair, chair alarm in place, call light within reach, and nurse notified    Plan  Frequency: 5 x/week, 60 min/day  Current Treatment Recommendations: strengthening, balance training, functional mobility training, transfer training, gait training, stair training, endurance training, patient/caregiver education, home exercise program, safety education, and equipment evaluation/education    Goals  Patient Goals: to go home   Short Term Goals:  Time Frame: to bet met in 7-10 days  Patient will complete stand step transfer at [facility]   Patient will ambulate 200 ft with use of LRAD at [facility]  Patient will ascend/descend 1 stairs with (R) ascending handrail at modified independent  Patient will complete car transfer at [facility]    Therapy Session Time      Individual Group Co-treatment   Time In  1315       Time Out  1415       Minutes  60         Timed Code Treatment Minutes:   60 minutes  Total Treatment Minutes:  60 minutes       Electronically Signed By: Cindy Tan PT

## 2022-09-12 NOTE — PATIENT CARE CONFERENCE
[facility]  Inpatient Rehabilitation  Weekly Team Conference Note    Patient Name: Noemy Martin        MRN: [id number]    : 1945  (78 y.o.)  Gender: male           The team conference for this patient was held on 2022 at 11:00am and led by:  Desirae Barclay MD    CASE MANAGEMENT:  Assessment: Patient lives alone, is  and is retired. Patient has a daughter who is supportive. Patient reports that he has COPD and uses Home O2 @ 3 liters. Patient also reports that he uses a cane to assist with ambulation. PHYSICAL THERAPY:    Bed Mobility  Patient declines bed mobility, stating that even in the home setting he is unable to perform bed mobility without increased back pain or feeling lack of oxygen. Patient sleeps in recliner chair. Transfers  Sit to stand transfer: contact guard assistance  Stand to sit transfer: contact guard assistance  Stand step transfer: contact guard assistance  Car transfer: contact guard assistance  Comments: All transfers completed with use of walking stick as patient states this is home device   Ambulation  Surface:level surface  Assistive Device: LRAD--walking stick  Other Apparatus: IPWirelessOT heart monitor  Assistance: contact guard assistance  Distance: 10'+ 80' + 48' + 87' + intermittent short distances throughout duration of evaluation   Gait Mechanics: decreased roxana, decreased step through, mild path deviation to the R with cues for obstacle avoidance, widened ZACH   Comments:  initially trialed ambulating with 6L O2 per chart review of prior notes for 80' distance. During activity patient becomes visibly SOB though denies symptoms of desaturation. SPO2 reads 79% with 5 minute recovery time to 89% with cues for PLB. Throughout duration of session during ambulation patient increased to 8L O2 via use of NC, with SPO2 declining to 82-83% during mobility, able to recover with 2 minute rest breaks.      Ambulation Trial 2  Surface:carpet  Assistive Score: 88  Discharge Goal: Independent  Walking 10 Feet on Uneven Surfaces  Assistance Needed: Supervision or touching assistance  CARE Score: 4  Discharge Goal: Independent  1 Step (Curb)  Assistance Needed: Supervision or touching assistance  CARE Score: 4  Discharge Goal: Independent  4 Steps  Reason if not Attempted: Not attempted due to medical condition or safety concerns  CARE Score: 88  Discharge Goal: Independent  12 Steps  Reason if not Attempted: Not attempted due to medical condition or safety concerns  CARE Score: 88  Discharge Goal: Independent  Picking Up Object  Assistance Needed: Supervision or touching assistance  CARE Score: 4  Discharge Goal: Independent  Wheelchair Ability  Uses a Wheelchair and/or Scooter?: No    Goals:                   Patient Goals: to go home   Short Term Goals:  Time Frame: to bet met in 7-10 days  Patient will complete stand step transfer at [facility]   Patient will ambulate 200 ft with use of LRAD at [facility]  Patient will ascend/descend 1 stairs with (R) ascending handrail at modified independent  Patient will complete car transfer at [facility]               These goals were reviewed with this patient at the time of assessment and Angelique Strauss is in agreement. Plan of Care: Pt to be seen 5 out of 7 days per week per ARU protocol ( 60 minutes with PT)                     SPEECH THERAPY:    Diet Level:ADULT DIET; Regular; 5 carb choices (75 gm/meal)    Assessment: Impressions  Diagnosis: Pt continues with Receptive and Expressive Aphasia characterized by frequent semantic and phonemic paraphasias, anomia, difficulty with repetition of phrase and sentence length information, difficulty answering mildly complex yes/no and wh-questions, reduced utterance length and reduced ability to follow 2+ step auditory directions. Pt benefits from repetition, simplification and visual cues for improved comprehension and processing.  Suspect cognitive-linguistic deficits but unable to fully assess due to aphasia. Pt is independent at discharge and is anxious to get back to his PLOF. Discussed assistance and supports recommended at d/c with pt stating he does not want to feel like a burden to his children. Pt with lack of insight into how his current deficits will impact him in his daily life post d/c. Continue with speech therapy services to improve language and cognitive skills to ensure a safe return back to his prior level of functioning. Goals:             Short-term Goals  Goal 1: Pt will tolerate recommended diet without overt s/s of aspiration. Goal Met                             Short-term Goals  Goal 1: Pt will complete auditory comprehension tasks with 80% accuracy. Progressing, continue  Goal 2: Pt will complete expressive language tasks with 80% accuracy or min cues. Progressing, continue  Goal 3: Pt will complete graded fx problem solving tasks >80% acc  min cues. Progressing, continue  Goal 4: Patient will be instructed in memory strategies to utilize in order to promote recall of newly learned information with >80% min cues. Progressing, continue  Goal 5: Pt will demonstrate use of visual strategies for visual language processing with 80% accuracy or min cues.  Progressing, continue                 Plan of Care:  Pt to be seen 5 out of 7 days per week per ARU protocol ( 60 minutes with SLP)    OCCUPATIONAL THERAPY:    ADL: UB ADLs SBA/set up, LB ADLs mod/max A      Toilet Transfers: SBA     Tub/ShowerTransfers: CGA walk in shower with tub bench       Patient requires monitoring, 02 increased from 3L to 6L and encouragement to take breaks to maintain 02 saturation above 90%    QM:  Eating  Assistance Needed: Independent  CARE Score: 6  Discharge Goal: Independent  Oral Hygiene  Assistance Needed: Supervision or touching assistance  CARE Score: 4  Discharge Goal: [address] needed: Independent  Comment: required assistance with clothing management only  CARE Score: 6  Discharge Goal: Independent  Toilet Transfer  Assistance needed: Supervision or touching assistance  CARE Score: 4  Discharge Goal: Independent  Shower/Bathe Self  Assistance Needed: Partial/moderate assistance  CARE Score: 3  Discharge Goal: Independent  Upper Body Dressing  Assistance Needed: Supervision or touching assistance  CARE Score: 4  Discharge Goal: Independent  Lower Body Dressing  Assistance Needed: Supervision or touching assistance  CARE Score: 4  Discharge Goal: Independent  Putting On/Taking Off Footwear  Assistance Needed: Substantial/maximal assistance  Comment: commpression stockings and use of sock aid  CARE Score: 2  Discharge Goal: Independent    Goals:             Patient Goals: pt reports being modest and wishes to complete ADLs independently    Short Term Goals:  Time Frame: 7-10 days   Patient will complete upper body ADL at [facility]   Patient will complete lower body ADL at modified independent, . Comment with use of AE PRN   Patient will complete toileting at [facility]   Patient will complete self-feeding at [facility]   Patient will complete grooming at [facility]   Patient will complete functional transfers at [facility]   Patient will complete functional mobility at modified independent   Patient to gather and transport IADL items at modified independent   Patient will demonstrate ability to perform simple IADL tasks (2-3 step tasks) with modified independence. These goals were reviewed with this patient at the time of assessment and Edin Caro is in agreement    Plan of Care:  Pt to be seen 5 out of 7 days per week per ARU protocol ( 60 minutes with OT)     NUTRITION:  Weight: (!) 331 lb 6.4 oz (150.3 kg) / Body mass index is 43.72 kg/m². Diet Order:CCC    Supplements:NA    Pt reports good appetite and denies any recent weight loss. Po intake % of meals.   Please see nutrition note for details. NURSING:    Risk for Readmission: 20%    Tsai Fall Risk Score: 60  Wounds/Incisions/Ulcers: None  Medication Review: Reviewed daily with patient  Pain: Managed with and without PRN medications  Consultations/Labs/X-rays: Internal Medicine, Pulmonology, Cardiology (last note 9/9); Neurology (last note 9/8). BMP & CBC every Monday & Thursday. Patient/Family Education provided by team:    Discharge Plan   Estimated Length of Stay:8 days  Destination: Home  Pass:No  Services at Discharge: HHOT,PT - would benefit from initial 24 hour supervision due to expressive and receptive language deficits and limited insight into deficits  Equipment at Discharge: owns shower chair and hand held shower, cane (owns)   Factors facilitating achievement of predicted outcomes: Motivated, Cooperative, and Pleasant  Barriers to the achievement of predicted outcomes: Limited safety awareness, Limited insight into deficits, and Communication deficit, COPD with 02 needs     Patient Goals:  to return home and be able to complete ADLs independently    Interdisciplinary Individualized Plan of Care Review of Previous Week:    Medical and functional progress towards goals:  Medically stable, requiring increased supplemental O2 with mobility. Assist levels and balance improving within ambulation/mobility improving but requiring ongoing consistent assistance and cueing for safety with O2 line management and guided rest breaks secondary to fatigue/SpO2. Functional cognition improving with repetition within speech therapy sessions but continues to lack functional carryover in PT/OT session. Speech to continue to work on word finding and medication management in addition to ongoing cognitive tasks. Barriers towards progress:  Impaired cardiovascular endurance. Decreased insight with reduced carryover.     Interventions to address Barriers:  Ongoing therapy services  Goals still appropriate:  Yes  Modifications to goals: N/A  Continue Current Plan of Care:  Yes  Modifications to Plan of Care: N/A    Rehab Team Members in attendance for Team Conference:  Deneen Hawk, MSW, LSW    Catrachito Winter, [address] [place] [address],     SUBHASH Munoz/OTIS Escobedo, PT, DPT    Laura Esteban M.A., Gilberto Hahn RN    David Riley PT, DPT,     I approve the established interdisciplinary plan of care as documented within the medical record of Marian Arroyo MD   Electronically signed by Ellie Arroyo MD on 9/13/2022 at 11:21 AM

## 2022-09-12 NOTE — PLAN OF CARE
Problem: Discharge Planning  Goal: Discharge to home or other facility with appropriate resources  Outcome: Progressing  Flowsheets (Taken 9/11/2022 2222)  Discharge to home or other facility with appropriate resources:   Identify barriers to discharge with patient and caregiver   Identify discharge learning needs (meds, wound care, etc)     Problem: Safety - Adult  Goal: Free from fall injury  Outcome: Progressing  Flowsheets (Taken 9/12/2022 0517)  Free From Fall Injury: Instruct family/caregiver on patient safety     Problem: ABCDS Injury Assessment  Goal: Absence of physical injury  Outcome: Progressing  Flowsheets (Taken 9/12/2022 0517)  Absence of Physical Injury: Implement safety measures based on patient assessment

## 2022-09-12 NOTE — PROGRESS NOTES
[address] [facility],6[address] Msb Department   Phone: [phone]    Occupational Therapy    [] Initial Evaluation            [x] Daily Treatment Note         [] Discharge Summary      Patient: Angelique Strauss   : 1945   MRN: [id number]   Date of Service:  2022    Admitting Diagnosis:  Metabolic encephalopathy/CVA  Current Admission Summary: 79-year-old male with a history of COPD, diabetes, gout, hypertension, and STEF who was admitted on 9/3 with altered mental status. CT head was unremarkable for acute findings. 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 Echo with a normal EF and positive bubble study. Cardiology evaluated and suggested a 30-day monitor and outpatient work-up. He was evaluated by therapy and suggested to continue in an inpatient setting prior to returning home. He is strongly interested in returning to home where he lives alone and was caring for himself independently. Past Medical History:  has a past medical history of Anemia, COPD (chronic obstructive pulmonary disease) ([address] Diabetes mellitus ([address] Edema, GI (gastrointestinal bleed), Gout, Hypertension, Morbid obesity ([address] Neuropathy, Obstructive sleep apnea, and Peripheral vascular disease ([address] Past Surgical History:  has a past surgical history that includes Appendectomy; Total hip arthroplasty; and Colonoscopy (N/A, 2019). Discharge Recommendations: home with possible need for 24 hour assist/intermittent checks     DME Required For Discharge: pt reports he owns a shower chair and reacher, requested a sock aid - already has home 02     Precautions/Restrictions: high fall risk, .   Comment: DNR-CC, regular diet   Weight Bearing Restrictions: no restrictions  [] Right Upper Extremity  [] Left Upper Extremity [] Right Lower Extremity  [] Left Lower Extremity     Required Braces/Orthotics: no braces required   [] Right  [] Left  Positional Restrictions:no positional restrictions    Pre-Admission Information - copied over from acute care notes - pt's word finding difficulties limit ability to explain home set up    Lives With: alone                     Type of Home: house  Home Layout: two level, able to live on main level  Home Access:  1 step to enter with handrail. Handrails are located on R side. Bathroom Layout: walk in shower  Bathroom Equipment: grab bars in shower, shower chair, hand held shower head  Toilet Height: elevated height  Home Equipment: rolling walker, single point cane, manual wheelchair, reacher, sock aide, long handled shoe horn, oxygen, . Comment: transport w/c, on 3L O2 all the time, even though Dr. Rosalinda Lou recommended 2 L  (wooden cane)  Transfer Assistance: Independent without use of device  Ambulation Assistance:modified independent with use of SPC; uses walker when work in garden  ADL Assistance: independent with all ADL's  IADL Assistance: independent with homemaking tasks  Active :        [x] Yes                 [] No  Hand Dominance: [] Left                 [x] Right  Hobbies: Enjoys cooking, baking bread, volunteer at [email or web address]  Recent Falls: No recent falls  Pt sleeps in a recliner. 2 daughters in area. 1 dtr is an RN and granddaughter         Subjective  General: pt sitting in chair on arrival   - 3L 02 at rest. Pt agreeable to OT session - declined shower this date.   Pain: 0/10  Pain Interventions: not applicable        Activities of Daily Living  Basic Activities of Daily Living  Feeding: modified independent  Feeding Comments: opened coffee creamer x3 while seated in chair  Grooming: stand by assistance  Grooming Comments: shaving, face washing, and oral hygiene standing at sink in bathroom- assist required for line management  Lower Extremity Dressing: maximum assistance Increased time to complete task  Dressing Equipment: reacher, sock aide  Dressing Comments: while seated in chair - Therapist donned compression stockings on BLE d/t increased swelling. Pt educated on purpose of stockings and wear time. Assist with socks, required verbal and visual cues to colleen socks on sock aid. Pt asking to have sock aid for discharge to use as home. Pt expressing frustration d/t not being able to fully pull socks on sock aid. General Comments: pt required an increase on O2 during morning ADLs (93% at rest on 3L; 87% on 4L at sink for grooming; 93% on 6L; 76% on 4L after ambulation to therapy area - increased to 6L and remained seated; 90% in standing at therapy kitchen sink on 6L, observed heavy breathing; 85% after ambulation back to room seated in chair on 6L; 94% at 3L after O2 sats stabilized while in chair at end of session)  Pt reports never feeling symptomatic when O2 sats decline and requires increased O2 to 6L during activity to maintain/stabilize sats     Instrumental Activities of Daily Living  Meal Prep: stand by assistance. Meal Prep Comments: grabbing items from cabinets and counter while in stance with cane (on 6L); [place] and having difficulty understanding what items were asked to be grabbed from counter, visual and verbal cuing required    Functional Mobility  Bed Mobility  Bed mobility not completed on this date. Comments:  Transfers  Sit to stand transfer:stand by assistance  Stand to sit transfer: stand by assistance  Bed / Chair transfer: stand by assistance.     Comments: use of cane   Functional Mobility:  Functional Mobility: .  contact guard assistance  Functional Mobility Activity: to/from bathroom  Functional Mobility Device Use: single point cane  Comments: Pt impulsive during mobility and stands to ambulate with cane before instructed - limited awareness for line management and impact of decreased O2 sats and requires assistance for O2 sats reading and O2 L adjustments    Other Therapeutic Interventions    Functional Outcomes       Cognition  Overall Cognitive Status: Impaired  Arousal/Alterness: appropriate responses to stimuli  Following Commands: follows one step commands with increased time  Attention Span: appears intact  Memory: appears intact  Safety Judgement: decreased awareness of need for safety  Problem Solving: assistance required to generate solutions, assistance required to implement solutions  Insights: decreased awareness of deficits  Comment - pt with decreased safety awareness, does not appear to be aware when 02 saturation is dropping, difficulty with word finding - appears to have mild receptive language difficulties as well  Orientation:    alert and oriented x 4  Command Following:   impaired accurately follows one step commands -follows simple one step commands about 75% of time,  following commands improves with in context cues and visual demonstration     Education  Barriers To Learning: cognition and language  Patient Education: patient educated on goals, OT role and benefits, plan of care, precautions, ADL adaptive strategies  Learning Assessment:  patient verbalizes understanding, would benefit from continued reinforcement, patient will require reinforcement due to cognitive deficits    Assessment  Activity Tolerance: O2 sats fluctuate during activity and in stance - recommend pt being on 6L of O2 during activity to prevent desaturation - see O2 sats values above during session; heavy breathing during activity but pt reported not feeling symptomatic  Impairments Requiring Therapeutic Intervention: decreased ADL status, decreased endurance, decreased IADL  Prognosis: good  Clinical Assessment: Patient presents below baseline function secondary to recent CVA. Patient will benefit from OT services to address the above deficits. Patient is typically indep with ADLs and functional mobility with cane and lives alone. Patient is primarily limited by endurance and receptive/expressive language deficits.  Patients sats fluctuate heavily and requires increases in O2 during activity. Patient will benefit from OT services to maximize safety and independence in ADLs, transfers and functional mobility prior to returning home. Patient currently requiring assistance : mod A LB ADLs, CGA with cane for functional mobility and SBA for standing and transfers with increased 02 and frequent rest breaks to maintain 02 saturation above 90s. Safety Interventions: patient left in chair, chair alarm in place, and call light within reach    Plan  Frequency: 5 x/week, 60 min/day  Current Treatment Recommendations: strengthening, balance training, functional mobility training, transfer training, endurance training, neuromuscular re-education, patient/caregiver education, ADL/self-care training, and IADL training    Goals  Patient Goals: pt reports being modest and wishes to complete ADLs independently    Short Term Goals:  Time Frame: 7-10 days   Patient will complete upper body ADL at [facility]   Patient will complete lower body ADL at modified independent, . Comment with use of AE PRN   Patient will complete toileting at [facility]   Patient will complete self-feeding at [facility]   Patient will complete grooming at [facility]   Patient will complete functional transfers at [facility]   Patient will complete functional mobility at modified independent   Patient to gather and transport IADL items at modified independent   Patient will demonstrate ability to perform simple IADL tasks (2-3 step tasks) with modified independence. Goals not met 9/12      Therapy Session Time     Individual Group Co-treatment   Time In  0730      Time Out  0830      Minutes  60           Timed Code Treatment Minutes:   60  Total Treatment Minutes:  60       Electronically Signed By: JAYNA Richards/OT  Supervision/direction Jaren CULLEN/L [id number], 9/12/2022, 1:37 PM

## 2022-09-12 NOTE — CARE COORDINATION
Social Work Admission Assessment    Objective:  Met with pt to complete initial assessment and review role of  in rehab process. Pt oriented to unit. Pt states understanding of this. Current Home Situation:  Patient lives aone. Patient reports that his spouse  a few years ago. Patient resides in a 2 story home but does not use the 2nd floor. Pt's plans re:  Return to work/school/volunteer:  Patient is retired. Patient is an active  but states he does not plan to drive until he is medically stable. Accessibility to community resources/transportation:  Not connected with any community resources. Has pt experienced a recent loss or signigicant life event that would impact their care or ability to participate? _x_No  __Yes - Explain    Has pt ever been treated for emotional disorders? _x_No  __Yes--How does that affect current situation:    How does pt and family cope with stressful events and this hospitalization? Patient appears to be coping well with hospital stay. Special Problem Areas:  Patient on home O2 prior to hospitalization. Patient use a cane and has a shower chair. Discharge Plan: To be determined. Impression/Plan: Urvashi Vasquez (patient )is a 78year old male that has been admitted to ARU. Provided patient with this SW's card to contact as needed. Will continue to follow for support and discharge planning.       Electronically signed by FRANCA Anthony on 2022 at 4:15 PM

## 2022-09-12 NOTE — PROGRESS NOTES
Incentive Spirometry education and demonstration completed by Respiratory Therapy Yes      Response to education: Very Good     Teaching Time: 5 minutes    Minimum Predicted Vital Capacity - 799 mL. Patient's Actual Vital Capacity - 1250 mL. Turning over to Nursing for routine follow-up Yes.     Comments:     Electronically signed by Selina Lazar RCP on 9/12/2022 at 11:49 AM

## 2022-09-12 NOTE — PROGRESS NOTES
[facility] Department   Phone: [phone]    Speech Therapy    [] Initial Evaluation            [x] Daily Treatment Note         [] Discharge Summary      Patient: All Parra   : 1945   MRN: [id number]          Admitting Diagnosis: Metabolic encephalopathy  Treatment Diagnosis:  Receptive Aphasia , Expressive Aphasia , and Cognitive-Linguistic Deficits  (suspect cognitive-linguistic deficits but unable to fully determine due to aphasia)   Rehab Admission Date: 22  Precautions/Restrictions: Fall Risk, O2 dependent (2-3L at baseline)       Pre-Admission Information   Living Status: Lives alone  Occupation/School: ,    Medication Management: :  [x]Primary   []Secondary []No  Finance Management:          [x]Primary   []Secondary []No  Active :                        []Yes         [x]No  Hearing:                                 VSporto  Vision:                                    Vision Gross Assessment: WFL; Wears reading glasses     Daily Treatment Info:   Date: 2022     Tx session 1   Pain Pt denied pain    Subjective     Pt sitting upright in chair on 3L of O2 via NC (pt's baseline). Pt pleasant and cooperative. Anxious about getting home and all of the tasks he has to complete. Pt with semantic/ phonemic paraphasias and significant word finding difficulty in conversation with pt utilizing gestures and circumlocution strategies Independently to repair communication breakdown. Pt with reduced insight into current deficits and how they will impact him at d/c. Discussed ways language deficits can cause difficulty with everyday tasks. Pt receptive to information but requires additional learning/ education. Objective:  Goals    Goal 1: Pt will tolerate recommended diet without overt s/s of aspiration.                      Goal not targeted this session.   - Pt denies difficulty with current diet/ swallowing difficulties    Goal 1: Pt will complete auditory comprehension tasks with 80% accuracy. Targeted via completion of subtest(s) on the EMILIA (see below)  - Pt benefits from repetition, simplification of instructions and visual cues for improved processing  - Pt Independently using self repetition for improved comprehension      Goal 2: Pt will complete expressive language tasks with 80% accuracy or min cues. Fx Expressive Language tasks during conversation  - Pt closing eyes to concentrate more when attempting to get through instances of word finding    - Pt able to explain that his wife passed ~ 9 years ago from cancer (unable to state name of cancer). - Utilized gestures, circumlocution and written words on paper to improve expressive language  - > difficulty with extended utterances and more complex thoughts to convey   - Pt able to state his wife's name, how long they have been  for and stated 1 daughter's name and 1 step-daughter's name (required cueing to state step daughter)   - Pt able to state where 1/2 daughter's live able to come up with initial letter for where other daughter lives for SLP to repair communication breakdown   - Pt able to state full name, , age, current address   - Semantic and phonemic paraphasic errors present (I.e. Bunny Loera for [employer])      Pt will complete graded fx problem solving tasks >80% acc  min cues.      New Goal added this date  Portions of EMILIA (Assessment of Language-Related Functional Activities) completed this date:  - Subtest 1: Telling Time- 8/10 (80%) 1:48  - Subtest 2: Counting Money- 6/10 (60%) in 7:31 improved with min cues for repetition and visual cues for amounts (pt stated \"I'm getting a headache\" following subtest)   - Subtest 3: Addressing an Envelope- 10/10 (100%)   - Subtest 4: Solving Daily Math Problems- 2/10 (20%) in 9:25 with use of calculator (suspect errors due to reduced comprehension)      Goal 4: Patient will be instructed in memory strategies to utilize in order to promote recall of newly learned information with >80% min cues. Goal not targeted this session. Goal 5: Pt will demonstrate use of visual strategies for visual language processing with 80% accuracy or min cues. - Improved processing when presented with visual. Pt able to complete basic calculations when information was presented visually vs verbally    Other areas targeted: Orientation  - able to state current place, facility name, month, CINDY, date, year, time Independently      Education:   Provided education regarding rationale for tasks and plan of care. Provided verbal/ written information re: aphasia and word finding strategies to utilize as this time. Pt verbalized understanding   Pt requires ongoing learning    Safety Devices: [x] Call light within reach  [x] Chair alarm activated  [] Bed alarm activated  [] Other:          Assessment/Progress/Discharge:   Speech Therapy Diagnosis  Communication Diagnosis: Pt presents with Moderate to Severe Receptive and Expressive Aphasia characterized by frequent semantic and phonemic paraphasias, anomia, difficulty with repetition of phrase and sentence length information, difficulty answering simple to mildly complex yes/no and wh-questions, reduced utterance length and reduced ability to follow 2-step auditory directions. Pt also presents with moderate to severe cognitive deficits within the domains of basic calculation, divergent naming, immediate recall, delayed recall, STM, WM, executive functioning, and attention. When asked about his language and cognitive skills, pt stated, \"brain is gone\". Pt would benefit from speech therapy services to improve language and cognitive skills to ensure a safe return back to his prior level of functioning. Current Diet Order:   ADULT DIET;  Regular; 5 carb choices (75 gm/meal)            Plan:     Frequency:  5days/week   60 minutes/day    Discharge Recommendations:   Barriers: Limited family support,

## 2022-09-13 LAB — VITAMIN B1, PLASMA: 73 NMOL/L (ref 4–15)

## 2022-09-13 PROCEDURE — 6370000000 HC RX 637 (ALT 250 FOR IP): Performed by: PHYSICAL MEDICINE & REHABILITATION

## 2022-09-13 PROCEDURE — 97110 THERAPEUTIC EXERCISES: CPT

## 2022-09-13 PROCEDURE — 97116 GAIT TRAINING THERAPY: CPT

## 2022-09-13 PROCEDURE — 2580000003 HC RX 258: Performed by: PHYSICAL MEDICINE & REHABILITATION

## 2022-09-13 PROCEDURE — 97535 SELF CARE MNGMENT TRAINING: CPT

## 2022-09-13 PROCEDURE — 97129 THER IVNTJ 1ST 15 MIN: CPT

## 2022-09-13 PROCEDURE — 94640 AIRWAY INHALATION TREATMENT: CPT

## 2022-09-13 PROCEDURE — 92507 TX SP LANG VOICE COMM INDIV: CPT

## 2022-09-13 PROCEDURE — 2700000000 HC OXYGEN THERAPY PER DAY

## 2022-09-13 PROCEDURE — 94761 N-INVAS EAR/PLS OXIMETRY MLT: CPT

## 2022-09-13 PROCEDURE — 1280000000 HC REHAB R&B

## 2022-09-13 PROCEDURE — 6360000002 HC RX W HCPCS: Performed by: PHYSICAL MEDICINE & REHABILITATION

## 2022-09-13 PROCEDURE — 92526 ORAL FUNCTION THERAPY: CPT

## 2022-09-13 PROCEDURE — 97530 THERAPEUTIC ACTIVITIES: CPT

## 2022-09-13 RX ADMIN — ASPIRIN 81 MG 81 MG: 81 TABLET ORAL at 09:08

## 2022-09-13 RX ADMIN — Medication 5 ML: at 10:00

## 2022-09-13 RX ADMIN — Medication 10 ML: at 22:11

## 2022-09-13 RX ADMIN — PREDNISONE 5 MG: 5 TABLET ORAL at 09:08

## 2022-09-13 RX ADMIN — FUROSEMIDE 40 MG: 40 TABLET ORAL at 15:46

## 2022-09-13 RX ADMIN — Medication 2 PUFF: at 05:34

## 2022-09-13 RX ADMIN — LISINOPRIL 2.5 MG: 5 TABLET ORAL at 09:08

## 2022-09-13 RX ADMIN — IPRATROPIUM BROMIDE AND ALBUTEROL SULFATE 1 AMPULE: 2.5; .5 SOLUTION RESPIRATORY (INHALATION) at 12:11

## 2022-09-13 RX ADMIN — ACETAMINOPHEN 650 MG: 325 TABLET ORAL at 05:47

## 2022-09-13 RX ADMIN — Medication 2 PUFF: at 06:00

## 2022-09-13 RX ADMIN — ENOXAPARIN SODIUM 30 MG: 100 INJECTION SUBCUTANEOUS at 09:09

## 2022-09-13 RX ADMIN — FUROSEMIDE 40 MG: 40 TABLET ORAL at 09:08

## 2022-09-13 RX ADMIN — IPRATROPIUM BROMIDE AND ALBUTEROL SULFATE 1 AMPULE: 2.5; .5 SOLUTION RESPIRATORY (INHALATION) at 05:33

## 2022-09-13 RX ADMIN — METOPROLOL SUCCINATE 12.5 MG: 25 TABLET, EXTENDED RELEASE ORAL at 09:08

## 2022-09-13 RX ADMIN — IPRATROPIUM BROMIDE AND ALBUTEROL SULFATE 1 AMPULE: 2.5; .5 SOLUTION RESPIRATORY (INHALATION) at 06:00

## 2022-09-13 RX ADMIN — GABAPENTIN 300 MG: 300 CAPSULE ORAL at 15:46

## 2022-09-13 RX ADMIN — ENOXAPARIN SODIUM 30 MG: 100 INJECTION SUBCUTANEOUS at 22:11

## 2022-09-13 RX ADMIN — Medication 2 PUFF: at 19:49

## 2022-09-13 RX ADMIN — IPRATROPIUM BROMIDE AND ALBUTEROL SULFATE 1 AMPULE: 2.5; .5 SOLUTION RESPIRATORY (INHALATION) at 19:49

## 2022-09-13 RX ADMIN — Medication 100 MG: at 09:09

## 2022-09-13 RX ADMIN — ROSUVASTATIN 20 MG: 20 TABLET, FILM COATED ORAL at 22:11

## 2022-09-13 RX ADMIN — GABAPENTIN 300 MG: 300 CAPSULE ORAL at 09:09

## 2022-09-13 RX ADMIN — IPRATROPIUM BROMIDE AND ALBUTEROL SULFATE 1 AMPULE: 2.5; .5 SOLUTION RESPIRATORY (INHALATION) at 16:06

## 2022-09-13 RX ADMIN — GABAPENTIN 300 MG: 300 CAPSULE ORAL at 22:11

## 2022-09-13 RX ADMIN — ALLOPURINOL 200 MG: 100 TABLET ORAL at 09:08

## 2022-09-13 ASSESSMENT — PAIN - FUNCTIONAL ASSESSMENT: PAIN_FUNCTIONAL_ASSESSMENT: PREVENTS OR INTERFERES SOME ACTIVE ACTIVITIES AND ADLS

## 2022-09-13 ASSESSMENT — PAIN SCALES - GENERAL
PAINLEVEL_OUTOF10: 0
PAINLEVEL_OUTOF10: 5

## 2022-09-13 ASSESSMENT — PAIN DESCRIPTION - DESCRIPTORS: DESCRIPTORS: SORE

## 2022-09-13 ASSESSMENT — PAIN DESCRIPTION - LOCATION: LOCATION: KNEE

## 2022-09-13 ASSESSMENT — PAIN DESCRIPTION - ORIENTATION: ORIENTATION: LEFT

## 2022-09-13 NOTE — PROGRESS NOTES
Patient refused to use the bipap. Patient is on continuous pulse oximetry and on a 3L nasal cannula.

## 2022-09-13 NOTE — PROGRESS NOTES
Assessment complete. Alert, oriented x4. Displaying some difficulty with word-finding throughout assessment. Patient hoping to discharge soon so he can tend to his garden at home. Denies pain. Dose of Lasix noted to have not been given when due at 1730. Patient refused to take at this time as he worries he will be up all night voiding. Refusing to wear bipap anymore as it made his airway dry during previous nights. Respiratory therapist aware. Patient to use nasal cannula throughout the night. The care plan and education has been reviewed and mutually agreed upon with the patient. In bed, alarm on, bed in lowest position, call light and table within reach. No further needs expressed at this time. 0205  Ambulated to bathroom using standby assistance and walking stick. Voided. A few minutes after returning to chair, SpO2 86% on 3L per nasal cannula. Supplemental O2 increased to 4L, SpO2 greater than 90% a minute later. 0653  Patient complaining of dry airway. Humidified O2 set up. SpO2 92% per 4L per nasal cannula.    0600  Complaining of pain to right knee. Given PRN Tylenol. Patient feels that his knee hurts because he is not walking as much as he normally does. Patient frustrated with fall precautions that are in place (e.g., chair alarm). This RN offered to take walks with patient overnight.

## 2022-09-13 NOTE — PROGRESS NOTES
Sukhdev Dorsey  9/13/2022  [id number]    Chief Complaint: Metabolic encephalopathy    Subjective:   No overnight events. No current complaints. Requiring increased O2 with activity. ROS: No CP, SOB, dyspnea    Objective:  Patient Vitals for the past 24 hrs:   BP Temp Temp src Pulse Resp SpO2 Weight   09/13/22 0547 -- -- -- -- -- 93 % --   09/13/22 0543 -- -- -- -- -- -- (!) 331 lb 6.4 oz (150.3 kg)   09/13/22 0533 -- -- -- -- -- 94 % --   09/12/22 2313 -- -- -- -- -- 94 % --   09/12/22 2151 -- -- -- 80 18 94 % --   09/12/22 2141 117/64 98 °F (36.7 °C) Oral 80 20 94 % --   09/12/22 1532 -- -- -- 86 18 94 % --   09/12/22 1138 -- -- -- 80 18 91 % --   09/12/22 0843 (!) 103/59 98.3 °F (36.8 °C) Oral 91 18 90 % --       Gen: No distress, pleasant. Resting in bedside chair  HEENT: Normocephalic, atraumatic. CV: Regular rate and rhythm. No MRG   Resp: No respiratory distress. Decreased BS diffusely, O2 per NC  Abd: Soft, nontender nondistended  Ext: No edema. Neuro: Alert, oriented, appropriately interactive. Laboratory data: Available via EMR. Therapy progress:    PT    Supine to Sit:    Sit to Supine:     Sit to Stand: Supervision or touching assistance  Chair/Bed to Chair Transfer: Supervision or touching assistance  Car Transfer: Supervision or touching assistance  Ambulation 10 ft: Supervision or touching assistance  Ambulation 50 ft: Supervision or touching assistance  Ambulation 150 ft:    Stairs - 1 Step: Supervision or touching assistance  Stairs - 4 Step:    Stairs - 12 Step:      OT    Eating: Independent  Oral Hygiene: Supervision or touching assistance  Bathing: Partial/moderate assistance  Upper Body Dressing: Supervision or touching assistance  Lower Body Dressing: Supervision or touching assistance  Toilet Transfer:     Toilet Hygiene: Supervision or touching assistance    Speech Therapy    Pt continues with Receptive and Expressive Aphasia characterized by frequent semantic and phonemic paraphasias, anomia, difficulty with repetition of phrase and sentence length information, difficulty answering mildly complex yes/no and wh-questions, reduced utterance length and reduced ability to follow 2+ step auditory directions. Suspect cognitive-linguistic deficits but unable to fully assess due to aphasia. Pt is independent at discharge and is anxious to get back to his PLOF. Discussed assistance and supports recommended at d/c with pt stating he does not want to feel like a burden to his children. Pt with lack of insight into how his current deficits will impact him in his daily life post d/c. Continue with speech therapy services to improve language and cognitive skills to ensure a safe return back to his prior level of functioning. Body mass index is 43.72 kg/m². Assessment:  Patient Active Problem List   Diagnosis    HTN (hypertension), benign    Syncope    DM (diabetes mellitus), type 2, uncontrolled with complications (HCC)    Arthritis    Chronic respiratory failure (HCC)    STEF (obstructive sleep apnea)    Venous insufficiency    Acute sinusitis    Morbid obesity ([address]    Vitamin D deficiency    Open back wound    COPD, severe ([address]    HLD (hyperlipidemia)    Diastolic dysfunction    Aneurysm of abdominal aorta (HCC)    Personal history of tobacco use, presenting hazards to health    Encephalopathy    Altered mental status    Cerebrovascular accident (CVA) due to thrombosis of left middle cerebral artery (HCC)    Acute on chronic respiratory failure with hypoxia and hypercapnia (HCC)    Cerebrovascular accident (CVA) ([address]    Chronic heart failure with preserved ejection fraction (HCC)    Ventricular ectopy    Leg swelling    TGA (transient global amnesia)    Metabolic encephalopathy       Plan:   Acute left temporal lobe infarct  - ASA, statin. PT/OT/SLP     Chronic hypoxia/hypercapnia  - NIV/AVAPs ventilation to be organized at the time of discharge.       COPD  - inhalers as ordered PFO/ASD  - Outpatient follow up. [facility]     HTN  - lisinopril 2.5, toprol 12.5     DM  - DM diet. consider resumption of metformin if needed     EOH abuse  - no evidence of withdrawal    BLE edema  - PO 40 BID    Bowel: Per protocol  Bladder: Per protocol  Sleep: Trazodone PRN  Pain: tylenol PRN  DVT PPx: lovenox  PAULINA: 9/21    Team conference was held today on the patient and discussed directly with the patient utilizing their entire treatment team. Please see separate team note for details. Total treatment time for today's care >35 min. Montana Rodriguez MD 9/13/2022, 8:18 AM    * This document was created using dictation software. While all precautions were taken to ensure accuracy, errors may have occurred. Please disregard any typographical errors.

## 2022-09-13 NOTE — PROGRESS NOTES
positional restrictions    Pre-Admission Information - copied over from acute care notes - pt's word finding difficulties limit ability to explain home set up    Lives With: alone                     Type of Home: house  Home Layout: two level, able to live on main level  Home Access:  1 step to enter with handrail. Handrails are located on R side. Bathroom Layout: walk in shower  Bathroom Equipment: grab bars in shower, shower chair, hand held shower head  Toilet Height: elevated height  Home Equipment: rolling walker, single point cane, manual wheelchair, reacher, sock aide, long handled shoe horn, oxygen, . Comment: transport w/c, on 3L O2 all the time, even though Dr. Prescott Harness recommended 2 L  (wooden cane)  Transfer Assistance: Independent without use of device  Ambulation Assistance:modified independent with use of SPC; uses walker when work in garden  ADL Assistance: independent with all ADL's  IADL Assistance: independent with homemaking tasks  Active :        [x] Yes                 [] No  Hand Dominance: [] Left                 [x] Right  Hobbies: Enjoys cooking, baking bread, volunteer at [employer]  Recent Falls: No recent falls  Pt sleeps in a recliner. 2 daughters in area. 1 dtr is an RN and granddaughter         Subjective  General: pt sitting in chair on arrival   - 5L 02 at rest this date. Pt agreeable to OT session - declined shower this date stating they would take one tomorrow. Pain: 0/10  Pain Interventions: not applicable     . Activities of Daily Living    Basic Activities of Daily Living  Upper Extremity Bathing: supervision  Lower Extremity Bathing: supervision   Bathing Equipment: none  Bathing Comments: declined shower this date - agreeable to sponge bathing while seated on tub transfer bench in shower - seated during bathing with standing PRN for LB bathing - use of grab bars  Upper Extremity Dressing: supervision  Lower Extremity Dressing: maximum assistance  Dressing Equipment: reacher, sock aide  Dressing Comments: completed UB/LB dressing while seated on tub transfer bench - standing PRN for LB clothing management over hips with SBA and grab bars- dep for donning compression stockings - use of sock aid to colleen socks with increased time, verbal cues, and required mod assistance to rotate L sock due to not being placed on foot properly  General Comments: pt educated on energy conservation techniques during ADL tasks to prevent decrease in O2 sats. O2 sats values provided below throughout tx session. 90% on 5L at rest at start of tx  81% on 6L after ambulation to therapy area (90 seconds of seated rest to return to 93%)  94% on 6L after completed trunk exercises  87% on 6L after ambulating to/from therapy kitchen from mat table (2 minutes of seated rest to return to 94%)  88% on 6L after sponge bath  84% on 6L after ambulating from shower to recliner (2 minutes to return to 91%)  Dropped to 5L at end of tx - stabilized at 91%  Pt had observed labored breathing throughout session after ambulation and bathing but never expressed feeling tired. Instrumental Activities of Daily Living  Meal Prep: contact guard assistance. Meal Prep Comments: CGA due to decreases in O2 sats but progressing to SBA - ambulated with cane to refrigerator from mat table and retrieving item from top shelf and returning item - ambulated back to mat table    Functional Mobility  Bed Mobility  Bed mobility not completed on this date. Comments:  Transfers  Sit to stand transfer:stand by assistance  Stand to sit transfer: stand by assistance  Bed / Chair transfer: stand by assistance. Shower transfer: stand by assistance  Shower transfer equipment: tub transfer bench, grab bars, cane  Shower transfer comments: limited awareness of O2 line when completing transfer   Comments: use of cane   Functional Mobility:  Sitting Balance: supervision.     Sitting Balance Comment: BUE unsupported - sitting on mat table and tub transfer bench  Standing Balance: stand by assistance. Standing Balance Comment: standing at chair with cane, in shower with grab bars  Functional Mobility: .  contact guard assistance  Functional Mobility Activity: to/from bathroom, to/from therapy room  Functional Mobility Device Use: single point cane  Comments: Pt impulsive during mobility and attempts to stand to ambulate with cane before instructed  - limited awareness for line management and impact of decreased O2 sats and requires assistance for O2 sats reading and O2 L adjustments - stepped over line this date after educated on line management - lack of carry over of education    Other Therapeutic Interventions   Pt completed trunk exercises sitting on mat table with SBA/supervision. Pt completed 1x20 lateral leans each side and rotations both sides while seated to improve activity tolerance for ADLs and IADLs. O2 sats stable during seated exercises - see values above.     Functional Outcomes       Cognition  Overall Cognitive Status: Impaired  Arousal/Alterness: appropriate responses to stimuli  Following Commands: follows one step commands with increased time  Attention Span: appears intact  Memory: appears intact  Safety Judgement: decreased awareness of need for safety  Problem Solving: assistance required to generate solutions, assistance required to implement solutions  Insights: decreased awareness of deficits  Comment - pt with decreased safety awareness, does not appear to be aware when 02 saturation is dropping, difficulty with word finding - receptive language difficulties as well  Orientation:    alert and oriented x 4  Command Following:   impaired accurately follows one step commands -follows simple one step commands about 75% of time,  following commands improves with in context cues and visual demonstration     Education  Barriers To Learning: cognition and language  Patient Education: patient educated on goals, OT role and benefits, plan of care, precautions, ADL adaptive strategies  Learning Assessment:  patient verbalizes understanding, would benefit from continued reinforcement, patient will require reinforcement due to cognitive deficits    Assessment  Activity Tolerance: O2 sats fluctuate during activity and in stance - recommend pt being on 6L of O2 during activity to prevent desaturation - see O2 sats values above during session; heavy breathing during activity but pt reported not feeling symptomatic  Impairments Requiring Therapeutic Intervention: decreased ADL status, decreased endurance, decreased IADL  Prognosis: good  Clinical Assessment: Patient presents below baseline function secondary to recent CVA. Patient will benefit from OT services to address the above deficits. Patient is typically indep with ADLs and functional mobility with cane and lives alone. Patient is primarily limited by endurance and receptive/expressive language deficits. Patients sats fluctuate heavily and requires increases in O2 during activity. Patient will benefit from OT services to maximize safety and independence in ADLs, transfers and functional mobility prior to returning home. Pt educated on energy conservation techniques this date, verbalized understanding but did not have the chance to demonstrate safety and understanding of techniques. Patient currently requiring assistance : mod/max A LB ADLs, CGA with cane for functional mobility and SBA for standing and transfers with increased 02 (6L) and frequent rest breaks to maintain 02 saturation above 90s.     Safety Interventions: patient left in chair, chair alarm in place, and call light within reach    Plan  Frequency: 5 x/week, 60 min/day  Current Treatment Recommendations: strengthening, balance training, functional mobility training, transfer training, endurance training, neuromuscular re-education, patient/caregiver education, ADL/self-care training, and IADL training    Goals  Patient Goals: pt reports being modest and wishes to complete ADLs independently    Short Term Goals:  Time Frame: 7-10 days   Patient will complete upper body ADL at [facility]   Patient will complete lower body ADL at modified independent, . Comment with use of AE PRN   Patient will complete toileting at Independent   Patient will complete self-feeding at [facility]   Patient will complete grooming at [facility]   Patient will complete functional transfers at [facility]   Patient will complete functional mobility at modified independent   Patient to gather and transport IADL items at modified independent   Patient will demonstrate ability to perform simple IADL tasks (2-3 step tasks) with modified independence. Goals not met 9/13      Therapy Session Time     Individual Group Co-treatment   Time In  0915      Time Out  1015      Minutes  60           Timed Code Treatment Minutes:   60  Total Treatment Minutes:  60       Electronically Signed By: JAYNA Bello/YORDY  Supervison/direction Jaren Mosher OTR/OTIS [id number], 9/13/2022, 1:56 PM

## 2022-09-13 NOTE — PROGRESS NOTES
Assessment complete. Pt A&O x4, scheduled meds given per MAR. Pt with not complaints of pain. O2 sats down to 88% on 6L with physical therapy. Respiratory therapist notified. The care plan and education has been reviewed and mutually agreed upon with the patient. Patient remains free from falls or accidental injury. Room free from clutter. All fall precautions in place. Bed in lowest position with wheels locked and alarm on, call light and belongings within reach, and door open.

## 2022-09-13 NOTE — PROGRESS NOTES
he won't drive at d/c. Pt currently on 5L of O2 via NC. Pt cooperative and engaged during all tasks. Team conference held this date see below for updated progress towards goals. Objective:  Goals     Goal 1: Pt will tolerate recommended diet without overt s/s of aspiration. Goal Met                     Meal Assessment completed this date (with soft solids and regular solids - scrambled eggs, leo, fresh fruit, hot coffee)    - Pt with adequate/ functional mastication and oral clearance  - No clinical overt s/s of aspiration, change in vq or increase in WOB noted  - SPO2 hovered around 89-90% throughout completion of meal  - Pt seemingly tolerating least restrictive diet, Goal Met will continue to monitor as needed    GOAL MET    Goal 1: Pt will complete auditory comprehension tasks with 80% accuracy. Progressing, continue    Pt required repetition of basic questions throughout session for improved comprehension. Pt continues to utilize self repetition as a strategy for improved processing. See below - initiated Subtest of the [address]   - completed via WAB-R (see below)   - Improved ability to answer complex y/n questions with repetition (pt often would not answer simply in a y/n format but as a statement I.e. Is your name Carlyle Speaks? \"No, it's Parag Kyrienifin. \")   Goal 2: Pt will complete expressive language tasks with 80% accuracy or min cues. Progressing, continue    Confrontational Naming (food items on tray)  - 3/4 with pt producing a phonemic paraphasia x 1 in which pt was able to self correct    Instances of anomia/ word finding difficulty during structured conversation in which pt is usually able to utilize circumlocution strategy effectively but occasionally is tangential and abandons thought. Pt with increased difficulty coming up with medication names. Able to utilize circumlocution for coming up with Lasix, Metformin, and neuropathy.     - completed via WAB-R (see below)  - picture scene- anomia noted with basic objects with phonemic paraphasias (pt able to correct errors provided with verbal/ visual cues  - Repetition breakdown at longer phrase/ sentence length on WAB-R pt benefited from verbal/ visual cues and single words presented at a time in order to repeat    Pt will complete graded fx problem solving tasks >80% acc  min cues. Following education pt stated that he wouldn't drive following d/c. Able to state he would order [employer] delivery for groceries. Pt seemed receptive to recommendation for levels of assistance post d/c but was more stating that his daughter's and son-in-law could stop by. Requires ongoing education. Pt able to explain medication routine at home but unable to state names of medications due to aphasia but utilized circumlocution for SLP to come up with some of his medication names. Pt does not utilize a pill organizer at home but may benefit from initial set up by family with use of pill organizers. Pt stated he takes 7 pills in AM, 1 pill in afternoon, and 2 pills in PM.    Verbal Problem Solving re: assistance at d/c and use of call button in current environment   - pt able to state that Alanis Rashid could help him (he could go live with her for some time if needed or she could come assist him initially for some time)   - Able to reiterate not being able to drive at d/c and need for 's evaluation (would benefit from further education, written handout and complete stroke education prior to d/c)   - able to recall and report reason for need to use call button at this time (although pt able to state he is not happy about not being able to get up  by himself emotional support and ongoing education provided)    Goal 4: Patient will be instructed in memory strategies to utilize in order to promote recall of newly learned information with >80% min cues. Progressing, continue    Goal not targeted this session. Goal not targeted this session.       Goal 5: Pt will demonstrate use of visual strategies for visual language processing with 80% accuracy or min cues. Progressing, continue    Goal not targeted this session. Goal not targeted this session. Other areas targeted: Initiated The [address] (WAB-R) with pt:      Spontaneous Speech:  - Conversational Questions- 5/6 (unable to state if he has been to rehab before)    Ongoing completion of The Western Aphasia Battery-Revised (WAB-R) completed with pt:  - Picture Description- 8/10  - Y/N questions- 48/60  - Auditory Word Recognition- 60/60  - Sequential Commands- 57/80  - Repetition- 56/100  - Object Naming- 52/60     Will complete final sections in upcoming sessions. Education:   Provided education regarding rationale for tasks and plan of care. Ongoing education re: assistance levels recommended at d/c.     Pt verbalized understanding   Pt requires ongoing learning  Provided education regarding rationale for tasks and plan of care. Pt verbalized understanding   Pt requires ongoing learning      Safety Devices: [x] Call light within reach  [x] Chair alarm activated  [] Bed alarm activated  [x] Other: Camera in place for safety  [x] Call light within reach  [x] Chair alarm activated  [] Bed alarm activated  [x] Other: RN removing camera from patient's room as he is using call button effectively           Assessment/Progress/Discharge:   Weekly Update 9/13/22:  Diagnosis: Pt continues with Receptive and Expressive Aphasia characterized by frequent semantic and phonemic paraphasias, anomia, difficulty with repetition of phrase and sentence length information, difficulty answering mildly complex yes/no and wh-questions, reduced utterance length and reduced ability to follow 2+ step auditory directions. Pt benefits from repetition, simplification and visual cues for improved comprehension and processing. Suspect cognitive-linguistic deficits but unable to fully assess due to aphasia.  Pt is independent at discharge and is anxious to get back to his PLOF. Discussed assistance and supports recommended at d/c with pt stating he does not want to feel like a burden to his children. Pt with lack of insight into how his current deficits will impact him in his daily life post d/c. Continue with speech therapy services to improve language and cognitive skills to ensure a safe return back to his prior level of functioning. Current Diet Order:   ADULT DIET; Regular; 5 carb choices (75 gm/meal)            Plan:     Frequency:  5days/week   60 minutes/day    Discharge Recommendations:   Barriers: Limited family support, Cognitive deficit, Limited safety awareness, Limited insight into deficits, Communication deficit, Decreased endurance, and Medication managment  Discharge Recommendations:  TBD   Continued SLP Treatment:  Yes   Estimated discharge date: TBD      Session 1 Times: Individual Concurrent Group Co-treatment   Time In 0800      Time Out 0830       Time Code Minutes        Minutes 30        Session 2 Times: Individual Concurrent Group Co-treatment   Time In 1030      Time Out 1100      Time Code Minutes  10      Minutes 30          Timed Code Treatment Minutes: 10  Total Treatment Minutes: 60     Electronically Signed by     Jeny MENDES CCC-SLP #[id number] 9/13/2022 9:10 AM  Speech-Language Pathologist

## 2022-09-13 NOTE — PLAN OF CARE
Problem: Discharge Planning  Goal: Discharge to home or other facility with appropriate resources  9/13/2022 1228 by Shoshana Penn RN  Outcome: Progressing  Flowsheets (Taken 9/13/2022 0900)  Discharge to home or other facility with appropriate resources: Identify barriers to discharge with patient and caregiver     Problem: Safety - Adult  Goal: Free from fall injury  9/13/2022 1228 by Shoshana Penn RN  Outcome: Progressing     Problem: ABCDS Injury Assessment  Goal: Absence of physical injury  9/13/2022 1228 by Shoshana Penn RN  Outcome: Progressing     Problem: Pain  Goal: Verbalizes/displays adequate comfort level or baseline comfort level  9/13/2022 1228 by Shoshana Penn RN  Outcome: Progressing  Flowsheets (Taken 9/13/2022 0900)  Verbalizes/displays adequate comfort level or baseline comfort level: Encourage patient to monitor pain and request assistance

## 2022-09-13 NOTE — PLAN OF CARE
Problem: Discharge Planning  Goal: Discharge to home or other facility with appropriate resources  Outcome: Progressing  Flowsheets (Taken 9/12/2022 2141)  Discharge to home or other facility with appropriate resources:   Identify barriers to discharge with patient and caregiver   Identify discharge learning needs (meds, wound care, etc)     Problem: Safety - Adult  Goal: Free from fall injury  Outcome: Progressing  Flowsheets (Taken 9/13/2022 0400)  Free From Fall Injury: Instruct family/caregiver on patient safety     Problem: ABCDS Injury Assessment  Goal: Absence of physical injury  Outcome: Progressing  Flowsheets (Taken 9/13/2022 0400)  Absence of Physical Injury: Implement safety measures based on patient assessment     Problem: Pain  Goal: Verbalizes/displays adequate comfort level or baseline comfort level  Outcome: Progressing

## 2022-09-13 NOTE — CARE COORDINATION
SW received a call from patient's daughter Prabhakar Osorio. SW gave patient's daughter an update from patient's team conference today and patient's tentative discharge date. Patient's daughter requesting to have a family meeting with the ARU therapist to further discuss patient's needs when patient discharges to home. SW will follow-up with patient's daughter on 9/14/2022 with a date and time for the meeting. SW will continue to follow.     Electronically signed by FRANCA Louis on 9/13/2022 at 4:42 PM

## 2022-09-13 NOTE — CARE COORDINATION
Team conference held today. Spoke with patient to discuss progress with therapy, barriers to discharge, and plans to return home. Estimated discharge date set for 9/21/2022. Patient anticipates discharging to home with needed supports. Will continue to follow for support and discharge planning.     Electronically signed by FRANCA Fay on 9/13/2022 at 2:51 PM

## 2022-09-13 NOTE — PROGRESS NOTES
[facility] Department   Phone: [phone]    Physical Therapy    [] Initial Evaluation            [x] Daily Treatment Note         [] Discharge Summary      Patient: All Parra   : 1945   MRN: [id number]   Date of Service:  2022  Admitting Diagnosis: Metabolic encephalopathy  Current Admission Summary: 53-year-old male with a history of COPD, diabetes, gout, hypertension, and STEF who was admitted on 9/3 with altered mental status. CT head was unremarkable for acute findings. 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 Echo with a normal EF and positive bubble study. Cardiology evaluated and suggested a 30-day monitor and outpatient work-up. He was evaluated by therapy and suggested to continue in an inpatient setting prior to returning home. He is strongly interested in returning to home where he lives alone and was caring for himself independently. Past Medical History:  has a past medical history of Anemia, COPD (chronic obstructive pulmonary disease) ([address] Diabetes mellitus ([address] Edema, GI (gastrointestinal bleed), Gout, Hypertension, Morbid obesity ([address] Neuropathy, Obstructive sleep apnea, and Peripheral vascular disease ([address] Past Surgical History:  has a past surgical history that includes Appendectomy; Total hip arthroplasty; and Colonoscopy (N/A, 2019).   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 Handrails are located on R side. Bathroom Layout: walk in shower  Bathroom Equipment: grab bars in shower, shower chair, hand held shower head  Toilet Height: elevated height  Home Equipment: rolling walker, single point cane, manual wheelchair, reacher, sock aide, long handled shoe horn, oxygen, wooden cane. Comment: transport w/c, on 3L O2 all the time, even though Dr. Rica Chiu recommended 2 L  Transfer Assistance: Independent without use of device  Ambulation Assistance:modified independent with use of SPC; uses walker when work in garden  ADL Assistance: independent with all ADL's  IADL Assistance: independent with homemaking tasks  Active :        [x] Yes                 [] No  Hand Dominance: [] Left                 [x] Right  Hobbies: Enjoys cooking, baking bread, volunteer at [employer]. [employer]  Recent Falls: No recent falls  Pt sleeps in a recliner. 2 daughters in area. 1 dtr is an RN and granddaughter becoming an RN. Per patient daughter reports that if patient is debilitated post rehabilitation that he could move in with her, living area in basement. Subjective  General: Patient is seated in recliner chair upon PT entry. States he wants to go home. Pain: 0/10  Pain Interventions: patient denies pain interventions       Functional Mobility  Bed Mobility  Bed mobility not completed on this date. Comments: Patient sleeps in recliner chair. Transfers  Sit to stand transfer: stand by assistance  Stand to sit transfer: stand by assistance  Stand step transfer: stand by assistance  Comments: All transfers completed with use of walking stick as patient states this is home device. Pt requires assistance with O2 line during stand step transfer.    Ambulation  Surface:level surface  Assistive Device: LRAD--walking stick  Other Apparatus: [facility] heart monitor  Assistance: stand by assist  Distance: 59' - limited by destination  Gait Mechanics: decreased roxana, decreased step through, mild path deviation to the R with cues for obstacle avoidance, widened ZACH   Comments:  Pt required 6L O2 to maintain 90% O2 during activity    Stair Mobility  Stair mobility not completed on this date. Comments:   Wheelchair Mobility:  No w/c mobility completed on this date. Comments:  Balance  Static Sitting Balance: fair (+): maintains balance at SBA/supervision without use of UE support  Dynamic Sitting Balance: fair (+): maintains balance at SBA/supervision without use of UE support  Static Standing Balance: fair (-): maintains balance at CGA with use of UE support  Dynamic Standing Balance: fair (-): maintains balance at CGA with use of UE support  Comments:    Other Therapeutic Interventions  1st session:  Performed toilet transfer and toileting mod I.  Pt stood to brush teeth at sink with supervision due to decreased safety with O2 line. Performed ambulation as documented above. Performed seated stepper X 5 minutes. Performed alternating toe taps on 4\" step X 10 B with BUE assist and step ups on 4\" step with BUE assist X 10. Performed STS from high chair without UE assist X 5. Pt returned to room and remained in recliner with alarm on and all needs in reach. 2nd session:  Pt in recliner on arrival.  Just walked back to chair from bathroom without staff assist.  Pt ambulated 209' with SBA and SPC with 6L O2. O2 73%  with 2 minute recovery to 90%. Performed trampoline ball toss in DLS X 30 and stagger stance B X 20 each with O2 75%  with 1 minute recovery to 90%. Pt did not increase over 91% throughout session on 6L. RN notified. Pt returned to room and remained in recliner on 6L. Pt talking to family member and O2 sats 88% on 6L. RN notified.   Pt with decreased awareness of low O2 and decreased awareness of O2 line and required frequent repositioning as well as assist for management. Functional Outcomes                 Cognition  Overall Cognitive Status: Impaired  Arousal/Alterness: appropriate responses to stimuli  Following Commands: follows one step commands with repetition, follows one step commands with increased time  Safety Judgement: decreased awareness of need for assistance  Insights: decreased awareness of deficits  Initiation: requires cues for some  Sequencing: requires cues for some  Orientation:    alert and oriented x 4  Command Following:   accurately follows one step commands    Education  Barriers To Learning: cognition  Patient Education: patient educated on goals, PT role and benefits, plan of care, general safety, functional mobility training, proper use of assistive device/equipment, energy conservation, transfer training, discharge recommendations  Learning Assessment:  patient verbalizes understanding, would benefit from continued reinforcement    Assessment  Activity Tolerance: poor activity tolerance compared to baseline, see gait analysis for SPO2 saturation changes with functional mobility. Patient presents with decreased insight into decreased SPO2 saturation requires cues for rest breaks, unable to self initiate rest at this time. Physically SOB and diaphoretic during activity though patient declining fatigue. Impairments Requiring Therapeutic Intervention: decreased functional mobility, decreased strength, decreased safety awareness, decreased cognition, decreased endurance, decreased balance  Prognosis: good  Clinical Assessment: Pt requires significant rest breaks due to low O2 levels with standing activities. Pt with decreased safety awareness regarding low O2 as well as placement of O2 line during functional activity. Pt with mild improvement in endurance, but continues to require increased O2 with activity.   Safety Interventions: patient left in chair, chair alarm in place, call light within reach, and nurse notified    Plan  Frequency: 5 x/week, 60 min/day  Current Treatment Recommendations: strengthening, balance training, functional mobility training, transfer training, gait training, stair training, endurance training, patient/caregiver education, home exercise program, safety education, and equipment evaluation/education    Goals  Patient Goals: to go home   Short Term Goals:  Time Frame: to bet met in 7-10 days  Patient will complete stand step transfer at [facility]   Patient will ambulate 200 ft with use of LRAD at [facility]  Patient will ascend/descend 1 stairs with (R) ascending handrail at modified independent  Patient will complete car transfer at [facility]    Therapy Session Time      Individual Group Co-treatment   Time In  830       Time Out  900       Minutes  30       Second Session Therapy Time:   Individual Concurrent Group Co-treatment   Time In  1245         Time Out  1315         Minutes  30             Timed Code Treatment Minutes:   30+ 30 minutes  Total Treatment Minutes:  60 minutes       Electronically Signed By: Demond Escobedo PT

## 2022-09-14 ENCOUNTER — APPOINTMENT (OUTPATIENT)
Dept: GENERAL RADIOLOGY | Age: 77
DRG: 056 | End: 2022-09-14
Attending: PHYSICAL MEDICINE & REHABILITATION
Payer: MEDICARE

## 2022-09-14 PROCEDURE — 2700000000 HC OXYGEN THERAPY PER DAY

## 2022-09-14 PROCEDURE — 6370000000 HC RX 637 (ALT 250 FOR IP): Performed by: PHYSICAL MEDICINE & REHABILITATION

## 2022-09-14 PROCEDURE — 94761 N-INVAS EAR/PLS OXIMETRY MLT: CPT

## 2022-09-14 PROCEDURE — 97116 GAIT TRAINING THERAPY: CPT

## 2022-09-14 PROCEDURE — 6360000002 HC RX W HCPCS: Performed by: PHYSICAL MEDICINE & REHABILITATION

## 2022-09-14 PROCEDURE — 94760 N-INVAS EAR/PLS OXIMETRY 1: CPT

## 2022-09-14 PROCEDURE — 97535 SELF CARE MNGMENT TRAINING: CPT

## 2022-09-14 PROCEDURE — 92507 TX SP LANG VOICE COMM INDIV: CPT

## 2022-09-14 PROCEDURE — 1280000000 HC REHAB R&B

## 2022-09-14 PROCEDURE — 2580000003 HC RX 258: Performed by: PHYSICAL MEDICINE & REHABILITATION

## 2022-09-14 PROCEDURE — 97110 THERAPEUTIC EXERCISES: CPT

## 2022-09-14 PROCEDURE — 94640 AIRWAY INHALATION TREATMENT: CPT

## 2022-09-14 PROCEDURE — 97530 THERAPEUTIC ACTIVITIES: CPT

## 2022-09-14 PROCEDURE — 71046 X-RAY EXAM CHEST 2 VIEWS: CPT

## 2022-09-14 PROCEDURE — 97129 THER IVNTJ 1ST 15 MIN: CPT

## 2022-09-14 RX ORDER — FUROSEMIDE 10 MG/ML
40 INJECTION INTRAMUSCULAR; INTRAVENOUS ONCE
Status: COMPLETED | OUTPATIENT
Start: 2022-09-14 | End: 2022-09-14

## 2022-09-14 RX ADMIN — ASPIRIN 81 MG 81 MG: 81 TABLET ORAL at 11:29

## 2022-09-14 RX ADMIN — FUROSEMIDE 40 MG: 10 INJECTION, SOLUTION INTRAMUSCULAR; INTRAVENOUS at 11:26

## 2022-09-14 RX ADMIN — Medication 10 ML: at 21:00

## 2022-09-14 RX ADMIN — FUROSEMIDE 40 MG: 40 TABLET ORAL at 11:41

## 2022-09-14 RX ADMIN — IPRATROPIUM BROMIDE AND ALBUTEROL SULFATE 1 AMPULE: 2.5; .5 SOLUTION RESPIRATORY (INHALATION) at 03:21

## 2022-09-14 RX ADMIN — ENOXAPARIN SODIUM 30 MG: 100 INJECTION SUBCUTANEOUS at 11:25

## 2022-09-14 RX ADMIN — Medication 2 PUFF: at 03:22

## 2022-09-14 RX ADMIN — ACETAMINOPHEN 650 MG: 325 TABLET ORAL at 06:50

## 2022-09-14 RX ADMIN — LISINOPRIL 2.5 MG: 5 TABLET ORAL at 11:44

## 2022-09-14 RX ADMIN — ROSUVASTATIN 20 MG: 20 TABLET, FILM COATED ORAL at 20:59

## 2022-09-14 RX ADMIN — GABAPENTIN 300 MG: 300 CAPSULE ORAL at 20:59

## 2022-09-14 RX ADMIN — PREDNISONE 5 MG: 5 TABLET ORAL at 11:30

## 2022-09-14 RX ADMIN — IPRATROPIUM BROMIDE AND ALBUTEROL SULFATE 1 AMPULE: 2.5; .5 SOLUTION RESPIRATORY (INHALATION) at 21:48

## 2022-09-14 RX ADMIN — IPRATROPIUM BROMIDE AND ALBUTEROL SULFATE 1 AMPULE: 2.5; .5 SOLUTION RESPIRATORY (INHALATION) at 17:44

## 2022-09-14 RX ADMIN — GABAPENTIN 300 MG: 300 CAPSULE ORAL at 14:06

## 2022-09-14 RX ADMIN — ALLOPURINOL 200 MG: 100 TABLET ORAL at 11:30

## 2022-09-14 RX ADMIN — FUROSEMIDE 40 MG: 40 TABLET ORAL at 18:21

## 2022-09-14 RX ADMIN — GABAPENTIN 300 MG: 300 CAPSULE ORAL at 11:30

## 2022-09-14 RX ADMIN — Medication 100 MG: at 11:30

## 2022-09-14 RX ADMIN — ENOXAPARIN SODIUM 30 MG: 100 INJECTION SUBCUTANEOUS at 20:59

## 2022-09-14 RX ADMIN — METOPROLOL SUCCINATE 12.5 MG: 25 TABLET, EXTENDED RELEASE ORAL at 11:44

## 2022-09-14 RX ADMIN — Medication 10 ML: at 11:26

## 2022-09-14 RX ADMIN — Medication 2 PUFF: at 21:50

## 2022-09-14 ASSESSMENT — PAIN DESCRIPTION - LOCATION: LOCATION: KNEE

## 2022-09-14 ASSESSMENT — PAIN SCALES - GENERAL
PAINLEVEL_OUTOF10: 0

## 2022-09-14 ASSESSMENT — PAIN DESCRIPTION - ORIENTATION: ORIENTATION: RIGHT

## 2022-09-14 ASSESSMENT — PAIN DESCRIPTION - DESCRIPTORS: DESCRIPTORS: SORE

## 2022-09-14 NOTE — PROGRESS NOTES
Assessment complete. Alert, oriented x4. Denies pain. Had knee pain this morning. Reports that they are sore at times. Ambulated to bathroom using cane and standby assistance. Voided. The care plan and education has been reviewed and mutually agreed upon with the patient. In bed, alarm on, bed in lowest position, call light and table within reach. No further needs expressed at this time. 0315  Ambulated to bathroom. Voided. Wheezing noted in right and left lungs. Respiratory contacted for breathing treatment. 0650  Ambulated to bathroom. Voided. Complaining of knee pain upon standing. Patient reports that the pain is probably secondary to sitting for too long. Given PRN Tylenol.

## 2022-09-14 NOTE — CARE COORDINATION
Patient's daughter requesting to have a family meeting with the ARU therapist to further discuss patient's needs when patient discharges to home. SW contacted patients daughter Tyrone Lanza. Tyrone Lanza agreed to meet at [facility] ARU with therapist on Friday 9/16/2022 @ 12:45. VERONICA informed patient's therapist Angie Cavanaugh.     Electronically signed by FRANCA Escobar Do on 9/14/2022 at 11:39 AM

## 2022-09-14 NOTE — PROGRESS NOTES
[facility]   Phone: [phone]    Speech Therapy    [] Initial Evaluation            [x] Daily Treatment Note         [] Discharge Summary      Patient: Orly Hicks   : 1945   MRN: [id number]          Admitting Diagnosis: Metabolic encephalopathy  Treatment Diagnosis:  Receptive Aphasia , Expressive Aphasia , and Cognitive-Linguistic Deficits  (suspect cognitive-linguistic deficits but unable to fully determine due to aphasia)   Rehab Admission Date: 22  Precautions/Restrictions: Fall Risk, O2 dependent (2-3L at baseline)       Pre-Admission Information   Living Status: Lives alone  Occupation/School: ,    Medication Management: :  [x]Primary   []Secondary []No  Finance Management:          [x]Primary   []Secondary []No  Active :                        []Yes         [x]No  Hearing:                                 SweetSlap  Vision:                                    Vision Gross Assessment: WFL; Wears reading glasses     Daily Treatment Info:   Date: 2022     [place] session [address] [place], [place] CCC-SLP  Tx session 2   [place], [place], CCC-SLP    Pain Pt denied pain  Pt denied    Subjective     Pt sitting upright in chair willing to participate in session tasks. Pt was alert and attentive to conversation topics. Pt's RN was present, reported fluctuating blood pressure. Pt currently on 5L of O2 via NC. Pt alert and participative with tasks, reporting frustration with memory deficits (suspect pt also referring to aphasic errors). Education provided regarding aphasia present following stroke and how this can coexist along with memory deficits. Encouraged pt to continue writing down important information for recall. Objective:  Goals     Goal 1: Pt will tolerate recommended diet without overt s/s of aspiration. Goal Met     GOAL MET  GOAL MET    Goal 1: Pt will complete auditory comprehension tasks with 80% accuracy. Progressing, continue    Pt required repetition of questions throughout tasks for improved comprehension. Pt utilized repetition requests as a strategy for improved processing. Repetition of questions/directions provided intermittently across session tasks. Need for repetition noted more with increase in complex information provided verbally. Pt occasionally noted to utilize auditory rehearsal therefore SLP educated on cont use of this strategy to maximize processing and retention of novel information. Goal 2: Pt will complete expressive language tasks with 80% accuracy or min cues. Progressing, continue    Instances of anomia/ word finding difficulty present throughout session. Pt Independently used circumlocution to describe the word  Ongoing training/education provided regarding use of circumlocution with episodes of anomia  - given 5 items in room, pt able to demonstrate use of circumlocution in 3 trials  - pt appeared able to utilize strategy but required cues for when to utilize in functional conversation     Pt will complete graded fx problem solving tasks >80% acc  min cues.       Completion of EMILIA (Assessment of Language-Related Functional Activities)  - Writing a check and balancing checkbook 9/10 (90%)   *This score indicates high probability of independent function on this task   *Recommend supervision with complex finance management  Further subtests of the EMILIA (Assessment of Language-Related Functional Activities) were completed to assess cognition  - Understanding Medicine Labels: 50% accuracy (errors appeared related to comprehension of questions)   - Using a Calendar: 90% accuracy   - Reading Instructions: 60% accuracy (pt read questions aloud with intermittent paraphasic errors, utilizing finger to follow along and min cues for correction of aphasic errors to maximize comprehension of presented question)     Goal 4: Patient will be instructed in memory strategies to utilize in order to promote recall of newly learned information with >80% min cues. Progressing, continue    Functional recall  - Pt able to recall family members and their careers. - Recall medical situation and rationale for being in the hospital.    Functional recall  - pt frequently becoming frustrated with changes in his cognition/memory and education/rationale provided regarding reasoning for changes   - pt appears to refer to deficits as all changes with memory vs aphasic errors as well as memory deficits   - able to recall reasoning for deficits independently      RN present at beginning of session reported that pt will be receiving a chest XR due to need for increased O2  - pt able to recall this test at end of session and reasoning given min-mod verbal cues      Goal 5: Pt will demonstrate use of visual strategies for visual language processing with 80% accuracy or min cues. Progressing, continue    Goal not targeted this session. Targeted via reading instructions task (EMILIA) listed above. Other areas targeted: Completion of The Western Aphasia Battery-Revised (WAB-R) completed with pt:  - Word Fluency: 10/20  - Sentence Completion: 8/10   - Responsive Speech: 10/10       Aphasia Quotient (AQ)- 75.7 : indicates mild to moderate severity   *Mild = 76 and above  *Moderate = 51 - 75  Severe = 26 - 50   Very Severe = 0 - 25       Education:   Provided education regarding rationale for tasks and plan of care. Pt verbalized understanding   Pt requires ongoing learning  Provided education regarding rationale for tasks and plan of care.        Pt verbalized understanding   Pt requires ongoing learning      Safety Devices: [x] Call light within reach  [x] Chair alarm activated  [] Bed alarm activated  [x] Other: Camera in place for safety  [x] Call light within reach  [x] Chair alarm activated  [] Bed alarm activated  [] Other:           Assessment/Progress/Discharge:   Weekly Update 9/13/22:  Diagnosis: Pt continues with Receptive and Expressive Aphasia characterized by frequent semantic and phonemic paraphasias, anomia, difficulty with repetition of phrase and sentence length information, difficulty answering mildly complex yes/no and wh-questions, reduced utterance length and reduced ability to follow 2+ step auditory directions. Pt benefits from repetition, simplification and visual cues for improved comprehension and processing. Suspect cognitive-linguistic deficits but unable to fully assess due to aphasia. Pt is independent at discharge and is anxious to get back to his PLOF. Discussed assistance and supports recommended at d/c with pt stating he does not want to feel like a burden to his children. Pt with lack of insight into how his current deficits will impact him in his daily life post d/c. Continue with speech therapy services to improve language and cognitive skills to ensure a safe return back to his prior level of functioning. Current Diet Order:   ADULT DIET; Regular; 5 carb choices (75 gm/meal)            Plan:     Frequency:  5days/week   60 minutes/day    Discharge Recommendations:   Barriers: Limited family support, Cognitive deficit, Limited safety awareness, Limited insight into deficits, Communication deficit, Decreased endurance, and Medication managment  Discharge Recommendations:  TBD   Continued SLP Treatment:  Yes   Estimated discharge date: 9/21/22    Session 1 Times: Individual Concurrent Group Co-treatment   Time In 1125      Time Out 1155      Time Code Minutes  15      Minutes 30        Session 2 Times: Individual Concurrent Group Co-treatment   Time In 1245      Time Out 1315      Time Code Minutes  20      Minutes 30        Timed Code Treatment Minutes: 20  Total Treatment Minutes: 60     Electronically Signed by     Session 1:   Josef Bautista M.A.  [facility]-SLP ROXANNE [id number]  Speech-Language Pathologist   9/14/2022 3:30 PM     The speech-language pathologist was present, directed the patient's care, made skilled judgment and was responsible for assessment and treatment.     Jam Giraldo.,  Speech-Language Pathology        Session 2:   Keiry Sue, [place] [address] [id number] [id number]  Speech-Language Pathologist  9/14/2022 3:07 PM

## 2022-09-14 NOTE — PROGRESS NOTES
Cyndi Barnes  9/14/2022  [id number]    Chief Complaint: Metabolic encephalopathy    Subjective:   No overnight events. No current complaints. Requiring increased O2 today. Feels his legs are more swollen. ROS: No CP, SOB, dyspnea    Objective:  Patient Vitals for the past 24 hrs:   BP Temp Temp src Pulse Resp SpO2 Weight   09/14/22 0649 -- -- -- -- -- -- (!) 326 lb 3.2 oz (148 kg)   09/14/22 0322 -- -- -- 82 18 90 % --   09/13/22 2207 103/61 98.1 °F (36.7 °C) Oral 79 20 98 % --   09/13/22 1949 -- -- -- 88 18 95 % --   09/13/22 1211 -- -- -- 77 18 94 % --       Gen: No distress, pleasant. Resting in chair  HEENT: Normocephalic, atraumatic. CV: Regular rate and rhythm. No MRG   Resp: No respiratory distress. Decreased BS diffusely, O2 per NC  Abd: Soft, nontender nondistended  Ext: +BLE edema. Neuro: Alert, oriented, appropriately interactive. Laboratory data: Available via EMR. Therapy progress:    PT    Supine to Sit:    Sit to Supine:     Sit to Stand: Supervision or touching assistance  Chair/Bed to Chair Transfer: Supervision or touching assistance  Car Transfer: Supervision or touching assistance  Ambulation 10 ft: Supervision or touching assistance  Ambulation 50 ft: Supervision or touching assistance  Ambulation 150 ft: Supervision or touching assistance  Stairs - 1 Step: Supervision or touching assistance  Stairs - 4 Step:    Stairs - 12 Step:      OT    Eating: Independent  Oral Hygiene: Supervision or touching assistance  Bathing: Supervision or touching assistance  Upper Body Dressing: Supervision or touching assistance  Lower Body Dressing: Supervision or touching assistance  Toilet Transfer:     Toilet Hygiene: Supervision or touching assistance    Speech Therapy    Pt continues with Receptive and Expressive Aphasia characterized by frequent semantic and phonemic paraphasias, anomia, difficulty with repetition of phrase and sentence length information, difficulty answering mildly complex yes/no and wh-questions, reduced utterance length and reduced ability to follow 2+ step auditory directions. Pt benefits from repetition, simplification and visual cues for improved comprehension and processing. Suspect cognitive-linguistic deficits but unable to fully assess due to aphasia. Pt is independent at discharge and is anxious to get back to his PLOF. Discussed assistance and supports recommended at d/c with pt stating he does not want to feel like a burden to his children. Pt with lack of insight into how his current deficits will impact him in his daily life post d/c. Continue with speech therapy services to improve language and cognitive skills to ensure a safe return back to his prior level of functioning. Body mass index is 43.04 kg/m². Assessment:  Patient Active Problem List   Diagnosis    HTN (hypertension), benign    Syncope    DM (diabetes mellitus), type 2, uncontrolled with complications (HCC)    Arthritis    Chronic respiratory failure (HCC)    STEF (obstructive sleep apnea)    Venous insufficiency    Acute sinusitis    Morbid obesity ([address]    Vitamin D deficiency    Open back wound    COPD, severe ([address]    HLD (hyperlipidemia)    Diastolic dysfunction    Aneurysm of abdominal aorta (HCC)    Personal history of tobacco use, presenting hazards to health    Encephalopathy    Altered mental status    Cerebrovascular accident (CVA) due to thrombosis of left middle cerebral artery (HCC)    Acute on chronic respiratory failure with hypoxia and hypercapnia (HCC)    Cerebrovascular accident (CVA) ([address]    Chronic heart failure with preserved ejection fraction (HCC)    Ventricular ectopy    Leg swelling    TGA (transient global amnesia)    Metabolic encephalopathy       Plan:   Acute left temporal lobe infarct  - ASA, statin. PT/OT/SLP     Chronic hypoxia/hypercapnia  - NIV/AVAPs ventilation to be organized at the time of discharge.       COPD  - inhalers as ordered       PFO/ASD  - Outpatient follow up. [facility]     HTN  - lisinopril 2.5, toprol 12.5     DM  - DM diet. consider resumption of metformin if needed     EOH abuse  - no evidence of withdrawal    BLE edema  - PO 40 BID  - 40 lasix x1 today, increase to 60 BID tomorrow PO. Check XR    Bowel: Per protocol  Bladder: Per protocol  Sleep: Trazodone PRN  Pain: tylenol PRN  DVT PPx: lovenox  PAULINA: 9/21    Nasrin Christie MD 9/14/2022, 9:14 AM    * This document was created using dictation software. While all precautions were taken to ensure accuracy, errors may have occurred. Please disregard any typographical errors.

## 2022-09-14 NOTE — PLAN OF CARE
Problem: Discharge Planning  Goal: Discharge to home or other facility with appropriate resources  Outcome: Progressing  Flowsheets (Taken 9/13/2022 2207)  Discharge to home or other facility with appropriate resources:   Identify barriers to discharge with patient and caregiver   Identify discharge learning needs (meds, wound care, etc)     Problem: Safety - Adult  Goal: Free from fall injury  Outcome: Progressing  Flowsheets (Taken 9/14/2022 0649)  Free From Fall Injury: Instruct family/caregiver on patient safety     Problem: ABCDS Injury Assessment  Goal: Absence of physical injury  Outcome: Progressing  Flowsheets (Taken 9/14/2022 0649)  Absence of Physical Injury: Implement safety measures based on patient assessment     Problem: Pain  Goal: Verbalizes/displays adequate comfort level or baseline comfort level  Outcome: Progressing     Problem: Chronic Conditions and Co-morbidities  Goal: Patient's chronic conditions and co-morbidity symptoms are monitored and maintained or improved  Outcome: Progressing  Flowsheets (Taken 9/13/2022 2207)  Care Plan - Patient's Chronic Conditions and Co-Morbidity Symptoms are Monitored and Maintained or Improved: Monitor and assess patient's chronic conditions and comorbid symptoms for stability, deterioration, or improvement

## 2022-09-14 NOTE — PROGRESS NOTES
positional restrictions    Pre-Admission Information - copied over from acute care notes - pt's word finding difficulties limit ability to explain home set up    Lives With: alone                     Type of Home: house  Home Layout: two level, able to live on main level  Home Access:  1 step to enter with handrail. Handrails are located on R side. Bathroom Layout: walk in shower  Bathroom Equipment: grab bars in shower, shower chair, hand held shower head  Toilet Height: elevated height  Home Equipment: rolling walker, single point cane, manual wheelchair, reacher, sock aide, long handled shoe horn, oxygen, . Comment: transport w/c, on 3L O2 all the time, even though Dr. Leticia Quintero recommended 2 L  (wooden cane)  Transfer Assistance: Independent without use of device  Ambulation Assistance:modified independent with use of SPC; uses walker when work in garden  ADL Assistance: independent with all ADL's  IADL Assistance: independent with homemaking tasks  Active :        [x] Yes                 [] No  Hand Dominance: [] Left                 [x] Right  Hobbies: Enjoys cooking, baking bread, volunteer at [employer]  Recent Falls: No recent falls  Pt sleeps in a recliner. 2 daughters in area. 1 dtr is an RN and granddaughter         Subjective  General: pt sitting asleep in chair on arrival   - 5L 02 at rest this date. Pt agreeable to OT session and shower. Increased to 6L during activity and throughout session. Pain: 0/10  Pain Interventions: not applicable     . Activities of Daily Living    Basic Activities of Daily Living  Feeding: modified independent  Feeding Comments: educated on taking rest breaks throughout eating   Grooming: minimal assistance  Grooming Comments: pt able to brush hair with supervision but unable to reach to place hair in pony tail requiring assistance  Upper Extremity Bathing: stand by assistance  Lower Extremity Bathing: stand by assistance   Bathing Equipment: none  Bathing Comments: cued to take breaks throughout shower to conserve energy - completed shower seated on tub bench - proper use of grab bars - standing PRN for LB bathing - IV covered/waterproofed during shower  Upper Extremity Dressing: stand by assistance  Lower Extremity Dressing: maximum assistance  Dressing Equipment: reacher, sock aide  Dressing Comments: completed UB/LB dressing while seated on tub transfer bench - standing PRN for LB clothing management over hips with SBA and grab bars- dep for donning compression stockings - use of sock aide to colleen socks with increased time, verbal cues, and required mod assistance to rotate socks due to not being placed on sock aide and foot properly - pt frustrated removing socks with reacher requesting therapist to doff socks; Pt had to be cued to place O2 nasal cannula back on after donning shirt  General Comments: pt educated on energy conservation techniques during ADL tasks to prevent decrease in O2 sats. O2 sats values provided below throughout tx session. Bathroom door left crack to increase air flow  96% on 5L at rest at start of tx  89% on 6L after ambulation to tub transfer bench prior to shower (1 minute of seated rest to return to 93%)  78% on 6L after showering and dressing - instructed to stay seated on transfer bench (2 minutes of seated rest to return to 93%)  92% on 6L after ambulating to recliner from shower  O2 decreased back to 5L at end of tx - stabilized at 96%  Pt had observed labored breathing throughout session after ambulation and bathing but never expressed feeling tired. When asked if feeling symptomatic, patient stated not really feeling bad. Instrumental Activities of Daily Living  No IADL completed on this date. Functional Mobility  Bed Mobility  Bed mobility not completed on this date. Comments:  Transfers  Sit to stand transfer:stand by assistance  Stand to sit transfer: stand by assistance  Bed / Chair transfer: stand by assistance. Shower transfer: stand by assistance  Shower transfer equipment: tub transfer bench, grab bars, cane  Shower transfer comments: limited awareness of O2 line when completing transfer with max verbal cuing - pt stated they understood O2 line placement education but demo'd poor safety awareness during ambulation   Comments: use of cane  Functional Mobility:  Sitting Balance: supervision. Sitting Balance Comment: tub transfer bench and recliner  Standing Balance: stand by assistance. Functional Mobility: .  contact guard assistance  Functional Mobility Activity: to/from bathroom  Functional Mobility Device Use: single point cane  Functional Mobility Comment: pt stood and began ambulating prior to therapist being nearby demonstrating poor safety awareness and impulisvity with mobility  Comments: limited awareness for line management and impact of decreased O2 sats and requires assistance for O2 sats reading and O2 L adjustments - stepped over line this date after educated on line management cuing to stop ambulating and fix line and pt continued ambulating, max A from therapist to manage- lack of carry over of education    Other Therapeutic Interventions  Pt provided and educated on energy conservation techniques for tasks and ADLs. Therapist read over each topic, explained scenarios, and provided examples for energy conservation. Pt stated they would look over handouts but stated not having any questions at this time. Pt's BLE appeared to be more swollen compared to prior sessions. MD made aware. Compression stockings donned to decrease swelling. Pt left in recliner with legs elevated to aide with swelling. Pt's overall cognition seemed more impaired this date. Increased verbal cuing for safety and rest breaks this date. Pt repeating self more this date compared to previous session.     Functional Outcomes       Cognition  Overall Cognitive Status: Impaired  Arousal/Alterness: appropriate responses to stimuli  Following Commands: follows one step commands with increased time  Attention Span: appears intact  Memory: appears intact  Safety Judgement: decreased awareness of need for safety  Problem Solving: assistance required to generate solutions, assistance required to implement solutions  Insights: decreased awareness of deficits  Comment - pt with decreased safety awareness, does not appear to be aware when 02 saturation is dropping, difficulty with word finding - receptive language difficulties as well  Orientation:    alert and oriented x 4  Command Following:   impaired accurately follows one step commands -follows simple one step commands about 75% of time,  following commands improves with visual demonstration     Education  Barriers To Learning: cognition and language  Patient Education: patient educated on goals, OT role and benefits, plan of care, precautions, ADL adaptive strategies  Learning Assessment:  patient verbalizes understanding, would benefit from continued reinforcement, patient will require reinforcement due to cognitive deficits    Assessment  Activity Tolerance: O2 sats fluctuate during activity and in stance - recommend pt being on 6L of O2 during activity to prevent desaturation - see O2 sats values above during session; heavy breathing during activity but pt reported not feeling symptomatic  Impairments Requiring Therapeutic Intervention: decreased ADL status, decreased endurance, decreased IADL  Prognosis: good  Clinical Assessment: Patient presents below baseline function secondary to recent CVA. Patient will benefit from OT services to address the above deficits. Patient is typically indep with ADLs and functional mobility with cane and lives alone. Patient is primarily limited by endurance and receptive/expressive language deficits. Patients sats fluctuate heavily and requires increases in O2 during activity.  Patient will benefit from OT services to maximize safety and independence in ADLs, transfers and functional mobility prior to returning home. Pt educated on energy conservation techniques this date, provided energy conservation handouts, verbalized understanding but still required cuing to take rest breaks and slow down during activity. Patient currently requiring assistance : mod/max A LB ADLs, CGA with cane for functional mobility and SBA for standing and transfers with increased 02 (6L) and frequent rest breaks to maintain 02 saturation above 90s. Safety Interventions: patient left in chair, chair alarm in place, and call light within reach    Plan  Frequency: 5 x/week, 60 min/day  Current Treatment Recommendations: strengthening, balance training, functional mobility training, transfer training, endurance training, neuromuscular re-education, patient/caregiver education, ADL/self-care training, and IADL training    Goals  Patient Goals: pt reports being modest and wishes to complete ADLs independently    Short Term Goals:  Time Frame: 7-10 days   Patient will complete upper body ADL at [facility]   Patient will complete lower body ADL at modified independent, . Comment with use of AE PRN   Patient will complete toileting at [facility]   Patient will complete self-feeding at [facility]   Patient will complete grooming at [facility]   Patient will complete functional transfers at [facility]   Patient will complete functional mobility at modified independent   Patient to gather and transport IADL items at modified independent   Patient will demonstrate ability to perform simple IADL tasks (2-3 step tasks) with modified independence. Goals not met 9/14      Therapy Session Time     Individual Group Co-treatment   Time In  0830      Time Out  0930      Minutes  60           Timed Code Treatment Minutes:   60  Total Treatment Minutes:  60       Electronically Signed By: Shobha Terrell S/OT  Rob supervison/direction Jaren Og OTR/L [id number], 9/14/2022, 2:20 PM

## 2022-09-14 NOTE — PROGRESS NOTES
[facility]   Phone: [phone]    Physical Therapy    [] Initial Evaluation            [x] Daily Treatment Note         [] Discharge Summary      Patient: Bev Saez   : 1945   MRN: [id number]   Date of Service:  2022  Admitting Diagnosis: Metabolic encephalopathy  Current Admission Summary: 62-year-old male with a history of COPD, diabetes, gout, hypertension, and STEF who was admitted on 9/3 with altered mental status. CT head was unremarkable for acute findings. 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 Echo with a normal EF and positive bubble study. Cardiology evaluated and suggested a 30-day monitor and outpatient work-up. He was evaluated by therapy and suggested to continue in an inpatient setting prior to returning home. He is strongly interested in returning to home where he lives alone and was caring for himself independently. Past Medical History:  has a past medical history of Anemia, COPD (chronic obstructive pulmonary disease) ([address] Diabetes mellitus ([address] Edema, GI (gastrointestinal bleed), Gout, Hypertension, Morbid obesity ([address] Neuropathy, Obstructive sleep apnea, and Peripheral vascular disease ([address] Past Surgical History:  has a past surgical history that includes Appendectomy; Total hip arthroplasty; and Colonoscopy (N/A, 2019).   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 Handrails are located on R side. Bathroom Layout: walk in shower  Bathroom Equipment: grab bars in shower, shower chair, hand held shower head  Toilet Height: elevated height  Home Equipment: rolling walker, single point cane, manual wheelchair, reacher, sock aide, long handled shoe horn, oxygen, wooden cane. Comment: transport w/c, on 3L O2 all the time, even though Dr. Arabella Nichols recommended 2 L  Transfer Assistance: Independent without use of device  Ambulation Assistance:modified independent with use of SPC; uses walker when work in garden  ADL Assistance: independent with all ADL's  IADL Assistance: independent with homemaking tasks  Active :        [x] Yes                 [] No  Hand Dominance: [] Left                 [x] Right  Hobbies: Enjoys cooking, baking bread, volunteer at [employer] [facility]. [employer]  Recent Falls: No recent falls  Pt sleeps in a recliner. 2 daughters in area. 1 dtr is an RN and granddaughter becoming an RN. Per patient daughter reports that if patient is debilitated post rehabilitation that he could move in with her, living area in basement. Subjective  General: Patient is seated in recliner chair upon PT entry. States he did not sleep well last night. Pain: 0/10  Pain Interventions: patient denies pain interventions       Functional Mobility  Bed Mobility  Bed mobility not completed on this date. Comments: Patient sleeps in recliner chair. Transfers  Sit to stand transfer: stand by assistance  Stand to sit transfer: stand by assistance  Stand step transfer: stand by assistance  Comments: All transfers completed with use of walking stick as patient states this is home device. Pt requires assistance with O2 line during stand step transfer.    Ambulation  Surface:level surface  Assistive Device: LRAD--walking stick  Other Apparatus: MCOT heart monitor  Assistance: stand by assist  Distance: 209'   Gait Mechanics: decreased roxana, decreased step through, mild path deviation to the R with cues for obstacle avoidance, widened ZACH   Comments:  O2 80% on 6L with 3 minute recovery to 90%    Stair Mobility  Number of Steps: 8  Step Height: 4 inch  Hand Rails: (B) handrail  Other Appliance: supplemental O2  Assistance: contact guard assistance  Comments: 6L O2, one step at a time. O2 sats 81% with 1 minute recovery. Wheelchair Mobility:  No w/c mobility completed on this date. Comments:  Balance  Static Sitting Balance: fair (+): maintains balance at SBA/supervision without use of UE support  Dynamic Sitting Balance: fair (+): maintains balance at SBA/supervision without use of UE support  Static Standing Balance: fair (-): maintains balance at CGA with use of UE support  Dynamic Standing Balance: fair (-): maintains balance at CGA with use of UE support  Comments:    Other Therapeutic Interventions  1st session:  Performed ambulation as documented above. Performed stair negotiation as documented above. Pt required assistance with O2 line due to becoming tangled during turns. Performed small SB floor to standing transition X 10. Performed seated stepper X 6 minutes. Performed STS with single UE assist X 5 with CGA. Performed standing ball OH X 10 on airex pad with unweighted ball. Performed step ups onto airex pad X 10 B with CGA for balance with mild posterior lean. Performed squats X 10 with R knee pain. Pt with increased ankle strategy on airex pad. Pt required extended seated rest break following each activity due to low O2 levels on 6L O2. Pt returned to room and remained in bedside chair with alarm on and all needs in reach.         Functional Outcomes                 Cognition  Overall Cognitive Status: Impaired  Arousal/Alterness: appropriate responses to stimuli  Following Commands: follows one step commands with repetition, follows one step commands with increased time  Safety Judgement: decreased awareness of need for assistance  Insights: decreased awareness of deficits  Initiation: requires cues for some  Sequencing: requires cues for some  Orientation:    alert and oriented x 4  Command Following:   accurately follows one step commands    Education  Barriers To Learning: cognition  Patient Education: patient educated on goals, PT role and benefits, plan of care, general safety, functional mobility training, proper use of assistive device/equipment, energy conservation, transfer training, discharge recommendations  Learning Assessment:  patient verbalizes understanding, would benefit from continued reinforcement    Assessment  Activity Tolerance: poor activity tolerance compared to baseline, see gait analysis for SPO2 saturation changes with functional mobility. Patient presents with decreased insight into decreased SPO2 saturation requires cues for rest breaks, unable to self initiate rest at this time. Physically SOB and diaphoretic during activity though patient declining fatigue. Impairments Requiring Therapeutic Intervention: decreased functional mobility, decreased strength, decreased safety awareness, decreased cognition, decreased endurance, decreased balance  Prognosis: good  Clinical Assessment: Pt continues with decreased safety awareness regarding balance and low O2 levels with activity. Pt with mild improvement in endurance and required 1 standing rest break during ambulation trial.  Pt will continue to benefit from skilled PT services to address deficits and return to PLOF.   Safety Interventions: patient left in chair, chair alarm in place, call light within reach, and nurse notified    Plan  Frequency: 5 x/week, 60 min/day  Current Treatment Recommendations: strengthening, balance training, functional mobility training, transfer training, gait training, stair training, endurance training, patient/caregiver education, home exercise program, safety education, and equipment evaluation/education    Goals  Patient Goals: to go home   Short Term Goals:  Time Frame: to bet met in 7-10 days  Patient will complete stand step transfer at [facility]   Patient will ambulate 200 ft with use of LRAD at [facility]  Patient will ascend/descend 1 stairs with (R) ascending handrail at modified independent  Patient will complete car transfer at [facility]    Therapy Session Time      Individual Group Co-treatment   Time In  900       Time Out  1000       Minutes  60             Timed Code Treatment Minutes:   60 minutes  Total Treatment Minutes:  60 minutes       Electronically Signed By: Jackelin Aguilar PT

## 2022-09-14 NOTE — PLAN OF CARE
Problem: Discharge Planning  Goal: Discharge to home or other facility with appropriate resources  9/14/2022 1140 by Vikas Corral RN  Outcome: Progressing  9/14/2022 0920 by Mera Gallardo RN  Outcome: Progressing     Problem: Safety - Adult  Goal: Free from fall injury  9/14/2022 1140 by Vikas Corral RN  Outcome: Progressing  9/14/2022 0920 by Mera Gallardo RN  Outcome: Progressing     Problem: ABCDS Injury Assessment  Goal: Absence of physical injury  9/14/2022 1140 by Vikas Corral RN  Outcome: Progressing  9/14/2022 0920 by Mera Gallardo RN  Outcome: Progressing     Problem: Pain  Goal: Verbalizes/displays adequate comfort level or baseline comfort level  9/14/2022 1140 by Vikas Corral RN  Outcome: Progressing  9/14/2022 0920 by Mera Gallardo RN  Outcome: Progressing     Problem: Chronic Conditions and Co-morbidities  Goal: Patient's chronic conditions and co-morbidity symptoms are monitored and maintained or improved  9/14/2022 1140 by Vikas Corral RN  Outcome: Progressing  9/14/2022 0920 by Mera Gallardo RN  Outcome: Progressing

## 2022-09-15 LAB
ANION GAP SERPL CALCULATED.3IONS-SCNC: 8 MMOL/L (ref 3–16)
BASOPHILS ABSOLUTE: 0 K/UL (ref 0–0.2)
BASOPHILS RELATIVE PERCENT: 0.5 %
BUN BLDV-MCNC: 68 MG/DL (ref 7–20)
CALCIUM SERPL-MCNC: 8.9 MG/DL (ref 8.3–10.6)
CHLORIDE BLD-SCNC: 100 MMOL/L (ref 99–110)
CO2: 30 MMOL/L (ref 21–32)
CREAT SERPL-MCNC: 1.4 MG/DL (ref 0.8–1.3)
EOSINOPHILS ABSOLUTE: 0.2 K/UL (ref 0–0.6)
EOSINOPHILS RELATIVE PERCENT: 3.8 %
GFR AFRICAN AMERICAN: 60
GFR NON-AFRICAN AMERICAN: 49
GLUCOSE BLD-MCNC: 115 MG/DL (ref 70–99)
HCT VFR BLD CALC: 31.8 % (ref 40.5–52.5)
HEMOGLOBIN: 10.5 G/DL (ref 13.5–17.5)
LYMPHOCYTES ABSOLUTE: 1 K/UL (ref 1–5.1)
LYMPHOCYTES RELATIVE PERCENT: 16.6 %
MCH RBC QN AUTO: 34.8 PG (ref 26–34)
MCHC RBC AUTO-ENTMCNC: 33 G/DL (ref 31–36)
MCV RBC AUTO: 105.4 FL (ref 80–100)
MONOCYTES ABSOLUTE: 0.7 K/UL (ref 0–1.3)
MONOCYTES RELATIVE PERCENT: 11.7 %
NEUTROPHILS ABSOLUTE: 4 K/UL (ref 1.7–7.7)
NEUTROPHILS RELATIVE PERCENT: 67.4 %
PDW BLD-RTO: 14.3 % (ref 12.4–15.4)
PLATELET # BLD: 252 K/UL (ref 135–450)
PMV BLD AUTO: 6.9 FL (ref 5–10.5)
POTASSIUM REFLEX MAGNESIUM: 4.9 MMOL/L (ref 3.5–5.1)
RBC # BLD: 3.02 M/UL (ref 4.2–5.9)
SODIUM BLD-SCNC: 138 MMOL/L (ref 136–145)
WBC # BLD: 5.9 K/UL (ref 4–11)

## 2022-09-15 PROCEDURE — 80048 BASIC METABOLIC PNL TOTAL CA: CPT

## 2022-09-15 PROCEDURE — 97530 THERAPEUTIC ACTIVITIES: CPT

## 2022-09-15 PROCEDURE — 6370000000 HC RX 637 (ALT 250 FOR IP): Performed by: PHYSICAL MEDICINE & REHABILITATION

## 2022-09-15 PROCEDURE — 97116 GAIT TRAINING THERAPY: CPT

## 2022-09-15 PROCEDURE — 94640 AIRWAY INHALATION TREATMENT: CPT

## 2022-09-15 PROCEDURE — 92507 TX SP LANG VOICE COMM INDIV: CPT

## 2022-09-15 PROCEDURE — 2580000003 HC RX 258: Performed by: PHYSICAL MEDICINE & REHABILITATION

## 2022-09-15 PROCEDURE — 2700000000 HC OXYGEN THERAPY PER DAY

## 2022-09-15 PROCEDURE — 97129 THER IVNTJ 1ST 15 MIN: CPT

## 2022-09-15 PROCEDURE — 97130 THER IVNTJ EA ADDL 15 MIN: CPT

## 2022-09-15 PROCEDURE — 1280000000 HC REHAB R&B

## 2022-09-15 PROCEDURE — 6360000002 HC RX W HCPCS: Performed by: PHYSICAL MEDICINE & REHABILITATION

## 2022-09-15 PROCEDURE — 85025 COMPLETE CBC W/AUTO DIFF WBC: CPT

## 2022-09-15 PROCEDURE — 97535 SELF CARE MNGMENT TRAINING: CPT

## 2022-09-15 PROCEDURE — 94761 N-INVAS EAR/PLS OXIMETRY MLT: CPT

## 2022-09-15 PROCEDURE — 36415 COLL VENOUS BLD VENIPUNCTURE: CPT

## 2022-09-15 PROCEDURE — 97110 THERAPEUTIC EXERCISES: CPT

## 2022-09-15 PROCEDURE — 94150 VITAL CAPACITY TEST: CPT

## 2022-09-15 RX ADMIN — GABAPENTIN 300 MG: 300 CAPSULE ORAL at 13:33

## 2022-09-15 RX ADMIN — ROSUVASTATIN 20 MG: 20 TABLET, FILM COATED ORAL at 21:44

## 2022-09-15 RX ADMIN — Medication 10 ML: at 08:48

## 2022-09-15 RX ADMIN — Medication 100 MG: at 08:42

## 2022-09-15 RX ADMIN — GABAPENTIN 300 MG: 300 CAPSULE ORAL at 08:42

## 2022-09-15 RX ADMIN — Medication 10 ML: at 21:44

## 2022-09-15 RX ADMIN — IPRATROPIUM BROMIDE AND ALBUTEROL SULFATE 1 AMPULE: 2.5; .5 SOLUTION RESPIRATORY (INHALATION) at 10:30

## 2022-09-15 RX ADMIN — Medication 2 PUFF: at 10:30

## 2022-09-15 RX ADMIN — Medication 2 PUFF: at 05:09

## 2022-09-15 RX ADMIN — LISINOPRIL 2.5 MG: 5 TABLET ORAL at 08:42

## 2022-09-15 RX ADMIN — ENOXAPARIN SODIUM 30 MG: 100 INJECTION SUBCUTANEOUS at 21:43

## 2022-09-15 RX ADMIN — METOPROLOL SUCCINATE 12.5 MG: 25 TABLET, EXTENDED RELEASE ORAL at 08:41

## 2022-09-15 RX ADMIN — IPRATROPIUM BROMIDE AND ALBUTEROL SULFATE 1 AMPULE: 2.5; .5 SOLUTION RESPIRATORY (INHALATION) at 19:43

## 2022-09-15 RX ADMIN — ENOXAPARIN SODIUM 30 MG: 100 INJECTION SUBCUTANEOUS at 08:41

## 2022-09-15 RX ADMIN — PREDNISONE 5 MG: 5 TABLET ORAL at 08:42

## 2022-09-15 RX ADMIN — FUROSEMIDE 60 MG: 40 TABLET ORAL at 08:41

## 2022-09-15 RX ADMIN — ASPIRIN 81 MG 81 MG: 81 TABLET ORAL at 08:42

## 2022-09-15 RX ADMIN — IPRATROPIUM BROMIDE AND ALBUTEROL SULFATE 1 AMPULE: 2.5; .5 SOLUTION RESPIRATORY (INHALATION) at 16:14

## 2022-09-15 RX ADMIN — FUROSEMIDE 60 MG: 40 TABLET ORAL at 17:05

## 2022-09-15 RX ADMIN — IPRATROPIUM BROMIDE AND ALBUTEROL SULFATE 1 AMPULE: 2.5; .5 SOLUTION RESPIRATORY (INHALATION) at 05:09

## 2022-09-15 RX ADMIN — ALLOPURINOL 200 MG: 100 TABLET ORAL at 08:45

## 2022-09-15 RX ADMIN — Medication 2 PUFF: at 19:43

## 2022-09-15 RX ADMIN — GABAPENTIN 300 MG: 300 CAPSULE ORAL at 21:44

## 2022-09-15 ASSESSMENT — PAIN SCALES - GENERAL: PAINLEVEL_OUTOF10: 0

## 2022-09-15 NOTE — PROGRESS NOTES
[address] [facility],[address] Msb Department   Phone: [phone]    Occupational Therapy    [] Initial Evaluation            [x] Daily Treatment Note         [] Discharge Summary      Patient: Becca Albert   : 1945   MRN: [id number]   Date of Service:  9/15/2022    Admitting Diagnosis:  Metabolic encephalopathy/CVA  Current Admission Summary: 65-year-old male with a history of COPD, diabetes, gout, hypertension, and STEF who was admitted on 9/3 with altered mental status. CT head was unremarkable for acute findings. 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 Echo with a normal EF and positive bubble study. Cardiology evaluated and suggested a 30-day monitor and outpatient work-up. He was evaluated by therapy and suggested to continue in an inpatient setting prior to returning home. He is strongly interested in returning to home where he lives alone and was caring for himself independently. Past Medical History:  has a past medical history of Anemia, COPD (chronic obstructive pulmonary disease) ([address] Diabetes mellitus ([address] Edema, GI (gastrointestinal bleed), Gout, Hypertension, Morbid obesity ([address] Neuropathy, Obstructive sleep apnea, and Peripheral vascular disease ([address] Past Surgical History:  has a past surgical history that includes Appendectomy; Total hip arthroplasty; and Colonoscopy (N/A, 2019). Discharge Recommendations: home with possible need for 24 hour assist/intermittent checks     DME Required For Discharge: pt reports he owns a shower chair and reacher, requested a sock aid - already has home 02     Precautions/Restrictions: high fall risk, .   Comment: DNR-CC, regular diet   Weight Bearing Restrictions: no restrictions  [] Right Upper Extremity  [] Left Upper Extremity [] Right Lower Extremity  [] Left Lower Extremity     Required Braces/Orthotics: no braces required   [] Right  [] Left  Positional Restrictions:no positional restrictions    Pre-Admission Information - copied over from acute care notes - pt's word finding difficulties limit ability to explain home set up    Lives With: alone                     Type of Home: house  Home Layout: two level, able to live on main level  Home Access:  1 step to enter with handrail. Handrails are located on R side. Bathroom Layout: walk in shower  Bathroom Equipment: grab bars in shower, shower chair, hand held shower head  Toilet Height: elevated height  Home Equipment: rolling walker, single point cane, manual wheelchair, reacher, sock aide, long handled shoe horn, oxygen, . Comment: transport w/c, on 3L O2 all the time, even though Dr. Vikki Acuña recommended 2 L  (wooden cane)  Transfer Assistance: Independent without use of device  Ambulation Assistance:modified independent with use of SPC; uses walker when work in garden  ADL Assistance: independent with all ADL's  IADL Assistance: independent with homemaking tasks  Active :        [x] Yes                 [] No  Hand Dominance: [] Left                 [x] Right  Hobbies: Enjoys cooking, baking bread, volunteer at [employer]  Recent Falls: No recent falls  Pt sleeps in a recliner. 2 daughters in area. 1 dtr is an RN and granddaughter         Subjective  General: pt sitting upright in chair on arrival   - 5L 02 at rest this date. Pt agreeable to OT session. Increased to 6L during activity and throughout session. Pain: 0/10  Pain Interventions: not applicable     . Activities of Daily Living    Basic Activities of Daily Living  Feeding: modified independent  Feeding Comments: educated on documenting fluid intake with use of handout - pt verbalized understanding - reinforcement would be beneficial for fluid tracking d/t to increased confusion and STM deficits; filled out handout with Tawny August for coffee this morning with cuing   Lower Extremity Dressing: moderate assistance  Dressing Equipment: reacher, sock aide  Dressing Comments: dep for donning compression stockings - use of sock aide to colleen socks with verbal cues, and required mod assistance to rotate R sock due to not being placed on foot properly   Toileting: supervision. Toileting Equipment: none  Toileting Comments: standing in bathroom with use of urinal, hand on sink vanity for balance  General Comments: Pt educated on line management during ADLs and mobility to prevent falling - pt had some carryover with verbal cuing and placing O2 line within hand but required reminders as the session progressed. Continued education on rest breaks during activity to maintain O2 sats. O2 sats during session tracked below:   97% on 5L at rest at start of tx while seated  94% on 6L after ambulation to/from bathroom for toileting  91% on 6L after ambulation to therapy area - seated in chair  95% on 6L after standing in kitchen completing meal prep activity  O2 decreased back to 5L at end of tx - stabilized at 95%  Pt had less labored breathing throughout session compared to previous session and O2 sats remained in the 90s for entirety of session. Instrumental Activities of Daily Living  Meal Prep: contact guard assistance. Meal Prep Equipment: none  Meal Prep Comments: use of cane - oftentimes placing cane on counter - standing too far from counter surface with limited hand placement for stability - verbal cuing required to address safety awareness deficits - activity: removed/replaced dishes from cabinets and dried them at counter in stance  Health Management: moderate assistance. Health Management Comments: limited safety awareness of O2 line during mobility - education on proper placement of lines, rest breaks with cuing to maintain O2 sats; fluid intake handout provided (see above in feeding ADL) - scale provided in room to encourage pt to take daily weight for when they d/c home, demo'd once with cuing   Comment: Pt unable to identify unsafe scenarios.  O2 line wrapped around pt's RUE when sitting and therapist asked what was wrong with the line before pt is able to stand safely, pt stating \"there is nothing wrong\" and proceeded to begin standing. Continued reinforcement for all health management tasks due to limited carry over and inconsistencies for demonstrating proper safety for health    Functional Mobility  Bed Mobility  Bed mobility not completed on this date. Transfers  Sit to stand transfer:stand by assistance  Stand to sit transfer: stand by assistance  Bed / Chair transfer: stand by assistance. Comments: use of cane; nasal canula with lines at all times    Functional Mobility:  Sitting Balance: supervision. Sitting Balance Comment: requires cues to sit further back in seat - prefers to sit on the edge    Standing Balance: stand by assistance. Functional Mobility: .  contact guard assistance  Functional Mobility Activity: retrieve items, to/from therapy room  Functional Mobility Device Use: single point cane  Functional Mobility Comment: occasionally hold O2 line with proper management with cuing - no LOB; retrieved clothing items in room in stance and hung on hangers          Cognition  Overall Cognitive Status: Impaired  Arousal/Alterness: appropriate responses to stimuli  Following Commands: follows one step commands with increased time  Attention Span: appears intact  Memory: appears intact  Safety Judgement: decreased awareness of need for safety  Problem Solving: assistance required to generate solutions, assistance required to implement solutions  Insights: decreased awareness of deficits  Comment - pt with decreased safety awareness, does not appear to be aware when 02 saturation is dropping, difficulty with word finding - receptive language difficulties as well  Update on 9/15: Pt's overall cognition seemed more impaired this date with increased confusion. Pt repeating self more this date compared to previous session.  Therapist informing pt to write fluid intake for Thursday and pt stated \"Oh it's Thursday, I didn't know what day it was. \"  and telling staff to have a good evening at 8:00 AM.   Orientation:    oriented to person, oriented to place, oriented to situation, and disoriented to time   Command Following:   impaired accurately follows one step commands -follows simple one step commands about 75% of time,  following commands improves with visual demonstration     Education  Barriers To Learning: cognition and language  Patient Education: patient educated on goals, OT role and benefits, plan of care, precautions, ADL adaptive strategies  Learning Assessment:  patient verbalizes understanding, would benefit from continued reinforcement, patient will require reinforcement due to cognitive deficits    Assessment  Activity Tolerance: O2 sats fluctuate during activity and in stance - recommend pt being on 6L of O2 during activity to prevent desaturation - see O2 sats values above during session; heavy breathing during activity but pt reported not feeling symptomatic  Impairments Requiring Therapeutic Intervention: decreased ADL status, decreased endurance, decreased IADL  Prognosis: good  Clinical Assessment: Patient is progressing fairly with fair carryover of learned information from previous sessions, specifically regarding O2 line management. Patient required 6 L of 02 during ADLs and frequent rest breaks to maintain 02 saturation above 90s. Patient is primarily limited by endurance and cognitive deficits. Pt is continuing to demonstrate deficits with safety awareness and will benefit from OT services to maximize safety and independence in ADLs, transfers and functional mobility prior to returning home. Continue POC.     Safety Interventions: patient left in chair, chair alarm in place, and call light within reach    Plan  Frequency: 5 x/week, 60 min/day  Current Treatment Recommendations: strengthening, balance training, functional mobility training, transfer training, endurance training, neuromuscular re-education, patient/caregiver education, ADL/self-care training, and IADL training    Goals  Patient Goals: pt reports being modest and wishes to complete ADLs independently    Short Term Goals:  Time Frame: 7-10 days   Patient will complete upper body ADL at [facility]   Patient will complete lower body ADL at modified independent, . Comment with use of AE PRN   Patient will complete toileting at [facility]   Patient will complete self-feeding at [facility]   Patient will complete grooming at [facility]   Patient will complete functional transfers at [facility]   Patient will complete functional mobility at modified independent   Patient to gather and transport IADL items at modified independent   Patient will demonstrate ability to perform simple IADL tasks (2-3 step tasks) with modified independence.     Goals not met 9/15      Therapy Session Time     Individual Group Co-treatment   Time In  0730      Time Out  0830      Minutes  60           Timed Code Treatment Minutes:   60    Total Treatment Minutes:  60     Electronically Signed By: Wade Merlin, S/SUBHASH Leyva/OTIS, [id number]

## 2022-09-15 NOTE — PROGRESS NOTES
Assessment complete. VSS. Pt A&O x4, scheduled meds given. No c/o pain. The care plan and education has been reviewed and mutually agreed upon with the patient. Patient remains free from falls or accidental injury. Room free from clutter. All fall precautions in place. Bed in lowest position with wheels locked and alarm on, call light and belongings within reach, and door open.

## 2022-09-15 NOTE — PROGRESS NOTES
Outreach call to Respiratory due to pt stating he didn't receive his scheduled neb treatment and inhaler at 0800 today. Respiratory to come to unit when able.       Familia Mcdonough BSN, RN   [phone]

## 2022-09-15 NOTE — PROGRESS NOTES
Kamari Rogers  9/15/2022  [id number]    Chief Complaint: Metabolic encephalopathy    Subjective:   No overnight events. No current complaints. O2 requirements still elevated. Weight down. CXR clear. Not using BiPAP at night. ROS: No CP. Mild SOB and dyspnea with exertion    Objective:  Patient Vitals for the past 24 hrs:   BP Temp Temp src Pulse Resp SpO2 Weight   09/15/22 0604 -- -- -- -- -- -- (!) 324 lb 12.8 oz (147.3 kg)   09/15/22 0509 -- -- -- -- -- 94 % --   09/14/22 2148 -- -- -- -- -- 94 % --   09/14/22 2045 105/63 97.9 °F (36.6 °C) Oral 72 20 94 % --   09/14/22 1745 -- -- -- -- -- 92 % --   09/14/22 1144 134/63 -- -- 84 -- -- --   09/14/22 1130 116/68 -- -- 80 -- -- --   09/14/22 1124 (!) 98/53 -- -- 83 -- 94 % --   09/14/22 0855 (!) 99/57 97.8 °F (36.6 °C) Oral 81 21 95 % --       Gen: No distress, pleasant. Resting in chair  HEENT: Normocephalic, atraumatic. CV: Regular rate and rhythm. No MRG   Resp: No respiratory distress. Decreased BS diffusely, O2 per NC  Abd: Soft, nontender nondistended  Ext: +2 BLE edema. Neuro: Alert, oriented, appropriately interactive. Laboratory data: Available via EMR. Therapy progress:    PT    Supine to Sit:    Sit to Supine:     Sit to Stand: Supervision or touching assistance  Chair/Bed to Chair Transfer: Supervision or touching assistance  Car Transfer: Supervision or touching assistance  Ambulation 10 ft: Supervision or touching assistance  Ambulation 50 ft: Supervision or touching assistance  Ambulation 150 ft: Supervision or touching assistance  Stairs - 1 Step: Supervision or touching assistance  Stairs - 4 Step: Supervision or touching assistance  Stairs - 12 Step:      OT    Eating: Independent  Oral Hygiene: Supervision or touching assistance  Bathing: Supervision or touching assistance  Upper Body Dressing: Supervision or touching assistance  Lower Body Dressing: Supervision or touching assistance  Toilet Transfer:     Toilet Hygiene: Supervision or touching assistance    Speech Therapy    Pt continues with Receptive and Expressive Aphasia characterized by frequent semantic and phonemic paraphasias, anomia, difficulty with repetition of phrase and sentence length information, difficulty answering mildly complex yes/no and wh-questions, reduced utterance length and reduced ability to follow 2+ step auditory directions. Pt benefits from repetition, simplification and visual cues for improved comprehension and processing. Suspect cognitive-linguistic deficits but unable to fully assess due to aphasia. Pt is independent at discharge and is anxious to get back to his PLOF. Discussed assistance and supports recommended at d/c with pt stating he does not want to feel like a burden to his children. Pt with lack of insight into how his current deficits will impact him in his daily life post d/c. Continue with speech therapy services to improve language and cognitive skills to ensure a safe return back to his prior level of functioning. Body mass index is 42.85 kg/m².     Assessment:  Patient Active Problem List   Diagnosis    HTN (hypertension), benign    Syncope    DM (diabetes mellitus), type 2, uncontrolled with complications (HCC)    Arthritis    Chronic respiratory failure (HCC)    STEF (obstructive sleep apnea)    Venous insufficiency    Acute sinusitis    Morbid obesity ([address]    Vitamin D deficiency    Open back wound    COPD, severe ([address]    HLD (hyperlipidemia)    Diastolic dysfunction    Aneurysm of abdominal aorta (HCC)    Personal history of tobacco use, presenting hazards to health    Encephalopathy    Altered mental status    Cerebrovascular accident (CVA) due to thrombosis of left middle cerebral artery (HCC)    Acute on chronic respiratory failure with hypoxia and hypercapnia (HCC)    Cerebrovascular accident (CVA) ([address]    Chronic heart failure with preserved ejection fraction (HCC)    Ventricular ectopy    Leg swelling    TGA (transient global amnesia)    Metabolic encephalopathy       Plan:   Acute left temporal lobe infarct  - ASA, statin. PT/OT/SLP     Chronic hypoxia/hypercapnia  - NIV/AVAPs ventilation to be organized at the time of discharge. Not using BiPAP overnight     COPD  - inhalers as ordered. - Pulm consult     PFO/ASD  - Outpatient follow up. [facility]     HTN  - lisinopril 2.5, toprol 12.5     DM  - DM diet. consider resumption of metformin if needed     EOH abuse  - no evidence of withdrawal    BLE edema  - PO 40 BID increase to 60 BID PO.  - s/p 40 lasix x1    Bowel: Per protocol  Bladder: Per protocol  Sleep: Trazodone PRN  Pain: tylenol PRN  DVT PPx: lovenox  PAULINA: 9/21    Alexandre Leonard MD 9/15/2022, 8:30 AM    * This document was created using dictation software. While all precautions were taken to ensure accuracy, errors may have occurred. Please disregard any typographical errors.

## 2022-09-15 NOTE — PROGRESS NOTES
Incentive Spirometry education and demonstration completed by Respiratory Therapy Yes      Response to education: Excellent     Teaching Time: 5 minutes    Minimum Predicted Vital Capacity - 799 mL. Patient's Actual Vital Capacity - 1500 mL. Turning over to Nursing for routine follow-up Yes.     Comments: IS goal met IS turned to nursing    Electronically signed by Debbie Pressley on 9/15/2022 at 4:25 PM+

## 2022-09-15 NOTE — PROGRESS NOTES
[facility] Department   Phone: [phone]    Physical Therapy    [] Initial Evaluation            [x] Daily Treatment Note         [] Discharge Summary      Patient: Jerrica Key   : 1945   MRN: [id number]   Date of Service:  9/15/2022  Admitting Diagnosis: Metabolic encephalopathy  Current Admission Summary: 65-year-old male with a history of COPD, diabetes, gout, hypertension, and STEF who was admitted on 9/3 with altered mental status. CT head was unremarkable for acute findings. 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 Echo with a normal EF and positive bubble study. Cardiology evaluated and suggested a 30-day monitor and outpatient work-up. He was evaluated by therapy and suggested to continue in an inpatient setting prior to returning home. He is strongly interested in returning to home where he lives alone and was caring for himself independently. Past Medical History:  has a past medical history of Anemia, COPD (chronic obstructive pulmonary disease) ([address] Diabetes mellitus ([address] Edema, GI (gastrointestinal bleed), Gout, Hypertension, Morbid obesity ([address] Neuropathy, Obstructive sleep apnea, and Peripheral vascular disease ([address] Past Surgical History:  has a past surgical history that includes Appendectomy; Total hip arthroplasty; and Colonoscopy (N/A, 2019).   Discharge Recommendations: Home with Home Health PT S3  DME Required For Discharge: patient has all required DME for discharge  Precautions/Restrictions: high fall risk  Weight Bearing Restrictions: no restrictions  [] Right Upper Extremity  [] Left Upper Extremity [] Right Lower Extremity  [] Left Lower Extremity     Required Braces/Orthotics: no braces required   [] Right  [] Left  Positional Restrictions:no positional restrictions    Pre-Admission Information   Lives With: alone   --daughter lives close by and can assist but works full time as an RN in wound care Apparatus: [employer] heart monitor  Assistance: stand by assist  Distance: 209'   Gait Mechanics: decreased roxana, decreased step through, mild path deviation to the R with cues for obstacle avoidance, widened ZACH, antalgic gait with increasing R knee pain, 1 standing rest break due to knee pain   Comments:  O2 82% on 6L with 1 minute recovery to 90%    Stair Mobility  Stair mobility not completed on this date. Comments: 6L O2, one step at a time. O2 sats 81% with 1 minute recovery. Wheelchair Mobility:  No w/c mobility completed on this date. Comments:  Balance  Static Sitting Balance: fair (+): maintains balance at SBA/supervision without use of UE support  Dynamic Sitting Balance: fair (+): maintains balance at SBA/supervision without use of UE support  Static Standing Balance: fair (-): maintains balance at CGA with use of UE support  Dynamic Standing Balance: fair (-): maintains balance at CGA with use of UE support  Comments:    Other Therapeutic Interventions  Performed ambulation as documented above. Performed seated stepper X 6 minutes. Performed seated LAQ X 10 B during active rest break. Performed TUG as documented below. Introduced RW to decrease knee pain. Pt ambulated 10'X2 with RW with decrease in knee pain and less antalgic gait noted. Performed dynamic standing task with reaching ipsilateral and contralateral X 15 reps each with increase in WOB noted. Pt required extended rest breaks for recovery. Pt with frequent drop in O2 sats to low 80s with activity with rest break recovery to 90%. Pt ambulated 209' back to room with RW and SBA. Pt did not require rest break due to knee pain. Increased distance from RW noted with fatigued and required vc to increase RW safety. Pt remained in recliner with alarm on and all needs in reach.         Functional Outcomes             33.8 seconds with walking stick and 6L O2    Cognition  Overall Cognitive Status: Impaired  Arousal/Alterness: appropriate responses to stimuli  Following Commands: follows one step commands with repetition, follows one step commands with increased time  Safety Judgement: decreased awareness of need for assistance  Insights: decreased awareness of deficits  Initiation: requires cues for some  Sequencing: requires cues for some  Orientation:    alert and oriented x 4  Command Following:   accurately follows one step commands    Education  Barriers To Learning: cognition  Patient Education: patient educated on goals, PT role and benefits, plan of care, general safety, functional mobility training, proper use of assistive device/equipment, energy conservation, transfer training, discharge recommendations  Learning Assessment:  patient verbalizes understanding, would benefit from continued reinforcement    Assessment  Activity Tolerance: poor activity tolerance compared to baseline, see gait analysis for SPO2 saturation changes with functional mobility. Patient presents with decreased insight into decreased SPO2 saturation requires cues for rest breaks, unable to self initiate rest at this time. Physically SOB and diaphoretic during activity though patient declining fatigue. Impairments Requiring Therapeutic Intervention: decreased functional mobility, decreased strength, decreased safety awareness, decreased cognition, decreased endurance, decreased balance  Prognosis: good  Clinical Assessment: Pt continues with decreased safety awareness regarding balance and low O2 levels with activity. Pt also with decreased attention to O2 line placement, increasing fall risk. Pt with mild improvement in endurance and required 1 standing rest break during ambulation trial.  Per TUG, pt scores at a high fall risk. Pt will continue to benefit from skilled PT services to address deficits and return to PLOF.   Safety Interventions: patient left in chair, chair alarm in place, call light within reach, and nurse notified    Plan  Frequency: 5 x/week, 60 min/day  Current Treatment Recommendations: strengthening, balance training, functional mobility training, transfer training, gait training, stair training, endurance training, patient/caregiver education, home exercise program, safety education, and equipment evaluation/education    Goals  Patient Goals: to go home   Short Term Goals:  Time Frame: to bet met in 7-10 days  Patient will complete stand step transfer at [facility]   Patient will ambulate 200 ft with use of LRAD at [facility]  Patient will ascend/descend 1 stairs with (R) ascending handrail at modified independent  Patient will complete car transfer at [facility]    Therapy Session Time      Individual Group Co-treatment   Time In  1315       Time Out  1415       Minutes  60           Timed Code Treatment Minutes:   60 minutes  Total Treatment Minutes:  60 minutes       Electronically Signed By: Claudette Smyth PT

## 2022-09-15 NOTE — PROGRESS NOTES
[facility]   Phone: [phone]    Speech Therapy    [] Initial Evaluation            [x] Daily Treatment Note         [] Discharge Summary      Patient: Treasure Habermann   : 1945   MRN: [id number]          Admitting Diagnosis: Metabolic encephalopathy  Treatment Diagnosis:  Receptive Aphasia , Expressive Aphasia , and Cognitive-Linguistic Deficits  (suspect cognitive-linguistic deficits but unable to fully determine due to aphasia)   Rehab Admission Date: 22  Precautions/Restrictions: Fall Risk, O2 dependent (2-3L at baseline)       Pre-Admission Information   Living Status: Lives alone  Occupation/School: ,    Medication Management: :  [x]Primary   []Secondary []No  Finance Management:          [x]Primary   []Secondary []No  Active :                        []Yes         [x]No  Hearing:                                 [email or web address]  Vision:                                    Vision Gross Assessment: WFL; Wears reading glasses     Daily Treatment Info:   Date: 9/15/2022     Tx session     Pain Pt denied pain    Subjective     Pt sitting upright in chair, alert and attentive to session tasks and conversations. Pt did require redirection back to tasks when off topic with conversation. Pt remains on 5L of O2 via [place]. MD aware of increasing O2 requirements. Per MD lungs clear per CXR completed 22:  Impression   Chronic COPD. Increased density in the left base may represent atelectasis   and/or consolidation. Minimal linear atelectasis right base. No active   pleural disease. Objective:  Goals    Goal 1: Pt will tolerate recommended diet without overt s/s of aspiration. Goal Met     GOAL MET    Goal 1: Pt will complete auditory comprehension tasks with 80% accuracy. Progressing, continue    Following 2-step verbal directions for written tasks  - Pt required segmentation of 2-step tasks to be divided into 1-step directions.  Each step had two components. - Pt completed 1-step tasks with two components with 80% accuracy with min cues for redirection to individual components and repetitions     Following 2-step verbal directions   - Pt able to complete 8/13 directions (62%) with mod cues for redirection to task requirements and repetition of directions. - Despite SLP noting pt should wait to perform task after completion of verbal directions, pt would perform task directions during verbal prompting causing challenges remember second step of direction    Yes/No Comprehension Questions (factual and comparative)   - Pt answered 19/20 (95%) questions requiring min cues consisting of repeating and/or rewording prompt. - Pt utilized immediate verbal repetition of question for processing   - During task, pt tangential discussing stories of [place] traditions. Pt went off topic during session/ task x 5 when he went into telling stories. Goal 2: Pt will complete expressive language tasks with 80% accuracy or min cues. Progressing, continue    Instances of anomia/ word finding difficulty present throughout session.   - Pt used circumlocution for word finding x8 independently and x11 with min cuing from SLP (pt benefited from phonemic cueing and use of carrier phrases)   - Pt mentioned \"Everything's on the tongue (tip) of my tongue\" when describing his difficulty with word finding     Provided ongoing education to pt re: aphasia \"tip of the tongue feeling\" and use of circumlocution strategy to utilize to repair communication breakdowns. Pt will complete graded fx problem solving tasks >80% acc  min cues.      Medicine Management Wh- Questions  - Pt given medicine bottle to read and answer prompted questions; completed task with 71% accuracy needing min-mod cues for redirection    - Pt presented with problem solving questions about medicine organizer and correctly answered 3/4 questions (75%) with mod cues for redirection to task and rewording of prompts. Functional Verbal Problem solving   - Pt identified solution including rationale for given functional problem with 100% accuracy (5/5) with min cues; pt prompted for expansion of 3/5 responses      Goal 4: Patient will be instructed in memory strategies to utilize in order to promote recall of newly learned information with >80% min cues. Progressing, continue    Functional recall  - Recall medical situation and rationale for being in the hospital.   - Recalled wife's medical history     Orientation   - Pt oriented to CINDY, date and overall situation     Goal 5: Pt will demonstrate use of visual strategies for visual language processing with 80% accuracy or min cues. Progressing, continue    Goal not targeted this session. Other areas targeted:    Education:   Provided education regarding rationale for tasks and plan of care. Pt verbalized understanding   Pt requires ongoing learning    Safety Devices: [x] Call light within reach  [x] Chair alarm activated  [] Bed alarm activated  [] Other:           Assessment/Progress/Discharge:   Weekly Update 9/13/22:  Diagnosis: Pt continues with Receptive and Expressive Aphasia characterized by frequent semantic and phonemic paraphasias, anomia, difficulty with repetition of phrase and sentence length information, difficulty answering mildly complex yes/no and wh-questions, reduced utterance length and reduced ability to follow 2+ step auditory directions. Pt benefits from repetition, simplification and visual cues for improved comprehension and processing. Suspect cognitive-linguistic deficits but unable to fully assess due to aphasia. Pt is independent at discharge and is anxious to get back to his PLOF. Discussed assistance and supports recommended at d/c with pt stating he does not want to feel like a burden to his children. Pt with lack of insight into how his current deficits will impact him in his daily life post d/c.  Continue with speech therapy services to improve language and cognitive skills to ensure a safe return back to his prior level of functioning. Current Diet Order:   ADULT DIET; Regular; 5 carb choices (75 gm/meal)            Plan:     Frequency:  5days/week   60 minutes/day    Discharge Recommendations:   Barriers: Limited family support, Cognitive deficit, Limited safety awareness, Limited insight into deficits, Communication deficit, Decreased endurance, and Medication managment  Discharge Recommendations:  TBD   Continued SLP Treatment:  Yes   Estimated discharge date: 9/21/22    Session Time:    Individual Concurrent Group Co-treatment   Time In 0900      Time Out 1000      Time Code Minutes  25      Minutes 60        Timed Code Treatment Minutes: 25  Total Treatment Minutes: 60     Electronically Signed by     Vergie Kehr M. A. CCC-SLP #[id number] 9/15/2022 2:16 PM  Speech-Language Pathologist    The speech-language pathologist was present, directed the patient's care, made skilled judgment and was responsible for assessment and treatment.     Nelly Peter.,  Speech-Language Pathology

## 2022-09-15 NOTE — PLAN OF CARE
Problem: Discharge Planning  Goal: Discharge to home or other facility with appropriate resources  Outcome: Progressing  Flowsheets (Taken 9/15/2022 0830)  Discharge to home or other facility with appropriate resources: Identify barriers to discharge with patient and caregiver     Problem: Safety - Adult  Goal: Free from fall injury  Outcome: Progressing     Problem: ABCDS Injury Assessment  Goal: Absence of physical injury  Outcome: Progressing     Problem: Pain  Goal: Verbalizes/displays adequate comfort level or baseline comfort level  Outcome: Progressing  Flowsheets (Taken 9/15/2022 0830)  Verbalizes/displays adequate comfort level or baseline comfort level: Encourage patient to monitor pain and request assistance     Problem: Chronic Conditions and Co-morbidities  Goal: Patient's chronic conditions and co-morbidity symptoms are monitored and maintained or improved  Outcome: Progressing  Flowsheets (Taken 9/15/2022 0830)  Care Plan - Patient's Chronic Conditions and Co-Morbidity Symptoms are Monitored and Maintained or Improved: Monitor and assess patient's chronic conditions and comorbid symptoms for stability, deterioration, or improvement

## 2022-09-16 PROCEDURE — 97129 THER IVNTJ 1ST 15 MIN: CPT

## 2022-09-16 PROCEDURE — 1280000000 HC REHAB R&B

## 2022-09-16 PROCEDURE — 94640 AIRWAY INHALATION TREATMENT: CPT

## 2022-09-16 PROCEDURE — 2700000000 HC OXYGEN THERAPY PER DAY

## 2022-09-16 PROCEDURE — 2580000003 HC RX 258: Performed by: PHYSICAL MEDICINE & REHABILITATION

## 2022-09-16 PROCEDURE — 6370000000 HC RX 637 (ALT 250 FOR IP): Performed by: INTERNAL MEDICINE

## 2022-09-16 PROCEDURE — 92507 TX SP LANG VOICE COMM INDIV: CPT

## 2022-09-16 PROCEDURE — 97110 THERAPEUTIC EXERCISES: CPT

## 2022-09-16 PROCEDURE — 97535 SELF CARE MNGMENT TRAINING: CPT

## 2022-09-16 PROCEDURE — 6370000000 HC RX 637 (ALT 250 FOR IP): Performed by: PHYSICAL MEDICINE & REHABILITATION

## 2022-09-16 PROCEDURE — 97530 THERAPEUTIC ACTIVITIES: CPT

## 2022-09-16 PROCEDURE — 94761 N-INVAS EAR/PLS OXIMETRY MLT: CPT

## 2022-09-16 PROCEDURE — 99223 1ST HOSP IP/OBS HIGH 75: CPT | Performed by: INTERNAL MEDICINE

## 2022-09-16 PROCEDURE — 97130 THER IVNTJ EA ADDL 15 MIN: CPT

## 2022-09-16 PROCEDURE — 6360000002 HC RX W HCPCS: Performed by: PHYSICAL MEDICINE & REHABILITATION

## 2022-09-16 RX ADMIN — PREDNISONE 5 MG: 5 TABLET ORAL at 11:59

## 2022-09-16 RX ADMIN — GABAPENTIN 300 MG: 300 CAPSULE ORAL at 15:46

## 2022-09-16 RX ADMIN — LISINOPRIL 2.5 MG: 5 TABLET ORAL at 11:58

## 2022-09-16 RX ADMIN — METOPROLOL SUCCINATE 12.5 MG: 25 TABLET, EXTENDED RELEASE ORAL at 11:59

## 2022-09-16 RX ADMIN — ALLOPURINOL 200 MG: 100 TABLET ORAL at 11:58

## 2022-09-16 RX ADMIN — Medication 10 ML: at 20:43

## 2022-09-16 RX ADMIN — ASPIRIN 81 MG 81 MG: 81 TABLET ORAL at 11:58

## 2022-09-16 RX ADMIN — Medication 2 PUFF: at 06:01

## 2022-09-16 RX ADMIN — GABAPENTIN 300 MG: 300 CAPSULE ORAL at 20:43

## 2022-09-16 RX ADMIN — IPRATROPIUM BROMIDE AND ALBUTEROL SULFATE 1 AMPULE: 2.5; .5 SOLUTION RESPIRATORY (INHALATION) at 20:48

## 2022-09-16 RX ADMIN — Medication 2 PUFF: at 20:48

## 2022-09-16 RX ADMIN — GABAPENTIN 300 MG: 300 CAPSULE ORAL at 11:58

## 2022-09-16 RX ADMIN — Medication 10 ML: at 11:55

## 2022-09-16 RX ADMIN — ROSUVASTATIN 20 MG: 20 TABLET, FILM COATED ORAL at 20:43

## 2022-09-16 RX ADMIN — FUROSEMIDE 60 MG: 40 TABLET ORAL at 11:58

## 2022-09-16 RX ADMIN — Medication 100 MG: at 11:58

## 2022-09-16 RX ADMIN — TIOTROPIUM BROMIDE INHALATION SPRAY 2 PUFF: 3.12 SPRAY, METERED RESPIRATORY (INHALATION) at 12:16

## 2022-09-16 RX ADMIN — ENOXAPARIN SODIUM 30 MG: 100 INJECTION SUBCUTANEOUS at 11:57

## 2022-09-16 RX ADMIN — IPRATROPIUM BROMIDE AND ALBUTEROL SULFATE 1 AMPULE: 2.5; .5 SOLUTION RESPIRATORY (INHALATION) at 12:16

## 2022-09-16 RX ADMIN — ENOXAPARIN SODIUM 30 MG: 100 INJECTION SUBCUTANEOUS at 20:44

## 2022-09-16 RX ADMIN — FUROSEMIDE 60 MG: 40 TABLET ORAL at 17:56

## 2022-09-16 RX ADMIN — IPRATROPIUM BROMIDE AND ALBUTEROL SULFATE 1 AMPULE: 2.5; .5 SOLUTION RESPIRATORY (INHALATION) at 06:00

## 2022-09-16 NOTE — PROGRESS NOTES
Pt's daughter brought home cpap. She asked many medical questions. Answered all of her questions. She verbalized understanding.

## 2022-09-16 NOTE — CARE COORDINATION
SW attended family meeting with the patient, patient's daughter, Judge Palmer and patient's son-n-law. Patient will be discharging to home with 24/7 family support, home health care and recommended DME. Patient's daughter will follow-up with the [facility] and refer patient for services. Patient/family's main concern is a need for transportation to appointments. Patient is presently receiving home O2 but does not remember who provides this service. Patient's daughter will find out who provides patient's O2. Patient/daughter requesting that patient be referred to [facility] for home health care PT/OT/SLP/Nursing/Aide. SW will refer patient. Therapies are recommending a Bariatric Rolling Walker for patient. Patient requesting that [facility] provide. VERONICA will continue to follow-up until patient's discharge is complete.     Electronically signed by FRANCA Jackson on 9/16/2022 at 1:55 PM

## 2022-09-16 NOTE — PROGRESS NOTES
Daughter Dragan Carter called and requested to bring in patient's BiPAP; patient refusing to use facility provided BiPAP.

## 2022-09-16 NOTE — PROGRESS NOTES
touching assistance  Toilet Hygiene: Independent    Speech Therapy    Pt continues with Receptive and Expressive Aphasia characterized by frequent semantic and phonemic paraphasias, anomia, difficulty with repetition of phrase and sentence length information, difficulty answering mildly complex yes/no and wh-questions, reduced utterance length and reduced ability to follow 2+ step auditory directions. Pt benefits from repetition, simplification and visual cues for improved comprehension and processing. Suspect cognitive-linguistic deficits but unable to fully assess due to aphasia. Pt is independent at discharge and is anxious to get back to his PLOF. Discussed assistance and supports recommended at d/c with pt stating he does not want to feel like a burden to his children. Pt with lack of insight into how his current deficits will impact him in his daily life post d/c. Continue with speech therapy services to improve language and cognitive skills to ensure a safe return back to his prior level of functioning. Body mass index is 42.97 kg/m².     Assessment:  Patient Active Problem List   Diagnosis    HTN (hypertension), benign    Syncope    DM (diabetes mellitus), type 2, uncontrolled with complications (HCC)    Arthritis    Chronic respiratory failure (HCC)    STEF (obstructive sleep apnea)    Venous insufficiency    Acute sinusitis    Morbid obesity ([address]    Vitamin D deficiency    Open back wound    COPD, severe ([address]    HLD (hyperlipidemia)    Diastolic dysfunction    Aneurysm of abdominal aorta (HCC)    Personal history of tobacco use, presenting hazards to health    Encephalopathy    Altered mental status    Cerebrovascular accident (CVA) due to thrombosis of left middle cerebral artery (HCC)    Acute on chronic respiratory failure with hypoxia and hypercapnia (HCC)    Cerebrovascular accident (CVA) ([address]    Chronic heart failure with preserved ejection fraction (HCC)    Ventricular ectopy    Leg swelling TGA (transient global amnesia)    Metabolic encephalopathy       Plan:   Acute left temporal lobe infarct  - ASA, statin. PT/OT/SLP     Chronic hypoxia/hypercapnia  - NIV/AVAPs ventilation to be organized at the time of discharge. Not using BiPAP overnight     COPD  - inhalers as ordered. - Pulm consult     PFO/ASD  - Outpatient follow up. [facility]     HTN  - lisinopril 2.5, toprol 12.5     DM  - DM diet. consider resumption of metformin if needed     EOH abuse  - no evidence of withdrawal    BLE edema  - PO 40 BID increase to 60 BID PO.  - s/p 40 lasix x1    Bowel: Per protocol  Bladder: Per protocol  Sleep: Trazodone PRN  Pain: tylenol PRN  DVT PPx: lovenox  PAULINA: 9/21    Madiha Gan MD 9/16/2022, 7:49 AM    * This document was created using dictation software. While all precautions were taken to ensure accuracy, errors may have occurred. Please disregard any typographical errors.

## 2022-09-16 NOTE — CONSULTS
PULMONARY AND CRITICAL CARE MEDICINE CONSULTATION NOTE    CONSULTING PHYSICIAN:  Martha French MD    ADMISSION DATE: 9/9/2022  ADMISSION DIAGNOSIS: Metabolic encephalopathy [J42.25]    REASON FOR CONSULT: Shortness of breath and hypoxia      DATE OF CONSULT: 9/9/2022    HISTORY OF PRESENT ILLNESS: 78y.o. year old male with a past medical history significant for severe COPD on home oxygen at 3 L/min, STEF on CPAP at home, previous history of tobacco abuse and alcohol abuse who had initially presented to the hospital with altered mental status. On MRI brain it was seen that he has any acute left temporal lobe CVA. No neurological deficits. Patient is undergoing rehab in the ARU. Pulmonary has been consulted for persistent hypoxia exertional shortness of breath. At the time of interview patient sitting in chair in no apparent respiratory distress. Reports that his breathing is stable. At the time of interview he is at 5 L/min of oxygen supplementation saturating in mid 90s. Denies any discolored expectoration, chest pain or chest tightness. Was given BiPAP therapy at nighttime which patient completely disliked and was very frustrated with it. He likes his CPAP therapy. Able to speak in sentences without any difficulty. REVIEW OF SYSTEMS:     CONSTITUTIONAL SYMPTOMS: The patient denies fever, fatigue, night sweats, weight loss or weight gain. HEENT: No vision changes. No tinnitus, Denies sinus pain. No hoarseness, or dysphagia. NECK: Patient denies swelling in the neck. CARDIOVASCULAR: Denies chest pain, palpitation, syncope. RESPIRATORY: As per HPI. GASTROINTESTINAL: Denies nausea, abdominal pain or change in bowel function. GENITOURINARY: Denies obstructive symptoms. No history of incontinence. BREASTS: No masses or lumps in the breasts. SKIN: No rashes or itching. MUSCULOSKELETAL: Denies weakness or bone pain. NEUROLOGICAL: No headaches or seizures.    PSYCHIATRIC: Denies mood swings or depression. ENDOCRINE: Denies heat or cold intolerance or excessive thirst.  HEMATOLOGIC/LYMPHATIC: Denies easy bruising or lymph node swelling. ALLERGIC/IMMUNOLOGIC: No environmental allergies. PAST MEDICAL HISTORY:   Past Medical History:   Diagnosis Date    Anemia     COPD (chronic obstructive pulmonary disease) (HCC)     Diabetes mellitus (HCC)     Edema     GI (gastrointestinal bleed)     Gout     Hypertension     Morbid obesity (HCC)     Neuropathy     Obstructive sleep apnea     uses CPAP    Peripheral vascular disease ([facility] [address]        PAST SURGICAL HISTORY:   Past Surgical History:   Procedure Laterality Date    APPENDECTOMY      COLONOSCOPY N/A 5/7/2019    COLONOSCOPY DIAGNOSTIC performed by Mayra Lomas MD at [facility]       SOCIAL HISTORY:   Social History     Tobacco Use    Smoking status: Former     Packs/day: 1.00     Years: 50.00     Pack years: 50.00     Types: Cigarettes     Quit date: 2/1/2012     Years since quitting: 10.6    Smokeless tobacco: Never    Tobacco comments:     H.O. smoking 1 p.p.d. Quit 9 months ago    Vaping Use    Vaping Use: Never used   Substance Use Topics    Alcohol use: Yes     Alcohol/week: 14.0 standard drinks     Types: 14 Shots of liquor per week     Comment: 3 drinks a evening    Drug use: Never       FAMILY HISTORY:   Family History   Problem Relation Age of Onset    High Blood Pressure Mother     High Cholesterol Mother     Heart Disease Mother     Stroke Mother     Diabetes Mother     Depression Mother     Mental Illness Mother     Cancer Father        MEDICATIONS:     No current facility-administered medications on file prior to encounter.      Current Outpatient Medications on File Prior to Encounter   Medication Sig Dispense Refill    aspirin 81 MG chewable tablet Take 1 tablet by mouth daily 30 tablet 3    rosuvastatin (CRESTOR) 20 MG tablet Take 1 tablet by mouth nightly 30 tablet 2 metoprolol succinate (TOPROL XL) 25 MG extended release tablet Take 0.5 tablets by mouth daily 30 tablet 1    gabapentin (NEURONTIN) 300 MG capsule TAKE ONE CAPSULE BY MOUTH THREE TIMES A DAY 90 capsule 3    furosemide (LASIX) 40 MG tablet TAKE ONE TABLET BY MOUTH TWICE A  tablet 1    predniSONE (DELTASONE) 5 MG tablet TAKE ONE TABLET BY MOUTH DAILY 90 tablet 1    allopurinol (ZYLOPRIM) 100 MG tablet TAKE TWO TABLETS BY MOUTH DAILY 180 tablet 1    [DISCONTINUED] celecoxib (CELEBREX) 100 MG capsule Take 1 capsule by mouth 2 times daily 60 capsule 5    BREO ELLIPTA 200-25 MCG/INH AEPB inhaler INHALE ONE DOSE BY MOUTH DAILY 1 each 11    ACCU-CHEK CLARISSA PLUS strip USE ONE STRIP TO TEST DAILY 50 strip 5    ipratropium-albuterol (DUONEB) 0.5-2.5 (3) MG/3ML SOLN nebulizer solution INHALE ONE VIAL (3MLS) VIA NEBULIZATION BY MOUTH FOUR TIMES A  mL 11    albuterol sulfate  (90 Base) MCG/ACT inhaler INHALE TWO PUFFS BY MOUTH EVERY 6 HOURS AS NEEDED 18 g 11    metFORMIN (GLUCOPHAGE) 1000 MG tablet TAKE ONE TABLET BY MOUTH TWICE A DAY WITH MEALS 180 tablet 3    lisinopril (PRINIVIL;ZESTRIL) 2.5 MG tablet TAKE ONE TABLET BY MOUTH DAILY 90 tablet 3    Accu-Chek Softclix Lancets MISC USE ONE LANCET TO TEST DAILY 100 each 2    [DISCONTINUED] diclofenac (VOLTAREN) 75 MG EC tablet TAKE ONE TABLET BY MOUTH TWICE A  tablet 0    Blood Glucose Monitoring Suppl (ACCU-CHEK CLARISSA PLUS) w/Device KIT 1 each by Does not apply route daily 1 kit 0    acetaminophen (TYLENOL) 325 MG tablet Take 650 mg by mouth every 6 hours as needed for Pain          furosemide  60 mg Oral BID    ipratropium-albuterol  1 ampule Inhalation 4x daily    lisinopril  2.5 mg Oral Daily    sodium chloride flush  5-40 mL IntraVENous 2 times per day    mometasone-formoterol  2 puff Inhalation BID    thiamine mononitrate  100 mg Oral Daily    rosuvastatin  20 mg Oral Nightly    aspirin  81 mg Oral Daily    allopurinol  200 mg Oral Daily have exertional dyspnea. Chest imaging done 2 days ago was personally reviewed by me and it only shows bibasilar atelectasis. He does have a history of STEF and uses CPAP therapy at nighttime. Currently not using hospital BiPAP therapy as it is drying him out. I have advised the patient to get his family member bring his home CPAP therapy so that he can use it. Using positive airway pressure would help reduce the atelectasis and may improve his overall respiratory status. During the interview I reduced his oxygen supplementation to 3.5 L/min with patient saturating in low to mid 90s. Continue to titrate the flow to maintain SPO2 between 88 to 92%. Continue with Dulera twice a day. I will add Spiriva Respimat to the regimen. Can get DuoNeb as needed. Encourage incentive spirometry. Thank you for your consultation. We will continue to follow the patient. Alejandrina Abdi MD  Pulmonary Critical Care and Sleep Medicine  9/9/2022, 10:29 AM    This note was completed using dragon medical speech recognition software. Grammatical errors, random word insertions, pronoun errors and incomplete sentences are occasional consequences of this technology due to software limitations. If there are questions or concerns about the content of this note of information contained within the body of this dictation they should be addressed with the provider for clarification.

## 2022-09-16 NOTE — PROGRESS NOTES
[facility],[address] Msb Department   Phone: [phone]    Occupational Therapy    [] Initial Evaluation            [x] Daily Treatment Note         [] Discharge Summary      Patient: Jesse Clements   : 1945   MRN: [id number]   Date of Service:  2022    Admitting Diagnosis:  Metabolic encephalopathy/CVA  Current Admission Summary: 71-year-old male with a history of COPD, diabetes, gout, hypertension, and STEF who was admitted on 9/3 with altered mental status. CT head was unremarkable for acute findings. 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 Echo with a normal EF and positive bubble study. Cardiology evaluated and suggested a 30-day monitor and outpatient work-up. He was evaluated by therapy and suggested to continue in an inpatient setting prior to returning home. He is strongly interested in returning to home where he lives alone and was caring for himself independently. Past Medical History:  has a past medical history of Anemia, COPD (chronic obstructive pulmonary disease) ([address] Diabetes mellitus ([address] Edema, GI (gastrointestinal bleed), Gout, Hypertension, Morbid obesity ([address] Neuropathy, Obstructive sleep apnea, and Peripheral vascular disease ([address] Past Surgical History:  has a past surgical history that includes Appendectomy; Total hip arthroplasty; and Colonoscopy (N/A, 2019). Discharge Recommendations:need for 24 hour assist/intermittent checks, HHOT    DME Required For Discharge: pt reports he owns a shower chair and reacher, requested a sock aid - has 02 concentrator but will require new 02 equipment due to increased 02 needs     Precautions/Restrictions: high fall risk, .   Comment: DNR-CC, regular diet   Weight Bearing Restrictions: no restrictions  [] Right Upper Extremity  [] Left Upper Extremity [] Right Lower Extremity  [] Left Lower Extremity     Required Braces/Orthotics: no braces required   [] Right  [] Left  Positional Restrictions:no positional restrictions    Pre-Admission Information - copied over from acute care notes - pt's word finding difficulties limit ability to explain home set up    Lives With: alone                     Type of Home: house  Home Layout: two level, able to live on main level  Home Access:  1 step to enter with handrail. Handrails are located on R side. Bathroom Layout: walk in shower  Bathroom Equipment: grab bars in shower, shower chair, hand held shower head  Toilet Height: elevated height  Home Equipment: rolling walker, single point cane, manual wheelchair, reacher, sock aide, long handled shoe horn, oxygen, . Comment: transport w/c, on 3L O2 all the time, even though Dr. Maia Mccoy recommended 2 L  (wooden cane)  Transfer Assistance: Independent without use of device  Ambulation Assistance:modified independent with use of SPC; uses walker when work in garden  ADL Assistance: independent with all ADL's  IADL Assistance: independent with homemaking tasks  Active :        [x] Yes                 [] No  Hand Dominance: [] Left                 [x] Right  Hobbies: Enjoys cooking, baking bread, volunteer at [place]. [employer]  Recent Falls: No recent falls  Pt sleeps in a recliner. 2 daughters in area. 1 dtr is an RN and granddaughter         Subjective  General: pt sitting upright in chair on arrival   - 5L 02 at rest this date. Pt agreeable to OT session. Increased to 6L during activity and throughout session to maintain O2 sats above 90s. O2 line twisted several times under chin and pt unaware - impacting air flow and increasing pressure/tension of line around ear, pt c/o rubbing on L ear. Pt requesting to see podiatry this date. Pain: 0/10  Pain Interventions: not applicable     . Activities of Daily Living    Basic Activities of Daily Living  Grooming: stand by assistance  Grooming Comments: in stance at sink for 6 minutes - brushed teeth and shaved face  Upper Extremity Bathing: supervision  Lower Extremity Bathing: stand by assistance   Bathing Equipment: none  Bathing Comments: seated on tub transfer bench - standing PRN for LB bathing - IV covered/waterproofed prior to bathing  Upper Extremity Dressing: supervision  Lower Extremity Dressing: moderate assistance  Dressing Equipment: reacher, sock aide  Dressing Comments: dep for donning compression stockings - use of sock aide to colleen socks with verbal cues, and required mod assistance to rotate R sock due to not being placed on foot properly - difficulty threading LLE through pant leg this date requiring assistance and increased time - seated on tub transfer bench for dressing and standing PRN for LB clothing management over hips with SBA and use of L grab bar  General Comments: Pt educated on line management during ADLs and mobility to prevent falling - pt moved O2 line properly with repeated. Continued education on rest breaks during activity to maintain O2 sats. O2 sats during session tracked below:   95% on 5L at rest at start of tx while seated  84% on 6L after ambulation to bathroom pre- showering (90 seconds to increase to 93%)  85% on 6L after shower and LB dressing (pt encouraged to take a rest break (90 seconds seated to increase to 93%)  93% on 6L after standing at bathroom sink for 6 minutes and ambulating back to chair  O2 decreased back to 5L while seated and O2 stabilized  Pt donned socks second time with sock aid causing O2 to drop to 87% on 5L (~90 seconds to recover to 92%)  Comments: Pt had labored breathing after showering and dressing. D/t to decrease in O2 while seating for donning socks, recommended to keep pt on 6L for all activity. Instrumental Activities of Daily Living  Health Management: moderate assistance.   Health Management Comments: limited safety awareness of O2 line during mobility - education on proper placement of lines, rest breaks with cuing to maintain O2 sats; re-educated on fluid intake handout provided 9/15; pt encouraged to use scale provided in room and wrote today's weight (327 lbs) on the weight column of health management log. Comment: Continued reinforcement for all health management tasks due to limited carry over and inconsistencies for demonstrating proper safety for health    Functional Mobility  Bed Mobility  Bed mobility not completed on this date. Transfers  Sit to stand transfer:stand by assistance  Stand to sit transfer: stand by assistance  Bed / Chair transfer: stand by assistance. Comments: use of cane; nasal canula with lines at all times    Functional Mobility:  Sitting Balance: supervision. Sitting Balance Comment: requires cues to sit further back in seat - prefers to sit on the edge of recliner causing increase weight to be beared through BLE    Standing Balance: stand by assistance.     Functional Mobility: .  stand by assistance  Functional Mobility Activity: retrieve items, transport items, to/from bathroom  Functional Mobility Device Use: single point cane  Functional Mobility Comment: occasionally hold O2 line with proper management with cuing - no LOB; retrieved clothing items in room out of  stance  and transported to bathroom, CGA when pt was bending over for clothing         Cognition  Overall Cognitive Status: Impaired  Arousal/Alterness: appropriate responses to stimuli  Following Commands: follows one step commands with increased time  Attention Span: appears intact  Memory: appears intact  Safety Judgement: decreased awareness of need for safety  Problem Solving: assistance required to generate solutions, assistance required to implement solutions  Insights: decreased awareness of deficits  Comment - pt with decreased safety awareness, does not appear to be aware when 02 saturation is dropping and when O2 line is twisted/tingled, difficulty with word finding - receptive language difficulties as well  Pt able to recall day of the week and date of the month this date compared to being unable yesterday. Orientation:    oriented to person, oriented to place, oriented to situation, and disoriented to time   Command Following:   impaired accurately follows one step commands -follows simple one step commands about 75% of time,  following commands improves with visual demonstration     Education  Barriers To Learning: cognition and language  Patient Education: patient educated on goals, OT role and benefits, plan of care, precautions, ADL adaptive strategies  Learning Assessment:  patient verbalizes understanding, would benefit from continued reinforcement, patient will require reinforcement due to cognitive deficits  Family meeting with daughter and SINDI - completed 9/16 with PT, ST and SW also present - family asking appropriate questions and supportive - reports they plan to provide 24 hour supervision initially at home - reviewed pt's current status and safety concerns regarding pt's insight, current 02 needs, safety with 02 line/mobility     Assessment  Activity Tolerance: O2 sats fluctuate during activity and in stance - recommend pt being on 6L of O2 during activity to prevent desaturation - see O2 sats values above during session; heavy breathing during activity but pt reported not feeling symptomatic  Impairments Requiring Therapeutic Intervention: decreased ADL status, decreased endurance, decreased IADL  Prognosis: good  Clinical Assessment: Patient is progressing fairly with fair carryover of learned information from previous sessions, specifically regarding O2 line management during mobility and when at rest. Patient required 6 L of 02 during ADLs and frequent rest breaks encouraged by therapist to maintain 02 saturation above 90s. Patient having increased diffuclty with LB dressing this date for threading legs within pants. Patient is primarily limited by endurance and cognitive deficits.  Pt is continuing to demonstrate deficits with safety awareness and will benefit from OT services to maximize safety and independence in ADLs, transfers and functional mobility prior to returning home. Continue POC. Safety Interventions: patient left in chair, chair alarm in place, and call light within reach    Plan  Frequency: 5 x/week, 60 min/day  Current Treatment Recommendations: strengthening, balance training, functional mobility training, transfer training, endurance training, neuromuscular re-education, patient/caregiver education, ADL/self-care training, and IADL training    Goals  Patient Goals: pt reports being modest and wishes to complete ADLs independently    Short Term Goals:  Time Frame: 7-10 days   Patient will complete upper body ADL at Independent   Patient will complete lower body ADL at modified independent, . Comment with use of AE PRN   Patient will complete toileting at Independent   Patient will complete self-feeding at Independent   Patient will complete grooming at Independent   Patient will complete functional transfers at [facility] independent   Patient will complete functional mobility at modified independent   Patient to gather and transport IADL items at modified independent   Patient will demonstrate ability to perform simple IADL tasks (2-3 step tasks) with modified [place]. Goals not met 9/16      Therapy Session Time     Individual Group Co-treatment   Time In  0830      Time Out  0930      Minutes  60           Timed Code Treatment Minutes:   60    Total Treatment Minutes:  60     Electronically Signed By: JAYNA Richards/YORDY Trejoign supervision/direction Jaren CULLEN/OTIS [id number], 9/16/2022, 1:39 PM

## 2022-09-16 NOTE — PROGRESS NOTES
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 Handrails are located on R side. Bathroom Layout: walk in shower  Bathroom Equipment: grab bars in shower, shower chair, hand held shower head  Toilet Height: elevated height  Home Equipment: rolling walker, single point cane, manual wheelchair, reacher, sock aide, long handled shoe horn, oxygen, wooden cane. Comment: transport w/c, on 3L O2 all the time, even though Dr. Sal Rizzo recommended 2 L  Transfer Assistance: Independent without use of device  Ambulation Assistance:modified independent with use of SPC; uses walker when work in garden  ADL Assistance: independent with all ADL's  IADL Assistance: independent with homemaking tasks  Active :        [x] Yes                 [] No  Hand Dominance: [] Left                 [x] Right  Hobbies: Enjoys cooking, baking bread, volunteer at [place]. [employer]  Recent Falls: No recent falls  Pt sleeps in a recliner. 2 daughters in area. 1 dtr is an RN and granddaughter becoming an RN. Per patient daughter reports that if patient is debilitated post rehabilitation that he could move in with her, living area in basement. Subjective  General: Patient is seated in recliner chair upon PT entry. States he slept better last night. Pt is aware of family meeting today. Pain: 0/10  Pain Interventions: patient denies pain interventions       Functional Mobility  Bed Mobility  Bed mobility not completed on this date. Comments: Patient sleeps in recliner chair. Transfers  Sit to stand transfer: stand by assistance  Stand to sit transfer: stand by assistance  Stand step transfer: stand by assistance  Comments: All transfers completed with use of walking stick as patient states this is home device. Pt requires assistance with O2 line during stand step transfer. Ambulation  Surface:level surface  Assistive Device: RW  Other Apparatus: MCOT heart monitor  Assistance: stand by assist  Distance: 209'   Gait Mechanics: decreased roxana, decreased step through, mild path deviation to the R with cues for obstacle avoidance, widened ZACH, antalgic gait with increasing R knee pain, pt with subjective decrease in knee pain  Comments:  O2 82%  on 6L with 1 minute recovery to 90%  Ambulation Trial 2  Surface:carpet  Assistive Device: rolling walker  Assistance: stand by assistance  Distance: 10'  Gait Mechanics: same as trial 1  Comments:        Stair Mobility  Number of Steps: 12  Step Height: 4 inch  Hand Rails: (B) handrail  Assistance: stand by assistance  Comments: 6L O2, one step at a time. O2 sats 79% with 3 minute recovery. Wheelchair Mobility:  No w/c mobility completed on this date. Comments:  Balance  Static Sitting Balance: fair (+): maintains balance at SBA/supervision without use of UE support  Dynamic Sitting Balance: fair (+): maintains balance at SBA/supervision without use of UE support  Static Standing Balance: fair (-): maintains balance at SBA with use of UE support  Dynamic Standing Balance: fair (-): maintains balance at SBA with use of UE support  Patient completes object retrieval from floor in standing position at stand by assistance with RW and reacher  Comments:    Other Therapeutic Interventions  Performed ambulation as documented above. Performed seated stepper X 6 minutes. 2nd session:  Pt in room on arrival with multiple family members present. Pt ambulated 200' with RW on 6L O2 with same gait pattern as above. Performed car transfer with SBA and extensive vc to get into car at SUV height and modA to get out of car. Pt with significant SOB with transfer. Pt ambulated 80' with RW to gym. O2 sats 81% with 2 minute recovery. Performed standing marching X 20 with RW and B heel raises X 20 with RW. Following heel raises, O2 91% on 6L. Pt returned to room and remained in recliner with alarm on and all needs in reach. Pt voicing significant fatigue following session. Functional Outcomes             33.8 seconds with walking stick and 6L O2    Cognition  Overall Cognitive Status: Impaired  Arousal/Alterness: appropriate responses to stimuli  Following Commands: follows one step commands with repetition, follows one step commands with increased time  Safety Judgement: decreased awareness of need for assistance  Insights: decreased awareness of deficits  Initiation: requires cues for some  Sequencing: requires cues for some  Orientation:    alert and oriented x 4  Command Following:   accurately follows one step commands    Education  Barriers To Learning: cognition  Patient Education: patient educated on goals, PT role and benefits, plan of care, general safety, functional mobility training, proper use of assistive device/equipment, energy conservation, transfer training, discharge recommendations  9/16/22:  Participated in 45 minute family conference. Reviewed current physical status and barriers to safe return home. Daughter present and voiced understanding. Provided recommendation for 24 hour supervision due to pt taking off O2 line or not managing O2 line and creating a fall hazard. Learning Assessment:  patient verbalizes understanding, would benefit from continued reinforcement    Assessment  Activity Tolerance: poor activity tolerance compared to baseline, see gait analysis for SPO2 saturation changes with functional mobility. Patient presents with decreased insight into decreased SPO2 saturation requires cues for rest breaks, unable to self initiate rest at this time. Physically SOB and diaphoretic during activity though patient declining fatigue.    Impairments Requiring Therapeutic Intervention: decreased functional mobility, decreased strength, decreased safety awareness, decreased cognition, decreased endurance, decreased balance  Prognosis: good  Clinical Assessment: Pt continues with decreased safety awareness regarding balance and low O2 levels with activity. Pt also with decreased attention to O2 line placement, increasing fall risk. Pt with mild improvement in endurance and and decrease in knee pain with use of RW. Pt will continue to benefit from skilled PT services to address deficits and return to PLOF.   Safety Interventions: patient left in chair, chair alarm in place, call light within reach, and nurse notified    Plan  Frequency: 5 x/week, 60 min/day  Current Treatment Recommendations: strengthening, balance training, functional mobility training, transfer training, gait training, stair training, endurance training, patient/caregiver education, home exercise program, safety education, and equipment evaluation/education    Goals  Patient Goals: to go home   Short Term Goals:  Time Frame: to bet met in 7-10 days  Patient will complete stand step transfer at [facility]   Patient will ambulate 200 ft with use of LRAD at [facility]  Patient will ascend/descend 1 stairs with (R) ascending handrail at modified independent  Patient will complete car transfer at [facility]    Therapy Session Time      Individual Group Co-treatment   Time In  1015       Time Out  1045       Minutes  30       Second Session Therapy Time:   Individual Concurrent Group Co-treatment   Time In  1400         Time Out  1430         Minutes  30               Timed Code Treatment Minutes:   30+ minutes  Total Treatment Minutes:  60 minutes       Electronically Signed By: Jackelin Aguilar PT

## 2022-09-16 NOTE — PROGRESS NOTES
[facility]   Phone: [phone]    Speech Therapy    [] Initial Evaluation            [x] Daily Treatment Note         [] Discharge Summary      Patient: Mitchel Manduajno   : 1945   MRN: [id number]          Admitting Diagnosis: Metabolic encephalopathy  Treatment Diagnosis:  Receptive Aphasia , Expressive Aphasia , and Cognitive-Linguistic Deficits  (suspect cognitive-linguistic deficits but unable to fully determine due to aphasia)   Rehab Admission Date: 22  Precautions/Restrictions: Fall Risk, O2 dependent (2-3L at baseline)       Pre-Admission Information   Living Status: Lives alone  Occupation/School: ,    Medication Management: :  [x]Primary   []Secondary []No  Finance Management:          [x]Primary   []Secondary []No  Active :                        []Yes         [x]No  Hearing:                                 [employer]  Vision:                                    Vision Gross Assessment: WFL; Wears reading glasses     Daily Treatment Info:   Date: 2022     Tx session 1 Tx session 2   Pain Pt denied pain  Pt denied pain. Subjective     Pt sitting upright in chair following OT session with OT's still present obtaining O2 stats to ensure stats back up to > 90%. During session Pulmonologist came in to discuss using home BiPap machine while being here and attempting to titrate O2 back down to baseline. Pt was initially on 5 L then was turned down to 3.5 L by pulmonologist initially maintaining SPO2 > 90% then fell back down to 86% within a couple of minutes. Notified RN, with RN stating it was ok to turn him back up to 5L for SPO2 to come back up to 89-90%. Pt found sitting upright in chair alert, pleasant and cooperative. Pt on 5L of O2 via NC. Objective:  Goals     Goal 1: Pt will tolerate recommended diet without overt s/s of aspiration.  Goal Met     GOAL MET  GOAL MET    Goal 1: Pt will complete auditory comprehension tasks with 80% accuracy. Progressing, continue    Answering questions about a bill statement   - 2/10 (20%) on initial presentation   - With min cues and repetition improved to 5/10 (50%)  Auditory Complex Y/N questions  - 4/10 on initial presentation (see below for improved accuracy when provided with visual)     Auditory Wh-(what) questions   - 5/10 (50%) on initial presentation (see below for improved accuracy when provided with visual)     Goal 2: Pt will complete expressive language tasks with 80% accuracy or min cues. Progressing, continue    Instances of anomia/ word finding difficulty present throughout session. Pt with intermittent semantic paraphasias during session. Able to effectively utilize circumlocution strategy. Responsive Naming task  - 8/10 (80%), improved to 9/10 (90%) given mod phonemic cue     Repeating fx phrases (4-7 word sentences)  - 5/10 (50%) on initial presentation, improved to 7/10 (70%) given min cues for repetition (benefited from segmentation of words and visual cues)      Pt will complete graded fx problem solving tasks >80% acc  min cues. Answering questions about a bill statement  - 5/10 (50%) given min cues and repetition of information (errors due to reduced comprehension of question with pt internalizing information)    Answering questions about a medication label  - 5/7 (71%) (errors related to reduced comprehension with pt internalizing information)    Fx Problem Solving related to d/c needs  - pt again stated he wouldn't drive until later on (reviewed and directed pt towards the 's evaluation information in stroke ed book)  - pt again stating he would order groceries via [employer] and have them delivered    Goal 4: Patient will be instructed in memory strategies to utilize in order to promote recall of newly learned information with >80% min cues. Progressing, continue    Goal not targeted this session. Goal not targeted this session.       Goal 5: Pt will demonstrate use of visual strategies for visual language processing with 80% accuracy or min cues. Progressing, continue    Following Written directions   - Basic 1 step/ 2 component- 8/8 (100%) Independently     Answering written questions about a medication label  - 5/7 (71%)    Complex Y/N questions presented visually  - Improved to 8/10 (80%) occ min cues to answer in y/n response vs responding in statement format     Wh-(what) questions presented visually  - Improved to 9/10 (90%)      Other areas targeted:  Stroke Education      Patient participated in stroke education focused on topics of:  Stroke overview   Signs and symptoms of stroke   Stroke risk factors   Rehabilitation team   Caregiver information   Diet and exercise   Home modification   Post Stroke Depression Management   Bowel and Bladder Management   Driving/Travel   Sexuality   Resources   Medication Log   Weekly Schedule Planning Sheet   Monthly Planning Sheet     Materials Provided:  - [facility] Guide to Stroke Recovery   - Neurologic Drivers Evaluation Information  - Aphasia Wallet Card    Education:   Provided education regarding rationale for tasks and plan of care. Pt verbalized understanding   Pt requires ongoing learning  Provided education regarding rationale for tasks and plan of care.      Pt verbalized understanding   Pt requires ongoing learning    Safety Devices: [x] Call light within reach  [x] Chair alarm activated  [] Bed alarm activated  [] Other:  [x] Call light within reach  [x] Chair alarm activated  [] Bed alarm activated  [] Other:           Assessment/Progress/Discharge:   Weekly Update 9/13/22:  Diagnosis: Pt continues with Receptive and Expressive Aphasia characterized by frequent semantic and phonemic paraphasias, anomia, difficulty with repetition of phrase and sentence length information, difficulty answering mildly complex yes/no and wh-questions, reduced utterance length and reduced ability to follow 2+ step auditory directions. Pt benefits from repetition, simplification and visual cues for improved comprehension and processing. Suspect cognitive-linguistic deficits but unable to fully assess due to aphasia. Pt is independent at discharge and is anxious to get back to his PLOF. Discussed assistance and supports recommended at d/c with pt stating he does not want to feel like a burden to his children. Pt with lack of insight into how his current deficits will impact him in his daily life post d/c. Continue with speech therapy services to improve language and cognitive skills to ensure a safe return back to his prior level of functioning. Current Diet Order:   ADULT DIET; Regular; 5 carb choices (75 gm/meal)            Plan:     Frequency:  5days/week   60 minutes/day    Discharge Recommendations:   Barriers: Limited family support, Cognitive deficit, Limited safety awareness, Limited insight into deficits, Communication deficit, Decreased endurance, and Medication managment  Discharge Recommendations:  TBD   Continued SLP Treatment:  Yes   Estimated discharge date: 9/21/22    Session 1 Time:    Individual Concurrent Group Co-treatment   Time In 0930      Time Out 1000      Time Code Minutes  15      Minutes 30         Session 2 Time:    Individual Concurrent Group Co-treatment   Time In 1115      Time Out 1145      Time Code Minutes  20      Minutes 30         Timed Code Treatment Minutes: 35  Total Treatment Minutes: 60     Electronically Signed by     Lucita MENDES CCC-SLP #[id number] 9/16/2022 10:34 AM  Speech-Language Pathologist

## 2022-09-17 PROCEDURE — 6370000000 HC RX 637 (ALT 250 FOR IP): Performed by: INTERNAL MEDICINE

## 2022-09-17 PROCEDURE — 94640 AIRWAY INHALATION TREATMENT: CPT

## 2022-09-17 PROCEDURE — 1280000000 HC REHAB R&B

## 2022-09-17 PROCEDURE — 2580000003 HC RX 258: Performed by: PHYSICAL MEDICINE & REHABILITATION

## 2022-09-17 PROCEDURE — 94761 N-INVAS EAR/PLS OXIMETRY MLT: CPT

## 2022-09-17 PROCEDURE — 6370000000 HC RX 637 (ALT 250 FOR IP): Performed by: PHYSICAL MEDICINE & REHABILITATION

## 2022-09-17 PROCEDURE — 2700000000 HC OXYGEN THERAPY PER DAY

## 2022-09-17 PROCEDURE — 6360000002 HC RX W HCPCS: Performed by: PHYSICAL MEDICINE & REHABILITATION

## 2022-09-17 RX ADMIN — METOPROLOL SUCCINATE 12.5 MG: 25 TABLET, EXTENDED RELEASE ORAL at 09:44

## 2022-09-17 RX ADMIN — Medication 2 PUFF: at 20:53

## 2022-09-17 RX ADMIN — ENOXAPARIN SODIUM 30 MG: 100 INJECTION SUBCUTANEOUS at 09:21

## 2022-09-17 RX ADMIN — FUROSEMIDE 60 MG: 40 TABLET ORAL at 16:31

## 2022-09-17 RX ADMIN — IPRATROPIUM BROMIDE AND ALBUTEROL SULFATE 1 AMPULE: 2.5; .5 SOLUTION RESPIRATORY (INHALATION) at 12:34

## 2022-09-17 RX ADMIN — Medication 10 ML: at 20:38

## 2022-09-17 RX ADMIN — LISINOPRIL 2.5 MG: 5 TABLET ORAL at 09:44

## 2022-09-17 RX ADMIN — FUROSEMIDE 60 MG: 40 TABLET ORAL at 09:20

## 2022-09-17 RX ADMIN — IPRATROPIUM BROMIDE AND ALBUTEROL SULFATE 1 AMPULE: 2.5; .5 SOLUTION RESPIRATORY (INHALATION) at 08:03

## 2022-09-17 RX ADMIN — TIOTROPIUM BROMIDE INHALATION SPRAY 2 PUFF: 3.12 SPRAY, METERED RESPIRATORY (INHALATION) at 08:04

## 2022-09-17 RX ADMIN — IPRATROPIUM BROMIDE AND ALBUTEROL SULFATE 1 AMPULE: 2.5; .5 SOLUTION RESPIRATORY (INHALATION) at 20:53

## 2022-09-17 RX ADMIN — ENOXAPARIN SODIUM 30 MG: 100 INJECTION SUBCUTANEOUS at 20:39

## 2022-09-17 RX ADMIN — GABAPENTIN 300 MG: 300 CAPSULE ORAL at 13:17

## 2022-09-17 RX ADMIN — ASPIRIN 81 MG 81 MG: 81 TABLET ORAL at 09:20

## 2022-09-17 RX ADMIN — IPRATROPIUM BROMIDE AND ALBUTEROL SULFATE 1 AMPULE: 2.5; .5 SOLUTION RESPIRATORY (INHALATION) at 15:26

## 2022-09-17 RX ADMIN — Medication 100 MG: at 09:20

## 2022-09-17 RX ADMIN — ROSUVASTATIN 20 MG: 20 TABLET, FILM COATED ORAL at 20:39

## 2022-09-17 RX ADMIN — Medication 2 PUFF: at 08:04

## 2022-09-17 RX ADMIN — Medication 10 ML: at 09:21

## 2022-09-17 RX ADMIN — ALLOPURINOL 200 MG: 100 TABLET ORAL at 09:20

## 2022-09-17 RX ADMIN — GABAPENTIN 300 MG: 300 CAPSULE ORAL at 09:20

## 2022-09-17 RX ADMIN — GABAPENTIN 300 MG: 300 CAPSULE ORAL at 20:39

## 2022-09-17 RX ADMIN — PREDNISONE 5 MG: 5 TABLET ORAL at 09:21

## 2022-09-17 ASSESSMENT — PAIN SCALES - GENERAL
PAINLEVEL_OUTOF10: 0

## 2022-09-17 NOTE — PROGRESS NOTES
Assessment complete see flowsheets. A&Ox4. Meds given per eMAR. The care plan and education has been reviewed and mutually agreed upon with the patient. In bed, fall precautions in place, hourly rounding, call light and belongings in reach, bed in lowest position, wheels locked in place, side rails up x 2, walkways free of clutter. No further needs expressed at this time. 2045 Home Cpap device set up by respiratory. 0530 pts weight is up 4lbs. Ace wrapped both legs. No further needs at this time.

## 2022-09-17 NOTE — PROGRESS NOTES
Consult placed per order for toenail trim for pt to be seen/evaluated by Dr. Ирина Gillis.      Luciano Billingsley BSN, RN   [phone]

## 2022-09-17 NOTE — PROGRESS NOTES
Julissa Lopez  9/17/2022  [id number]    Chief Complaint: Metabolic encephalopathy    Subjective:   Patient seen resting in bedside chair. Currently on 4L O2 via NC. Per nursing edema in his BLE is increased, and he does not like to recline the chair to elevate his feet. Patient states this causes him more pain. Legs ACE wrapped currently. No acute events overnight. ROS: No CP. Mild SOB and dyspnea with exertion    Objective:  Patient Vitals for the past 24 hrs:   BP Temp Temp src Pulse Resp SpO2 Weight   09/17/22 1527 -- -- -- -- -- 92 % --   09/17/22 1239 -- -- -- -- -- 99 % --   09/17/22 0944 (!) 150/60 -- -- (!) 101 -- -- --   09/17/22 0925 (!) 104/51 -- -- 85 -- -- --   09/17/22 0859 (!) 100/57 97.8 °F (36.6 °C) Oral 83 18 94 % --   09/17/22 0804 -- -- -- -- -- 92 % --   09/17/22 0530 -- -- -- -- -- -- (!) 329 lb 6.4 oz (149.4 kg)   09/17/22 0005 -- -- -- 80 16 95 % --   09/16/22 2048 -- -- -- 72 16 92 % --   09/16/22 2046 129/62 98.4 °F (36.9 °C) Oral 80 -- 91 % --       Gen: No distress, pleasant. Resting in bedside chair  HEENT: Normocephalic, atraumatic. CV: Regular rate and rhythm. No MRG   Resp: No respiratory distress. Decreased BS diffusely, 4L O2 per NC  Abd: Soft, nontender nondistended  Ext: +3 pitting BLE edema. Neuro: Alert, oriented, appropriately interactive. Laboratory data: Available via EMR.      Therapy progress:    PT    Supine to Sit:    Sit to Supine:     Sit to Stand: Supervision or touching assistance  Chair/Bed to Chair Transfer: Supervision or touching assistance  Car Transfer: Partial/moderate assistance  Ambulation 10 ft: Supervision or touching assistance  Ambulation 50 ft: Supervision or touching assistance  Ambulation 150 ft: Supervision or touching assistance  Stairs - 1 Step: Supervision or touching assistance  Stairs - 4 Step: Supervision or touching assistance  Stairs - 12 Step: Supervision or touching assistance    OT    Eating: Independent  Oral Hygiene: Supervision or touching assistance  Bathing: Supervision or touching assistance  Upper Body Dressing: Supervision or touching assistance  Lower Body Dressing: Partial/moderate assistance  Toilet Transfer: Supervision or touching assistance  Toilet Hygiene: Independent    Speech Therapy    Pt continues with Receptive and Expressive Aphasia characterized by frequent semantic and phonemic paraphasias, anomia, difficulty with repetition of phrase and sentence length information, difficulty answering mildly complex yes/no and wh-questions, reduced utterance length and reduced ability to follow 2+ step auditory directions. Pt benefits from repetition, simplification and visual cues for improved comprehension and processing. Suspect cognitive-linguistic deficits but unable to fully assess due to aphasia. Pt is independent at discharge and is anxious to get back to his PLOF. Discussed assistance and supports recommended at d/c with pt stating he does not want to feel like a burden to his children. Pt with lack of insight into how his current deficits will impact him in his daily life post d/c. Continue with speech therapy services to improve language and cognitive skills to ensure a safe return back to his prior level of functioning. Body mass index is 43.46 kg/m².     Assessment:  Patient Active Problem List   Diagnosis    HTN (hypertension), benign    Syncope    DM (diabetes mellitus), type 2, uncontrolled with complications (HCC)    Arthritis    Chronic respiratory failure (HCC)    STEF (obstructive sleep apnea)    Venous insufficiency    Acute sinusitis    Morbid obesity ([address]    Vitamin D deficiency    Open back wound    COPD, severe ([address]    HLD (hyperlipidemia)    Diastolic dysfunction    Aneurysm of abdominal aorta (HCC)    Personal history of tobacco use, presenting hazards to health    Encephalopathy    Altered mental status    Cerebrovascular accident (CVA) due to thrombosis of left middle cerebral artery ([address] Acute on chronic respiratory failure with hypoxia and hypercapnia (HCC)    Cerebrovascular accident (CVA) ([address]    Chronic heart failure with preserved ejection fraction (HCC)    Ventricular ectopy    Leg swelling    TGA (transient global amnesia)    Metabolic encephalopathy       Plan:   Acute left temporal lobe infarct  - ASA, statin. PT/OT/SLP     Chronic hypoxia/hypercapnia  - NIV/AVAPs ventilation to be organized at the time of discharge. Not using BiPAP overnight     COPD  - inhalers as ordered. - Pulm consult     PFO/ASD  - Outpatient follow up. [facility]     HTN  - lisinopril 2.5, toprol 12.5     DM  - DM diet. consider resumption of metformin if needed     EOH abuse  - no evidence of withdrawal    BLE edema  - PO 40 BID increase to 60 BID PO.  - s/p 40 lasix x1    Bowel: Per protocol  Bladder: Per protocol  Sleep: Trazodone PRN  Pain: tylenol PRN  DVT PPx: lovenox  PAULINA: 9/21    Electronically signed by THU Pozo CNP on 9/17/2022 at 3:46 PM      * This document was created using dictation software. While all precautions were taken to ensure accuracy, errors may have occurred. Please disregard any typographical errors.

## 2022-09-17 NOTE — PLAN OF CARE
Problem: Discharge Planning  Goal: Discharge to home or other facility with appropriate resources  Outcome: Progressing     Problem: Safety - Adult  Goal: Free from fall injury  Outcome: Progressing     Problem: ABCDS Injury Assessment  Goal: Absence of physical injury  Outcome: Progressing     Problem: Pain  Goal: Verbalizes/displays adequate comfort level or baseline comfort level  Outcome: Progressing     Problem: Chronic Conditions and Co-morbidities  Goal: Patient's chronic conditions and co-morbidity symptoms are monitored and maintained or improved  Outcome: Progressing     Problem: Skin/Tissue Integrity  Goal: Absence of new skin breakdown  Outcome: Progressing

## 2022-09-18 PROCEDURE — 6360000002 HC RX W HCPCS: Performed by: PHYSICAL MEDICINE & REHABILITATION

## 2022-09-18 PROCEDURE — 6370000000 HC RX 637 (ALT 250 FOR IP): Performed by: INTERNAL MEDICINE

## 2022-09-18 PROCEDURE — 99232 SBSQ HOSP IP/OBS MODERATE 35: CPT | Performed by: INTERNAL MEDICINE

## 2022-09-18 PROCEDURE — 6370000000 HC RX 637 (ALT 250 FOR IP): Performed by: PHYSICAL MEDICINE & REHABILITATION

## 2022-09-18 PROCEDURE — 2580000003 HC RX 258: Performed by: PHYSICAL MEDICINE & REHABILITATION

## 2022-09-18 PROCEDURE — 94761 N-INVAS EAR/PLS OXIMETRY MLT: CPT

## 2022-09-18 PROCEDURE — 94640 AIRWAY INHALATION TREATMENT: CPT

## 2022-09-18 PROCEDURE — 2700000000 HC OXYGEN THERAPY PER DAY

## 2022-09-18 PROCEDURE — 94660 CPAP INITIATION&MGMT: CPT

## 2022-09-18 PROCEDURE — 1280000000 HC REHAB R&B

## 2022-09-18 RX ADMIN — METOPROLOL SUCCINATE 12.5 MG: 25 TABLET, EXTENDED RELEASE ORAL at 08:05

## 2022-09-18 RX ADMIN — Medication 2 PUFF: at 20:38

## 2022-09-18 RX ADMIN — Medication 10 ML: at 10:46

## 2022-09-18 RX ADMIN — IPRATROPIUM BROMIDE AND ALBUTEROL SULFATE 1 AMPULE: 2.5; .5 SOLUTION RESPIRATORY (INHALATION) at 11:48

## 2022-09-18 RX ADMIN — FUROSEMIDE 60 MG: 40 TABLET ORAL at 17:09

## 2022-09-18 RX ADMIN — GABAPENTIN 300 MG: 300 CAPSULE ORAL at 15:17

## 2022-09-18 RX ADMIN — GABAPENTIN 300 MG: 300 CAPSULE ORAL at 21:18

## 2022-09-18 RX ADMIN — ALLOPURINOL 200 MG: 100 TABLET ORAL at 08:04

## 2022-09-18 RX ADMIN — ENOXAPARIN SODIUM 30 MG: 100 INJECTION SUBCUTANEOUS at 08:04

## 2022-09-18 RX ADMIN — Medication 100 MG: at 08:03

## 2022-09-18 RX ADMIN — ENOXAPARIN SODIUM 30 MG: 100 INJECTION SUBCUTANEOUS at 21:18

## 2022-09-18 RX ADMIN — Medication 5 ML: at 21:18

## 2022-09-18 RX ADMIN — Medication 2 PUFF: at 09:11

## 2022-09-18 RX ADMIN — PREDNISONE 5 MG: 5 TABLET ORAL at 08:04

## 2022-09-18 RX ADMIN — IPRATROPIUM BROMIDE AND ALBUTEROL SULFATE 1 AMPULE: 2.5; .5 SOLUTION RESPIRATORY (INHALATION) at 09:11

## 2022-09-18 RX ADMIN — ROSUVASTATIN 20 MG: 20 TABLET, FILM COATED ORAL at 21:18

## 2022-09-18 RX ADMIN — GABAPENTIN 300 MG: 300 CAPSULE ORAL at 08:04

## 2022-09-18 RX ADMIN — ASPIRIN 81 MG 81 MG: 81 TABLET ORAL at 08:04

## 2022-09-18 RX ADMIN — IPRATROPIUM BROMIDE AND ALBUTEROL SULFATE 1 AMPULE: 2.5; .5 SOLUTION RESPIRATORY (INHALATION) at 15:33

## 2022-09-18 RX ADMIN — TIOTROPIUM BROMIDE INHALATION SPRAY 2 PUFF: 3.12 SPRAY, METERED RESPIRATORY (INHALATION) at 09:11

## 2022-09-18 RX ADMIN — FUROSEMIDE 60 MG: 40 TABLET ORAL at 08:03

## 2022-09-18 RX ADMIN — IPRATROPIUM BROMIDE AND ALBUTEROL SULFATE 1 AMPULE: 2.5; .5 SOLUTION RESPIRATORY (INHALATION) at 20:37

## 2022-09-18 ASSESSMENT — PAIN SCALES - GENERAL
PAINLEVEL_OUTOF10: 0

## 2022-09-18 NOTE — PROGRESS NOTES
Message sent to provider due to BP (systolic) running lower past few days and no current perimeters. Currently 101/58 HR 76.      Sarah Beth DAVISN, RN   [phone]

## 2022-09-18 NOTE — PROGRESS NOTES
[facility] Pulmonary/CCM Progress note      Admit Date: 9/9/2022    Chief Complaint: Altered mental status and shortness of breath    Subjective: Interval History: Currently in ARU, sitting comfortably in chair. O2 requirements are fluctuating-requiring up to 5 L. Still has significant pedal edema. Did not use BiPAP overnight.     Scheduled Meds:   tiotropium  2 puff Inhalation Daily    furosemide  60 mg Oral BID    ipratropium-albuterol  1 ampule Inhalation 4x daily    lisinopril  2.5 mg Oral Daily    sodium chloride flush  5-40 mL IntraVENous 2 times per day    mometasone-formoterol  2 puff Inhalation BID    thiamine mononitrate  100 mg Oral Daily    rosuvastatin  20 mg Oral Nightly    aspirin  81 mg Oral Daily    allopurinol  200 mg Oral Daily    predniSONE  5 mg Oral Daily    gabapentin  300 mg Oral TID    metoprolol succinate  12.5 mg Oral Daily    enoxaparin  30 mg SubCUTAneous BID     Continuous Infusions:   sodium chloride       PRN Meds:ipratropium-albuterol, albuterol sulfate HFA, sodium chloride flush, sodium chloride, ondansetron **OR** ondansetron, polyethylene glycol, [DISCONTINUED] acetaminophen **OR** acetaminophen, traZODone, hydrALAZINE, acetaminophen    Review of Systems  Constitutional: negative for fatigue, fevers, malaise and weight loss  Ears, nose, mouth, throat: negative for ear drainage, epistaxis, hoarseness, nasal congestion, sore throat and voice change  Respiratory: negative for shortness of breath, cough, phlegm or pleurisy  Cardiovascular: Leg edema +, negative for chest pain, chest pressure/discomfort, irregular heart beat and palpitations  Gastrointestinal: negative for abdominal pain, constipation, diarrhea, jaundice, melena, odynophagia, reflux symptoms and vomiting  Hematologic/lymphatic: negative for bleeding, easy bruising, lymphadenopathy and petechiae  Musculoskeletal:negative for arthralgias, bone pain, muscle weakness, neck pain and stiff joints  Neurological: negative for dizziness, gait problems, headaches, seizures, speech problems, tremors and weakness  Behavioral/Psych: negative for anxiety, behavior problems, depression, fatigue and sleep disturbance  Endocrine: negative for diabetic symptoms including none, neuropathy, polyphagia, polyuria, polydipsia, vomiting and diarrhea and temperature intolerance  Allergic/Immunologic: negative for anaphylaxis, angioedema, hay fever and urticaria    Objective:     Patient Vitals for the past 8 hrs:   BP Temp Temp src Pulse Resp SpO2   09/18/22 1148 -- -- -- 85 20 95 %   09/18/22 1146 (!) 101/58 -- -- -- -- --   09/18/22 1045 (!) 101/58 -- -- 76 -- --   09/18/22 0911 -- -- -- 76 20 90 %   09/18/22 0805 111/67 -- -- 76 -- --   09/18/22 0715 (!) 113/58 98.3 °F (36.8 °C) Oral 83 21 95 %       I/O last 3 completed shifts: In: 4428 [P.O.:3080; I.V.:10]  Out: 4900 [Urine:4900]  I/O this shift:  In: 0558 [P.O.:1280;  I.V.:10]  Out: 1475 [Urine:1475]    General Appearance: alert and oriented to person, place and time, well developed and well- nourished, in no acute distress  Skin: warm and dry, no rash or erythema  Head: normocephalic and atraumatic  Eyes: pupils equal, round, and reactive to light, extraocular eye movements intact, conjunctivae normal  ENT: external ear and ear canal normal bilaterally, nose without deformity, nasal mucosa and turbinates normal  Neck: supple and non-tender without mass, no cervical lymphadenopathy  Pulmonary/Chest: clear to auscultation bilaterally- no wheezes, rales or rhonchi, normal air movement, no respiratory distress  Cardiovascular: normal rate, regular rhythm,  no murmurs, rubs, distal pulses intact, no carotid bruits  Abdomen: soft, non-tender, non-distended, normal bowel sounds, no masses or organomegaly  Lymph Nodes: Cervical, supraclavicular normal  Extremities: Leg edema+  Musculoskeletal: normal range of motion, no joint swelling, deformity or tenderness  Neurologic: alert, no focal neurologic deficits    Data Review:  CBC:   Lab Results   Component Value Date/Time    WBC 5.9 09/15/2022 05:39 AM    RBC 3.02 09/15/2022 05:39 AM     BMP:   Lab Results   Component Value Date/Time    GLUCOSE 115 09/15/2022 05:39 AM    CO2 30 09/15/2022 05:39 AM    BUN 68 09/15/2022 05:39 AM    CREATININE 1.4 09/15/2022 05:39 AM    CALCIUM 8.9 09/15/2022 05:39 AM     ABG:   Lab Results   Component Value Date/Time    WNS3AWJ 31.3 09/07/2022 12:23 PM    BEART 4.0 09/07/2022 12:23 PM    Z5WIBCDT 93.7 09/07/2022 12:23 PM    PHART 7.337 09/07/2022 12:23 PM    XTS9BNV 58.4 09/07/2022 12:23 PM    PO2ART 73.3 09/07/2022 12:23 PM    YHR5WKT 74.1 09/07/2022 12:23 PM       Radiology: All pertinent images / reports were reviewed as a part of this visit. Narrative   EXAMINATION:   ONE XRAY VIEW OF THE CHEST       9/7/2022 10:13 am       COMPARISON:   Chest x-ray dated 3 September 2022       HISTORY:   ORDERING SYSTEM PROVIDED HISTORY: to evaluate for infiltrates   TECHNOLOGIST PROVIDED HISTORY:   Reason for exam:->to evaluate for infiltrates   Reason for Exam: to evaluate for infiltrates       FINDINGS:   Blunting of the left costophrenic angle appears stable. The right lung is   clear. No pneumothorax. Stable cardiomediastinal silhouette           Impression   Blunting of the left costophrenic angle appears unchanged. This could   represent underlying atelectasis or an infiltrate in the appropriate clinical   setting. Problem List:     Altered mental status-resolved  Acute on chronic hypoxic/hypercapnic respiratory failure  COPD, severe  CVA-left MCA/parietal stroke    Assessment/Plan:     Acute on chronic hypercapnic respiratory failure, patient now does not appear to be fully compliant with BiPAP per report. This could be contributing to patient's worsening hypoxia. Overall patient has severe COPD, with fluctuating O2 needs. Currently not in exacerbation. History of left MCA CVA, currently undergoing rehab.   CVA with PFO, lower extremity Dopplers were negative for DVT. Seen by cardiology, no plans made for closure due to questionable benefit. Currently does not have COPD exacerbation, continue current inhaler regime. Overall patient has poor prognosis, is already [facility]. If there is any further decline from here, we should consider hospice care. Unfortunately there are no other better options available.     Hailey Ward MD

## 2022-09-18 NOTE — PROGRESS NOTES
Assessment complete see flowsheets. A&Ox4. Meds given per eMAR. The care plan and education has been reviewed and mutually agreed upon with the patient. In bed, fall precautions in place, hourly rounding, call light and belongings in reach, bed in lowest position, wheels locked in place, side rails up x 2, walkways free of clutter. No further needs expressed at this time. 9284 patient complaint of the Cpap being uncomfortable and wanted to remove it and replace it with his nasal canula. 0220 re-wrapped bilateral legs with ace wrap. Showing progress with swelling reducing due to ace wrap. Patient had issues overnight with Cpap fitting. Wants to continue to work on fitting today. No further needs expressed at this time.

## 2022-09-19 LAB
ANION GAP SERPL CALCULATED.3IONS-SCNC: 9 MMOL/L (ref 3–16)
BASOPHILS ABSOLUTE: 0 K/UL (ref 0–0.2)
BASOPHILS RELATIVE PERCENT: 0 %
BUN BLDV-MCNC: 45 MG/DL (ref 7–20)
CALCIUM SERPL-MCNC: 8.9 MG/DL (ref 8.3–10.6)
CHLORIDE BLD-SCNC: 100 MMOL/L (ref 99–110)
CO2: 30 MMOL/L (ref 21–32)
CREAT SERPL-MCNC: 1.3 MG/DL (ref 0.8–1.3)
EOSINOPHILS ABSOLUTE: 0.2 K/UL (ref 0–0.6)
EOSINOPHILS RELATIVE PERCENT: 3 %
GFR AFRICAN AMERICAN: >60
GFR NON-AFRICAN AMERICAN: 54
GLUCOSE BLD-MCNC: 121 MG/DL (ref 70–99)
HCT VFR BLD CALC: 32.4 % (ref 40.5–52.5)
HEMOGLOBIN: 10.7 G/DL (ref 13.5–17.5)
LYMPHOCYTES ABSOLUTE: 1 K/UL (ref 1–5.1)
LYMPHOCYTES RELATIVE PERCENT: 20 %
MCH RBC QN AUTO: 34.6 PG (ref 26–34)
MCHC RBC AUTO-ENTMCNC: 33 G/DL (ref 31–36)
MCV RBC AUTO: 104.8 FL (ref 80–100)
MONOCYTES ABSOLUTE: 0.4 K/UL (ref 0–1.3)
MONOCYTES RELATIVE PERCENT: 7 %
MYELOCYTE PERCENT: 1 %
NEUTROPHILS ABSOLUTE: 3.6 K/UL (ref 1.7–7.7)
NEUTROPHILS RELATIVE PERCENT: 69 %
PDW BLD-RTO: 14.5 % (ref 12.4–15.4)
PLATELET # BLD: 261 K/UL (ref 135–450)
PLATELET SLIDE REVIEW: ADEQUATE
PMV BLD AUTO: 7 FL (ref 5–10.5)
POTASSIUM REFLEX MAGNESIUM: 4.5 MMOL/L (ref 3.5–5.1)
RBC # BLD: 3.09 M/UL (ref 4.2–5.9)
RBC # BLD: NORMAL 10*6/UL
SLIDE REVIEW: ABNORMAL
SODIUM BLD-SCNC: 139 MMOL/L (ref 136–145)
WBC # BLD: 5.1 K/UL (ref 4–11)

## 2022-09-19 PROCEDURE — 97129 THER IVNTJ 1ST 15 MIN: CPT

## 2022-09-19 PROCEDURE — 85025 COMPLETE CBC W/AUTO DIFF WBC: CPT

## 2022-09-19 PROCEDURE — 97130 THER IVNTJ EA ADDL 15 MIN: CPT

## 2022-09-19 PROCEDURE — 92526 ORAL FUNCTION THERAPY: CPT

## 2022-09-19 PROCEDURE — 80048 BASIC METABOLIC PNL TOTAL CA: CPT

## 2022-09-19 PROCEDURE — 94761 N-INVAS EAR/PLS OXIMETRY MLT: CPT

## 2022-09-19 PROCEDURE — 6370000000 HC RX 637 (ALT 250 FOR IP): Performed by: INTERNAL MEDICINE

## 2022-09-19 PROCEDURE — 6370000000 HC RX 637 (ALT 250 FOR IP): Performed by: PHYSICAL MEDICINE & REHABILITATION

## 2022-09-19 PROCEDURE — 97116 GAIT TRAINING THERAPY: CPT

## 2022-09-19 PROCEDURE — 1280000000 HC REHAB R&B

## 2022-09-19 PROCEDURE — 94640 AIRWAY INHALATION TREATMENT: CPT

## 2022-09-19 PROCEDURE — 97110 THERAPEUTIC EXERCISES: CPT

## 2022-09-19 PROCEDURE — 99233 SBSQ HOSP IP/OBS HIGH 50: CPT | Performed by: INTERNAL MEDICINE

## 2022-09-19 PROCEDURE — 97530 THERAPEUTIC ACTIVITIES: CPT

## 2022-09-19 PROCEDURE — 6360000002 HC RX W HCPCS: Performed by: PHYSICAL MEDICINE & REHABILITATION

## 2022-09-19 PROCEDURE — 2580000003 HC RX 258: Performed by: PHYSICAL MEDICINE & REHABILITATION

## 2022-09-19 RX ORDER — FUROSEMIDE 40 MG/1
80 TABLET ORAL 2 TIMES DAILY
Status: DISCONTINUED | OUTPATIENT
Start: 2022-09-19 | End: 2022-09-21 | Stop reason: HOSPADM

## 2022-09-19 RX ADMIN — Medication 100 MG: at 09:11

## 2022-09-19 RX ADMIN — LISINOPRIL 2.5 MG: 5 TABLET ORAL at 09:11

## 2022-09-19 RX ADMIN — ALLOPURINOL 200 MG: 100 TABLET ORAL at 09:12

## 2022-09-19 RX ADMIN — Medication 10 ML: at 09:15

## 2022-09-19 RX ADMIN — ROSUVASTATIN 20 MG: 20 TABLET, FILM COATED ORAL at 21:20

## 2022-09-19 RX ADMIN — GABAPENTIN 300 MG: 300 CAPSULE ORAL at 09:11

## 2022-09-19 RX ADMIN — Medication 2 PUFF: at 05:17

## 2022-09-19 RX ADMIN — ENOXAPARIN SODIUM 30 MG: 100 INJECTION SUBCUTANEOUS at 09:12

## 2022-09-19 RX ADMIN — IPRATROPIUM BROMIDE AND ALBUTEROL SULFATE 1 AMPULE: 2.5; .5 SOLUTION RESPIRATORY (INHALATION) at 05:14

## 2022-09-19 RX ADMIN — IPRATROPIUM BROMIDE AND ALBUTEROL SULFATE 1 AMPULE: 2.5; .5 SOLUTION RESPIRATORY (INHALATION) at 20:55

## 2022-09-19 RX ADMIN — FUROSEMIDE 80 MG: 40 TABLET ORAL at 17:36

## 2022-09-19 RX ADMIN — ENOXAPARIN SODIUM 30 MG: 100 INJECTION SUBCUTANEOUS at 21:20

## 2022-09-19 RX ADMIN — IPRATROPIUM BROMIDE AND ALBUTEROL SULFATE 1 AMPULE: 2.5; .5 SOLUTION RESPIRATORY (INHALATION) at 13:21

## 2022-09-19 RX ADMIN — Medication 2 PUFF: at 20:55

## 2022-09-19 RX ADMIN — TIOTROPIUM BROMIDE INHALATION SPRAY 2 PUFF: 3.12 SPRAY, METERED RESPIRATORY (INHALATION) at 05:18

## 2022-09-19 RX ADMIN — GABAPENTIN 300 MG: 300 CAPSULE ORAL at 14:52

## 2022-09-19 RX ADMIN — GABAPENTIN 300 MG: 300 CAPSULE ORAL at 21:20

## 2022-09-19 RX ADMIN — TRAZODONE HYDROCHLORIDE 50 MG: 50 TABLET ORAL at 21:20

## 2022-09-19 RX ADMIN — PREDNISONE 5 MG: 5 TABLET ORAL at 09:12

## 2022-09-19 RX ADMIN — ASPIRIN 81 MG 81 MG: 81 TABLET ORAL at 09:12

## 2022-09-19 RX ADMIN — Medication 5 ML: at 21:20

## 2022-09-19 RX ADMIN — IPRATROPIUM BROMIDE AND ALBUTEROL SULFATE 1 AMPULE: 2.5; .5 SOLUTION RESPIRATORY (INHALATION) at 17:14

## 2022-09-19 ASSESSMENT — PAIN SCALES - GENERAL
PAINLEVEL_OUTOF10: 0

## 2022-09-19 NOTE — PROGRESS NOTES
[facility]   Phone: [phone]    Physical Therapy    [] Initial Evaluation            [x] Daily Treatment Note         [] Discharge Summary      Patient: Becca Albert   : 1945   MRN: [id number]   Date of Service:  2022  Admitting Diagnosis: Metabolic encephalopathy  Current Admission Summary: 53-year-old male with a history of COPD, diabetes, gout, hypertension, and STEF who was admitted on 9/3 with altered mental status. CT head was unremarkable for acute findings. 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 Echo with a normal EF and positive bubble study. Cardiology evaluated and suggested a 30-day monitor and outpatient work-up. He was evaluated by therapy and suggested to continue in an inpatient setting prior to returning home. He is strongly interested in returning to home where he lives alone and was caring for himself independently. Past Medical History:  has a past medical history of Anemia, COPD (chronic obstructive pulmonary disease) ([address] Diabetes mellitus ([address] Edema, GI (gastrointestinal bleed), Gout, Hypertension, Morbid obesity ([address] Neuropathy, Obstructive sleep apnea, and Peripheral vascular disease ([address] Past Surgical History:  has a past surgical history that includes Appendectomy; Total hip arthroplasty; and Colonoscopy (N/A, 2019).   Discharge Recommendations: Home with Home Health PT S3, 24 hour supervision due to O2 line management  DME Required For Discharge: patient has all required DME for discharge (would benefit from a RW, unsure if pt actually has one)  Precautions/Restrictions: high fall risk  Weight Bearing Restrictions: no restrictions  [] Right Upper Extremity  [] Left Upper Extremity [] Right Lower Extremity  [] Left Lower Extremity     Required Braces/Orthotics: no braces required   [] Right  [] Left  Positional Restrictions:no positional restrictions    Pre-Admission Information Lives With: alone   --daughter lives close by and can assist but 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 Handrails are located on R side. Bathroom Layout: walk in shower  Bathroom Equipment: grab bars in shower, shower chair, hand held shower head  Toilet Height: elevated height  Home Equipment: rolling walker, single point cane, manual wheelchair, reacher, sock aide, long handled shoe horn, oxygen, wooden cane. Comment: transport w/c, on 3L O2 all the time, even though Dr. Rica Chiu recommended 2 L  Transfer Assistance: Independent without use of device  Ambulation Assistance:modified independent with use of SPC; uses walker when work in garden  ADL Assistance: independent with all ADL's  IADL Assistance: independent with homemaking tasks  Active :        [x] Yes                 [] No  Hand Dominance: [] Left                 [x] Right  Hobbies: Enjoys cooking, baking bread, volunteer at [place]  Recent Falls: No recent falls  Pt sleeps in a recliner. 2 daughters in area. 1 dtr is an RN and granddaughter becoming an RN. Per patient daughter reports that if patient is debilitated post rehabilitation that he could move in with her, living area in basement. Subjective  General: Patient seated in chair at arrival. Agreed to therapy. Reporting several times that he is \"fine. \" On 3L at rest, 88% saturation. Pain: 0/10  Pain Interventions: patient denies pain interventions       Functional Mobility  Bed Mobility  Bed mobility not completed on this date. Comments: Patient sleeps in recliner chair. Transfers  Sit to stand transfer: supervision  Stand to sit transfer: supervision  Comments: Performed with both walking stick and RW.    Ambulation  Surface:level surface  Assistive Device: RW  Other Apparatus: MCOT heart monitor  Assistance: stand by assist  Distance: 270 ft   Gait Mechanics: decreased roxana, decreased step through, widened ZACH  Comments:  PT inquired about need for rest multiple times but pt declined. Did not have any audible SOB. Once seated, SpO2 70% on 3L. Increased to 6L and recovered in 2 minutes. HR elevated to 120s. Stair Mobility  Stair mobility not completed on this date. Comments:   Wheelchair Mobility:  No w/c mobility completed on this date. Comments:  Balance  Static Sitting Balance: fair (+): maintains balance at SBA/supervision without use of UE support  Dynamic Sitting Balance: fair (+): maintains balance at SBA/supervision without use of UE support  Static Standing Balance: fair (-): maintains balance at SBA with use of UE support  Dynamic Standing Balance: fair (-): maintains balance at SBA with use of UE support  Patient completes object retrieval from floor in standing position at stand by assistance with RW and reacher  Comments:    Other Therapeutic Interventions  1st session: see functional mobility details above. Additional walk of 40 ft to gym with RW on 6L O2 with desaturation to 87%. Remained on 6 L through rest of session. Seated stepper x 5 minutes using BUE/LEs for endurance. Standing OH ball raise 2x 10 reps. 2nd session:  Pt seated in recliner at arrival.  Agreed to therapy. On 3L with SpO2 at rest 93%. STS with Supervision. Ambulated 40 ft with RW to destination with drop in saturation to 73%. Increased O2 to 6L and recovered in 1 minute. 4inch repeat step ups on LLE only 2x10 reps. Additional walking with walking stick 2x 50 ft with SBA. Standing alternating cross-body punches 2x 20 reps. Repeat STS x 5 reps. Desaturation between all activities requiring a seated rest break before continuation of activities. Pt returned to chair, alarm on and needs in reach.        Functional Outcomes                 Cognition  Overall Cognitive Status: Impaired  Arousal/Alterness: appropriate responses to stimuli  Following Commands: follows one step commands with repetition, follows one step commands with increased time  Safety Judgement: decreased awareness of need for assistance  Insights: decreased awareness of deficits  Initiation: requires cues for some  Sequencing: requires cues for some  Orientation:    alert and oriented x 4  Command Following:   accurately follows one step commands    Education  Barriers To Learning: cognition  Patient Education: patient educated on goals, PT role and benefits, plan of care, general safety, functional mobility training, proper use of assistive device/equipment, energy conservation, transfer training, discharge recommendations  9/16/22:  Participated in 45 minute family conference. Reviewed current physical status and barriers to safe return home. Daughter present and voiced understanding. Provided recommendation for 24 hour supervision due to pt taking off O2 line or not managing O2 line and creating a fall hazard. Learning Assessment:  patient verbalizes understanding, would benefit from continued reinforcement    Assessment  Activity Tolerance: decreased endurance, DUNCAN with all activities   Impairments Requiring Therapeutic Intervention: decreased functional mobility, decreased strength, decreased safety awareness, decreased cognition, decreased endurance, decreased balance  Prognosis: good  Clinical Assessment: Pt with decreased safety awareness in regards to O2 saturation levels, SOB with activity, and need for rest breaks. Requiring an increase to 6L with activity and still dropping below 90%. Pt did not have any LOB walking household distances with walking stick but noted to have more R knee pain and less endurance than when using RW. Continues skilled PT to promote safe return home.    Safety Interventions: patient left in chair, chair alarm in place, call light within reach, and nurse notified    Plan  Frequency: 5 x/week, 60 min/day  Current Treatment Recommendations: strengthening, balance training, functional mobility training, transfer training, gait training, stair training, endurance training, patient/caregiver education, home exercise program, safety education, and equipment evaluation/education    Goals  Patient Goals: to go home   Short Term Goals:  Time Frame: to bet met in 7-10 days  Patient will complete stand step transfer at [facility]   Patient will ambulate 200 ft with use of LRAD at [facility]  Patient will ascend/descend 1 stairs with (R) ascending handrail at modified independent  Patient will complete car transfer at [facility]    Therapy Session Time      Individual Group Co-treatment   Time In 1020       Time Out 1057       Minutes 37         Second Session Therapy Time:   Individual Concurrent Group Co-treatment   Time In 1245         Time Out 1315         Minutes 30           Timed Code Treatment Minutes:  67    Total Treatment Minutes:  67    Electronically Signed By: Ankur Cali, [id number]

## 2022-09-19 NOTE — PROGRESS NOTES
Ambulated to bathroom using walking stick and standby assistance. 2128  Assessment complete. Alert, oriented x4. Denies pain. Hoping to go home soon. Patient frustrated with how much time he is spending sitting during hospitalization. Per patient, if he were home, he would be up ambulating much more frequently. Patient wanting to sleep in chair. Ace wraps removed from bilateral lower legs. Patient encouraged to elevate legs while sitting in the chair to help manage edema. The care plan and education has been reviewed and mutually agreed upon with the patient. In bed, alarm on, bed in lowest position, call light and table within reach. No further needs expressed at this time. 2305  Ambulated to bathroom. Patient and this RN having difficulty getting patient's home CPAP in place. Santosh [email or web address], contacted to assist patient in donning home CPAP.    0699  Patient having difficulty sleeping. Denied need for sleeping medicine. Patient removed CPAP in attempt to increase comfort. Nasal cannula replaced. Santosh Pedraza RCP, notified. 0561  Ace wrap re-applied to bilateral lower legs.

## 2022-09-19 NOTE — PROGRESS NOTES
[facility]   Phone: [phone]    Speech Therapy    [] Initial Evaluation            [x] Daily Treatment Note         [] Discharge Summary      Patient: Elisabeth Nuñez   : 1945   MRN: [id number]          Admitting Diagnosis: Metabolic encephalopathy  Treatment Diagnosis:  Receptive Aphasia , Expressive Aphasia , and Cognitive-Linguistic Deficits  (suspect cognitive-linguistic deficits but unable to fully determine due to aphasia)   Rehab Admission Date: 22  Precautions/Restrictions: Fall Risk, O2 dependent (2-3L at baseline)       Pre-Admission Information   Living Status: Lives alone  Occupation/School: [employer]   Medication Management: :  [x]Primary   []Secondary []No  Finance Management:          [x]Primary   []Secondary []No  Active :                        []Yes         [x]No  Hearing:                                 [id number]  Vision:                                    Vision Gross Assessment: WFL; Wears reading glasses     Daily Treatment Info:   Date: 2022     Tx session 1 Tx session 2   Pain Pt denied pain  Pt denied pain. Subjective     Pt found sitting upright in recliner chair with 5L of O2 via NC in place. Pt pleasant and receptive. Pt found sitting upright in recliner chair on 5L of O2 via NC. Pt pleasant and cooperative. Objective:  Goals     Goal 1: Pt will tolerate recommended diet without overt s/s of aspiration. Goal Met     GOAL MET  GOAL MET    Goal 1: Pt will complete auditory comprehension tasks with 80% accuracy. Progressing, continue    Mix of wh-ques during responsive naming task  - 6/15 on initial presentation  - improved to 12/15 (80%) acc with x 1 repetition  Required ongoing explanation of attempted check balancing task due to reduced auditory comprehension. Goal 2: Pt will complete expressive language tasks with 80% accuracy or min cues.  Progressing, continue    Instances of anomia/ word finding because he is not tired enough here because all he has been doing is sitting around   - able to recall increase in Lasix per MD        Goal not targeted this session. Goal 5: Pt will demonstrate use of visual strategies for visual language processing with 80% accuracy or min cues. Progressing, continue    Answering wh-questions provided with chart (schedule planning)  - reduced comprehension with pt internalizing information  Improved ability to come up with words for given categories in category matrix when provided with visual for initial letter and category. Required less repetition during task      Other areas targeted:     Education:   Provided education regarding rationale for tasks and plan of care. Pt verbalized understanding   Pt requires ongoing learning  Provided education regarding rationale for tasks and plan of care. Pt verbalized understanding   Pt requires ongoing learning    Safety Devices: [x] Call light within reach  [x] Chair alarm activated  [] Bed alarm activated  [] Other:  [x] Call light within reach  [x] Chair alarm activated  [] Bed alarm activated  [] Other:           Assessment/Progress/Discharge:   Weekly Update 9/13/22:  Diagnosis: Pt continues with Receptive and Expressive Aphasia characterized by frequent semantic and phonemic paraphasias, anomia, difficulty with repetition of phrase and sentence length information, difficulty answering mildly complex yes/no and wh-questions, reduced utterance length and reduced ability to follow 2+ step auditory directions. Pt benefits from repetition, simplification and visual cues for improved comprehension and processing. Suspect cognitive-linguistic deficits but unable to fully assess due to aphasia. Pt is independent at discharge and is anxious to get back to his PLOF. Discussed assistance and supports recommended at d/c with pt stating he does not want to feel like a burden to his children.  Pt with lack of insight into how his current deficits will impact him in his daily life post d/c. Continue with speech therapy services to improve language and cognitive skills to ensure a safe return back to his prior level of functioning. Current Diet Order:   ADULT DIET; Regular; 5 carb choices (75 gm/meal)            Plan:     Frequency:  5days/week   60 minutes/day    Discharge Recommendations:   Barriers: Limited family support, Cognitive deficit, Limited safety awareness, Limited insight into deficits, Communication deficit, Decreased endurance, and Medication managment  Discharge Recommendations:  24 hour supervision (initial 24 hour supervision then daily check ins, recommend assistance with meds/ finances at d/c -- pt's daughter aware of recommendations)   Continued SLP Treatment:  Yes   Estimated discharge date: 9/21/22    Session 1 Time:    Individual Concurrent Group Co-treatment   Time In 0830      Time Out 0900      Time Code Minutes  15      Minutes 30         Session 2 Time:    Individual Concurrent Group Co-treatment   Time In 0930      Time Out 1000      Time Code Minutes  15      Minutes 30         Timed Code Treatment Minutes: 30  Total Treatment Minutes: 60     Electronically Signed by     Laura MENDES CCC-SLP #[id number] 9/19/2022 10:02 AM  Speech-Language Pathologist

## 2022-09-19 NOTE — PROGRESS NOTES
[address] [facility],[address] Msb Department   Phone: [phone]    Occupational Therapy    [] Initial Evaluation            [x] Daily Treatment Note         [] Discharge Summary      Patient: Maria Del Carmen Peterson   : 1945   MRN: [id number]   Date of Service:  2022    Admitting Diagnosis:  Metabolic encephalopathy/CVA  Current Admission Summary: 79-year-old male with a history of COPD, diabetes, gout, hypertension, and STEF who was admitted on 9/3 with altered mental status. CT head was unremarkable for acute findings. 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 Echo with a normal EF and positive bubble study. Cardiology evaluated and suggested a 30-day monitor and outpatient work-up. He was evaluated by therapy and suggested to continue in an inpatient setting prior to returning home. He is strongly interested in returning to home where he lives alone and was caring for himself independently. Past Medical History:  has a past medical history of Anemia, COPD (chronic obstructive pulmonary disease) ([address] Diabetes mellitus ([address] Edema, GI (gastrointestinal bleed), Gout, Hypertension, Morbid obesity ([address] Neuropathy, Obstructive sleep apnea, and Peripheral vascular disease ([address] Past Surgical History:  has a past surgical history that includes Appendectomy; Total hip arthroplasty; and Colonoscopy (N/A, 2019). Discharge Recommendations:need for 24 hour assist/intermittent checks, HHOT    DME Required For Discharge: pt reports he owns a shower chair and reacher, requested a sock aid - has 02 concentrator but will require new 02 equipment due to increased 02 needs     Precautions/Restrictions: high fall risk, .   Comment: DNR-CC, regular diet   Weight Bearing Restrictions: no restrictions  [] Right Upper Extremity  [] Left Upper Extremity [] Right Lower Extremity  [] Left Lower Extremity     Required Braces/Orthotics: no braces required   [] Right  [] Left  Positional Restrictions:no positional restrictions    Pre-Admission Information - copied over from acute care notes - pt's word finding difficulties limit ability to explain home set up    Lives With: alone                     Type of Home: house  Home Layout: two level, able to live on main level  Home Access:  1 step to enter with handrail. Handrails are located on R side. Bathroom Layout: walk in shower  Bathroom Equipment: grab bars in shower, shower chair, hand held shower head  Toilet Height: elevated height  Home Equipment: rolling walker, single point cane, manual wheelchair, reacher, sock aide, long handled shoe horn, oxygen, . Comment: transport w/c, on 3L O2 all the time, even though Dr. Brian Kilpatrick recommended 2 L  (wooden cane)  Transfer Assistance: Independent without use of device  Ambulation Assistance:modified independent with use of SPC; uses walker when work in garden  ADL Assistance: independent with all ADL's  IADL Assistance: independent with homemaking tasks  Active :        [x] Yes                 [] No  Hand Dominance: [] Left                 [x] Right  Hobbies: Enjoys cooking, baking bread, volunteer at [employer]. [email or web address]  Recent Falls: No recent falls  Pt sleeps in a recliner. 2 daughters in area. 1 dtr is an RN and granddaughter         Subjective  General: pt sitting upright in chair on arrival   - on 3L 02 at rest at start of session. Pt agreeable to OT session but denied all ADLs this date. Agreeable to shower tomorrow. Increased to 6L during activity and throughout session to maintain O2 sats above 90s. Pain: 0/10 and 5/10. Location: R knee  Pain Interventions: repositioned  and use of RW to reduce weight bearing through RLE     . Activities of Daily Living    Basic Activities of Daily Living  General Comments: Denied all ADLs. Instrumental Activities of Daily Living  Health Management: moderate assistance.   Health Management Comments: limited safety awareness of O2 line during mobility - education on proper placement of lines, rest breaks with cuing to maintain O2 sats   Comment: Continued reinforcement for O2 management due to limited carry over and inconsistencies for demonstrating proper safety for health. O2 values listed below during session:   88% on 3L at rest in recliner at start of tx   82% on 6L after ambulation to therapy area (45 second seated rest break to increase to 92%)   92% on 6L while in stance for 4 minutes for pipe building activity   87% on 6L after ambulation from kitchen area to couch within therapy area (~25 ft)   90% on 6L in stance for 2 minutes with RW at kitchen counter   79% on 6L after ambulation back to room (2 minutes to increase to 90%)   91% on 5L at rest in recliner    Functional Mobility  Bed Mobility  Bed mobility not completed on this date. Transfers  Sit to stand transfer:stand by assistance  Stand to sit transfer: stand by assistance  Bed / Chair transfer: stand by assistance. Bed / Chair comments: SBA when using chair with arm rest within therapy gym; Min A for transfers completed on different types of chairs (i.e., no arm rest, one arm rest, lower in height)  Comments: use of RW; nasal canula with lines at all times    Functional Mobility:  Sitting Balance: supervision. Standing Balance: stand by assistance.     Standing Balance Comment: pt c/o R knee pain while in stance  Functional Mobility: .  stand by assistance  Functional Mobility Activity: to/from therapy room  Functional Mobility Device Use: rolling walker  Functional Mobility Comment: use of RW this date and not cane d/t RW decreasing R knee pain during ambulation      Therapeutic activities:  -Pt completed pipe building activity in stance to address activity tolerance, fine motor skills, BUE ROM, sequencing and problem solving; pt had one error and unable to correct with cues  -Completed laundry folding task while in stance to address activity tolerance and BUE ROM  -Pt asked to located 4 unsafe scenarios with kitchen area and correct them. Pt required both max verbal and visual cues as well as an example to correctly perform the task. Pt used RW during task and required cuing for RW placement to prevent tipping and risk of falls. Education:  -Pt educated on [employer]. Pt lifted walker over objects, stepped away from walker during tasks, and had poor hand placement. Verbal and tactile cuing requiring to maintain safety during session.  -Continuous education on breathing techniques. Pt often times breathing via mouth while experiencing desaturation. Cued to breathe in via nose and out mouth.     Cognition  Overall Cognitive Status: Impaired  Arousal/Alterness: appropriate responses to stimuli  Following Commands: follows one step commands with increased time  Attention Span: appears intact  Memory: appears intact  Safety Judgement: decreased awareness of need for safety  Problem Solving: assistance required to generate solutions, assistance required to implement solutions  Insights: decreased awareness of deficits  Comment - pt with decreased safety awareness, does not appear to be aware when 02 saturation is dropping and where O2 line is during ambulation, difficulty with word finding - receptive language difficulties as well    Orientation:    oriented to person, oriented to place, oriented to situation, and disoriented to time   Command Following:   impaired accurately follows one step commands -follows simple one step commands about 75% of time,  following commands improves with visual demonstration     Education  Barriers To Learning: cognition and language  Patient Education: patient educated on goals, OT role and benefits, plan of care, precautions, ADL adaptive strategies  Learning Assessment:  patient verbalizes understanding, would benefit from continued reinforcement, patient will require reinforcement due to cognitive deficits      Assessment  Activity Tolerance: O2 sats fluctuate during activity and in stance - recommend pt being on 6L of O2 during activity to prevent desaturation - see O2 sats values above during session; heavy breathing during activity but pt reported not feeling symptomatic  Impairments Requiring Therapeutic Intervention: decreased ADL status, decreased endurance, decreased IADL  Prognosis: good  Clinical Assessment: Patient is progressing fairly with fair carryover of learned information from previous sessions, specifically regarding O2 line management during mobility and when at rest. Patient required 6 L of 02 during ADLs and frequent rest breaks encouraged by therapist to maintain 02 saturation above 90s. Patient having pain in R knee in stance and ambulation this date resulting in use of RW versus cane to decrease weight bearing in RLE. Patient is primarily limited by endurance and cognitive deficits. Pt is continuing to demonstrate deficits with safety awareness and will benefit from OT services to maximize safety and independence in ADLs, transfers and functional mobility prior to returning home. Continue POC. Safety Interventions: patient left in chair, chair alarm in place, and call light within reach    Plan  Frequency: 5 x/week, 60 min/day  Current Treatment Recommendations: strengthening, balance training, functional mobility training, transfer training, endurance training, neuromuscular re-education, patient/caregiver education, ADL/self-care training, and IADL training    Goals  Patient Goals: pt reports being modest and wishes to complete ADLs independently    Short Term Goals:  Time Frame: 7-10 days   Patient will complete upper body ADL at Independent   Patient will complete lower body ADL at modified independent, .   Comment with use of AE PRN   Patient will complete toileting at Independent   Patient will complete self-feeding at Independent   Patient will complete grooming at Independent Patient will complete functional transfers at modified independent   Patient will complete functional mobility at modified independent   Patient to gather and transport IADL items at modified independent   Patient will demonstrate ability to perform simple IADL tasks (2-3 step tasks) with modified independence. Goals not met 9/19      Therapy Session Time     Individual Group Co-treatment   Time In  1330      Time Out  1430      Minutes  60           Timed Code Treatment Minutes:   60    Total Treatment Minutes:  60     Electronically Signed By: Gee Garcia, S/OT  Supervision/direction Jaren Soriano OTR/L [id number], 9/19/2022, 3:21 PM

## 2022-09-19 NOTE — PATIENT CARE CONFERENCE
[facility]  Inpatient Rehabilitation  Weekly Team Conference Note    Patient Name: Faiza Castellano        MRN: [id number]    : 1945  (78 y.o.)  Gender: male           The team conference for this patient was held on 2022 at 11:00am and led by:  Marisa Whitley MD    CASE MANAGEMENT:  Assessment:  Patient lives alone. Patient is retired. Patient has a daughter/son-n-law who are very supportive and will provide patient supervision and support when needed. Patient will be discharging to home with  family support, home health care PT/OT/SLP and recommended DME. PHYSICAL THERAPY:  Bed Mobility  Comments: Patient sleeps in recliner chair. Transfers  Sit to stand transfer: supervision  Stand to sit transfer: supervision  Comments: Performed with both walking stick and RW. Ambulation  Surface:level surface  Assistive Device: RW  Other Apparatus: DrikOT heart monitor  Assistance: stand by assist  Distance: 270 ft   Gait Mechanics: decreased roxana, decreased step through, widened ZACH  Comments:  PT inquired about need for rest multiple times but pt declined. Did not have any audible SOB. Once seated, SpO2 70% on 3L. Increased to 6L and recovered in 2 minutes. HR elevated to 120s.      Balance  Static Sitting Balance: fair (+): maintains balance at SBA/supervision without use of UE support  Dynamic Sitting Balance: fair (+): maintains balance at SBA/supervision without use of UE support  Static Standing Balance: fair (-): maintains balance at SBA with use of UE support  Dynamic Standing Balance: fair (-): maintains balance at SBA with use of UE support  Patient completes object retrieval from floor in standing position at stand by assistance with RW and reacher  Comments:    QM:  Roll Left and Right  Assistance Needed: Independent  Reason if not Attempted: Patient refused  CARE Score: 6  Discharge Goal: Independent  Sit to Lying  Assistance Needed: Independent  Reason if not Attempted: Patient refused  CARE Score: 6  Discharge Goal: Independent  Lying to Sitting on Side of Bed  Assistance Needed: Independent  Reason if not Attempted: Patient refused  CARE Score: 6  Discharge Goal: Independent  Sit to Stand  Assistance Needed: Independent  CARE Score: 6  Discharge Goal: Independent  Chair/Bed-to-Chair Transfer  Assistance Needed: Independent  CARE Score: 6  Discharge Goal: Independent  Car Transfer  Assistance Needed: Independent  CARE Score: 6  Discharge Goal: Independent  Walk 10 Feet  Assistance Needed: Independent  CARE Score: 6  Discharge Goal: Independent  Walk 50 Feet with Two Turns  Assistance Needed: Independent  CARE Score: 6  Discharge Goal: Independent  Walk 150 Feet  Assistance Needed: Independent  Reason if not Attempted: Not attempted due to medical condition or safety concerns  CARE Score: 6  Discharge Goal: Independent  Walking 10 Feet on Uneven Surfaces  Assistance Needed: Independent  CARE Score: 6  Discharge Goal: Independent  1 Step (Curb)  Assistance Needed: Independent  CARE Score: 6  Discharge Goal: Independent  4 Steps  Assistance Needed: Independent  Reason if not Attempted: Not attempted due to medical condition or safety concerns  CARE Score: 6  Discharge Goal: Independent  12 Steps  Assistance Needed: Independent  Reason if not Attempted: Not attempted due to medical condition or safety concerns  CARE Score: 6  Discharge Goal: Independent  Picking Up Object  Assistance Needed: Independent  CARE Score: 6  Discharge Goal: Independent  Wheelchair Ability  Uses a Wheelchair and/or Scooter?: No    Goals:                   Patient Goals: to go home   Short Term Goals:  Time Frame: to bet met in 7-10 days  Patient will complete stand step transfer at [facility]   Patient will ambulate 200 ft with use of LRAD at [facility]  Patient will ascend/descend 1 stairs with (R) ascending handrail at modified independent  Patient will complete car transfer at modified independent               These goals were reviewed with this patient at the time of assessment and Car Pineda is in agreement. Plan of Care: Pt to be seen 5 out of 7 days per week per ARU protocol ( 60 minutes with PT)                     SPEECH THERAPY:    Diet Level:ADULT DIET; Regular; 5 carb choices (75 gm/meal)    Assessment: Impressions  Diagnosis: Pt making progress towards all goals, auditory comprehension is improving with basic questions/ commands, continues to require reptition and visual for improved comprehension of more complex/ abstract commands. Anomia continues with intermittent phonemic/ semantic paraphasias with pt utilizing the circumlocution strategy effectively and independently. Repetition of single words, phrases and sentences continues to be impaired due to subtype of aphasia (conduction), pt benefits from visual aids for improved repetition. Verbal functional problem solving and fx short term memory appear intact but unable to fully assess due to language deficits. Continue to recommend assistance with medication/ finance management at d/c, pt's daughter in agreement and aware of recommendations. Pt would benefit from initial 24 hour supervision for safety at discharge with recommendation of continued speech therapy for speech/ language and cognition at discharge. Pt v/u and in agreement. Goals:  Pt will complete auditory comprehension tasks with 80% accuracy. Progressing, Goal Not met (69% acc across tasks on inital presentation)  Pt will complete expressive language tasks with 80% accuracy or min cues. Progressing, Goal Not met (75% acc across tasks)  Patient will be instructed in memory strategies to utilize in order to promote recall of newly learned information with >80% min cues. Goal Met (98% acc across tasks, min cues)  Pt will demonstrate use of visual strategies for visual language processing with 80% accuracy or min cues.  Goal Met (88% acc across tasks)  Pt will tolerate recommended diet without overt s/s of aspiration. Goal Met    Plan of Care:  Pt to be seen 5 out of 7 days per week per ARU protocol ( 60 minutes with SLP)    OCCUPATIONAL THERAPY:    ADL: Grooming: stand by assistance  Grooming Comments: in stance at sink for 6 minutes - brushed teeth and shaved face  Upper Extremity Bathing: supervision  Lower Extremity Bathing: stand by assistance   Bathing Equipment: none  Bathing Comments: seated on tub transfer bench - standing PRN for LB bathing - IV covered/waterproofed prior to bathing  Upper Extremity Dressing: supervision  Lower Extremity Dressing: moderate assistance  Dressing Equipment: reacher, sock aide  Dressing Comments: dep for donning compression stockings - use of sock aide to colleen socks with verbal cues, and required mod assistance to rotate R sock due to not being placed on foot properly - difficulty threading LLE through pant leg this date requiring assistance and increased time - seated on tub transfer bench for dressing and standing PRN for LB clothing management over hips with SBA and use of L grab bar  General Comments: Pt educated on line management during ADLs and mobility to prevent falling - pt moved O2 line properly with repeated. Continued education on rest breaks during activity to maintain O2 sats. O2 sats during session tracked below:   95% on 5L at rest at start of tx while seated  84% on 6L after ambulation to bathroom pre- showering (90 seconds to increase to 93%)  85% on 6L after shower and LB dressing (pt encouraged to take a rest break (90 seconds seated to increase to 93%)  93% on 6L after standing at bathroom sink for 6 minutes and ambulating back to chair  O2 decreased back to 5L while seated and O2 stabilized  Pt donned socks second time with sock aid causing O2 to drop to 87% on 5L (~90 seconds to recover to 92%)  Comments: Pt had labored breathing after showering and dressing.  D/t to decrease in O2 while seating for donning socks, recommended to keep pt on 6L for all activity. Toilet Transfers:SBA     Tub/ShowerTransfers:SBA     QM:  Eating  Assistance Needed: Independent  CARE Score: 6  Discharge Goal: Independent  Oral Hygiene  Assistance Needed: Supervision or touching assistance  Reason if not Attempted: Patient refused  CARE Score: 7  Discharge Goal: [address] needed: Independent  Comment: required assistance with clothing management only  CARE Score: 6  Discharge Goal: Independent  Toilet Transfer  Assistance needed: Supervision or touching assistance  Comment: standing holding urinal  CARE Score: 4  Discharge Goal: Independent  Shower/Bathe Self  Assistance Needed: Supervision or touching assistance  CARE Score: 4  Discharge Goal: Independent  Upper Body Dressing  Assistance Needed: Supervision or touching assistance  Reason if not Attempted: Patient refused  CARE Score: 7  Discharge Goal: Independent  Lower Body Dressing  Assistance Needed: Partial/moderate assistance  Comment: use of reacher  Reason if not Attempted: Patient refused  CARE Score: 7  Discharge Goal: Independent  Putting On/Taking Off Footwear  Assistance Needed: Partial/moderate assistance  Comment: ace wraps bilateral LE  CARE Score: 3  Discharge Goal: Independent    Goals:               :Patient Goals: pt reports being modest and wishes to complete ADLs independently    Short Term Goals:  Time Frame: 7-10 days   Patient will complete upper body ADL at Independent   Patient will complete lower body ADL at modified independent, .   Comment with use of AE PRN   Patient will complete toileting at Independent   Patient will complete self-feeding at [facility]   Patient will complete grooming at Independent   Patient will complete functional transfers at [facility]   Patient will complete functional mobility at modified independent   Patient to gather and transport IADL items at modified independent   Patient will demonstrate ability to perform simple IADL tasks (2-3 step tasks) with modified independence.    :  These goals were reviewed with this patient at the time of assessment and Prudence Lobe is in agreement    Plan of Care:  Pt to be seen 5 out of 7 days per week per ARU protocol ( 60 minutes with OT)     NUTRITION:  Weight: (!) 328 lb 11.2 oz (149.1 kg) / Body mass index is 43.37 kg/m². Diet Order:CCC    Supplements:NA    Nutrition status is stable AEB on-going good appetite reported, with po intake greater than 75% most meals consistently. Skin remains intact. Please see nutrition note for details. NURSING:    Risk for Readmission: 20%    Tsai Fall Risk Score: 60  Wounds/Incisions/Ulcers: None. Medication Review: Reviewed daily with patient. Pain: Managed with and without medications. Consultations/Labs/X-rays: Pulmonology (last note 9/19). BMP & CBC every Monday & Thursday. CXR (9/14). Patient/Family Education provided by team:    Discharge Plan   Estimated Length of Stay:1 days  Destination: Home  Pass:No  Services at Discharge: [facility] PT, [facility] at Discharge: RW, shower chair (owns)  Factors facilitating achievement of predicted outcomes: cooperative, pleasant, family support  Barriers to the achievement of predicted outcomes: cognition, aphasia    Patient Goals: To go home and be able to complete ADLs independently. Interdisciplinary Individualized Plan of Care Review of Previous Week:    Medical and functional progress towards goals:  Medically doing ok, but will need O2 supply that goes up to 6L at discharge. Pt still limited by decreased O2.   Endurance is improved with the use of a walker with ambulation and standing, basic comprehension progressing with repetition and visual aides  Barriers towards progress:  Decreased activity tolerance, O2 limited and need to be on 6L  Interventions to address Barriers:  Continue PT/OT focus on increasing activity tolerance, continue use of O2, get pt set up with tank that goes to 6L O2. Goals still appropriate:  Yes  Modifications to goals: None  Continue Current Plan of Care:  Yes  Modifications to Plan of Care: None    Rehab Team Members in attendance for Team Conference:  Maddi Archer MSW, LSW    Rivas Thurman, RD, LD    Krzysztof Argueta, OTR/OTIS Chase, PT, DPT    Sanam Cutler M.A., KASH Cooper PT, DPT,     I approve the established interdisciplinary plan of care as documented within the medical record of Cyndi Barnes.     Selena Castle MD   Electronically signed by Selena Castle MD on 9/20/2022 at 11:19 AM

## 2022-09-19 NOTE — PLAN OF CARE
Problem: Discharge Planning  Goal: Discharge to home or other facility with appropriate resources  9/19/2022 1027 by Augusto Márquez RN  Outcome: Progressing  9/19/2022 0234 by Broderick Tohmas RN  Outcome: Progressing  Flowsheets (Taken 9/18/2022 2114)  Discharge to home or other facility with appropriate resources:   Identify barriers to discharge with patient and caregiver   Identify discharge learning needs (meds, wound care, etc)     Problem: Safety - Adult  Goal: Free from fall injury  9/19/2022 1027 by Augusto Márquez RN  Outcome: Progressing  9/19/2022 0234 by Broderick Thomsa RN  Outcome: Progressing  Flowsheets (Taken 9/19/2022 0233)  Free From Fall Injury: Instruct family/caregiver on patient safety     Problem: ABCDS Injury Assessment  Goal: Absence of physical injury  9/19/2022 1027 by Augusto Márquez RN  Outcome: Progressing  9/19/2022 0234 by Broderick Thomas RN  Outcome: Progressing  Flowsheets (Taken 9/19/2022 0233)  Absence of Physical Injury: Implement safety measures based on patient assessment     Problem: Pain  Goal: Verbalizes/displays adequate comfort level or baseline comfort level  9/19/2022 1027 by Augusto Márquez RN  Outcome: Progressing  9/19/2022 0234 by Broderick Thomas RN  Outcome: Progressing     Problem: Chronic Conditions and Co-morbidities  Goal: Patient's chronic conditions and co-morbidity symptoms are monitored and maintained or improved  9/19/2022 1027 by Augusto Márquez RN  Outcome: Progressing  9/19/2022 0234 by Broderick Thomas RN  Outcome: Progressing  Flowsheets (Taken 9/18/2022 2114)  Care Plan - Patient's Chronic Conditions and Co-Morbidity Symptoms are Monitored and Maintained or Improved: Monitor and assess patient's chronic conditions and comorbid symptoms for stability, deterioration, or improvement     Problem: Skin/Tissue Integrity  Goal: Absence of new skin breakdown  Description: 1. Monitor for areas of redness and/or skin breakdown  2.   Assess vascular access sites hourly  3. Every 4-6 hours minimum:  Change oxygen saturation probe site  4. Every 4-6 hours:  If on nasal continuous positive airway pressure, respiratory therapy assess nares and determine need for appliance change or resting period.   9/19/2022 1027 by Eric Man RN  Outcome: Progressing  9/19/2022 0234 by Gaston Farias RN  Outcome: Progressing

## 2022-09-19 NOTE — DISCHARGE INSTR - COC
Continuity of Care Form    Patient Name: Emeterio Ward   :  1945  MRN:  [id number]    Admit date:  2022  Discharge date:  22    Code Status Order: DNR-CC   Advance Directives:     Admitting Physician:  Unruly Irby MD  PCP: Jimmy Kiser MD    Discharging Nurse: [facility] Unit/Room#: [id number]/0842-45  Discharging Unit Phone Number: [phone]    Emergency Contact:   Extended Emergency Contact Information  Primary Emergency Contact: Makayla Urban  Home Phone: [phone]  Relation: Child  Secondary Emergency Contact: Makayla Urban  Home Phone: [phone]  Relation: Child    Past Surgical History:  Past Surgical History:   Procedure Laterality Date    APPENDECTOMY      COLONOSCOPY N/A 2019    COLONOSCOPY DIAGNOSTIC performed by Marissa Zhu MD at [facility]       Immunization History:   Immunization History   Administered Date(s) Administered    COVID-19, PFIZER PURPLE top, DILUTE for use, (age 15 y+), 30mcg/0.3mL 2021, 2021, 10/06/2021    DTaP 10/31/2014    Influenza Vaccine, unspecified formulation 2016    Influenza Virus Vaccine 2016    Influenza, FLUZONE (age 72 y+), High Dose, 0.7mL 10/21/2020    Influenza, High Dose (Fluzone 65 yrs and older) 10/04/2017, 10/04/2019    Pneumococcal Conjugate 13-valent (Uhiuycv68) 2015    Pneumococcal Polysaccharide (Nmqwsoejp86) 2018    Zoster Live (Zostavax) 2016       Active Problems:  Patient Active Problem List   Diagnosis Code    HTN (hypertension), benign I10    Syncope R55    DM (diabetes mellitus), type 2, uncontrolled with complications ([facility] H06.7, E11.65    Arthritis M19.90    Chronic respiratory failure ([facility] [address] J96.10    STEF (obstructive sleep apnea) G47.33    Venous insufficiency I87.2    Acute sinusitis J01.90    Morbid obesity ([facility] [address] E66.01    Vitamin D deficiency E55.9    Open back wound S21.209A COPD, severe ([facility] [address] J44.9    HLD (hyperlipidemia) S83.2    Diastolic dysfunction C48.44    Aneurysm of abdominal aorta (HCC) I71.4    Personal history of tobacco use, presenting hazards to health Z87.891    Encephalopathy G93.40    Altered mental status R41.82    Cerebrovascular accident (CVA) due to thrombosis of left middle cerebral artery (HCC) I63.312    Acute on chronic respiratory failure with hypoxia and hypercapnia ([facility]) J96.21, J96.22    Cerebrovascular accident (CVA) ([facility] [address] I63.9    Chronic heart failure with preserved ejection fraction (HCC) I50.32    Ventricular ectopy I49.3    Leg swelling M79.89    TGA (transient global amnesia) W55.2    Metabolic encephalopathy I91.16       Isolation/Infection:   Isolation            No Isolation          Patient Infection Status       Infection Onset Added Last Indicated Last Indicated By Review Planned Expiration Resolved Resolved By    None active    Resolved    COVID-19 (Rule Out) 09/03/22 09/03/22 09/03/22 COVID-19, Rapid (Ordered)   09/03/22 Rule-Out Test Resulted            Nurse Assessment:  Last Vital Signs: /60   Pulse 74   Temp 97.8 °F (36.6 °C) (Oral)   Resp 18   Ht 6' 1\" (1.854 m)   Wt (!) 325 lb 14.4 oz (147.8 kg)   SpO2 98%   BMI 43.00 kg/m²     Last documented pain score (0-10 scale): Pain Level: 0  Last Weight:   Wt Readings from Last 1 Encounters:   09/19/22 (!) 325 lb 14.4 oz (147.8 kg)     Mental Status:  oriented, alert, coherent, logical, thought processes intact, able to concentrate and follow conversation, and sometimes has trouble finding words     IV Access:  - None    Nursing Mobility/ADLs:  Walking   Assisted  Transfer  Assisted  Bathing  Assisted  Dressing  Assisted  Toileting  Assisted  Feeding  Independent  Med Admin  Assisted  Med Delivery   whole    Wound Care Documentation and Therapy:  Wound 07/07/14 Other (Comment) Back Mid #1 (Active)   Number of days: 2996        Elimination:  Continence:    Bowel: Yes  Bladder: Yes  Urinary Catheter: None   Colostomy/Ileostomy/Ileal Conduit: No       Date of Last BM: 9/20/22    Intake/Output Summary (Last 24 hours) at 9/19/2022 1456  Last data filed at 9/19/2022 1253  Gross per 24 hour   Intake 1145 ml   Output 3250 ml   Net -2105 ml     I/O last 3 completed shifts: In: 8005 [P.O.:3860; I.V.:15]  Out: 5700 [Urine:5700]    Safety Concerns: At Risk for Falls    Impairments/Disabilities:      Speech (has trouble finding words)    Nutrition Therapy:  Current Nutrition Therapy:   - Oral Diet:  Carb Control 5 carbs/meal (2000kcals/day)    Routes of Feeding: Oral  Liquids: Thin Liquids  Daily Fluid Restriction: no  Last Modified Barium Swallow with Video (Video Swallowing Test): not done    Treatments at the Time of Hospital Discharge:   Respiratory Treatments: nebulizer, oxygen, cpap   Oxygen Therapy:  is on oxygen at 5 L/min per nasal cannula.   Ventilator:    - CPAP   only when sleeping; from home    Rehab Therapies: Physical Therapy, Occupational Therapy, and Speech/Language Therapy  Weight Bearing Status/Restrictions: No weight bearing restrictions  Other Medical Equipment (for information only, NOT a DME order):  NIV, bariatric rolling walker  Other Treatments: none    Patient's personal belongings (please select all that are sent with patient):  Glasses, cane, cell phone, 30 day heart monitor, clothes, shoes, cpap    RN SIGNATURE:  Electronically signed by Alley Ferris RN on 9/21/22 at 9:50 AM EDT    CASE MANAGEMENT/SOCIAL WORK SECTION    Inpatient Status Date: 9/9/2022    Readmission Risk Assessment Score:  Readmission Risk              Risk of Unplanned Readmission:  19           Discharging to Facility/ Agency   Name: [facility]  Address: [address], [address] [place] [address],  Sara Jovannybrenda  Phone: [phone]    Discharging to Facility/ Agency   Name: [facility] [place] [facility]  Phone: [phone]  Fax: [phone]    Discharging to Facility/

## 2022-09-19 NOTE — PLAN OF CARE
Problem: Discharge Planning  Goal: Discharge to home or other facility with appropriate resources  Outcome: Progressing  Flowsheets (Taken 9/18/2022 2114)  Discharge to home or other facility with appropriate resources:   Identify barriers to discharge with patient and caregiver   Identify discharge learning needs (meds, wound care, etc)     Problem: Safety - Adult  Goal: Free from fall injury  Outcome: Progressing  Flowsheets (Taken 9/19/2022 0233)  Free From Fall Injury: Instruct family/caregiver on patient safety     Problem: ABCDS Injury Assessment  Goal: Absence of physical injury  Outcome: Progressing  Flowsheets (Taken 9/19/2022 0233)  Absence of Physical Injury: Implement safety measures based on patient assessment     Problem: Pain  Goal: Verbalizes/displays adequate comfort level or baseline comfort level  Outcome: Progressing     Problem: Chronic Conditions and Co-morbidities  Goal: Patient's chronic conditions and co-morbidity symptoms are monitored and maintained or improved  Outcome: Progressing  Flowsheets (Taken 9/18/2022 2114)  Care Plan - Patient's Chronic Conditions and Co-Morbidity Symptoms are Monitored and Maintained or Improved: Monitor and assess patient's chronic conditions and comorbid symptoms for stability, deterioration, or improvement     Problem: Skin/Tissue Integrity  Goal: Absence of new skin breakdown  Description: 1. Monitor for areas of redness and/or skin breakdown  2. Assess vascular access sites hourly  3. Every 4-6 hours minimum:  Change oxygen saturation probe site  4. Every 4-6 hours:  If on nasal continuous positive airway pressure, respiratory therapy assess nares and determine need for appliance change or resting period.   Outcome: Progressing

## 2022-09-19 NOTE — PROGRESS NOTES
shift:  In: 180 [P.O.:180]  Out: 625 [Urine:625]     PHYSICAL EXAM:  CONSTITUTIONAL: He is a 78y.o.-year-old who appears his stated age. He is alert and oriented x 3 and in no acute distress. CARDIOVASCULAR: S1 S2 RRR. Without murmer  RESPIRATORY & CHEST: Lungs are clear to auscultation and percussion. No wheezing, no crackles. Good air movement  GASTROINTESTINAL & ABDOMEN: Soft, nontender, positive bowel sounds in all quadrants, non-distended, without hepatosplenomegaly. GENITOURINARY: Deferred. MUSCULOSKELETAL: No tenderness to palpation of the axial skeleton. There is no clubbing. No cyanosis. No edema of the lower extremities. SKIN OF BODY: No rash or jaundice. PSYCHIATRIC EVALUATION: Normal affect. Patient answers questions appropriately. HEMATOLOGIC/LYMPHATIC/ IMMUNOLOGIC: No palpable lymphadenopathy. NEUROLOGIC: Alert and oriented x 3. Groslly non-focal. Motor strength is 5+/5 in all muscle groups. The patient has a normal sensorium globally.       LABS:   Lab Results   Component Value Date    WBC 5.1 09/19/2022    HGB 10.7 (L) 09/19/2022    HCT 32.4 (L) 09/19/2022     09/19/2022    CHOL 121 09/06/2022    TRIG 81 09/06/2022    HDL 62 (H) 09/06/2022    ALT 6 (L) 09/10/2022    AST 8 (L) 09/10/2022     09/19/2022    K 4.5 09/19/2022     09/19/2022    CREATININE 1.3 09/19/2022    BUN 45 (H) 09/19/2022    CO2 30 09/19/2022    TSH 2.83 10/03/2013    PSA 0.20 04/17/2013       Lab Results   Component Value Date    GLUCOSE 121 (H) 09/19/2022    CALCIUM 8.9 09/19/2022     09/19/2022    K 4.5 09/19/2022    CO2 30 09/19/2022     09/19/2022    BUN 45 (H) 09/19/2022    CREATININE 1.3 09/19/2022       Lab Results   Component Value Date    GLUCOSE 121 (H) 09/19/2022    CALCIUM 8.9 09/19/2022     09/19/2022    K 4.5 09/19/2022    CO2 30 09/19/2022     09/19/2022    BUN 45 (H) 09/19/2022    CREATININE 1.3 09/19/2022       IMPRESSION:   Acute on chronic hypoxic and hypercapnic respiratory failure  Severe COPD, FEV1 37%  STEF on CPAP, noncompliant  Left MCA CVA   Patent valverde ovale      RECOMMENDATION:   Patient seems to be at his baseline pulmonary status. Clinically improved. He is not fully compliant with CPAP. Currently not in exacerbation of COPD. Continue Dulera and Spiriva and duo nebs for COPD  Undergoing rehab for left MCA CVA. Also noted to have PFO. No plans for closure per cardiology due to questionable benefit. Pulmonary will sign off. Patient will follow-up with pulmonary as outpatient. Cash Eastman MD  Pulmonary Critical Care and Sleep Medicine  9/19/2022, 12:36 PM    This note was completed using dragon medical speech recognition software. Grammatical errors, random word insertions, pronoun errors and incomplete sentences are occasional consequences of this technology due to software limitations. If there are questions or concerns about the content of this note of information contained within the body of this dictation they should be addressed with the provider for clarification.

## 2022-09-19 NOTE — PROGRESS NOTES
Tami Km  9/19/2022  [id number]    Chief Complaint: Metabolic encephalopathy    Subjective:   No overnight events. No current complaints. Has home CPAP unit here but reports not using it. BP has been soft. Labs reviewed. ROS: No CP. Mild SOB and dyspnea with exertion    Objective:  Patient Vitals for the past 24 hrs:   BP Temp Temp src Pulse Resp SpO2 Weight   09/19/22 0514 -- -- -- -- -- -- (!) 325 lb 14.4 oz (147.8 kg)   09/19/22 0226 -- -- -- 73 -- 93 % --   09/19/22 0131 -- -- -- 83 -- 93 % --   09/19/22 0031 -- -- -- 71 -- 93 % --   09/19/22 0009 -- -- -- 73 -- 91 % --   09/18/22 2114 (!) 106/56 98.2 °F (36.8 °C) Oral 75 19 92 % --   09/18/22 2039 -- -- -- -- -- 95 % --   09/18/22 1533 -- -- -- 76 20 94 % --   09/18/22 1148 -- -- -- 85 20 95 % --   09/18/22 1146 (!) 101/58 -- -- -- -- -- --   09/18/22 1045 (!) 101/58 -- -- 76 -- -- --   09/18/22 0911 -- -- -- 76 20 90 % --       Gen: No distress, pleasant. Resting in bedside chair  HEENT: Normocephalic, atraumatic. CV: Regular rate and rhythm. No MRG   Resp: No respiratory distress. Decreased BS diffusely, 4L O2 per NC  Abd: Soft, nontender nondistended  Ext: +2 pitting BLE edema - slightly improved. Neuro: Alert, oriented, appropriately interactive. Laboratory data: Available via EMR.      Therapy progress:    PT    Supine to Sit:    Sit to Supine:     Sit to Stand: Supervision or touching assistance  Chair/Bed to Chair Transfer: Supervision or touching assistance  Car Transfer: Partial/moderate assistance  Ambulation 10 ft: Supervision or touching assistance  Ambulation 50 ft: Supervision or touching assistance  Ambulation 150 ft: Supervision or touching assistance  Stairs - 1 Step: Supervision or touching assistance  Stairs - 4 Step: Supervision or touching assistance  Stairs - 12 Step: Supervision or touching assistance    OT    Eating: Independent  Oral Hygiene: Supervision or touching assistance  Bathing: Supervision or touching [facility])    Cerebrovascular accident (CVA) ([address]    Chronic heart failure with preserved ejection fraction (HCC)    Ventricular ectopy    Leg swelling    TGA (transient global amnesia)    Metabolic encephalopathy       Plan:   Acute left temporal lobe infarct  - ASA, statin. PT/OT/SLP     Chronic hypoxia/hypercapnia  - NIV/AVAPs ventilation to be organized at the time of discharge. Not using BiPAP overnight     COPD  - inhalers as ordered. Pulm evaluated. Severe COPD.     PFO/ASD  - Outpatient follow up. [facility]     HTN  - lisinopril 2.5, toprol 12.5 - d/c     DM  - DM diet. consider resumption of metformin if needed     EOH abuse  - no evidence of withdrawal    BLE edema  - PO 60 BID increase to 80 BID PO.  - s/p 40 lasix x1    Bowel: Per protocol  Bladder: Per protocol  Sleep: Trazodone PRN  Pain: tylenol PRN  DVT PPx: lovenox  PAULINA: 9/21    Electronically signed by Nayeli Dubon MD on 9/19/2022 at 8:19 AM      * This document was created using dictation software. While all precautions were taken to ensure accuracy, errors may have occurred. Please disregard any typographical errors.

## 2022-09-20 PROCEDURE — 6360000002 HC RX W HCPCS: Performed by: PHYSICAL MEDICINE & REHABILITATION

## 2022-09-20 PROCEDURE — 6370000000 HC RX 637 (ALT 250 FOR IP): Performed by: PHYSICAL MEDICINE & REHABILITATION

## 2022-09-20 PROCEDURE — 1280000000 HC REHAB R&B

## 2022-09-20 PROCEDURE — 92507 TX SP LANG VOICE COMM INDIV: CPT

## 2022-09-20 PROCEDURE — 6370000000 HC RX 637 (ALT 250 FOR IP): Performed by: INTERNAL MEDICINE

## 2022-09-20 PROCEDURE — 94680 O2 UPTK RST&XERS DIR SIMPLE: CPT

## 2022-09-20 PROCEDURE — 94761 N-INVAS EAR/PLS OXIMETRY MLT: CPT

## 2022-09-20 PROCEDURE — 2580000003 HC RX 258: Performed by: PHYSICAL MEDICINE & REHABILITATION

## 2022-09-20 PROCEDURE — 97530 THERAPEUTIC ACTIVITIES: CPT

## 2022-09-20 PROCEDURE — 97129 THER IVNTJ 1ST 15 MIN: CPT

## 2022-09-20 PROCEDURE — 97116 GAIT TRAINING THERAPY: CPT

## 2022-09-20 PROCEDURE — 97130 THER IVNTJ EA ADDL 15 MIN: CPT

## 2022-09-20 PROCEDURE — 94640 AIRWAY INHALATION TREATMENT: CPT

## 2022-09-20 PROCEDURE — 97110 THERAPEUTIC EXERCISES: CPT

## 2022-09-20 PROCEDURE — 2700000000 HC OXYGEN THERAPY PER DAY

## 2022-09-20 PROCEDURE — 97535 SELF CARE MNGMENT TRAINING: CPT

## 2022-09-20 RX ADMIN — LISINOPRIL 2.5 MG: 5 TABLET ORAL at 10:11

## 2022-09-20 RX ADMIN — Medication 2 PUFF: at 05:39

## 2022-09-20 RX ADMIN — Medication 10 ML: at 21:32

## 2022-09-20 RX ADMIN — IPRATROPIUM BROMIDE AND ALBUTEROL SULFATE 1 AMPULE: 2.5; .5 SOLUTION RESPIRATORY (INHALATION) at 11:43

## 2022-09-20 RX ADMIN — ROSUVASTATIN 20 MG: 20 TABLET, FILM COATED ORAL at 21:28

## 2022-09-20 RX ADMIN — ENOXAPARIN SODIUM 30 MG: 100 INJECTION SUBCUTANEOUS at 21:27

## 2022-09-20 RX ADMIN — ENOXAPARIN SODIUM 30 MG: 100 INJECTION SUBCUTANEOUS at 10:10

## 2022-09-20 RX ADMIN — IPRATROPIUM BROMIDE AND ALBUTEROL SULFATE 1 AMPULE: 2.5; .5 SOLUTION RESPIRATORY (INHALATION) at 15:42

## 2022-09-20 RX ADMIN — Medication 10 ML: at 10:12

## 2022-09-20 RX ADMIN — IPRATROPIUM BROMIDE AND ALBUTEROL SULFATE 1 AMPULE: 2.5; .5 SOLUTION RESPIRATORY (INHALATION) at 05:39

## 2022-09-20 RX ADMIN — PREDNISONE 5 MG: 5 TABLET ORAL at 10:11

## 2022-09-20 RX ADMIN — TIOTROPIUM BROMIDE INHALATION SPRAY 2 PUFF: 3.12 SPRAY, METERED RESPIRATORY (INHALATION) at 05:39

## 2022-09-20 RX ADMIN — TRAZODONE HYDROCHLORIDE 50 MG: 50 TABLET ORAL at 23:07

## 2022-09-20 RX ADMIN — ASPIRIN 81 MG 81 MG: 81 TABLET ORAL at 10:11

## 2022-09-20 RX ADMIN — ALLOPURINOL 200 MG: 100 TABLET ORAL at 10:11

## 2022-09-20 RX ADMIN — IPRATROPIUM BROMIDE AND ALBUTEROL SULFATE 1 AMPULE: 2.5; .5 SOLUTION RESPIRATORY (INHALATION) at 21:02

## 2022-09-20 RX ADMIN — GABAPENTIN 300 MG: 300 CAPSULE ORAL at 21:28

## 2022-09-20 RX ADMIN — GABAPENTIN 300 MG: 300 CAPSULE ORAL at 16:19

## 2022-09-20 RX ADMIN — Medication 100 MG: at 10:10

## 2022-09-20 RX ADMIN — FUROSEMIDE 80 MG: 40 TABLET ORAL at 10:11

## 2022-09-20 RX ADMIN — FUROSEMIDE 80 MG: 40 TABLET ORAL at 16:19

## 2022-09-20 RX ADMIN — GABAPENTIN 300 MG: 300 CAPSULE ORAL at 10:10

## 2022-09-20 RX ADMIN — Medication 2 PUFF: at 21:02

## 2022-09-20 ASSESSMENT — PAIN SCALES - GENERAL
PAINLEVEL_OUTOF10: 0
PAINLEVEL_OUTOF10: 0

## 2022-09-20 NOTE — CARE COORDINATION
VERONICA spoke with patient's daughter Chacha Locke. Chacha Locke requesting that patient discharge @ 4:00 PM on 9/20/2022. SW informed daughter that all discharge needs (home health care and DME) have been addressed.     Electronically signed by FRANCA Turner on 9/20/2022 at 5:15 PM

## 2022-09-20 NOTE — PROGRESS NOTES
Oswald Waddell  9/20/2022  [id number]    Chief Complaint: Metabolic encephalopathy    Subjective:   No overnight events. No current complaints. Has home CPAP unit but only wore for a few hours. BP has been soft. Labs reviewed. Daughter doesn't believe his home concentrator gets up past 5LPM. He's requiring 6L with exercise. Deficits from stroke include: decreased cognition and decreased safety and balance. ROS: No CP. Mild SOB and dyspnea with exertion    Objective:  Patient Vitals for the past 24 hrs:   BP Temp Temp src Pulse Resp SpO2 Weight   09/20/22 0600 -- -- -- -- -- -- (!) 328 lb 11.2 oz (149.1 kg)   09/20/22 0539 -- -- -- -- -- 92 % --   09/20/22 0302 -- -- -- 68 -- 93 % --   09/20/22 0244 -- -- -- 74 -- 93 % --   09/20/22 0148 -- -- -- 73 -- 91 % --   09/20/22 0040 -- -- -- -- -- 93 % --   09/19/22 2118 (!) 94/55 -- -- -- -- -- --   09/19/22 2055 -- -- -- -- -- 93 % --   09/19/22 1945 (!) 92/53 98.1 °F (36.7 °C) Oral 94 18 93 % --   09/19/22 1733 (!) 160/63 98.2 °F (36.8 °C) Oral 84 17 95 % --   09/19/22 0900 106/60 97.8 °F (36.6 °C) Oral 74 18 98 % --       Gen: No distress, pleasant. Resting in bedside chair  HEENT: Normocephalic, atraumatic. CV: Regular rate and rhythm. No MRG   Resp: No respiratory distress. Decreased BS diffusely, 4L O2 per NC  Abd: Soft, nontender nondistended  Ext: +2 pitting BLE edema   Neuro: Alert, oriented, appropriately interactive. Laboratory data: Available via EMR.      Therapy progress:    PT    Supine to Sit:    Sit to Supine:     Sit to Stand: Supervision or touching assistance  Chair/Bed to Chair Transfer: Supervision or touching assistance  Car Transfer: Partial/moderate assistance  Ambulation 10 ft: Supervision or touching assistance  Ambulation 50 ft: Supervision or touching assistance  Ambulation 150 ft: Supervision or touching assistance  Stairs - 1 Step: Supervision or touching assistance  Stairs - 4 Step: Supervision or touching assistance  Stairs - 12 Step: Supervision or touching assistance    OT    Eating: Independent  Oral Hygiene: Supervision or touching assistance  Bathing: Supervision or touching assistance  Upper Body Dressing: Supervision or touching assistance  Lower Body Dressing: Partial/moderate assistance  Toilet Transfer: Supervision or touching assistance  Toilet Hygiene: Independent    Speech Therapy    Pt continues with Receptive and Expressive Aphasia characterized by frequent semantic and phonemic paraphasias, anomia, difficulty with repetition of phrase and sentence length information, difficulty answering mildly complex yes/no and wh-questions, reduced utterance length and reduced ability to follow 2+ step auditory directions. Pt benefits from repetition, simplification and visual cues for improved comprehension and processing. Suspect cognitive-linguistic deficits but unable to fully assess due to aphasia. Pt is independent at discharge and is anxious to get back to his PLOF. Discussed assistance and supports recommended at d/c with pt stating he does not want to feel like a burden to his children. Pt with lack of insight into how his current deficits will impact him in his daily life post d/c. Continue with speech therapy services to improve language and cognitive skills to ensure a safe return back to his prior level of functioning. Body mass index is 43.37 kg/m².     Assessment:  Patient Active Problem List   Diagnosis    HTN (hypertension), benign    Syncope    DM (diabetes mellitus), type 2, uncontrolled with complications (HCC)    Arthritis    Chronic respiratory failure (HCC)    STEF (obstructive sleep apnea)    Venous insufficiency    Acute sinusitis    Morbid obesity ([address]    Vitamin D deficiency    Open back wound    COPD, severe ([address]    HLD (hyperlipidemia)    Diastolic dysfunction    Aneurysm of abdominal aorta (HCC)    Personal history of tobacco use, presenting hazards to health    Encephalopathy    Altered mental status

## 2022-09-20 NOTE — DISCHARGE SUMMARY
Swathi Oneil RCP   Respiratory Therapist   Specialty:  Respiratory Therapy   Progress Notes       Signed   Date of Service:  9/20/2022  6:33 PM                 Signed                         09/20/22 1820   Resting (On O2)   SpO2 93   HR 89   O2 Device Nasal cannula   O2 Flow Rate (l/min) 3 l/min   Comments home oxygen   During Walk (On O2)   SpO2 92      O2 Device    (high flow nasal cannula)   O2 Flow Rate (l/min) 15 l/min   Need Additional O2 Flow Rate Rows Yes   O2 Flow Rate (l/min) 14 l/min   O2 Saturation 90   O2 Flow Rate (l/min) 13 l/min   O2 Saturation 88   O2 Flow Rate (l/min) 12 l/min   O2 Saturation 87   O2 Flow Rate (l/min) 11 l/min   O2 Saturation 85   O2 Flow Rate (l/min) 10 l/min   O2 Saturation 83   Walk/Assistance Device Cane   After Walk   SpO2 98   HR 95   O2 Device    (high flow nasal cannula)   O2 Flow Rate (l/min) 10 l/min   Does the Patient Qualify for Home O2 Yes

## 2022-09-20 NOTE — PROGRESS NOTES
Assessment complete. Alert, oriented x4. Denies pain. Patient looking forward to anticipated discharge in a couple days. Had difficulty sleeping last night. Requesting sleep aid. Given PRN Trazodone. Wanting to wait a little longer before donning CPAP. Ace wrap removed from bilateral legs. Patient encouraged to elevate legs while sitting in chair. Heart monitor in place to left chest. The care plan and education has been reviewed and mutually agreed upon with the patient. In bed, alarm on, bed in lowest position, call light and table within reach. No further needs expressed at this time. 2305  Ambulated to bathroom using cane and standby assistance. Voided. CPAP placed by Alanis Marquez RCP.    0100  Patient unable to tolerate wearing CPAP any longer tonight. Nasal cannula replaced. Alanis Marquez RCP notified. 0700  Patient reports that he is to have a shower this morning. Awaiting legs to be re-wrapped until after shower. Heart monitor fully charged. Monitor reapplied to left chest. Monitor phone fully charged (on ).

## 2022-09-20 NOTE — CARE COORDINATION
SW met with patient and patient reporting that [facility] ([phone]) is patient's home O2 provider. VERONICA spoke with Charly Alvarez, [facility] Representative regarding Home O2 for patient. Per Chart review: 9/07/2022 Pulmonary stating that, Patient will benefit from high flow O2 concentrator at home, whereby we will be able to titrate O2 up to 10 L. [facility] requesting that patient have qualifying oxygen testing, and updated orders for increased liter flow. Patient now requiring 5 liters. VERONICA gave all needed documentation to the ARU Charge RN and requested that respiratory be consulted. SW will continue to follow until discharge is complete.     Electronically signed by FRANCA Escobar Do on 9/20/2022 at 5:04 PM

## 2022-09-20 NOTE — PROGRESS NOTES
[facility]   Phone: [phone]    Speech Therapy    [] Initial Evaluation            [x] Daily Treatment Note         [x] Discharge Summary      Patient: Chetna Blanton   : 1945   MRN: [id number]          Admitting Diagnosis: Metabolic encephalopathy  Treatment Diagnosis:  Receptive Aphasia , Expressive Aphasia , and Cognitive-Linguistic Deficits  (suspect cognitive-linguistic deficits but unable to fully determine due to aphasia)   Rehab Admission Date: 22  Precautions/Restrictions: Fall Risk, O2 dependent (2-3L at baseline)       Pre-Admission Information   Living Status: Lives alone  Occupation/School: [employer]   Medication Management: :  [x]Primary   []Secondary []No  Finance Management:          [x]Primary   []Secondary []No  Active :                        []Yes         [x]No  Hearing:                                 [place]  Vision:                                    Vision Gross Assessment: WFL; Wears reading glasses     Daily Treatment Info:   Date: 2022     Tx session 1   Pain Pt denied pain    Subjective     Pt found sitting upright in recliner chair with 4.5 L of O2 in place following PT session. Pt pleasant, cooperative and engaged with all speech therapy tasks. Instances of anomia and semantic paraphasias persist (I.e. vegetables for fruit). Team conference held this date, see below for progress towards goals and d/c summary. Objective:  Goals    Goal 1: Pt will tolerate recommended diet without overt s/s of aspiration. Goal Met     GOAL MET    Goal 1: Pt will complete auditory comprehension tasks with 80% accuracy.  Progressing, Goal Not met (69% acc across tasks on inital presentation)    Wh-questions (who) 10/10 (100%) comprehension on initial presentation     Wh-questions (what)- 10/10 (100%) comprehension on initial presentation     Y/N questions (complex/ abstract)- /10 (70%) on initial presentation, with repetition (min cue) improved to 9/10 (90%)    More complex wh-questions related to calendar task- 6/10 (60%) on initial presentation      Goal 2: Pt will complete expressive language tasks with 80% accuracy or min cues. Progressing, Goal Not met (75% acc across tasks)    Who to contact. Lawernce Roughen if situations- 7/10 (70%) Independently, improved to 10/10 (100%) min cues (pt would often state basic word I.e. bug doc for  requiring min phonemic cues     Responsive Naming- 6/10 (60%) acc Independently, improved to 9/10 (90%) given min semantic/ phonemic cues     Divergent Naming provided with initial letter when going through alphabet to create a list of items for a grocery list- 16/20 (80%) Independently, improve to 19/20 (95%) given min semantic/ phonemic cues      Pt will complete graded fx problem solving tasks >80% acc  min cues. Organizing notes, appointments and mail onto a monthly calendar- pt included 16/19 (84%) items onto calendar (missed 2 appointments and 1 event)     Answering questions about events filled in on calendar (assessing for organization within task)- 7/10 (70%)     Fx problem solving relating to d/c- pt able to state he had a hard time understanding all of his MD's even before this because of their nationalities able to state Orlin Pan will have to come with him to MD's appointments to relay information to him. Aware he is not to drive and won't for awhile, able to state need to complete 's evaluation if he wishes to return to driving. Pt stated Orlin Pan will be by to help him set up using [email or web address] nupur for getting groceries delivered. Goal 4: Patient will be instructed in memory strategies to utilize in order to promote recall of newly learned information with >80% min cues.  Goal Met (98% acc across tasks, min cues)    Fx memory appears intact   - reported CPAP did not get a good seal previous night  - aware of d/c date tomorrow  - able to state Orlin Pan paid home insurance for pt Discussed memory strategies pt utilizes at home. Pt reports using a calendar, writing lists down. Immediate Recall of newly generated listed items provided with initial letter- Pt 15/20 (75%) acc Independently, improved to 20/20 given min cues     STM of newly generated list provided with initial letter following a 29 min delay- 11/20 (55%), improved to (95%) given min cues      Goal 5: Pt will demonstrate use of visual strategies for visual language processing with 80% accuracy or min cues. Goal Met (88% acc across tasks)    Improved processing during generative naming task provided with visual of initial letter. Other areas targeted:    Education:   Provided education regarding rationale for tasks and plan of care. Pt verbalized understanding   Pt requires ongoing learning    Safety Devices: [x] Call light within reach  [x] Chair alarm activated  [] Bed alarm activated  [] Other:           Assessment/Progress/Discharge:   Discharge Summary 9/20/22:  Diagnosis: Pt making progress towards all goals, auditory comprehension is improving with basic questions/ commands, continues to require reptition and visual for improved comprehension of more complex/ abstract commands. Anomia continues with intermittent phonemic/ semantic paraphasias with pt utilizing the circumlocution strategy effectively and independently. Repetition of single words, phrases and sentences continues to be impaired due to subtype of aphasia (conduction), pt benefits from visual aids for improved repetition. Verbal functional problem solving and fx short term memory appear intact but unable to fully assess due to language deficits. Continue to recommend assistance with medication/ finance management at d/c, pt's daughter in agreement and aware of recommendations. Pt would benefit from initial 24 hour supervision for safety at discharge with recommendation of continued speech therapy for speech/ language and cognition at discharge.  Pt v/u and in agreement. Current Diet Order:   ADULT DIET; Regular; 5 carb choices (75 gm/meal)            Plan:     Frequency:  5days/week   60 minutes/day    Discharge Recommendations:   Barriers: Limited family support, Cognitive deficit, Limited safety awareness, Limited insight into deficits, Communication deficit, Decreased endurance, and Medication managment  Discharge Recommendations:  24 hour supervision (initial 24 hour supervision then daily check ins, recommend assistance with meds/ finances at d/c -- pt's daughter aware of recommendations)   Continued SLP Treatment:  Yes   Estimated discharge date: 9/21/22    Session 1 Time:    Individual Concurrent Group Co-treatment   Time In 0830      Time Out 0930      Time Code Minutes  40      Minutes 60          Timed Code Treatment Minutes: 40  Total Treatment Minutes: 60     Electronically Signed by     Chucky MENDES CCC-SLP #[id number] 9/20/2022 10:18 AM  Speech-Language Pathologist

## 2022-09-20 NOTE — PROGRESS NOTES
[address] [facility] [facility] Department   Phone: [phone]    Occupational Therapy    [] Initial Evaluation            [x] Daily Treatment Note         [x] Discharge Summary      Patient: Julissa Lopez   : 1945   MRN: [id number]   Date of Service:  2022    Admitting Diagnosis:  Metabolic encephalopathy/CVA  Current Admission Summary: 73-year-old male with a history of COPD, diabetes, gout, hypertension, and STEF who was admitted on 9/3 with altered mental status. CT head was unremarkable for acute findings. 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 Echo with a normal EF and positive bubble study. Cardiology evaluated and suggested a 30-day monitor and outpatient work-up. He was evaluated by therapy and suggested to continue in an inpatient setting prior to returning home. He is strongly interested in returning to home where he lives alone and was caring for himself independently. Past Medical History:  has a past medical history of Anemia, COPD (chronic obstructive pulmonary disease) ([address] Diabetes mellitus ([address] Edema, GI (gastrointestinal bleed), Gout, Hypertension, Morbid obesity ([address] Neuropathy, Obstructive sleep apnea, and Peripheral vascular disease ([address] Past Surgical History:  has a past surgical history that includes Appendectomy; Total hip arthroplasty; and Colonoscopy (N/A, 2019). Discharge Recommendations:need for 24 hour assist/intermittent checks, HHOT    DME Required For Discharge: pt reports he owns a shower chair and reacher, requested a sock aid - has 02 concentrator but will require new 02 equipment due to increased 02 needs     Precautions/Restrictions: high fall risk, .   Comment: DNR-CC, regular diet   Weight Bearing Restrictions: no restrictions  [] Right Upper Extremity  [] Left Upper Extremity [] Right Lower Extremity  [] Left Lower Extremity     Required Braces/Orthotics: no braces required   [] Right  [] Left  Positional Restrictions:no positional restrictions    Pre-Admission Information - copied over from acute care notes - pt's word finding difficulties limit ability to explain home set up    Lives With: alone                     Type of Home: house  Home Layout: two level, able to live on main level  Home Access:  1 step to enter with handrail. Handrails are located on R side. Bathroom Layout: walk in shower  Bathroom Equipment: grab bars in shower, shower chair, hand held shower head  Toilet Height: elevated height  Home Equipment: rolling walker, single point cane, manual wheelchair, reacher, sock aide, long handled shoe horn, oxygen, . Comment: transport w/c, on 3L O2 all the time, even though Dr. Leticia Quintero recommended 2 L  (wooden cane)  Transfer Assistance: Independent without use of device  Ambulation Assistance:modified independent with use of SPC; uses walker when work in garden  ADL Assistance: independent with all ADL's  IADL Assistance: independent with homemaking tasks  Active :        [x] Yes                 [] No  Hand Dominance: [] Left                 [x] Right  Hobbies: Enjoys cooking, baking bread, volunteer at [employer]. [employer]  Recent Falls: No recent falls  Pt sleeps in a recliner. 2 daughters in area. 1 dtr is an RN and granddaughter         Subjective  General: pt sitting upright in chair on arrival   - on 4.5L 02 at rest at start of session. Pt agreeable to OT session and shower this date. Increased to 6L during activity and throughout session to maintain O2 sats above 90s. O2 line squealing during session - RN notified, pt placed on O2 tank during session. Pain: 0/10  Pain Interventions: not applicable      . Activities of Daily Living    Basic Activities of Daily Living  Grooming: stand by assistance  Grooming Comments: SBA for line management but progressing to supervision for grooming tasks; completed shaving and oral care in stance at sink   Upper Extremity Bathing: supervision  Lower Extremity Bathing: supervision   Bathing Equipment: none  Bathing Comments: seated on tub transfer bench, standing PRN for LB bathing with use of grab bars   Upper Extremity Dressing: supervision  Lower Extremity Dressing: moderate assistance  Dressing Equipment: reacher, sock aide  Dressing Comments: seated on tub transfer bench for UB and LB dressing; standing PRN for LB clothing management over hips; max A to colleen compression socks but uses sock aide properly with assist for sock orientation PRN; increased time to orient pants for BLE threading with reacher this date  Toileting: supervision. Toileting Comments: pt modest with ADLs - safe use of grab bars but supervision for line management and mod I for toilet hygiene  General Comments: SBA and supervision for ADLs due to poor safety awareness of O2 sats and O2 line management     Instrumental Activities of Daily Living  Health Management: moderate assistance. Health Management Comments: limited safety awareness of O2 line during mobility - education on proper placement of lines, rest breaks with cuing to maintain O2 sats; stepped on O2 line during mobility this date and required cuing   Comment: Continued reinforcement for O2 management due to limited carry over and inconsistencies for demonstrating proper safety for health. O2 values listed below during session:   92% on 4.5L at rest in recliner at start of tx   83% on 6L after ambulation to shower and doffed clothing (45 second seated rest break to increase to 91%)   79% on 6L after shower and donning clothing (2 minute seated rest break to increase to 90%)   83% on 6L after shaving and oral care instance and ambulation back to chair (2 minutes seated rest break to increase to 92%)   90% on 4.5L at rest in recliner at EOS    Functional Mobility  Bed Mobility  Bed mobility not completed on this date.     Transfers  Sit to stand transfer:stand by assistance  Stand to sit transfer: stand by assistance  Bed / Chair transfer: stand by assistance. Bed / Chair comments: no LOB this date  Comments: use of cane; nasal canula with lines at all times    Functional Mobility:  Sitting Balance: supervision. Standing Balance: stand by assistance.     Standing Balance Comment: no LOB this date  Functional Mobility: .  stand by assistance  Functional Mobility Activity: transport items, to/from bathroom  Functional Mobility Device Use: single point cane  Functional Mobility Comment: use of cane versus RW this date d/t small spaces within room and ambulating short distance; gathered clothing items before entering bathroom           Cognition  Overall Cognitive Status: Impaired  Arousal/Alterness: appropriate responses to stimuli  Following Commands: follows one step commands with increased time  Attention Span: appears intact  Memory: appears intact  Safety Judgement: decreased awareness of need for safety  Problem Solving: assistance required to generate solutions, assistance required to implement solutions  Insights: decreased awareness of deficits  Comment - pt with decreased safety awareness, does not appear to be aware when 02 saturation is dropping and where O2 line is during ambulation, difficulty with word finding - receptive language difficulties as well    Orientation:    oriented to person, oriented to place, oriented to situation, and disoriented to time   Command Following:   impaired accurately follows one step commands -follows simple one step commands about 75% of time,  following commands improves with visual demonstration     Education  Barriers To Learning: cognition and language  Patient Education: patient educated on goals, OT role and benefits, plan of care, precautions, ADL adaptive strategies  Learning Assessment:  patient verbalizes understanding, would benefit from continued reinforcement, patient will require reinforcement due to cognitive deficits      Assessment  Activity Tolerance: O2 sats fluctuate during activity and in stance - recommend pt being on 6L of O2 during activity to prevent desaturation - see O2 sats values above during session; heavy breathing during activity but pt reported not feeling symptomatic  Impairments Requiring Therapeutic Intervention: decreased ADL status, decreased endurance, decreased IADL  Prognosis: good  Clinical Assessment: Patient is progressing fairly with fair carryover of learned information from previous sessions, specifically regarding O2 line management during mobility and when at rest. Patient required 6 L of 02 during ADLs and frequent rest breaks encouraged by therapist to maintain 02 saturation above 90s. Patient reported no pain this date and was comfortable using single point cane for room mobility. Patient is primarily limited by endurance and cognitive deficits. Pt is continuing to demonstrate deficits with safety awareness and will benefit from OT services to maximize safety and independence in ADLs, transfers and functional mobility prior to returning home. Continue POC. Safety Interventions: patient left in chair, chair alarm in place, and call light within reach    Plan  Frequency: 5 x/week, 60 min/day  Current Treatment Recommendations: strengthening, balance training, functional mobility training, transfer training, endurance training, neuromuscular re-education, patient/caregiver education, ADL/self-care training, and IADL training    Goals  Patient Goals: pt reports being modest and wishes to complete ADLs independently    Short Term Goals:  Time Frame: 7-10 days   Patient will complete upper body ADL at Independent   Patient will complete lower body ADL at modified independent, .   Comment with use of AE PRN   Patient will complete toileting at Independent   Patient will complete self-feeding at Independent   Patient will complete grooming at Independent   Patient will complete functional transfers at [facility] Patient will complete functional mobility at modified independent   Patient to gather and transport IADL items at modified independent   Patient will demonstrate ability to perform simple IADL tasks (2-3 step tasks) with modified independence. Goals not met 9/20 - recommend supervision and assistance with ADLs/IADLs and mobility d/ pt having poor safety awareness and fluctuating O2 levels with activity. Therapy Session Time     Individual Group Co-treatment   Time In  6785      Time Out  1345      Minutes  60           Timed Code Treatment Minutes:   60    Total Treatment Minutes:  60     Electronically Signed By: Dorian Villatoro, S/OT  Supervision/direction Jaren Han OTR/L [id number], 9/20/2022, 3:30 PM DISCHARGE

## 2022-09-20 NOTE — CARE COORDINATION
Team conference held today. Spoke with patient to discuss progress with therapy, barriers to discharge, and plans to return home. Estimated discharge date set for 9/21/2022. Patient anticipates discharging to home with home health PT/OT/SLP/Nursing/Aide/SW and needed DME: bariatric rolling walker, NIV, and Home O2. Will continue to follow for support and discharge planning.     Electronically signed by FRANCA Nelson on 9/20/2022 at 4:34 PM

## 2022-09-20 NOTE — CARE COORDINATION
PT/OT recommending that patient discharge to home with a bariatric rolling walker and Pulmonary recommending that patient discharging with and NIV. Per Chart Review: NIV is required to decrease hospital readmissions; the patient is at high risk for exacerbation leading quickly to decline and harm without this therapy. Patient agreeable to a referral to  [facility] . [facility] will follow patient.     Electronically signed by FRANCA Massey on 9/20/2022 at 5:12 PM

## 2022-09-20 NOTE — PROGRESS NOTES
[facility]   Phone: [phone]    Physical Therapy    [] Initial Evaluation            [x] Daily Treatment Note         [x] Discharge Summary      Patient: Rafael Desouza   : 1945   MRN: [id number]   Date of Service:  2022  Admitting Diagnosis: Metabolic encephalopathy  Current Admission Summary: 72-year-old male with a history of COPD, diabetes, gout, hypertension, and STEF who was admitted on 9/3 with altered mental status. CT head was unremarkable for acute findings. 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 Echo with a normal EF and positive bubble study. Cardiology evaluated and suggested a 30-day monitor and outpatient work-up. He was evaluated by therapy and suggested to continue in an inpatient setting prior to returning home. He is strongly interested in returning to home where he lives alone and was caring for himself independently. Past Medical History:  has a past medical history of Anemia, COPD (chronic obstructive pulmonary disease) ([address] Diabetes mellitus ([address] Edema, GI (gastrointestinal bleed), Gout, Hypertension, Morbid obesity ([address] Neuropathy, Obstructive sleep apnea, and Peripheral vascular disease ([address] Past Surgical History:  has a past surgical history that includes Appendectomy; Total hip arthroplasty; and Colonoscopy (N/A, 2019).   Discharge Recommendations: Home with Home Health PT S3, 24 hour supervision due to O2 line management  DME Required For Discharge: patient has all required DME for discharge (would benefit from a RW, unsure if pt actually has one)  Precautions/Restrictions: high fall risk  Weight Bearing Restrictions: no restrictions  [] Right Upper Extremity  [] Left Upper Extremity [] Right Lower Extremity  [] Left Lower Extremity     Required Braces/Orthotics: no braces required   [] Right  [] Left  Positional Restrictions:no positional restrictions    Pre-Admission Information Lives With: alone   --daughter lives close by and can assist but 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 Handrails are located on R side. Bathroom Layout: walk in shower  Bathroom Equipment: grab bars in shower, shower chair, hand held shower head  Toilet Height: elevated height  Home Equipment: rolling walker, single point cane, manual wheelchair, reacher, sock aide, long handled shoe horn, oxygen, wooden cane. Comment: transport w/c, on 3L O2 all the time, even though Dr. Rica Chiu recommended 2 L  Transfer Assistance: Independent without use of device  Ambulation Assistance:modified independent with use of SPC; uses walker when work in garden  ADL Assistance: independent with all ADL's  IADL Assistance: independent with homemaking tasks  Active :        [x] Yes                 [] No  Hand Dominance: [] Left                 [x] Right  Hobbies: Enjoys cooking, baking bread, volunteer at [employer]. NoteSick  Recent Falls: No recent falls  Pt sleeps in a recliner. 2 daughters in area. 1 dtr is an RN and granddaughter becoming an RN. Per patient daughter reports that if patient is debilitated post rehabilitation that he could move in with her, living area in basement. Subjective  General: Patient seated in chair at arrival. Agreed to therapy. Reporting several times that he is \"fine. \" On 3L at rest, 88% saturation. Pain: 0/10  Pain Interventions: patient denies pain interventions       Functional Mobility  Bed Mobility  Supine to Sit: modified independent  Sit to Supine: modified independent  Rolling Left: modified independent  Rolling Right: . Comment pt refused due to R shoulder pain  Bridging: modified independent  Comments: Patient sleeps in recliner chair.   Transfers  Sit to stand transfer: modified independent  Stand to sit transfer: modified independent  Stand step transfer: modified independent  Bed to chair transfer: modified independent  Car transfer: modified independent  Comments: Performed with RW. Ambulation  Surface:level surface  Assistive Device: RW  Other Apparatus: MCOT heart monitor  Assistance: modified independent  Distance: 270 ft   Gait Mechanics: decreased roxana, decreased step through, widened ZACH  Comments:  PT inquired about need for rest multiple times but pt declined. Did not have any audible SOB. Once seated, SpO2 82% on 6L. Recovered in 2 minutes. HR elevated to 120s. Ambulation Trial 2  Surface:carpet  Assistive Device: rolling walker  Assistance: modified independent  Distance: 10'  Gait Mechanics: same as trial 1  Comments:    Stair Mobility  Number of Steps: 12  Step Height: 6 inch  Hand Rails: (B) handrail  Assistance: modified independent  Comments: O2 77% on 6L with 3 minute recovery  Wheelchair Mobility:  No w/c mobility completed on this date. Comments:  Balance  Static Sitting Balance: good: independent with functional balance in unsupported position  Dynamic Sitting Balance: good: independent with functional balance in unsupported position  Static Standing Balance: fair (-): maintains balance at mod I with use of UE support  Dynamic Standing Balance: fair (-): maintains balance at mod I with use of UE support  Patient completes object retrieval from floor in standing position at modified independent with RW and reacher  Comments: Pt was able to manage O2 line better with 1-2 cues     Other Therapeutic Interventions  1st session: see functional mobility details above. Performed toilet transfer and toileting mod I.  Stood to wash hands at sink mod I. Performed seated stepper X 6 minutes.        Functional Outcomes                 Cognition  Overall Cognitive Status: Impaired  Arousal/Alterness: appropriate responses to stimuli  Following Commands: follows one step commands with repetition, follows one step commands with increased time  Safety Judgement: decreased awareness of need for assistance  Insights: decreased awareness of deficits  Initiation: requires cues for some  Sequencing: requires cues for some  Orientation:    alert and oriented x 4  Command Following:   accurately follows one step commands    Education  Barriers To Learning: cognition  Patient Education: patient educated on goals, PT role and benefits, plan of care, general safety, functional mobility training, proper use of assistive device/equipment, energy conservation, transfer training, discharge recommendations  9/16/22:  Participated in 45 minute family conference. Reviewed current physical status and barriers to safe return home. Daughter present and voiced understanding. Provided recommendation for 24 hour supervision due to pt taking off O2 line or not managing O2 line and creating a fall hazard. Learning Assessment:  patient verbalizes understanding, would benefit from continued reinforcement    Assessment  Activity Tolerance: decreased endurance, DUNCAN with all activities   Impairments Requiring Therapeutic Intervention: decreased functional mobility, decreased strength, decreased safety awareness, decreased cognition, decreased endurance, decreased balance  Prognosis: good  Clinical Assessment: Pt with improved management of O2 line after initial cue. Pt with improving endurance, but remains with limited balance due to low O2 sats with mobility as well as knee pain. Pt with decreased knee pain and improved stability and endurance with use of RW. Pt will benefit from skilled PT services to address deficits and promote return to PLOF.   Safety Interventions: patient left in chair, chair alarm in place, call light within reach, and nurse notified    Plan  Frequency: 5 x/week, 60 min/day  Current Treatment Recommendations: strengthening, balance training, functional mobility training, transfer training, gait training, stair training, endurance training, patient/caregiver education, home exercise program, safety education, and equipment evaluation/education    Goals  Patient Goals: to go home   Short Term Goals:  Time Frame: to bet met in 7-10 days  Patient will complete stand step transfer at [facility]  Patient will ambulate 200 ft with use of LRAD at [facility]  Patient will ascend/descend 1 stairs with (R) ascending handrail at modified independent - met  Patient will complete car transfer at [facility]    Therapy Session Time      Individual Group Co-treatment   Time In  730       Time Out  830       Minutes  60             Timed Code Treatment Minutes:  60    Total Treatment Minutes:  60    Electronically Signed By: Andres Gama PT

## 2022-09-20 NOTE — PROGRESS NOTES
09/20/22 1820   Resting (On O2)   SpO2 93   HR 89   O2 Device Nasal cannula   O2 Flow Rate (l/min) 3 l/min   Comments home oxygen   During Walk (On O2)   SpO2 92      O2 Device   (high flow nasal cannula)   O2 Flow Rate (l/min) 15 l/min   Need Additional O2 Flow Rate Rows Yes   O2 Flow Rate (l/min) 14 l/min   O2 Saturation 90   O2 Flow Rate (l/min) 13 l/min   O2 Saturation 88   O2 Flow Rate (l/min) 12 l/min   O2 Saturation 87   O2 Flow Rate (l/min) 11 l/min   O2 Saturation 85   O2 Flow Rate (l/min) 10 l/min   O2 Saturation 83   Walk/Assistance Device Cane   After Walk   SpO2 98   HR 95   O2 Device   (high flow nasal cannula)   O2 Flow Rate (l/min) 10 l/min   Does the Patient Qualify for Home O2 Yes

## 2022-09-21 VITALS
TEMPERATURE: 97.7 F | DIASTOLIC BLOOD PRESSURE: 63 MMHG | HEIGHT: 73 IN | WEIGHT: 315 LBS | HEART RATE: 74 BPM | SYSTOLIC BLOOD PRESSURE: 101 MMHG | OXYGEN SATURATION: 96 % | BODY MASS INDEX: 41.75 KG/M2 | RESPIRATION RATE: 20 BRPM

## 2022-09-21 PROCEDURE — 6370000000 HC RX 637 (ALT 250 FOR IP): Performed by: PHYSICAL MEDICINE & REHABILITATION

## 2022-09-21 PROCEDURE — 6360000002 HC RX W HCPCS: Performed by: PHYSICAL MEDICINE & REHABILITATION

## 2022-09-21 PROCEDURE — 6370000000 HC RX 637 (ALT 250 FOR IP): Performed by: INTERNAL MEDICINE

## 2022-09-21 PROCEDURE — 2700000000 HC OXYGEN THERAPY PER DAY

## 2022-09-21 PROCEDURE — 94640 AIRWAY INHALATION TREATMENT: CPT

## 2022-09-21 PROCEDURE — 94761 N-INVAS EAR/PLS OXIMETRY MLT: CPT

## 2022-09-21 RX ORDER — ASPIRIN 81 MG/1
81 TABLET, CHEWABLE ORAL DAILY
Qty: 30 TABLET | Refills: 3 | Status: SHIPPED | OUTPATIENT
Start: 2022-09-21

## 2022-09-21 RX ORDER — FUROSEMIDE 80 MG
80 TABLET ORAL 2 TIMES DAILY
Qty: 60 TABLET | Refills: 3 | Status: SHIPPED | OUTPATIENT
Start: 2022-09-21 | End: 2022-10-03 | Stop reason: SDUPTHER

## 2022-09-21 RX ORDER — THIAMINE MONONITRATE (VIT B1) 100 MG
100 TABLET ORAL DAILY
Qty: 30 TABLET | Refills: 3 | Status: SHIPPED | OUTPATIENT
Start: 2022-09-21

## 2022-09-21 RX ORDER — ROSUVASTATIN CALCIUM 20 MG/1
20 TABLET, COATED ORAL NIGHTLY
Qty: 30 TABLET | Refills: 3 | Status: SHIPPED | OUTPATIENT
Start: 2022-09-21

## 2022-09-21 RX ADMIN — IPRATROPIUM BROMIDE AND ALBUTEROL SULFATE 1 AMPULE: 2.5; .5 SOLUTION RESPIRATORY (INHALATION) at 05:41

## 2022-09-21 RX ADMIN — FUROSEMIDE 80 MG: 40 TABLET ORAL at 08:57

## 2022-09-21 RX ADMIN — TIOTROPIUM BROMIDE INHALATION SPRAY 2 PUFF: 3.12 SPRAY, METERED RESPIRATORY (INHALATION) at 05:41

## 2022-09-21 RX ADMIN — LISINOPRIL 2.5 MG: 5 TABLET ORAL at 08:56

## 2022-09-21 RX ADMIN — IPRATROPIUM BROMIDE AND ALBUTEROL SULFATE 1 AMPULE: 2.5; .5 SOLUTION RESPIRATORY (INHALATION) at 11:59

## 2022-09-21 RX ADMIN — ENOXAPARIN SODIUM 30 MG: 100 INJECTION SUBCUTANEOUS at 08:57

## 2022-09-21 RX ADMIN — Medication 100 MG: at 08:56

## 2022-09-21 RX ADMIN — Medication 2 PUFF: at 05:41

## 2022-09-21 RX ADMIN — ALLOPURINOL 200 MG: 100 TABLET ORAL at 08:57

## 2022-09-21 RX ADMIN — PREDNISONE 5 MG: 5 TABLET ORAL at 08:57

## 2022-09-21 RX ADMIN — ASPIRIN 81 MG 81 MG: 81 TABLET ORAL at 08:56

## 2022-09-21 RX ADMIN — GABAPENTIN 300 MG: 300 CAPSULE ORAL at 14:44

## 2022-09-21 RX ADMIN — GABAPENTIN 300 MG: 300 CAPSULE ORAL at 08:58

## 2022-09-21 NOTE — CARE COORDINATION
[facility] received a referral to this patient for a HD rolling walker. HD rolling walker has been delivered to the patients room. Thank you for the referral.  Electronically signed by Cyndee Terrazas on 9/21/2022 at 12:19 PM  Cell ph# [phone]    NOTE: After 5:00 pm, Weekends, Holidays: Call [facility] at [phone] to coordinate delivery of home medical equipment.

## 2022-09-21 NOTE — PROGRESS NOTES
[facility] Discharge Assessment    Transportation  \"Has lack of transportation kept you from medical appointments, meetings, work, or from getting things needed for daily living? \"Check all that apply:  [] A.  Yes, it has kept me from medical appointments or from getting my medications  [] B.  Yes, it has kept me from non-medical meetings, appointments, work, or from getting things that I need  [x] C.  No  [] X. Patient unable to respond    Provision of Current Reconciled Medication List to Subsequent Provider at Discharge  [] No, current reconciled medication list not provided to the subsequent provider.  [] Yes, current reconciled medication list provided to the subsequent provider. (**Select route of transmission below**)   [x] Via Electronic Health Record   [] [facility] [facility] [employer]  [] Verbal (e.g. in person, telephone, video conferencing)  [x] Paper-based (e.g. fax, copies, printouts)   [] Other Methods (e.g. texting, email, CDs)    Provision of Current Reconciled Medication List to Patient at Discharge  [] No, current reconciled medication list not provided to the patient, family and/or caregiver.   [] Yes, current reconciled medication list provided to the patient, family and/or caregiver.  (**Select route of transmission below**)   [] [employer] Record (e.g., electronic access to patient portal)   [] [employer]  [x] Verbal (e.g. in person, telephone, video conferencing)  [x] Paper-based (e.g. fax, copies, printouts)   [] Other Methods (e.g. texting, email, CDs)    Health Literacy  \"How often do you need to have someone help you when you read instructions, pamphlets, or other written material from your doctor or pharmacy? \"  []  0. Never  []  1. Rarely  [x]  2. Sometimes  []  3. Often  []  4. Always  []  8.   Patient unable to respond    BIMS - **Must be completed in the flowsheet at discharge prior to proceeding with Delirium Assessment**  [x] or hopeless   0    **If either A or B in column 2 is coded 2 or 3, CONTINUE asking the questions below. If not, END the interview. **     Trouble falling or staying asleep, or sleeping too much       Feeling tired or having little energy       Poor appetite or overeating       Feeling bad about yourself - or that you are a failure or have let yourself or your family down       Trouble concentrating on things, such as reading the newspaper or watching television       Moving or speaking so slowly that other people could have noticed. Or the opposite- being so fidgety or restless that you have been moving around a lot more than usual.       Thoughts that you would be better off dead, or of hurting yourself in some way. Total Severity: Add scores for all frequency responses in column 2 (possible score 0-27, or enter 99 if unable to complete (if symptom frequency (column 2) is blank for 3 or more items). Social Isolation  \"How often do you feel lonely or isolated from those around you? \"  [] 0. Never  [] 1. Rarely  [x] 2. Sometimes  [] 3. Often  [] 4. Always  [] 8. Patient unable to respond    Pain Effect on Sleep  \"Over the past 5 days, how much of the time has pain made it hard for you to sleep at night? \"  [x]  0. Does not apply - I have not had any pain or hurting in the past 5 days  []  1. Rarely or not at all  []  2. Occasionally  []  3. Frequently  []  4. Almost constantly  []  8. Unable to answer    **If the patient answers \"0. Does not apply\" to this question, skip the next two \"Pain Effect. [employer] \" questions**    Pain Interference with Therapy Activities  \"Over the past 5 days, how often have you limited your participation in rehabilitation therapy sessions due to pain? \"  []  0. Does not apply - I have not received rehabilitation therapy in the past 5 days  []  1. Rarely or not at all  []  2. Occasionally  []  3. Frequently  []  4. Almost constantly  []  8.   Unable to answer    Pain Interference with Day-to-Day Activities: \"Over the past 5 days, how often have you limited your day-to-day activities (excluding rehabilitation therapy session)? \"  []  1. Rarely or not at all  []  2. Occasionally  []  3. Frequently  []  4. Almost constantly  []  8. Unable to answer    Nutritional Approaches  Last 7 Days - Check all of the following nutritional approaches that were received within the last 7 days:  []  A. Parenteral/IV feeding  []  B. Feeding tube (e.g., nasogastric or abdominal (PEG))  []  C. Mechanically altered diet - requires change in texture of food or liquids (e.g., pureed food, thickened liquids)  [x]  D. Therapeutic diet (e.g., low salt, diabetic, low cholesterol)  []  Z. None of the above    At time of Discharge - Check all of the following nutritional approaches that were being received at discharge:  []  A. Parenteral/IV feeding (including IV fluids if needed for hydration, but not as part of dialysis/chemo)  []  B. Feeding tube (e.g., nasogastric or abdominal (PEG))  []  C. Mechanically altered diet - requires change in texture of food or liquids (e.g., pureed food, thickened liquids)  [x]  D. Therapeutic diet (e.g., low salt, diabetic, low cholesterol  []  Z. None of the above    High Risk Drug Classes:  Use and Indication    Is taking: Check if the pt is taking any medications by pharmacological classification, not how it is used, in the following classes  Indication noted: If column 1 is checked, check if there is an indication noted for all meds in the drug class Is taking  (check all that apply) Indication noted (check all that apply)   Antipsychotic [] []   Anticoagulant [] []   Antibiotic [] []   Opioid [] []   Antiplatelet [x] [x]   Hypoglycemic (including insulin) [] []   None of the above []     Special Treatments, Procedures, and Programs    Check all of the following treatments, procedures, and programs that apply at discharge.  At Discharge (check all that apply)   Cancer Treatments   A1. Chemotherapy []           A2. IV []           A3. Oral []           A10. Other []   B1. Radiation []   Respiratory Therapies   C1. Oxygen Therapy []           C2. Continuous (continuously for at least 14 hours per day) [x]           C3. Intermittent []           C4. High-concentration []   D1. Suctioning (Does not include oral suctioning) []           D2. Scheduled []           D3. As needed []   E1. Tracheostomy Care []   F1. Invasive Mechanical Ventilator (ventilator or respirator) []   G1. Non-invasive Mechanical Ventilator []           G2. BiPAP []           G3. CPAP [x]   Other   H1. IV Medications (Do not include sub Q pumps, flushes, Dextrose 50% or lactated ringers) []           H2. Vasoactive medications []           H3. Antibiotics []           H4. Anticoagulation []           H10. Other []   I1. Transfusions []   J1. Dialysis []           J2. Hemodialysis []           J3. Peritoneal dialysis []   O1. IV access (including a catheter in a vein) []           O2. Peripheral []           O3. Midline []           O4.  Central (PICC, tunneled, port) []      None of the above (select if no Cancer, Respiratory, or Other boxes are checked) []

## 2022-09-21 NOTE — PROGRESS NOTES
Discharge instructions reviewed with patient and family members and all questions answered. Patient discharged via w/c with all personal belongings.

## 2022-09-21 NOTE — PLAN OF CARE
Problem: Discharge Planning  Goal: Discharge to home or other facility with appropriate resources  9/21/2022 1255 by Aditi Mg RN  Outcome: Completed  9/21/2022 0723 by Juliet Stack RN  Outcome: Progressing     Problem: Safety - Adult  Goal: Free from fall injury  9/21/2022 1255 by Aditi Mg RN  Outcome: Completed  9/21/2022 0723 by Juliet Stack RN  Outcome: Progressing     Problem: ABCDS Injury Assessment  Goal: Absence of physical injury  9/21/2022 1255 by Aditi Mg RN  Outcome: Completed  9/21/2022 0723 by Juliet Stack RN  Outcome: Progressing     Problem: Pain  Goal: Verbalizes/displays adequate comfort level or baseline comfort level  Outcome: Completed     Problem: Chronic Conditions and Co-morbidities  Goal: Patient's chronic conditions and co-morbidity symptoms are monitored and maintained or improved  Outcome: Completed     Problem: Skin/Tissue Integrity  Goal: Absence of new skin breakdown  Description: 1. Monitor for areas of redness and/or skin breakdown  2. Assess vascular access sites hourly  3. Every 4-6 hours minimum:  Change oxygen saturation probe site  4. Every 4-6 hours:  If on nasal continuous positive airway pressure, respiratory therapy assess nares and determine need for appliance change or resting period.   9/21/2022 1255 by Aditi Mg RN  Outcome: Completed  9/21/2022 0723 by Juliet Stack RN  Outcome: Progressing

## 2022-09-21 NOTE — CARE COORDINATION
Patient discharging to home via car with family. Patient to receive home nursing and therapy from [facility]. Patient received a Bariatric Rolling Walker/NIV from [address] [place] [address]. Patient also received Home O2 from [facility].   No further needs voiced by patient or treatment team.     Trey Ruiz, MSW, LSW   [phone]

## 2022-09-21 NOTE — DISCHARGE SUMMARY
Physical Medicine & Rehabilitation  Discharge Summary     Patient Identification:  sOwald Waddell  : 1945  Admit date: 2022  Discharge date:   2022  Attending provider: Cassidy Fernandez MD        Primary care provider: Mar Helm MD     Discharge Diagnoses:   Patient Active Problem List   Diagnosis    HTN (hypertension), benign    Syncope    DM (diabetes mellitus), type 2, uncontrolled with complications (HCC)    Arthritis    Chronic respiratory failure (HCC)    STEF (obstructive sleep apnea)    Venous insufficiency    Acute sinusitis    Morbid obesity ([address]    Vitamin D deficiency    Open back wound    COPD, severe ([address]    HLD (hyperlipidemia)    Diastolic dysfunction    Aneurysm of abdominal aorta (HCC)    Personal history of tobacco use, presenting hazards to health    Encephalopathy    Altered mental status    Cerebrovascular accident (CVA) due to thrombosis of left middle cerebral artery (HCC)    Acute on chronic respiratory failure with hypoxia and hypercapnia (HCC)    Cerebrovascular accident (CVA) ([address]    Chronic heart failure with preserved ejection fraction (HCC)    Ventricular ectopy    Leg swelling    TGA (transient global amnesia)    Metabolic encephalopathy       Discharge Functional Status:      Physical therapy:  Supine to Sit: Independent  Sit to Supine: Independent      Sit to Stand: Independent  Chair/Bed to Chair Transfer: Independent  Car Transfer: Independent     Ambulation 10 ft: Independent  Ambulation 50 ft: Independent  Ambulation 150 ft: Independent  Stairs - 1 Step: Independent  Stairs - 4 Step: Independent  Stairs - 12 Step: Independent    Occupational therapy:  Eating: Independent  Oral Hygiene: Supervision or touching assistance  Bathing: Supervision or touching assistance  Upper Body Dressing: Supervision or touching assistance  Lower Body Dressing: Supervision or touching assistance     Toilet Transfer: Supervision or touching assistance  Toilet Hygiene: Independent    Speech therapy:    Pt making progress towards all goals, auditory comprehension is improving with basic questions/ commands, continues to require reptition and visual for improved comprehension of more complex/ abstract commands. Anomia continues with intermittent phonemic/ semantic paraphasias with pt utilizing the circumlocution strategy effectively and independently. Repetition of single words, phrases and sentences continues to be impaired due to subtype of aphasia (conduction), pt benefits from visual aids for improved repetition. Verbal functional problem solving and fx short term memory appear intact but unable to fully assess due to language deficits. Continue to recommend assistance with medication/ finance management at d/c, pt's daughter in agreement and aware of recommendations. Pt would benefit from initial 24 hour supervision for safety at discharge with recommendation of continued speech therapy for speech/ language and cognition at discharge. Pt v/u and in agreement. Inpatient Rehabilitation Course:   Rocael Allen is a 78 y.o. male admitted to inpatient rehabilitation on 6/8/6744 s/p Metabolic encephalopathy. The patient participated in an aggressive multidisciplinary inpatient rehabilitation program involving 3 hours of therapy per day, at least 5 days per week. He progressed well in therapy and was able to be discharged home with home health care. His medical rehab course was significant for below:    Acute left temporal lobe infarct  - ASA, statin. PT/OT/SLP     Chronic hypoxia/hypercapnia  - NIV/AVAPs ventilation to be organized at the time of discharge. Prescription provided. COPD  - inhalers as ordered. Pulm evaluated. Severe COPD. Continue home inhalers at discharge. PFO/ASD  - Outpatient follow up. MCOT     HTN  - lisinopril 2.5.  toprol 12.5 d/c'd. Continue lisinopril for renal prophylaxis. DM  - DM diet.  consider resumption of metformin if needed     [facility] abuse  - no evidence of withdrawal     BLE edema  - Lasix increased to 80 BID PO. Continue this regimen at discharge. - s/p 40 lasix x1       Discharge Exam:  Patient Vitals for the past 24 hrs:   BP Temp Temp src Pulse Resp SpO2 Weight   09/21/22 0852 101/63 -- Oral 71 19 97 % --   09/21/22 0600 -- -- -- -- -- -- (!) 331 lb 12.8 oz (150.5 kg)   09/21/22 0541 -- -- -- -- -- 97 % --   09/21/22 0401 -- -- -- -- -- 93 % --   09/20/22 2324 -- -- -- -- -- 97 % --   09/20/22 2115 (!) 93/51 97.7 °F (36.5 °C) Oral 82 18 98 % --   09/20/22 2102 -- -- -- -- -- 97 % --   09/20/22 1615 116/64 -- -- 77 -- -- --   09/20/22 1543 -- -- -- 72 18 92 % --   09/20/22 1143 -- -- -- 82 18 93 % --   09/20/22 1015 127/61 97.7 °F (36.5 °C) Oral 70 19 94 % --     Gen: No distress, pleasant. Resting in bedside chair  HEENT: Normocephalic, atraumatic. CV: Regular rate and rhythm. No MRG   Resp: No respiratory distress. Decreased BS diffusely, 4L O2 per NC  Abd: Soft, nontender nondistended  Ext: +1 pitting BLE edema   Neuro: Alert, oriented, appropriately interactive. Sydni Najera was evaluated today and a DME order was entered for a wheeled walker because he requires this to successfully complete daily living tasks of personal cares and ambulating. A wheeled walker is necessary due to the patient's unsteady gait, upper body weakness, and inability to  an ambulation device; and he can ambulate only by pushing a walker instead of a lesser assistive device such as a cane, crutch, or standard walker. The need for this equipment was discussed with the patient and he understands and is in agreement. Patient was evaluated today for the diagnosis of COPD. I entered a DME order for home oxygen and an NIV because the diagnosis and testing requires the patient to have supplemental oxygen. Condition will improve or be benefited by oxygen and positive pressure use.   The patient is  able to perform good mobility in a home setting and therefore does require the use of a portable oxygen system. The need for this equipment was discussed with the patient and he understands and is in agreement. Significant Diagnostics:   Lab Results   Component Value Date    CREATININE 1.3 09/19/2022    BUN 45 (H) 09/19/2022     09/19/2022    K 4.5 09/19/2022     09/19/2022    CO2 30 09/19/2022       Lab Results   Component Value Date    WBC 5.1 09/19/2022    HGB 10.7 (L) 09/19/2022    HCT 32.4 (L) 09/19/2022    .8 (H) 09/19/2022     09/19/2022       Disposition:  Home in stable condition. Follow-up:  See after visit summary from hospitalization    Discharge Medications:     Medication List        START taking these medications      vitamin B-1 100 MG tablet  Commonly known as: THIAMINE  Take 1 tablet by mouth daily            CHANGE how you take these medications      furosemide 80 MG tablet  Commonly known as: LASIX  Take 1 tablet by mouth in the morning and 1 tablet in the evening. What changed:   medication strength  See the new instructions.             CONTINUE taking these medications      Accu-Chek Lulu Plus strip  Generic drug: blood glucose test strips  USE ONE STRIP TO TEST DAILY     Accu-Chek Lulu Plus w/Device Kit  1 each by Does not apply route daily     Accu-Chek Softclix Lancets Misc  USE ONE LANCET TO TEST DAILY     acetaminophen 325 MG tablet  Commonly known as: TYLENOL     albuterol sulfate  (90 Base) MCG/ACT inhaler  Commonly known as: PROVENTIL;VENTOLIN;PROAIR  INHALE TWO PUFFS BY MOUTH EVERY 6 HOURS AS NEEDED  Notes to patient: Uses: a bronchodilator used help prevent bronchospasms, helps treat wheezing in asthma patients  Side Effects: nervousness, shaking, dizziness, dry mouth     allopurinol 100 MG tablet  Commonly known as: ZYLOPRIM  TAKE TWO TABLETS BY MOUTH DAILY  Notes to patient: Use:  to treat gout and certain types of kidney stones  Side Effects:  skin rash, headache, and dizziness     aspirin 81 MG chewable tablet  Take 1 tablet by mouth daily     Breo Ellipta 200-25 MCG/INH Aepb inhaler  Generic drug: Fluticasone furoate-vilanterol  INHALE ONE DOSE BY MOUTH DAILY  Notes to patient: Use:  prevent symptoms of asthma and chronic obstructive pulmonary disease  Side Effects:  tired, weak, and low blood pressure     gabapentin 300 MG capsule  Commonly known as: NEURONTIN  TAKE ONE CAPSULE BY MOUTH THREE TIMES A DAY  Notes to patient: Uses: for treatment of nerve pain  Side Effects: dizziness, blurred vision, drowsiness, ataxia (stumbling, slurred speech, incoordination)     ipratropium-albuterol 0.5-2.5 (3) MG/3ML Soln nebulizer solution  Commonly known as: DUONEB  INHALE ONE VIAL (3MLS) VIA NEBULIZATION BY MOUTH FOUR TIMES A DAY  Notes to patient: Use:  to control and prevent wheezing and shortness of breath  Side Effects:  bladder pain, bloody urine, and cough     lisinopril 2.5 MG tablet  Commonly known as: PRINIVIL;ZESTRIL  TAKE ONE TABLET BY MOUTH DAILY  Notes to patient: Uses: To treat high blood pressure  Side Effects: dizziness, lightheadedness,tiredness, headache, dry cough     predniSONE 5 MG tablet  Commonly known as: DELTASONE  TAKE ONE TABLET BY MOUTH DAILY  Notes to patient: Use:  to decrease your immune systems response  Side Effects:  nausea/vomiting, loss of appetite, and heartburn     rosuvastatin 20 MG tablet  Commonly known as: CRESTOR  Take 1 tablet by mouth nightly            STOP taking these medications      celecoxib 100 MG capsule  Commonly known as: CELEBREX  Notes to patient: Use: To treat arthritis and other inflammatory processes. Side Effects: Dizziness, heartburn, and upset stomach.       diclofenac 75 MG EC tablet  Commonly known as: VOLTAREN  Notes to patient: Uses: Treatment of arthritic joint pain (Topical)   Side Effects: Skin rash, headache, fever, burning skin sensation     metFORMIN 1000 MG tablet  Commonly known as: GLUCOPHAGE     metoprolol succinate 25 MG extended

## 2022-09-21 NOTE — CARE COORDINATION
CM presented patient with  IMM (Important Message from Medicare) letter prior to discharge (w/in 48 hrs of dc). CM explained the right to appeal discharge and the process to follow. All questions answered. Copy of IMM provided to pt and pt signed CM copy. Date and time of signed IMM documented and placed on soft chart.        Electronically signed by FRANCA Painting on 9/21/2022 at 11:44 AM

## 2022-09-26 ENCOUNTER — TELEPHONE (OUTPATIENT)
Dept: CARDIOLOGY CLINIC | Age: 77
End: 2022-09-26

## 2022-09-26 NOTE — TELEPHONE ENCOUNTER
Feason called to request a verbal order to drawn blood for a BNP and a Renal.  Lacey Nicole will see the Pt tomorrow 09/27. Please call to discuss.   Please advise

## 2022-09-27 ENCOUNTER — TELEPHONE (OUTPATIENT)
Dept: CARDIOLOGY CLINIC | Age: 77
End: 2022-09-27

## 2022-09-27 ENCOUNTER — TELEPHONE (OUTPATIENT)
Dept: INTERNAL MEDICINE CLINIC | Age: 77
End: 2022-09-27

## 2022-09-27 LAB
ALBUMIN SERPL-MCNC: 4.2 G/DL (ref 3.4–5)
ANION GAP SERPL CALCULATED.3IONS-SCNC: 15 MMOL/L (ref 3–16)
BUN BLDV-MCNC: 45 MG/DL (ref 7–20)
CALCIUM SERPL-MCNC: 9 MG/DL (ref 8.3–10.6)
CHLORIDE BLD-SCNC: 99 MMOL/L (ref 99–110)
CO2: 28 MMOL/L (ref 21–32)
CREAT SERPL-MCNC: 1.4 MG/DL (ref 0.8–1.3)
GFR AFRICAN AMERICAN: 60
GFR NON-AFRICAN AMERICAN: 49
GLUCOSE BLD-MCNC: 93 MG/DL (ref 70–99)
PHOSPHORUS: 4.4 MG/DL (ref 2.5–4.9)
POTASSIUM SERPL-SCNC: 5.1 MMOL/L (ref 3.5–5.1)
PRO-BNP: 207 PG/ML (ref 0–449)
SODIUM BLD-SCNC: 142 MMOL/L (ref 136–145)

## 2022-09-27 NOTE — TELEPHONE ENCOUNTER
----- Message from Savannah sent at 9/27/2022 12:07 PM EDT -----  Subject: Message to Provider    QUESTIONS  Information for Provider? Elba Greco is calling from [facility] with   [facility] [place] [facility] She is looking to speak with a nurse about his care. Pt is   needing some lab orders because he has some SOB. Pt went home from the   hospital on 9/21/2021. Home care came out on 9/22/2022. Please call Elba Greco   back at [phone].   ---------------------------------------------------------------------------  --------------  Flynn SALINAS  [phone]; OK to leave message on voicemail  ---------------------------------------------------------------------------  --------------  SCRIPT ANSWERS  Relationship to Patient? Third Party  Third Party Type? Home Health Care? Representative Name?  Elba Greco

## 2022-09-27 NOTE — TELEPHONE ENCOUNTER
Please let him know he does not have a clot in his leg.  Area does show a cyst, which he can follow up with pcp for

## 2022-09-27 NOTE — TELEPHONE ENCOUNTER
Called and spoke with patient and informed him of message below. He ask that we inform his daughter since he suffered a stroke. Lmor for Dwayne Black to return our call.

## 2022-09-27 NOTE — TELEPHONE ENCOUNTER
Spoke to Dr. Dante Reno. He saw patient in the past and will see him in follow up but patient should be scheduled with NP in mid October for HFU post CVA and review cardiac monitor (results not available currently). Notified Dari Boston. She advised that we call daughter Nelida Silverman [phone] to schedule an appointment.

## 2022-09-27 NOTE — TELEPHONE ENCOUNTER
Dalia Mora informed, per Dr Evaristo Morales, ok for BNP/BMP. Verbal orders will follow/sign orders for SN/OT/PT/Speech. Will change DM f/u on 10/03/22 to a HFU instead.

## 2022-09-27 NOTE — TELEPHONE ENCOUNTER
Spoke to Rainer and told her that patient does not have a cardiology appointment at this time. Patient was seen by three physicians in the hospital (Dr. Leonel Aragon did consult, Michael Nicole saw patient in follow up). Once the monitor results are reviewed, we can determine which physician would be most appropriate for patient to follow up with. I recommended that Rainer get lab orders from Dr. Arelis Figueroa PCP. She voiced understanding of information discussed.

## 2022-09-28 NOTE — TELEPHONE ENCOUNTER
Spoke with Patient's Daughter Edwin Tavera relayed message per Reginald Vargas RN. Voiced understanding.

## 2022-10-03 ENCOUNTER — OFFICE VISIT (OUTPATIENT)
Dept: INTERNAL MEDICINE CLINIC | Age: 77
End: 2022-10-03
Payer: MEDICARE

## 2022-10-03 VITALS
SYSTOLIC BLOOD PRESSURE: 114 MMHG | OXYGEN SATURATION: 98 % | HEART RATE: 88 BPM | BODY MASS INDEX: 42.75 KG/M2 | RESPIRATION RATE: 20 BRPM | WEIGHT: 315 LBS | TEMPERATURE: 97.7 F | DIASTOLIC BLOOD PRESSURE: 64 MMHG

## 2022-10-03 DIAGNOSIS — Z23 NEED FOR DIPHTHERIA, TETANUS, ACELLULAR PERTUSSIS AND HAEMOPHILUS INFLUENZAE VACCINE: ICD-10-CM

## 2022-10-03 DIAGNOSIS — I50.32 CHRONIC HEART FAILURE WITH PRESERVED EJECTION FRACTION (HCC): ICD-10-CM

## 2022-10-03 DIAGNOSIS — E78.5 HYPERLIPIDEMIA, UNSPECIFIED HYPERLIPIDEMIA TYPE: ICD-10-CM

## 2022-10-03 DIAGNOSIS — I63.312 CEREBROVASCULAR ACCIDENT (CVA) DUE TO THROMBOSIS OF LEFT MIDDLE CEREBRAL ARTERY (HCC): Primary | ICD-10-CM

## 2022-10-03 DIAGNOSIS — Z09 HOSPITAL DISCHARGE FOLLOW-UP: ICD-10-CM

## 2022-10-03 DIAGNOSIS — J96.22 ACUTE ON CHRONIC RESPIRATORY FAILURE WITH HYPOXIA AND HYPERCAPNIA (HCC): ICD-10-CM

## 2022-10-03 DIAGNOSIS — J96.21 ACUTE ON CHRONIC RESPIRATORY FAILURE WITH HYPOXIA AND HYPERCAPNIA (HCC): ICD-10-CM

## 2022-10-03 PROCEDURE — 99214 OFFICE O/P EST MOD 30 MIN: CPT | Performed by: INTERNAL MEDICINE

## 2022-10-03 PROCEDURE — G0008 ADMIN INFLUENZA VIRUS VAC: HCPCS | Performed by: INTERNAL MEDICINE

## 2022-10-03 PROCEDURE — 1123F ACP DISCUSS/DSCN MKR DOCD: CPT | Performed by: INTERNAL MEDICINE

## 2022-10-03 PROCEDURE — 1111F DSCHRG MED/CURRENT MED MERGE: CPT | Performed by: INTERNAL MEDICINE

## 2022-10-03 PROCEDURE — 90694 VACC AIIV4 NO PRSRV 0.5ML IM: CPT | Performed by: INTERNAL MEDICINE

## 2022-10-03 RX ORDER — FUROSEMIDE 80 MG
80 TABLET ORAL DAILY
Qty: 60 TABLET | Refills: 3
Start: 2022-10-03

## 2022-10-03 ASSESSMENT — PATIENT HEALTH QUESTIONNAIRE - PHQ9
SUM OF ALL RESPONSES TO PHQ QUESTIONS 1-9: 0
2. FEELING DOWN, DEPRESSED OR HOPELESS: 0
SUM OF ALL RESPONSES TO PHQ QUESTIONS 1-9: 0
SUM OF ALL RESPONSES TO PHQ QUESTIONS 1-9: 0
1. LITTLE INTEREST OR PLEASURE IN DOING THINGS: 0
SUM OF ALL RESPONSES TO PHQ QUESTIONS 1-9: 0
SUM OF ALL RESPONSES TO PHQ9 QUESTIONS 1 & 2: 0

## 2022-10-03 NOTE — TELEPHONE ENCOUNTER
Spoke to daughter Vasiliy Landa and schedule a HFU appt with Lily JACKSON on 10/21/22 at 2:45 pm.  She voiced understanding of appt date and time and is sending in the event monitor today.

## 2022-10-03 NOTE — PROGRESS NOTES
Post-Discharge Transitional Care Follow Up      Jamil Daniels   YOB: 1945    Date of Office Visit:  10/3/2022  Date of Hospital Admission: 9/9/22  Date of Hospital Discharge: 9/21/22  Readmission Risk Score (high >=14%. Medium >=10%):Readmission Risk Score: 19.6      Care management risk score Rising risk (score 2-5) and Complex Care (Scores >=6): No Risk Score On File     Non face to face  following discharge, date last encounter closed (first attempt may have been earlier): *No documented post hospital discharge outreach found in the last 14 days     Call initiated 2 business days of discharge: *No response recorded in the last 14 days     Cerebrovascular accident (CVA) due to thrombosis of left middle cerebral artery ([facility]  -     [address] (ACT)[facility] discharge follow-up  -     [place] DISCHARGE MEDS RECONCILED W/ CURRENT OUTPATIENT MED LIST  Acute on chronic respiratory failure with hypoxia and hypercapnia ([facility]  -     [address] Work (ACT), [facility]  Hyperlipidemia, unspecified hyperlipidemia type  -     [address] [facility] ([facility]), [facility]  Chronic heart failure with preserved ejection fraction (HCC)  -     furosemide (LASIX) 80 MG tablet; Take 1 tablet by mouth daily, Disp-60 tablet, R-3NO PRINT  -     [address] Work ([facility]), [facility]  Need for diphtheria, tetanus, acellular pertussis and haemophilus influenzae vaccine  -     Influenza, FLUAD, (age 72 y+), IM, Preservative Free, 0.5 mL    Medical Decision Making: high complexity  Return in about 3 months (around 1/3/2023) for CVA 30 min. Subjective:   HPI    Inpatient course: Discharge summary reviewed- see chart. Interval history/Current status: Patient states that his oxygen requirement has decreased to 6 L/min. He did have a loop recorder to check for source of his stroke given that there was unknown source for his MCA CVA. He just turned in the loop recorder.   He is using his HOURS AS NEEDED     allopurinol 100 MG tablet  Commonly known as: ZYLOPRIM  TAKE TWO TABLETS BY MOUTH DAILY     aspirin 81 MG chewable tablet  Take 1 tablet by mouth daily     Breo Ellipta 200-25 MCG/INH Aepb inhaler  Generic drug: Fluticasone furoate-vilanterol  INHALE ONE DOSE BY MOUTH DAILY     gabapentin 300 MG capsule  Commonly known as: NEURONTIN  TAKE ONE CAPSULE BY MOUTH THREE TIMES A DAY     ipratropium-albuterol 0.5-2.5 (3) MG/3ML Soln nebulizer solution  Commonly known as: DUONEB  INHALE ONE VIAL (3MLS) VIA NEBULIZATION BY MOUTH FOUR TIMES A DAY     lisinopril 2.5 MG tablet  Commonly known as: PRINIVIL;ZESTRIL  TAKE ONE TABLET BY MOUTH DAILY     predniSONE 5 MG tablet  Commonly known as: DELTASONE  TAKE ONE TABLET BY MOUTH DAILY     rosuvastatin 20 MG tablet  Commonly known as: CRESTOR  Take 1 tablet by mouth nightly     vitamin B-1 100 MG tablet  Commonly known as: THIAMINE  Take 1 tablet by mouth daily            STOP taking these medications      metFORMIN 1000 MG tablet  Commonly known as: GLUCOPHAGE  Stopped by: Summer Deutsch MD               Where to Get Your Medications        Information about where to get these medications is not yet available    Ask your nurse or doctor about these medications  furosemide 80 MG tablet          Medications marked \"taking\" at this time  Outpatient Medications Marked as Taking for the 10/3/22 encounter (Office Visit) with Summer Deutsch MD   Medication Sig Dispense Refill    furosemide (LASIX) 80 MG tablet Take 1 tablet by mouth daily 60 tablet 3    aspirin 81 MG chewable tablet Take 1 tablet by mouth daily 30 tablet 3    rosuvastatin (CRESTOR) 20 MG tablet Take 1 tablet by mouth nightly 30 tablet 3    thiamine mononitrate (THIAMINE) 100 MG tablet Take 1 tablet by mouth daily 30 tablet 3    gabapentin (NEURONTIN) 300 MG capsule TAKE ONE CAPSULE BY MOUTH THREE TIMES A DAY 90 capsule 3    predniSONE (DELTASONE) 5 MG tablet TAKE ONE TABLET BY MOUTH DAILY 90 tablet 1    allopurinol (ZYLOPRIM) 100 MG tablet TAKE TWO TABLETS BY MOUTH DAILY 180 tablet 1    BREO ELLIPTA 200-25 MCG/INH AEPB inhaler INHALE ONE DOSE BY MOUTH DAILY 1 each 11    ipratropium-albuterol (DUONEB) 0.5-2.5 (3) MG/3ML SOLN nebulizer solution INHALE ONE VIAL (3MLS) VIA NEBULIZATION BY MOUTH FOUR TIMES A  mL 11    albuterol sulfate  (90 Base) MCG/ACT inhaler INHALE TWO PUFFS BY MOUTH EVERY 6 HOURS AS NEEDED 18 g 11    lisinopril (PRINIVIL;ZESTRIL) 2.5 MG tablet TAKE ONE TABLET BY MOUTH DAILY 90 tablet 3        Medications patient taking as of now reconciled against medications ordered at time of hospital discharge: Yes    Review of Systems    Objective:    /64   Pulse 88   Temp 97.7 °F (36.5 °C) (Temporal)   Resp 20   Wt (!) 324 lb (147 kg)   SpO2 98%   BMI 42.75 kg/m²   Physical Exam  Constitutional:       Appearance: He is obese. HENT:      Right Ear: Tympanic membrane and ear canal normal.      Left Ear: Tympanic membrane and ear canal normal.      Mouth/Throat:      Mouth: Mucous membranes are moist.      Pharynx: No oropharyngeal exudate or posterior oropharyngeal erythema. Eyes:      General:         Right eye: No discharge. Left eye: No discharge. Pupils: Pupils are equal, round, and reactive to light. Cardiovascular:      Rate and Rhythm: Bradycardia present. Heart sounds: No murmur heard. No friction rub. Pulmonary:      Breath sounds: No wheezing or rhonchi. Comments: Decreased breath sounds  Musculoskeletal:      Cervical back: No rigidity or tenderness. An electronic signature was used to authenticate this note.   --Brien Fox MD

## 2022-10-04 ENCOUNTER — TELEPHONE (OUTPATIENT)
Dept: FAMILY MEDICINE CLINIC | Age: 77
End: 2022-10-04

## 2022-10-04 NOTE — TELEPHONE ENCOUNTER
SW contacted patient to follow-up on [facility] referral from PCP. SW explored with pt what is his insurance coverage and offered to call to see if transportation is a covered benefit through his [employer]. Pt agreed and SW assisted pt in contacting member services. Pt was able to sign up to receive 60 one way medical transportation trips through his "[employer] policy and [employer] will provide this transportation service. Pt was informed this service will be initiated in about 10 days, he needs to call before 48hrs of his scheduled appointment by calling [phone] to schedule his transportation. Pt was also informed he could receive HHA and other services as a covered benefit by his PCP sending in an order to [employer]. Pt spoke about needing an oxygen concentrator and plans to contact his current oxygen provider to discuss this process further. Pt thanked VERONICA for the assistance.

## 2022-10-06 ENCOUNTER — TELEPHONE (OUTPATIENT)
Dept: INTERNAL MEDICINE CLINIC | Age: 77
End: 2022-10-06

## 2022-10-06 ENCOUNTER — TELEPHONE (OUTPATIENT)
Dept: PULMONOLOGY | Age: 77
End: 2022-10-06

## 2022-10-06 DIAGNOSIS — L30.9 DERMATITIS: Primary | ICD-10-CM

## 2022-10-06 RX ORDER — ALLOPURINOL 100 MG/1
TABLET ORAL
Qty: 180 TABLET | Refills: 1 | Status: SHIPPED | OUTPATIENT
Start: 2022-10-06

## 2022-10-06 NOTE — TELEPHONE ENCOUNTER
----- Message from Jessica Howell sent at 10/6/2022  3:23 PM EDT -----  Subject: Message to Provider    QUESTIONS  Information for Provider? Racquel from [facility]. He has   scratches from his knee to ankle all over. He is using Jergens lotion. Manav Cuellar is wanting to know if you want to change medication or give him   a anti-itching medication or cream.   ---------------------------------------------------------------------------  --------------  Amos Rojas INFO  [phone]; OK to leave message on voicemail  ---------------------------------------------------------------------------  --------------  SCRIPT ANSWERS  Relationship to Patient? Third Party  Third Party Type? Other  Other Third Party Type? [facility]  Representative Name?  Racquel

## 2022-10-06 NOTE — TELEPHONE ENCOUNTER
[facility] called and requested that at patients apt on 11/2 a discussion about a portable concentrator she had said that patient goes through the small tanks in like 20 minutes and a portable would be a good idea for patient when he goes to places.       [address]: [phone]  Racquel

## 2022-10-08 PROCEDURE — 93272 ECG/REVIEW INTERPRET ONLY: CPT | Performed by: INTERNAL MEDICINE

## 2022-10-15 DIAGNOSIS — J44.9 COPD, VERY SEVERE (HCC): ICD-10-CM

## 2022-10-17 RX ORDER — PREDNISONE 1 MG/1
TABLET ORAL
Qty: 90 TABLET | Refills: 1 | Status: SHIPPED | OUTPATIENT
Start: 2022-10-17

## 2022-10-17 RX ORDER — FUROSEMIDE 40 MG/1
TABLET ORAL
Qty: 180 TABLET | Refills: 0 | Status: SHIPPED
Start: 2022-10-17 | End: 2022-10-21 | Stop reason: DRUGHIGH

## 2022-10-21 ENCOUNTER — OFFICE VISIT (OUTPATIENT)
Dept: CARDIOLOGY CLINIC | Age: 77
End: 2022-10-21
Payer: MEDICARE

## 2022-10-21 VITALS
DIASTOLIC BLOOD PRESSURE: 70 MMHG | BODY MASS INDEX: 41.75 KG/M2 | HEART RATE: 100 BPM | OXYGEN SATURATION: 92 % | HEIGHT: 73 IN | WEIGHT: 315 LBS | SYSTOLIC BLOOD PRESSURE: 120 MMHG

## 2022-10-21 DIAGNOSIS — I10 PRIMARY HYPERTENSION: ICD-10-CM

## 2022-10-21 DIAGNOSIS — R06.02 SOB (SHORTNESS OF BREATH): ICD-10-CM

## 2022-10-21 DIAGNOSIS — I63.312 CEREBROVASCULAR ACCIDENT (CVA) DUE TO THROMBOSIS OF LEFT MIDDLE CEREBRAL ARTERY (HCC): Primary | ICD-10-CM

## 2022-10-21 DIAGNOSIS — Z79.899 LONG-TERM USE OF HIGH-RISK MEDICATION: ICD-10-CM

## 2022-10-21 PROCEDURE — 99214 OFFICE O/P EST MOD 30 MIN: CPT | Performed by: NURSE PRACTITIONER

## 2022-10-21 PROCEDURE — 1123F ACP DISCUSS/DSCN MKR DOCD: CPT | Performed by: NURSE PRACTITIONER

## 2022-10-21 NOTE — PROGRESS NOTES
[facility]     Outpatient Follow Up Note    CHIEF COMPLAINT / HPI: Hospital Follow Up secondary to history of prior stroke    Hospital record has been reviewed  Hospital Course progressed as follows per discharge summary:     Principal Problem:    Encephalopathy  Active Problems:    Altered mental status    Cerebrovascular accident (CVA) due to thrombosis of left middle cerebral artery (HCC)    Acute on chronic respiratory failure with hypoxia and hypercapnia ([address]    Cerebrovascular accident (CVA) ([address]    Chronic heart failure with preserved ejection fraction (HCC)    Ventricular ectopy    Leg swelling    TGA (transient global amnesia)    HTN (hypertension), benign       Problem-based Hospital Course. Presented with acute confusion. Unable to recall events of the day. CT head with no acute findings. No evidence of infection. Acute CVA. Presented with memory loss. CT head with no acute findings. Carotid doppler with no significant stenosis. Ammonia level 11, vitamin B12 & folate WNL, TSH 3.18. Urine drug screen negative, ETOH negative. MRI of brain revealed moderate sized acute infarct involving left temporal lobe. Echo with normal EF and evidence tiny PFO or patent ASD. Neuro evaluated, recommend continue asa and statin. Neuro has signed off. PT/OT recommend ARU on Dc for continued therapy. PMR evaluated and found to be appropriate candidate for ARU. Acute on chronic hypoxic/hypercapnic respiratory failure / very severe COPD. On chronic prednisone therapy and O2 therapy at 3 liters. CXR with left basilar opacity which may reflect underlying atelectasis. No evidence of exacerbation, low suspicion for pneumonia as patient afebrile, no leukocytosis, procalcitonin only 0.06.  Currently at his baseline oxygen requirement, 3 liters but O2 sat noted to drop to 70% with ambulation requiring up to 8 liters to recover.  Pulmonary evaluated and recommend NIV on DC and high flow O2 concentrator at home which can be titrated up to 10 liters. Continue Dulera. DM2. A1c 5.6. Takes metformin at home, held on admission. BG values controlled. HTN. Controlled. Continue lisinopril. ETOH use. Pt drinks beer daily, etoh level was negative. No s/s of withdrawal. B12, thiamine and ammonia levels in range. STEF. Will require NIV on DC    Atrial level shunt / acute ischemic stroke. Noted on echo. Evaluated by cardiology and felt unlikely to be cause of stroke, not candidate for closure given age, code status and patient wishes. Recommend 30 day event monitor on DC to assess for PAF and if negative then consideration for ILR. Leg edema. Echo with EF 21%, diastolic function indeterminate, mildly enlarged RV. No evidence pulmonary edema on CXR, patient weight unchanged compared to PCP office visits. Edema likely multifactorial, secondary to immobility, legs in dependent position and possible right heart failure from severe COPD. Venous doppler negative for DVT. Continue po Lasix. Patient discharged to ARU. Porsha Martell is 78 y.o. male who presents today for a routine follow up after a recent hospitalization related to the above mentioned issues. His daughter recalls that her dad just wasn't feeling well. He was confused for hours thereafter. Subjective:   Since the time of discharge, the patient admits their symptoms have improved. He's having a hard time expressing himself though overall getting better. He denies significant chest pain. There is SOB walking. He'd gotten COVID in Aug and his breathing has worsened (hx mesothelioma ). His legs are swollen and has been so since Aug; the ballooned up in the hospital in the dependent position). The patient is not experiencing palpitations. His BP has been ~ 120/80  He wasn't able to tolerating 160 mg of lasix d/t weakness. His daughter decreased his dose to 80 mg daily. His wt hadn't changed    These symptoms are improving over the last many days.    With regard to medication therapy the patient has been compliant with prescribed regimen. They have tolerated therapy to date. Past Medical History:   Diagnosis Date    Anemia     COPD (chronic obstructive pulmonary disease) ([facility])     Diabetes mellitus ([address]     Edema     GI (gastrointestinal bleed)     Gout     Hypertension     Morbid obesity ([facility])     Neuropathy     Obstructive sleep apnea     uses CPAP    Peripheral vascular disease ([facility])      Social History:    Social History     Tobacco Use   Smoking Status Former    Packs/day: 1.00    Years: 50.00    Pack years: 50.00    Types: Cigarettes    Quit date: 2/1/2012    Years since quitting: 10.7   Smokeless Tobacco Never   Tobacco Comments    H.O. smoking 1 p.p.d. Quit 9 months ago      Current Medications:  Current Outpatient Medications   Medication Sig Dispense Refill    predniSONE (DELTASONE) 5 MG tablet TAKE ONE TABLET BY MOUTH DAILY 90 tablet 1    furosemide (LASIX) 40 MG tablet TAKE ONE TABLET BY MOUTH TWICE A  tablet 0    triamcinolone (KENALOG) 0.1 % ointment Apply to legs twice daily.  80 g 0    allopurinol (ZYLOPRIM) 100 MG tablet TAKE TWO TABLETS BY MOUTH DAILY 180 tablet 1    furosemide (LASIX) 80 MG tablet Take 1 tablet by mouth daily 60 tablet 3    aspirin 81 MG chewable tablet Take 1 tablet by mouth daily 30 tablet 3    rosuvastatin (CRESTOR) 20 MG tablet Take 1 tablet by mouth nightly 30 tablet 3    thiamine mononitrate (THIAMINE) 100 MG tablet Take 1 tablet by mouth daily 30 tablet 3    gabapentin (NEURONTIN) 300 MG capsule TAKE ONE CAPSULE BY MOUTH THREE TIMES A DAY 90 capsule 3    BREO ELLIPTA 200-25 MCG/INH AEPB inhaler INHALE ONE DOSE BY MOUTH DAILY 1 each 11    ACCU-CHEK CLARISSA PLUS strip USE ONE STRIP TO TEST DAILY 50 strip 5    ipratropium-albuterol (DUONEB) 0.5-2.5 (3) MG/3ML SOLN nebulizer solution INHALE ONE VIAL (3MLS) VIA NEBULIZATION BY MOUTH FOUR TIMES A  mL 11    albuterol sulfate  (90 Base) MCG/ACT inhaler INHALE TWO PUFFS BY MOUTH EVERY 6 HOURS AS NEEDED 18 g 11    lisinopril (PRINIVIL;ZESTRIL) 2.5 MG tablet TAKE ONE TABLET BY MOUTH DAILY 90 tablet 3    Accu-Chek Softclix Lancets MISC USE ONE LANCET TO TEST DAILY 100 each 2    Blood Glucose Monitoring Suppl (ACCU-CHEK CLARISSA PLUS) w/Device KIT 1 each by Does not apply route daily 1 kit 0    acetaminophen (TYLENOL) 325 MG tablet Take 650 mg by mouth every 6 hours as needed for Pain       No current facility-administered medications for this visit. REVIEW OF SYSTEMS:   CONSTITUTIONAL: No major weight gain or loss, fatigue, weakness, night sweats or fever. There's been no change in energy level, sleep pattern, or activity level. HEENT: No new vision difficulties or ringing in the ears. RESPIRATORY: No new SOB, PND, orthopnea or cough. CARDIOVASCULAR: See HPI  GI: No nausea, vomiting, diarrhea, constipation, abdominal pain or changes in bowel habits. : No urinary frequency, urgency, incontinence hematuria or dysuria. SKIN: No cyanosis or skin lesions. MUSCULOSKELETAL: No new muscle or joint pain. NEUROLOGICAL: No syncope or TIA-like symptoms. PSYCHIATRIC: No anxiety, pain, insomnia or depression    Objective:   PHYSICAL EXAM:      VITALS:  /70 (Site: Right Upper Arm, Position: Sitting, Cuff Size: Large Adult)   Pulse 100   Ht 6' 1\" (1.854 m)   Wt (!) 328 lb 12.8 oz (149.1 kg)   SpO2 92%   BMI 43.38 kg/m²     CONSTITUTIONAL: Cooperative, no apparent distress, and appears well nourished / obese  NEUROLOGIC:  Awake and orientated to person, place and time. PSYCH: Calm affect. SKIN: Warm and dry. HEENT: Sclera non-icteric, normocephalic, neck supple, no elevation of JVP, normal carotid pulses with no bruits and thyroid normal size. LUNGS:  No increased work of breathing and slightly diminished RLL  CARDIOVASCULAR:  Regular rate 80 and rhythm with no murmurs, gallops, rubs, or abnormal heart sounds, normal PMI. The apical impulses not displaced. Heart tones are crisp and normal                                                                                          Cervical veins are not engorged                 JVP less than 8 cm H2O                                                                              The carotid upstroke is normal in amplitude and contour without delay or bruit    ABDOMEN:  Normal bowel sounds, non-distended and non-tender to palpation   EXT: lukasz LE edema, no calf tenderness. Pulses are present bilaterally. DATA:    Lab Results   Component Value Date    ALT 6 (L) 09/10/2022    AST 8 (L) 09/10/2022    ALKPHOS 66 09/10/2022    BILITOT <0.2 09/10/2022     Lab Results   Component Value Date    CREATININE 1.4 (H) 09/27/2022    BUN 45 (H) 09/27/2022     09/27/2022    K 5.1 09/27/2022    CL 99 09/27/2022    CO2 28 09/27/2022       Lab Results   Component Value Date    WBC 5.1 09/19/2022    HGB 10.7 (L) 09/19/2022    HCT 32.4 (L) 09/19/2022    .8 (H) 09/19/2022     09/19/2022     No components found for: CHLPL  Lab Results   Component Value Date    TRIG 81 09/06/2022    TRIG 87 04/04/2022    TRIG 80 06/29/2021     Lab Results   Component Value Date    HDL 62 (H) 09/06/2022    HDL 81 (H) 04/04/2022    HDL 82 (H) 06/29/2021     Lab Results   Component Value Date    LDLCALC 43 09/06/2022    LDLCALC 70 04/04/2022    LDLCALC 86 06/29/2021     Lab Results   Component Value Date    LABVLDL 16 09/06/2022    LABVLDL 17 04/04/2022    LABVLDL 16 06/29/2021     Radiology Review:  Pertinent images / reports were reviewed as a part of this visit and reveals the following:    CT head: 9/3/22:     Impression   Atrophy and small-vessel ischemic change. No hemorrhage or mass identified       Paranasal sinus disease       MRI brain: 9/6/22:     Impression   1. 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   Sulcal effacement in this region of infarct without mass effect or midline shift. 2. Otherwise, no acute intracranial abnormality. 3. Minimal global parenchymal volume loss with minimal chronic microvascular   ischemic changes       Last Echo: 9/6/22  Summary   A bubble study was performed and shows evidence of a trivial amount of right   to left shunting consistent with a tiny patent foramen ovale or atrial   septal defect. Irregular rhythm. Left ventricular systolic function is normal with ejection fraction   estimated at 55 %. No regional wall motion abnormalities are noted. Normal left ventricular wall thickness. Diastolic dysfunction grade and filing pressure are indeterminate. The right ventricle is mildly enlarged. The right atrium is mildly dilated. The aortic root is moderately dilated. The ascending aorta is moderately dilated. Mild mitral regurgitation. Carotid US: 9/6/22:  Conclusions        Summary        No hemodynamically significant stenosis noted in the internal carotid artery    bilaterally       Venous US LE : 9/8/22:        Summary        -There is no evidence of deep or superficial venous thrombosis involving the    bilateral lower extremities.    -Incidental finding on the left: 3.17 cm x 3.31 cm cystic appearing    structure noted in the left popliteal fossa, possibly representing a Baker's    cyst.         Event monitor : 9/9 - 10/8/22:  Sinus rhythm : avg 77 bpm min 50 max 120  ~2 episodes NSVT : 4 bt and 6 bt  ~PAC burden : 3%  ~PVC burden : 5%    Assessment:      Diagnosis Orders   1. Cerebrovascular accident (CVA) due to thrombosis of left middle cerebral artery (HCC)   ~tiny PFO on echo : not felt to be a contributing factor to event  ~neg carotid stenosis by US   ~acute infarct by MRI brain  ~event monitor : neg for AF  ~ASA       2. Primary hypertension   ~controlled        3.  SOB (shortness of breath)   ~multifactorial with COPD / mesothelioma vs volume overload   ~did not tolerate high dose furosemide  ~normal LVEF by echo  ~ Brain Natriuretic Peptide      4. Long-term use of high-risk medication  Basic Metabolic Panel          Patient  is stable since hospital discharge. Plan:  BNP/BMP today : decreased lasix dose   Referral to EP : possible ILR   F/U with general cardiology in 8-10 weeks      I have addressed the patient's cardiac risk factors and adjusted pharmacologic treatment as needed. In addition, I have reinforced the need for patient directed risk factor modification. Further evaluation will be based upon the patient's clinical course and testing results. All questions and concerns were addressed to the patient/daughter Eleonora Whiteside) nurse. Alternatives to  treatment were discussed. The patient  currently  is not smoking. The risks related to smoking were reviewed with the patient. Recommend maintaining a smoke-free lifestyle. Daily weight, low sodium diet were discussed. Patient instructed to call the office with a weight gain: > 3 # over night or 5# in one week; swelling, SOB/orthopnea/PND    Dual Antiplatelet therapy / anti-coagulation has been recommended / prescribed for this patient. Education conducted on adverse reactions including bleeding was discussed. The patient verbalizes understanding. Pt is not on a BB : BP did not support  Pt is on an ace-i/ARB  Pt is on a statin      Saturated fat diet discussed  Exercise program discussed    Thank you for allowing to us to participate in the care of Yung Llamas.       [address]  Documentation of today's visit sent to PCP

## 2022-10-26 DIAGNOSIS — I10 ESSENTIAL HYPERTENSION: ICD-10-CM

## 2022-10-26 RX ORDER — LISINOPRIL 2.5 MG/1
TABLET ORAL
Qty: 90 TABLET | Refills: 3 | Status: SHIPPED | OUTPATIENT
Start: 2022-10-26

## 2022-11-02 ENCOUNTER — OFFICE VISIT (OUTPATIENT)
Dept: PULMONOLOGY | Age: 77
End: 2022-11-02
Payer: MEDICARE

## 2022-11-02 VITALS — OXYGEN SATURATION: 97 % | HEART RATE: 77 BPM

## 2022-11-02 DIAGNOSIS — I50.32 CHRONIC HEART FAILURE WITH PRESERVED EJECTION FRACTION (HCC): ICD-10-CM

## 2022-11-02 DIAGNOSIS — G47.33 OSA (OBSTRUCTIVE SLEEP APNEA): Chronic | ICD-10-CM

## 2022-11-02 DIAGNOSIS — E66.01 MORBID OBESITY (HCC): Chronic | ICD-10-CM

## 2022-11-02 DIAGNOSIS — J44.9 COPD, SEVERE (HCC): Primary | ICD-10-CM

## 2022-11-02 DIAGNOSIS — J96.11 CHRONIC RESPIRATORY FAILURE WITH HYPOXIA (HCC): ICD-10-CM

## 2022-11-02 PROCEDURE — 99214 OFFICE O/P EST MOD 30 MIN: CPT | Performed by: INTERNAL MEDICINE

## 2022-11-02 PROCEDURE — 1123F ACP DISCUSS/DSCN MKR DOCD: CPT | Performed by: INTERNAL MEDICINE

## 2022-11-02 NOTE — PROGRESS NOTES
excess calories (HCC)  Contributes to his SOB      FOLLOW UP: 3 months for pulmonary. Chief Complaint   Patient presents with    Shortness of Breath     HFU had a stoke, on 6 liters of O2 and on NIV at night        HPI  The patient presents with a chief complaint of moderate shortness of breath related to very severe COPD of many years duration. He has mild associated cough. Exertion is a modifying factor. He takes Breo (which he finds better than Advair) and duoneb for very severe COPD of many years duration. He had a stroke and was in the hospital in September. He is now on O2 at 6 LPM. HE is also on an Astral NIMV. Review of Systems  No Chest pain, Nausea or vomiting reported      Physical Exam:  Well developed, well nourished  Alert and oriented  Sclera is clear  No cervical adenopathy  No JVD. Chest examination is fine wheezing. Cardiac examination reveals regular rate and rhythm without murmur, gallop or rub. The abdomen is soft, nontender and nondistended. There is no clubbing, cyanosis or edema of the extremities. There is no obvious skin rash. No focal neuro deficicts  Normal mood and affect    No Known Allergies  Prior to Visit Medications    Medication Sig Taking? Authorizing Provider   lisinopril (PRINIVIL;ZESTRIL) 2.5 MG tablet TAKE ONE TABLET BY MOUTH DAILY  Summer Deutsch MD   OXYGEN Inhale into the lungs 6L O2  Historical Provider, MD   predniSONE (DELTASONE) 5 MG tablet TAKE ONE TABLET BY MOUTH DAILY  Summer Deutsch MD   triamcinolone (KENALOG) 0.1 % ointment Apply to legs twice daily.   Summer Deutsch MD   allopurinol (ZYLOPRIM) 100 MG tablet TAKE TWO TABLETS BY MOUTH DAILY  Summer Deutsch MD   furosemide (LASIX) 80 MG tablet Take 1 tablet by mouth daily  Summer Deutsch MD   aspirin 81 MG chewable tablet Take 1 tablet by mouth daily  Monet Perez MD   rosuvastatin (CRESTOR) 20 MG tablet Take 1 tablet by mouth nightly  Monet Perez MD thiamine mononitrate (THIAMINE) 100 MG tablet Take 1 tablet by mouth daily  Gilmar Brown MD   metoprolol succinate (TOPROL XL) 25 MG extended release tablet Take 0.5 tablets by mouth daily  THU Cardenas - CNP   gabapentin (NEURONTIN) 300 MG capsule TAKE ONE CAPSULE BY MOUTH THREE TIMES A DAY  Cricket Johnson MD   celecoxib (CELEBREX) 100 MG capsule Take 1 capsule by mouth 2 times daily  Cricket Johnson MD   BREO ELLIPTA 200-25 MCG/INH AEPB inhaler INHALE ONE DOSE BY MOUTH DAILY  Nadine Ortiz MD   ACCU-CHEK CLARISSA PLUS strip USE ONE STRIP TO TEST DAILY  Cricket Johnson MD   ipratropium-albuterol (DUONEB) 0.5-2.5 (3) MG/3ML SOLN nebulizer solution INHALE ONE VIAL (3MLS) VIA NEBULIZATION BY MOUTH FOUR TIMES A DAY  Nadine Ortiz MD   albuterol sulfate  (90 Base) MCG/ACT inhaler INHALE TWO PUFFS BY MOUTH EVERY 6 HOURS AS NEEDED  Nadine Ortiz MD   Accu-Chek Softclix Lancets MISC USE ONE LANCET TO TEST DAILY  Cricket Johnson MD   diclofenac (VOLTAREN) 75 MG EC tablet TAKE ONE TABLET BY MOUTH TWICE A DAY  Cricket Johnson MD   Blood Glucose Monitoring Suppl (ACCU-CHEK CLARISSA PLUS) w/Device KIT 1 each by Does not apply route daily  Cricket Johnson MD   acetaminophen (TYLENOL) 325 MG tablet Take 650 mg by mouth every 6 hours as needed for Pain  Historical Provider, MD       Vitals:    11/02/22 1457   Pulse: 77   SpO2: 97%     There is no height or weight on file to calculate BMI.      Wt Readings from Last 3 Encounters:   10/21/22 (!) 328 lb 12.8 oz (149.1 kg)   10/03/22 (!) 324 lb (147 kg)   09/21/22 (!) 331 lb 12.8 oz (150.5 kg)     BP Readings from Last 3 Encounters:   10/21/22 120/70   10/03/22 114/64   09/21/22 101/63        Social History     Tobacco Use   Smoking Status Former    Packs/day: 1.00    Years: 50.00    Pack years: 50.00    Types: Cigarettes    Quit date: 2/1/2012    Years since quitting: 10.7   Smokeless Tobacco Never   Tobacco Comments    H.O. smoking 1 p.p.d. Quit 9 months ago

## 2022-11-09 ENCOUNTER — TELEPHONE (OUTPATIENT)
Dept: CARDIOLOGY CLINIC | Age: 77
End: 2022-11-09

## 2022-11-09 NOTE — TELEPHONE ENCOUNTER
Called patient left message to call back.     TS CNP received labs from [facility] from 12/26/22  BMP  was ok

## 2022-11-26 DIAGNOSIS — J44.9 COPD, SEVERE (HCC): Primary | ICD-10-CM

## 2022-11-28 RX ORDER — IPRATROPIUM BROMIDE AND ALBUTEROL SULFATE 2.5; .5 MG/3ML; MG/3ML
SOLUTION RESPIRATORY (INHALATION)
Qty: 360 ML | Refills: 5 | Status: SHIPPED | OUTPATIENT
Start: 2022-11-28

## 2022-12-23 DIAGNOSIS — E13.40 NEUROPATHY DUE TO SECONDARY DIABETES (HCC): ICD-10-CM

## 2022-12-23 NOTE — TELEPHONE ENCOUNTER
Recent Visits  Date Type Provider Dept   10/03/22 Office Visit Manual MD [facility]   04/11/22 Office Visit Manual MD [facility]   11/11/21 Office Visit Manual MD [facility]   Showing recent visits within past 540 days with a meds authorizing provider and meeting all other requirements  Future Appointments  Date Type Provider Dept   01/12/23 Appointment Manual MD [facility]   Showing future appointments within next 150 days with a meds authorizing provider and meeting all other requirements     10/3/2022

## 2022-12-27 RX ORDER — GABAPENTIN 300 MG/1
CAPSULE ORAL
Qty: 90 CAPSULE | Refills: 3 | Status: SHIPPED | OUTPATIENT
Start: 2022-12-27 | End: 2023-04-27

## 2023-01-12 ENCOUNTER — OFFICE VISIT (OUTPATIENT)
Dept: INTERNAL MEDICINE CLINIC | Age: 78
End: 2023-01-12

## 2023-01-12 VITALS
SYSTOLIC BLOOD PRESSURE: 118 MMHG | WEIGHT: 315 LBS | TEMPERATURE: 97.6 F | OXYGEN SATURATION: 99 % | BODY MASS INDEX: 45.12 KG/M2 | DIASTOLIC BLOOD PRESSURE: 62 MMHG | HEART RATE: 69 BPM

## 2023-01-12 DIAGNOSIS — E13.40 NEUROPATHY DUE TO SECONDARY DIABETES (HCC): ICD-10-CM

## 2023-01-12 DIAGNOSIS — I10 ESSENTIAL HYPERTENSION: ICD-10-CM

## 2023-01-12 DIAGNOSIS — I50.32 CHRONIC HEART FAILURE WITH PRESERVED EJECTION FRACTION (HCC): ICD-10-CM

## 2023-01-12 DIAGNOSIS — E11.9 TYPE 2 DIABETES MELLITUS WITHOUT COMPLICATION, WITHOUT LONG-TERM CURRENT USE OF INSULIN (HCC): Primary | ICD-10-CM

## 2023-01-12 RX ORDER — ALLOPURINOL 100 MG/1
TABLET ORAL
Qty: 180 TABLET | Refills: 3 | Status: SHIPPED | OUTPATIENT
Start: 2023-01-12

## 2023-01-12 RX ORDER — LISINOPRIL 2.5 MG/1
TABLET ORAL
Qty: 90 TABLET | Refills: 3 | Status: SHIPPED | OUTPATIENT
Start: 2023-01-12

## 2023-01-12 RX ORDER — BLOOD SUGAR DIAGNOSTIC
STRIP MISCELLANEOUS
Qty: 100 STRIP | Refills: 1 | Status: SHIPPED | OUTPATIENT
Start: 2023-01-12

## 2023-01-12 RX ORDER — ASPIRIN 81 MG/1
81 TABLET, CHEWABLE ORAL DAILY
Qty: 90 TABLET | Refills: 3 | Status: SHIPPED | OUTPATIENT
Start: 2023-01-12

## 2023-01-12 RX ORDER — FLUTICASONE FUROATE, UMECLIDINIUM BROMIDE AND VILANTEROL TRIFENATATE 200; 62.5; 25 UG/1; UG/1; UG/1
1 POWDER RESPIRATORY (INHALATION) DAILY
Qty: 3 EACH | Refills: 0 | COMMUNITY
Start: 2023-01-12

## 2023-01-12 RX ORDER — GABAPENTIN 300 MG/1
300 CAPSULE ORAL 3 TIMES DAILY
Qty: 270 CAPSULE | Refills: 3 | Status: SHIPPED | OUTPATIENT
Start: 2023-01-12 | End: 2023-04-12

## 2023-01-12 RX ORDER — FUROSEMIDE 80 MG
80 TABLET ORAL DAILY
Qty: 90 TABLET | Refills: 3 | Status: SHIPPED | OUTPATIENT
Start: 2023-01-12

## 2023-01-12 ASSESSMENT — COLUMBIA-SUICIDE SEVERITY RATING SCALE - C-SSRS
1. WITHIN THE PAST MONTH, HAVE YOU WISHED YOU WERE DEAD OR WISHED YOU COULD GO TO SLEEP AND NOT WAKE UP?: YES
2. HAVE YOU ACTUALLY HAD ANY THOUGHTS OF KILLING YOURSELF?: NO
6. HAVE YOU EVER DONE ANYTHING, STARTED TO DO ANYTHING, OR PREPARED TO DO ANYTHING TO END YOUR LIFE?: NO

## 2023-01-12 ASSESSMENT — PATIENT HEALTH QUESTIONNAIRE - PHQ9
9. THOUGHTS THAT YOU WOULD BE BETTER OFF DEAD, OR OF HURTING YOURSELF: 0
SUM OF ALL RESPONSES TO PHQ9 QUESTIONS 1 & 2: 6
SUM OF ALL RESPONSES TO PHQ QUESTIONS 1-9: 24
SUM OF ALL RESPONSES TO PHQ QUESTIONS 1-9: 24
8. MOVING OR SPEAKING SO SLOWLY THAT OTHER PEOPLE COULD HAVE NOTICED. OR THE OPPOSITE, BEING SO FIGETY OR RESTLESS THAT YOU HAVE BEEN MOVING AROUND A LOT MORE THAN USUAL: 3
5. POOR APPETITE OR OVEREATING: 3
SUM OF ALL RESPONSES TO PHQ QUESTIONS 1-9: 24
7. TROUBLE CONCENTRATING ON THINGS, SUCH AS READING THE NEWSPAPER OR WATCHING TELEVISION: 3
6. FEELING BAD ABOUT YOURSELF - OR THAT YOU ARE A FAILURE OR HAVE LET YOURSELF OR YOUR FAMILY DOWN: 3
3. TROUBLE FALLING OR STAYING ASLEEP: 3
2. FEELING DOWN, DEPRESSED OR HOPELESS: 3
4. FEELING TIRED OR HAVING LITTLE ENERGY: 3
10. IF YOU CHECKED OFF ANY PROBLEMS, HOW DIFFICULT HAVE THESE PROBLEMS MADE IT FOR YOU TO DO YOUR WORK, TAKE CARE OF THINGS AT HOME, OR GET ALONG WITH OTHER PEOPLE: 0
1. LITTLE INTEREST OR PLEASURE IN DOING THINGS: 3
SUM OF ALL RESPONSES TO PHQ QUESTIONS 1-9: 24

## 2023-01-12 NOTE — PROGRESS NOTES
Velia Ross (:  1945) is a 78 y.o. male,Established patient, here for evaluation of the following chief complaint(s):  Cerebrovascular Accident         ASSESSMENT/PLAN:  1. Type 2 diabetes mellitus without complication, without long-term current use of insulin (HCC)  Stable  - diet controlled  -     blood glucose test strips (ACCU-CHEK CLARISSA PLUS) strip; As needed. , Disp-100 strip, R-1Normal  -     check blood sugars regularl    2. Essential hypertension  -     lisinopril (PRINIVIL;ZESTRIL) 2.5 MG tablet; TAKE ONE TABLET BY MOUTH DAILY, Disp-90 tablet, R-3Normal  3. Chronic heart failure with preserved ejection fraction (HCC)  stable  -     furosemide (LASIX) 80 MG tablet; Take 1 tablet by mouth daily, Disp-90 tablet, R-3Normal    4. Neuropathy due to secondary diabetes (HCC)  stable  -     gabapentin (NEURONTIN) 300 MG capsule; Take 1 capsule by mouth 3 times daily for 90 days. , Disp-270 capsule, R-3Normal    Patient has COPD. His daughter would like to try some Trelegy as he continues have dyspnea on Breo. We will provide him with some samples and have him follow-up with Dr. Molly Fitzpatrick in 3 weeks. Patient scored high on the phq9. He is frustrated by his condition, but does not        Want any medication. Return in about 4 months (around 2023) for Medicare Wellness 45 min. Subjective   SUBJECTIVE/OBJECTIVE:  HPI  Treatment Adherence:   Medication compliance:  compliant most of the time  Diet compliance:  compliant most of the time  Weight trend: stable  Current exercise: no regular exercise  Barriers: none    Diabetes Mellitus Type 2: Current symptoms/problems include none. Home blood sugar records: postprandial range: < 120  Any episodes of hypoglycemia? no  Eye exam current (within one year): no  Tobacco history: He  reports that he quit smoking about 10 years ago. His smoking use included cigarettes. He has a 50.00 pack-year smoking history.  He has never used smokeless tobacco.   Daily Aspirin? Yes    Hypertension:  Home blood pressure monitoring: No.  He is adherent to a low sodium diet. Patient denies chest pain. Antihypertensive medication side effects: no medication side effects noted. Use of agents associated with hypertension: none. Hyperlipidemia:  No new myalgias or GI upset on rosuvastatin (Crestor). Lab Results   Component Value Date    LABA1C 5.6 09/06/2022    LABA1C 5.8 04/04/2022    LABA1C 5.6 06/29/2021     Lab Results   Component Value Date    LABMICR Not Indicated 09/03/2022    CREATININE 1.4 (H) 09/27/2022     Lab Results   Component Value Date    ALT 6 (L) 09/10/2022    AST 8 (L) 09/10/2022     Lab Results   Component Value Date    CHOL 121 09/06/2022    TRIG 81 09/06/2022    HDL 62 (H) 09/06/2022    LDLCALC 43 09/06/2022           Review of Systems       Objective   Physical Exam  Constitutional:       Appearance: He is ill-appearing. Comments: 6 liters of oxygen continous   HENT:      Right Ear: Tympanic membrane normal. There is no impacted cerumen. Left Ear: Tympanic membrane normal. There is no impacted cerumen. Nose: Nose normal. No congestion or rhinorrhea. Mouth/Throat:      Mouth: Mucous membranes are moist.      Pharynx: No oropharyngeal exudate. Eyes:      General:         Right eye: No discharge. Pupils: Pupils are equal, round, and reactive to light. Cardiovascular:      Rate and Rhythm: Normal rate. Pulmonary:      Breath sounds: Decreased air movement present. Examination of the right-upper field reveals rhonchi. Examination of the left-upper field reveals rhonchi. Decreased breath sounds, wheezing and rhonchi present. No rales. Musculoskeletal:      Cervical back: Normal range of motion. No rigidity or tenderness.           PHQ-9  1/12/2023 10/3/2022 4/12/2022 4/11/2022 6/29/2021 12/16/2020 4/21/2020   Little interest or pleasure in doing things 3 0 0 0 0 0 0   Feeling down, depressed, or hopeless 3 0 0 0 1 0 0   Trouble falling or staying asleep, or sleeping too much 3 - - - - - -   Feeling tired or having little energy 3 - - - - - -   Poor appetite or overeating 3 - - - - - -   Feeling bad about yourself - or that you are a failure or have let yourself or your family down 3 - - - - - -   Trouble concentrating on things, such as reading the newspaper or watching television 3 - - - - - -   Moving or speaking so slowly that other people could have noticed. Or the opposite - being so fidgety or restless that you have been moving around a lot more than usual 3 - - - - - -   Thoughts that you would be better off dead, or of hurting yourself in some way 0 - - - - - -   PHQ-2 Score 6 0 0 0 1 0 0   PHQ-9 Total Score 24 0 0 0 1 0 0   If you checked off any problems, how difficult have these problems made it for you to do your work, take care of things at home, or get along with other people? 0 - - - - - -     Interpretation of Total Score Total Score Depression Severity: 1-4 = Minimal depression, 5-9 = Mild depression, 10-14 = Moderate depression, 15-19 = Moderately severe depression, 20-27 = Severe depression           An electronic signature was used to authenticate this note.     --Florence Cueva MD

## 2023-01-13 ENCOUNTER — TELEPHONE (OUTPATIENT)
Dept: CARDIOLOGY CLINIC | Age: 78
End: 2023-01-13

## 2023-01-13 NOTE — TELEPHONE ENCOUNTER
Called patient and [facility] for patient to return call to office for message below.

## 2023-01-13 NOTE — TELEPHONE ENCOUNTER
Spoke to pt and told him we have oxygen tanks in cardio department. He can use it until he is here. Pt v/u.

## 2023-01-13 NOTE — TELEPHONE ENCOUNTER
Pt called asking if we have O2 in the rooms and in the waiting area. He has appt on 1/18/23 and is needing the O2 so he does not deplete his tank. Please call pt to advise.

## 2023-01-15 DIAGNOSIS — E11.9 TYPE 2 DIABETES MELLITUS WITHOUT COMPLICATION, WITHOUT LONG-TERM CURRENT USE OF INSULIN (HCC): ICD-10-CM

## 2023-01-15 RX ORDER — BLOOD SUGAR DIAGNOSTIC
STRIP MISCELLANEOUS
Qty: 50 STRIP | OUTPATIENT
Start: 2023-01-15

## 2023-01-17 PROBLEM — Q21.12 PFO (PATENT FORAMEN OVALE): Status: ACTIVE | Noted: 2023-01-17

## 2023-01-17 NOTE — PROGRESS NOTES
[address]    2023    Luis Caceres (:  1945) is a 78 y.o. male who is here to re establish care. He was last seen in  in office for treatment of Htn, Hld, dHF. Referring Provider: Olena Ha MD    HISTORY:  Mr. Kristie Gregory has a history of HTN, Hyperlipidemia, diastolic dysfunction/heart failure, sleep apnea treated with CPAP. He has a history of DM and severe COPD (followed by Dr. Tra De Leon). He quit smoking in  (he was up to three packs a day). In  he had an angiogram that showed normal coronaries with an EF of 70%. He was at that time diuresed and treated with natrecor for diastolic heart failure. He has since remained very stable with medical management. 2019 screening ultrasound of abdominal aorta measured proximal aorta at 3.2 cm. Hospital admission  with acute CVA by MRI brain. PFO revealed on echo. Cardiac event monitor with no evidence of afib. Carotid dopplers with no stenosis noted. Today he is here with his daughter and granddaughter. He is improving since his stroke. The family has questions regarding the ILR and if he needs it. He is currently on 6L of oxygen. He sees Dr Tra De Leon in February. REVIEW OF SYSTEMS:  A complete review of systems was reviewed and is negative except as noted in the history of present illness. Prior to Visit Medications    Medication Sig Taking? Authorizing Provider   furosemide (LASIX) 80 MG tablet Take 1 tablet by mouth daily Yes Olena Ha MD   aspirin 81 MG chewable tablet Take 1 tablet by mouth daily Yes Olena Ha MD   lisinopril (PRINIVIL;ZESTRIL) 2.5 MG tablet TAKE ONE TABLET BY MOUTH DAILY Yes Olena Ha MD   allopurinol (ZYLOPRIM) 100 MG tablet TAKE TWO TABLETS BY MOUTH DAILY Yes Olena Ha MD   gabapentin (NEURONTIN) 300 MG capsule Take 1 capsule by mouth 3 times daily for 90 days.  Yes Olena Ha MD   blood glucose test strips (ACCU-CHEK CLARISSA PLUS) strip As needed. Yes Arcelia Chaparro MD   fluticasone-umeclidin-vilant (TRELEGY ELLIPTA) 223-93.9-35 MCG/ACT AEPB inhaler Inhale 1 puff into the lungs daily Yes Arcelia Chaparro MD   ipratropium-albuterol (DUONEB) 0.5-2.5 (3) MG/3ML SOLN nebulizer solution INHALE THREE MILLILITERS VIA NEBULIZATION BY MOUTH FOUR TIMES A DAY Yes Crescencio Willingham MD   OXYGEN Inhale into the lungs 6L O2 Yes Historical Provider, MD   predniSONE (DELTASONE) 5 MG tablet TAKE ONE TABLET BY MOUTH DAILY Yes Arcelia Chaparro MD   triamcinolone (KENALOG) 0.1 % ointment Apply to legs twice daily.  Yes Arcelia Chaparro MD   rosuvastatin (CRESTOR) 20 MG tablet Take 1 tablet by mouth nightly Yes Zohra Otero MD   thiamine mononitrate (THIAMINE) 100 MG tablet Take 1 tablet by mouth daily Yes Zohra Otero MD   BREO ELLIPTA 200-25 MCG/INH AEPB inhaler INHALE ONE DOSE BY MOUTH DAILY Yes Crescencio Willingham MD   albuterol sulfate  (90 Base) MCG/ACT inhaler INHALE TWO PUFFS BY MOUTH EVERY 6 HOURS AS NEEDED Yes Crescencio Willingham MD   Accu-Chek Softclix Lancets MISC USE ONE LANCET TO TEST DAILY Yes Arcelia Chaparro MD   Blood Glucose Monitoring Suppl (ACCU-CHEK CLARISSA PLUS) w/Device KIT 1 each by Does not apply route daily Yes Arcelia Chaparro MD   acetaminophen (TYLENOL) 325 MG tablet Take 650 mg by mouth every 6 hours as needed for Pain Yes Historical Provider, MD   metoprolol succinate (TOPROL XL) 25 MG extended release tablet Take 0.5 tablets by mouth daily  THU Novak - CNP   celecoxib (CELEBREX) 100 MG capsule Take 1 capsule by mouth 2 times daily  Arcelia Chaparro MD   diclofenac (VOLTAREN) 75 MG EC tablet TAKE ONE TABLET BY MOUTH TWICE A DAY  Arcelia Chaparro MD        No Known Allergies    Past Medical History:   Diagnosis Date    Anemia     COPD (chronic obstructive pulmonary disease) ([facility]     Diabetes mellitus ([facility]     Edema     GI (gastrointestinal bleed)     Gout     Hypertension     Morbid obesity (HCC)     Neuropathy     Obstructive sleep apnea     uses CPAP    Peripheral vascular disease [facility])        Past Surgical History:   Procedure Laterality Date    APPENDECTOMY      COLONOSCOPY N/A 5/7/2019    COLONOSCOPY DIAGNOSTIC performed by Audrey Vásquez MD at [facility]      bilateral       Social History     Tobacco Use    Smoking status: Former     Packs/day: 1.00     Years: 50.00     Pack years: 50.00     Types: Cigarettes     Quit date: 2/1/2012     Years since quitting: 10.9    Smokeless tobacco: Never    Tobacco comments:     H.O. smoking 1 p.p.d. Quit 9 months ago    Substance Use Topics    Alcohol use: Yes     Alcohol/week: 14.0 standard drinks     Types: 14 Shots of liquor per week     Comment: 3 drinks a evening        Family History   Problem Relation Age of Onset    High Blood Pressure Mother     High Cholesterol Mother     Heart Disease Mother     Stroke Mother     Diabetes Mother     Depression Mother     Mental Illness Mother     Cancer Father        PHYSICAL EXAMINATION:  Vitals:    01/18/23 1422   BP: 120/64   SpO2: 90%  Comment: 6 liters   Weight: (!) 346 lb (156.9 kg)   Height: 6' (1.829 m)       Estimated body mass index is 46.93 kg/m² as calculated from the following:    Height as of this encounter: 6' (1.829 m). Weight as of this encounter: 346 lb (156.9 kg). Physical Exam  HENT:      Head: Normocephalic and atraumatic. Eyes:      Extraocular Movements: Extraocular movements intact. Conjunctiva/sclera: Conjunctivae normal.   Cardiovascular:      Rate and Rhythm: Normal rate and regular rhythm. Pulmonary:      Effort: Pulmonary effort is normal.      Breath sounds: Normal breath sounds. Abdominal:      General: Bowel sounds are normal.      Palpations: Abdomen is soft. Musculoskeletal:         General: Normal range of motion. Cervical back: Normal range of motion and neck supple. Skin:     General: Skin is warm and dry. Neurological:      General: No focal deficit present. Mental Status: He is alert and oriented to person, place, and time. Psychiatric:         Mood and Affect: Mood normal.         Behavior: Behavior normal.         Thought Content: Thought content normal.         Judgment: Judgment normal.         I have reviewed all pertinent lab results and diagnostic testing. Lab Results   Component Value Date/Time    CHOL 121 09/06/2022 05:32 AM    TRIG 81 09/06/2022 05:32 AM    HDL 62 09/06/2022 05:32 AM    HDL 76 10/25/2010 09:23 AM    LDLCALC 43 09/06/2022 05:32 AM     Lab Results   Component Value Date/Time     09/27/2022 10:30 AM    K 5.1 09/27/2022 10:30 AM    K 4.5 09/19/2022 06:25 AM    CL 99 09/27/2022 10:30 AM    CO2 28 09/27/2022 10:30 AM    GLUCOSE 93 09/27/2022 10:30 AM    BUN 45 09/27/2022 10:30 AM    CREATININE 1.4 09/27/2022 10:30 AM    CALCIUM 9.0 09/27/2022 10:30 AM    GFR >60 04/17/2013 08:55 AM    GFRAA 60 09/27/2022 10:30 AM    GFRAA >60 04/17/2013 08:55 AM     Lab Results   Component Value Date/Time    ALT 6 09/10/2022 06:54 AM    AST 8 09/10/2022 06:54 AM     MCOT 9/9/22-10/8/22  No afib. PAC 3%, PVC 5%  2 NSVT    Echo 9/3/22  Summary  A bubble study was performed and shows evidence of a trivial amount of right to left shunting consistent with a tiny patent foramen  ovale or atrial septal defect. Irregular rhythm. Left ventricular systolic function is normal with ejection fraction estimated at 55 %. No regional wall motion abnormalities are noted. Normal left ventricular wall thickness. Diastolic dysfunction grade and filing pressure are indeterminate. The right ventricle is mildly enlarged. The right atrium is mildly dilated. The aortic root is moderately dilated. The ascending aorta is moderately dilated. Mild mitral regurgitation.     Ultrasound screening for AAA 4/16/19:  Study somewhat limited secondary to patient body habitus and motion. Proximal aorta measures 3.2 cm in size. Cardiac cath 2/13/09:  Normal coronaries  LVEF 70%     RA-mean 12  RV-45/2, 10  PA-56/10 mean 35  PW-mean 30  LV-128/-9, 13  Ao-121/71     Echocardiogram 1/13/09:  Sub-optimal images due to body habitus. Probable normal LVEF, sub-optimal images to evaluate valves, possible AR enlargement      ASSESSMENT/PLAN:    Hypertension:   Blood pressure 120/64, height 6' (1.829 m), weight (!) 346 lb (156.9 kg), SpO2 90 %. - lisinopril    Plan : Stable. Continue current medication. Diastolic heart failure:   '2009  [facility] showed normal coronaries. treated with furosemide and Lisinopril   Echo 9/3/22 EF 55%     Plan : stable. Continue current medication regimen. Hyperlipidemia:   7/2017 TC- 146, TG- 114, HDL- 62, LDL- 61. Liver enzymes normal 11/2017.    9/6/22  TG 81 HDL 62 LDL 43. LFT normal.  - no statin      Plan: stable. Venous insufficiency:  ~ Lasix 80 mg daily     Plan:  continue present management. STEF:  ~ treated with CPAP    COPD:  ~ followed by Dr. Anais Stone  ~ uses oxygen supplementation  ~ recently started Trelegy     CVA   PFO  Acute infarct by MRI brain  PFO noted on Echo 9/6/22 - very small, not felt to be cause of CVA  Event monitor neg for Afib  On ASA  Was referred to EP for ILR, he and family wanted to discuss it first.       Plan :  1. He is in agreement for ILR, will refer to EP  2. Improving since stroke  3. RTC in 1 year, 6 months with NPTS      Scribe's Attestation: This note was scribed in the presence of Dr. Mert Tee MD by Nell Christianson RN. Physician Attestation: The scribe's documentation has been prepared under my direction and personally reviewed by me in its entirety. I confirm that the note above accurately reflects all work, treatment, procedures, and medical decision making performed by me. An  electronic signature was used to authenticate this note. Yaquelin Deal MD, Beth Choi

## 2023-01-17 NOTE — PATIENT INSTRUCTIONS
Schedule an appt with Dr Coleman for the implanted loop recorder  See Dr Rogers again in 1 year, Lily in 6 months

## 2023-01-18 ENCOUNTER — OFFICE VISIT (OUTPATIENT)
Dept: CARDIOLOGY CLINIC | Age: 78
End: 2023-01-18

## 2023-01-18 VITALS
DIASTOLIC BLOOD PRESSURE: 64 MMHG | BODY MASS INDEX: 42.66 KG/M2 | OXYGEN SATURATION: 90 % | WEIGHT: 315 LBS | HEIGHT: 72 IN | SYSTOLIC BLOOD PRESSURE: 120 MMHG

## 2023-01-18 DIAGNOSIS — I10 HTN (HYPERTENSION), BENIGN: ICD-10-CM

## 2023-01-18 DIAGNOSIS — Q21.12 PFO (PATENT FORAMEN OVALE): ICD-10-CM

## 2023-01-18 DIAGNOSIS — I50.32 CHRONIC HEART FAILURE WITH PRESERVED EJECTION FRACTION (HCC): ICD-10-CM

## 2023-01-18 DIAGNOSIS — E78.5 HYPERLIPIDEMIA, UNSPECIFIED HYPERLIPIDEMIA TYPE: Primary | ICD-10-CM

## 2023-01-18 DIAGNOSIS — I63.9 CEREBROVASCULAR ACCIDENT (CVA), UNSPECIFIED MECHANISM (HCC): ICD-10-CM

## 2023-01-23 DIAGNOSIS — I50.32 CHRONIC HEART FAILURE WITH PRESERVED EJECTION FRACTION (HCC): ICD-10-CM

## 2023-01-23 DIAGNOSIS — I10 ESSENTIAL HYPERTENSION: ICD-10-CM

## 2023-01-23 DIAGNOSIS — E13.40 NEUROPATHY DUE TO SECONDARY DIABETES (HCC): ICD-10-CM

## 2023-01-23 DIAGNOSIS — E11.9 TYPE 2 DIABETES MELLITUS WITHOUT COMPLICATION, WITHOUT LONG-TERM CURRENT USE OF INSULIN (HCC): ICD-10-CM

## 2023-01-23 RX ORDER — FUROSEMIDE 80 MG
80 TABLET ORAL DAILY
Qty: 90 TABLET | Refills: 3 | Status: SHIPPED | OUTPATIENT
Start: 2023-01-23

## 2023-01-23 RX ORDER — ASPIRIN 81 MG/1
81 TABLET, CHEWABLE ORAL DAILY
Qty: 90 TABLET | Refills: 3 | Status: SHIPPED | OUTPATIENT
Start: 2023-01-23

## 2023-01-23 RX ORDER — BLOOD SUGAR DIAGNOSTIC
STRIP MISCELLANEOUS
Qty: 100 STRIP | Refills: 1 | Status: SHIPPED | OUTPATIENT
Start: 2023-01-23

## 2023-01-23 RX ORDER — GABAPENTIN 300 MG/1
300 CAPSULE ORAL 3 TIMES DAILY
Qty: 270 CAPSULE | Refills: 3 | Status: SHIPPED | OUTPATIENT
Start: 2023-01-23 | End: 2023-04-23

## 2023-01-23 RX ORDER — ALLOPURINOL 100 MG/1
TABLET ORAL
Qty: 180 TABLET | Refills: 3 | Status: SHIPPED | OUTPATIENT
Start: 2023-01-23

## 2023-01-23 RX ORDER — LISINOPRIL 2.5 MG/1
TABLET ORAL
Qty: 90 TABLET | Refills: 3 | Status: SHIPPED | OUTPATIENT
Start: 2023-01-23

## 2023-01-23 RX ORDER — FLUTICASONE FUROATE, UMECLIDINIUM BROMIDE AND VILANTEROL TRIFENATATE 200; 62.5; 25 UG/1; UG/1; UG/1
1 POWDER RESPIRATORY (INHALATION) DAILY
Qty: 3 EACH | Refills: 0 | Status: SHIPPED | OUTPATIENT
Start: 2023-01-23

## 2023-01-23 NOTE — TELEPHONE ENCOUNTER
Patient would like to change to mail order due to his health conditions . I pended all medications prescribed during last OV and cancelled them at [facility].

## 2023-01-30 ENCOUNTER — TELEPHONE (OUTPATIENT)
Dept: PULMONOLOGY | Age: 78
End: 2023-01-30

## 2023-01-30 DIAGNOSIS — J44.9 COPD, SEVERE (HCC): ICD-10-CM

## 2023-01-30 RX ORDER — ALBUTEROL SULFATE 90 UG/1
AEROSOL, METERED RESPIRATORY (INHALATION)
Qty: 54 G | Refills: 3 | Status: SHIPPED | OUTPATIENT
Start: 2023-01-30

## 2023-01-30 RX ORDER — IPRATROPIUM BROMIDE AND ALBUTEROL SULFATE 2.5; .5 MG/3ML; MG/3ML
SOLUTION RESPIRATORY (INHALATION)
Qty: 1080 ML | Refills: 3 | Status: SHIPPED | OUTPATIENT
Start: 2023-01-30

## 2023-01-30 RX ORDER — FLUTICASONE FUROATE AND VILANTEROL 200; 25 UG/1; UG/1
1 POWDER RESPIRATORY (INHALATION) DAILY
Qty: 84 EACH | Refills: 3 | Status: SHIPPED | OUTPATIENT
Start: 2023-01-30

## 2023-01-30 NOTE — TELEPHONE ENCOUNTER
Pt needs scripts sent to:    [facility], [facility] [address] [id number]   Phone:  [phone]    Fax:  [phone]      ipratropium-albuterol (DUONEB) 0.5-2.5 (3) MG/3ML SOLN nebulizer solution     BREO ELLIPTA 200-25 MCG/INH AEPB inhaler     albuterol sulfate  (90 Base) MCG/ACT inhaler

## 2023-02-03 ENCOUNTER — OFFICE VISIT (OUTPATIENT)
Dept: PULMONOLOGY | Age: 78
End: 2023-02-03
Payer: MEDICARE

## 2023-02-03 VITALS — HEART RATE: 81 BPM | OXYGEN SATURATION: 99 %

## 2023-02-03 DIAGNOSIS — J44.9 COPD, SEVERE (HCC): Primary | ICD-10-CM

## 2023-02-03 DIAGNOSIS — G47.33 OSA (OBSTRUCTIVE SLEEP APNEA): Chronic | ICD-10-CM

## 2023-02-03 DIAGNOSIS — J96.11 CHRONIC RESPIRATORY FAILURE WITH HYPOXIA (HCC): ICD-10-CM

## 2023-02-03 DIAGNOSIS — I50.32 CHRONIC HEART FAILURE WITH PRESERVED EJECTION FRACTION (HCC): ICD-10-CM

## 2023-02-03 DIAGNOSIS — E66.01 MORBID OBESITY (HCC): Chronic | ICD-10-CM

## 2023-02-03 PROCEDURE — 1123F ACP DISCUSS/DSCN MKR DOCD: CPT | Performed by: INTERNAL MEDICINE

## 2023-02-03 PROCEDURE — 99214 OFFICE O/P EST MOD 30 MIN: CPT | Performed by: INTERNAL MEDICINE

## 2023-02-03 NOTE — PROGRESS NOTES
[facility]  Progress Note  Brandy Deal, MD Raye Pallas   YOB: 1945    Date of Visit:  2/3/2023    Assessment/Plan:  1. COPD, very severe ([address]  PFT 3/13:  INTERPRETATION: Spirometry showed decreased FVC of 3.32 L, 65% predicted and  decreased FEV1 of 1.52 L 37% predicted. FEV1:FVC ratio was decreased. No  response to bronchodilators demonstrated. Lung volumes showed normal total  lung capacity of 94% predicted. Diffusion capacity showed decreased DLCO of  49% predicted. IMPRESSION: Very severe obstructive defect with no response to  bronchodilators. Normal lung volumes with moderate to severe decrease in  diffusion capacity noted. In comparison to the test that was done in  February of 2011 no significant change in FVC noted but FEV1 has decreased by  22% and total lung capacity by 20% as well as DLCO by 10%. Breo -- he likes this better than Advair ==> Trelegy  Not making any difference  ?air flow issue  Try Breztri with a spacer. Has Ventolin which he likes  Duoneb      LDCT chest ordered but he has not had this done. 2. Chronic respiratory failure with hypoxia ([address]  Patient was discharged from the hospital on 6 L/min nasal cannula oxygen supplement. He is wearing that. He also is bleeding it into his noninvasive ventilator. He is on an astral 100. He is wearing the an IMV nightly. He feels like that is helping him. Oxygen saturation at rest on 6 L was 97%. I turned him to 3 L and he remained 97%. However, when he got up to walk he only made it a few feet before he desaturated. He got as low as 84% and it took him a few minutes to recover. I would recommend he remains on 6 L/min, 24 hours/day until we see him back in about 3 months    3. Type 2 diabetes mellitus without complication, without long-term current use of insulin (HCC)  Well controlled    4.  STEF (obstructive sleep apnea)  His CPAP is broken beyond repair  This is >8years old  Get his CPAP repalced. He benefits from CPAP. 5. Morbid obesity due to excess calories ([address]  Contributes to his SOB      FOLLOW UP: 3 months for pulmonary. Chief Complaint   Patient presents with    Shortness of Breath     3 month        HPI  The patient presents with a chief complaint of moderate shortness of breath related to very severe COPD of many years duration. He has mild associated cough. Exertion is a modifying factor. He takes Breo (which he finds better than Advair) and duoneb for very severe COPD of many years duration. He had a stroke and was in the hospital in September. He is now on O2 at 6 LPM. HE is also on an Astral NIMV. Dr Santhosh Laurent gave him some Trelegy to try. Review of Systems  No Chest pain, Nausea or vomiting reported      Physical Exam:  Well developed, well nourished  Alert and oriented  Sclera is clear  No cervical adenopathy  No JVD. Chest examination is fine wheezing. Cardiac examination reveals regular rate and rhythm without murmur, gallop or rub. The abdomen is soft, nontender and nondistended. There is no clubbing, cyanosis or edema of the extremities. There is no obvious skin rash. No focal neuro deficicts  Normal mood and affect    No Known Allergies  Prior to Visit Medications    Medication Sig Taking?  Authorizing Provider   albuterol sulfate HFA (PROVENTIL;VENTOLIN;PROAIR) 108 (90 Base) MCG/ACT inhaler INHALE TWO PUFFS BY MOUTH EVERY 6 HOURS AS NEEDED  Brett Wick MD   ipratropium-albuterol (DUONEB) 0.5-2.5 (3) MG/3ML SOLN nebulizer solution INHALE THREE MILLILITERS VIA NEBULIZATION BY MOUTH FOUR TIMES A DAY  Brett Wick MD   fluticasone furoate-vilanterol (BREO ELLIPTA) 200-25 MCG/ACT AEPB inhaler Inhale 1 puff into the lungs daily  Brett Wick MD   furosemide (LASIX) 80 MG tablet Take 1 tablet by mouth daily  Efrain Ortega MD   aspirin 81 MG chewable tablet Take 1 tablet by mouth daily  Efrain Ortega MD   lisinopril (PRINIVIL;ZESTRIL) 2.5 MG tablet TAKE ONE TABLET BY MOUTH DAILY  Bobbi Vora MD   allopurinol (ZYLOPRIM) 100 MG tablet TAKE TWO TABLETS BY MOUTH DAILY  Bobbi Vora MD   gabapentin (NEURONTIN) 300 MG capsule Take 1 capsule by mouth 3 times daily for 90 days. Bobbi oVra MD   blood glucose test strips (ACCU-CHEK CLARISSA PLUS) strip As needed. Bobbi Vora MD   fluticasone-umeclidin-vilant (TRELEGY ELLIPTA) 146-56.0-27 MCG/ACT AEPB inhaler Inhale 1 puff into the lungs daily  Bobbi Vora MD   OXYGEN Inhale into the lungs 6L O2  Historical Provider, MD   predniSONE (DELTASONE) 5 MG tablet TAKE ONE TABLET BY MOUTH DAILY  Bobbi Vora MD   triamcinolone (KENALOG) 0.1 % ointment Apply to legs twice daily. Bobbi Vora MD   rosuvastatin (CRESTOR) 20 MG tablet Take 1 tablet by mouth nightly  Dhruv Gutierrez MD   thiamine mononitrate (THIAMINE) 100 MG tablet Take 1 tablet by mouth daily  Dhruv Gutierrez MD   metoprolol succinate (TOPROL XL) 25 MG extended release tablet Take 0.5 tablets by mouth daily  THU Hoffman CNP   celecoxib (CELEBREX) 100 MG capsule Take 1 capsule by mouth 2 times daily  Bobbi Vora MD   Accu-Chek Softclix Lancets MISC USE ONE LANCET TO TEST DAILY  Bobbi Vora MD   diclofenac (VOLTAREN) 75 MG EC tablet TAKE ONE TABLET BY MOUTH TWICE A DAY  Bobbi Vora MD   Blood Glucose Monitoring Suppl (ACCU-CHEK CLARISSA PLUS) w/Device KIT 1 each by Does not apply route daily  Bobbi Vora MD   acetaminophen (TYLENOL) 325 MG tablet Take 650 mg by mouth every 6 hours as needed for Pain  Historical Provider, MD       Vitals:    02/03/23 1542   Pulse: 81   SpO2: 99%     There is no height or weight on file to calculate BMI.      Wt Readings from Last 3 Encounters:   01/18/23 (!) 346 lb (156.9 kg)   01/12/23 (!) 342 lb (155.1 kg)   10/21/22 (!) 328 lb 12.8 oz (149.1 kg)     BP Readings from Last 3 Encounters:   23 120/64   23 118/62   10/21/22 120/70        Social History     Tobacco Use   Smoking Status Former    Packs/day: 1.00    Years: 50.00    Pack years: 50.00    Types: Cigarettes    Quit date: 2012    Years since quittin.0   Smokeless Tobacco Never   Tobacco Comments    H.O. smoking 1 p.p.d. Quit 9 months ago

## 2023-02-06 NOTE — PROGRESS NOTES
[address]   Electrophysiology Consultation   Date: 2/6/2023  Reason for Consultation: ILR consideration   Consult Requesting Physician: Holly Fernández MD     CC: Talk about ILR     HPI: Kamari Rogers is a 78 y.o. male with a past medical history of CVA, hypertension, diastolic heart failure, STEF, DM, COPD, chronic hypoxic respiratory failure on 6 L O2, CVA in September 2022, no intervention, echo demonstrated a small PFO.  09/2022 CVA per MRI of brain . PFO noted on echo event monitor was ordered and worn for 1 month (9/9 to 10/8), no atrial fibrillation, normal sinus rhythm and 5% PVC burden in addition to short runs of NSVT. Referred to electrophysiology to discuss ILR. Stella Montoya presents today to office with his granddaughter. EKG notable for sinus tachycardia, heart rate 120 bpm.  Patient's granddaughter thinks that his lower extremity swelling is worse, patient feels generally unwell, more fatigued, denies worsening shortness of breath, chronic orthopnea, denies chest pain. Following 20-minute discussion I rechecked his heart rate which was still 120 bpm.  In light of worsening lower extremity swelling and tachycardia unresolved with rest I discussed with patient and granddaughter about emergency department referral and admission for further evaluation. Review of System:  [x] Full ROS obtained and negative except as mentioned in HPI or below      Prior to Admission medications    Medication Sig Start Date End Date Taking?  Authorizing Provider   albuterol sulfate HFA (PROVENTIL;VENTOLIN;PROAIR) 108 (90 Base) MCG/ACT inhaler INHALE TWO PUFFS BY MOUTH EVERY 6 HOURS AS NEEDED 1/30/23   Phuong Nur MD   ipratropium-albuterol (DUONEB) 0.5-2.5 (3) MG/3ML SOLN nebulizer solution INHALE THREE MILLILITERS VIA NEBULIZATION BY MOUTH FOUR TIMES A DAY 1/30/23   Phuong Nur MD   fluticasone furoate-vilanterol (BREO ELLIPTA) 200-25 MCG/ACT AEPB inhaler Inhale 1 puff into the lungs daily 1/30/23 Moni Whitehead MD   furosemide (LASIX) 80 MG tablet Take 1 tablet by mouth daily 1/23/23   Hanh Hernandez MD   aspirin 81 MG chewable tablet Take 1 tablet by mouth daily 1/23/23   Hanh Hernandez MD   lisinopril (PRINIVIL;ZESTRIL) 2.5 MG tablet TAKE ONE TABLET BY MOUTH DAILY 1/23/23   Hanh Hernandez MD   allopurinol (ZYLOPRIM) 100 MG tablet TAKE TWO TABLETS BY MOUTH DAILY 1/23/23   Hanh Hernandez MD   gabapentin (NEURONTIN) 300 MG capsule Take 1 capsule by mouth 3 times daily for 90 days. 1/23/23 4/23/23  Hanh Hernandez MD   blood glucose test strips (ACCU-CHEK CLARISSA PLUS) strip As needed. 1/23/23   Hanh Hernandez MD   fluticasone-umeclidin-vilant (TRELEGY ELLIPTA) 508-87.7-15 MCG/ACT AEPB inhaler Inhale 1 puff into the lungs daily 1/23/23   Hanh Hernandez MD   OXYGEN Inhale into the lungs 6L O2    Historical Provider, MD   predniSONE (DELTASONE) 5 MG tablet TAKE ONE TABLET BY MOUTH DAILY 10/17/22   Hanh Hernandez MD   triamcinolone (KENALOG) 0.1 % ointment Apply to legs twice daily.  10/9/22   Hanh Hernandez MD   rosuvastatin (CRESTOR) 20 MG tablet Take 1 tablet by mouth nightly 9/21/22   Chris Beckwith MD   thiamine mononitrate (THIAMINE) 100 MG tablet Take 1 tablet by mouth daily 9/21/22   Chris Beckwith MD   metoprolol succinate (TOPROL XL) 25 MG extended release tablet Take 0.5 tablets by mouth daily 9/10/22 9/21/22  THU Valencia - CNP   celecoxib (CELEBREX) 100 MG capsule Take 1 capsule by mouth 2 times daily 4/11/22 9/9/22  Hahn Hernandez MD   Accu-Chek Softclix Lancets MISC USE ONE LANCET TO TEST DAILY 2/5/21   Hanh Hernandez MD   diclofenac (VOLTAREN) 75 MG EC tablet TAKE ONE TABLET BY MOUTH TWICE A DAY 3/13/20 9/4/22  Hanh Hernandez MD   Blood Glucose Monitoring Suppl (ACCU-CHEK CLARISSA PLUS) w/Device KIT 1 each by Does not apply route daily 12/23/19   Hanh Hernandez MD   acetaminophen (TYLENOL) 325 MG tablet Take 650 mg by mouth every 6 hours as needed for Pain    Historical Provider, MD       Past Medical History:   Diagnosis Date    Anemia     COPD (chronic obstructive pulmonary disease) ([facility] [address]     Diabetes mellitus ([facility] [address]     Edema     GI (gastrointestinal bleed)     Gout     Hypertension     Morbid obesity ([facility] [address]     Neuropathy     Obstructive sleep apnea     uses CPAP    [facility]         Past Surgical History:   Procedure Laterality Date    APPENDECTOMY      COLONOSCOPY N/A 5/7/2019    COLONOSCOPY DIAGNOSTIC performed by Hesham Phillips MD at [facility]       No Known Allergies    Social History:  Reviewed. reports that he quit smoking about 11 years ago. His smoking use included cigarettes. He has a 50.00 pack-year smoking history. He has never used smokeless tobacco. He reports current alcohol use of about 14.0 standard drinks per week. He reports that he does not use drugs. Family History:  Reviewed. No family history of SCD.         Physical Examination:  Vitals:    02/13/23 1031   BP: (!) 104/58      Wt Readings from Last 3 Encounters:   01/18/23 (!) 346 lb (156.9 kg)   01/12/23 (!) 342 lb (155.1 kg)   10/21/22 (!) 328 lb 12.8 oz (149.1 kg)     No acute distress  Moist mucosa, nonicteric conjunctiva  Diminished aeration bilaterally, crackles of bilateral lower lobes, no rhonchi  Heart is tachycardic, regular rhythm, no murmurs, 1+ pitting edema of lower extremities bilaterally  Abdomen is soft nontender  Strength is grossly preserved  No focal neurologic deficits  Appropriate mood and affect  No rashes or ecchymoses, excoriations and chronic venous stasis changes of lower extremities      Labs:  Lab Results   Component Value Date    ALT 6 (L) 09/10/2022    AST 8 (L) 09/10/2022     09/27/2022    K 5.1 09/27/2022    HGB 10.7 (L) 09/19/2022    CL 99 09/27/2022    CREATININE 1.4 (H) 09/27/2022    BUN 45 (H) 09/27/2022    TSH 2.83 10/03/2013     Imaging:  ECG 2/14/23  Sinus rhythm    30 Day Holter (09/09/22-10/08/22)   NSR, average HR 77 bpm (), 2 NSVT episodes noted (longest and fastest 22.2 seconds at 111 bpm) 5% PVC burden & 3% PAC burden     Echo 09/06/2022   Summary   A bubble study was performed and shows evidence of a trivial amount of right   to left shunting consistent with a tiny patent foramen ovale or atrial   septal defect. Irregular rhythm. Left ventricular systolic function is normal with ejection fraction   estimated at 55 %. No regional wall motion abnormalities are noted. Normal left ventricular wall thickness. Diastolic dysfunction grade and filing pressure are indeterminate. The right ventricle is mildly enlarged. The right atrium is mildly dilated. The aortic root is moderately dilated. The ascending aorta is moderately dilated. Mild mitral regurgitation. Assessment/Plan:    Patient initially referred for ILR discussion, found to be tachycardic, sinus tachycardia that persisted throughout interview, increased lower extremity swelling, generalized fatigue/unwell. Chronic hypoxic respiratory failure on home O2. Chronic orthopnea. Chronic dyspnea. Concern for PE or decompensated heart failure. Referred to the emergency department. CVA   - 09/2022, MRI 9/6/2022  - Small PFO noted on 9/6 echo  - 30-day event, short runs of NSVT, no atrial fibrillation  - on ASA  - We discussed role of continuous monitoring in undifferentiated stroke, to assess for atrial fibrillation, patient like to proceed    HFpEF  - EF 55% per echo 09/06/22  - worsening LE swelling  - continue lasix  - continue lisinopril    HTN  - Controlled: 104/58  - BP goal <130/80  - Home BP monitoring      STEF  - Stable: Uses BiPAP    Refer ED, diuresis, CT chest, ILR implant    Scribe attestation:  This note was scribed in the presence of Deborah Hayward MD by Miguel Ángel Fish LPN    Physician Attestation: Dr. OLGA Esther Lieberman, confirm that the scribe's documentation has been prepared under my direction and personally reviewed by me in its entirety. I also confirm that the note above accurately reflects all work, treatment, procedures, and medical decision making performed by me. NOTE: This report was transcribed using voice recognition software. Every effort was made to ensure accuracy, however, inadvertent computerized transcription errors may be present.      Esther Lieberman MD  [facility]    Office: [phone]  Fax: [phone]

## 2023-02-13 ENCOUNTER — APPOINTMENT (OUTPATIENT)
Dept: GENERAL RADIOLOGY | Age: 78
End: 2023-02-13
Payer: MEDICARE

## 2023-02-13 ENCOUNTER — HOSPITAL ENCOUNTER (INPATIENT)
Age: 78
LOS: 3 days | Discharge: HOME | End: 2023-02-16
Attending: EMERGENCY MEDICINE | Admitting: FAMILY MEDICINE
Payer: MEDICARE

## 2023-02-13 ENCOUNTER — OFFICE VISIT (OUTPATIENT)
Dept: CARDIOLOGY CLINIC | Age: 78
End: 2023-02-13
Payer: MEDICARE

## 2023-02-13 ENCOUNTER — TELEPHONE (OUTPATIENT)
Dept: PULMONOLOGY | Age: 78
End: 2023-02-13

## 2023-02-13 ENCOUNTER — APPOINTMENT (OUTPATIENT)
Dept: CT IMAGING | Age: 78
End: 2023-02-13
Payer: MEDICARE

## 2023-02-13 VITALS
BODY MASS INDEX: 42.66 KG/M2 | DIASTOLIC BLOOD PRESSURE: 58 MMHG | HEIGHT: 72 IN | SYSTOLIC BLOOD PRESSURE: 104 MMHG | WEIGHT: 315 LBS

## 2023-02-13 DIAGNOSIS — R06.00 DYSPNEA AND RESPIRATORY ABNORMALITIES: ICD-10-CM

## 2023-02-13 DIAGNOSIS — J44.9 COPD, SEVERE (HCC): Primary | ICD-10-CM

## 2023-02-13 DIAGNOSIS — E78.5 HYPERLIPIDEMIA, UNSPECIFIED HYPERLIPIDEMIA TYPE: Primary | ICD-10-CM

## 2023-02-13 DIAGNOSIS — R22.43 LOCALIZED SWELLING OF BOTH LOWER LEGS: ICD-10-CM

## 2023-02-13 DIAGNOSIS — R06.89 DYSPNEA AND RESPIRATORY ABNORMALITIES: ICD-10-CM

## 2023-02-13 DIAGNOSIS — R00.0 TACHYCARDIA: Primary | ICD-10-CM

## 2023-02-13 PROBLEM — R60.9 PERIPHERAL EDEMA: Status: ACTIVE | Noted: 2023-02-13

## 2023-02-13 PROBLEM — R60.0 PERIPHERAL EDEMA: Status: ACTIVE | Noted: 2023-02-13

## 2023-02-13 LAB
A/G RATIO: 1 (ref 1.1–2.2)
ALBUMIN SERPL-MCNC: 3.8 G/DL (ref 3.4–5)
ALP BLD-CCNC: 74 U/L (ref 40–129)
ALT SERPL-CCNC: 9 U/L (ref 10–40)
ANION GAP SERPL CALCULATED.3IONS-SCNC: 6 MMOL/L (ref 3–16)
AST SERPL-CCNC: 13 U/L (ref 15–37)
BACTERIA: ABNORMAL /HPF
BASOPHILS ABSOLUTE: 0 K/UL (ref 0–0.2)
BASOPHILS RELATIVE PERCENT: 0.2 %
BILIRUB SERPL-MCNC: 0.4 MG/DL (ref 0–1)
BILIRUBIN URINE: NEGATIVE
BLOOD, URINE: ABNORMAL
BUN BLDV-MCNC: 48 MG/DL (ref 7–20)
CALCIUM SERPL-MCNC: 9.2 MG/DL (ref 8.3–10.6)
CHLORIDE BLD-SCNC: 102 MMOL/L (ref 99–110)
CLARITY: CLEAR
CO2: 28 MMOL/L (ref 21–32)
COLOR: YELLOW
CREAT SERPL-MCNC: 1.3 MG/DL (ref 0.8–1.3)
D DIMER: 1.42 UG/ML FEU (ref 0–0.6)
EKG ATRIAL RATE: 118 BPM
EKG DIAGNOSIS: NORMAL
EKG P-R INTERVAL: 248 MS
EKG Q-T INTERVAL: 294 MS
EKG QRS DURATION: 108 MS
EKG QTC CALCULATION (BAZETT): 412 MS
EKG R AXIS: -54 DEGREES
EKG T AXIS: 82 DEGREES
EKG VENTRICULAR RATE: 118 BPM
EOSINOPHILS ABSOLUTE: 0.2 K/UL (ref 0–0.6)
EOSINOPHILS RELATIVE PERCENT: 1.6 %
EPITHELIAL CELLS, UA: 1 /HPF (ref 0–5)
GFR SERPL CREATININE-BSD FRML MDRD: 57 ML/MIN/{1.73_M2}
GLUCOSE BLD-MCNC: 122 MG/DL (ref 70–99)
GLUCOSE URINE: NEGATIVE MG/DL
HCT VFR BLD CALC: 32.7 % (ref 40.5–52.5)
HEMOGLOBIN: 10.7 G/DL (ref 13.5–17.5)
HYALINE CASTS: 1 /LPF (ref 0–8)
KETONES, URINE: NEGATIVE MG/DL
LACTIC ACID: 1 MMOL/L (ref 0.4–2)
LEUKOCYTE ESTERASE, URINE: NEGATIVE
LYMPHOCYTES ABSOLUTE: 0.4 K/UL (ref 1–5.1)
LYMPHOCYTES RELATIVE PERCENT: 4 %
MCH RBC QN AUTO: 33.8 PG (ref 26–34)
MCHC RBC AUTO-ENTMCNC: 32.7 G/DL (ref 31–36)
MCV RBC AUTO: 103.2 FL (ref 80–100)
MICROSCOPIC EXAMINATION: YES
MONOCYTES ABSOLUTE: 0.8 K/UL (ref 0–1.3)
MONOCYTES RELATIVE PERCENT: 8.3 %
NEUTROPHILS ABSOLUTE: 8.7 K/UL (ref 1.7–7.7)
NEUTROPHILS RELATIVE PERCENT: 85.9 %
NITRITE, URINE: NEGATIVE
PDW BLD-RTO: 14.9 % (ref 12.4–15.4)
PH UA: 6 (ref 5–8)
PLATELET # BLD: 341 K/UL (ref 135–450)
PMV BLD AUTO: 6.6 FL (ref 5–10.5)
POTASSIUM REFLEX MAGNESIUM: 4.9 MMOL/L (ref 3.5–5.1)
PRO-BNP: 260 PG/ML (ref 0–449)
PROTEIN UA: ABNORMAL MG/DL
RAPID INFLUENZA  B AGN: NEGATIVE
RAPID INFLUENZA A AGN: NEGATIVE
RBC # BLD: 3.17 M/UL (ref 4.2–5.9)
RBC UA: 234 /HPF (ref 0–4)
SARS-COV-2, NAAT: NOT DETECTED
SODIUM BLD-SCNC: 136 MMOL/L (ref 136–145)
SPECIFIC GRAVITY UA: >=1.03 (ref 1–1.03)
TOTAL PROTEIN: 7.5 G/DL (ref 6.4–8.2)
TROPONIN: 0.04 NG/ML
URINE REFLEX TO CULTURE: ABNORMAL
URINE TYPE: ABNORMAL
UROBILINOGEN, URINE: 1 E.U./DL
WBC # BLD: 10.2 K/UL (ref 4–11)
WBC UA: 2 /HPF (ref 0–5)

## 2023-02-13 PROCEDURE — 84484 ASSAY OF TROPONIN QUANT: CPT

## 2023-02-13 PROCEDURE — 93000 ELECTROCARDIOGRAM COMPLETE: CPT | Performed by: INTERNAL MEDICINE

## 2023-02-13 PROCEDURE — 71045 X-RAY EXAM CHEST 1 VIEW: CPT

## 2023-02-13 PROCEDURE — 85379 FIBRIN DEGRADATION QUANT: CPT

## 2023-02-13 PROCEDURE — 6360000002 HC RX W HCPCS: Performed by: FAMILY MEDICINE

## 2023-02-13 PROCEDURE — 1123F ACP DISCUSS/DSCN MKR DOCD: CPT | Performed by: INTERNAL MEDICINE

## 2023-02-13 PROCEDURE — 2580000003 HC RX 258: Performed by: PHYSICIAN ASSISTANT

## 2023-02-13 PROCEDURE — 2700000000 HC OXYGEN THERAPY PER DAY

## 2023-02-13 PROCEDURE — 6370000000 HC RX 637 (ALT 250 FOR IP): Performed by: FAMILY MEDICINE

## 2023-02-13 PROCEDURE — 87804 INFLUENZA ASSAY W/OPTIC: CPT

## 2023-02-13 PROCEDURE — 85025 COMPLETE CBC W/AUTO DIFF WBC: CPT

## 2023-02-13 PROCEDURE — 99285 EMERGENCY DEPT VISIT HI MDM: CPT

## 2023-02-13 PROCEDURE — 3074F SYST BP LT 130 MM HG: CPT | Performed by: INTERNAL MEDICINE

## 2023-02-13 PROCEDURE — 94640 AIRWAY INHALATION TREATMENT: CPT

## 2023-02-13 PROCEDURE — 80053 COMPREHEN METABOLIC PANEL: CPT

## 2023-02-13 PROCEDURE — 99204 OFFICE O/P NEW MOD 45 MIN: CPT | Performed by: INTERNAL MEDICINE

## 2023-02-13 PROCEDURE — 71260 CT THORAX DX C+: CPT | Performed by: PHYSICIAN ASSISTANT

## 2023-02-13 PROCEDURE — 2060000000 HC ICU INTERMEDIATE R&B

## 2023-02-13 PROCEDURE — 87635 SARS-COV-2 COVID-19 AMP PRB: CPT

## 2023-02-13 PROCEDURE — 94761 N-INVAS EAR/PLS OXIMETRY MLT: CPT

## 2023-02-13 PROCEDURE — 93005 ELECTROCARDIOGRAM TRACING: CPT | Performed by: EMERGENCY MEDICINE

## 2023-02-13 PROCEDURE — 93010 ELECTROCARDIOGRAM REPORT: CPT | Performed by: INTERNAL MEDICINE

## 2023-02-13 PROCEDURE — 2580000003 HC RX 258: Performed by: FAMILY MEDICINE

## 2023-02-13 PROCEDURE — 3078F DIAST BP <80 MM HG: CPT | Performed by: INTERNAL MEDICINE

## 2023-02-13 PROCEDURE — 83605 ASSAY OF LACTIC ACID: CPT

## 2023-02-13 PROCEDURE — 87040 BLOOD CULTURE FOR BACTERIA: CPT

## 2023-02-13 PROCEDURE — 81001 URINALYSIS AUTO W/SCOPE: CPT

## 2023-02-13 PROCEDURE — 6360000002 HC RX W HCPCS: Performed by: EMERGENCY MEDICINE

## 2023-02-13 PROCEDURE — 6360000004 HC RX CONTRAST MEDICATION: Performed by: PHYSICIAN ASSISTANT

## 2023-02-13 PROCEDURE — 83880 ASSAY OF NATRIURETIC PEPTIDE: CPT

## 2023-02-13 RX ORDER — ONDANSETRON 2 MG/ML
4 INJECTION INTRAMUSCULAR; INTRAVENOUS EVERY 6 HOURS PRN
Status: DISCONTINUED | OUTPATIENT
Start: 2023-02-13 | End: 2023-02-16 | Stop reason: HOSPADM

## 2023-02-13 RX ORDER — LISINOPRIL 5 MG/1
2.5 TABLET ORAL DAILY
Status: DISCONTINUED | OUTPATIENT
Start: 2023-02-13 | End: 2023-02-16 | Stop reason: HOSPADM

## 2023-02-13 RX ORDER — FUROSEMIDE 10 MG/ML
40 INJECTION INTRAMUSCULAR; INTRAVENOUS ONCE
Status: COMPLETED | OUTPATIENT
Start: 2023-02-13 | End: 2023-02-13

## 2023-02-13 RX ORDER — FUROSEMIDE 10 MG/ML
40 INJECTION INTRAMUSCULAR; INTRAVENOUS DAILY
Status: DISCONTINUED | OUTPATIENT
Start: 2023-02-14 | End: 2023-02-14

## 2023-02-13 RX ORDER — SODIUM CHLORIDE 9 MG/ML
INJECTION, SOLUTION INTRAVENOUS PRN
Status: DISCONTINUED | OUTPATIENT
Start: 2023-02-13 | End: 2023-02-16 | Stop reason: HOSPADM

## 2023-02-13 RX ORDER — GABAPENTIN 300 MG/1
300 CAPSULE ORAL 3 TIMES DAILY
Status: DISCONTINUED | OUTPATIENT
Start: 2023-02-13 | End: 2023-02-16 | Stop reason: HOSPADM

## 2023-02-13 RX ORDER — ALBUTEROL SULFATE 90 UG/1
1 AEROSOL, METERED RESPIRATORY (INHALATION) EVERY 4 HOURS PRN
Status: DISCONTINUED | OUTPATIENT
Start: 2023-02-13 | End: 2023-02-16 | Stop reason: HOSPADM

## 2023-02-13 RX ORDER — ASPIRIN 81 MG/1
81 TABLET, CHEWABLE ORAL DAILY
Status: DISCONTINUED | OUTPATIENT
Start: 2023-02-13 | End: 2023-02-16 | Stop reason: HOSPADM

## 2023-02-13 RX ORDER — POLYETHYLENE GLYCOL 3350 17 G/17G
17 POWDER, FOR SOLUTION ORAL DAILY PRN
Status: DISCONTINUED | OUTPATIENT
Start: 2023-02-13 | End: 2023-02-16 | Stop reason: HOSPADM

## 2023-02-13 RX ORDER — GAUZE BANDAGE 2" X 2"
100 BANDAGE TOPICAL DAILY
Status: DISCONTINUED | OUTPATIENT
Start: 2023-02-13 | End: 2023-02-16 | Stop reason: HOSPADM

## 2023-02-13 RX ORDER — SODIUM CHLORIDE 0.9 % (FLUSH) 0.9 %
5-40 SYRINGE (ML) INJECTION PRN
Status: DISCONTINUED | OUTPATIENT
Start: 2023-02-13 | End: 2023-02-16 | Stop reason: HOSPADM

## 2023-02-13 RX ORDER — IPRATROPIUM BROMIDE AND ALBUTEROL SULFATE 2.5; .5 MG/3ML; MG/3ML
1 SOLUTION RESPIRATORY (INHALATION) 4 TIMES DAILY
Status: DISCONTINUED | OUTPATIENT
Start: 2023-02-14 | End: 2023-02-16 | Stop reason: HOSPADM

## 2023-02-13 RX ORDER — IPRATROPIUM BROMIDE AND ALBUTEROL SULFATE 2.5; .5 MG/3ML; MG/3ML
1 SOLUTION RESPIRATORY (INHALATION) EVERY 4 HOURS PRN
Status: DISCONTINUED | OUTPATIENT
Start: 2023-02-13 | End: 2023-02-13

## 2023-02-13 RX ORDER — ROSUVASTATIN CALCIUM 20 MG/1
20 TABLET, COATED ORAL NIGHTLY
Status: DISCONTINUED | OUTPATIENT
Start: 2023-02-13 | End: 2023-02-16 | Stop reason: HOSPADM

## 2023-02-13 RX ORDER — ACETAMINOPHEN 325 MG/1
650 TABLET ORAL EVERY 6 HOURS PRN
Status: DISCONTINUED | OUTPATIENT
Start: 2023-02-13 | End: 2023-02-16 | Stop reason: HOSPADM

## 2023-02-13 RX ORDER — SODIUM CHLORIDE 0.9 % (FLUSH) 0.9 %
5-40 SYRINGE (ML) INJECTION EVERY 12 HOURS SCHEDULED
Status: DISCONTINUED | OUTPATIENT
Start: 2023-02-13 | End: 2023-02-16 | Stop reason: HOSPADM

## 2023-02-13 RX ORDER — ACETAMINOPHEN 650 MG/1
650 SUPPOSITORY RECTAL EVERY 6 HOURS PRN
Status: DISCONTINUED | OUTPATIENT
Start: 2023-02-13 | End: 2023-02-16 | Stop reason: HOSPADM

## 2023-02-13 RX ORDER — PREDNISONE 1 MG/1
5 TABLET ORAL DAILY
Status: DISCONTINUED | OUTPATIENT
Start: 2023-02-13 | End: 2023-02-16 | Stop reason: HOSPADM

## 2023-02-13 RX ORDER — ONDANSETRON 4 MG/1
4 TABLET, ORALLY DISINTEGRATING ORAL EVERY 8 HOURS PRN
Status: DISCONTINUED | OUTPATIENT
Start: 2023-02-13 | End: 2023-02-16 | Stop reason: HOSPADM

## 2023-02-13 RX ORDER — ENOXAPARIN SODIUM 100 MG/ML
40 INJECTION SUBCUTANEOUS 2 TIMES DAILY
Status: DISCONTINUED | OUTPATIENT
Start: 2023-02-13 | End: 2023-02-16 | Stop reason: HOSPADM

## 2023-02-13 RX ORDER — 0.9 % SODIUM CHLORIDE 0.9 %
1000 INTRAVENOUS SOLUTION INTRAVENOUS ONCE
Status: COMPLETED | OUTPATIENT
Start: 2023-02-13 | End: 2023-02-13

## 2023-02-13 RX ADMIN — THIAMINE HCL TAB 100 MG 100 MG: 100 TAB at 19:50

## 2023-02-13 RX ADMIN — IOPAMIDOL 75 ML: 755 INJECTION, SOLUTION INTRAVENOUS at 13:14

## 2023-02-13 RX ADMIN — ROSUVASTATIN CALCIUM 20 MG: 20 TABLET, FILM COATED ORAL at 19:49

## 2023-02-13 RX ADMIN — LISINOPRIL 2.5 MG: 5 TABLET ORAL at 19:49

## 2023-02-13 RX ADMIN — FUROSEMIDE 40 MG: 10 INJECTION, SOLUTION INTRAMUSCULAR; INTRAVENOUS at 15:57

## 2023-02-13 RX ADMIN — IPRATROPIUM BROMIDE AND ALBUTEROL SULFATE 1 AMPULE: 2.5; .5 SOLUTION RESPIRATORY (INHALATION) at 18:29

## 2023-02-13 RX ADMIN — GABAPENTIN 300 MG: 300 CAPSULE ORAL at 19:49

## 2023-02-13 RX ADMIN — Medication 10 ML: at 19:51

## 2023-02-13 RX ADMIN — ASPIRIN 81 MG 81 MG: 81 TABLET ORAL at 19:50

## 2023-02-13 RX ADMIN — ENOXAPARIN SODIUM 40 MG: 100 INJECTION SUBCUTANEOUS at 19:50

## 2023-02-13 RX ADMIN — SODIUM CHLORIDE 1000 ML: 9 INJECTION, SOLUTION INTRAVENOUS at 13:44

## 2023-02-13 RX ADMIN — PREDNISONE 5 MG: 5 TABLET ORAL at 19:49

## 2023-02-13 ASSESSMENT — PAIN SCALES - GENERAL: PAINLEVEL_OUTOF10: 0

## 2023-02-13 ASSESSMENT — PAIN - FUNCTIONAL ASSESSMENT: PAIN_FUNCTIONAL_ASSESSMENT: 0-10

## 2023-02-13 NOTE — TELEPHONE ENCOUNTER
Patients child left  and said after a week of trying new medication patient is liking medication and would like sent to pharmacy.     [facility] - [address], [address] [place] [address] 30: [phone]

## 2023-02-13 NOTE — ED TRIAGE NOTES
Patient is here for follow up of right knee arthritis. Pt is requesting right knee Euflexxa #2.  [facility] record. Has failed other conservative modalities including NSAIDS, activity modification, weight loss.    The prior shot was tolerated well.    PHYSICAL EXAMINATION:     General: The patient is alert and oriented x 3. Mood is pleasant.   Observation of ears, eyes and nose reveals no gross abnormalities. No   labored breathing observed.     No signs of infection or adverse reaction to knee.    PROCEDURE NOTE:  Aspiration/Injection Procedure right knee    A time out was performed, including verification of patient ID, procedure, site and side, availability of information and equipment, review of safety issues, and agreement with consent, the procedure site was marked.    Aspiration:  After time out was performed, the patient was prepped aseptically with povidone-iodine swabsticks. 5cc's of normal joint fluid was aspirated from the Superolateral  aspect of the right Knee Joint using a 22g x 1.5 needle in sterile fashion without complication.     Injection:  Following aspiration, a diagnostic and therapeutic injection of 2cc Euflexxa was given under sterile technique in the supine position.     Grecia Boyd had no adverse reactions to the medication. Pain decreased. She was instructed to apply ice to the joint for 20 minutes and avoid strenuous activities for 24-36 hours following the injection. She was warned of possible blood sugar and/or blood pressure changes during that time. Following that time, she can resume regular activities.    RTC 1 week for 3rd injection.  All questions were answered, pt will contact us for questions or concerns in the interim.       States cardiology sent due to concerns for PE.

## 2023-02-13 NOTE — ED PROVIDER NOTES
[facility]        Pt Name: Nico Charles  MRN: [id number]  [place] 1945  Date of evaluation: 2/13/2023  Provider: LYN Crane  PCP: Iban Cobos MD  Note Started: 2:23 PM EST 2/13/23       I have seen and evaluated this patient with my supervising physician Lovely Saleh MD.      [address] [place] [address]       Chief Complaint   Patient presents with    Tachycardia     Patient sent in by Cardiologist. States has fluid retention in leg, weakness, shortness of breath. States HR baseline \"70's and 80's\". Has decompensated heart failure. Is on 6L NC at baseline. HISTORY OF PRESENT ILLNESS: 1 or more Elements     History from : Patient    Limitations to history : None    Nico Charles is a 78 y.o. male who presents to the emergency room due to tachycardia noted at his cardiologist office today. Patient is also been having significant bilateral lower leg swelling and redness. Patient's daughter was at bedside and states that he is always on oxygen due to his COPD. Patient states that he otherwise feels well. While at the cardiologist office they noted that he was tachycardic. He is also noted some shortness of breath compared to his baseline. He states that his heart rate is normally in the 70s or 80s at baseline. Patient is always on 6 L nasal cannula at baseline. Cardiology sent him here for concern for possible pulmonary embolism. Patient denies any fevers, chills, nausea, vomiting, abdominal pain. Nursing Notes were all reviewed and agreed with or any disagreements were addressed in the HPI. REVIEW OF SYSTEMS :      Review of Systems    Positives and Pertinent negatives as per HPI.      SURGICAL HISTORY     Past Surgical History:   Procedure Laterality Date    APPENDECTOMY      COLONOSCOPY N/A 5/7/2019    COLONOSCOPY DIAGNOSTIC performed by Jesse Barnard MD at [facility] [address]       Current Discharge Medication List        CONTINUE these medications which have NOT CHANGED    Details   albuterol sulfate HFA (PROVENTIL;VENTOLIN;PROAIR) 108 (90 Base) MCG/ACT inhaler INHALE TWO PUFFS BY MOUTH EVERY 6 HOURS AS NEEDED  Qty: 54 g, Refills: 3      ipratropium-albuterol (DUONEB) 0.5-2.5 (3) MG/3ML SOLN nebulizer solution INHALE THREE MILLILITERS VIA NEBULIZATION BY MOUTH FOUR TIMES A DAY  Qty: 1080 mL, Refills: 3    Associated Diagnoses: COPD, severe ([facility])      fluticasone furoate-vilanterol (BREO ELLIPTA) 200-25 MCG/ACT AEPB inhaler Inhale 1 puff into the lungs daily  Qty: 84 each, Refills: 3      furosemide (LASIX) 80 MG tablet Take 1 tablet by mouth daily  Qty: 90 tablet, Refills: 3    Associated Diagnoses: Chronic heart failure with preserved ejection fraction ([facility])      aspirin 81 MG chewable tablet Take 1 tablet by mouth daily  Qty: 90 tablet, Refills: 3      lisinopril (PRINIVIL;ZESTRIL) 2.5 MG tablet TAKE ONE TABLET BY MOUTH DAILY  Qty: 90 tablet, Refills: 3    Associated Diagnoses: Essential hypertension      allopurinol (ZYLOPRIM) 100 MG tablet TAKE TWO TABLETS BY MOUTH DAILY  Qty: 180 tablet, Refills: 3      gabapentin (NEURONTIN) 300 MG capsule Take 1 capsule by mouth 3 times daily for 90 days. Qty: 270 capsule, Refills: 3    Associated Diagnoses: Neuropathy due to secondary diabetes ([facility]      blood glucose test strips (ACCU-CHEK CLARISSA PLUS) strip As needed.   Qty: 100 strip, Refills: 1    Associated Diagnoses: Type 2 diabetes mellitus without complication, without long-term current use of insulin ([facility])      fluticasone-umeclidin-vilant (TRELEGY ELLIPTA) 200-62.5-25 MCG/ACT AEPB inhaler Inhale 1 puff into the lungs daily  Qty: 3 each, Refills: 0      OXYGEN Inhale into the lungs 6L O2      predniSONE (DELTASONE) 5 MG tablet TAKE ONE TABLET BY MOUTH DAILY  Qty: 90 tablet, Refills: 1    Associated Diagnoses: COPD, very severe ([facility])      triamcinolone (KENALOG) 0.1 % ointment Apply to legs twice daily. Qty: 80 g, Refills: 0    Associated Diagnoses: Dermatitis      rosuvastatin (CRESTOR) 20 MG tablet Take 1 tablet by mouth nightly  Qty: 30 tablet, Refills: 3      thiamine mononitrate (THIAMINE) 100 MG tablet Take 1 tablet by mouth daily  Qty: 30 tablet, Refills: 3      Accu-Chek Softclix Lancets MISC USE ONE LANCET TO TEST DAILY  Qty: 100 each, Refills: 2    Associated Diagnoses: Type 2 diabetes mellitus without complication, without long-term current use of insulin ([facility])      Blood Glucose Monitoring Suppl (ACCU-CHEK CLARISSA PLUS) w/Device KIT 1 each by Does not apply route daily  Qty: 1 kit, Refills: 0    Associated Diagnoses: Type 2 diabetes mellitus without complication, without long-term current use of insulin ([facility])      acetaminophen (TYLENOL) 325 MG tablet Take 650 mg by mouth every 6 hours as needed for Pain             ALLERGIES     Patient has no known allergies. FAMILYHISTORY       Family History   Problem Relation Age of Onset    High Blood Pressure Mother     High Cholesterol Mother     Heart Disease Mother     Stroke Mother     Diabetes Mother     Depression Mother     Mental Illness Mother     Cancer Father         SOCIAL HISTORY       Social History     Tobacco Use    Smoking status: Former     Packs/day: 1.00     Years: 50.00     Pack years: 50.00     Types: Cigarettes     Quit date: 2012     Years since quittin.0    Smokeless tobacco: Never    Tobacco comments:     H.O. smoking 1 p.p.d. Quit 9 months ago    Vaping Use    Vaping Use: Never used   Substance Use Topics    Alcohol use:  Yes     Alcohol/week: 14.0 standard drinks     Types: 14 Shots of liquor per week     Comment: 3 drinks a evening    Drug use: Never       SCREENINGS        Butch Coma Scale  Eye Opening: Spontaneous  Best Verbal Response: Oriented  Best Motor Response: Obeys commands  [place] Coma Scale Score: 15                CIWA Assessment  BP: 139/72  Heart Rate: 98 PHYSICAL EXAM  1 or more Elements     ED Triage Vitals [23 1134]   BP Temp Temp Source Heart Rate Resp SpO2 Height Weight   (!) 147/112 99.5 °F (37.5 °C) Oral (!) 117 24 96 % -- --       Physical Exam  Vitals and nursing note reviewed. Constitutional:       General: He is not in acute distress. Appearance: He is obese. He is not ill-appearing. HENT:      Head: Normocephalic. Mouth/Throat:      Mouth: Mucous membranes are moist.      Pharynx: Oropharynx is clear. No oropharyngeal exudate or posterior oropharyngeal erythema. Cardiovascular:      Rate and Rhythm: Regular rhythm. Tachycardia present. Pulses: Normal pulses. Heart sounds: Normal heart sounds. Comments: Bilateral lower leg redness with excoriations noted with scab formation. Bilateral lower legs are also warm to touch. Patient has gross sensation to light touch throughout. Pulmonary:      Effort: Pulmonary effort is normal.      Breath sounds: Normal breath sounds. Comments: Wearing nasal cannula on 6 L of oxygen  Abdominal:      General: Bowel sounds are normal.   Musculoskeletal:      Cervical back: Normal range of motion and neck supple. Right lower le+ Pitting Edema present. Left lower le+ Pitting Edema present. Neurological:      Mental Status: He is alert and oriented to person, place, and time.    Psychiatric:         Mood and Affect: Mood normal.         Behavior: Behavior normal.           DIAGNOSTIC RESULTS   LABS:    Labs Reviewed   CBC WITH AUTO DIFFERENTIAL - Abnormal; Notable for the following components:       Result Value    RBC 3.17 (*)     Hemoglobin 10.7 (*)     Hematocrit 32.7 (*)     .2 (*)     Neutrophils Absolute 8.7 (*)     Lymphocytes Absolute 0.4 (*)     All other components within normal limits   COMPREHENSIVE METABOLIC PANEL W/ REFLEX TO MG FOR LOW K - Abnormal; Notable for the following components:    Glucose 122 (*)     BUN 48 (*)     Est, Glom Filt Rate 57 (*)     Albumin/Globulin Ratio 1.0 (*)     ALT 9 (*)     AST 13 (*)     All other components within normal limits   D-DIMER, QUANTITATIVE - Abnormal; Notable for the following components:    D-Dimer, Quant 1.42 (*)     All other components within normal limits   URINALYSIS WITH REFLEX TO CULTURE - Abnormal; Notable for the following components:    Blood, Urine LARGE (*)     Protein, UA TRACE (*)     All other components within normal limits   TROPONIN - Abnormal; Notable for the following components:    Troponin 0.04 (*)     All other components within normal limits   MICROSCOPIC URINALYSIS - Abnormal; Notable for the following components:    RBC,  (*)     All other components within normal limits   COVID-19, RAPID   RAPID INFLUENZA A/B ANTIGENS   CULTURE, BLOOD 2   CULTURE, BLOOD 1   LACTIC ACID   BRAIN NATRIURETIC PEPTIDE   CBC   BASIC METABOLIC PANEL       When ordered only abnormal lab results are displayed. All other labs were within normal range or not returned as of this dictation. EKG: When ordered, EKG's are interpreted by the Emergency Department Physician in the absence of a cardiologist.  Please see their note for interpretation of EKG. RADIOLOGY:   Non-plain film images such as CT, Ultrasound and MRI are read by the radiologist. Plain radiographic images are visualized and preliminarily interpreted by the ED Provider with the below findings:        Interpretation per the Radiologist below, if available at the time of this note:    CT CHEST PULMONARY EMBOLISM W CONTRAST   Final Result   Severe coronary artery calcification. No central pulmonary embolus   identified. 9 mm noncalcified pulmonary nodule right lower lobe, not clearly seen in   2019. Trent Blanca Consider pulmonary follow-up, and either short-term follow-up   noncontrast chest CT or PET-CT.       Decreased medial to lateral dimension of the trachea suggesting saber sheath   trachea      Nonobstructing renal calculi RECOMMENDATIONS:   Unavailable         XR CHEST PORTABLE   Final Result   No pneumonia or edema      Bandlike opacity at the left lung base, similar to prior, Likely scarring or   atelectasis           XR CHEST PORTABLE    Result Date: 2/13/2023  EXAMINATION: ONE XRAY VIEW OF THE CHEST 2/13/2023 11:49 am COMPARISON: September 2022 HISTORY: ORDERING SYSTEM PROVIDED HISTORY: Shortness of breath, tachycardia TECHNOLOGIST PROVIDED HISTORY: Reason for exam:->Shortness of breath, tachycardia Reason for Exam: Tachycardia (Patient sent in by Cardiologist. States has fluid retention in leg, weakness, shortness of breath. States HR baseline \"70's and 80's\". Has decompensated heart failure. Is on 6L NC at baseline. ) FINDINGS: Heart size is normal.  Mediastinal contours are normal.  Pulmonary vascularity is normal There is underlying emphysema Bandlike opacity seen in the left lung base, similar compared to prior     No pneumonia or edema Bandlike opacity at the left lung base, similar to prior, Likely scarring or atelectasis     CT CHEST PULMONARY EMBOLISM W CONTRAST    Result Date: 2/13/2023  EXAMINATION: CTA OF THE CHEST 2/13/2023 1:06 pm TECHNIQUE: CTA of the chest was performed after the administration of intravenous contrast.  Multiplanar reformatted images are provided for review. MIP images are provided for review. Automated exposure control, iterative reconstruction, and/or weight based adjustment of the mA/kV was utilized to reduce the radiation dose to as low as reasonably achievable. COMPARISON: None. HISTORY: ORDERING SYSTEM PROVIDED HISTORY: Tachycardia, elevated Dimer TECHNOLOGIST PROVIDED HISTORY: Reason for exam:->Tachycardia, elevated Dimer Decision Support Exception - unselect if not a suspected or confirmed emergency medical condition->Emergency Medical Condition (MA) Reason for Exam: Tachycardia, elevated Dimer FINDINGS: Mediastinum: Thyroid gland is unremarkable.   There is decreased medial to lateral dimension of the trachea. Atherosclerotic change is seen in the aorta. No intimal flap. No pericardial effusion seen. Severe coronary artery calcification is seen. No embolus is seen in the central, right, or left main pulmonary artery. No embolus is seen in the proximal segmental branches. Distal segmental branches and subsegmental branches not well evaluated secondary to motion. Lungs/pleura: Respiratory motion artifact limits evaluation of fine pulmonary parenchymal change. Moderate underlying emphysema is seen. Scattered areas of bronchial wall thickening are seen. De pendant opacity is seen at the lung bases, likely atelectasis. No pneumothorax noted. Bandlike opacity seen the right lung base, likely atelectasis. 9 mm noncalcified nodule is seen in the right lower lobe. Upper Abdomen: Stones are seen in the right kidney measuring 7 mm in size or less. Stones are seen left kidney measuring 9 mm in size or less Soft Tissues/Bones: Spurring is seen in the spine. Spurring is seen in the shoulder joints There is a probable sebaceous cyst posteriorly in the chest wall measuring 1.3 cm     Severe coronary artery calcification. No central pulmonary embolus identified. 9 mm noncalcified pulmonary nodule right lower lobe, not clearly seen in 2019. Cloteal Vaca Consider pulmonary follow-up, and either short-term follow-up noncontrast chest CT or PET-CT. 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 Nonobstructing renal calculi RECOMMENDATIONS: Unavailable       No results found. PROCEDURES   Unless otherwise noted below, none     Procedures    CRITICAL CARE TIME (.cctime)       PAST MEDICAL HISTORY      has a past medical history of Anemia, COPD (chronic obstructive pulmonary disease) ([address] Diabetes mellitus ([address] Edema, GI (gastrointestinal bleed), Gout, Hypertension, Morbid obesity ([address] Neuropathy, Obstructive sleep apnea, and Peripheral vascular disease ([address] Chronic Conditions affecting Care:     EMERGENCY DEPARTMENT COURSE and DIFFERENTIAL DIAGNOSIS/MDM:   Vitals:    Vitals:    02/13/23 1829 02/13/23 1912 02/13/23 1925 02/13/23 1944   BP:   139/72    Pulse: 100  98    Resp: 18  18    Temp:   98.9 °F (37.2 °C)    TempSrc:   Oral    SpO2: 96%  93%    Weight:    (!) 346 lb 1 oz (157 kg)   Height:  6' (1.829 m)  6' (1.829 m)       Patient was given the following medications:  Medications   furosemide (LASIX) injection 40 mg (has no administration in time range)   aspirin chewable tablet 81 mg (81 mg Oral Given 2/13/23 1950)   albuterol sulfate HFA (PROVENTIL;VENTOLIN;PROAIR) 108 (90 Base) MCG/ACT inhaler 1 puff (has no administration in time range)   gabapentin (NEURONTIN) capsule 300 mg (300 mg Oral Given 2/13/23 1949)   lisinopril (PRINIVIL;ZESTRIL) tablet 2.5 mg (2.5 mg Oral Given 2/13/23 1949)   rosuvastatin (CRESTOR) tablet 20 mg (20 mg Oral Given 2/13/23 1949)   predniSONE (DELTASONE) tablet 5 mg (5 mg Oral Given 2/13/23 1949)   thiamine mononitrate tablet 100 mg (100 mg Oral Given 2/13/23 1950)   sodium chloride flush 0.9 % injection 5-40 mL (10 mLs IntraVENous Given 2/13/23 1951)   sodium chloride flush 0.9 % injection 5-40 mL (has no administration in time range)   0.9 % sodium chloride infusion (has no administration in time range)   enoxaparin (LOVENOX) injection 40 mg (40 mg SubCUTAneous Given 2/13/23 1950)   ondansetron (ZOFRAN-ODT) disintegrating tablet 4 mg (has no administration in time range)     Or   ondansetron (ZOFRAN) injection 4 mg (has no administration in time range)   polyethylene glycol (GLYCOLAX) packet 17 g (has no administration in time range)   acetaminophen (TYLENOL) tablet 650 mg (has no administration in time range)     Or   acetaminophen (TYLENOL) suppository 650 mg (has no administration in time range)   ipratropium-albuterol (DUONEB) nebulizer solution 1 ampule (has no administration in time range)   0.9 % sodium chloride bolus (0 mLs IntraVENous Stopped 2/13/23 1546)   iopamidol (ISOVUE-370) 76 % injection 75 mL (75 mLs IntraVENous Given 2/13/23 1314)   furosemide (LASIX) injection 40 mg (40 mg IntraVENous Given 2/13/23 1557)             Is this patient to be included in the SEP-1 Core Measure due to severe sepsis or septic shock? No   Exclusion criteria - the patient is NOT to be included for SEP-1 Core Measure due to: Infection is not suspected    CONSULTS: (Who and What was discussed)  IP CONSULT TO CARDIOLOGY  Discussion with Other Profesionals : None    Social Determinants : None    Records Reviewed : None    CC/HPI Summary, DDx, ED Course, and Reassessment: 42-year-old male presents to the emergency department due to tachycardia, shortness of breath, bilateral lower leg swelling sent over by cardiology for concern for possible pulmonary embolism. Patient is on 6 L nasal cannula at baseline. He also has bilateral lower leg swelling which has been worsening compared to his baseline. D-dimer elevated at 1.42. CT scan of the chest did not reveal pulmonary embolism did show severe coronary artery calcification and a 9 mm noncalcified pulmonary nodule in the right lower lobe. Troponin elevated at 0.04 no chest pain and no ST changes on EKG. Chest x-ray showing no pneumonia or edema. There is a bandlike opacity at the left lung base that is similar to prior studies most likely scarring or atelectasis    CBC slight anemia 10.7. CMP creatinine 1.3 and otherwise unremarkable    Dr. Angel Perez who also saw the patient spoke with cardiology please see his note for details about the conversation. Patient be admitted to the hospital for further evaluation due to his tachycardia, dyspnea and localized lower leg swelling      FINAL IMPRESSION      1. Tachycardia    2. Dyspnea and respiratory abnormalities    3.  Localized swelling of both lower legs          DISPOSITION/PLAN     DISPOSITION Admitted 02/13/2023 03:29:34 PM      PATIENT REFERRED TO:  No follow-up provider specified.     DISCHARGE MEDICATIONS:  Current Discharge Medication List          DISCONTINUED MEDICATIONS:  Current Discharge Medication List        STOP taking these medications       metoprolol succinate (TOPROL XL) 25 MG extended release tablet Comments:   Reason for Stopping:         celecoxib (CELEBREX) 100 MG capsule Comments:   Reason for Stopping:         diclofenac (VOLTAREN) 75 MG EC tablet Comments:   Reason for Stopping:                      (Please note that portions of this note were completed with a voice recognition program.  Efforts were made to edit the dictations but occasionally words are mis-transcribed.)    LYN Iverson (electronically signed)            Telma Iverson  02/13/23 2032

## 2023-02-13 NOTE — PROGRESS NOTES
Patient admitted to room 95 [id number] from ED. Patient oriented to room, call light, bed rails, phone, lights and bathroom. Patient instructed about prescribed diet, how to use menu, and television. Bed alarm in place, patient aware of placement and reason. Telemetry box 5944 in place, patient aware of placement and reason. Bed locked, in lowest position, side rails up 2/4, call light within reach. Encouraged patient to call with any needs. Patient does wear a C-pap at night. C-pap from home in patients room. RT but in C-pap orders.

## 2023-02-14 PROBLEM — R00.0 TACHYCARDIA: Status: ACTIVE | Noted: 2023-02-14

## 2023-02-14 LAB
ANION GAP SERPL CALCULATED.3IONS-SCNC: 10 MMOL/L (ref 3–16)
BUN BLDV-MCNC: 40 MG/DL (ref 7–20)
CALCIUM SERPL-MCNC: 8.8 MG/DL (ref 8.3–10.6)
CHLORIDE BLD-SCNC: 101 MMOL/L (ref 99–110)
CO2: 26 MMOL/L (ref 21–32)
CREAT SERPL-MCNC: 1.2 MG/DL (ref 0.8–1.3)
GFR SERPL CREATININE-BSD FRML MDRD: >60 ML/MIN/{1.73_M2}
GLUCOSE BLD-MCNC: 130 MG/DL (ref 70–99)
HCT VFR BLD CALC: 31.3 % (ref 40.5–52.5)
HEMOGLOBIN: 10 G/DL (ref 13.5–17.5)
MCH RBC QN AUTO: 33 PG (ref 26–34)
MCHC RBC AUTO-ENTMCNC: 32 G/DL (ref 31–36)
MCV RBC AUTO: 103.1 FL (ref 80–100)
PDW BLD-RTO: 14.9 % (ref 12.4–15.4)
PLATELET # BLD: 286 K/UL (ref 135–450)
PMV BLD AUTO: 6.4 FL (ref 5–10.5)
POTASSIUM SERPL-SCNC: 4.4 MMOL/L (ref 3.5–5.1)
RBC # BLD: 3.04 M/UL (ref 4.2–5.9)
SODIUM BLD-SCNC: 137 MMOL/L (ref 136–145)
WBC # BLD: 7.1 K/UL (ref 4–11)

## 2023-02-14 PROCEDURE — 2700000000 HC OXYGEN THERAPY PER DAY

## 2023-02-14 PROCEDURE — 94640 AIRWAY INHALATION TREATMENT: CPT

## 2023-02-14 PROCEDURE — 99223 1ST HOSP IP/OBS HIGH 75: CPT | Performed by: INTERNAL MEDICINE

## 2023-02-14 PROCEDURE — 6360000002 HC RX W HCPCS: Performed by: FAMILY MEDICINE

## 2023-02-14 PROCEDURE — 94761 N-INVAS EAR/PLS OXIMETRY MLT: CPT

## 2023-02-14 PROCEDURE — 6370000000 HC RX 637 (ALT 250 FOR IP): Performed by: FAMILY MEDICINE

## 2023-02-14 PROCEDURE — 85027 COMPLETE CBC AUTOMATED: CPT

## 2023-02-14 PROCEDURE — 6370000000 HC RX 637 (ALT 250 FOR IP): Performed by: NURSE PRACTITIONER

## 2023-02-14 PROCEDURE — 36415 COLL VENOUS BLD VENIPUNCTURE: CPT

## 2023-02-14 PROCEDURE — 99222 1ST HOSP IP/OBS MODERATE 55: CPT | Performed by: INTERNAL MEDICINE

## 2023-02-14 PROCEDURE — 6360000002 HC RX W HCPCS: Performed by: INTERNAL MEDICINE

## 2023-02-14 PROCEDURE — 80048 BASIC METABOLIC PNL TOTAL CA: CPT

## 2023-02-14 PROCEDURE — 2580000003 HC RX 258: Performed by: FAMILY MEDICINE

## 2023-02-14 PROCEDURE — 2060000000 HC ICU INTERMEDIATE R&B

## 2023-02-14 PROCEDURE — 6360000002 HC RX W HCPCS: Performed by: NURSE PRACTITIONER

## 2023-02-14 RX ORDER — TRIAMCINOLONE ACETONIDE 1 MG/G
CREAM TOPICAL 2 TIMES DAILY
Status: DISCONTINUED | OUTPATIENT
Start: 2023-02-14 | End: 2023-02-16 | Stop reason: HOSPADM

## 2023-02-14 RX ORDER — FUROSEMIDE 10 MG/ML
40 INJECTION INTRAMUSCULAR; INTRAVENOUS 2 TIMES DAILY
Status: DISCONTINUED | OUTPATIENT
Start: 2023-02-14 | End: 2023-02-16 | Stop reason: HOSPADM

## 2023-02-14 RX ORDER — ALLOPURINOL 100 MG/1
200 TABLET ORAL DAILY
Status: DISCONTINUED | OUTPATIENT
Start: 2023-02-14 | End: 2023-02-16 | Stop reason: HOSPADM

## 2023-02-14 RX ORDER — METOPROLOL SUCCINATE 25 MG/1
12.5 TABLET, EXTENDED RELEASE ORAL DAILY
Status: DISCONTINUED | OUTPATIENT
Start: 2023-02-14 | End: 2023-02-16 | Stop reason: HOSPADM

## 2023-02-14 RX ORDER — FUROSEMIDE 10 MG/ML
40 INJECTION INTRAMUSCULAR; INTRAVENOUS ONCE
Status: COMPLETED | OUTPATIENT
Start: 2023-02-14 | End: 2023-02-14

## 2023-02-14 RX ADMIN — ENOXAPARIN SODIUM 40 MG: 100 INJECTION SUBCUTANEOUS at 21:59

## 2023-02-14 RX ADMIN — Medication 2 PUFF: at 20:21

## 2023-02-14 RX ADMIN — FUROSEMIDE 40 MG: 10 INJECTION, SOLUTION INTRAMUSCULAR; INTRAVENOUS at 21:57

## 2023-02-14 RX ADMIN — IPRATROPIUM BROMIDE AND ALBUTEROL SULFATE 1 AMPULE: 2.5; .5 SOLUTION RESPIRATORY (INHALATION) at 07:57

## 2023-02-14 RX ADMIN — FUROSEMIDE 40 MG: 10 INJECTION, SOLUTION INTRAMUSCULAR; INTRAVENOUS at 10:01

## 2023-02-14 RX ADMIN — GABAPENTIN 300 MG: 300 CAPSULE ORAL at 15:31

## 2023-02-14 RX ADMIN — ROSUVASTATIN CALCIUM 20 MG: 20 TABLET, FILM COATED ORAL at 21:58

## 2023-02-14 RX ADMIN — IPRATROPIUM BROMIDE AND ALBUTEROL SULFATE 1 AMPULE: 2.5; .5 SOLUTION RESPIRATORY (INHALATION) at 16:56

## 2023-02-14 RX ADMIN — ENOXAPARIN SODIUM 40 MG: 100 INJECTION SUBCUTANEOUS at 10:00

## 2023-02-14 RX ADMIN — Medication 10 ML: at 10:01

## 2023-02-14 RX ADMIN — PREDNISONE 5 MG: 5 TABLET ORAL at 10:01

## 2023-02-14 RX ADMIN — IPRATROPIUM BROMIDE AND ALBUTEROL SULFATE 1 AMPULE: 2.5; .5 SOLUTION RESPIRATORY (INHALATION) at 11:49

## 2023-02-14 RX ADMIN — GABAPENTIN 300 MG: 300 CAPSULE ORAL at 21:59

## 2023-02-14 RX ADMIN — METOPROLOL SUCCINATE 12.5 MG: 25 TABLET, FILM COATED, EXTENDED RELEASE ORAL at 21:58

## 2023-02-14 RX ADMIN — LISINOPRIL 2.5 MG: 5 TABLET ORAL at 10:01

## 2023-02-14 RX ADMIN — THIAMINE HCL TAB 100 MG 100 MG: 100 TAB at 10:01

## 2023-02-14 RX ADMIN — TRIAMCINOLONE ACETONIDE: 1 CREAM TOPICAL at 21:59

## 2023-02-14 RX ADMIN — IPRATROPIUM BROMIDE AND ALBUTEROL SULFATE 1 AMPULE: 2.5; .5 SOLUTION RESPIRATORY (INHALATION) at 20:21

## 2023-02-14 RX ADMIN — ALLOPURINOL 200 MG: 100 TABLET ORAL at 18:25

## 2023-02-14 RX ADMIN — ASPIRIN 81 MG 81 MG: 81 TABLET ORAL at 10:00

## 2023-02-14 RX ADMIN — GABAPENTIN 300 MG: 300 CAPSULE ORAL at 10:01

## 2023-02-14 RX ADMIN — FUROSEMIDE 40 MG: 10 INJECTION, SOLUTION INTRAMUSCULAR; INTRAVENOUS at 15:31

## 2023-02-14 RX ADMIN — Medication 10 ML: at 21:58

## 2023-02-14 ASSESSMENT — PAIN SCALES - GENERAL: PAINLEVEL_OUTOF10: 0

## 2023-02-14 NOTE — PROGRESS NOTES
RN asked patient about code status while doing admission. RN stated that patient is a full code. Patient was a prior DNR-CC back on 9/9/2022. Patient stated to RN that he does not want any ventilator if an arrest happened.  RN made night RN aware of patients code request.

## 2023-02-14 NOTE — PROGRESS NOTES
[facility]    2023 8:17 AM        Name: Julissa Lopez . Admitted: 2023  Primary Care Provider: Bairon Sanford MD (Tel: [phone])      Subjective:  . Admitted at the advise of cardiology due to tachycardia with fluid retention in lower legs. Lives with his daughter,  wife  8 years ago.   Feels better since IV lasic    Drinks 3 beers per day  I added 1 beer at lunch and dinner     On 6 liters oxygen ,  which is his baseline due to advanced COPD     Reviewed interval ancillary notes    Current Medications  furosemide (LASIX) injection 40 mg, Daily  aspirin chewable tablet 81 mg, Daily  albuterol sulfate HFA (PROVENTIL;VENTOLIN;PROAIR) 108 (90 Base) MCG/ACT inhaler 1 puff, Q4H PRN  gabapentin (NEURONTIN) capsule 300 mg, TID  lisinopril (PRINIVIL;ZESTRIL) tablet 2.5 mg, Daily  rosuvastatin (CRESTOR) tablet 20 mg, Nightly  predniSONE (DELTASONE) tablet 5 mg, Daily  thiamine mononitrate tablet 100 mg, Daily  sodium chloride flush 0.9 % injection 5-40 mL, 2 times per day  sodium chloride flush 0.9 % injection 5-40 mL, PRN  0.9 % sodium chloride infusion, PRN  enoxaparin (LOVENOX) injection 40 mg, BID  ondansetron (ZOFRAN-ODT) disintegrating tablet 4 mg, Q8H PRN   Or  ondansetron (ZOFRAN) injection 4 mg, Q6H PRN  polyethylene glycol (GLYCOLAX) packet 17 g, Daily PRN  acetaminophen (TYLENOL) tablet 650 mg, Q6H PRN   Or  acetaminophen (TYLENOL) suppository 650 mg, Q6H PRN  ipratropium-albuterol (DUONEB) nebulizer solution 1 ampule, 4x daily        Objective:  /69   Pulse 74   Temp 97.8 °F (36.6 °C) (Oral)   Resp 16   Ht 6' (1.829 m)   Wt (!) 334 lb (151.5 kg)   SpO2 98%   BMI 45.30 kg/m²   No intake or output data in the 24 hours ending 23 0817   Wt Readings from Last 3 Encounters:   23 (!) 334 lb (151.5 kg)   23 (!) 346 lb (156.9 kg)   23 (!) 346 lb (156.9 kg)       General appearance:  Appears chronically ill. He is alert and pleasant. Obese   Eyes: Sclera clear. Pupils equal.  ENT: Moist oral mucosa. Trachea midline, no adenopathy. Cardiovascular: Regular rhythm, normal S1, S2. No murmur. ++ edema in lower extremities  Respiratory: Not using accessory muscles. Good inspiratory effort. Clear to auscultation bilaterally, no wheeze or crackles. GI: Abdomen soft, no tenderness, not distended, normal bowel sounds  Musculoskeletal: No cyanosis in digits, neck supple  Neurology: CN 2-12 grossly intact. No speech or motor deficits  Psych: Normal affect. Alert and oriented in time, place and person  Skin: Warm, dry, normal turgor    Labs and Tests:  CBC:   Recent Labs     02/13/23  1204 02/14/23  0514   WBC 10.2 7.1   HGB 10.7* 10.0*    286     BMP:    Recent Labs     02/13/23  1204 02/14/23  0514    137   K 4.9 4.4    101   CO2 28 26   BUN 48* 40*   CREATININE 1.3 1.2   GLUCOSE 122* 130*     Hepatic:   Recent Labs     02/13/23  1204   AST 13*   ALT 9*   BILITOT 0.4   ALKPHOS 74     CTPA:    Severe coronary artery calcification. No central pulmonary embolus   identified. 9 mm noncalcified pulmonary nodule right lower lobe, not clearly seen in   2019. Leesa Suh Consider pulmonary follow-up, and either short-term follow-up   noncontrast chest CT or PET-CT. Decreased medial to lateral dimension of the trachea suggesting saber sheath   trachea       Nonobstructing renal calculi         Problem List  Principal Problem:    Peripheral edema  Resolved Problems:    * No resolved hospital problems. *       Assessment & Plan:   Tachycardia; now resolved,  NO AF,  rate now 70- 80 on low dose BB . CTPA negative for PE  Lower leg edema:  increased IV lasix to 40 mg BID,  previous echo from sept showed EF 55%  Chronic hypoxic respiratory failure due to COPD:  on his baseline oxygen requirement of 6 liters. Not in exacerbation.   He is on chronic prednisone at 5 mg daily    Coronary artery calcifications:  cardiology following, anticipate stress test in the am   Right Pulmonary nodule at 9 mm. Needs follow up CT scan in 1 month with pulmonary ,    Drinks 3 beers per day,  may have beer at lunch and dinner       Diet: ADULT DIET; Regular;  Low Sodium (2 gm)  Code:DNR-CC  DVT PPX      Anjali Couch, THU - CNP   2/14/2023 8:17 AM

## 2023-02-14 NOTE — CONSULTS
[address]   Electrophysiology Consultation     Date: 2/14/2023  Reason for Consultation: tachycardia   Consult Requesting Physician: Bam Rendon MD     CC: Referred from electrophysiology clinic      HPI:   Tejas Moody is a 78 y.o. male history of hypertension,, diastolic heart failure, obstructive sleep apnea, diabetes, COPD, chronic hypoxic respiratory failure on 6 L home O2, CVA that was diagnosed in September 2022, followed COVID infection, required no intervention, echo demonstrating small PFO that was felt to be too small to require closure/cause of his CVA, recent event monitor with no atrial dysrhythmia. Was initially seen in electrophysiology clinic with his granddaughter, heart rates noted to be elevated in the 120s, increased lower extremity swelling, fatigue. Baseline chronic dyspnea and chronic orthopnea, denied chest pain. Was admitted where he received CT chest with contrast, no pulmonary embolism. Started on IV diuretics. Review of System:  Complete 10 point ROS performed and negative unless noted in above HPI or below    Prior to Admission medications    Medication Sig Start Date End Date Taking?  Authorizing Provider   albuterol sulfate HFA (PROVENTIL;VENTOLIN;PROAIR) 108 (90 Base) MCG/ACT inhaler INHALE TWO PUFFS BY MOUTH EVERY 6 HOURS AS NEEDED 1/30/23   Rosas Denny MD   ipratropium-albuterol (DUONEB) 0.5-2.5 (3) MG/3ML SOLN nebulizer solution INHALE THREE MILLILITERS VIA NEBULIZATION BY MOUTH FOUR TIMES A DAY 1/30/23   Rosas Denny MD   fluticasone furoate-vilanterol (BREO ELLIPTA) 200-25 MCG/ACT AEPB inhaler Inhale 1 puff into the lungs daily 1/30/23   Rosas Denny MD   furosemide (LASIX) 80 MG tablet Take 1 tablet by mouth daily 1/23/23   Cathi Henderson MD   aspirin 81 MG chewable tablet Take 1 tablet by mouth daily 1/23/23   Cathi Henderson MD   lisinopril (PRINIVIL;ZESTRIL) 2.5 MG tablet TAKE ONE TABLET BY MOUTH DAILY 1/23/23   Monty Stewart Sowmya Erwin MD   allopurinol (ZYLOPRIM) 100 MG tablet TAKE TWO TABLETS BY MOUTH DAILY 1/23/23   Kirt George MD   gabapentin (NEURONTIN) 300 MG capsule Take 1 capsule by mouth 3 times daily for 90 days. 1/23/23 4/23/23  Kirt George MD   blood glucose test strips (ACCU-CHEK CLARISSA PLUS) strip As needed. 1/23/23   Kirt George MD   fluticasone-umeclidin-vilant (TRELEGY ELLIPTA) 334-10.8-82 MCG/ACT AEPB inhaler Inhale 1 puff into the lungs daily 1/23/23   Kirt George MD   OXYGEN Inhale into the lungs 6L O2    Historical Provider, MD   predniSONE (DELTASONE) 5 MG tablet TAKE ONE TABLET BY MOUTH DAILY 10/17/22   Kirt George MD   triamcinolone (KENALOG) 0.1 % ointment Apply to legs twice daily.  10/9/22   Kirt George MD   rosuvastatin (CRESTOR) 20 MG tablet Take 1 tablet by mouth nightly 9/21/22   Son Meyer MD   thiamine mononitrate (THIAMINE) 100 MG tablet Take 1 tablet by mouth daily 9/21/22   Son Meyer MD   metoprolol succinate (TOPROL XL) 25 MG extended release tablet Take 0.5 tablets by mouth daily 9/10/22 9/21/22  THU Landeros CNP   celecoxib (CELEBREX) 100 MG capsule Take 1 capsule by mouth 2 times daily 4/11/22 9/9/22  Kirt George MD   Accu-Chek Softclix Lancets MISC USE ONE LANCET TO TEST DAILY 2/5/21   Kirt George MD   diclofenac (VOLTAREN) 75 MG EC tablet TAKE ONE TABLET BY MOUTH TWICE A DAY 3/13/20 9/4/22  Kirt George MD   Blood Glucose Monitoring Suppl (ACCU-CHEK CLARISSA PLUS) w/Device KIT 1 each by Does not apply route daily 12/23/19   Kirt George MD   acetaminophen (TYLENOL) 325 MG tablet Take 650 mg by mouth every 6 hours as needed for Pain    Historical Provider, MD       Past Medical History:   Diagnosis Date    Anemia     COPD (chronic obstructive pulmonary disease) ([facility] [address]     Diabetes mellitus ([facility]     Edema     GI (gastrointestinal bleed)     Gout     Hypertension     Morbid obesity ([address]     Neuropathy     Obstructive sleep apnea     uses CPAP    Peripheral vascular disease [facility])         Past Surgical History:   Procedure Laterality Date    APPENDECTOMY      COLONOSCOPY N/A 2019    COLONOSCOPY DIAGNOSTIC performed by Gloria Clifton MD at [facility]      bilateral       No Known Allergies    Social History:  Reviewed. reports that he quit smoking about 11 years ago. His smoking use included cigarettes. He has a 50.00 pack-year smoking history. He has never used smokeless tobacco. He reports current alcohol use of about 14.0 standard drinks per week. He reports that he does not use drugs. Family History:  Reviewed. Reviewed. No family history of SCD.         Physical Examination:  /69   Pulse 74   Temp 97.8 °F (36.6 °C) (Oral)   Resp 16   Ht 6' (1.829 m)   Wt (!) 334 lb (151.5 kg)   SpO2 98%   BMI 45.30 kg/m²   Temp  Av.6 °F (37 °C)  Min: 97.8 °F (36.6 °C)  Max: 99.5 °F (37.5 °C)  Pulse  Av.3  Min: 74  Max: 117  BP  Min: 104/58  Max: 147/67  SpO2  Av.4 %  Min: 93 %  Max: 98 %  FiO2   Av %  Min: 21 %  Max: 21 %  No intake or output data in the 24 hours ending 23 0912    No acute distress  Moist oral mucosa, nonicteric conjunctiva  Nonlabored, distant lung sounds, no crackles, wheeze, rhonchi  Heart is tachycardic, regular rhythm, no murmurs, 1+ pitting edema through lower extremities including dependent edema through hips and thighs  Abdomen is rounded, soft, nontender  Strength is grossly preserved, normal tone  No focal neurologic deficits  Appropriate mood and affect  No rashes or ecchymoses, chronic venous stasis changes and excoriations over bilateral lower extremities    Labs:    Potassium 4.4  Creatinine 1.2  Magnesium 2.5  Lactate 1    EKG   Sinus tachycardia    Stress    Coronary angiography 2009  Normal coronaries  Pulmonary hypertension, elevated EDP and wedge  Normal left ventriculogram, EF 70%    ECHO 9/22   A bubble study was performed and shows evidence of a trivial amount of right   to left shunting consistent with a tiny patent foramen ovale or atrial   septal defect. Irregular rhythm. Left ventricular systolic function is normal with ejection fraction   estimated at 55 %. No regional wall motion abnormalities are noted. Normal left ventricular wall thickness. Diastolic dysfunction grade and filing pressure are indeterminate. The right ventricle is mildly enlarged. The right atrium is mildly dilated. The aortic root is moderately dilated. The ascending aorta is moderately dilated. Mild mitral regurgitation. Assessment/Plan:     Decompensated HFpEF  Sinus tachycardia  Severe coronary artery calcification, normal coronaries in 2009 F cath  Hx cryptogenic CVA, 30 day monitor no AF  PVCs/NSVT  COPD, chronic hypoxic respiratory failure  Small PFO    - sinus tachycardia, increased LE swelling, chronic dyspnea/orthopnea, concern in clinic for decompensated HF, possible PE, no PE on CT chest  - sinus tachycardia is intermittent in hospital, may be related to underlying lung disease  - IV diuresis, volume up, added additional IV lasix  - repeat ECHO  - stress MPI for severe coronary calcification  - day of discharge can place ILR, for monitoring of cryptogenic stroke    NOTE: This report was transcribed using voice recognition software. Every effort was made to ensure accuracy, however, inadvertent computerized transcription errors may be present.      Randi Lentz MD  [facility]   Office: [phone]  Fax: [phone]

## 2023-02-14 NOTE — H&P
HOSPITALISTS HISTORY AND PHYSICAL    2/13/2023 9:01 PM    Patient Information:  Bayron Bach is a 78 y.o. male [id number]  PCP:  True Carr MD (Tel: [phone] )    Chief complaint:    Chief Complaint   Patient presents with    Tachycardia     Patient sent in by Cardiologist. States has fluid retention in leg, weakness, shortness of breath. States HR baseline \"70's and 80's\". Has decompensated heart failure. Is on 6L NC at baseline. History of Present Illness:  Sydni Najera is a 78 y.o. male with h/o HTN , AAA, CVA, dCHF, DM , dyslipidemia, Chronic respiratory failure( uses 6L at baseline,)  STEF, PFO, nicotine use, anemia . presented  dt leg edema, weakness and dyspnea. The pt follows up with Dr Elizabeth Callaway for COPD and Dr. Anant Garvey cardiology . He is on chronic prednisone therapy. He was seen at the cardiology office today . Was found to have tachycardia . He was sent to the ED with concern for PE/ DVT. ED work up showed   Chest CT   Severe coronary artery calcification. No central pulmonary embolus   identified. 9 mm noncalcified pulmonary nodule right lower lobe, not clearly seen in   2019. Brad Gomez Consider pulmonary follow-up, and either short-term follow-up   noncontrast chest CT or PET-CT. Decreased medial to lateral dimension of the trachea suggesting saber sheath   trachea       Nonobstructing renal calculi     EKG showed Sinus tachycardia with A-v block, PVCs and Left axis deviation   Troponin <0.01        REVIEW OF SYSTEMS:   Constitutional: Negative for fever,chills or night sweats  ENT: Negative for rhinorrhea, epistaxis, hoarseness, sore throat.   Respiratory: +Ve for shortness of breath,wheezing  Cardiovascular: Negative for chest pain, palpitations   Gastrointestinal: Negative for nausea, vomiting, diarrhea  Genitourinary: Negative for polyuria, dysuria Hematologic/Lymphatic: Negative for bleeding tendency, easy bruising  Musculoskeletal: Negative for myalgias and arthralgias  Neurologic: Negative for confusion,dysarthria. Skin: Negative for itching,rash  Psychiatric: Negative for depression,anxiety, agitation. Endocrine: Negative for polydipsia,polyuria,heat /cold intolerance. Past Medical History:   has a past medical history of Anemia, COPD (chronic obstructive pulmonary disease) ([facility] [address] Diabetes mellitus ([facility] [address] Edema, GI (gastrointestinal bleed), Gout, Hypertension, Morbid obesity ([facility] [address] Neuropathy, Obstructive sleep apnea, and Peripheral vascular disease ([facility] [address] Past Surgical History:   has a past surgical history that includes Appendectomy; Total hip arthroplasty; and Colonoscopy (N/A, 5/7/2019). Medications:  No current facility-administered medications on file prior to encounter. Current Outpatient Medications on File Prior to Encounter   Medication Sig Dispense Refill    albuterol sulfate HFA (PROVENTIL;VENTOLIN;PROAIR) 108 (90 Base) MCG/ACT inhaler INHALE TWO PUFFS BY MOUTH EVERY 6 HOURS AS NEEDED 54 g 3    ipratropium-albuterol (DUONEB) 0.5-2.5 (3) MG/3ML SOLN nebulizer solution INHALE THREE MILLILITERS VIA NEBULIZATION BY MOUTH FOUR TIMES A DAY 1080 mL 3    fluticasone furoate-vilanterol (BREO ELLIPTA) 200-25 MCG/ACT AEPB inhaler Inhale 1 puff into the lungs daily 84 each 3    furosemide (LASIX) 80 MG tablet Take 1 tablet by mouth daily 90 tablet 3    aspirin 81 MG chewable tablet Take 1 tablet by mouth daily 90 tablet 3    lisinopril (PRINIVIL;ZESTRIL) 2.5 MG tablet TAKE ONE TABLET BY MOUTH DAILY 90 tablet 3    allopurinol (ZYLOPRIM) 100 MG tablet TAKE TWO TABLETS BY MOUTH DAILY 180 tablet 3    gabapentin (NEURONTIN) 300 MG capsule Take 1 capsule by mouth 3 times daily for 90 days. 270 capsule 3    blood glucose test strips (ACCU-CHEK CLARISSA PLUS) strip As needed.  100 strip 1    fluticasone-umeclidin-vilant (Valerie Rashel) 651-30.0-40 MCG/ACT AEPB inhaler Inhale 1 puff into the lungs daily 3 each 0    OXYGEN Inhale into the lungs 6L O2      predniSONE (DELTASONE) 5 MG tablet TAKE ONE TABLET BY MOUTH DAILY 90 tablet 1    triamcinolone (KENALOG) 0.1 % ointment Apply to legs twice daily.  80 g 0    rosuvastatin (CRESTOR) 20 MG tablet Take 1 tablet by mouth nightly 30 tablet 3    thiamine mononitrate (THIAMINE) 100 MG tablet Take 1 tablet by mouth daily 30 tablet 3    [DISCONTINUED] metoprolol succinate (TOPROL XL) 25 MG extended release tablet Take 0.5 tablets by mouth daily 30 tablet 1    [DISCONTINUED] celecoxib (CELEBREX) 100 MG capsule Take 1 capsule by mouth 2 times daily 60 capsule 5    Accu-Chek Softclix Lancets MISC USE ONE LANCET TO TEST DAILY 100 each 2    [DISCONTINUED] diclofenac (VOLTAREN) 75 MG EC tablet TAKE ONE TABLET BY MOUTH TWICE A  tablet 0    Blood Glucose Monitoring Suppl (ACCU-CHEK CLARISSA PLUS) w/Device KIT 1 each by Does not apply route daily 1 kit 0    acetaminophen (TYLENOL) 325 MG tablet Take 650 mg by mouth every 6 hours as needed for Pain       Current Facility-Administered Medications   Medication Dose Route Frequency Provider Last Rate Last Admin    [START ON 2/14/2023] furosemide (LASIX) injection 40 mg  40 mg IntraVENous Daily Kacey Woods MD        aspirin chewable tablet 81 mg  81 mg Oral Daily Kacey Woods MD   81 mg at 02/13/23 1950    albuterol sulfate HFA (PROVENTIL;VENTOLIN;PROAIR) 108 (90 Base) MCG/ACT inhaler 1 puff  1 puff Inhalation Q4H PRN Kacey Woods MD        gabapentin (NEURONTIN) capsule 300 mg  300 mg Oral TID Mohamud Hanna MD   300 mg at 02/13/23 1949    lisinopril (PRINIVIL;ZESTRIL) tablet 2.5 mg  2.5 mg Oral Daily Mohamud Hnana MD   2.5 mg at 02/13/23 1949    rosuvastatin (CRESTOR) tablet 20 mg  20 mg Oral Nightly Mohamud Hanna MD   20 mg at 02/13/23 1949    predniSONE (DELTASONE) tablet 5 mg  5 mg Oral Daily Mohamud Hanna MD   5 mg at 02/13/23 1949    thiamine mononitrate tablet 100 mg 100 mg Oral Daily Tariq Hernandez MD   100 mg at 02/13/23 1950    sodium chloride flush 0.9 % injection 5-40 mL  5-40 mL IntraVENous 2 times per day Tariq Hernandez MD   10 mL at 02/13/23 1951    sodium chloride flush 0.9 % injection 5-40 mL  5-40 mL IntraVENous PRN Tariq Hernandez MD        0.9 % sodium chloride infusion   IntraVENous PRN Tariq Hernandez MD        enoxaparin (LOVENOX) injection 40 mg  40 mg SubCUTAneous BID Tariq Hernandez MD   40 mg at 02/13/23 1950    ondansetron (ZOFRAN-ODT) disintegrating tablet 4 mg  4 mg Oral Q8H PRN Tariq Hernandez MD        Or    ondansetron (ZOFRAN) injection 4 mg  4 mg IntraVENous Q6H PRN Tariq Hernandez MD        polyethylene glycol (GLYCOLAX) packet 17 g  17 g Oral Daily PRN Tariq Hernandez MD        acetaminophen (TYLENOL) tablet 650 mg  650 mg Oral Q6H PRN Tariq Henrandez MD        Or    acetaminophen (TYLENOL) suppository 650 mg  650 mg Rectal Q6H PRN MD Zina Garcia Joni ON 2/14/2023] ipratropium-albuterol (DUONEB) nebulizer solution 1 ampule  1 ampule Inhalation 4x daily Tariq Hernandez MD          Allergies:  No Known Allergies     Social History:  [facility]    reports that he quit smoking about 11 years ago. His smoking use included cigarettes. He has a 50.00 pack-year smoking history. He has never used smokeless tobacco. He reports current alcohol use of about 14.0 standard drinks per week. He reports that he does not use drugs. Family History:  family history includes Cancer in his father; Depression in his mother; Diabetes in his mother; Heart Disease in his mother; High Blood Pressure in his mother; High Cholesterol in his mother; Mental Illness in his mother; Stroke in his mother. ,     Physical Exam:  /72   Pulse 98   Temp 98.9 °F (37.2 °C) (Oral)   Resp 18   Ht 6' (1.829 m)   Wt (!) 346 lb 1 oz (157 kg)   SpO2 93%   BMI 46.93 kg/m²     General appearance:  Appears comfortable.  Well nourished  Eyes: Sclera clear, pupils equal  ENT: Moist mucus membranes, no thrush. Trachea midline. Cardiovascular: Regular rhythm, normal S1, S2. No murmur, gallop, rub. N+Ve  edema in lower extremities  Respiratory: Clear to auscultation bilaterally, no wheeze, good inspiratory effort  Gastrointestinal: Abdomen soft, non-tender, not distended, normal bowel sounds  Musculoskeletal: No cyanosis in digits, neck supple  Neurology: Cranial nerves grossly intact. Alert and oriented in time, place and person. No speech or motor deficits  Psychiatry: Appropriate affect. Not agitated  Skin: Warm, dry, normal turgor, no rash  Brisk capillary refill, peripheral pulses palpable   Labs:  CBC:   Lab Results   Component Value Date/Time    WBC 10.2 02/13/2023 12:04 PM    RBC 3.17 02/13/2023 12:04 PM    HGB 10.7 02/13/2023 12:04 PM    HCT 32.7 02/13/2023 12:04 PM    .2 02/13/2023 12:04 PM    MCH 33.8 02/13/2023 12:04 PM    MCHC 32.7 02/13/2023 12:04 PM    RDW 14.9 02/13/2023 12:04 PM     02/13/2023 12:04 PM    MPV 6.6 02/13/2023 12:04 PM     BMP:    Lab Results   Component Value Date/Time     02/13/2023 12:04 PM    K 4.9 02/13/2023 12:04 PM     02/13/2023 12:04 PM    CO2 28 02/13/2023 12:04 PM    BUN 48 02/13/2023 12:04 PM    CREATININE 1.3 02/13/2023 12:04 PM    CALCIUM 9.2 02/13/2023 12:04 PM    GFRAA 60 09/27/2022 10:30 AM    GFRAA >60 04/17/2013 08:55 AM    LABGLOM 57 02/13/2023 12:04 PM    GLUCOSE 122 02/13/2023 12:04 PM     CT CHEST PULMONARY EMBOLISM W CONTRAST   Final Result   Severe coronary artery calcification. No central pulmonary embolus   identified. 9 mm noncalcified pulmonary nodule right lower lobe, not clearly seen in   2019. Genet Mcgarry Consider pulmonary follow-up, and either short-term follow-up   noncontrast chest CT or PET-CT.       Decreased medial to lateral dimension of the trachea suggesting saber sheath   trachea      Nonobstructing renal calculi      RECOMMENDATIONS:   Unavailable         XR CHEST PORTABLE   Final Result   No pneumonia or edema      Bandlike opacity at the left lung base, similar to prior, Likely scarring or   atelectasis           Chest Xray:   EKG:    I visualized CXR images and EKG strips    Discussed case  with     Problem Lis  Principal Problem:    Peripheral edema  Resolved Problems:    * No resolved hospital problems. *        Assessment/Plan:   Peripheral edema  Chest xray showed no pulmonary edema or Vascular congestion   Pro bnp Is not elevated   Last ECHO in 09/22 showed EF 55% with indeterminate Diastolic dysfunction   Will cont IV Lasix   Low Sodium diet     Chronic respiratory failure  On L 6 L O2 via NC     Tachycardia HR in one teens  EKG showed Sinus rythm  Chest CT showed Coronary artery calcification   Cont on ASA and Crestor  No recent stress test or angiogram   Cardiology consulted       DVT prophylaxis   Code status   Diet   IV access   Albarado Catheter    Admit as inpatient. I anticipate hospitalization spanning mroe than two midnights for investigation and treatment of the above medically necessary diagnoses. Please note that some part of this chart was generated using Dragon dictation software. Although every effort was made to ensure the accuracy of this automated transcription, some errors in transcription may have occurred inadvertently. If you may need any clarification, please do not hesitate to contact me through [facility].        Tariq Hernandez MD    2/13/2023 9:01 PM

## 2023-02-14 NOTE — PLAN OF CARE
Problem: Discharge Planning  Goal: Discharge to home or other facility with appropriate resources  Outcome: Progressing     Problem: Safety - Adult  Goal: Free from fall injury  Outcome: Progressing     Problem: Respiratory - Adult  Goal: Achieves optimal ventilation and oxygenation  Outcome: Progressing     Problem: Cardiovascular - Adult  Goal: Maintains optimal cardiac output and hemodynamic stability  Outcome: Progressing  Goal: Absence of cardiac dysrhythmias or at baseline  Outcome: Progressing     Problem: Musculoskeletal - Adult  Goal: Return mobility to safest level of function  Outcome: Progressing     Problem: Metabolic/Fluid and Electrolytes - Adult  Goal: Electrolytes maintained within normal limits  Outcome: Progressing     Problem: Hematologic - Adult  Goal: Maintains hematologic stability  Outcome: Progressing

## 2023-02-14 NOTE — CONSULTS
[facility]  [phone]        Reason for Consultation/Chief Complaint: \"Tachycardia, SOB, fluid retention\"     History of Present Illness:  Oswald Waddell is a 78 y.o. patient who presented to the hospital from the cardiology office for shortness of breath, fluid retention in leg, weakness. He has a history of hypertension, hyperlipidemia, d HF. He is also followed by pulmonology for severe COPD and wears supplemental oxygen. He is SP CVA Sept '22, PFO revealed on echo but not felt to be the cause. He was being evaluated by EP for a loop recorder after previous event monitor did not show evidence of afib. He denies chest discomfort. He did have increased shortness of breath and leg swelling. He does admit to dietary indiscretion with consumption of beer and increased sodium intake. Past Medical History:   has a past medical history of Anemia, COPD (chronic obstructive pulmonary disease) ([address] Diabetes mellitus ([address] Edema, GI (gastrointestinal bleed), Gout, Hypertension, Morbid obesity ([address] Neuropathy, Obstructive sleep apnea, and Peripheral vascular disease ([address] Surgical History:   has a past surgical history that includes Appendectomy; Total hip arthroplasty; and Colonoscopy (N/A, 5/7/2019). Social History:   reports that he quit smoking about 11 years ago. His smoking use included cigarettes. He has a 50.00 pack-year smoking history. He has never used smokeless tobacco. He reports current alcohol use of about 14.0 standard drinks per week. He reports that he does not use drugs. Family History:  family history includes Cancer in his father; Depression in his mother; Diabetes in his mother; Heart Disease in his mother; High Blood Pressure in his mother; High Cholesterol in his mother; Mental Illness in his mother; Stroke in his mother. Home Medications:  Were reviewed and are listed in nursing record.  and/or listed below  Prior to Admission medications Medication Sig Start Date End Date Taking? Authorizing Provider   albuterol sulfate HFA (PROVENTIL;VENTOLIN;PROAIR) 108 (90 Base) MCG/ACT inhaler INHALE TWO PUFFS BY MOUTH EVERY 6 HOURS AS NEEDED 1/30/23   Kylee Early MD   ipratropium-albuterol (DUONEB) 0.5-2.5 (3) MG/3ML SOLN nebulizer solution INHALE THREE MILLILITERS VIA NEBULIZATION BY MOUTH FOUR TIMES A DAY 1/30/23   Kylee Early MD   fluticasone furoate-vilanterol (BREO ELLIPTA) 200-25 MCG/ACT AEPB inhaler Inhale 1 puff into the lungs daily 1/30/23   Kylee Early MD   furosemide (LASIX) 80 MG tablet Take 1 tablet by mouth daily 1/23/23   Jimmy Kiser MD   aspirin 81 MG chewable tablet Take 1 tablet by mouth daily 1/23/23   Jimmy Kiser MD   lisinopril (PRINIVIL;ZESTRIL) 2.5 MG tablet TAKE ONE TABLET BY MOUTH DAILY 1/23/23   Jimmy Kiser MD   allopurinol (ZYLOPRIM) 100 MG tablet TAKE TWO TABLETS BY MOUTH DAILY 1/23/23   Jimmy Kiser MD   gabapentin (NEURONTIN) 300 MG capsule Take 1 capsule by mouth 3 times daily for 90 days. 1/23/23 4/23/23  Jimmy Kiser MD   blood glucose test strips (ACCU-CHEK CLARISSA PLUS) strip As needed. 1/23/23   Jimmy Kiser MD   fluticasone-umeclidin-vilant (TRELEGY ELLIPTA) 604-21.8-45 MCG/ACT AEPB inhaler Inhale 1 puff into the lungs daily 1/23/23   Jimmy Kiser MD   OXYGEN Inhale into the lungs 6L O2    Historical Provider, MD   predniSONE (DELTASONE) 5 MG tablet TAKE ONE TABLET BY MOUTH DAILY 10/17/22   Jimmy Kiser MD   triamcinolone (KENALOG) 0.1 % ointment Apply to legs twice daily.  10/9/22   Jimmy Kiser MD   rosuvastatin (CRESTOR) 20 MG tablet Take 1 tablet by mouth nightly 9/21/22   Pavan Bajwa MD   thiamine mononitrate (THIAMINE) 100 MG tablet Take 1 tablet by mouth daily 9/21/22   Pavan Bajwa MD   metoprolol succinate (TOPROL XL) 25 MG extended release tablet Take 0.5 tablets by mouth daily 9/10/22 9/21/22  Max Oliver THU Webb - CNP   celecoxib (CELEBREX) 100 MG capsule Take 1 capsule by mouth 2 times daily 4/11/22 9/9/22  Iban Cobos MD   Accu-Chek Softclix Lancets MISC USE ONE LANCET TO TEST DAILY 2/5/21   Iban Cobos MD   diclofenac (VOLTAREN) 75 MG EC tablet TAKE ONE TABLET BY MOUTH TWICE A DAY 3/13/20 9/4/22  Iban Cobos MD   Blood Glucose Monitoring Suppl (ACCU-CHEK CLARISSA PLUS) w/Device KIT 1 each by Does not apply route daily 12/23/19   Iban Cobos MD   acetaminophen (TYLENOL) 325 MG tablet Take 650 mg by mouth every 6 hours as needed for Pain    Historical Provider, MD        Allergies:  Patient has no known allergies. Review of Systems:   A complete review of systems has been reviewed and updated today and is negative except as noted in the history of present illness. Physical Examination:    Vitals:    02/14/23 0802   BP:    Pulse: 74   Resp: 16   Temp:    SpO2: 98%    Weight: (!) 334 lb (151.5 kg)         General Appearance:  Alert, cooperative, no distress, appears stated age   Head:  Normocephalic, without obvious abnormality, atraumatic   Eyes:  EOMI, conjunctiva/corneas clear       Nose: Nares normal   Throat: Lips normal   Neck: Supple, symmetrical, trachea midline,  no carotid bruit or JVD       Lungs:   Decreased breath sounds bilaterally, respirations unlabored   Chest Wall:  No tenderness or deformity   Heart:  Regular rate and rhythm, S1, S2 normal, no murmur, rub or gallop   Abdomen:   Soft, non-tender, bowel sounds active all four quadrants,  no masses, no organomegaly           Extremities: Extremities normal, atraumatic, no cyanosis.   2+ edema   Pulses: Diminished and symmetric   Skin: Skin color, texture, turgor normal, no rashes or lesions   Pysch: Normal mood and affect   Neurologic: Normal gross motor and sensory exam.         EKG:  I have reviewed EKG with the following interpretation:  Impression:    13-FEB-2023 11:39:31 [facility] ROUTINE RECORD  Poor data quality, interpretation may be adversely affected  Sinus tachycardia with 1st degree A-V block with occasional Premature ventricular complexes  Left axis deviation  Septal infarct , age undetermined  Abnormal ECG      Lab Data:  CBC:   Recent Labs     02/13/23  1204 02/14/23  0514   WBC 10.2 7.1   HGB 10.7* 10.0*   HCT 32.7* 31.3*   .2* 103.1*    286     BMP:   Recent Labs     02/13/23  1204 02/14/23  0514    137   K 4.9 4.4    101   CO2 28 26   BUN 48* 40*   CREATININE 1.3 1.2     LIVER PROFILE:   Recent Labs     02/13/23  1204   AST 13*   ALT 9*   BILITOT 0.4   ALKPHOS 74     PT/INR: No results for input(s): PROTIME, INR in the last 72 hours. APTT: No results for input(s): APTT in the last 72 hours. BNP:  No results for input(s): BNP in the last 72 hours. Imaging/Procedures:     MCOT 9/9/22-10/8/22  No afib. PAC 3%, PVC 5%  2 NSVT     Echo 9/3/22  Summary  A bubble study was performed and shows evidence of a trivial amount of right to left shunting consistent with a tiny patent foramen  ovale or atrial septal defect. Irregular rhythm. Left ventricular systolic function is normal with ejection fraction estimated at 55 %. No regional wall motion abnormalities are noted. Normal left ventricular wall thickness. Diastolic dysfunction grade and filing pressure are indeterminate. The right ventricle is mildly enlarged. The right atrium is mildly dilated. The aortic root is moderately dilated. The ascending aorta is moderately dilated. Mild mitral regurgitation. Ultrasound screening for AAA 4/16/19:  Study somewhat limited secondary to patient body habitus and motion. Proximal aorta measures 3.2 cm in size. Cardiac cath 2/13/09:  Normal coronaries  LVEF 70%     RA-mean 12  RV-45/2, 10  PA-56/10 mean 35  PW-mean 30  LV-128/-9, 13  Ao-121/71     Echocardiogram 1/13/09:  Sub-optimal images due to body habitus.   Probable normal LVEF, sub-optimal images to evaluate valves, possible AR enlargement       Assessment/Plan:  Principal Problem:    Peripheral edema  Suggestive of acute on chronic diastolic CHF. Continue IV diuresis. Shortness of breath  CT chest 2/13/23 - severe coronary artery calcification  Last heart cath in '09 with normal cors      Diastolic heart failure  Echo 9/22 EF 55%      CAD  Plan: As evidenced by coronary calcification on CT. Myoview stress test if no contraindication. Coronary artery calcification on CT  Plan: Noted on CT. Consistent with CAD. Would benefit from further cardiac risk assessment with stress testing if pulmonary status not a contraindication. Hypertension  Controlled      Hyperlipidemia   Continue statin       COPD  6 L trey flow O2      CVA   Sept '22 MRI  PFO not felt to be cause  No evidence of afib, ILR in future        Thank you for allowing us to participate in the care of Elisabeth Nuñez. Further evaluation will be based upon the patient's clinical course and testing results. All questions and concerns were addressed to the patient/family. Alternatives to my treatment were discussed. The note was completed using EMR. Every effort was made to ensure accuracy; however, inadvertent computerized transcription errors may be present.     Mikhail Bustillos M.D.

## 2023-02-15 LAB
ANION GAP SERPL CALCULATED.3IONS-SCNC: 10 MMOL/L (ref 3–16)
BUN BLDV-MCNC: 53 MG/DL (ref 7–20)
CALCIUM SERPL-MCNC: 9.2 MG/DL (ref 8.3–10.6)
CHLORIDE BLD-SCNC: 102 MMOL/L (ref 99–110)
CO2: 24 MMOL/L (ref 21–32)
CREAT SERPL-MCNC: 1.9 MG/DL (ref 0.8–1.3)
GFR SERPL CREATININE-BSD FRML MDRD: 36 ML/MIN/{1.73_M2}
GLUCOSE BLD-MCNC: 110 MG/DL (ref 70–99)
HCT VFR BLD CALC: 30.6 % (ref 40.5–52.5)
HEMOGLOBIN: 9.9 G/DL (ref 13.5–17.5)
LV EF: 53 %
LV EF: 59 %
LVEF MODALITY: NORMAL
LVEF MODALITY: NORMAL
MCH RBC QN AUTO: 33.7 PG (ref 26–34)
MCHC RBC AUTO-ENTMCNC: 32.3 G/DL (ref 31–36)
MCV RBC AUTO: 104.5 FL (ref 80–100)
PDW BLD-RTO: 15.2 % (ref 12.4–15.4)
PLATELET # BLD: 303 K/UL (ref 135–450)
PMV BLD AUTO: 6.7 FL (ref 5–10.5)
POTASSIUM SERPL-SCNC: 4.8 MMOL/L (ref 3.5–5.1)
RBC # BLD: 2.93 M/UL (ref 4.2–5.9)
SODIUM BLD-SCNC: 136 MMOL/L (ref 136–145)
WBC # BLD: 7 K/UL (ref 4–11)

## 2023-02-15 PROCEDURE — 78452 HT MUSCLE IMAGE SPECT MULT: CPT

## 2023-02-15 PROCEDURE — 6360000004 HC RX CONTRAST MEDICATION: Performed by: INTERNAL MEDICINE

## 2023-02-15 PROCEDURE — 80048 BASIC METABOLIC PNL TOTAL CA: CPT

## 2023-02-15 PROCEDURE — 2060000000 HC ICU INTERMEDIATE R&B

## 2023-02-15 PROCEDURE — 6370000000 HC RX 637 (ALT 250 FOR IP): Performed by: FAMILY MEDICINE

## 2023-02-15 PROCEDURE — 94761 N-INVAS EAR/PLS OXIMETRY MLT: CPT

## 2023-02-15 PROCEDURE — A9502 TC99M TETROFOSMIN: HCPCS | Performed by: INTERNAL MEDICINE

## 2023-02-15 PROCEDURE — 2700000000 HC OXYGEN THERAPY PER DAY

## 2023-02-15 PROCEDURE — C8929 TTE W OR WO FOL WCON,DOPPLER: HCPCS

## 2023-02-15 PROCEDURE — 99232 SBSQ HOSP IP/OBS MODERATE 35: CPT | Performed by: INTERNAL MEDICINE

## 2023-02-15 PROCEDURE — 2580000003 HC RX 258: Performed by: FAMILY MEDICINE

## 2023-02-15 PROCEDURE — 94640 AIRWAY INHALATION TREATMENT: CPT

## 2023-02-15 PROCEDURE — 6360000002 HC RX W HCPCS: Performed by: INTERNAL MEDICINE

## 2023-02-15 PROCEDURE — 85027 COMPLETE CBC AUTOMATED: CPT

## 2023-02-15 PROCEDURE — 3430000000 HC RX DIAGNOSTIC RADIOPHARMACEUTICAL: Performed by: INTERNAL MEDICINE

## 2023-02-15 PROCEDURE — 6360000002 HC RX W HCPCS: Performed by: FAMILY MEDICINE

## 2023-02-15 PROCEDURE — 36415 COLL VENOUS BLD VENIPUNCTURE: CPT

## 2023-02-15 PROCEDURE — 93017 CV STRESS TEST TRACING ONLY: CPT | Performed by: INTERNAL MEDICINE

## 2023-02-15 PROCEDURE — 2580000003 HC RX 258: Performed by: NURSE PRACTITIONER

## 2023-02-15 PROCEDURE — 6370000000 HC RX 637 (ALT 250 FOR IP): Performed by: NURSE PRACTITIONER

## 2023-02-15 RX ORDER — SODIUM CHLORIDE 9 MG/ML
INJECTION, SOLUTION INTRAVENOUS CONTINUOUS
Status: DISCONTINUED | OUTPATIENT
Start: 2023-02-15 | End: 2023-02-16 | Stop reason: HOSPADM

## 2023-02-15 RX ADMIN — GABAPENTIN 300 MG: 300 CAPSULE ORAL at 09:27

## 2023-02-15 RX ADMIN — REGADENOSON 0.4 MG: 0.08 INJECTION, SOLUTION INTRAVENOUS at 13:29

## 2023-02-15 RX ADMIN — ASPIRIN 81 MG 81 MG: 81 TABLET ORAL at 09:27

## 2023-02-15 RX ADMIN — ENOXAPARIN SODIUM 40 MG: 100 INJECTION SUBCUTANEOUS at 20:51

## 2023-02-15 RX ADMIN — PREDNISONE 5 MG: 5 TABLET ORAL at 09:27

## 2023-02-15 RX ADMIN — TETROFOSMIN 10 MILLICURIE: 1.38 INJECTION, POWDER, LYOPHILIZED, FOR SOLUTION INTRAVENOUS at 12:46

## 2023-02-15 RX ADMIN — METOPROLOL SUCCINATE 12.5 MG: 25 TABLET, FILM COATED, EXTENDED RELEASE ORAL at 09:27

## 2023-02-15 RX ADMIN — ROSUVASTATIN CALCIUM 20 MG: 20 TABLET, FILM COATED ORAL at 20:52

## 2023-02-15 RX ADMIN — TRIAMCINOLONE ACETONIDE: 1 CREAM TOPICAL at 09:27

## 2023-02-15 RX ADMIN — TRIAMCINOLONE ACETONIDE: 1 CREAM TOPICAL at 20:53

## 2023-02-15 RX ADMIN — IPRATROPIUM BROMIDE AND ALBUTEROL SULFATE 1 AMPULE: 2.5; .5 SOLUTION RESPIRATORY (INHALATION) at 15:34

## 2023-02-15 RX ADMIN — GABAPENTIN 300 MG: 300 CAPSULE ORAL at 15:05

## 2023-02-15 RX ADMIN — TETROFOSMIN 30 MILLICURIE: 1.38 INJECTION, POWDER, LYOPHILIZED, FOR SOLUTION INTRAVENOUS at 13:39

## 2023-02-15 RX ADMIN — IPRATROPIUM BROMIDE AND ALBUTEROL SULFATE 1 AMPULE: 2.5; .5 SOLUTION RESPIRATORY (INHALATION) at 07:41

## 2023-02-15 RX ADMIN — GABAPENTIN 300 MG: 300 CAPSULE ORAL at 20:52

## 2023-02-15 RX ADMIN — PERFLUTREN 1.5 ML: 6.52 INJECTION, SUSPENSION INTRAVENOUS at 09:12

## 2023-02-15 RX ADMIN — Medication 10 ML: at 09:27

## 2023-02-15 RX ADMIN — ENOXAPARIN SODIUM 40 MG: 100 INJECTION SUBCUTANEOUS at 09:27

## 2023-02-15 RX ADMIN — Medication 2 PUFF: at 20:59

## 2023-02-15 RX ADMIN — IPRATROPIUM BROMIDE AND ALBUTEROL SULFATE 1 AMPULE: 2.5; .5 SOLUTION RESPIRATORY (INHALATION) at 20:59

## 2023-02-15 RX ADMIN — Medication 2 PUFF: at 07:41

## 2023-02-15 RX ADMIN — THIAMINE HCL TAB 100 MG 100 MG: 100 TAB at 09:27

## 2023-02-15 RX ADMIN — SODIUM CHLORIDE: 9 INJECTION, SOLUTION INTRAVENOUS at 15:14

## 2023-02-15 RX ADMIN — ALLOPURINOL 200 MG: 100 TABLET ORAL at 09:27

## 2023-02-15 ASSESSMENT — PAIN SCALES - GENERAL: PAINLEVEL_OUTOF10: 0

## 2023-02-15 NOTE — CARE COORDINATION
Discharge Planning Assessment  Readmission Score of 20%    RN/SW discharge planner met with patient/ (and family member) to discuss reason for admission, current living situation, and potential needs at the time of discharge    Demographics/Insurance verified Yes    Current type of dwelling: Home     Patient from ECF/SW confirmed with: n/a    Living arrangements: Alone     Level of function/Support: Independent     PCP: Maryann Johnson MD    Last Visit to PCP: 1/12/2023     DME: [place] [employer]  [employer]     Active with any community resources/agencies/skilled home care: The patient states to not be active with a [address] at this time. Home O2 from [facility]    Medication compliance issues: None reported. Financial issues that could impact healthcare: None reported. Tentative discharge plan: Home at discharge. Discussed with patient and/or family that on the day of discharge home tentative time of discharge will be between 10 AM and noon. Transportation at the time of discharge:  Family.      Electronically signed by FRANCA Coleman on 2/15/2023 at 3:54 PM

## 2023-02-15 NOTE — PROGRESS NOTES
Instructed on Lexiscan Stress Test Procedure including possible side effects/ adverse reactions. Patient verbalizes  understanding and denies having any questions . See [address] [place] [address] Cardiology

## 2023-02-15 NOTE — PROGRESS NOTES
BP is low at 0430 88/55. Denies any symptoms. Resting in the recliner with home cpap in place.  Decreased U.O.

## 2023-02-15 NOTE — PROGRESS NOTES
Instructed on Lexiscan Stress Test Procedure including possible side effects/ adverse reactions. Patient verbalizes  understanding and denies having any questions . See [address] [place] [address] Cardiology

## 2023-02-15 NOTE — PROGRESS NOTES
Assessment complete. Up in the chair. BLE edema noted. Feet elevated in recliner. Ointment applied to scabbed areas in lower extremities. Aware of plan for stress test in am and NPO except sips after MN. Snack provided. /63. Lasix and Toprol given with other HS medications.

## 2023-02-15 NOTE — PROGRESS NOTES
[address] Interventional Cardiology Daily Progress Note      Admit Date:  2/13/2023    Chief Complaint: Coronary calcification    Subjective:  Mr. Jose E Myers . He has no new complaints. Denies chest discomfort.     Objective:   BP (!) 94/52   Pulse 73   Temp 98.2 °F (36.8 °C) (Oral)   Resp 16   Ht 6' (1.829 m)   Wt (!) 338 lb 9.6 oz (153.6 kg)   SpO2 97%   BMI 45.92 kg/m²     Intake/Output Summary (Last 24 hours) at 2/15/2023 0919  Last data filed at 2/15/2023 3443  Gross per 24 hour   Intake 1580 ml   Output 2050 ml   Net -470 ml       Physical Exam:  General:  Awake, alert, oriented x 3, NAD  Skin:  Warm and dry  Neck:  JVD 8 cm  Chest:  normal air entry  Cardiovascular:  RRR S1S2, no S3,   Abdomen:  Soft, ND, NT, No HSM  Extremities:  1+ edema    Medications:    allopurinol  200 mg Oral Daily    mometasone-formoterol  2 puff Inhalation BID    metoprolol succinate  12.5 mg Oral Daily    triamcinolone   Topical BID    [Held by provider] furosemide  40 mg IntraVENous BID    aspirin  81 mg Oral Daily    gabapentin  300 mg Oral TID    [Held by provider] lisinopril  2.5 mg Oral Daily    rosuvastatin  20 mg Oral Nightly    predniSONE  5 mg Oral Daily    vitamin B-1  100 mg Oral Daily    sodium chloride flush  5-40 mL IntraVENous 2 times per day    enoxaparin  40 mg SubCUTAneous BID    ipratropium-albuterol  1 ampule Inhalation 4x daily      sodium chloride       perflutren lipid microspheres, albuterol sulfate HFA, sodium chloride flush, sodium chloride, ondansetron **OR** ondansetron, polyethylene glycol, acetaminophen **OR** acetaminophen    TELEMETRY (Personally reviewed by me): Sinus     Lab Data:  CBC:   Recent Labs     02/13/23  1204 02/14/23  0514 02/15/23  0453   WBC 10.2 7.1 7.0   HGB 10.7* 10.0* 9.9*   HCT 32.7* 31.3* 30.6*   .2* 103.1* 104.5*    286 303     BMP:   Recent Labs     02/13/23  1204 02/14/23  0514 02/15/23  0453    137 136   K 4.9 4.4 4.8    101 102   CO2 28 26 24   BUN 48* 40* 53*   CREATININE 1.3 1.2 1.9*     LIVER PROFILE:   Recent Labs     02/13/23  1204   AST 13*   ALT 9*   BILITOT 0.4   ALKPHOS 74     PT/INR: No results for input(s): PROTIME, INR in the last 72 hours. APTT: No results for input(s): APTT in the last 72 hours. BNP:  No results for input(s): BNP in the last 72 hours. Imaging/Procedures:     MCOT 9/9/22-10/8/22  No afib. PAC 3%, PVC 5%  2 NSVT     Echo 9/3/22:  Summary  A bubble study was performed and shows evidence of a trivial amount of right to left shunting consistent with a tiny patent foramen ovale or atrial septal defect. Irregular rhythm. Left ventricular systolic function is normal with ejection fraction estimated at 55 %. No regional wall motion abnormalities are noted. Normal left ventricular wall thickness. Diastolic dysfunction grade and filing pressure are indeterminate. The right ventricle is mildly enlarged. The right atrium is mildly dilated. The aortic root is moderately dilated. The ascending aorta is moderately dilated. Mild mitral regurgitation. Ultrasound screening for AAA 4/16/19:  Study somewhat limited secondary to patient body habitus and motion. Proximal aorta measures 3.2 cm in size. Cardiac cath 2/13/09:  Normal coronaries  LVEF 70%     RA-mean 12  RV-45/2, 10  PA-56/10 mean 35  PW-mean 30  LV-128/-9, 13  Ao-121/71     Echocardiogram 1/13/09:  Sub-optimal images due to body habitus. Probable normal LVEF, sub-optimal images to evaluate valves, possible AR enlargement      Assessment/Plan:  Principal Problem:    Peripheral edema  Suggestive of acute on chronic diastolic CHF. Creatinine markedly increased suggestive of acute kidney injury. Hold diuretics.        Shortness of breath  CT chest 2/13/23 - severe coronary artery calcification  Last heart cath in '09 with normal cors       Diastolic heart failure  Echo 9/22 EF 55%       CAD  Plan: As evidenced by coronary calcification on CT.  Myoview stress test if no contraindication. Coronary artery calcification on CT  Plan: Noted on CT. Consistent with CAD. Would benefit from further cardiac risk assessment with stress testing if pulmonary status not a contraindication. Hypertension  Controlled       Hyperlipidemia   Continue statin        COPD  6 L trey flow O2       CVA   Sept '22 MRI  PFO not felt to be cause  No evidence of afib, ILR in future      ABRAHAM  Plan: Likely secondary to diuresis. Hold diuretics. Trend creatinine. Would probably benefit from nephrology consultation. Myoview stress test and echo today.         Tre Samuel MD, MD 2/15/2023 9:19 AM

## 2023-02-15 NOTE — PROGRESS NOTES
[facility]    2/15/2023 7:57 AM        Name: Richie Herron . Admitted: 2023  Primary Care Provider: Luciano Bearden MD (Tel: [phone])      Subjective:  . Admitted at the advise of cardiology due to tachycardia with fluid retention in lower legs. Lives with his daughter,  wife  8 years ago.   Feels better since IV lasix , however now with ABRAHAM and creat of 1.9    I have stopped the ACE and Lasix this am  NO reports of pain   On 6 liters oxygen ,  which is his baseline due to advanced COPD     Reviewed interval ancillary notes    Current Medications  perflutren lipid microspheres (DEFINITY) injection 1.5 mL, ONCE PRN  allopurinol (ZYLOPRIM) tablet 200 mg, Daily  mometasone-formoterol (DULERA) 200-5 MCG/ACT inhaler 2 puff, BID  metoprolol succinate (TOPROL XL) extended release tablet 12.5 mg, Daily  triamcinolone (KENALOG) 0.1 % cream, BID  [Held by provider] furosemide (LASIX) injection 40 mg, BID  aspirin chewable tablet 81 mg, Daily  albuterol sulfate HFA (PROVENTIL;VENTOLIN;PROAIR) 108 (90 Base) MCG/ACT inhaler 1 puff, Q4H PRN  gabapentin (NEURONTIN) capsule 300 mg, TID  [Held by provider] lisinopril (PRINIVIL;ZESTRIL) tablet 2.5 mg, Daily  rosuvastatin (CRESTOR) tablet 20 mg, Nightly  predniSONE (DELTASONE) tablet 5 mg, Daily  thiamine mononitrate tablet 100 mg, Daily  sodium chloride flush 0.9 % injection 5-40 mL, 2 times per day  sodium chloride flush 0.9 % injection 5-40 mL, PRN  0.9 % sodium chloride infusion, PRN  enoxaparin (LOVENOX) injection 40 mg, BID  ondansetron (ZOFRAN-ODT) disintegrating tablet 4 mg, Q8H PRN   Or  ondansetron (ZOFRAN) injection 4 mg, Q6H PRN  polyethylene glycol (GLYCOLAX) packet 17 g, Daily PRN  acetaminophen (TYLENOL) tablet 650 mg, Q6H PRN   Or  acetaminophen (TYLENOL) suppository 650 mg, Q6H PRN  ipratropium-albuterol (DUONEB) nebulizer solution 1 ampule, 4x daily      Objective:  BP (!) 94/52   Pulse 73   Temp 98.2 °F (36.8 °C) (Oral)   Resp 16   Ht 6' (1.829 m)   Wt (!) 338 lb 9.6 oz (153.6 kg)   SpO2 97%   BMI 45.92 kg/m²     Intake/Output Summary (Last 24 hours) at 2/15/2023 0757  Last data filed at 2/15/2023 2096  Gross per 24 hour   Intake 1580 ml   Output 2050 ml   Net -470 ml        Wt Readings from Last 3 Encounters:   02/15/23 (!) 338 lb 9.6 oz (153.6 kg)   02/13/23 (!) 346 lb (156.9 kg)   01/18/23 (!) 346 lb (156.9 kg)       General appearance:  Appears chronically ill. He is alert and pleasant. Obese   Eyes: Sclera clear. Pupils equal.  ENT: Moist oral mucosa. Trachea midline, no adenopathy. Cardiovascular: Regular rhythm, normal S1, S2. No murmur. + edema in lower extremities  Respiratory: Not using accessory muscles. Good inspiratory effort. Clear to auscultation bilaterally, no wheeze or crackles. GI: Abdomen soft, no tenderness, not distended, normal bowel sounds  Musculoskeletal: No cyanosis in digits, neck supple  Neurology: CN 2-12 grossly intact. No speech or motor deficits  Psych: Normal affect. Alert and oriented in time, place and person  Skin: Warm, dry, normal turgor    Labs and Tests:  CBC:   Recent Labs     02/13/23  1204 02/14/23  0514 02/15/23  0453   WBC 10.2 7.1 7.0   HGB 10.7* 10.0* 9.9*    286 303       BMP:    Recent Labs     02/13/23  1204 02/14/23  0514 02/15/23  0453    137 136   K 4.9 4.4 4.8    101 102   CO2 28 26 24   BUN 48* 40* 53*   CREATININE 1.3 1.2 1.9*   GLUCOSE 122* 130* 110*       Hepatic:   Recent Labs     02/13/23  1204   AST 13*   ALT 9*   BILITOT 0.4   ALKPHOS 74       CTPA:    Severe coronary artery calcification. No central pulmonary embolus   identified. 9 mm noncalcified pulmonary nodule right lower lobe, not clearly seen in   2019. Arielle Roberts Consider pulmonary follow-up, and either short-term follow-up   noncontrast chest CT or PET-CT.        Decreased medial to lateral dimension of the trachea suggesting saber sheath   trachea       Nonobstructing renal calculi         Problem List  Principal Problem:    Peripheral edema  Active Problems:    Tachycardia  Resolved Problems:    * No resolved hospital problems. *       Assessment & Plan:   Tachycardia; now resolved,  NO AF,  rate now 70- 80 on low dose BB . CTPA negative for PE,  EP has evaluated, will need loop recorder prior to d/c   Lower leg edema: lasix d/c in light of abraham,  weight is up 4 lbs today, previous echo from sept showed EF 55%  ABRAHAM:  creat up to 1.9 ,  stop ACE and lasix ,  may also be due to contrast.  Will give IV hydration at 50 cc per hour. recheck in am   Chronic hypoxic respiratory failure due to COPD:  on his baseline oxygen requirement of 6 liters. Not in exacerbation. He is on chronic prednisone at 5 mg daily    Coronary artery calcifications:  cardiology following, anticipate stress test today  Right Pulmonary nodule at 9 mm.   Needs follow up CT scan in 1 month with pulmonary ,    Drinks 3 beers per day,  may have beer at lunch and dinner       Diet: Diet NPO Exceptions are: Sips of Clear Liquids  Code:DNR-CC  DVT PPX      Arcelia Ramírez, APRN - CNP   2/15/2023 7:57 AM

## 2023-02-16 VITALS
RESPIRATION RATE: 18 BRPM | DIASTOLIC BLOOD PRESSURE: 74 MMHG | HEART RATE: 75 BPM | TEMPERATURE: 97.8 F | SYSTOLIC BLOOD PRESSURE: 126 MMHG | HEIGHT: 72 IN | BODY MASS INDEX: 42.66 KG/M2 | OXYGEN SATURATION: 97 % | WEIGHT: 315 LBS

## 2023-02-16 LAB
ANION GAP SERPL CALCULATED.3IONS-SCNC: 8 MMOL/L (ref 3–16)
BUN BLDV-MCNC: 48 MG/DL (ref 7–20)
CALCIUM SERPL-MCNC: 8.9 MG/DL (ref 8.3–10.6)
CHLORIDE BLD-SCNC: 102 MMOL/L (ref 99–110)
CO2: 27 MMOL/L (ref 21–32)
CREAT SERPL-MCNC: 1.4 MG/DL (ref 0.8–1.3)
GFR SERPL CREATININE-BSD FRML MDRD: 52 ML/MIN/{1.73_M2}
GLUCOSE BLD-MCNC: 106 MG/DL (ref 70–99)
HCT VFR BLD CALC: 30.7 % (ref 40.5–52.5)
HEMOGLOBIN: 10 G/DL (ref 13.5–17.5)
MCH RBC QN AUTO: 33.3 PG (ref 26–34)
MCHC RBC AUTO-ENTMCNC: 32.5 G/DL (ref 31–36)
MCV RBC AUTO: 102.6 FL (ref 80–100)
PDW BLD-RTO: 14.6 % (ref 12.4–15.4)
PLATELET # BLD: 313 K/UL (ref 135–450)
PMV BLD AUTO: 6.5 FL (ref 5–10.5)
POTASSIUM SERPL-SCNC: 4.7 MMOL/L (ref 3.5–5.1)
RBC # BLD: 2.99 M/UL (ref 4.2–5.9)
SODIUM BLD-SCNC: 137 MMOL/L (ref 136–145)
WBC # BLD: 5.2 K/UL (ref 4–11)

## 2023-02-16 PROCEDURE — 2700000000 HC OXYGEN THERAPY PER DAY

## 2023-02-16 PROCEDURE — 80048 BASIC METABOLIC PNL TOTAL CA: CPT

## 2023-02-16 PROCEDURE — 94761 N-INVAS EAR/PLS OXIMETRY MLT: CPT

## 2023-02-16 PROCEDURE — 6370000000 HC RX 637 (ALT 250 FOR IP): Performed by: FAMILY MEDICINE

## 2023-02-16 PROCEDURE — 99232 SBSQ HOSP IP/OBS MODERATE 35: CPT | Performed by: INTERNAL MEDICINE

## 2023-02-16 PROCEDURE — 0JH602Z INSERTION OF MONITORING DEVICE INTO CHEST SUBCUTANEOUS TISSUE AND FASCIA, OPEN APPROACH: ICD-10-PCS | Performed by: INTERNAL MEDICINE

## 2023-02-16 PROCEDURE — 36415 COLL VENOUS BLD VENIPUNCTURE: CPT

## 2023-02-16 PROCEDURE — 85027 COMPLETE CBC AUTOMATED: CPT

## 2023-02-16 PROCEDURE — 33285 INSJ SUBQ CAR RHYTHM MNTR: CPT

## 2023-02-16 PROCEDURE — 6360000002 HC RX W HCPCS: Performed by: FAMILY MEDICINE

## 2023-02-16 PROCEDURE — 2580000003 HC RX 258: Performed by: FAMILY MEDICINE

## 2023-02-16 PROCEDURE — 94640 AIRWAY INHALATION TREATMENT: CPT

## 2023-02-16 PROCEDURE — 33285 INSJ SUBQ CAR RHYTHM MNTR: CPT | Performed by: INTERNAL MEDICINE

## 2023-02-16 PROCEDURE — 6370000000 HC RX 637 (ALT 250 FOR IP): Performed by: NURSE PRACTITIONER

## 2023-02-16 PROCEDURE — C1764 EVENT RECORDER, CARDIAC: HCPCS

## 2023-02-16 RX ORDER — BUDESONIDE, GLYCOPYRROLATE, AND FORMOTEROL FUMARATE 160; 9; 4.8 UG/1; UG/1; UG/1
2 AEROSOL, METERED RESPIRATORY (INHALATION) 2 TIMES DAILY
Qty: 3 EACH | Refills: 3 | Status: SHIPPED | OUTPATIENT
Start: 2023-02-16

## 2023-02-16 RX ADMIN — PREDNISONE 5 MG: 5 TABLET ORAL at 10:31

## 2023-02-16 RX ADMIN — Medication 10 ML: at 10:33

## 2023-02-16 RX ADMIN — GABAPENTIN 300 MG: 300 CAPSULE ORAL at 10:30

## 2023-02-16 RX ADMIN — GABAPENTIN 300 MG: 300 CAPSULE ORAL at 13:37

## 2023-02-16 RX ADMIN — ALLOPURINOL 200 MG: 100 TABLET ORAL at 10:30

## 2023-02-16 RX ADMIN — TRIAMCINOLONE ACETONIDE: 1 CREAM TOPICAL at 10:38

## 2023-02-16 RX ADMIN — ASPIRIN 81 MG 81 MG: 81 TABLET ORAL at 10:30

## 2023-02-16 RX ADMIN — ENOXAPARIN SODIUM 40 MG: 100 INJECTION SUBCUTANEOUS at 10:32

## 2023-02-16 RX ADMIN — THIAMINE HCL TAB 100 MG 100 MG: 100 TAB at 10:32

## 2023-02-16 RX ADMIN — METOPROLOL SUCCINATE 12.5 MG: 25 TABLET, FILM COATED, EXTENDED RELEASE ORAL at 10:31

## 2023-02-16 RX ADMIN — IPRATROPIUM BROMIDE AND ALBUTEROL SULFATE 1 AMPULE: 2.5; .5 SOLUTION RESPIRATORY (INHALATION) at 12:22

## 2023-02-16 NOTE — DISCHARGE SUMMARY
[facility]    [facility] Demographics    Patient. Ion Armendariz  Date of Birth. 1945  MRN. [id number]     Primary care provider. Ellery Duverney, MD  (Tel: [phone])    Admit date: 2/13/2023    Discharge date 2/16/2023  Note Date: 2/16/2023     Reason for Hospitalization. Chief Complaint   Patient presents with    Tachycardia     Patient sent in by Cardiologist. States has fluid retention in leg, weakness, shortness of breath. States HR baseline \"70's and 80's\". Has decompensated heart failure. Is on 6L NC at baseline. Significant Findings. Principal Problem:    Peripheral edema  Active Problems:    Tachycardia  Resolved Problems:    * No resolved hospital problems. *       Problems and results from this hospitalization that need follow up. Creat 1.4 on day of d/c  Hold ACE at d/c,  follow up with Dr Lulu Valdez in 2 weeks  Follow up with cardiology   Get follow up non contrast CT chest in 6 weeks. Follow up with pulmonary     Significant test results and incidental findings. Stress test:    Summary    -Myocardial perfusion is abnormal.    -There is a moderate size, moderate intensity, fixed inferior lateral defect    c/w scar.    -No significant ischemia is seen.    -Overall LV function is normal with EF=59%. There is mild inferior    hypokinesis.    -Intermediate risk     CTPA:     Severe coronary artery calcification. No central pulmonary embolus   identified. 9 mm noncalcified pulmonary nodule right lower lobe, not clearly seen in   2019. Katina Worley Consider pulmonary follow-up, and either short-term follow-up   noncontrast chest CT or PET-CT. Decreased medial to lateral dimension of the trachea suggesting saber sheath   trachea       Nonobstructing renal calculi      ECHO:   Summary    Technically difficult study.  Definity was used to assist with endocardial border delineation. Mild concentric left ventricular hypertrophy. Mildly dilated left    ventricular cavity size. Borderline left ventricular systolic function with    an estimated ejection fraction of 50-55%. Abnormal septal motion. Indeterminate diastolic function. The right ventricle is severely enlarged. The right atrium is not well visualized but appears enlarged. The aortic root is mildly dilated. A bubble study was performed and fails to show evidence of shunting. Invasive procedures and treatments. Loop recorder inserted on 2/16/23      [facility]. Admitted at the advise of cardiology due to tachycardia with fluid retention in lower legs. He was followed by cardiology and EP. He was treated for the following: Tachycardia; now resolved,  NO AF,  rate now 70- 80   CTPA negative for PE,  EP has evaluated,had  loop recorder inserted on day of d/c. Lower leg edema: he did receive IV lasix during his stay and had mild diuresis. ECHO noted above   ABRAHAM:  creat peak at 1.9 ,  following contrast from CT and IV lasix. Was 1.4 on day of d/c. Oral lasix resumed, however ACE on hold. Will follow up in 2 weeks with Alicia. Chronic hypoxic respiratory failure due to COPD/ mesotheilioma:   on his baseline oxygen requirement of 6 liters. Not in exacerbation. He is on chronic prednisone at 5 mg daily    Coronary artery calcifications:  cardiology followed. Stress test resilts noted above. Right Pulmonary nodule at 9 mm. Needs follow up CT scan in 1 month with pulmonary ,    Drinks 3 beers per day,  may have beer at lunch and dinner ,no signs of withdrawal.     Consults. IP CONSULT TO CARDIOLOGY  IP CONSULT TO CARDIOLOGY  IP CONSULT TO HEART FAILURE NURSE/COORDINATOR    Physical examination on discharge day.    /62   Pulse 70   Temp 97.7 °F (36.5 °C) (Oral)   Resp 18   Ht 6' (1.829 m)   Wt (!) 337 lb (152.9 kg)   SpO2 98%   BMI 45.71 kg/m² General appearance. Alert. Looks comfortable, looks chronically ill. Alert and pleasant , obese   HEENT. Sclera clear. Moist mucus membranes. Cardiovascular. Regular rate and rhythm, normal S1, S2. No murmur. Respiratory. Not using accessory muscles. Clear to auscultation bilaterally, no wheeze. Gastrointestinal. Abdomen soft, non-tender, not distended, normal bowel sounds  Neurology. Facial symmetry. No speech deficits. Moving all extremities equally. Extremities. + edema in lower extremities. Skin. Warm, dry, normal turgor    Condition at time of discharge stable     Medication instructions provided to patient at discharge. Medication List        START taking these medications      Breztri Aerosphere 160-9-4.8 MCG/ACT Aero  Generic drug: Budeson-Glycopyrrol-Formoterol  Inhale 2 puffs into the lungs in the morning and at bedtime            CONTINUE taking these medications      Accu-Chek Lulu Plus strip  Generic drug: blood glucose test strips  As needed. Accu-Chek Lulu Plus w/Device Kit  1 each by Does not apply route daily     Accu-Chek Softclix Lancets Misc  USE ONE LANCET TO TEST DAILY     acetaminophen 325 MG tablet  Commonly known as: TYLENOL  Notes to patient: Max dose 4000 mg per 24 hours from all sources     albuterol sulfate  (90 Base) MCG/ACT inhaler  Commonly known as: PROVENTIL;VENTOLIN;PROAIR  INHALE TWO PUFFS BY MOUTH EVERY 6 HOURS AS NEEDED     allopurinol 100 MG tablet  Commonly known as: ZYLOPRIM  TAKE TWO TABLETS BY MOUTH DAILY     aspirin 81 MG chewable tablet  Take 1 tablet by mouth daily     fluticasone furoate-vilanterol 200-25 MCG/ACT Aepb inhaler  Commonly known as: Breo Ellipta  Inhale 1 puff into the lungs daily     furosemide 80 MG tablet  Commonly known as: LASIX  Take 1 tablet by mouth daily     gabapentin 300 MG capsule  Commonly known as: NEURONTIN  Take 1 capsule by mouth 3 times daily for 90 days.      ipratropium-albuterol 0.5-2.5 (3) MG/3ML Soln nebulizer solution  Commonly known as: DUONEB  INHALE THREE MILLILITERS VIA NEBULIZATION BY MOUTH FOUR TIMES A DAY     OXYGEN     predniSONE 5 MG tablet  Commonly known as: DELTASONE  TAKE ONE TABLET BY MOUTH DAILY     rosuvastatin 20 MG tablet  Commonly known as: CRESTOR  Take 1 tablet by mouth nightly     Trelegy Ellipta 200-62.5-25 MCG/ACT Aepb inhaler  Generic drug: fluticasone-umeclidin-vilant  Inhale 1 puff into the lungs daily     triamcinolone 0.1 % ointment  Commonly known as: KENALOG  Apply to legs twice daily. vitamin B-1 100 MG tablet  Commonly known as: THIAMINE  Take 1 tablet by mouth daily            STOP taking these medications      celecoxib 100 MG capsule  Commonly known as: CELEBREX     diclofenac 75 MG EC tablet  Commonly known as: VOLTAREN     lisinopril 2.5 MG tablet  Commonly known as: PRINIVIL;ZESTRIL     metoprolol succinate 25 MG extended release tablet  Commonly known as: TOPROL XL  PT was not taking this medication                Where to Get Your Medications        These medications were sent to [address] [phone] [facility] [phone] [address] [id number]      Phone: [phone]   Donnie Jimenez Aerosphere 160-9-4.8 MCG/ACT Aero         Discharge recommendations given to patient. Follow Up. in 1 week   Disposition. home  Activity. activity as tolerated  Diet: ADULT DIET; Regular; No Added Salt (3-4 gm)  ADULT ORAL NUTRITION SUPPLEMENT; Lunch, Dinner; Other Oral Supplement; Beer      Spent  35 minutes in discharge process.     Signed:  THU Riley CNP     2/16/2023 8:09 AM

## 2023-02-16 NOTE — CONSULTS
MD Azul Robertson MD Booker Hug, MD                  Office: [phone]                      Fax: [phone]             [facility]                   NEPHROLOGY INITIAL CONSULT NOTE:     PATIENT NAME: Marian Caldera  : 1945  MRN: [id number]  REASON FOR CONSULT: For evaluation and management of Acute Kidney Injury. (My recommendations will be communicated by way of shared medical record.)      RECOMMENDATIONS: *   -Discussed with patient about avoiding NSAIDs as diclofenac listed in home med rec  -Home Lasix, - 80mg QD -> resume 40 mg BID   -Home Lisinopril - hold till next fup outpt, as at risk for worsening renal fx w/ CTPA yesterday    -IV fluids given - stop now   -Chronic hypoxic respiratory failure, 6 L oxygen at baseline with advanced COPD  - at higher risk for decompensation, needing closer monitoring. D/C plan from renal stand point: ok for dc today   : f/u w/ me at [address] in 2 weeks after d/c. (I will arrange.)     Discussed with patient, primary team,      IMPRESSION:       Admitted on:  2023 11:27 AM   For:  Peripheral edema [R60.9]  Tachycardia [R00.0]  Dyspnea and respiratory abnormalities [R06.00, R06.89]  Localized swelling of both lower legs [R22.43]       ABRAHAM (on non-proteinuric CKD: stage-3A):   - BL Scr-lowest recently 1.2-1.3,-> at baseline on admission -> during hospitalization peaked at 1.9, within 24 hours,  2- = ABRAHAM.    - Etiology of ARBAHAM -ATN in the setting of CTPA, with CI-ABRAHAM +  diclofenac, Lasix,  - other differentials: Unlikely other GN / TI / TMA process  - UA : results reviewed: From 2023, showing very high specific gravity, large blood, but RBC is 234, no proteinuria,   -Hematuria could be due to patient on statin, but risk versus benefits, with coronary disease, with close monitoring of renal function,  - Renal imaging:   None recently:       Associated problems:   - Volume status: hypervolemic  Acute on chronic diastolic heart failure,  Weight 338 lbs to 337 lbs       : HTN : no need for tight control   : Na: WNL  - Azotemia: pre-renal  - Electrolytes: K: WNL  - Acid-Base: WNL  - Anemia: Mild, likely of chronic disease,      Other major problems: Management per primary and other consulting teams. Chronic hypoxic respiratory failure, 6 L oxygen at baseline with advanced COPD    History of coronary artery disease,    Morbid Obesity BMI mid 45s      Hospital Problems             Last Modified POA    * (Principal) Peripheral edema 2/13/2023 Yes    Tachycardia 2/14/2023 Yes         : other supportive care :   - Check daily renal function panel with electrolytes-phosphorus  - Strict monitoring of I/Os, daily weight  - Renal feeds/diet  - Current medications reviewed. - Nephrotoxic medications have been discontinued. - Dose adjusted and appropriate. - Dose meds for eGFR <15 mL/min/1.73m2 during ABRAHAM    - Avoid heavy opioids due to renal failure - may use very low dose dilaudid / fentanyl with close monitoring of CNS and respiratory depression. Please refer to the orders. High Complexity. Multiple complex problems. Discussed with patient and treatment team-    Time spent > 30 (~35) minutes. Thank you for allowing me to participate in this patient's care. Please do not hesitate to contact me anytime. We will follow along with you. Collins Tuttle MD,  [facility] [address] [place] [address] [place]: [phone] or [address]  Fax: [phone]        =======================================================================================   =======================================================================================      CHIEF COMPLAINT:   Chief Complaint   Patient presents with    Tachycardia     Patient sent in by Cardiologist. States has fluid retention in leg, weakness, shortness of breath. States HR baseline \"70's and 80's\". Has decompensated heart failure.  Is on 6L NC at baseline. History Obtained From:  patient + treatment team + Electronic Medical Records    HPI: Mr. Дмитрий De Guzman is a 78 y.o. male with significant past medical history as below:   Past Medical History:   Diagnosis Date    Anemia     COPD (chronic obstructive pulmonary disease) ([facility]     Diabetes mellitus ([facility]     Edema     GI (gastrointestinal bleed)     Gout     Hypertension     Morbid obesity ([facility]     Neuropathy     Obstructive sleep apnea     uses CPAP    Peripheral vascular disease ([facility]       Presents with Tachycardia (Patient sent in by Cardiologist. States has fluid retention in leg, weakness, shortness of breath. States HR baseline \"70's and 80's\". Has decompensated heart failure. Is on 6L NC at baseline. )    Admitted with Peripheral edema [R60.9]  Tachycardia [R00.0]  Dyspnea and respiratory abnormalities [R06.00, R06.89]  Localized swelling of both lower legs [R22.43]   Found to have elevated creatinine, so we are called for that. No current active complaints. Patient denied chest pain / dizziness/lightheadedness/syncope/ SOB    Said chronic b/l leg edema - said better now. On 6L NC - as at baseline  *   Regarding: ABRAHAM on CKD   Duration: acute on chronic   Location: kidneys  Severity: Severe    Timing: continous  Context (ex: related to condition):   , refer to my assessment / impression. Modifying factors (ex: medications, interventions):   , refer to my plan / recommendation. Associated signs & symptoms (ex: edema, SOB): As mentioned above in CC and HPI    Chart reviewed, patient's pertinent electronic medical records , Medical history and medication reviewed as needed for my evaulation. Past medical, Surgical, Social, Family medical history reviewed by me.      PAST MEDICAL HISTORY:   Past Medical History:   Diagnosis Date    Anemia     COPD (chronic obstructive pulmonary disease) (HCC)     Diabetes mellitus (HCC)     Edema     GI (gastrointestinal bleed)     Gout     Hypertension     Morbid obesity (HCC)     Neuropathy     Obstructive sleep apnea     uses CPAP    Peripheral vascular disease [facility]      PAST SURGICAL HISTORY:   Past Surgical History:   Procedure Laterality Date    APPENDECTOMY      COLONOSCOPY N/A 2019    COLONOSCOPY DIAGNOSTIC performed by Adriane Chapman MD at [facility]     FAMILY HISTORY:   Family History   Problem Relation Age of Onset    High Blood Pressure Mother     High Cholesterol Mother     Heart Disease Mother     Stroke Mother     Diabetes Mother     Depression Mother     Mental Illness Mother     Cancer Father      SOCIAL HISTORY:   Social History     Socioeconomic History    Marital status:      Spouse name:     Number of children: 2   Occupational History    Occupation: retired   Tobacco Use    Smoking status: Former     Packs/day: 1.00     Years: 50.00     Pack years: 50.00     Types: Cigarettes     Quit date: 2012     Years since quittin.0    Smokeless tobacco: Never    Tobacco comments:     H.O. smoking 1 p.p.d. Quit 9 months ago    Vaping Use    Vaping Use: Never used   Substance and Sexual Activity    Alcohol use: Yes     Alcohol/week: 14.0 standard drinks     Types: 14 Shots of liquor per week     Comment: 3 drinks a evening    Drug use: Never    Sexual activity: Not Currently     Social Determinants of Health     Financial Resource Strain: Low Risk     Difficulty of Paying Living Expenses: Not very hard   Food Insecurity: No Food Insecurity    Worried About Running Out of Food in the Last Year: Never true    Ran Out of Food in the Last Year: Never true   Transportation Needs: No Transportation Needs    Lack of Transportation (Medical): No    Lack of Transportation (Non-Medical):  No   Physical Activity: Unknown    Days of Exercise per Week: 0 days   Stress: No Stress Concern Present    Feeling of Stress : Not at all   Housing Stability: Low Risk     Unable to Pay for Housing in the Last Year: No    Number of Places Lived in the Last Year: 1    Unstable Housing in the Last Year: No          MEDICATIONS: reviewed by me. Medications Prior to Admission:  No current facility-administered medications on file prior to encounter. Current Outpatient Medications on File Prior to Encounter   Medication Sig Dispense Refill    albuterol sulfate HFA (PROVENTIL;VENTOLIN;PROAIR) 108 (90 Base) MCG/ACT inhaler INHALE TWO PUFFS BY MOUTH EVERY 6 HOURS AS NEEDED 54 g 3    ipratropium-albuterol (DUONEB) 0.5-2.5 (3) MG/3ML SOLN nebulizer solution INHALE THREE MILLILITERS VIA NEBULIZATION BY MOUTH FOUR TIMES A DAY 1080 mL 3    fluticasone furoate-vilanterol (BREO ELLIPTA) 200-25 MCG/ACT AEPB inhaler Inhale 1 puff into the lungs daily 84 each 3    furosemide (LASIX) 80 MG tablet Take 1 tablet by mouth daily 90 tablet 3    aspirin 81 MG chewable tablet Take 1 tablet by mouth daily 90 tablet 3    lisinopril (PRINIVIL;ZESTRIL) 2.5 MG tablet TAKE ONE TABLET BY MOUTH DAILY 90 tablet 3    allopurinol (ZYLOPRIM) 100 MG tablet TAKE TWO TABLETS BY MOUTH DAILY 180 tablet 3    gabapentin (NEURONTIN) 300 MG capsule Take 1 capsule by mouth 3 times daily for 90 days. 270 capsule 3    blood glucose test strips (ACCU-CHEK CLARISSA PLUS) strip As needed. 100 strip 1    fluticasone-umeclidin-vilant (TRELEGY ELLIPTA) 200-62.5-25 MCG/ACT AEPB inhaler Inhale 1 puff into the lungs daily 3 each 0    OXYGEN Inhale into the lungs 6L O2      predniSONE (DELTASONE) 5 MG tablet TAKE ONE TABLET BY MOUTH DAILY 90 tablet 1    triamcinolone (KENALOG) 0.1 % ointment Apply to legs twice daily.  80 g 0    rosuvastatin (CRESTOR) 20 MG tablet Take 1 tablet by mouth nightly 30 tablet 3    thiamine mononitrate (THIAMINE) 100 MG tablet Take 1 tablet by mouth daily 30 tablet 3    [DISCONTINUED] metoprolol succinate (TOPROL XL) 25 MG extended release tablet Take 0.5 tablets by mouth daily 30 tablet 1    [DISCONTINUED] celecoxib (CELEBREX) 100 MG capsule Take 1 capsule by mouth 2 times daily 60 capsule 5    Accu-Chek Softclix Lancets MISC USE ONE LANCET TO TEST DAILY 100 each 2    [DISCONTINUED] diclofenac (VOLTAREN) 75 MG EC tablet TAKE ONE TABLET BY MOUTH TWICE A  tablet 0    Blood Glucose Monitoring Suppl (ACCU-CHEK CLARISSA PLUS) w/Device KIT 1 each by Does not apply route daily 1 kit 0    acetaminophen (TYLENOL) 325 MG tablet Take 650 mg by mouth every 6 hours as needed for Pain           Current Facility-Administered Medications:     0.9 % sodium chloride infusion, , IntraVENous, Continuous, THU Simpson - CNP, Last Rate: 50 mL/hr at 02/15/23 1514, New Bag at 02/15/23 1514    perflutren lipid microspheres (DEFINITY) injection 1.5 mL, 1.5 mL, IntraVENous, ONCE PRN, Bhavin Richter MD    allopurinol (ZYLOPRIM) tablet 200 mg, 200 mg, Oral, Daily, Anjali Couch, APRN - CNP, 200 mg at 02/16/23 1030    mometasone-formoterol (DULERA) 200-5 MCG/ACT inhaler 2 puff, 2 puff, Inhalation, BID, Anjali Maynardistgaleer, APRN - CNP, 2 puff at 02/15/23 2059    metoprolol succinate (TOPROL XL) extended release tablet 12.5 mg, 12.5 mg, Oral, Daily, Anjali Cannonhristopher, APRN - CNP, 12.5 mg at 02/16/23 1031    triamcinolone (KENALOG) 0.1 % cream, , Topical, BID, Anjali Maynardistopher, APRN - CNP, Given at 02/16/23 1038    [Held by provider] furosemide (LASIX) injection 40 mg, 40 mg, IntraVENous, BID, Anjali Maynardistopher, APRN - CNP, 40 mg at 02/14/23 2157    aspirin chewable tablet 81 mg, 81 mg, Oral, Daily, Kacey Woods MD, 81 mg at 02/16/23 1030    albuterol sulfate HFA (PROVENTIL;VENTOLIN;PROAIR) 108 (90 Base) MCG/ACT inhaler 1 puff, 1 puff, Inhalation, Q4H PRN, Keith Hendrix MD    gabapentin (NEURONTIN) capsule 300 mg, 300 mg, Oral, TID, Kacey Woods MD, 300 mg at 02/16/23 1030    [Held by provider] lisinopril (PRINIVIL;ZESTRIL) tablet 2.5 mg, 2.5 mg, Oral, Daily, Kacey Woods MD, 2.5 mg at 02/14/23 1001    rosuvastatin (CRESTOR) tablet 20 mg, 20 mg, Oral, Nightly, Melissa Esquivel MD, 20 mg at 02/15/23 2052    predniSONE (DELTASONE) tablet 5 mg, 5 mg, Oral, Daily, Melissa Esquivel MD, 5 mg at 02/16/23 1031    thiamine mononitrate tablet 100 mg, 100 mg, Oral, Daily, Melissa Esquivel MD, 100 mg at 02/16/23 1032    sodium chloride flush 0.9 % injection 5-40 mL, 5-40 mL, IntraVENous, 2 times per day, Melissa Esquivel MD, 10 mL at 02/15/23 0927    sodium chloride flush 0.9 % injection 5-40 mL, 5-40 mL, IntraVENous, PRN, Melissa Esquivel MD    0.9 % sodium chloride infusion, , IntraVENous, PRN, Melissa Esquivel MD    enoxaparin (LOVENOX) injection 40 mg, 40 mg, SubCUTAneous, BID, Melissa Esquivel MD, 40 mg at 02/16/23 1032    ondansetron (ZOFRAN-ODT) disintegrating tablet 4 mg, 4 mg, Oral, Q8H PRN **OR** ondansetron (ZOFRAN) injection 4 mg, 4 mg, IntraVENous, Q6H PRN, Melissa Esquivel MD    polyethylene glycol (GLYCOLAX) packet 17 g, 17 g, Oral, Daily PRN, Melissa Esquivel MD    acetaminophen (TYLENOL) tablet 650 mg, 650 mg, Oral, Q6H PRN **OR** acetaminophen (TYLENOL) suppository 650 mg, 650 mg, Rectal, Q6H PRN, Melissa Esquivel MD    ipratropium-albuterol (DUONEB) nebulizer solution 1 ampule, 1 ampule, Inhalation, 4x daily, Melissa Esquivel MD, 1 ampule at 02/15/23 2059      Allergies reviewed by me: Patient has no known allergies. REVIEW OF SYSTEMS:  As mentioned in chief complaint and HPI , Subjective   All other 10-point review of systems: negative.            =======================================================================================     PHYSICAL EXAM:  Recent vital signs and recent I/Os reviewed by me.      Wt Readings from Last 3 Encounters:   02/16/23 (!) 337 lb (152.9 kg)   02/13/23 (!) 346 lb (156.9 kg)   01/18/23 (!) 346 lb (156.9 kg)     BP Readings from Last 3 Encounters:   02/16/23 126/74   02/13/23 (!) 104/58   01/18/23 120/64     Patient Vitals for the past 24 hrs:   BP Temp Temp src Pulse Resp SpO2 Weight   02/16/23 1153 126/74 97.8 °F (36.6 °C) Oral 75 18 97 % --   02/16/23 1031 119/66 -- -- 93 -- -- --   02/16/23 1015 119/66 -- -- 93 -- -- --   02/16/23 0749 -- -- -- -- -- -- (!) 337 lb (152.9 kg)   02/16/23 0715 107/62 97.7 °F (36.5 °C) Oral 70 18 98 % --   02/16/23 0600 -- -- -- -- -- -- (!) 337 lb (152.9 kg)   02/16/23 0443 106/64 97.7 °F (36.5 °C) Oral 74 16 98 % --   02/16/23 0045 98/61 97.9 °F (36.6 °C) Oral 73 18 100 % --   02/15/23 2059 -- -- -- -- -- 97 % --   02/15/23 1954 100/61 97.8 °F (36.6 °C) Oral 68 18 99 % --   02/15/23 1607 (!) 112/59 97.8 °F (36.6 °C) Oral 74 16 100 % --   02/15/23 1533 -- -- -- -- 16 97 % --   02/15/23 1504 121/65 97.6 °F (36.4 °C) Oral 73 18 98 % --       Intake/Output Summary (Last 24 hours) at 2/16/2023 1212  Last data filed at 2/16/2023 1051  Gross per 24 hour   Intake 740 ml   Output 1850 ml   Net -1110 ml       Physical exam:  General: Awake, Alert,  obese   HENT: Atraumatic, normocephalic   Eyes: Normal conjunctiva, Non-incteral sclera. Neck: Supple, JVD not visible. CVS:  Heart sounds are normal. No loud murmur. RS: Normal respiratory effort, Breat sound: diminished at bases. Abd: Soft , bowel sounds are normal, no distension and no tenderness . Skin: No rash , some bruises,   CNS: Awake Oriented , No focal.   Extremities/MSK: ++ Edema, no cyanosis.         =======================================================================================     DATA:  Diagnostic tests reviewed by me for today's visit:   (AS NEEDED FOR MY EVALUATION AND MANAGEMENT).        Recent Labs     02/14/23  0514 02/15/23  0453 02/16/23  0513   WBC 7.1 7.0 5.2   HCT 31.3* 30.6* 30.7*    303 313     Iron Saturation:  No components found for: PERCENTFE  FERRITIN:    Lab Results   Component Value Date/Time    FERRITIN 70 10/03/2013 08:57 AM     IRON:    Lab Results   Component Value Date/Time    IRON 81 10/03/2013 08:57 AM     TIBC:    Lab Results   Component Value Date/Time    TIBC 353 10/03/2013 08:57 AM       Recent Labs 02/14/23  0514 02/15/23  0453 02/16/23  0513    136 137   K 4.4 4.8 4.7    102 102   CO2 26 24 27   BUN 40* 53* 48*   CREATININE 1.2 1.9* 1.4*     Recent Labs     02/14/23  0514 02/15/23  0453 02/16/23  0513   CALCIUM 8.8 9.2 8.9     No results for input(s): PH, PCO2, PO2 in the last 72 hours.     Invalid input(s): Page Fee    ABG:  No results found for: PH, PCO2, PO2, HCO3, BE, THGB, TCO2, O2SAT  VBG:    Lab Results   Component Value Date/Time    PHVEN 7.271 09/03/2022 03:37 PM    WCS7UFB 62.0 09/03/2022 03:37 PM    BEVEN 0.3 09/03/2022 03:37 PM    X1JRNVKY 94 09/03/2022 03:37 PM       LDH:  No results found for: LDH  Uric Acid:    Lab Results   Component Value Date/Time    LABURIC 7.5 04/21/2020 04:09 PM       PT/INR:  No results found for: PROTIME, INR  Warfarin PT/INR:  No components found for: PTPATWAR, PTINRWAR  PTT:  No results found for: APTT, PTT[APTT}  Last 3 Troponin:    Lab Results   Component Value Date/Time    TROPONINI 0.04 02/13/2023 12:04 PM    TROPONINI <0.01 09/03/2022 02:26 PM       U/A:    Lab Results   Component Value Date/Time    COLORU Yellow 02/13/2023 03:55 PM    PROTEINU TRACE 02/13/2023 03:55 PM    PHUR 6.0 02/13/2023 03:55 PM    WBCUA 2 02/13/2023 03:55 PM    RBCUA 234 02/13/2023 03:55 PM    BACTERIA None Seen 02/13/2023 03:55 PM    CLARITYU Clear 02/13/2023 03:55 PM    SPECGRAV >=1.030 02/13/2023 03:55 PM    LEUKOCYTESUR Negative 02/13/2023 03:55 PM    UROBILINOGEN 1.0 02/13/2023 03:55 PM    BILIRUBINUR Negative 02/13/2023 03:55 PM    BLOODU LARGE 02/13/2023 03:55 PM    GLUCOSEU Negative 02/13/2023 03:55 PM     Microalbumen/Creatinine ratio:  No components found for: RUCREAT  24 Hour Urine for Protein:  No components found for: RAWUPRO, UHRS3, CTOJ24QM, UTV3  24 Hour Urine for Creatinine Clearance:  No components found for: CREAT4, UHRS10, UTV10  Urine Toxicology:  No components found for: IAMMENTA, IBARBIT, IBENZO, ICOCAINE, IMARTHC, IOPIATES, [facility]    HgBA1c:    Lab Results   Component Value Date/Time    LABA1C 5.6 09/06/2022 05:32 AM     RPR:  No results found for: RPR  HIV:  No results found for: HIV  TERENCE:    Lab Results   Component Value Date/Time    TERENCE Negative 04/18/2016 01:54 PM     RF:    Lab Results   Component Value Date/Time    RF <10.0 04/18/2016 01:54 PM     DSDNA:  No components found for: DNA  AMYLASE:  No results found for: AMYLASE  LIPASE:  No results found for: LIPASE  Fibrinogen Level:  No components found for: FIB       BELOW MENTIONED RADIOLOGY STUDY RESULTS BY ME (AS NEEDED FOR MY EVALUATION AND MANAGEMENT). XR CHEST PORTABLE    Result Date: 2/13/2023  EXAMINATION: ONE XRAY VIEW OF THE CHEST 2/13/2023 11:49 am COMPARISON: September 2022 HISTORY: ORDERING SYSTEM PROVIDED HISTORY: Shortness of breath, tachycardia TECHNOLOGIST PROVIDED HISTORY: Reason for exam:->Shortness of breath, tachycardia Reason for Exam: Tachycardia (Patient sent in by Cardiologist. States has fluid retention in leg, weakness, shortness of breath. States HR baseline \"70's and 80's\". Has decompensated heart failure. Is on 6L NC at baseline. ) FINDINGS: Heart size is normal.  Mediastinal contours are normal.  Pulmonary vascularity is normal There is underlying emphysema Bandlike opacity seen in the left lung base, similar compared to prior     No pneumonia or edema Bandlike opacity at the left lung base, similar to prior, Likely scarring or atelectasis     CT CHEST PULMONARY EMBOLISM W CONTRAST    Result Date: 2/13/2023  EXAMINATION: CTA OF THE CHEST 2/13/2023 1:06 pm TECHNIQUE: CTA of the chest was performed after the administration of intravenous contrast.  Multiplanar reformatted images are provided for review. MIP images are provided for review. Automated exposure control, iterative reconstruction, and/or weight based adjustment of the mA/kV was utilized to reduce the radiation dose to as low as reasonably achievable. COMPARISON: None.  HISTORY: ORDERING SYSTEM PROVIDED HISTORY: Tachycardia, elevated Dimer TECHNOLOGIST PROVIDED HISTORY: Reason for exam:->Tachycardia, elevated Dimer Decision Support Exception - unselect if not a suspected or confirmed emergency medical condition->Emergency Medical Condition (MA) Reason for Exam: Tachycardia, elevated Dimer FINDINGS: Mediastinum: Thyroid gland is unremarkable. There is decreased medial to lateral dimension of the trachea. Atherosclerotic change is seen in the aorta. No intimal flap. No pericardial effusion seen. Severe coronary artery calcification is seen. No embolus is seen in the central, right, or left main pulmonary artery. No embolus is seen in the proximal segmental branches. Distal segmental branches and subsegmental branches not well evaluated secondary to motion. Lungs/pleura: Respiratory motion artifact limits evaluation of fine pulmonary parenchymal change. Moderate underlying emphysema is seen. Scattered areas of bronchial wall thickening are seen. De pendant opacity is seen at the lung bases, likely atelectasis. No pneumothorax noted. Bandlike opacity seen the right lung base, likely atelectasis. 9 mm noncalcified nodule is seen in the right lower lobe. Upper Abdomen: Stones are seen in the right kidney measuring 7 mm in size or less. Stones are seen left kidney measuring 9 mm in size or less Soft Tissues/Bones: Spurring is seen in the spine. Spurring is seen in the shoulder joints There is a probable sebaceous cyst posteriorly in the chest wall measuring 1.3 cm     Severe coronary artery calcification. No central pulmonary embolus identified. 9 mm noncalcified pulmonary nodule right lower lobe, not clearly seen in 2019. [facility] Consider pulmonary follow-up, and either short-term follow-up noncontrast chest CT or PET-CT.  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 Nonobstructing renal calculi RECOMMENDATIONS: Unavailable     NM MYOCARDIAL SPECT REST EXERCISE OR RX    Result Date: 2/15/2023  Cardiac Perfusion Imaging  Demographics   Patient Name       Ronal HERNANDEZ   Date of Study      02/15/2023         Gender              Male   Patient Number     [id number]         Date of Birth       1945   Visit Number       [id number]          Age                 78 year(s)   Accession Number   [id number]         Room Number         5941   Corporate ID       [id number]            NM Technician   Nurse              Kay Rodgers, RN Interpreting        Beba Castaneda,                                        Physician           MD, [facility]   Ordering Physician Haley Mackey MD,                     [facility]   The procedure was explained in detail to the patient. Risks,  complications and alternative treatments were reviewed. Written consent  was obtained. Procedure Procedure Type:   Nuclear Stress Test:Pharmacological, NM MYOCARDIAL SPECT REST EXERCISE OR  RX   Study location: [facility] - Nuclear Medicine   Indications: Dyspnea upon exertion. Hospital Status: Inpatient. Height: 72 inches Weight: 334 pounds  Risk Factors   The patient risk factors include:obesity, physical activity, treated and  controlled hypercholesterolemia, treated and controlled hypertension,  untreated diabetes mellitus and chronic lung disease. Conclusions   Summary  -Myocardial perfusion is abnormal.  -There is a moderate size, moderate intensity, fixed inferior lateral defect  c/w scar.  -No significant ischemia is seen.  -Overall LV function is normal with EF=59%. There is mild inferior  hypokinesis.  -Intermediate risk  Stress Protocols   Resting ECG  Normal sinus rhythm.    Resting HR:76 bpm  Resting BP:111/68 mmHg   Pre-stress physical exam: Patient had  expiratory wheezing prior to test; he was  on 6liters O2 (which he states is his  norm) 91% pre test   Peak pulse ox 93%  Stress Protocol:Pharmacologic - Lexiscan's  Peak HR:78 bpm                   HR/BP product:9594  Peak BP:123/56 mmHg  Predicted HR: 145 bpm  % of predicted HR: 54  Test duration: 4 min  Reason for termination:Completed   ECG Findings  Normal response to lexiscan . Symptoms  Developed SOB likely related to [address] [place] [address]. Symptoms resolved with rest and caffeine. Complications  Procedure complication was none. Procedure Medications   - Lexiscan I.V. 0.4 mg.10 sec  Imaging Protocols   - One Day   Rest                          Stress   Isotope:Myoview/Tetrofosmin   Isotope: Myoview/Tetrofosmin  Isotope dose:10.9 mCi         Isotope dose:32.5 mCi  Administration Route:I.V. Administration Route:I.V.  Date:02/15/2023 12:35         Date:02/15/2023 13:35                                 Technique:      Gated  Imaging Results    Stress ejection    Ejection fraction:59 %    EDV :189 ml    ESV :77 ml    Stroke volume :112 ml    LV mass :120 gr  Medical History   Additional Medical History   Past Medical History:  has a past medical history of Anemia, COPD (chronic obstructive  pulmonary disease) ([facility] Diabetes mellitus ([facility] Edema, GI  (gastrointestinal bleed), Gout, Hypertension, Morbid obesity  ([facility] Neuropathy, Obstructive sleep apnea, and Peripheral  vascular disease ([facility] Surgical History:  has a past surgical history that includes Appendectomy; Total  hip arthroplasty; and Colonoscopy (N/A, 5/7/2019). Prior to Admission medications  Medication Sig Start Date End Date Taking?  Authorizing Provider  albuterol sulfate HFA (PROVENTIL;VENTOLIN;PROAIR) 108 (90 Base)  MCG/ACT inhaler INHALE TWO PUFFS BY MOUTH EVERY 6 HOURS AS  NEEDED 1/30/23 Karishma Jernigan MD  ipratropium-albuterol (DUONEB) 0.5-2.5 (3) MG/3ML SOLN nebulizer  solution INHALE THREE MILLILITERS VIA NEBULIZATION BY MOUTH FOUR  TIMES A DAY 1/30/23 Karishma Jernigan MD  fluticasone furoate-vilanterol (BREO ELLIPTA) 200-25 MCG/ACT  AEPB inhaler Inhale 1 puff into the lungs daily 1/30/23 Karishma Jernigan MD  furosemide (LASIX) 80 MG tablet Take 1 tablet by mouth daily  1/23/23 Franc Martinez MD  aspirin 81 MG chewable tablet Take 1 tablet by mouth daily  1/23/23 Franc Martinez MD  lisinopril (PRINIVIL;ZESTRIL) 2.5 MG tablet TAKE ONE TABLET BY  MOUTH DAILY 1/23/23 Franc Martinez MD  allopurinol (ZYLOPRIM) 100 MG tablet TAKE TWO TABLETS BY MOUTH  DAILY 1/23/23 Franc Martinez MD  gabapentin (NEURONTIN) 300 MG capsule Take 1 capsule by mouth 3  times daily for 90 days. 1/23/23 4/23/23 Franc Martinez MD  blood glucose test strips (ACCU-CHEK CLARISSA PLUS) strip As  needed. 1/23/23 Franc Martinez MD  fluticasone-umeclidin-vilant (TRELEGY ELLIPTA) 987-75.2-46  MCG/ACT AEPB inhaler Inhale 1 puff into the lungs daily 1/23/23  Franc Martinez MD  OXYGEN Inhale into the lungs 6L O2 Historical Provider, MD  predniSONE (DELTASONE) 5 MG tablet TAKE ONE TABLET BY MOUTH  DAILY 10/17/22 Franc Martinez MD  triamcinolone (KENALOG) 0.1 % ointment Apply to legs twice  daily.  10/9/22 Franc Martinez MD  rosuvastatin (CRESTOR) 20 MG tablet Take 1 tablet by mouth  nightly 9/21/22 Claus Ye MD  thiamine mononitrate (THIAMINE) 100 MG tablet Take 1 tablet by  mouth daily 9/21/22 Claus Ye MD  metoprolol succinate (TOPROL XL) 25 MG extended release tablet  Take 0.5 tablets by mouth daily 9/10/22 9/21/22 THU Ascencio CNP  celecoxib (CELEBREX) 100 MG capsule Take 1 capsule by mouth 2  times daily 4/11/22 9/9/22 Franc Martinez MD  Accu-Chek Softclix Lancets MISC USE ONE LANCET TO TEST DAILY  2/5/21 Franc Martinez MD  diclofenac (VOLTAREN) 75 MG EC tablet TAKE ONE TABLET BY MOUTH  TWICE A DAY 3/13/20 9/4/22 Franc Martinez MD  Blood Glucose Monitoring Suppl (ACCU-CHEK CLARISSA PLUS) w/Device  KIT 1 each by Does not apply route daily 12/23/19 Franc Martinez MD  acetaminophen (TYLENOL) 325 MG tablet Take 650 mg by mouth every  6 hours as needed for Pain Historical Provider, MD   Signatures   ------------------------------------------------------------------  Electronically signed by Stella Brewer MD, [facility] (Interpreting  physician) on 02/15/2023 at 15:37  ------------------------------------------------------------------            Imaging: [unfilled]         ======================================================================  Please note that this chart entry has been generated using using voice recognition software, mainly. So please excuse brevity and/or typos. While every effort and attempts have been made to ensure the accuracy of this automated transcription, some errors may have occurred; and certain words and phrases in transcription may not be entered as intended. However, inadvertent computerized transcription errors may be present. So please contact us if any clarification needed.

## 2023-02-16 NOTE — CONSULTS
[address] [facility] 1945    History:  Past Medical History:   Diagnosis Date    Anemia     COPD (chronic obstructive pulmonary disease) ([facility]     Diabetes mellitus ([facility]     Edema     GI (gastrointestinal bleed)     Gout     Hypertension     Morbid obesity ([facility]     Neuropathy     Obstructive sleep apnea     uses CPAP    Peripheral vascular disease ([facility]        ECHO:  2/15/23   Conclusions      Summary   Technically difficult study. Definity was used to assist with endocardial   border delineation. Mild concentric left ventricular hypertrophy. Mildly dilated left   ventricular cavity size. Borderline left ventricular systolic function with   an estimated ejection fraction of 50-55%. Abnormal septal motion. Indeterminate diastolic function. The right ventricle is severely enlarged. The right atrium is not well visualized but appears enlarged. The aortic root is mildly dilated. A bubble study was performed and fails to show evidence of shunting. History of sleep apnea: Yes  Type: josephine   Equipment used at home: cpap      DM History: Yes  Consult for DM educator: No      Last Hospital Admission: 9/3/22 with change in mental status  Code Status: DNR CC  Discharge plans: from home    Family Present: sadaf    Remi Jeff was admitted to the hospital with increased shortness of breath. Patient has HF hx. He does have a scale but does not weigh daily. Was noted to have some edema and dyspnea. Patient does not add salt to season or flavor foods. Patient does not follow a fluid restriction. Patient is compliant with medications and follow up visits. Patient provided with both written and verbal education on CHF signs/ symptoms, causes, discharge medications, daily weights, low sodium diet, activity, and follow-up. Pt to call if gains 3 pounds in one day or 5 pounds in one week.  Mutually agreed upon goals were discussed such as calling the MD as soon as they recognize symptoms and weight gain, maintaining proper diet, taking medications as prescribed, joining cardiac rehab when able. Also reviewed importance of risk factor reduction. Patient provided with CHF Zone Management tool and CHF symptoms magnet. Discussed importance of lifestyle changes:  encourage daily weights    PATIENT/CAREGIVER TEACHING:    Level of patient/caregiver understanding able to:   [x ] Verbalize understanding [ ] Demonstrate understanding [ ] Teach back   [ ] Needs reinforcement [ ] Other:       Time spent teachin mins    1. WEIGHT: Admit Weight: (!) 346 lb 1 oz (157 kg)      Today  Weight: (!) 337 lb (152.9 kg)   2. I/O   Intake/Output Summary (Last 24 hours) at 2023 1432  Last data filed at 2023 1340  Gross per 24 hour   Intake 1140 ml   Output 1850 ml   Net -710 ml       Recommendations:   1 He needs one week hospital follow up appointment  2. Educate further on fluid restriction 48 oz- 64 oz during inpatient stay so he can understand how to measure intake at home. 3. Continue to educate on S/S.   4. Emphasize daily weights, diet, and knowing when and who to call  5. Provided patient with CHF Resource Line for questions and concerns.          ABIGAIL OROZCO, RN 2023 2:32 PM

## 2023-02-16 NOTE — H&P
[address]     Electrophysiology Procedure Note       Date of Procedure: 2/16/2023  Patient's Name: Shagufta Feng  YOB: 1945   Medical Record Number: [id number]    Indication: ILR implant for monitoring of cryptogenic stroke     Consent: I have discussed with the patient and/or the patient representative the indication, alternatives, and the possible risks and/or complications of the planned procedure and the anesthesia methods. The patient and/or patient representative appear to understand and agree to proceed. Past Medical History:   Diagnosis Date    Anemia     COPD (chronic obstructive pulmonary disease) (HCC)     Diabetes mellitus (HCC)     Edema     GI (gastrointestinal bleed)     Gout     Hypertension     Morbid obesity ([facility]     Neuropathy     Obstructive sleep apnea     uses CPAP    Peripheral vascular disease ([facility] [facility]        Past Surgical History:   Procedure Laterality Date    APPENDECTOMY      COLONOSCOPY N/A 5/7/2019    COLONOSCOPY DIAGNOSTIC performed by Chika Cordon MD at [facility]       Prior to Admission medications    Medication Sig Start Date End Date Taking?  Authorizing Provider   albuterol sulfate HFA (PROVENTIL;VENTOLIN;PROAIR) 108 (90 Base) MCG/ACT inhaler INHALE TWO PUFFS BY MOUTH EVERY 6 HOURS AS NEEDED 1/30/23   Stephanie Vang MD   ipratropium-albuterol (DUONEB) 0.5-2.5 (3) MG/3ML SOLN nebulizer solution INHALE THREE MILLILITERS VIA NEBULIZATION BY MOUTH FOUR TIMES A DAY 1/30/23   Stephanie Vang MD   fluticasone furoate-vilanterol (BREO ELLIPTA) 200-25 MCG/ACT AEPB inhaler Inhale 1 puff into the lungs daily 1/30/23   Stephanie Vang MD   furosemide (LASIX) 80 MG tablet Take 1 tablet by mouth daily 1/23/23   Therese Tipton MD   aspirin 81 MG chewable tablet Take 1 tablet by mouth daily 1/23/23   Therese Tipton MD   lisinopril (PRINIVIL;ZESTRIL) 2.5 MG tablet TAKE ONE TABLET BY MOUTH DAILY [facility]    PATIENT'S NAME: Blaire Billingsley   ATTENDING PHYSICIAN: Zakiya Wu M.D. OPERATING PHYSICIAN: Zakiya Wu M.D.    PATIENT ACCOUNT#:   [de-identified]    LOCATION:  [address] [place] [address]  MEDICAL RECORD #:   [id number]       DATE OF BIRTH:  [birth year]   Therese Tipton MD   allopurinol (ZYLOPRIM) 100 MG tablet TAKE TWO TABLETS BY MOUTH DAILY 1/23/23   Therese Tipton MD   gabapentin (NEURONTIN) 300 MG capsule Take 1 capsule by mouth 3 times daily for 90 days. 1/23/23 4/23/23  Therese Tipton MD   blood glucose test strips (ACCU-CHEK CLARISSA PLUS) strip As needed. 1/23/23   Therese Tipton MD   fluticasone-umeclidin-vilant (TRELEGY ELLIPTA) 616-09.9-94 MCG/ACT AEPB inhaler Inhale 1 puff into the lungs daily 1/23/23   Therese Tipton MD   OXYGEN Inhale into the lungs 6L O2    Historical Provider, MD   predniSONE (DELTASONE) 5 MG tablet TAKE ONE TABLET BY MOUTH DAILY 10/17/22   Therese Tipton MD   triamcinolone (KENALOG) 0.1 % ointment Apply to legs twice daily. 10/9/22   Therese Tipton MD   rosuvastatin (CRESTOR) 20 MG tablet Take 1 tablet by mouth nightly 9/21/22   Yanni Velasco MD   thiamine mononitrate (THIAMINE) 100 MG tablet Take 1 tablet by mouth daily 9/21/22   Yanni Velasco MD   metoprolol succinate (TOPROL XL) 25 MG extended release tablet Take 0.5 tablets by mouth daily 9/10/22 9/21/22  THU Adame CNP   celecoxib (CELEBREX) 100 MG capsule Take 1 capsule by mouth 2 times daily 4/11/22 9/9/22  Therese Tipton MD   Accu-Chek Softclix Lancets MISC USE ONE LANCET TO TEST DAILY 2/5/21   Therese Tipton MD   diclofenac (VOLTAREN) 75 MG EC tablet TAKE ONE TABLET BY MOUTH TWICE A DAY 3/13/20 9/4/22  Therese Tipotn MD   Blood Glucose Monitoring Suppl (ACCU-CHEK CLARISSA PLUS) w/Device KIT 1 each by Does not apply route daily 12/23/19   Therese Tipton MD   acetaminophen (TYLENOL) 325 MG tablet Take 650 mg by mouth every 6 hours as needed for Pain    Historical Provider, MD       Pre-Sedation Documentation and Exam:   I have personally completed a history, physical exam & review of systems for this patient (see notes).     See my consult note from this admission       ASA Lam catheter placed by Surgery and arterial line, IVs, and central line by Anesthesia. SCDs were placed on both lower legs. The patient had the lower chest, abdomen, both groins and thighs prepped and draped in the usual sterile technique.   Lacy Leslie Vi- 1 limb was anastomosed to the common iliac artery on the right side down near the area of the bifurcation with 4-0 Prolene suture reinforced with 4-0 Prolene suture for hemostasis.   After all vessels were irrigated, flushed, flow was established down the l Classification:  Class 3 - A patient with severe systemic disease that limits activity but is not incapacitating     Sedation/ Anesthesia Plan:   None, local anesthetic         Patient is an appropriate candidate for plan of sedation: yes    Indira Guo MD  [place] [address]   Office: [phone]  Fax: [phone] to be oversewn with 3-0 Prolene sutures to prevent any possible rupture to the inferior vena cava, treated with a 16 x 9 mm woven bifurcated aortobi-iliac bypass graft.     Dictated By Rene Tillman M.D.  d: 05/09/2017 15:04:38  t: 05/09/2017 20:54:44  Job

## 2023-02-16 NOTE — PLAN OF CARE
Resting well in recliner. Home cpap in place. Assessment complete. Denies any discomfort. Calls appropriately for assistance.     Problem: Discharge Planning  Goal: Discharge to home or other facility with appropriate resources  2/16/2023 0113 by Kimberley Benitez RN  Outcome: Progressing     Problem: Safety - Adult  Goal: Free from fall injury  2/16/2023 0113 by Kimberley Benitez RN  Outcome: Progressing     Problem: Respiratory - Adult  Goal: Achieves optimal ventilation and oxygenation  2/16/2023 0113 by Denis Redman RN  Outcome: Progressing     Problem: Cardiovascular - Adult  Goal: Maintains optimal cardiac output and hemodynamic stability  2/16/2023 0113 by Denis Redman RN  Outcome: Progressing     Problem: Cardiovascular - Adult  Goal: Absence of cardiac dysrhythmias or at baseline  2/16/2023 0113 by Denis Redman RN  Outcome: Progressing     Problem: Metabolic/Fluid and Electrolytes - Adult  Goal: Electrolytes maintained within normal limits  2/16/2023 0113 by Kimberley Benitez RN  Outcome: Progressing     Problem: Hematologic - Adult  Goal: Maintains hematologic stability  2/16/2023 0113 by Kimberley Benitez RN  Outcome: Progressing    Problem: Skin/Tissue Integrity - Adult  Goal: Skin integrity remains intact  2/16/2023 0113 by Kimberley Benitez RN  Outcome: Progressing

## 2023-02-16 NOTE — PROGRESS NOTES
[address] Interventional Cardiology Daily Progress Note      Admit Date:  2/13/2023    Chief Complaint: Coronary calcification    Subjective:  Mr. Briseida Connor . He has no new complaints. Denies chest discomfort.     Objective:   /74   Pulse 75   Temp 97.8 °F (36.6 °C) (Oral)   Resp 18   Ht 6' (1.829 m)   Wt (!) 337 lb (152.9 kg)   SpO2 97%   BMI 45.71 kg/m²     Intake/Output Summary (Last 24 hours) at 2/16/2023 1632  Last data filed at 2/16/2023 1340  Gross per 24 hour   Intake 1140 ml   Output 1850 ml   Net -710 ml       Physical Exam:  General:  Awake, alert, oriented x 3, NAD  Skin:  Warm and dry  Neck:  JVD 8 cm  Chest:  normal air entry  Cardiovascular:  RRR S1S2, no S3,   Abdomen:  Soft, ND, NT, No HSM  Extremities:  1+ edema    Medications:    allopurinol  200 mg Oral Daily    mometasone-formoterol  2 puff Inhalation BID    metoprolol succinate  12.5 mg Oral Daily    triamcinolone   Topical BID    [Held by provider] furosemide  40 mg IntraVENous BID    aspirin  81 mg Oral Daily    gabapentin  300 mg Oral TID    [Held by provider] lisinopril  2.5 mg Oral Daily    rosuvastatin  20 mg Oral Nightly    predniSONE  5 mg Oral Daily    vitamin B-1  100 mg Oral Daily    sodium chloride flush  5-40 mL IntraVENous 2 times per day    enoxaparin  40 mg SubCUTAneous BID    ipratropium-albuterol  1 ampule Inhalation 4x daily      sodium chloride 50 mL/hr at 02/15/23 1514    sodium chloride       perflutren lipid microspheres, albuterol sulfate HFA, sodium chloride flush, sodium chloride, ondansetron **OR** ondansetron, polyethylene glycol, acetaminophen **OR** acetaminophen    TELEMETRY (Personally reviewed by me): Sinus     Lab Data:  CBC:   Recent Labs     02/14/23  0514 02/15/23  0453 02/16/23  0513   WBC 7.1 7.0 5.2   HGB 10.0* 9.9* 10.0*   HCT 31.3* 30.6* 30.7*   .1* 104.5* 102.6*    303 313     BMP:   Recent Labs     02/14/23  0514 02/15/23  0453 02/16/23  0513    136 137   K 4.4 4.8 4.7    102 102   CO2 26 24 27   BUN 40* 53* 48*   CREATININE 1.2 1.9* 1.4*     LIVER PROFILE:   No results for input(s): AST, ALT, LIPASE, BILIDIR, BILITOT, ALKPHOS in the last 72 hours. Invalid input(s): AMYLASE,  ALB    PT/INR: No results for input(s): PROTIME, INR in the last 72 hours. APTT: No results for input(s): APTT in the last 72 hours. BNP:  No results for input(s): BNP in the last 72 hours. Imaging/Procedures:     Stress lexiscan 2/15/23  Summary -Myocardial perfusion is abnormal.  -There is a moderate size, moderate intensity, fixed inferior lateral defect  c/w scar.  -No significant ischemia is seen.  -Overall LV function is normal with EF=59%. There is mild inferior  hypokinesis.  -Intermediate risk     Echo 2/15/23  Summary   Technically difficult study. Definity was used to assist with endocardial border delineation. Mild concentric left ventricular hypertrophy. Mildly dilated left ventricular cavity size. Borderline left ventricular systolic function with an estimated ejection fraction of 50-55%. Abnormal septal motion. Indeterminate diastolic function. The right ventricle is severely enlarged. The right atrium is not well visualized but appears enlarged. The aortic root is mildly dilated. A bubble study was performed and fails to show evidence of shunting. MCOT 9/9/22-10/8/22  No afib. PAC 3%, PVC 5%  2 NSVT     Echo 9/3/22:  Summary  A bubble study was performed and shows evidence of a trivial amount of right to left shunting consistent with a tiny patent foramen ovale or atrial septal defect. Irregular rhythm. Left ventricular systolic function is normal with ejection fraction estimated at 55 %. No regional wall motion abnormalities are noted. Normal left ventricular wall thickness. Diastolic dysfunction grade and filing pressure are indeterminate. The right ventricle is mildly enlarged. The right atrium is mildly dilated.   The aortic root is moderately dilated. The ascending aorta is moderately dilated. Mild mitral regurgitation. Ultrasound screening for AAA 4/16/19:  Study somewhat limited secondary to patient body habitus and motion. Proximal aorta measures 3.2 cm in size. Cardiac cath 2/13/09:  Normal coronaries  LVEF 70%     RA-mean 12  RV-45/2, 10  PA-56/10 mean 35  PW-mean 30  LV-128/-9, 13  Ao-121/71     Echocardiogram 1/13/09:  Sub-optimal images due to body habitus. Probable normal LVEF, sub-optimal images to evaluate valves, possible AR enlargement      Assessment/Plan:  Principal Problem:    Peripheral edema  Suggestive of acute on chronic diastolic CHF. Shortness of breath  CT chest 2/13/23 - severe coronary artery calcification  Last heart cath in '09 with normal cors       Diastolic heart failure  Echo 9/22 EF 55%         CAD  Plan: As evidenced by coronary calcification on CT. Myoview stress test without significant reversible ischemia. Recommend medical management. Coronary artery calcification on CT  Plan: Noted on CT. Consistent with CAD. Myoview stress test without significant reversible ischemia. Recommend medical management. Hypertension  Controlled       Hyperlipidemia   Continue statin        COPD  6 L high flow O2       CVA   Sept '22 MRI  PFO not felt to be cause  No evidence of afib, ILR in future      ABRAHAM  Plan: Likely secondary to diuresis. Diuretics held. Creatinine now much improved. Would probably benefit from nephrology consultation. Recommend medical management cardiac status. Okay for discharge home from a cardiology standpoint.         Nanette Lin MD, MD 2/16/2023 4:32 PM

## 2023-02-16 NOTE — PROCEDURES
[address]     Electrophysiology Procedure Note       Date of Procedure: 2/16/2023  Patient's Name: Mitchel Mandujano  YOB: 1945   Medical Record Number: [id number]  Procedure Performed by: Alvaro Larose MD    Procedures performed:  Loop recorder implantation    Indication of the procedure: Cryptogenic stroke    Mitchel Mandujano is a 78 y.o. male with cryptogenic stroke, outpatient cardiac monitoring and holter results were inconclusive so far, therefore he has been scheduled for loop recorder implantation. Details of procedure:   Procedure's risks, benefits and alternatives of procedure were explained to patient. All questions were answered. Patient understood and informed consent was obtained. Patient was prepped and draped in usual sterile fashion. After injection of 2% lidocaine in the left 4th intercostal area, a 5 mm small incision was made. Using ILR insertion device, a small subcutaneous tunnel was created and the loop recorder was inserted under skin, due to poor sensing the device has to be repositioned twice. Dermabond was used. The skin was covered with Steri-Strips.      Device information:   The device is Reveal LINQ  SN# [id number] [employer] Implant date: 2/16/2023  R wave amplitude at implant 0.08 to 0.13 mV    Plan:   1 week wound check   3 month follow up in electrophysiology clinic with MD [address]   Office: [phone]  Fax: [phone]

## 2023-02-16 NOTE — DISCHARGE INSTRUCTIONS
Your information:  Name: Nico Charles  : 1945    Your Discharge Instructions    What to do after you leave the hospital:    Read, review and familiarize yourself with the information provided below and in a separate packet on   ***    Diet: No added salt    Recommended activity:   as   Avoid strenuous activity until instructed by your physician to resume; balance rest with periods of light to normal activity. If you experience any of the following: Unusual or inadequately controlled pain; unusual transient shortness of breath; recurrent or persistent nausea, heartburn, palpitations or lightheadedness; increased swelling; increased fatigue; fever >100; please follow up with @[email or web address]@ [unfilled] or go to the Emergency Room. Home Health/ Outpatient Services: ***      Information obtained by:  By signing below, I understand and acknowledge receipt of the instructions indicated above, and I understand that if any problems occur once I leave the hospital I am to contact [email or web address]@.               Follow up with pulmonary due to small lung nodule get repeat CT scan in 6 weeks  Follow up with cardiology   Follow up with nephrology in 2 weeks

## 2023-02-16 NOTE — DISCHARGE INSTR - COC
Continuity of Care Form    Patient Name: Shagufta Feng   :  1945  MRN:  [id number]    Admit date:  2023  Discharge date:  ***    Code Status Order: DNR-CC   Advance Directives:     Admitting Physician:  Mohamud Hanna MD  PCP: Therese Tipton MD    Discharging Nurse: [facility] Unit/Room#: 4JE-0239/1343-10  Discharging Unit Phone Number: ***    Emergency Contact:   Extended Emergency Contact Information  Primary Emergency Contact: Makayla Urban  Home Phone: [phone]  Relation: Child  Secondary Emergency Contact: BroMakayla simmons  [place] Phone: [phone]  Relation: Child    Past Surgical History:  Past Surgical History:   Procedure Laterality Date    APPENDECTOMY      COLONOSCOPY N/A 2019    COLONOSCOPY DIAGNOSTIC performed by Chika Cordon MD at [facility]      bilateral       Immunization History:   Immunization History   Administered Date(s) Administered    COVID-19, PFIZER PURPLE top, DILUTE for use, (age 15 y+), 30mcg/0.3mL 2021, 2021, 10/06/2021    DTaP 10/31/2014    Influenza Vaccine, unspecified formulation 2016    Influenza Virus Vaccine 2016    Influenza, FLUAD, (age 72 y+), Adjuvanted, 0.5mL 10/03/2022    Influenza, FLUZONE (age 72 y+), High Dose, 0.7mL 10/21/2020, 10/06/2021    Influenza, High Dose (Fluzone 65 yrs and older) 10/04/2017, 10/04/2019    Pneumococcal Conjugate 13-valent (Ugvfwwm93) 2015    Pneumococcal Polysaccharide (Kfkbdftfj31) 2018    Zoster Live (Zostavax) 2016    Zoster Recombinant (Shingrix) 2022, 2022       Active Problems:  Patient Active Problem List   Diagnosis Code    HTN (hypertension), benign I10    Syncope R55    DM (diabetes mellitus), type 2, uncontrolled with complications GVE0403    Arthritis M19.90    Chronic respiratory failure (HCC) J96.10    STEF (obstructive sleep apnea) G47.33    Venous insufficiency I87.2    Acute sinusitis J01.90 Morbid obesity (HCC) E66.01    Vitamin D deficiency E55.9    Open back wound S21.209A    COPD, severe (HCC) J44.9    HLD (hyperlipidemia) U38.3    Diastolic dysfunction Y95.11    Aneurysm of abdominal aorta I71.40    Personal history of tobacco use, presenting hazards to health Z87.891    Encephalopathy G93.40    Altered mental status R41.82    Cerebrovascular accident (CVA) due to thrombosis of left middle cerebral artery ([facility]) I63.312    Acute on chronic respiratory failure with hypoxia and hypercapnia ([facility]) J96.21, J96.22    Cerebrovascular accident (CVA) ([facility] [address] I63.9    Chronic heart failure with preserved ejection fraction ([facility]) I50.32    Ventricular ectopy I49.3    Leg swelling M79.89    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    Metabolic encephalopathy O37.52    PFO (patent foramen ovale) Q21.12    Peripheral edema R60.9    Tachycardia R00.0       Isolation/Infection:   Isolation            No Isolation          Patient Infection Status       Infection Onset Added Last Indicated Last Indicated By Review Planned Expiration Resolved Resolved By    None active    Resolved    COVID-19 (Rule Out) 02/13/23 02/13/23 02/13/23 COVID-19, Rapid (Ordered)   02/13/23 Rule-Out Test Resulted    COVID-19 (Rule Out) 09/03/22 09/03/22 09/03/22 COVID-19, Rapid (Ordered)   09/03/22 Rule-Out Test Resulted            Nurse Assessment:  Last Vital Signs: /74   Pulse 75   Temp 97.8 °F (36.6 °C) (Oral)   Resp 18   Ht 6' (1.829 m)   Wt (!) 337 lb (152.9 kg)   SpO2 97%   BMI 45.71 kg/m²     Last documented pain score (0-10 scale): Pain Level: 0  Last Weight:   Wt Readings from Last 1 Encounters:   02/16/23 (!) 337 lb (152.9 kg)     Mental Status:  {IP PT MENTAL STATUS:20030}    IV Access:  { ABRAN IV ACCESS:212938495}    Nursing Mobility/ADLs:  Walking   {P DME KEDT:870393546}  Transfer  {P DME KIQF:457347904}  Bathing  {P DME GMLE:249079704}  Dressing  {CHP DME IFHR:635902719}  Toileting  {P DME ELYY:677348204}  Feeding  {[facility] DME KSL}  Med Admin  {[facility] DME QXHF:298737955}  Med Delivery   { ABRAN MED Delivery:671268897}    Wound Care Documentation and Therapy:        Elimination:  Continence: Bowel: {YES / UF:48630}  Bladder: {YES / HC:91119}  Urinary Catheter: {Urinary Catheter:468395796}   Colostomy/Ileostomy/Ileal Conduit: {YES / AT:64578}       Date of Last BM: ***    Intake/Output Summary (Last 24 hours) at 2023 1308  Last data filed at 2023 1051  Gross per 24 hour   Intake 740 ml   Output 1850 ml   Net -1110 ml     I/O last 3 completed shifts:   In: 500 [P.O.:500]  Out: 2500 [Urine:2500]    Safety Concerns:     [employer] Safety Concerns:120963263}    Impairments/Disabilities:      [employer] Impairments/Disabilities:107772445}    Nutrition Therapy:  Current Nutrition Therapy:   [employer] Diet List:383126782}    Routes of Feeding: {[facility] DME Other Feedings:371546593}  Liquids: {Slp liquid thickness:21790}  Daily Fluid Restriction: {[facility] DME Yes amt example:502115472}  Last Modified Barium Swallow with Video (Video Swallowing Test): {Done Not Done APGU:253725062}    Treatments at the Time of Hospital Discharge:   Respiratory Treatments: ***  Oxygen Therapy:  {Therapy; copd oxygen:36456}  Ventilator:    { CC Vent WOFP:554383142}    Rehab Therapies: {THERAPEUTIC INTERVENTION:3777836109}  Weight Bearing Status/Restrictions: [employer] Weight Bearin}  Other Medical Equipment (for information only, NOT a DME order):  {EQUIPMENT:547424284}  Other Treatments: ***    Patient's personal belongings (please select all that are sent with patient):  {[facility] DME Belongings:950507602}    RN SIGNATURE:  {Esignature:883442409}    CASE MANAGEMENT/SOCIAL WORK SECTION    Inpatient Status Date: ***    Readmission Risk Assessment Score:  Readmission Risk              Risk of Unplanned Readmission:  25           Discharging to Facility/ Agency   Name:   Address:  Phone:  Fax:    Dialysis Facility (if applicable)   Name:  Address:  Dialysis Schedule:  Phone:  Fax:    / signature: {Esignature:988625681}    PHYSICIAN SECTION    Prognosis: {Prognosis:0721017826}    Condition at Discharge: [address] Patient Condition:098162472}    Rehab Potential (if transferring to Rehab): {Prognosis:7888636461}    Recommended Labs or Other Treatments After Discharge: ***    Physician Certification: I certify the above information and transfer of Giana Farrar  is necessary for the continuing treatment of the diagnosis listed and that he requires {Admit to Appropriate Level of Care:69461} for {GREATER/LESS:824636703} 30 days.      Update Admission H&P: {CHP DME Changes in EXFPP:772760497}    PHYSICIAN SIGNATURE:  {Esignature:686893180}

## 2023-02-17 LAB
BLOOD CULTURE, ROUTINE: NORMAL
CULTURE, BLOOD 2: NORMAL

## 2023-02-20 ENCOUNTER — TELEPHONE (OUTPATIENT)
Dept: INTERNAL MEDICINE CLINIC | Age: 78
End: 2023-02-20

## 2023-02-20 ENCOUNTER — TELEPHONE (OUTPATIENT)
Dept: OTHER | Age: 78
End: 2023-02-20

## 2023-02-20 NOTE — TELEPHONE ENCOUNTER
Care Transitions Initial Follow Up Call    Outreach made within 2 business days of discharge: Yes    Patient: Leigh Laurent Patient : 1945   MRN: [id number]  Reason for Admission: There are no discharge diagnoses documented for the most recent discharge.   Discharge Date: 23       Spoke with: Left a message for patient to call and schedule an appointment    Scheduled appointment with PCP within 7-14 days    Follow Up  Future Appointments   Date Time Provider Lizzie Marifer   2023 10:00 AM SCHEDULE, [facility] DEVICE CHECK FF Cardio MMA   2023 10:00 AM THU Jesus - CNP FF Cardio MMA   2023  2:15 PM Deirdre Owens MD PULM & CC MMA   2023  2:45 PM SCHEDULE, [place] REMOTE TRANSMISSION FF Cardio MMA   4/3/2023  2:45 PM SCHEDULE, [place] REMOTE TRANSMISSION FF Cardio MMA   5/3/2023  2:45 PM Deirdre Owens MD PULM & CC MMA   2023  4:45 PM SCHEDULE, [place] REMOTE TRANSMISSION FF Cardio MMA   2023  1:00 PM MD EBENEZER Cruz  [place] - DYD   5/15/2023 11:00 AM SCHEDULE, [place] DEVICE CHECK FF Cardio MMA   5/15/2023 11:00 AM Melissa Wilson MD FF Cardio MMA   2023  5:15 PM SCHEDULE, [place] REMOTE TRANSMISSION FF Cardio MMA   2023  4:15 PM SCHEDULE, [place] REMOTE TRANSMISSION FF Cardio MMA   2023  4:00 PM SCHEDULE, [place] REMOTE TRANSMISSION FF Cardio MMA   2023  4:00 PM SCHEDULE, [place] REMOTE TRANSMISSION FF Cardio MMA   10/30/2023  4:00 PM SCHEDULE, [place] REMOTE TRANSMISSION FF Cardio MMA   2023  4:00 PM SCHEDULE, [place] REMOTE TRANSMISSION FF Cardio MMA       Dennis Valentine MA

## 2023-02-20 NOTE — TELEPHONE ENCOUNTER
Patient declined a HFU appt. He said he is already scheduled w/numerous specialists and has to depend on his family for transportation. Patient prefers to just keep his appt in May w/Dr Ramos Hickman.

## 2023-02-20 NOTE — TELEPHONE ENCOUNTER
[address] [facility]  [facility]  TELEPHONE ENCOUNTER FORM    Jose Dafelodia 1945    ASSESSMENT:   Baseline weight: 337 lbs  Current weight: not weighing  Patient weighing daily: No  What are your symptoms of heart failure: dyspnea, edema  Are you having any symptoms:  No  Patient knows who to call with symptoms: Yes  Diet history:      Patient states sodium limitation is : 3000 mg      Patient states fluid limitation is 64 oz  Patient following diet as instructed: Yes  Medication history: taking medications as instructed Yes; medication side effects noted No  Patient is being active at home: Yes  Patient knows date and time of follow up appointment: Yes   Patient has transportation to appointments: Yes    RECOMMENDATIONS:   Medication: taking as prescribed  Diet: no added salt diet  MD/ Clinic appointment: was scheduled with CHF NP on 2/23--patient has to reschedule  Other: Doing well. Some dyspnea,- encouraged patient to call with any questions or concerns.

## 2023-02-21 ENCOUNTER — TELEPHONE (OUTPATIENT)
Dept: CARDIOLOGY CLINIC | Age: 78
End: 2023-02-21

## 2023-02-21 DIAGNOSIS — Z45.09 ENCOUNTER FOR ELECTRONIC ANALYSIS OF REVEAL EVENT RECORDER: Primary | ICD-10-CM

## 2023-02-21 DIAGNOSIS — I63.312 CEREBROVASCULAR ACCIDENT (CVA) DUE TO THROMBOSIS OF LEFT MIDDLE CEREBRAL ARTERY (HCC): ICD-10-CM

## 2023-02-21 DIAGNOSIS — I49.3 VENTRICULAR ECTOPY: ICD-10-CM

## 2023-02-21 NOTE — TELEPHONE ENCOUNTER
Pt called and states Dr. Jaleesa Quintana will not schedule him unless he has a referral from you. Pt asking if you still want him to see this dr. Josy Lees need referral. Please advise.

## 2023-02-21 NOTE — TELEPHONE ENCOUNTER
I spoke with Dr Asael Mensah office. He was actually seen inpatient earlier this month and there was to be a follow up scheduled, looks like they tried to call him 2/17/23, there was a misunderstanding when he returned the call. I did verify that he can call back to the office to get scheduled now, there should be no problem. Office number is [phone].  thanks

## 2023-02-22 ENCOUNTER — NURSE ONLY (OUTPATIENT)
Dept: CARDIOLOGY CLINIC | Age: 78
End: 2023-02-22

## 2023-02-22 NOTE — PROGRESS NOTES
Patient comes in for their 1 week wound check S/p mdtlinq II Implant on 2/16/2023 with Dr. Estrellita Kimbrough. Patients incision is healing nicely. Brusing noted. Patient and family educated on wound care. All questions answered. Patient to call the office immediately with any signs on infection. Carelink Interrogation shows a Battery Status GOOD. No episodes noted. 0.3% PVC burden. Implanted for Cryptogenic Stroke. Please see interrogation for more detail. Patient will followed in office as scheduled on 5/15/2023 with Dr. Estrellita Kimbrough as scheduled and we will continue to follow the Patient remotely.

## 2023-02-23 ENCOUNTER — TELEPHONE (OUTPATIENT)
Dept: CARDIOLOGY CLINIC | Age: 78
End: 2023-02-23

## 2023-02-23 DIAGNOSIS — R06.02 SHORTNESS OF BREATH: Primary | ICD-10-CM

## 2023-02-23 NOTE — TELEPHONE ENCOUNTER
No need for stress test, he did have it during hospital stay. The message was prior to his hospitalization.  Will close encounter

## 2023-02-23 NOTE — TELEPHONE ENCOUNTER
[facility] would like for patient to get scheduled for a stress test (chemical) he did get the approval from Dr Tillman with pulmonology. He and the pt discussed this during his recent hospital stay. Please call patient to give him the central scheduling number so he can get in for his stress. Order placed. thanks

## 2023-02-23 NOTE — TELEPHONE ENCOUNTER
Called patient to relay message below. Patient stated while he was in the hospital he had a stress test and doesn't want to do another. Please advise.

## 2023-02-23 NOTE — TELEPHONE ENCOUNTER
Will discuss/verify with [facility] to see if there is a reason for a second stress.  thanks

## 2023-02-24 DIAGNOSIS — J44.9 COPD, VERY SEVERE (HCC): ICD-10-CM

## 2023-02-24 NOTE — TELEPHONE ENCOUNTER
Recent Visits  Date Type Provider Dept   01/12/23 Office Visit Addison Moseley MD [facility]   10/03/22 Office Visit Addison Moseley MD [facility]   04/11/22 Office Visit Addison Moseley MD [facility]   11/11/21 Office Visit Addison Moseley MD [facility]   Showing recent visits within past 540 days with a meds authorizing provider and meeting all other requirements  Future Appointments  Date Type Provider Dept   05/12/23 Appointment Addison Moseley MD [facility]   Showing future appointments within next 150 days with a meds authorizing provider and meeting all other requirements     1/12/2023

## 2023-02-27 ENCOUNTER — NURSE ONLY (OUTPATIENT)
Dept: CARDIOLOGY CLINIC | Age: 78
End: 2023-02-27
Payer: MEDICARE

## 2023-02-27 DIAGNOSIS — I63.312 CEREBROVASCULAR ACCIDENT (CVA) DUE TO THROMBOSIS OF LEFT MIDDLE CEREBRAL ARTERY (HCC): ICD-10-CM

## 2023-02-27 DIAGNOSIS — Z45.09 ENCOUNTER FOR ELECTRONIC ANALYSIS OF REVEAL EVENT RECORDER: ICD-10-CM

## 2023-02-27 DIAGNOSIS — I49.3 VENTRICULAR ECTOPY: ICD-10-CM

## 2023-02-27 PROCEDURE — 93298 REM INTERROG DEV EVAL SCRMS: CPT | Performed by: INTERNAL MEDICINE

## 2023-02-27 PROCEDURE — G2066 INTER DEVC REMOTE 30D: HCPCS | Performed by: INTERNAL MEDICINE

## 2023-02-27 RX ORDER — PREDNISONE 1 MG/1
5 TABLET ORAL DAILY
Qty: 90 TABLET | Refills: 0 | Status: SHIPPED | OUTPATIENT
Start: 2023-02-27

## 2023-02-27 NOTE — PROGRESS NOTES
Physician Progress Note      Jaida Heard  CSN #:                  [id number]  :                       1945  ADMIT DATE:       2023 11:27 AM  DISCH DATE:        2023 3:30 PM  RESPONDING  PROVIDER #:        Jose De Jesus Márquez CNP          QUERY TEXT:    Patient admitted with ABRAHAM and Peripheral Edema. Noted documentation of   \"Suggestive of acute on chronic diastolic CHF\" in Cardiology note on   2023. Please document whether. .. The medical record reflects the following:  Risk Factors: Echo , indeterminant Diastolic dysfunction. Per office visit   Hx Diastolic CHF. Clinical Indicators: Pt. had tachycardia at office, has ankle edema, SOB, Per   electrophysiology: Orthopnea; Per Cardiology note on 2/15: JVD 8cm; Echo EF   50-55%, Pro , CXR=No PNA or edema. Treatment: received one dose of Lasix IV, then Held d/t ABRAHAM  Options provided:  -- Acute on Chronic Diastolic CHF present this admit  -- Acute on Chronic Diastolic CHF was ruled out, patient has Chronic Diastolic   CHF only  -- Other - I will add my own diagnosis  -- Disagree - Not applicable / Not valid  -- Disagree - Clinically unable to determine / Unknown  -- Refer to Clinical Documentation Reviewer    PROVIDER RESPONSE TEXT:    Acute on Chronic Diastolic CHF was ruled out after study, patient has Chronic   Diastolic CHF only.     Query created by: Mikel Armijo on 2023 12:58 PM      Electronically signed by:  Arden Márquez CNP 2023 4:12   PM

## 2023-02-27 NOTE — PROGRESS NOTES
We received a remote transmission from patient's monitor at home. Remote Linq report shows no arrhythmias. EP physician to review. We will continue to monitor remotely. Implanted for stroke. End of 31-day monitoring period 2-27-23.

## 2023-02-27 NOTE — TELEPHONE ENCOUNTER
Kristin Werner MD,    Patient out of refills of rosuvastatin 20 ng nightly. Rx pended for your signature/modification as appropriate. LOV: 1/12/23  Next: 5/12/23    Thank you,  Annika Amaral, PharmD, [address] [place] [address], toll free: [phone], option 1  =======================================================    POPULATION HEALTH CLINICAL PHARMACY: STATIN THERAPY REVIEW  Identified statin use in persons with diabetes care gap per [facility]. Last Office Visit: 1/12/23    Patient also appears to be prescribed the following medications in an adherence measure: n/a    Patient is prescribed the following for lipid management: rosuvastatin 20 mg nightly    No Known Allergies      ASSESSMENT    STATIN GAP IDENTIFIED    Per review of chart and [facility] claim data, statin is prescribed for patient; however, patient has not filled and picked up the medication. Patient is prescribed rosuvastatin 20 mg nightly. Per Reconciled Dispense Report:   ROSUVASTATIN 20MG TAB 12/16/2022 30 30 tablet Fili Mcnally MD [facility] [id number]. .. Per [facility] Portal:  Same as above; has 0 refill left. Lab Results   Component Value Date    CHOL 121 09/06/2022    TRIG 81 09/06/2022    HDL 62 (H) 09/06/2022    LDLCALC 43 09/06/2022     ALT   Date Value Ref Range Status   02/13/2023 9 (L) 10 - 40 U/L Final     AST   Date Value Ref Range Status   02/13/2023 13 (L) 15 - 37 U/L Final       The ASCVD Risk score (Shabnam DK, et al., 2019) failed to calculate for the following reasons: The patient has a prior MI or stroke diagnosis       Patient turns 70years old on 3/7/23. Per CMS SUPD measure, a statin must be filled no later than the month before the patient turns 78 for the claim to close the SUPD gap.       PLAN  Interventions that have been identified include:  - Patient identified as having SUPD gap  Patient needs a new prescription for refills of rosuvastatin 20 mg nightly. Will pend a prescription for provider review/modification. No patient out reach planned at this time.     Future Appointments   Date Time Provider Lizzie Caicedo   3/10/2023  4:15 PM Milo Mayes MD [facility]   4/3/2023  2:45 PM SCHEDULE, [place] REMOTE TRANSMISSION FF Cardio MMA   5/3/2023  2:45 PM Jens Mancilla MD PULM & CC MMA   5/8/2023  4:45 PM SCHEDULE, [place] REMOTE TRANSMISSION FF Cardio MMA   5/12/2023  1:00 PM MD EBENEZER [place] [facility] [place] - DYD   5/15/2023 11:00 AM SCHEDULE, [place] DEVICE CHECK FF Cardio MMA   5/15/2023 11:00 AM Beto Valero MD FF Cardio MMA   6/12/2023  5:15 PM SCHEDULE, [place] REMOTE TRANSMISSION FF Cardio MMA   7/17/2023  4:15 PM SCHEDULE, [place] REMOTE TRANSMISSION FF Cardio MMA   8/21/2023  4:00 PM SCHEDULE, [place] REMOTE TRANSMISSION FF Cardio MMA   9/25/2023  4:00 PM SCHEDULE, [place] REMOTE TRANSMISSION FF Cardio MMA   10/30/2023  4:00 PM SCHEDULE, [place] REMOTE TRANSMISSION FF Cardio MMA   12/4/2023  4:00 PM SCHEDULE, [place] REMOTE TRANSMISSION FF Cardio MMA     Aron Cornelius, PharmD, [address] [place] [address], toll free: [phone], option 1

## 2023-02-27 NOTE — LETTER
[place]  [address], [address]        [facility]   [facility] [address]           02/28/23     Dear Tam Garcia,    We have on file that you are currently taking rosuvastatin 20 mg nightly. If you are no longer taking or taking differently, please call us at the number below so that we can discuss this and update your medication profile. This medication is filled and ready for you at:  [address] [facility] [address]   Phone:  [phone]    Please pick this medication up as soon as possible, if you have not already done so. It appears that this medication has not been filled at proper times. We are worried you might be missing doses or not taking it as directed. It is important that you take your medications regularly and try not to miss a single dose.     Some ways to help you remember to take and refill your medications are to:  · Use a pill box, set an alarm, and/or keep your medication near something that you do every day  · Ask your pharmacy if they participate in [facility]", a program where you can  all of your medications on the same day  · Ask your pharmacy if you can be set up with automatic refill, where they will automatically refill your prescription when it is due and let you know it's ready to     Sincerely,   Lucero Mercado, PharmD, [address] [place] [address], toll free: [phone], option 1

## 2023-02-28 ENCOUNTER — TELEPHONE (OUTPATIENT)
Dept: CASE MANAGEMENT | Age: 78
End: 2023-02-28

## 2023-02-28 RX ORDER — ROSUVASTATIN CALCIUM 20 MG/1
20 TABLET, COATED ORAL NIGHTLY
Qty: 90 TABLET | Refills: 3 | Status: SHIPPED | OUTPATIENT
Start: 2023-02-28 | End: 2023-03-01 | Stop reason: SDUPTHER

## 2023-02-28 NOTE — TELEPHONE ENCOUNTER
Dr Mi Mitchell and Dr Margo Escobar    Pt recently admitted to the hospital and had a CT Chest PE on 2/13/23. Severe coronary artery calcification. No central pulmonary embolus   identified. 9 mm noncalcified pulmonary nodule right lower lobe, not clearly seen in   2019. Manda Mota Consider pulmonary follow-up, and either short-term follow-up   noncontrast chest CT or PET-CT. Decreased medial to lateral dimension of the trachea suggesting saber sheath   trachea       Nonobstructing renal calculi      Dr Mi Mitchell has an appointment with him 5/3/23. If either of you have any orders, let me know and I can follow up.     Thank you,   Nuvia Solis RN  Lung Navigator  [address], [address] [place] [address]  [phone]

## 2023-02-28 NOTE — TELEPHONE ENCOUNTER
Left message for patient regarding prescription ready for . Will also send a letter.     Virgie Crawford, PharmD, [facility], toll free: [phone], option 1    For Pharmacy Admin Tracking Only  Program: [address] in place:  No  Recommendation Provided To: Provider: 1 via Note to Provider  Intervention Detail: Adherence Monitorin and Refill(s) Provided  Intervention Accepted By: Provider: 1  Gap Closed?: No   Time Spent (min): 30

## 2023-03-01 ENCOUNTER — TELEPHONE (OUTPATIENT)
Dept: INTERNAL MEDICINE CLINIC | Age: 78
End: 2023-03-01

## 2023-03-01 RX ORDER — ROSUVASTATIN CALCIUM 20 MG/1
20 TABLET, COATED ORAL NIGHTLY
Qty: 90 TABLET | Refills: 3 | Status: SHIPPED | OUTPATIENT
Start: 2023-03-01 | End: 2023-05-03

## 2023-03-03 ENCOUNTER — HOSPITAL ENCOUNTER (OUTPATIENT)
Age: 78
Discharge: HOME | End: 2023-03-03
Payer: MEDICARE

## 2023-03-03 DIAGNOSIS — N17.9 AKI (ACUTE KIDNEY INJURY) (HCC): ICD-10-CM

## 2023-03-03 LAB
ALBUMIN SERPL-MCNC: 3.8 G/DL (ref 3.4–5)
ANION GAP SERPL CALCULATED.3IONS-SCNC: 12 MMOL/L (ref 3–16)
BACTERIA: ABNORMAL /HPF
BILIRUBIN URINE: NEGATIVE
BLOOD, URINE: ABNORMAL
BUN BLDV-MCNC: 44 MG/DL (ref 7–20)
CALCIUM SERPL-MCNC: 9.1 MG/DL (ref 8.3–10.6)
CHLORIDE BLD-SCNC: 99 MMOL/L (ref 99–110)
CLARITY: CLEAR
CO2: 27 MMOL/L (ref 21–32)
COLOR: YELLOW
CREAT SERPL-MCNC: 1.4 MG/DL (ref 0.8–1.3)
EPITHELIAL CELLS, UA: 1 /HPF (ref 0–5)
GFR SERPL CREATININE-BSD FRML MDRD: 52 ML/MIN/{1.73_M2}
GLUCOSE BLD-MCNC: 100 MG/DL (ref 70–99)
GLUCOSE URINE: NEGATIVE MG/DL
HCT VFR BLD CALC: 32.6 % (ref 40.5–52.5)
HEMOGLOBIN: 10.5 G/DL (ref 13.5–17.5)
HYALINE CASTS: 0 /LPF (ref 0–8)
KETONES, URINE: NEGATIVE MG/DL
LEUKOCYTE ESTERASE, URINE: NEGATIVE
MCH RBC QN AUTO: 32.8 PG (ref 26–34)
MCHC RBC AUTO-ENTMCNC: 32.3 G/DL (ref 31–36)
MCV RBC AUTO: 101.5 FL (ref 80–100)
MICROSCOPIC EXAMINATION: YES
NITRITE, URINE: NEGATIVE
PDW BLD-RTO: 15 % (ref 12.4–15.4)
PH UA: 6 (ref 5–8)
PHOSPHORUS: 4.2 MG/DL (ref 2.5–4.9)
PLATELET # BLD: 394 K/UL (ref 135–450)
PMV BLD AUTO: 6.8 FL (ref 5–10.5)
POTASSIUM SERPL-SCNC: 4.6 MMOL/L (ref 3.5–5.1)
PROTEIN UA: ABNORMAL MG/DL
RBC # BLD: 3.21 M/UL (ref 4.2–5.9)
RBC UA: 17 /HPF (ref 0–4)
SODIUM BLD-SCNC: 138 MMOL/L (ref 136–145)
SPECIFIC GRAVITY UA: 1.01 (ref 1–1.03)
URINE TYPE: ABNORMAL
UROBILINOGEN, URINE: 1 E.U./DL
WBC # BLD: 7.4 K/UL (ref 4–11)
WBC UA: 1 /HPF (ref 0–5)

## 2023-03-03 PROCEDURE — 81001 URINALYSIS AUTO W/SCOPE: CPT

## 2023-03-03 PROCEDURE — 36415 COLL VENOUS BLD VENIPUNCTURE: CPT

## 2023-03-03 PROCEDURE — 80069 RENAL FUNCTION PANEL: CPT

## 2023-03-03 PROCEDURE — 85027 COMPLETE CBC AUTOMATED: CPT

## 2023-04-02 NOTE — PROGRESS NOTES
We received a remote transmission from patient's monitor at home. Remote Linq report shows AF and SR with ectopy. Pt is not on anti coags. Message sent to office staff. EP physician to review. We will continue to monitor remotely. Implanted for stroke. End of 31-day monitoring period 4-3-23.

## 2023-04-03 ENCOUNTER — NURSE ONLY (OUTPATIENT)
Dept: CARDIOLOGY CLINIC | Age: 78
End: 2023-04-03

## 2023-04-03 DIAGNOSIS — I63.312 CEREBROVASCULAR ACCIDENT (CVA) DUE TO THROMBOSIS OF LEFT MIDDLE CEREBRAL ARTERY (HCC): ICD-10-CM

## 2023-04-03 DIAGNOSIS — I49.3 VENTRICULAR ECTOPY: ICD-10-CM

## 2023-04-03 DIAGNOSIS — Z45.09 ENCOUNTER FOR ELECTRONIC ANALYSIS OF REVEAL EVENT RECORDER: ICD-10-CM

## 2023-04-04 ENCOUNTER — TELEPHONE (OUTPATIENT)
Dept: CARDIOLOGY CLINIC | Age: 78
End: 2023-04-04

## 2023-04-04 RX ORDER — WARFARIN SODIUM 5 MG/1
5 TABLET ORAL DAILY
Qty: 30 TABLET | Refills: 3 | Status: SHIPPED | OUTPATIENT
Start: 2023-04-04

## 2023-04-04 NOTE — TELEPHONE ENCOUNTER
Spoke with patient  - he does not want to use Eliquis due to cost. Is not interested in trying Xarelto. Not Interested in trying for Patient assistance. We discussed  Warfarin and he would like to try that . Will confirm with CMV.

## 2023-04-04 NOTE — TELEPHONE ENCOUNTER
Pt called to pay for his Eliquis and was told it is $400.00. Pt states he cannot afford this and asking for a call to discuss alternate more affordable pill for him. Please call pt.

## 2023-04-06 ENCOUNTER — TELEPHONE (OUTPATIENT)
Dept: PHARMACY | Age: 78
End: 2023-04-06

## 2023-04-06 NOTE — TELEPHONE ENCOUNTER
Received signed referral from Dr. Carol Dorman for pt to begin management of warfarin in clinic. Eliquis was too expensive so warfarin 5 mg script was sent to patient. Called and spoke to patient. States that he has had a stroke and takes a bit longer to process information. Asked to speak slowly. Pt has not yet received warfarin script, it is coming via mail order pharmacy. Reviewed what the clinic is and how appointments will work. Explained to pt will need initial appt ~5 days after starting warfarin. Will call patient back on Mon 4/10 to verify received warfarin and then schedule initial appt based off when started warfarin.

## 2023-04-19 ENCOUNTER — TELEPHONE (OUTPATIENT)
Dept: CARDIOLOGY CLINIC | Age: 78
End: 2023-04-19

## 2023-04-19 NOTE — TELEPHONE ENCOUNTER
Shiv Pinon called and has a couple of questions for [place] and asking if she would give her a call. Please advise.

## 2023-04-19 NOTE — TELEPHONE ENCOUNTER
There is no physician comummication/HIPPA form.  I can not speak to [facility] without consent from the patient

## 2023-04-21 ENCOUNTER — TELEPHONE (OUTPATIENT)
Dept: CARDIOLOGY CLINIC | Age: 78
End: 2023-04-21

## 2023-04-21 NOTE — TELEPHONE ENCOUNTER
Talked to Pt and he gives his permission to talk to Tono Lizama his Daughter. Says she is like his private nurse. Tono Lizama is wanting to discuss paperwork on pt.  Please call Tono Lizama @ [phone]

## 2023-04-21 NOTE — TELEPHONE ENCOUNTER
Spoke to Zamzam and discussed home coumadin management as the patient does not drive. I explained that we do not manage INR levels in the office and recommended she contact PCP to see if he would be willing to manage his levels.  She VU

## 2023-04-21 NOTE — TELEPHONE ENCOUNTER
Michael Link, daughter called to request that cmv change the  Pt's med from warfarin to Eliquis. Please send a 30 day supply to:      [facility] [phone] [address] [phone]       And send the other script to:      [facility], [address] [phone] Jeffry Dear [phone]     NOTE: per Michael Link she does not know what the mg would be  for  the Eliquis and how should he take this med.   Please advise

## 2023-04-21 NOTE — TELEPHONE ENCOUNTER
Spoke with the patients daughter, he started Warfarin on 04/19/23. Daughter states patient is unable to have INR checked weekly and does not want to complete process with PCP to have home INR monitored. Discussed with CMV , OK to stop Warfarin and start Eliquis 5 mg BID. Rx has been sent.  Please let the daughter know

## 2023-05-01 ENCOUNTER — TELEPHONE (OUTPATIENT)
Dept: CARDIOLOGY CLINIC | Age: 78
End: 2023-05-01

## 2023-05-03 ENCOUNTER — OFFICE VISIT (OUTPATIENT)
Dept: PULMONOLOGY | Age: 78
End: 2023-05-03
Payer: MEDICARE

## 2023-05-03 VITALS — OXYGEN SATURATION: 99 % | HEART RATE: 88 BPM

## 2023-05-03 DIAGNOSIS — G47.33 OSA (OBSTRUCTIVE SLEEP APNEA): Chronic | ICD-10-CM

## 2023-05-03 DIAGNOSIS — J44.9 COPD, SEVERE (HCC): Primary | ICD-10-CM

## 2023-05-03 DIAGNOSIS — E66.01 MORBID OBESITY (HCC): Chronic | ICD-10-CM

## 2023-05-03 DIAGNOSIS — J96.11 CHRONIC RESPIRATORY FAILURE WITH HYPOXIA (HCC): ICD-10-CM

## 2023-05-03 DIAGNOSIS — R91.1 PULMONARY NODULE: ICD-10-CM

## 2023-05-03 PROCEDURE — 1123F ACP DISCUSS/DSCN MKR DOCD: CPT | Performed by: INTERNAL MEDICINE

## 2023-05-03 PROCEDURE — 99214 OFFICE O/P EST MOD 30 MIN: CPT | Performed by: INTERNAL MEDICINE

## 2023-05-03 NOTE — PROGRESS NOTES
[facility]  Progress Note  Bhaskar Mckeon MD       [facility]   YOB: 1945    Date of Visit:  5/3/2023    Assessment/Plan:  1. COPD, very severe ([address]  PFT 3/13:  INTERPRETATION: Spirometry showed decreased FVC of 3.32 L, 65% predicted and  decreased FEV1 of 1.52 L 37% predicted. FEV1:FVC ratio was decreased. No  response to bronchodilators demonstrated. Lung volumes showed normal total  lung capacity of 94% predicted. Diffusion capacity showed decreased DLCO of  49% predicted. IMPRESSION: Very severe obstructive defect with no response to  bronchodilators. Normal lung volumes with moderate to severe decrease in  diffusion capacity noted. In comparison to the test that was done in  February of 2011 no significant change in FVC noted but FEV1 has decreased by  22% and total lung capacity by 20% as well as DLCO by 10%. Breo -- he likes this better than Advair ==> Trelegy ==> Breztri. They have all felt about the same to him. Has Ventolin which he likes  Duoneb      LDCT chest ordered but he has not had this done. 2. Chronic respiratory failure with hypoxia ([address]  Patient was discharged from the hospital on 6 L/min nasal cannula oxygen supplement. He is wearing that. He also is bleeding it into his noninvasive ventilator. He is on an astral 100. He is wearing the an IMV nightly. He feels like that is helping him. Oxygen saturation at rest on 6 L was 97%. I turned him to 3 L and he remained 97%. However, when he got up to walk he only made it a few feet before he desaturated. He got as low as 84% and it took him a few minutes to recover. I would recommend he remains on 6 L/min, 24 hours/day until we see him back in about 3 months    3. Type 2 diabetes mellitus without complication, without long-term current use of insulin (HCC)  Well controlled    4.  STEF (obstructive sleep apnea)  His CPAP is broken beyond repair  This is >10 years

## 2023-05-08 ENCOUNTER — NURSE ONLY (OUTPATIENT)
Dept: CARDIOLOGY CLINIC | Age: 78
End: 2023-05-08
Payer: MEDICARE

## 2023-05-08 DIAGNOSIS — I63.312 CEREBROVASCULAR ACCIDENT (CVA) DUE TO THROMBOSIS OF LEFT MIDDLE CEREBRAL ARTERY (HCC): ICD-10-CM

## 2023-05-08 DIAGNOSIS — I49.3 VENTRICULAR ECTOPY: ICD-10-CM

## 2023-05-08 DIAGNOSIS — Z45.09 ENCOUNTER FOR ELECTRONIC ANALYSIS OF REVEAL EVENT RECORDER: ICD-10-CM

## 2023-05-08 PROCEDURE — 93298 REM INTERROG DEV EVAL SCRMS: CPT | Performed by: INTERNAL MEDICINE

## 2023-05-08 PROCEDURE — G2066 INTER DEVC REMOTE 30D: HCPCS | Performed by: INTERNAL MEDICINE

## 2023-05-08 NOTE — PROGRESS NOTES
We received a remote transmission from patient's monitor at home. Remote Linq report shows SR with ectopy. AF is not real. EP physician to review. We will continue to monitor remotely. Implanted for stroke. End of 31-day monitoring period 5-8-23.

## 2023-05-12 ENCOUNTER — OFFICE VISIT (OUTPATIENT)
Dept: INTERNAL MEDICINE CLINIC | Age: 78
End: 2023-05-12
Payer: MEDICARE

## 2023-05-12 VITALS
HEART RATE: 92 BPM | DIASTOLIC BLOOD PRESSURE: 76 MMHG | WEIGHT: 315 LBS | HEIGHT: 72 IN | OXYGEN SATURATION: 98 % | BODY MASS INDEX: 42.66 KG/M2 | SYSTOLIC BLOOD PRESSURE: 138 MMHG

## 2023-05-12 DIAGNOSIS — L30.9 DERMATITIS: ICD-10-CM

## 2023-05-12 DIAGNOSIS — N18.31 STAGE 3A CHRONIC KIDNEY DISEASE (HCC): ICD-10-CM

## 2023-05-12 DIAGNOSIS — Z00.00 MEDICARE ANNUAL WELLNESS VISIT, SUBSEQUENT: Primary | ICD-10-CM

## 2023-05-12 DIAGNOSIS — E11.9 TYPE 2 DIABETES MELLITUS WITHOUT COMPLICATION, WITHOUT LONG-TERM CURRENT USE OF INSULIN (HCC): ICD-10-CM

## 2023-05-12 DIAGNOSIS — I48.0 PAROXYSMAL ATRIAL FIBRILLATION (HCC): ICD-10-CM

## 2023-05-12 DIAGNOSIS — E55.9 VITAMIN D DEFICIENCY: ICD-10-CM

## 2023-05-12 LAB
25(OH)D3 SERPL-MCNC: 10.4 NG/ML
ALBUMIN SERPL-MCNC: 4.2 G/DL (ref 3.4–5)
ALBUMIN/GLOB SERPL: 1.3 {RATIO} (ref 1.1–2.2)
ALP SERPL-CCNC: 61 U/L (ref 40–129)
ALT SERPL-CCNC: 9 U/L (ref 10–40)
ANION GAP SERPL CALCULATED.3IONS-SCNC: 13 MMOL/L (ref 3–16)
AST SERPL-CCNC: 12 U/L (ref 15–37)
BILIRUB SERPL-MCNC: 0.3 MG/DL (ref 0–1)
BUN SERPL-MCNC: 32 MG/DL (ref 7–20)
CALCIUM SERPL-MCNC: 9.4 MG/DL (ref 8.3–10.6)
CHLORIDE SERPL-SCNC: 101 MMOL/L (ref 99–110)
CHOLEST SERPL-MCNC: 162 MG/DL (ref 0–199)
CO2 SERPL-SCNC: 29 MMOL/L (ref 21–32)
CREAT SERPL-MCNC: 1.1 MG/DL (ref 0.8–1.3)
GFR SERPLBLD CREATININE-BSD FMLA CKD-EPI: >60 ML/MIN/{1.73_M2}
GLUCOSE SERPL-MCNC: 114 MG/DL (ref 70–99)
HDLC SERPL-MCNC: 93 MG/DL (ref 40–60)
LDLC SERPL CALC-MCNC: 51 MG/DL
POTASSIUM SERPL-SCNC: 4.4 MMOL/L (ref 3.5–5.1)
PROT SERPL-MCNC: 7.5 G/DL (ref 6.4–8.2)
PTH-INTACT SERPL-MCNC: 128.5 PG/ML (ref 14–72)
SODIUM SERPL-SCNC: 143 MMOL/L (ref 136–145)
TRIGL SERPL-MCNC: 90 MG/DL (ref 0–150)
VLDLC SERPL CALC-MCNC: 18 MG/DL

## 2023-05-12 PROCEDURE — 1123F ACP DISCUSS/DSCN MKR DOCD: CPT | Performed by: INTERNAL MEDICINE

## 2023-05-12 PROCEDURE — G0439 PPPS, SUBSEQ VISIT: HCPCS | Performed by: INTERNAL MEDICINE

## 2023-05-12 PROCEDURE — 3044F HG A1C LEVEL LT 7.0%: CPT | Performed by: INTERNAL MEDICINE

## 2023-05-12 PROCEDURE — 3078F DIAST BP <80 MM HG: CPT | Performed by: INTERNAL MEDICINE

## 2023-05-12 PROCEDURE — 3074F SYST BP LT 130 MM HG: CPT | Performed by: INTERNAL MEDICINE

## 2023-05-12 RX ORDER — THIAMINE MONONITRATE (VIT B1) 100 MG
100 TABLET ORAL DAILY
COMMUNITY

## 2023-05-12 RX ORDER — TRIAMCINOLONE ACETONIDE 1 MG/G
CREAM TOPICAL
Qty: 240 G | Refills: 1 | Status: SHIPPED | OUTPATIENT
Start: 2023-05-12 | End: 2023-05-19 | Stop reason: SDUPTHER

## 2023-05-12 RX ORDER — TRIAMCINOLONE ACETONIDE 1 MG/G
CREAM TOPICAL 2 TIMES DAILY
COMMUNITY
End: 2023-05-12 | Stop reason: SDUPTHER

## 2023-05-12 SDOH — ECONOMIC STABILITY: FOOD INSECURITY: WITHIN THE PAST 12 MONTHS, THE FOOD YOU BOUGHT JUST DIDN'T LAST AND YOU DIDN'T HAVE MONEY TO GET MORE.: NEVER TRUE

## 2023-05-12 SDOH — ECONOMIC STABILITY: INCOME INSECURITY: HOW HARD IS IT FOR YOU TO PAY FOR THE VERY BASICS LIKE FOOD, HOUSING, MEDICAL CARE, AND HEATING?: NOT HARD AT ALL

## 2023-05-12 SDOH — ECONOMIC STABILITY: FOOD INSECURITY: WITHIN THE PAST 12 MONTHS, YOU WORRIED THAT YOUR FOOD WOULD RUN OUT BEFORE YOU GOT MONEY TO BUY MORE.: NEVER TRUE

## 2023-05-12 ASSESSMENT — LIFESTYLE VARIABLES
HOW OFTEN DURING THE LAST YEAR HAVE YOU FAILED TO DO WHAT WAS NORMALLY EXPECTED FROM YOU BECAUSE OF DRINKING: 0
HOW OFTEN DURING THE LAST YEAR HAVE YOU NEEDED AN ALCOHOLIC DRINK FIRST THING IN THE MORNING TO GET YOURSELF GOING AFTER A NIGHT OF HEAVY DRINKING: 0
HOW MANY STANDARD DRINKS CONTAINING ALCOHOL DO YOU HAVE ON A TYPICAL DAY: 3 OR 4
HAVE YOU OR SOMEONE ELSE BEEN INJURED AS A RESULT OF YOUR DRINKING: 0
HOW OFTEN DURING THE LAST YEAR HAVE YOU FOUND THAT YOU WERE NOT ABLE TO STOP DRINKING ONCE YOU HAD STARTED: 0
HOW OFTEN DURING THE LAST YEAR HAVE YOU HAD A FEELING OF GUILT OR REMORSE AFTER DRINKING: 0
HAS A RELATIVE, FRIEND, DOCTOR, OR ANOTHER HEALTH PROFESSIONAL EXPRESSED CONCERN ABOUT YOUR DRINKING OR SUGGESTED YOU CUT DOWN: 0
HOW OFTEN DO YOU HAVE A DRINK CONTAINING ALCOHOL: 4 OR MORE TIMES A WEEK
HOW OFTEN DURING THE LAST YEAR HAVE YOU BEEN UNABLE TO REMEMBER WHAT HAPPENED THE NIGHT BEFORE BECAUSE YOU HAD BEEN DRINKING: 0

## 2023-05-12 ASSESSMENT — PATIENT HEALTH QUESTIONNAIRE - PHQ9
2. FEELING DOWN, DEPRESSED OR HOPELESS: 1
SUM OF ALL RESPONSES TO PHQ QUESTIONS 1-9: 3
3. TROUBLE FALLING OR STAYING ASLEEP: 0
6. FEELING BAD ABOUT YOURSELF - OR THAT YOU ARE A FAILURE OR HAVE LET YOURSELF OR YOUR FAMILY DOWN: 0
9. THOUGHTS THAT YOU WOULD BE BETTER OFF DEAD, OR OF HURTING YOURSELF: 0
SUM OF ALL RESPONSES TO PHQ QUESTIONS 1-9: 3
1. LITTLE INTEREST OR PLEASURE IN DOING THINGS: 1
SUM OF ALL RESPONSES TO PHQ QUESTIONS 1-9: 3
5. POOR APPETITE OR OVEREATING: 0
8. MOVING OR SPEAKING SO SLOWLY THAT OTHER PEOPLE COULD HAVE NOTICED. OR THE OPPOSITE, BEING SO FIGETY OR RESTLESS THAT YOU HAVE BEEN MOVING AROUND A LOT MORE THAN USUAL: 0
SUM OF ALL RESPONSES TO PHQ9 QUESTIONS 1 & 2: 2
4. FEELING TIRED OR HAVING LITTLE ENERGY: 1
SUM OF ALL RESPONSES TO PHQ QUESTIONS 1-9: 3
7. TROUBLE CONCENTRATING ON THINGS, SUCH AS READING THE NEWSPAPER OR WATCHING TELEVISION: 0
10. IF YOU CHECKED OFF ANY PROBLEMS, HOW DIFFICULT HAVE THESE PROBLEMS MADE IT FOR YOU TO DO YOUR WORK, TAKE CARE OF THINGS AT HOME, OR GET ALONG WITH OTHER PEOPLE: 0

## 2023-05-13 LAB
EST. AVERAGE GLUCOSE BLD GHB EST-MCNC: 108.3 MG/DL
HBA1C MFR BLD: 5.4 %

## 2023-05-15 ENCOUNTER — NURSE ONLY (OUTPATIENT)
Dept: CARDIOLOGY CLINIC | Age: 78
End: 2023-05-15

## 2023-05-15 ENCOUNTER — OFFICE VISIT (OUTPATIENT)
Dept: CARDIOLOGY CLINIC | Age: 78
End: 2023-05-15
Payer: MEDICARE

## 2023-05-15 VITALS
DIASTOLIC BLOOD PRESSURE: 80 MMHG | WEIGHT: 315 LBS | BODY MASS INDEX: 42.66 KG/M2 | SYSTOLIC BLOOD PRESSURE: 132 MMHG | OXYGEN SATURATION: 97 % | HEIGHT: 72 IN | HEART RATE: 86 BPM

## 2023-05-15 DIAGNOSIS — E78.5 HYPERLIPIDEMIA, UNSPECIFIED HYPERLIPIDEMIA TYPE: Primary | ICD-10-CM

## 2023-05-15 PROCEDURE — 3079F DIAST BP 80-89 MM HG: CPT | Performed by: INTERNAL MEDICINE

## 2023-05-15 PROCEDURE — 3075F SYST BP GE 130 - 139MM HG: CPT | Performed by: INTERNAL MEDICINE

## 2023-05-15 PROCEDURE — 93000 ELECTROCARDIOGRAM COMPLETE: CPT | Performed by: INTERNAL MEDICINE

## 2023-05-15 PROCEDURE — 1123F ACP DISCUSS/DSCN MKR DOCD: CPT | Performed by: INTERNAL MEDICINE

## 2023-05-15 PROCEDURE — 99214 OFFICE O/P EST MOD 30 MIN: CPT | Performed by: INTERNAL MEDICINE

## 2023-05-15 NOTE — PATIENT INSTRUCTIONS
So, you are starting Eliquis (Apixaban)? Please see below with helpful tips on lessening the cost:  ALL Commercial Insurance pts should get the $10 copay card. IF we do not have them - you can go to [employer] and activate the card from the website. These must be activated before presenting this at the pharmacy. This step is crucial!!! Otherwise, the pharmacy will state the card is invalid. The card is good for 24 months upon activation - once 24 months is used, you will get another card (or go to website) and active another card. Medicare patients with Part D -   Will pay their standard copay amount (typically $10-$60 month/max)  When hit [facility] - Will pay 25% of the drug cost (will be $140/month based on $561 wholesale retail cost of the drug  If can't afford the $140 - call 1-855-Eliquis to ask about resources available (Delmy said it helps to say resources available and not what assistance is available)  There is a program called Medicare Extra help - can be used once they hit the doughnut hole Medicare patients with no prescription coverage (or self-pay patients)   You will need to call the 1-855-Eliquis to ask what resources are available for them   If necessary- they should be applying for Medicaid and then the drug is free for them (typically 23K individual yearly income)    0-537Kimbiaquis (0-485.770.7331)  Always remember-shop around as pharmacies can differ quite a lot on cost.    [address] com  [employer]. com    Additional Affordability Resources    Other Resources   Here are some additional resources that may help you gain access to the medicines or services you need. This is not a complete list and is provided as a public service for health care providers, caregivers, and low-income patients. Area Agencies on Aging (ElderCare)  Local area agencies on aging may be able to help patients age 72 years and older who cannot afford their medicines.  To contact your local area agency on aging,

## 2023-05-15 NOTE — PROGRESS NOTES
Patient comes in for their OV with Dr. Merna Álvarez. S/p mdtlinq II Implant on 2/16/2023. Carelink Interrogation shows a Battery Status GOOD. Since 5/12/2023 -AT/AF with a 2.4% burden. 1.6% PVC burden. Implanted for Cryptogenic Stroke. Patient remains on Eliquis. Please see interrogation for more detail. We will continue to follow the Patient remotely. Yesy Armando from [employer] spoke with Traci about the ??? For AF episodes noted since 5/12/2023. States it is not an electrical reset but that there is a communication error between the device and [employer]link. Episode noted are correct.

## 2023-05-19 ENCOUNTER — TELEPHONE (OUTPATIENT)
Dept: INTERNAL MEDICINE CLINIC | Age: 78
End: 2023-05-19

## 2023-05-19 DIAGNOSIS — L30.9 DERMATITIS: ICD-10-CM

## 2023-05-19 RX ORDER — TRIAMCINOLONE ACETONIDE 1 MG/G
CREAM TOPICAL
Qty: 240 G | Refills: 1 | Status: SHIPPED | OUTPATIENT
Start: 2023-05-19

## 2023-05-19 NOTE — TELEPHONE ENCOUNTER
[id number] [place] [address] asking for a new script for triamcinolone (KENALOG) 0.1 % cream stating how many gms/day and how many times to apply.

## 2023-05-22 DIAGNOSIS — J44.9 COPD, VERY SEVERE (HCC): ICD-10-CM

## 2023-05-22 RX ORDER — PREDNISONE 1 MG/1
5 TABLET ORAL DAILY
Qty: 90 TABLET | Refills: 0 | Status: SHIPPED | OUTPATIENT
Start: 2023-05-22

## 2023-05-22 NOTE — TELEPHONE ENCOUNTER
Recent Visits  Date Type Provider Dept   05/12/23 Office Visit Rosalba Watkins MD [facility]   01/12/23 Office Visit Rosalba Watkins MD [facility]   10/03/22 Office Visit Rosalba Watkins MD [facility]   04/11/22 Office Visit Rosalba Watkins MD [facility]   Showing recent visits within past 540 days with a meds authorizing provider and meeting all other requirements  Future Appointments  Date Type Provider Dept   09/22/23 Appointment Rosalba Watkins MD [facility]   Showing future appointments within next 150 days with a meds authorizing provider and meeting all other requirements     5/12/2023

## 2023-07-17 ENCOUNTER — NURSE ONLY (OUTPATIENT)
Dept: CARDIOLOGY CLINIC | Age: 78
End: 2023-07-17
Payer: MEDICARE

## 2023-07-17 DIAGNOSIS — I49.3 VENTRICULAR ECTOPY: ICD-10-CM

## 2023-07-17 DIAGNOSIS — Z45.09 ENCOUNTER FOR ELECTRONIC ANALYSIS OF REVEAL EVENT RECORDER: ICD-10-CM

## 2023-07-17 DIAGNOSIS — I63.312 CEREBROVASCULAR ACCIDENT (CVA) DUE TO THROMBOSIS OF LEFT MIDDLE CEREBRAL ARTERY (HCC): ICD-10-CM

## 2023-07-17 PROCEDURE — 93298 REM INTERROG DEV EVAL SCRMS: CPT | Performed by: INTERNAL MEDICINE

## 2023-07-17 PROCEDURE — G2066 INTER DEVC REMOTE 30D: HCPCS | Performed by: INTERNAL MEDICINE

## 2023-07-17 NOTE — PROGRESS NOTES
We received a remote transmission from patient's monitor at home. Remote Linq report shows SR with ectopy. AF is not real. EP physician to review. We will continue to monitor remotely. Implanted for stroke. End of 31-day monitoring period 7-17-23.

## 2023-07-20 ENCOUNTER — TELEPHONE (OUTPATIENT)
Dept: CASE MANAGEMENT | Age: 78
End: 2023-07-20

## 2023-07-24 ENCOUNTER — TELEPHONE (OUTPATIENT)
Dept: PHARMACY | Facility: CLINIC | Age: 78
End: 2023-07-24

## 2023-07-24 NOTE — TELEPHONE ENCOUNTER
[facility] CLINICAL PHARMACY: ADHERENCE REVIEW  Identified care gap per [place]: fills at [facility]: Statin adherence    Patient also appears to be prescribed: Statin    ASSESSMENT   [employer] Records claims through 23 (Prior Year [address] = not reported; YTD [address] = FIRST FILL; Potential Fail Date: 23):   ROSUVASTATIN TAB 20MG last filled on 23 for 90 day supply. Next refill due: 23    Prescribed si tablet/capsule daily    Per Reconcile Dispense History: last filled on 23 for 90 day supply. Per [facility]: last picked up on 23 for 90 day supply. Lab Results   Component Value Date    CHOL 162 2023    TRIG 90 2023    HDL 93 (H) 2023    LDLCALC 51 2023     ALT   Date Value Ref Range Status   2023 9 (L) 10 - 40 U/L Final     AST   Date Value Ref Range Status   2023 12 (L) 15 - 37 U/L Final     The ASCVD Risk score (Shabnam PAUL, et al., 2019) failed to calculate for the following reasons: The patient has a prior MI or stroke diagnosis     PLAN  The following are interventions that have been identified:   Patient overdue refilling ROSUVASTATIN TAB 20MG and active on home medication list.     Attempting to reach patient to review. Left message asking for return call. Attempting to reach patient to review. Left message asking for return call - to review if patient is still taking ROSUVASTATIN TAB 20MG.   pt wants to refill at [facility] now    Recent Visits  Date Type Provider Dept   23 Office Visit Matilde Alexandre MD [facility]   23 Office Visit Matilde Alexandre MD [facility]   10/03/22 Office Visit Matilde Alexandre MD [facility]   22 Office Visit Matilde Alexandre MD [facility]   Showing recent visits within past 540 days with a meds authorizing provider and meeting all other requirements  Future Appointments  Date Type Provider

## 2023-07-25 NOTE — TELEPHONE ENCOUNTER
Patient returned call regarding rosuvastatin. Patient states he is not taking this medication any longer. He denies having any side effects, stating \"If I remember correctly, both of my doctors (cardio & pcp) wanted me to stop taking it\". Patient does not remember when he was advised to discontinue the medication. Will route to originating [facility].

## 2023-08-14 ENCOUNTER — TELEPHONE (OUTPATIENT)
Dept: CASE MANAGEMENT | Age: 78
End: 2023-08-14

## 2023-08-14 NOTE — TELEPHONE ENCOUNTER
Call to patient to assist with scheduling f/u CT Chest-states he is going to get it done in October and see MD in November.

## 2023-08-19 DIAGNOSIS — J44.9 COPD, VERY SEVERE (HCC): ICD-10-CM

## 2023-08-20 NOTE — TELEPHONE ENCOUNTER
Recent Visits  Date Type Provider Dept   05/12/23 Office Visit Gustavo Mckay MD [facility]   01/12/23 Office Visit Gustavo Mckay MD [facility]   10/03/22 Office Visit Gustavo Mckay MD [facility]   04/11/22 Office Visit Gustavo Mckay MD [facility]   Showing recent visits within past 540 days with a meds authorizing provider and meeting all other requirements  Future Appointments  Date Type Provider Dept   09/22/23 Appointment Gustavo Mckay MD [facility]   Showing future appointments within next 150 days with a meds authorizing provider and meeting all other requirements     5/12/2023

## 2023-08-21 RX ORDER — PREDNISONE 5 MG/1
TABLET ORAL
Qty: 90 TABLET | Refills: 0 | Status: SHIPPED | OUTPATIENT
Start: 2023-08-21

## 2023-08-31 PROCEDURE — G2066 INTER DEVC REMOTE 30D: HCPCS | Performed by: INTERNAL MEDICINE

## 2023-08-31 PROCEDURE — 93298 REM INTERROG DEV EVAL SCRMS: CPT | Performed by: INTERNAL MEDICINE

## 2023-09-12 ENCOUNTER — TELEPHONE (OUTPATIENT)
Dept: PULMONOLOGY | Age: 78
End: 2023-09-12

## 2023-09-12 ENCOUNTER — TELEPHONE (OUTPATIENT)
Dept: INTERNAL MEDICINE CLINIC | Age: 78
End: 2023-09-12

## 2023-09-12 NOTE — TELEPHONE ENCOUNTER
Pt has [employer], pt has a lot of medical issues and wants to know if Dr Roxie Roca can continue care after 10/1. I advised can't guarantee.      Pre cert phone is [phone] and fax [phone]  Thank you

## 2023-09-12 NOTE — TELEPHONE ENCOUNTER
I am happy to continue prescriptions and do whatever he needs me to do. However, I recommend that we do things over mychart as he will get charged if he comes into the office.

## 2023-09-12 NOTE — TELEPHONE ENCOUNTER
Attempted to call insurance. Got transferred to multiple departments who could not provide any assistance.     Will try back

## 2023-09-12 NOTE — TELEPHONE ENCOUNTER
Zeb called and asked that we do a PA for   continuity of care, so pt can stay with dr Abdiel Alatorre after 10/1

## 2023-09-13 ENCOUNTER — TELEPHONE (OUTPATIENT)
Dept: CARDIOLOGY CLINIC | Age: 78
End: 2023-09-13

## 2023-09-13 ENCOUNTER — PATIENT MESSAGE (OUTPATIENT)
Dept: PULMONOLOGY | Age: 78
End: 2023-09-13

## 2023-09-13 NOTE — TELEPHONE ENCOUNTER
Sofi called to ask if the office would send in a continuance of care Pre-Authorization for the Pt so his care won't be disrupted. Please fax to:  [phone]. Please advise.   Thank you

## 2023-09-13 NOTE — TELEPHONE ENCOUNTER
Advised pt  is happy to continue prescriptions and do whatever he needs me to do. However, Dr recommend that ya'll do things over mychart as he will get charged if he comes into the office.

## 2023-09-13 NOTE — TELEPHONE ENCOUNTER
Patient is going to have his daughter call us so we can explain to her why we wont be able to accept him. I advised him that when daughter calls I will gladly explain.

## 2023-09-13 NOTE — TELEPHONE ENCOUNTER
Called Zeb and received a pending auth for continuity of care with our group. Pending Auth# [id number]    Will send clinicals supporting request to F [phone].

## 2023-09-14 ENCOUNTER — CASE MANAGEMENT (OUTPATIENT)
Dept: PULMONOLOGY | Age: 78
End: 2023-09-14

## 2023-09-14 NOTE — TELEPHONE ENCOUNTER
Spoke with patient and left message for daughter. Will work on getting approval for continuity of care. Please see additional telephone encounter.

## 2023-09-14 NOTE — TELEPHONE ENCOUNTER
Please provide her with information about changing coverage. We are not able to provide continuity of care letter      Their  or [employer], a nationwide , at [phone] () can help patients change to a new plan during Annual Enrollment. Medicare at 1-800-MEDICARE (7-224.600.9814) can answer questions about eligibility to transfer to a new Medicare Advantage Plan now, as well as how to change to a new plan. The [address] [place] [address] at [phone] can help answer questions as well.

## 2023-09-14 NOTE — TELEPHONE ENCOUNTER
From: Guillermina Suárez  To: Dr. Francisca Hurtado  Sent: 9/13/2023 3:35 PM EDT  Subject: Out of network    Dr. Stephani Garcia like [employer] is dropping my Dads insurance in October. His other M[employer]ave have agreed to keep him on under cont of care since he is a long term pt being followed for existing medical conditions. Your office called today and let me k ow that [facility] would [facility]. The insurance company has been calling your office trying to establish continuity of care for dad since your are following him for a potential mass in his lung with an upcoming ct scan. I really need some answers whether or not he can keep coming to you under his current insurance, or will we have to find another provider?   Thanks  [facility]  Daughter

## 2023-10-03 DIAGNOSIS — E11.9 TYPE 2 DIABETES MELLITUS WITHOUT COMPLICATION, WITHOUT LONG-TERM CURRENT USE OF INSULIN (HCC): ICD-10-CM

## 2023-10-04 RX ORDER — BLOOD SUGAR DIAGNOSTIC
STRIP MISCELLANEOUS
Qty: 100 STRIP | Refills: 1 | Status: SHIPPED | OUTPATIENT
Start: 2023-10-04

## 2023-10-05 PROCEDURE — G2066 INTER DEVC REMOTE 30D: HCPCS | Performed by: INTERNAL MEDICINE

## 2023-10-05 PROCEDURE — 93298 REM INTERROG DEV EVAL SCRMS: CPT | Performed by: INTERNAL MEDICINE

## 2023-10-12 ENCOUNTER — OFFICE VISIT (OUTPATIENT)
Dept: INTERNAL MEDICINE CLINIC | Age: 78
End: 2023-10-12
Payer: MEDICARE

## 2023-10-12 VITALS
OXYGEN SATURATION: 95 % | HEART RATE: 85 BPM | DIASTOLIC BLOOD PRESSURE: 82 MMHG | WEIGHT: 315 LBS | TEMPERATURE: 97 F | SYSTOLIC BLOOD PRESSURE: 136 MMHG | HEIGHT: 72 IN | RESPIRATION RATE: 18 BRPM | BODY MASS INDEX: 42.66 KG/M2

## 2023-10-12 DIAGNOSIS — Z23 NEED FOR VACCINATION: ICD-10-CM

## 2023-10-12 DIAGNOSIS — J44.9 COPD, SEVERE (HCC): ICD-10-CM

## 2023-10-12 DIAGNOSIS — E55.9 VITAMIN D DEFICIENCY: ICD-10-CM

## 2023-10-12 DIAGNOSIS — I50.32 CHRONIC HEART FAILURE WITH PRESERVED EJECTION FRACTION (HCC): ICD-10-CM

## 2023-10-12 DIAGNOSIS — E11.9 TYPE 2 DIABETES MELLITUS WITHOUT COMPLICATION, WITHOUT LONG-TERM CURRENT USE OF INSULIN (HCC): Primary | ICD-10-CM

## 2023-10-12 PROCEDURE — 1123F ACP DISCUSS/DSCN MKR DOCD: CPT | Performed by: INTERNAL MEDICINE

## 2023-10-12 PROCEDURE — 3074F SYST BP LT 130 MM HG: CPT | Performed by: INTERNAL MEDICINE

## 2023-10-12 PROCEDURE — G0008 ADMIN INFLUENZA VIRUS VAC: HCPCS | Performed by: INTERNAL MEDICINE

## 2023-10-12 PROCEDURE — 99214 OFFICE O/P EST MOD 30 MIN: CPT | Performed by: INTERNAL MEDICINE

## 2023-10-12 PROCEDURE — 90694 VACC AIIV4 NO PRSRV 0.5ML IM: CPT | Performed by: INTERNAL MEDICINE

## 2023-10-12 PROCEDURE — 3078F DIAST BP <80 MM HG: CPT | Performed by: INTERNAL MEDICINE

## 2023-10-12 PROCEDURE — 3044F HG A1C LEVEL LT 7.0%: CPT | Performed by: INTERNAL MEDICINE

## 2023-10-12 RX ORDER — ERGOCALCIFEROL 1.25 MG/1
50000 CAPSULE ORAL WEEKLY
Qty: 12 CAPSULE | Refills: 3 | Status: SHIPPED | OUTPATIENT
Start: 2023-10-12

## 2023-10-12 ASSESSMENT — ANXIETY QUESTIONNAIRES
6. BECOMING EASILY ANNOYED OR IRRITABLE: 0
7. FEELING AFRAID AS IF SOMETHING AWFUL MIGHT HAPPEN: 0
1. FEELING NERVOUS, ANXIOUS, OR ON EDGE: 1
2. NOT BEING ABLE TO STOP OR CONTROL WORRYING: 0
IF YOU CHECKED OFF ANY PROBLEMS ON THIS QUESTIONNAIRE, HOW DIFFICULT HAVE THESE PROBLEMS MADE IT FOR YOU TO DO YOUR WORK, TAKE CARE OF THINGS AT HOME, OR GET ALONG WITH OTHER PEOPLE: NOT DIFFICULT AT ALL
4. TROUBLE RELAXING: 0
3. WORRYING TOO MUCH ABOUT DIFFERENT THINGS: 0
5. BEING SO RESTLESS THAT IT IS HARD TO SIT STILL: 0
GAD7 TOTAL SCORE: 1

## 2023-10-12 ASSESSMENT — PATIENT HEALTH QUESTIONNAIRE - PHQ9
10. IF YOU CHECKED OFF ANY PROBLEMS, HOW DIFFICULT HAVE THESE PROBLEMS MADE IT FOR YOU TO DO YOUR WORK, TAKE CARE OF THINGS AT HOME, OR GET ALONG WITH OTHER PEOPLE: 0
SUM OF ALL RESPONSES TO PHQ QUESTIONS 1-9: 3
6. FEELING BAD ABOUT YOURSELF - OR THAT YOU ARE A FAILURE OR HAVE LET YOURSELF OR YOUR FAMILY DOWN: 0
SUM OF ALL RESPONSES TO PHQ QUESTIONS 1-9: 3
4. FEELING TIRED OR HAVING LITTLE ENERGY: 3
7. TROUBLE CONCENTRATING ON THINGS, SUCH AS READING THE NEWSPAPER OR WATCHING TELEVISION: 0
1. LITTLE INTEREST OR PLEASURE IN DOING THINGS: 0
5. POOR APPETITE OR OVEREATING: 0
3. TROUBLE FALLING OR STAYING ASLEEP: 0
SUM OF ALL RESPONSES TO PHQ QUESTIONS 1-9: 3
8. MOVING OR SPEAKING SO SLOWLY THAT OTHER PEOPLE COULD HAVE NOTICED. OR THE OPPOSITE, BEING SO FIGETY OR RESTLESS THAT YOU HAVE BEEN MOVING AROUND A LOT MORE THAN USUAL: 0
SUM OF ALL RESPONSES TO PHQ9 QUESTIONS 1 & 2: 0
2. FEELING DOWN, DEPRESSED OR HOPELESS: 0
9. THOUGHTS THAT YOU WOULD BE BETTER OFF DEAD, OR OF HURTING YOURSELF: 0
SUM OF ALL RESPONSES TO PHQ QUESTIONS 1-9: 3

## 2023-10-12 NOTE — PROGRESS NOTES
Renée Stokes (:  1945) is a 70 y.o. male,Established patient, here for evaluation of the following chief complaint(s):  Follow-up         ASSESSMENT/PLAN:  1. Type 2 diabetes mellitus without complication, without long-term current use of insulin (HCC  Improved  -  continue off medications    2. Need for vaccination  -     Influenza, FLUAD, (age 72 y+), IM, PF, 0.5 mL    3. COPD, severe ([address])  Stable  -  continue oxygen  -  continue trelegy    4. Vitamin D deficiency  -     vitamin D (ERGOCALCIFEROL) 1.25 MG (24688 UT) CAPS capsule; Take 1 capsule by mouth once a week, Disp-12 capsule, R-3Normal  5. Chronic heart failure with preserved ejection fraction (HCC)  -     apixaban (ELIQUIS) 5 MG TABS tablet; Take 1 tablet by mouth 2 times daily, Disp-70 tablet, R-0Sample      Return in about 4 months (around 2024) for copd 30 min. Subjective   SUBJECTIVE/OBJECTIVE:  HPI  Treatment Adherence:   Medication compliance:  compliant most of the time  Diet compliance:  compliant most of the time  Weight trend: increasing  Current exercise: no regular exercise  Barriers: none    Diabetes Mellitus Type 2: Current symptoms/problems include none. Home blood sugar records: patient does not test  Any episodes of hypoglycemia? no  Eye exam current (within one year): no  Tobacco history: He  reports that he quit smoking about 11 years ago. His smoking use included cigarettes. He has a 50.00 pack-year smoking history. He has never used smokeless tobacco.   Daily Aspirin? Yes    Hypertension:  Home blood pressure monitoring: No.  He is adherent to a low sodium diet. Patient denies chest pain, shortness of breath, and headache. Antihypertensive medication side effects: no medication side effects noted. Use of agents associated with hypertension: none. Hyperlipidemia:  No new myalgias or GI upset on no medications.        Lab Results   Component Value Date    LABA1C 5.4 2023    LABA1C 5.6 2022

## 2023-10-16 ENCOUNTER — HOSPITAL ENCOUNTER (OUTPATIENT)
Dept: CT IMAGING | Age: 78
Discharge: HOME | End: 2023-10-16
Attending: INTERNAL MEDICINE
Payer: MEDICARE

## 2023-10-16 DIAGNOSIS — R91.1 PULMONARY NODULE: ICD-10-CM

## 2023-10-16 PROCEDURE — 71250 CT THORAX DX C-: CPT

## 2023-10-20 ENCOUNTER — TELEPHONE (OUTPATIENT)
Dept: PULMONOLOGY | Age: 78
End: 2023-10-20

## 2023-10-20 NOTE — TELEPHONE ENCOUNTER
----- Message from Karthikeyan Osuna MD sent at 10/20/2023 10:40 AM EDT -----  Please call the patient  Emphysema persists. Previously described pulmonary nodule has resolved.   Repeat in 1 year

## 2023-10-23 DIAGNOSIS — R91.1 PULMONARY NODULE: Primary | ICD-10-CM

## 2023-10-30 ENCOUNTER — TELEPHONE (OUTPATIENT)
Dept: CARDIOLOGY CLINIC | Age: 78
End: 2023-10-30

## 2023-10-30 NOTE — TELEPHONE ENCOUNTER
LM for Tony Guerrero, pt's daughter (got verbal approval from pt to speak w/her), to call and schedule yearly w/NPT.

## 2023-11-06 DIAGNOSIS — J44.9 COPD, VERY SEVERE (HCC): ICD-10-CM

## 2023-11-06 RX ORDER — PREDNISONE 5 MG/1
TABLET ORAL
Qty: 90 TABLET | Refills: 0 | Status: SHIPPED | OUTPATIENT
Start: 2023-11-06

## 2023-11-07 ENCOUNTER — OFFICE VISIT (OUTPATIENT)
Dept: PULMONOLOGY | Age: 78
End: 2023-11-07
Payer: MEDICARE

## 2023-11-07 VITALS
SYSTOLIC BLOOD PRESSURE: 131 MMHG | OXYGEN SATURATION: 95 % | DIASTOLIC BLOOD PRESSURE: 88 MMHG | HEART RATE: 99 BPM | WEIGHT: 315 LBS | HEIGHT: 72 IN | BODY MASS INDEX: 42.66 KG/M2

## 2023-11-07 DIAGNOSIS — R91.1 PULMONARY NODULE: ICD-10-CM

## 2023-11-07 DIAGNOSIS — G47.33 OSA (OBSTRUCTIVE SLEEP APNEA): Chronic | ICD-10-CM

## 2023-11-07 DIAGNOSIS — J44.9 COPD, SEVERE (HCC): Primary | ICD-10-CM

## 2023-11-07 DIAGNOSIS — J96.11 CHRONIC RESPIRATORY FAILURE WITH HYPOXIA (HCC): ICD-10-CM

## 2023-11-07 PROCEDURE — 3079F DIAST BP 80-89 MM HG: CPT | Performed by: INTERNAL MEDICINE

## 2023-11-07 PROCEDURE — 1123F ACP DISCUSS/DSCN MKR DOCD: CPT | Performed by: INTERNAL MEDICINE

## 2023-11-07 PROCEDURE — 3075F SYST BP GE 130 - 139MM HG: CPT | Performed by: INTERNAL MEDICINE

## 2023-11-07 PROCEDURE — 99214 OFFICE O/P EST MOD 30 MIN: CPT | Performed by: INTERNAL MEDICINE

## 2023-11-07 RX ORDER — FLUTICASONE PROPIONATE AND SALMETEROL 250; 50 UG/1; UG/1
1 POWDER RESPIRATORY (INHALATION) EVERY 12 HOURS
Qty: 60 EACH | Refills: 3 | Status: SHIPPED | OUTPATIENT
Start: 2023-11-07

## 2023-11-07 NOTE — PROGRESS NOTES
[facility]  Progress Note  MD Arun Rodriges   YOB: 1945    Date of Visit:  11/7/2023    Assessment/Plan:  1. COPD, very severe ([address])  PFT 3/13:  INTERPRETATION: Spirometry showed decreased FVC of 3.32 L, 65% predicted and  decreased FEV1 of 1.52 L 37% predicted. FEV1:FVC ratio was decreased. No  response to bronchodilators demonstrated. Lung volumes showed normal total  lung capacity of 94% predicted. Diffusion capacity showed decreased DLCO of  49% predicted. IMPRESSION: Very severe obstructive defect with no response to  bronchodilators. Normal lung volumes with moderate to severe decrease in  diffusion capacity noted. In comparison to the test that was done in  February of 2011 no significant change in FVC noted but FEV1 has decreased by  22% and total lung capacity by 20% as well as DLCO by 10%. Breo -- he likes this better than Nirmal ==> Trelegy ==> Breztri. They have all felt about the same to him. Cost is an issue    He wants to go back to Advair. Has Ventolin which he likes  Duoneb    I gave him a script for handicap placard. 2. Chronic respiratory failure with hypoxia ([address])  Patient was discharged from the hospital on 6 L/min nasal cannula oxygen supplement. He is wearing that. He also is bleeding it into his noninvasive ventilator. He is on an astral 100. He is wearing the an IMV nightly. He feels like that is helping him. Oxygen saturation at rest on 6 L was 97%. I turned him to 3 L and he remained 97%. However, when he got up to walk he only made it a few feet before he desaturated. He got as low as 84% and it took him a few minutes to recover. I would recommend he remains on 6 L/min, 24 hours/day until we see him back in about 3 months    3. Type 2 diabetes mellitus without complication, without long-term current use of insulin (HCC)  Well controlled    4.  STEF (obstructive sleep apnea)  Using NIV

## 2023-11-09 PROCEDURE — 93298 REM INTERROG DEV EVAL SCRMS: CPT | Performed by: INTERNAL MEDICINE

## 2023-11-09 PROCEDURE — G2066 INTER DEVC REMOTE 30D: HCPCS | Performed by: INTERNAL MEDICINE

## 2023-11-27 ENCOUNTER — APPOINTMENT (OUTPATIENT)
Dept: GENERAL RADIOLOGY | Age: 78
End: 2023-11-27
Payer: MEDICARE

## 2023-11-27 ENCOUNTER — APPOINTMENT (OUTPATIENT)
Dept: CT IMAGING | Age: 78
End: 2023-11-27
Payer: MEDICARE

## 2023-11-27 ENCOUNTER — HOSPITAL ENCOUNTER (INPATIENT)
Age: 78
LOS: 4 days | Discharge: HOME | End: 2023-12-01
Attending: EMERGENCY MEDICINE | Admitting: STUDENT IN AN ORGANIZED HEALTH CARE EDUCATION/TRAINING PROGRAM
Payer: MEDICARE

## 2023-11-27 DIAGNOSIS — R53.1 GENERALIZED WEAKNESS: ICD-10-CM

## 2023-11-27 DIAGNOSIS — I50.9 ACUTE ON CHRONIC CONGESTIVE HEART FAILURE, UNSPECIFIED HEART FAILURE TYPE (HCC): ICD-10-CM

## 2023-11-27 DIAGNOSIS — J96.11 CHRONIC RESPIRATORY FAILURE WITH HYPOXIA AND HYPERCAPNIA (HCC): ICD-10-CM

## 2023-11-27 DIAGNOSIS — J96.12 CHRONIC RESPIRATORY FAILURE WITH HYPOXIA AND HYPERCAPNIA (HCC): ICD-10-CM

## 2023-11-27 DIAGNOSIS — N18.31 STAGE 3A CHRONIC KIDNEY DISEASE (HCC): ICD-10-CM

## 2023-11-27 DIAGNOSIS — R79.89 ELEVATED TROPONIN: ICD-10-CM

## 2023-11-27 DIAGNOSIS — J18.9 PNEUMONIA OF BOTH LOWER LOBES DUE TO INFECTIOUS ORGANISM: Primary | ICD-10-CM

## 2023-11-27 PROBLEM — Z86.73 HISTORY OF CVA (CEREBROVASCULAR ACCIDENT): Status: RESOLVED | Noted: 2023-11-27 | Resolved: 2023-11-27

## 2023-11-27 PROBLEM — J96.22 ACUTE ON CHRONIC RESPIRATORY FAILURE WITH HYPERCAPNIA (HCC): Status: ACTIVE | Noted: 2023-11-27

## 2023-11-27 PROBLEM — I21.4 NSTEMI (NON-ST ELEVATED MYOCARDIAL INFARCTION) (HCC): Status: ACTIVE | Noted: 2023-11-27

## 2023-11-27 PROBLEM — J96.02 ACUTE RESPIRATORY FAILURE WITH HYPERCAPNIA (HCC): Status: ACTIVE | Noted: 2022-09-07

## 2023-11-27 PROBLEM — I25.84 CORONARY ARTERY DISEASE DUE TO CALCIFIED CORONARY LESION: Status: ACTIVE | Noted: 2023-11-27

## 2023-11-27 PROBLEM — A41.9 SEPSIS (HCC): Status: ACTIVE | Noted: 2023-11-27

## 2023-11-27 PROBLEM — I25.10 CORONARY ARTERY DISEASE DUE TO CALCIFIED CORONARY LESION: Status: ACTIVE | Noted: 2023-11-27

## 2023-11-27 PROBLEM — Z86.73 HISTORY OF CVA (CEREBROVASCULAR ACCIDENT): Status: ACTIVE | Noted: 2023-11-27

## 2023-11-27 PROBLEM — I73.9 PERIPHERAL VASCULAR DISEASE (HCC): Status: ACTIVE | Noted: 2023-11-27

## 2023-11-27 PROBLEM — I25.10 CORONARY ARTERY CALCIFICATION SEEN ON CT SCAN: Status: ACTIVE | Noted: 2023-11-27

## 2023-11-27 LAB
ALBUMIN SERPL-MCNC: 4 G/DL (ref 3.4–5)
ALBUMIN/GLOB SERPL: 1.1 {RATIO} (ref 1.1–2.2)
ALP SERPL-CCNC: 53 U/L (ref 40–129)
ALT SERPL-CCNC: 17 U/L (ref 10–40)
ANION GAP SERPL CALCULATED.3IONS-SCNC: 8 MMOL/L (ref 3–16)
ANTI-XA UNFRAC HEPARIN: >1.1 IU/ML (ref 0.3–0.7)
APTT BLD: 37.2 SEC (ref 22.7–35.9)
APTT BLD: 59.3 SEC (ref 22.7–35.9)
AST SERPL-CCNC: 25 U/L (ref 15–37)
BACTERIA URNS QL MICRO: ABNORMAL /HPF
BASE EXCESS BLDV CALC-SCNC: 6.8 MMOL/L (ref -3–3)
BASOPHILS # BLD: 0.1 K/UL (ref 0–0.2)
BASOPHILS NFR BLD: 0.3 %
BILIRUB SERPL-MCNC: 0.5 MG/DL (ref 0–1)
BILIRUB UR QL STRIP.AUTO: NEGATIVE
BUN SERPL-MCNC: 41 MG/DL (ref 7–20)
CALCIUM SERPL-MCNC: 8.8 MG/DL (ref 8.3–10.6)
CHLORIDE SERPL-SCNC: 99 MMOL/L (ref 99–110)
CK SERPL-CCNC: 172 U/L (ref 39–308)
CLARITY UR: CLEAR
CO2 BLDV-SCNC: 85 MMOL/L
CO2 SERPL-SCNC: 33 MMOL/L (ref 21–32)
COHGB MFR BLDV: 2.1 % (ref 0–1.5)
COLOR UR: YELLOW
CREAT SERPL-MCNC: 1.4 MG/DL (ref 0.8–1.3)
DEPRECATED RDW RBC AUTO: 14.7 % (ref 12.4–15.4)
DO-HGB MFR BLDV: 5 %
EKG ATRIAL RATE: 111 BPM
EKG DIAGNOSIS: NORMAL
EKG P AXIS: -4 DEGREES
EKG P-R INTERVAL: 224 MS
EKG Q-T INTERVAL: 316 MS
EKG QRS DURATION: 112 MS
EKG QTC CALCULATION (BAZETT): 429 MS
EKG R AXIS: -49 DEGREES
EKG T AXIS: 78 DEGREES
EKG VENTRICULAR RATE: 111 BPM
EOSINOPHIL # BLD: 0.1 K/UL (ref 0–0.6)
EOSINOPHIL NFR BLD: 0.5 %
EPI CELLS #/AREA URNS AUTO: 1 /HPF (ref 0–5)
FLUAV RNA RESP QL NAA+PROBE: NOT DETECTED
FLUBV RNA RESP QL NAA+PROBE: NOT DETECTED
GFR SERPLBLD CREATININE-BSD FMLA CKD-EPI: 52 ML/MIN/{1.73_M2}
GLUCOSE BLD-MCNC: 109 MG/DL (ref 70–99)
GLUCOSE BLD-MCNC: 114 MG/DL (ref 70–99)
GLUCOSE BLD-MCNC: 124 MG/DL (ref 70–99)
GLUCOSE SERPL-MCNC: 150 MG/DL (ref 70–99)
GLUCOSE UR STRIP.AUTO-MCNC: NEGATIVE MG/DL
HCO3 BLDV-SCNC: 35.6 MMOL/L (ref 23–29)
HCT VFR BLD AUTO: 35.8 % (ref 40.5–52.5)
HGB BLD-MCNC: 11.3 G/DL (ref 13.5–17.5)
HGB UR QL STRIP.AUTO: ABNORMAL
HYALINE CASTS #/AREA URNS AUTO: 2 /LPF (ref 0–8)
INR PPP: 1.28 (ref 0.84–1.16)
KETONES UR STRIP.AUTO-MCNC: NEGATIVE MG/DL
LACTATE BLDV-SCNC: 1.7 MMOL/L (ref 0.4–1.9)
LACTATE BLDV-SCNC: 2.4 MMOL/L (ref 0.4–1.9)
LEUKOCYTE ESTERASE UR QL STRIP.AUTO: NEGATIVE
LIPASE SERPL-CCNC: 14 U/L (ref 13–60)
LYMPHOCYTES # BLD: 0.2 K/UL (ref 1–5.1)
LYMPHOCYTES NFR BLD: 1.2 %
MCH RBC QN AUTO: 33 PG (ref 26–34)
MCHC RBC AUTO-ENTMCNC: 31.6 G/DL (ref 31–36)
MCV RBC AUTO: 104.5 FL (ref 80–100)
METHGB MFR BLDV: 0.4 %
MONOCYTES # BLD: 0.9 K/UL (ref 0–1.3)
MONOCYTES NFR BLD: 5.8 %
NEUTROPHILS # BLD: 13.9 K/UL (ref 1.7–7.7)
NEUTROPHILS NFR BLD: 92.2 %
NITRITE UR QL STRIP.AUTO: NEGATIVE
NT-PROBNP SERPL-MCNC: 1131 PG/ML (ref 0–449)
O2 CT VFR BLDV CALC: 16 VOL %
O2 THERAPY: ABNORMAL
PCO2 BLDV: 73.3 MMHG (ref 40–50)
PERFORMED ON: ABNORMAL
PH BLDV: 7.29 [PH] (ref 7.35–7.45)
PH UR STRIP.AUTO: 5 [PH] (ref 5–8)
PLATELET # BLD AUTO: 288 K/UL (ref 135–450)
PMV BLD AUTO: 6.9 FL (ref 5–10.5)
PO2 BLDV: 89.8 MMHG (ref 25–40)
POTASSIUM SERPL-SCNC: 4.1 MMOL/L (ref 3.5–5.1)
PROCALCITONIN SERPL IA-MCNC: 3.12 NG/ML (ref 0–0.15)
PROT SERPL-MCNC: 7.6 G/DL (ref 6.4–8.2)
PROT UR STRIP.AUTO-MCNC: ABNORMAL MG/DL
PROTHROMBIN TIME: 16 SEC (ref 11.5–14.8)
RBC # BLD AUTO: 3.42 M/UL (ref 4.2–5.9)
RBC CLUMPS #/AREA URNS AUTO: 10 /HPF (ref 0–4)
SAO2 % BLDV: 95 %
SARS-COV-2 RNA RESP QL NAA+PROBE: NOT DETECTED
SODIUM SERPL-SCNC: 140 MMOL/L (ref 136–145)
SP GR UR STRIP.AUTO: 1.01 (ref 1–1.03)
TROPONIN, HIGH SENSITIVITY: 101 NG/L (ref 0–22)
TROPONIN, HIGH SENSITIVITY: 103 NG/L (ref 0–22)
TROPONIN, HIGH SENSITIVITY: 89 NG/L (ref 0–22)
UA COMPLETE W REFLEX CULTURE PNL UR: ABNORMAL
UA DIPSTICK W REFLEX MICRO PNL UR: YES
URN SPEC COLLECT METH UR: ABNORMAL
UROBILINOGEN UR STRIP-ACNC: 0.2 E.U./DL
WBC # BLD AUTO: 15.1 K/UL (ref 4–11)
WBC #/AREA URNS AUTO: 1 /HPF (ref 0–5)

## 2023-11-27 PROCEDURE — 82803 BLOOD GASES ANY COMBINATION: CPT

## 2023-11-27 PROCEDURE — 85610 PROTHROMBIN TIME: CPT

## 2023-11-27 PROCEDURE — 2700000000 HC OXYGEN THERAPY PER DAY

## 2023-11-27 PROCEDURE — 87186 SC STD MICRODIL/AGAR DIL: CPT

## 2023-11-27 PROCEDURE — 6370000000 HC RX 637 (ALT 250 FOR IP): Performed by: NURSE PRACTITIONER

## 2023-11-27 PROCEDURE — 96374 THER/PROPH/DIAG INJ IV PUSH: CPT

## 2023-11-27 PROCEDURE — 96365 THER/PROPH/DIAG IV INF INIT: CPT

## 2023-11-27 PROCEDURE — 84484 ASSAY OF TROPONIN QUANT: CPT

## 2023-11-27 PROCEDURE — 87636 SARSCOV2 & INF A&B AMP PRB: CPT

## 2023-11-27 PROCEDURE — 6360000002 HC RX W HCPCS: Performed by: EMERGENCY MEDICINE

## 2023-11-27 PROCEDURE — 94640 AIRWAY INHALATION TREATMENT: CPT

## 2023-11-27 PROCEDURE — 2500000003 HC RX 250 WO HCPCS: Performed by: STUDENT IN AN ORGANIZED HEALTH CARE EDUCATION/TRAINING PROGRAM

## 2023-11-27 PROCEDURE — 6360000002 HC RX W HCPCS: Performed by: STUDENT IN AN ORGANIZED HEALTH CARE EDUCATION/TRAINING PROGRAM

## 2023-11-27 PROCEDURE — 82550 ASSAY OF CK (CPK): CPT

## 2023-11-27 PROCEDURE — 83690 ASSAY OF LIPASE: CPT

## 2023-11-27 PROCEDURE — 71045 X-RAY EXAM CHEST 1 VIEW: CPT

## 2023-11-27 PROCEDURE — 36415 COLL VENOUS BLD VENIPUNCTURE: CPT

## 2023-11-27 PROCEDURE — 87150 DNA/RNA AMPLIFIED PROBE: CPT

## 2023-11-27 PROCEDURE — 2580000003 HC RX 258: Performed by: EMERGENCY MEDICINE

## 2023-11-27 PROCEDURE — 85730 THROMBOPLASTIN TIME PARTIAL: CPT

## 2023-11-27 PROCEDURE — C9113 INJ PANTOPRAZOLE SODIUM, VIA: HCPCS | Performed by: STUDENT IN AN ORGANIZED HEALTH CARE EDUCATION/TRAINING PROGRAM

## 2023-11-27 PROCEDURE — 80053 COMPREHEN METABOLIC PANEL: CPT

## 2023-11-27 PROCEDURE — 6370000000 HC RX 637 (ALT 250 FOR IP): Performed by: STUDENT IN AN ORGANIZED HEALTH CARE EDUCATION/TRAINING PROGRAM

## 2023-11-27 PROCEDURE — 96375 TX/PRO/DX INJ NEW DRUG ADDON: CPT

## 2023-11-27 PROCEDURE — 93005 ELECTROCARDIOGRAM TRACING: CPT | Performed by: EMERGENCY MEDICINE

## 2023-11-27 PROCEDURE — 87449 NOS EACH ORGANISM AG IA: CPT

## 2023-11-27 PROCEDURE — 85520 HEPARIN ASSAY: CPT

## 2023-11-27 PROCEDURE — 2580000003 HC RX 258: Performed by: STUDENT IN AN ORGANIZED HEALTH CARE EDUCATION/TRAINING PROGRAM

## 2023-11-27 PROCEDURE — 81001 URINALYSIS AUTO W/SCOPE: CPT

## 2023-11-27 PROCEDURE — 94761 N-INVAS EAR/PLS OXIMETRY MLT: CPT

## 2023-11-27 PROCEDURE — 87040 BLOOD CULTURE FOR BACTERIA: CPT

## 2023-11-27 PROCEDURE — 84145 PROCALCITONIN (PCT): CPT

## 2023-11-27 PROCEDURE — 94660 CPAP INITIATION&MGMT: CPT

## 2023-11-27 PROCEDURE — 2000000000 HC ICU R&B

## 2023-11-27 PROCEDURE — 70450 CT HEAD/BRAIN W/O DYE: CPT

## 2023-11-27 PROCEDURE — 83605 ASSAY OF LACTIC ACID: CPT

## 2023-11-27 PROCEDURE — 99222 1ST HOSP IP/OBS MODERATE 55: CPT | Performed by: INTERNAL MEDICINE

## 2023-11-27 PROCEDURE — 93010 ELECTROCARDIOGRAM REPORT: CPT | Performed by: INTERNAL MEDICINE

## 2023-11-27 PROCEDURE — 99285 EMERGENCY DEPT VISIT HI MDM: CPT

## 2023-11-27 PROCEDURE — 83880 ASSAY OF NATRIURETIC PEPTIDE: CPT

## 2023-11-27 PROCEDURE — 85025 COMPLETE CBC W/AUTO DIFF WBC: CPT

## 2023-11-27 PROCEDURE — 5A09357 ASSISTANCE WITH RESPIRATORY VENTILATION, LESS THAN 24 CONSECUTIVE HOURS, CONTINUOUS POSITIVE AIRWAY PRESSURE: ICD-10-PCS | Performed by: STUDENT IN AN ORGANIZED HEALTH CARE EDUCATION/TRAINING PROGRAM

## 2023-11-27 PROCEDURE — 83036 HEMOGLOBIN GLYCOSYLATED A1C: CPT

## 2023-11-27 RX ORDER — DEXMEDETOMIDINE HYDROCHLORIDE 4 UG/ML
.1-1.5 INJECTION, SOLUTION INTRAVENOUS CONTINUOUS
Status: DISCONTINUED | OUTPATIENT
Start: 2023-11-27 | End: 2023-12-01

## 2023-11-27 RX ORDER — IPRATROPIUM BROMIDE AND ALBUTEROL SULFATE 2.5; .5 MG/3ML; MG/3ML
1 SOLUTION RESPIRATORY (INHALATION)
Status: DISCONTINUED | OUTPATIENT
Start: 2023-11-27 | End: 2023-11-27

## 2023-11-27 RX ORDER — ONDANSETRON 4 MG/1
4 TABLET, ORALLY DISINTEGRATING ORAL EVERY 8 HOURS PRN
Status: DISCONTINUED | OUTPATIENT
Start: 2023-11-27 | End: 2023-12-01 | Stop reason: HOSPADM

## 2023-11-27 RX ORDER — ACETAMINOPHEN 650 MG/1
650 SUPPOSITORY RECTAL EVERY 6 HOURS PRN
Status: DISCONTINUED | OUTPATIENT
Start: 2023-11-27 | End: 2023-12-01 | Stop reason: HOSPADM

## 2023-11-27 RX ORDER — SODIUM CHLORIDE 0.9 % (FLUSH) 0.9 %
5-40 SYRINGE (ML) INJECTION PRN
Status: DISCONTINUED | OUTPATIENT
Start: 2023-11-27 | End: 2023-12-01 | Stop reason: HOSPADM

## 2023-11-27 RX ORDER — SODIUM CHLORIDE 0.9 % (FLUSH) 0.9 %
5-40 SYRINGE (ML) INJECTION EVERY 12 HOURS SCHEDULED
Status: DISCONTINUED | OUTPATIENT
Start: 2023-11-27 | End: 2023-12-01 | Stop reason: HOSPADM

## 2023-11-27 RX ORDER — FUROSEMIDE 40 MG/1
40 TABLET ORAL 2 TIMES DAILY
Status: DISCONTINUED | OUTPATIENT
Start: 2023-11-27 | End: 2023-11-27

## 2023-11-27 RX ORDER — HEPARIN SODIUM 1000 [USP'U]/ML
2000 INJECTION, SOLUTION INTRAVENOUS; SUBCUTANEOUS PRN
Status: DISCONTINUED | OUTPATIENT
Start: 2023-11-27 | End: 2023-11-30

## 2023-11-27 RX ORDER — DEXTROSE MONOHYDRATE 100 MG/ML
INJECTION, SOLUTION INTRAVENOUS CONTINUOUS PRN
Status: DISCONTINUED | OUTPATIENT
Start: 2023-11-27 | End: 2023-12-01 | Stop reason: HOSPADM

## 2023-11-27 RX ORDER — ALLOPURINOL 100 MG/1
200 TABLET ORAL DAILY
Status: DISCONTINUED | OUTPATIENT
Start: 2023-11-28 | End: 2023-12-01 | Stop reason: HOSPADM

## 2023-11-27 RX ORDER — SODIUM CHLORIDE 9 MG/ML
INJECTION, SOLUTION INTRAVENOUS PRN
Status: DISCONTINUED | OUTPATIENT
Start: 2023-11-27 | End: 2023-12-01 | Stop reason: HOSPADM

## 2023-11-27 RX ORDER — MAGNESIUM SULFATE IN WATER 40 MG/ML
2000 INJECTION, SOLUTION INTRAVENOUS PRN
Status: DISCONTINUED | OUTPATIENT
Start: 2023-11-27 | End: 2023-12-01 | Stop reason: HOSPADM

## 2023-11-27 RX ORDER — IPRATROPIUM BROMIDE AND ALBUTEROL SULFATE 2.5; .5 MG/3ML; MG/3ML
1 SOLUTION RESPIRATORY (INHALATION)
Status: DISCONTINUED | OUTPATIENT
Start: 2023-11-27 | End: 2023-12-01 | Stop reason: HOSPADM

## 2023-11-27 RX ORDER — FUROSEMIDE 10 MG/ML
40 INJECTION INTRAMUSCULAR; INTRAVENOUS 2 TIMES DAILY
Status: DISCONTINUED | OUTPATIENT
Start: 2023-11-27 | End: 2023-11-30

## 2023-11-27 RX ORDER — POTASSIUM CHLORIDE 7.45 MG/ML
10 INJECTION INTRAVENOUS PRN
Status: DISCONTINUED | OUTPATIENT
Start: 2023-11-27 | End: 2023-12-01 | Stop reason: HOSPADM

## 2023-11-27 RX ORDER — ACETAMINOPHEN 325 MG/1
650 TABLET ORAL EVERY 6 HOURS PRN
Status: DISCONTINUED | OUTPATIENT
Start: 2023-11-27 | End: 2023-12-01 | Stop reason: HOSPADM

## 2023-11-27 RX ORDER — IPRATROPIUM BROMIDE AND ALBUTEROL SULFATE 2.5; .5 MG/3ML; MG/3ML
1 SOLUTION RESPIRATORY (INHALATION) EVERY 4 HOURS PRN
Status: DISCONTINUED | OUTPATIENT
Start: 2023-11-27 | End: 2023-12-01 | Stop reason: HOSPADM

## 2023-11-27 RX ORDER — FUROSEMIDE 10 MG/ML
40 INJECTION INTRAMUSCULAR; INTRAVENOUS ONCE
Status: COMPLETED | OUTPATIENT
Start: 2023-11-27 | End: 2023-11-27

## 2023-11-27 RX ORDER — ALBUTEROL SULFATE 90 UG/1
2 AEROSOL, METERED RESPIRATORY (INHALATION) EVERY 4 HOURS PRN
Status: DISCONTINUED | OUTPATIENT
Start: 2023-11-27 | End: 2023-12-01 | Stop reason: HOSPADM

## 2023-11-27 RX ORDER — HEPARIN SODIUM 10000 [USP'U]/100ML
5-30 INJECTION, SOLUTION INTRAVENOUS CONTINUOUS
Status: DISCONTINUED | OUTPATIENT
Start: 2023-11-27 | End: 2023-11-30

## 2023-11-27 RX ORDER — LANOLIN ALCOHOL/MO/W.PET/CERES
6 CREAM (GRAM) TOPICAL NIGHTLY PRN
Status: DISCONTINUED | OUTPATIENT
Start: 2023-11-27 | End: 2023-12-01 | Stop reason: HOSPADM

## 2023-11-27 RX ORDER — POLYETHYLENE GLYCOL 3350 17 G/17G
17 POWDER, FOR SOLUTION ORAL DAILY PRN
Status: DISCONTINUED | OUTPATIENT
Start: 2023-11-27 | End: 2023-12-01 | Stop reason: HOSPADM

## 2023-11-27 RX ORDER — ONDANSETRON 2 MG/ML
4 INJECTION INTRAMUSCULAR; INTRAVENOUS EVERY 6 HOURS PRN
Status: DISCONTINUED | OUTPATIENT
Start: 2023-11-27 | End: 2023-12-01 | Stop reason: HOSPADM

## 2023-11-27 RX ORDER — POTASSIUM CHLORIDE 29.8 MG/ML
20 INJECTION INTRAVENOUS PRN
Status: DISCONTINUED | OUTPATIENT
Start: 2023-11-27 | End: 2023-12-01 | Stop reason: HOSPADM

## 2023-11-27 RX ORDER — INSULIN LISPRO 100 [IU]/ML
0-8 INJECTION, SOLUTION INTRAVENOUS; SUBCUTANEOUS EVERY 4 HOURS
Status: DISCONTINUED | OUTPATIENT
Start: 2023-11-27 | End: 2023-12-01 | Stop reason: HOSPADM

## 2023-11-27 RX ORDER — ASPIRIN 300 MG/1
300 SUPPOSITORY RECTAL DAILY
Status: DISCONTINUED | OUTPATIENT
Start: 2023-11-27 | End: 2023-12-01 | Stop reason: HOSPADM

## 2023-11-27 RX ORDER — HEPARIN SODIUM 1000 [USP'U]/ML
4000 INJECTION, SOLUTION INTRAVENOUS; SUBCUTANEOUS ONCE
Status: COMPLETED | OUTPATIENT
Start: 2023-11-27 | End: 2023-11-27

## 2023-11-27 RX ORDER — GAUZE BANDAGE 2" X 2"
100 BANDAGE TOPICAL DAILY
Status: DISCONTINUED | OUTPATIENT
Start: 2023-11-28 | End: 2023-12-01 | Stop reason: HOSPADM

## 2023-11-27 RX ORDER — HEPARIN SODIUM 1000 [USP'U]/ML
4000 INJECTION, SOLUTION INTRAVENOUS; SUBCUTANEOUS PRN
Status: DISCONTINUED | OUTPATIENT
Start: 2023-11-27 | End: 2023-11-30

## 2023-11-27 RX ORDER — PANTOPRAZOLE SODIUM 40 MG/10ML
40 INJECTION, POWDER, LYOPHILIZED, FOR SOLUTION INTRAVENOUS DAILY
Status: DISCONTINUED | OUTPATIENT
Start: 2023-11-27 | End: 2023-11-30

## 2023-11-27 RX ORDER — ATORVASTATIN CALCIUM 80 MG/1
80 TABLET, FILM COATED ORAL NIGHTLY
Status: DISCONTINUED | OUTPATIENT
Start: 2023-11-27 | End: 2023-12-01 | Stop reason: HOSPADM

## 2023-11-27 RX ORDER — ASPIRIN 81 MG/1
81 TABLET, CHEWABLE ORAL DAILY
Status: DISCONTINUED | OUTPATIENT
Start: 2023-11-27 | End: 2023-12-01 | Stop reason: HOSPADM

## 2023-11-27 RX ADMIN — WATER 40 MG: 1 INJECTION INTRAMUSCULAR; INTRAVENOUS; SUBCUTANEOUS at 15:55

## 2023-11-27 RX ADMIN — HEPARIN SODIUM 6 UNITS/KG/HR: 10000 INJECTION, SOLUTION INTRAVENOUS at 16:17

## 2023-11-27 RX ADMIN — FUROSEMIDE 40 MG: 10 INJECTION, SOLUTION INTRAMUSCULAR; INTRAVENOUS at 17:31

## 2023-11-27 RX ADMIN — IPRATROPIUM BROMIDE AND ALBUTEROL SULFATE 1 DOSE: .5; 3 SOLUTION RESPIRATORY (INHALATION) at 17:05

## 2023-11-27 RX ADMIN — ASPIRIN 81 MG: 81 TABLET, CHEWABLE ORAL at 15:54

## 2023-11-27 RX ADMIN — ATORVASTATIN CALCIUM 80 MG: 80 TABLET, FILM COATED ORAL at 21:34

## 2023-11-27 RX ADMIN — HEPARIN SODIUM 2000 UNITS: 1000 INJECTION INTRAVENOUS; SUBCUTANEOUS at 23:31

## 2023-11-27 RX ADMIN — DEXMEDETOMIDINE HYDROCHLORIDE 0.2 MCG/KG/HR: 400 INJECTION, SOLUTION INTRAVENOUS at 21:44

## 2023-11-27 RX ADMIN — FUROSEMIDE 40 MG: 10 INJECTION, SOLUTION INTRAMUSCULAR; INTRAVENOUS at 10:25

## 2023-11-27 RX ADMIN — MELATONIN TAB 3 MG 6 MG: 3 TAB at 23:33

## 2023-11-27 RX ADMIN — CEFTRIAXONE SODIUM 1000 MG: 1 INJECTION, POWDER, FOR SOLUTION INTRAMUSCULAR; INTRAVENOUS at 10:29

## 2023-11-27 RX ADMIN — AZITHROMYCIN MONOHYDRATE 500 MG: 500 INJECTION, POWDER, LYOPHILIZED, FOR SOLUTION INTRAVENOUS at 11:07

## 2023-11-27 RX ADMIN — ACETAMINOPHEN 650 MG: 325 TABLET ORAL at 15:54

## 2023-11-27 RX ADMIN — HEPARIN SODIUM 4000 UNITS: 1000 INJECTION INTRAVENOUS; SUBCUTANEOUS at 16:14

## 2023-11-27 RX ADMIN — Medication 2 PUFF: at 21:29

## 2023-11-27 RX ADMIN — Medication 10 ML: at 23:36

## 2023-11-27 RX ADMIN — IPRATROPIUM BROMIDE AND ALBUTEROL SULFATE 1 DOSE: .5; 3 SOLUTION RESPIRATORY (INHALATION) at 21:28

## 2023-11-27 RX ADMIN — PANTOPRAZOLE SODIUM 40 MG: 40 INJECTION, POWDER, FOR SOLUTION INTRAVENOUS at 15:55

## 2023-11-27 ASSESSMENT — LIFESTYLE VARIABLES
HOW MANY STANDARD DRINKS CONTAINING ALCOHOL DO YOU HAVE ON A TYPICAL DAY: 3 OR 4
HOW OFTEN DO YOU HAVE A DRINK CONTAINING ALCOHOL: 4 OR MORE TIMES A WEEK

## 2023-11-27 ASSESSMENT — ENCOUNTER SYMPTOMS
SHORTNESS OF BREATH: 1
VOMITING: 0
EYES NEGATIVE: 1
SORE THROAT: 0
BACK PAIN: 0
ABDOMINAL PAIN: 0
GASTROINTESTINAL NEGATIVE: 1
NAUSEA: 0
DIARRHEA: 0

## 2023-11-27 ASSESSMENT — PAIN SCALES - GENERAL
PAINLEVEL_OUTOF10: 2
PAINLEVEL_OUTOF10: 0
PAINLEVEL_OUTOF10: 0

## 2023-11-27 ASSESSMENT — PAIN DESCRIPTION - PAIN TYPE: TYPE: ACUTE PAIN

## 2023-11-27 ASSESSMENT — PAIN DESCRIPTION - LOCATION: LOCATION: HEAD

## 2023-11-27 ASSESSMENT — PAIN - FUNCTIONAL ASSESSMENT: PAIN_FUNCTIONAL_ASSESSMENT: NONE - DENIES PAIN

## 2023-11-27 NOTE — ED PROVIDER NOTES
[facility] ENCOUNTER        Pt Name: Delmi Osborne  MRN: [id number]  [address] [place] 1945  Date of evaluation: 11/27/2023  Provider: Romie Bingham MD  PCP: Gonsalo Barber MD  Note Started: 9:22 AM EST 11/27/23    CHIEF COMPLAINT       Chief Complaint   Patient presents with    Fatigue     Pt arrives from home via [facility]. Pt c/o more weakness than normal around 0500. Pt has history of mesothemia, copd, chf. Trouble breathing increased. Baseline 6L, pt states he just couldn't get up       HISTORY OF PRESENT ILLNESS: 1 or more Elements     History from : Patient, Family daughter, and EMS    Limitations to history : None    Delmi Osborne is a 70 y.o. male who presents for generalized weakness that started worse around 5 AM this morning. Patient also having mild headache. Patient has history of previous mesothelioma, COPD, CHF, STEF, obesity, hypertension, prior CVA. Patient wears 4 to 6 L nasal cannula at baseline wears CPAP at night. Patient was too weak to get up out of his chair which is why his daughter called 911. Patient does admit that he has had worsening breathing recently has had dyspnea on exertion. Worsening bilateral lower extremity swelling. No fevers or chills. Denies any chest pain. No numbness or weakness. No vision changes. Nursing Notes were all reviewed and agreed with or any disagreements were addressed in the HPI. REVIEW OF SYSTEMS :      Review of Systems   Constitutional:  Positive for fatigue. Negative for chills and fever. HENT: Negative. Negative for sore throat. Eyes: Negative. Negative for visual disturbance. Respiratory:  Positive for shortness of breath. Cardiovascular:  Positive for leg swelling. Negative for chest pain. Gastrointestinal: Negative. Negative for abdominal pain, diarrhea, nausea and vomiting. Genitourinary: Negative.   Negative for decreased urine THREE MILLILITERS VIA NEBULIZATION BY MOUTH FOUR TIMES A DAY    OXYGEN    Inhale into the lungs 6L O2    PREDNISONE (DELTASONE) 5 MG TABLET    TAKE 1 TABLET DAILY    TRIAMCINOLONE (KENALOG) 0.1 % CREAM    Apply to affected areas twice daily. Send a 3 month supply    VITAMIN B-1 (THIAMINE) 100 MG TABLET    Take 1 tablet by mouth daily    VITAMIN D (ERGOCALCIFEROL) 1.25 MG (72298 UT) CAPS CAPSULE    Take 1 capsule by mouth once a week       ALLERGIES     Patient has no known allergies. FAMILYHISTORY       Family History   Problem Relation Age of Onset    High Blood Pressure Mother     High Cholesterol Mother     Heart Disease Mother     Stroke Mother     Diabetes Mother     Depression Mother     Mental Illness Mother     Cancer Father         SOCIAL HISTORY       Social History     Tobacco Use    Smoking status: Former     Packs/day: 1.00     Years: 50.00     Additional pack years: 0.00     Total pack years: 50.00     Types: Cigarettes     Quit date: 2012     Years since quittin.8    Smokeless tobacco: Never    Tobacco comments:     H.O. smoking 1 p.p.d. Quit 9 months ago    Vaping Use    Vaping Use: Never used   Substance Use Topics    Alcohol use: Yes     Alcohol/week: 14.0 standard drinks of alcohol     Types: 14 Shots of liquor per week     Comment: 3 drinks a evening    Drug use: Never       SCREENINGS        Butch Coma Scale  Eye Opening: Spontaneous  Best Verbal Response: Oriented  Best Motor Response: Obeys commands  Butch Coma Scale Score: 15                CIWA Assessment  BP: (!) 125/47  Pulse: 100           PHYSICAL EXAM  1 or more Elements     ED Triage Vitals   BP Temp Temp src Pulse Resp SpO2 Height Weight   -- -- -- -- -- -- -- --       Physical Exam  Vitals and nursing note reviewed. Constitutional:       General: He is not in acute distress. Appearance: He is well-developed. He is obese. He is ill-appearing. He is not toxic-appearing or diaphoretic.    HENT:      Head:

## 2023-11-27 NOTE — PROGRESS NOTES
Pharmacy Home Medication Reconciliation Note    A medication reconciliation has been completed for Violette Jett 1945    Pharmacy: [address] [place] [address], [place], [place]  Information provided by: patient    The patient's home medication list is as follows: No current facility-administered medications on file prior to encounter. Current Outpatient Medications on File Prior to Encounter   Medication Sig Dispense Refill    fluticasone-salmeterol (ADVAIR DISKUS) 250-50 MCG/ACT AEPB diskus inhaler Inhale 1 puff into the lungs in the morning and 1 puff in the evening. 60 each 3    predniSONE (DELTASONE) 5 MG tablet TAKE 1 TABLET DAILY 90 tablet 0    vitamin D (ERGOCALCIFEROL) 1.25 MG (79785 UT) CAPS capsule Take 1 capsule by mouth once a week (Patient taking differently: Take 1 capsule by mouth once a week Mondays.) 12 capsule 3    [DISCONTINUED] apixaban (ELIQUIS) 5 MG TABS tablet Take 1 tablet by mouth 2 times daily (Patient not taking: Reported on 11/7/2023) 70 tablet 0    blood glucose test strips (ACCU-CHEK CLARISSA PLUS) strip USE AS NEEDED 100 strip 1    triamcinolone (KENALOG) 0.1 % cream Apply to affected areas twice daily.   Send a 3 month supply 240 g 1    apixaban (ELIQUIS) 5 MG TABS tablet Take 1 tablet by mouth 2 times daily 180 tablet 3    vitamin B-1 (THIAMINE) 100 MG tablet Take 1 tablet by mouth daily      apixaban (ELIQUIS) 5 MG TABS tablet Take 1 tablet by mouth 2 times daily 70 tablet 0    allopurinol (ZYLOPRIM) 100 MG tablet TAKE TWO TABLETS BY MOUTH DAILY (Patient taking differently: Take 2 tablets by mouth daily TAKE TWO TABLETS BY MOUTH DAILY) 180 tablet 3    Budeson-Glycopyrrol-Formoterol (BREZTRI AEROSPHERE) 160-9-4.8 MCG/ACT AERO Inhale 2 puffs into the lungs in the morning and at bedtime 3 each 3    albuterol sulfate HFA (PROVENTIL;VENTOLIN;PROAIR) 108 (90 Base) MCG/ACT inhaler INHALE TWO PUFFS BY MOUTH EVERY 6 HOURS AS NEEDED (Patient taking differently: Inhale 2 puffs into the lungs every 6 hours as needed for Wheezing or Shortness of Breath INHALE TWO PUFFS BY MOUTH EVERY 6 HOURS AS NEEDED) 54 g 3    ipratropium-albuterol (DUONEB) 0.5-2.5 (3) MG/3ML SOLN nebulizer solution INHALE THREE MILLILITERS VIA NEBULIZATION BY MOUTH FOUR TIMES A DAY (Patient taking differently: Take 3 mLs by nebulization every 6 hours INHALE THREE MILLILITERS VIA NEBULIZATION BY MOUTH FOUR TIMES A DAY) 1080 mL 3    furosemide (LASIX) 80 MG tablet Take 1 tablet by mouth daily 90 tablet 3    gabapentin (NEURONTIN) 300 MG capsule Take 1 capsule by mouth 3 times daily for 90 days. 270 capsule 3    fluticasone-umeclidin-vilant (TRELEGY ELLIPTA) 200-62.5-25 MCG/ACT AEPB inhaler Inhale 1 puff into the lungs daily (Patient not taking: Reported on 11/27/2023) 3 each 0    OXYGEN Inhale into the lungs 6L O2      [DISCONTINUED] metoprolol succinate (TOPROL XL) 25 MG extended release tablet Take 0.5 tablets by mouth daily 30 tablet 1    [DISCONTINUED] celecoxib (CELEBREX) 100 MG capsule Take 1 capsule by mouth 2 times daily 60 capsule 5    Accu-Chek Softclix Lancets MISC USE ONE LANCET TO TEST DAILY 100 each 2    [DISCONTINUED] diclofenac (VOLTAREN) 75 MG EC tablet TAKE ONE TABLET BY MOUTH TWICE A  tablet 0    Blood Glucose Monitoring Suppl (ACCU-CHEK CLARISSA PLUS) w/Device KIT 1 each by Does not apply route daily 1 kit 0    acetaminophen (TYLENOL) 325 MG tablet Take 2 tablets by mouth every 6 hours as needed for Pain         Patient is no longer taking Trelegy Ellipta (Advair and Breztri only). Of note, patient takes Eliquis 5 mg BID: took at 9:00 am with all other morning meds prior to ED arrival.    Timing of last doses updated. Thank you,  Lady Zander Segovia

## 2023-11-27 NOTE — CONSULTS
[address] [place] [address]  [phone]        Reason for Consultation/Chief Complaint: \"Elevated troponin, elevated proBNP. \"    History of Present Illness:  Domenic Lauren is a 70 y.o. patient who presented to the hospital with complaints of fatigue, weakness and shortness of breath more than his usual. He has a history of dHF, COPD, Htn, Hld. H/o CVA 9/2022, PFO revealed on echo, not felt to be the cause. ILR with no evidence of Afib. Normal heart cath in '09    Daughters at bedside. He reports he was feeling fatigue and had difficulty breathing. Daughter reports he was confused at home, did not know where he was, oxygen was in the 80's, he is currently on Bipap and alert and oriented. Past Medical History:   has a past medical history of Anemia, COPD (chronic obstructive pulmonary disease) ([address]), Diabetes mellitus ([address]), Edema, GI (gastrointestinal bleed), Gout, Hypertension, Morbid obesity ([address]), Neuropathy, Obstructive sleep apnea, and Peripheral vascular disease ([address]). Surgical History:   has a past surgical history that includes Appendectomy; Total hip arthroplasty; and Colonoscopy (N/A, 5/7/2019). Social History:   reports that he quit smoking about 11 years ago. His smoking use included cigarettes. He has a 50.00 pack-year smoking history. He has never used smokeless tobacco. He reports current alcohol use of about 14.0 standard drinks of alcohol per week. He reports that he does not use drugs. Family History:  family history includes Cancer in his father; Depression in his mother; Diabetes in his mother; Heart Disease in his mother; High Blood Pressure in his mother; High Cholesterol in his mother; Mental Illness in his mother; Stroke in his mother. Home Medications:  Were reviewed and are listed in nursing record. and/or listed below  Prior to Admission medications    Medication Sig Start Date End Date Taking?  Authorizing Provider   fluticasone-salmeterol (ADVAIR DISKUS) 250-50

## 2023-11-27 NOTE — H&P
HCT 35.8*        Recent Labs     11/27/23  0934      K 4.1   CL 99   CO2 33*   BUN 41*   CREATININE 1.4*   CALCIUM 8.8     Recent Labs     11/27/23  0934   AST 25   ALT 17   BILITOT 0.5   ALKPHOS 53     No results for input(s): \"INR\" in the last 72 hours. No results for input(s): \"CKTOTAL\", \"TROPONINI\" in the last 72 hours. Urinalysis:      Lab Results   Component Value Date/Time    NITRU Negative 03/03/2023 01:30 PM    WBCUA 1 03/03/2023 01:30 PM    BACTERIA None Seen 03/03/2023 01:30 PM    RBCUA 17 03/03/2023 01:30 PM    BLOODU MODERATE 03/03/2023 01:30 PM    SPECGRAV 1.015 03/03/2023 01:30 PM    GLUCOSEU Negative 03/03/2023 01:30 PM       Radiology:     CT Head W/O Contrast   Final Result   Area of infarct involving the left temporal lobe noted with associated   encephalomalacia. No gross intra cerebral hemorrhage or midline shift. Sinus disease noted as above. If there is clinical concern for an acute   ischemic event, recommend comparison with MRI with diffusion-weighted   sequences. XR CHEST PORTABLE   Final Result   1. Bibasilar airspace disease. This could reflect atelectasis and/or   pneumonia. 2.  Mild cardiomegaly         MRI BRAIN WO CONTRAST    (Results Pending)           Medications:  Not in a hospital admission.   Current Facility-Administered Medications   Medication Dose Route Frequency Provider Last Rate Last Admin    albuterol sulfate HFA (PROVENTIL;VENTOLIN;PROAIR) 108 (90 Base) MCG/ACT inhaler 2 puff  2 puff Inhalation Q4H PRN Angel Kaplan DO        allopurinol (ZYLOPRIM) tablet 200 mg  200 mg Oral Daily Angel Kaplan DO        mometasone-formoterol (DULERA) 200-5 MCG/ACT inhaler 2 puff  2 puff Inhalation BID RT Linford Actis, DO        fluticasone-umeclidin-vilant (TRELEGY ELLIPTA) 200-62.5-25 MCG/ACT inhaler 1 puff  1 puff Inhalation Daily Angel Kaplan DO        furosemide (LASIX) tablet 40 mg  40 mg Oral BID Angel Kaplan DO        ipratropium TAKE TWO TABLETS BY MOUTH DAILY 180 tablet 3    Budeson-Glycopyrrol-Formoterol (BREZTRI AEROSPHERE) 160-9-4.8 MCG/ACT AERO Inhale 2 puffs into the lungs in the morning and at bedtime 3 each 3    albuterol sulfate HFA (PROVENTIL;VENTOLIN;PROAIR) 108 (90 Base) MCG/ACT inhaler INHALE TWO PUFFS BY MOUTH EVERY 6 HOURS AS NEEDED 54 g 3    ipratropium-albuterol (DUONEB) 0.5-2.5 (3) MG/3ML SOLN nebulizer solution INHALE THREE MILLILITERS VIA NEBULIZATION BY MOUTH FOUR TIMES A DAY 1080 mL 3    furosemide (LASIX) 80 MG tablet Take 1 tablet by mouth daily 90 tablet 3    gabapentin (NEURONTIN) 300 MG capsule Take 1 capsule by mouth 3 times daily for 90 days.  270 capsule 3    fluticasone-umeclidin-vilant (TRELEGY ELLIPTA) 200-62.5-25 MCG/ACT AEPB inhaler Inhale 1 puff into the lungs daily 3 each 0    OXYGEN Inhale into the lungs 6L O2      Accu-Chek Softclix Lancets MISC USE ONE LANCET TO TEST DAILY 100 each 2    Blood Glucose Monitoring Suppl (ACCU-CHEK CLARISSA PLUS) w/Device KIT 1 each by Does not apply route daily 1 kit 0    acetaminophen (TYLENOL) 325 MG tablet Take 2 tablets by mouth every 6 hours as needed for Pain         Consults:    IP CONSULT TO STROKE TEAM  IP CONSULT TO HOSPITALIST    ASSESSMENT:    Active Hospital Problems    Diagnosis Date Noted    Acute respiratory failure with hypercapnia ([address]) [J96.02] 09/07/2022     Priority: Medium    Cerebrovascular accident (CVA) ([address]) [I63.9] 09/07/2022     Priority: Medium    Chronic heart failure with preserved ejection fraction ([address]) [I50.32] 09/07/2022     Priority: Medium    Peripheral vascular disease ([address]) [I73.9] 11/27/2023    Sepsis ([address]) [A41.9] 11/27/2023    NSTEMI (non-ST elevated myocardial infarction) ([address]) [I21.4] 11/27/2023    Acute on chronic respiratory failure with hypercapnia ([address]) [J96.22] 11/27/2023    Hyperlipidemia associated with type 2 diabetes mellitus ([address]) [E11.69, E78.5] 11/15/2016    COPD, severe ([address]) [J44.9] 10/21/2015    Morbid obesity ([address])

## 2023-11-27 NOTE — ED NOTES
ED TO INPATIENT SBAR HANDOFF    Patient Name: Juan Carlos Rios   :  1945  70 y.o. MRN:  [id number]  Preferred Name  Erving Late  ED Room #:  ED-0002/02  Family/Caregiver Present no   Restraints no   Sitter no   Sepsis Risk Score Sepsis Risk Score: 6.98    Situation  Code Status: Full Code No additional code details. Allergies: Patient has no known allergies. Weight: No data found. Arrived from: home  Chief Complaint:   Chief Complaint   Patient presents with    Fatigue     Pt arrives from home via [facility]. Pt c/o more weakness than normal around 0500. Pt has history of mesothemia, copd, chf. Trouble breathing increased. Baseline 6L, pt states he just couldn't get up     Hospital Problem/Diagnosis:  Principal Problem:    Acute respiratory failure with hypercapnia (HCC)  Active Problems:    Cerebrovascular accident (CVA) ([address])    Chronic heart failure with preserved ejection fraction ([address])    Hypertension associated with diabetes ([address])    Type 2 diabetes mellitus with hyperglycemia, without long-term current use of insulin (HCC)    STEF (obstructive sleep apnea)    Morbid obesity (HCC)    COPD, severe (HCC)    Hyperlipidemia associated with type 2 diabetes mellitus ([address])    Peripheral vascular disease (HCC)    Sepsis ([address])    NSTEMI (non-ST elevated myocardial infarction) ([address])    Acute on chronic respiratory failure with hypercapnia ([address])  Resolved Problems:    History of CVA (cerebrovascular accident)    Imaging:   CT Head W/O Contrast   Final Result   Area of infarct involving the left temporal lobe noted with associated   encephalomalacia. No gross intra cerebral hemorrhage or midline shift. Sinus disease noted as above. If there is clinical concern for an acute   ischemic event, recommend comparison with MRI with diffusion-weighted   sequences. XR CHEST PORTABLE   Final Result   1. Bibasilar airspace disease. This could reflect atelectasis and/or   pneumonia.       2.  Mild

## 2023-11-28 LAB
ALBUMIN SERPL-MCNC: 3.2 G/DL (ref 3.4–5)
ANION GAP SERPL CALCULATED.3IONS-SCNC: 11 MMOL/L (ref 3–16)
APTT BLD: 125 SEC (ref 22.7–35.9)
APTT BLD: 61.5 SEC (ref 22.7–35.9)
APTT BLD: 65.4 SEC (ref 22.7–35.9)
APTT BLD: 76.3 SEC (ref 22.7–35.9)
BASE EXCESS BLDA CALC-SCNC: 6.4 MMOL/L (ref -3–3)
BASOPHILS # BLD: 0 K/UL (ref 0–0.2)
BASOPHILS NFR BLD: 0.3 %
BUN SERPL-MCNC: 41 MG/DL (ref 7–20)
CALCIUM SERPL-MCNC: 8.3 MG/DL (ref 8.3–10.6)
CHLORIDE SERPL-SCNC: 104 MMOL/L (ref 99–110)
CHOLEST SERPL-MCNC: 108 MG/DL (ref 0–199)
CO2 BLDA-SCNC: 76.5 MMOL/L
CO2 SERPL-SCNC: 30 MMOL/L (ref 21–32)
COHGB MFR BLDA: 0.5 % (ref 0–1.5)
CREAT SERPL-MCNC: 1.5 MG/DL (ref 0.8–1.3)
DEPRECATED RDW RBC AUTO: 14.9 % (ref 12.4–15.4)
EOSINOPHIL # BLD: 0 K/UL (ref 0–0.6)
EOSINOPHIL NFR BLD: 0.1 %
EST. AVERAGE GLUCOSE BLD GHB EST-MCNC: 114 MG/DL
GFR SERPLBLD CREATININE-BSD FMLA CKD-EPI: 48 ML/MIN/{1.73_M2}
GLUCOSE BLD-MCNC: 112 MG/DL (ref 70–99)
GLUCOSE BLD-MCNC: 129 MG/DL (ref 70–99)
GLUCOSE BLD-MCNC: 159 MG/DL (ref 70–99)
GLUCOSE BLD-MCNC: 176 MG/DL (ref 70–99)
GLUCOSE BLD-MCNC: 191 MG/DL (ref 70–99)
GLUCOSE BLD-MCNC: 200 MG/DL (ref 70–99)
GLUCOSE SERPL-MCNC: 145 MG/DL (ref 70–99)
HBA1C MFR BLD: 5.6 %
HCO3 BLDA-SCNC: 32.5 MMOL/L (ref 21–29)
HCT VFR BLD AUTO: 34.3 % (ref 40.5–52.5)
HDLC SERPL-MCNC: 64 MG/DL (ref 40–60)
HGB BLD-MCNC: 11.1 G/DL (ref 13.5–17.5)
HGB BLDA-MCNC: 10.9 G/DL (ref 13.5–17.5)
LACTATE BLDV-SCNC: 0.8 MMOL/L (ref 0.4–2)
LACTATE BLDV-SCNC: 1.1 MMOL/L (ref 0.4–2)
LACTATE BLDV-SCNC: 1.3 MMOL/L (ref 0.4–2)
LDLC SERPL CALC-MCNC: 25 MG/DL
LEGIONELLA AG UR QL: NORMAL
LYMPHOCYTES # BLD: 0.3 K/UL (ref 1–5.1)
LYMPHOCYTES NFR BLD: 3.8 %
MAGNESIUM SERPL-MCNC: 2.3 MG/DL (ref 1.8–2.4)
MCH RBC QN AUTO: 33.7 PG (ref 26–34)
MCHC RBC AUTO-ENTMCNC: 32.4 G/DL (ref 31–36)
MCV RBC AUTO: 104.1 FL (ref 80–100)
METHGB MFR BLDA: 0.3 %
MONOCYTES # BLD: 0.7 K/UL (ref 0–1.3)
MONOCYTES NFR BLD: 10.4 %
NEUTROPHILS # BLD: 6 K/UL (ref 1.7–7.7)
NEUTROPHILS NFR BLD: 85.4 %
O2 THERAPY: ABNORMAL
PCO2 BLDA: 53.3 MMHG (ref 35–45)
PERFORMED ON: ABNORMAL
PH BLDA: 7.39 [PH] (ref 7.35–7.45)
PHOSPHATE SERPL-MCNC: 3.1 MG/DL (ref 2.5–4.9)
PLATELET # BLD AUTO: 206 K/UL (ref 135–450)
PMV BLD AUTO: 6.7 FL (ref 5–10.5)
PO2 BLDA: 96.1 MMHG (ref 75–108)
POTASSIUM SERPL-SCNC: 4.2 MMOL/L (ref 3.5–5.1)
RBC # BLD AUTO: 3.3 M/UL (ref 4.2–5.9)
REASON FOR REJECTION: NORMAL
REJECTED TEST: NORMAL
REPORT: NORMAL
S PNEUM AG UR QL: NORMAL
SAO2 % BLDA: 96.1 %
SODIUM SERPL-SCNC: 145 MMOL/L (ref 136–145)
TRIGL SERPL-MCNC: 97 MG/DL (ref 0–150)
VLDLC SERPL CALC-MCNC: 19 MG/DL
WBC # BLD AUTO: 7.1 K/UL (ref 4–11)

## 2023-11-28 PROCEDURE — 80069 RENAL FUNCTION PANEL: CPT

## 2023-11-28 PROCEDURE — 2500000003 HC RX 250 WO HCPCS: Performed by: STUDENT IN AN ORGANIZED HEALTH CARE EDUCATION/TRAINING PROGRAM

## 2023-11-28 PROCEDURE — 85025 COMPLETE CBC W/AUTO DIFF WBC: CPT

## 2023-11-28 PROCEDURE — APPNB30 APP NON BILLABLE TIME 0-30 MINS

## 2023-11-28 PROCEDURE — 99223 1ST HOSP IP/OBS HIGH 75: CPT | Performed by: PSYCHIATRY & NEUROLOGY

## 2023-11-28 PROCEDURE — 6360000002 HC RX W HCPCS: Performed by: INTERNAL MEDICINE

## 2023-11-28 PROCEDURE — 83735 ASSAY OF MAGNESIUM: CPT

## 2023-11-28 PROCEDURE — 36415 COLL VENOUS BLD VENIPUNCTURE: CPT

## 2023-11-28 PROCEDURE — 2580000003 HC RX 258: Performed by: INTERNAL MEDICINE

## 2023-11-28 PROCEDURE — 97166 OT EVAL MOD COMPLEX 45 MIN: CPT

## 2023-11-28 PROCEDURE — 6360000002 HC RX W HCPCS: Performed by: STUDENT IN AN ORGANIZED HEALTH CARE EDUCATION/TRAINING PROGRAM

## 2023-11-28 PROCEDURE — 6360000004 HC RX CONTRAST MEDICATION: Performed by: STUDENT IN AN ORGANIZED HEALTH CARE EDUCATION/TRAINING PROGRAM

## 2023-11-28 PROCEDURE — 94640 AIRWAY INHALATION TREATMENT: CPT

## 2023-11-28 PROCEDURE — 92526 ORAL FUNCTION THERAPY: CPT

## 2023-11-28 PROCEDURE — 99232 SBSQ HOSP IP/OBS MODERATE 35: CPT | Performed by: INTERNAL MEDICINE

## 2023-11-28 PROCEDURE — 94761 N-INVAS EAR/PLS OXIMETRY MLT: CPT

## 2023-11-28 PROCEDURE — 85730 THROMBOPLASTIN TIME PARTIAL: CPT

## 2023-11-28 PROCEDURE — C9113 INJ PANTOPRAZOLE SODIUM, VIA: HCPCS | Performed by: STUDENT IN AN ORGANIZED HEALTH CARE EDUCATION/TRAINING PROGRAM

## 2023-11-28 PROCEDURE — 87081 CULTURE SCREEN ONLY: CPT

## 2023-11-28 PROCEDURE — 92523 SPEECH SOUND LANG COMPREHEN: CPT

## 2023-11-28 PROCEDURE — 97530 THERAPEUTIC ACTIVITIES: CPT

## 2023-11-28 PROCEDURE — 2700000000 HC OXYGEN THERAPY PER DAY

## 2023-11-28 PROCEDURE — C8929 TTE W OR WO FOL WCON,DOPPLER: HCPCS

## 2023-11-28 PROCEDURE — 92610 EVALUATE SWALLOWING FUNCTION: CPT

## 2023-11-28 PROCEDURE — 2580000003 HC RX 258: Performed by: STUDENT IN AN ORGANIZED HEALTH CARE EDUCATION/TRAINING PROGRAM

## 2023-11-28 PROCEDURE — 2000000000 HC ICU R&B

## 2023-11-28 PROCEDURE — 83605 ASSAY OF LACTIC ACID: CPT

## 2023-11-28 PROCEDURE — 97162 PT EVAL MOD COMPLEX 30 MIN: CPT

## 2023-11-28 PROCEDURE — 6370000000 HC RX 637 (ALT 250 FOR IP): Performed by: STUDENT IN AN ORGANIZED HEALTH CARE EDUCATION/TRAINING PROGRAM

## 2023-11-28 PROCEDURE — 97116 GAIT TRAINING THERAPY: CPT

## 2023-11-28 PROCEDURE — 82803 BLOOD GASES ANY COMBINATION: CPT

## 2023-11-28 PROCEDURE — 94660 CPAP INITIATION&MGMT: CPT

## 2023-11-28 PROCEDURE — 87205 SMEAR GRAM STAIN: CPT

## 2023-11-28 PROCEDURE — 80061 LIPID PANEL: CPT

## 2023-11-28 RX ADMIN — IPRATROPIUM BROMIDE AND ALBUTEROL SULFATE 1 DOSE: .5; 3 SOLUTION RESPIRATORY (INHALATION) at 23:25

## 2023-11-28 RX ADMIN — ALLOPURINOL 200 MG: 100 TABLET ORAL at 08:27

## 2023-11-28 RX ADMIN — PERFLUTREN 1.5 ML: 6.52 INJECTION, SUSPENSION INTRAVENOUS at 09:14

## 2023-11-28 RX ADMIN — DEXMEDETOMIDINE HYDROCHLORIDE 0.4 MCG/KG/HR: 400 INJECTION, SOLUTION INTRAVENOUS at 05:53

## 2023-11-28 RX ADMIN — AZITHROMYCIN MONOHYDRATE 500 MG: 500 INJECTION, POWDER, LYOPHILIZED, FOR SOLUTION INTRAVENOUS at 14:37

## 2023-11-28 RX ADMIN — IPRATROPIUM BROMIDE AND ALBUTEROL SULFATE 1 DOSE: .5; 3 SOLUTION RESPIRATORY (INHALATION) at 08:47

## 2023-11-28 RX ADMIN — ATORVASTATIN CALCIUM 80 MG: 80 TABLET, FILM COATED ORAL at 21:10

## 2023-11-28 RX ADMIN — IPRATROPIUM BROMIDE AND ALBUTEROL SULFATE 1 DOSE: .5; 3 SOLUTION RESPIRATORY (INHALATION) at 12:41

## 2023-11-28 RX ADMIN — Medication 100 MG: at 08:35

## 2023-11-28 RX ADMIN — HEPARIN SODIUM 2000 UNITS: 1000 INJECTION INTRAVENOUS; SUBCUTANEOUS at 22:39

## 2023-11-28 RX ADMIN — ASPIRIN 81 MG: 81 TABLET, CHEWABLE ORAL at 08:27

## 2023-11-28 RX ADMIN — DEXMEDETOMIDINE HYDROCHLORIDE 0.3 MCG/KG/HR: 400 INJECTION, SOLUTION INTRAVENOUS at 11:42

## 2023-11-28 RX ADMIN — PANTOPRAZOLE SODIUM 40 MG: 40 INJECTION, POWDER, FOR SOLUTION INTRAVENOUS at 08:28

## 2023-11-28 RX ADMIN — INSULIN LISPRO 2 UNITS: 100 INJECTION, SOLUTION INTRAVENOUS; SUBCUTANEOUS at 21:09

## 2023-11-28 RX ADMIN — FUROSEMIDE 40 MG: 10 INJECTION, SOLUTION INTRAMUSCULAR; INTRAVENOUS at 18:19

## 2023-11-28 RX ADMIN — Medication 2 PUFF: at 12:41

## 2023-11-28 RX ADMIN — FUROSEMIDE 40 MG: 10 INJECTION, SOLUTION INTRAMUSCULAR; INTRAVENOUS at 08:27

## 2023-11-28 RX ADMIN — Medication 10 ML: at 21:13

## 2023-11-28 RX ADMIN — IPRATROPIUM BROMIDE AND ALBUTEROL SULFATE 1 DOSE: .5; 3 SOLUTION RESPIRATORY (INHALATION) at 15:54

## 2023-11-28 RX ADMIN — WATER 40 MG: 1 INJECTION INTRAMUSCULAR; INTRAVENOUS; SUBCUTANEOUS at 16:11

## 2023-11-28 RX ADMIN — Medication 10 ML: at 08:28

## 2023-11-28 RX ADMIN — DEXMEDETOMIDINE HYDROCHLORIDE 0.8 MCG/KG/HR: 400 INJECTION, SOLUTION INTRAVENOUS at 02:55

## 2023-11-28 RX ADMIN — WATER 40 MG: 1 INJECTION INTRAMUSCULAR; INTRAVENOUS; SUBCUTANEOUS at 04:33

## 2023-11-28 RX ADMIN — HEPARIN SODIUM 7 UNITS/KG/HR: 10000 INJECTION, SOLUTION INTRAVENOUS at 14:42

## 2023-11-28 RX ADMIN — IPRATROPIUM BROMIDE AND ALBUTEROL SULFATE 1 DOSE: .5; 3 SOLUTION RESPIRATORY (INHALATION) at 19:28

## 2023-11-28 RX ADMIN — IPRATROPIUM BROMIDE AND ALBUTEROL SULFATE 1 DOSE: .5; 3 SOLUTION RESPIRATORY (INHALATION) at 04:36

## 2023-11-28 RX ADMIN — HEPARIN SODIUM 2000 UNITS: 1000 INJECTION INTRAVENOUS; SUBCUTANEOUS at 07:20

## 2023-11-28 RX ADMIN — VANCOMYCIN HYDROCHLORIDE 2000 MG: 10 INJECTION, POWDER, LYOPHILIZED, FOR SOLUTION INTRAVENOUS at 22:49

## 2023-11-28 RX ADMIN — Medication 2 PUFF: at 19:29

## 2023-11-28 RX ADMIN — IPRATROPIUM BROMIDE AND ALBUTEROL SULFATE 1 DOSE: .5; 3 SOLUTION RESPIRATORY (INHALATION) at 01:07

## 2023-11-28 RX ADMIN — CEFTRIAXONE SODIUM 1000 MG: 1 INJECTION, POWDER, FOR SOLUTION INTRAMUSCULAR; INTRAVENOUS at 11:47

## 2023-11-28 ASSESSMENT — PAIN SCALES - GENERAL
PAINLEVEL_OUTOF10: 0

## 2023-11-28 NOTE — CARE COORDINATION
Discharge Planning Note:    Called and LVM for Celine at [facility] to verify SNF placement and barriers to returning.      Electronically signed by Kimmy Lao RN on 11/28/2023 at 9:46 AM

## 2023-11-28 NOTE — PROGRESS NOTES
11/28/23 1631   RT Protocol   History Pulmonary Disease 2   Respiratory pattern 2   Breath sounds 2   Cough 0   Bronchodilator Assessment Score 6

## 2023-11-28 NOTE — PROGRESS NOTES
[facility] Department   Phone: [phone]    Physical Therapy    [x] Initial Evaluation            [] Daily Treatment Note         [] Discharge Summary      Patient: Arun Sotomayor   : 1945   MRN: [id number]   Date of Service:  2023  Admitting Diagnosis: Acute respiratory failure with hypercapnia [facility])  Current Admission Summary: Arun Sotomayor is a 70 y.o. male who presents for generalized weakness that started worse around 5 AM this morning. Patient also having mild headache. Patient has history of previous mesothelioma, COPD, CHF, STEF, obesity, hypertension, prior CVA. Patient wears 4 to 6 L nasal cannula at baseline wears CPAP at night. Patient was too weak to get up out of his chair which is why his daughter called 911. Patient does admit that he has had worsening breathing recently has had dyspnea on exertion. Worsening bilateral lower extremity swelling. No fevers or chills. Denies any chest pain. No numbness or weakness. No vision changes. Past Medical History:  has a past medical history of Anemia, COPD (chronic obstructive pulmonary disease) ([address]), Diabetes mellitus ([address]), Edema, GI (gastrointestinal bleed), Gout, Hypertension, Morbid obesity ([address]), Neuropathy, Obstructive sleep apnea, and Peripheral vascular disease ([address]). Past Surgical History:  has a past surgical history that includes Appendectomy; Total hip arthroplasty; and Colonoscopy (N/A, 2019). Discharge Recommendations: Arun Sotomayor scored a 15/24 on the AM-PAC short mobility form. Current research shows that an AM-PAC score of 17 or less is typically not associated with a discharge to the patient's home setting. Based on the patient's AM-PAC score and their current functional mobility deficits, it is recommended that the patient have 3-5 sessions per week of Physical Therapy at d/c to increase the patient's independence.   Please see assessment section for further patient ROM, decreased strength, decreased endurance, decreased balance  Prognosis: good  Clinical Assessment: Pt presents as below his baseline function and would benefit from skilled PT services to promote safe return to [facility]. Safety Interventions: patient left in chair, chair alarm in place, call light within reach, gait belt, patient at risk for falls, and nurse notified    Plan  Frequency: 5-7 x/week  Current Treatment Recommendations: strengthening, ROM, balance training, functional mobility training, transfer training, gait training, stair training, endurance training, neuromuscular re-education, patient/caregiver education, home exercise program, safety education, and equipment evaluation/education    Goals  Patient Goals: To return home    Short Term Goals:  Time Frame: Prior to D/C  Patient will complete bed mobility at modified independent   Patient will complete transfers at [facility]   Patient will ambulate 100 ft with use of LRAD at [facility]  Patient will ascend/descend 2 stairs with (R) ascending handrail at stand by assistance    Above goals reviewed on 11/28/2023. All goals are ongoing at this time unless indicated above.       Therapy Session Time      Individual Group Co-treatment   Time In      1340   Time Out      1420   Minutes      40     Timed Code Treatment Minutes:   25  Total Treatment Minutes:  40       Electronically Signed By: Lyric Hernandez, PT  Lyric Hernandez, PT, DPT, [id number]

## 2023-11-28 NOTE — CONSULTS
Palliative Care:      70 y.o. male brought to ED 11/27/23 for c/o fatigue, weakness, difficulty breathing. Hx. Of mesothemia, COPD and CHF. Admitted for intensive medical treatment and monitoring. Palliative consult placed. Past Medical History:   has a past medical history of Anemia, COPD (chronic obstructive pulmonary disease) ([address]), Diabetes mellitus ([address]), Edema, GI (gastrointestinal bleed), Gout, Hypertension, Morbid obesity ([address]), Neuropathy, Obstructive sleep apnea, and Peripheral vascular disease ([address]). Past Surgical History:   has a past surgical history that includes Appendectomy; Total hip arthroplasty; and Colonoscopy (N/A, 5/7/2019). Advance Directives:     FULL CODE   Shantanu Ward (daughter) DPOA if/when indicated. Problem Severity: Pain/Other Symptoms:    Poor endurance . Poor memory base      Bed Mobility/Toileting/Transfer:   Independent in home setting       Performance Status:        Palliative Performance Scale:  100% []Full Normal activity & work No evidence of disease  90%   [] Full Normal activity & work Some evidence of disease  80%   [] Full Normal activity with Effort Some evidence of disease  70%   [] Reduced Unable Normal Job/Work Significant disease Full Normal or reduced  60%   [] Ambulation reduced; Significant disease; Can't do hobbies/housework; intake normal   or reduced; occasional assist; LOC full/confusion  50%   [x] Mainly sit/lie; Extensive disease; Can't do any work; Considerable assist; intake normal  Or reduced; LOC full/confusion  40%   [] Mainly in bed; Extensive disease; Mainly assist; intake normal or reduced; occasional assist; LOC full/confusion  30%   [] Bed Bound; Extensive disease; Total care; intake reduced; LOC full/confusion  20%   [] Bed Bound; Extensive disease; Total care; intake minimal; Drowsy/coma  10%   [] Bed Bound; Extensive disease;  Total care; Mouth care only; Drowsy/coma    PPS 50%    Symptom Assessment: Appetite/Nausea/Bowels/Fatigue:       lbs. BMI 45.87. Albumin 3.2      Social History:   reports that he quit smoking about 11 years ago. His smoking use included cigarettes. He has a 50.00 pack-year smoking history. He has never used smokeless tobacco. He reports current alcohol use of about 14.0 standard drinks of alcohol per week. He reports that he does not use drugs. Family History:  family history includes Cancer in his father; Depression in his mother; Diabetes in his mother; Heart Disease in his mother; High Blood Pressure in his mother; High Cholesterol in his mother; Mental Illness in his mother; Stroke in his mother. Psychological/Spiritual:   A/O X3. Poor history recall. Single. One daughter. Mandaeism. Agreeable for  spiritual support to follow. Will submit consult. Family Discussion:     Pending MRI today. Neurology consulted. Discussed with patient code status and call to daughter to verify wishes. [facility] states that they do have ACP paperwork and will obtain to bring in for placement into patient documents. I outlined the various approaches to health care in the setting of life-limiting/life-threatening illness going forward, which broadly consist of:    (1) Continued life-prolonging treatments such as lab monitoring, artificial nutrition/hydration, and disease-modifying treatment with clear benchmarks to regularly assess progress or decline, along with escalation of treatment to include CPR/ACLS, intubation, use of vasopressors, and other forms of life support. (2) Continued life-prolonging treatments with clear boundaries such as withholding high-morbidity, likely non-beneficial interventions, specifically chest compressions, mechanical ventilation, and defibrillation/cardioversion. (3) Full commitment to intensive comfort treatment while allowing natural death, in which case hospice may be helpful. Both state DNR-CC.  In agreement during in-patient

## 2023-11-28 NOTE — PROGRESS NOTES
factors include advanced age, hx of CVA, and hx of COPD. Therefore, aspiration precautions should be in place. Recommend dysphagia diet of Regular solids, thin liquids, meds whole with water and ongoing intervention for diet tolerance. Recommended Diet and Intervention:  Diet Solids Recommendation:  Regular texture diet  Liquid Consistency Recommendation: Thin liquids  Recommended form of Meds: Meds whole with water        Dysphagia Therapeutic Intervention:  Diet Tolerance Monitoring , Patient/Family Education , Therapeutic Trials with SLP     Compensatory Swallowing Strategies:  Upright as possible with all PO intake , Small bites/sips , Eat/feed slowly, Remain upright 30-45 min     Oral Mechanism Exam:  [x]WFL []Mild   [] Moderate  []Severe  []To be assessed       SHORT TERM DYSPHAGIA GOALS  Pt will functionally tolerate recommended diet with no overt clinical s/s of aspiration   Pt will demonstrate understanding of aspiration risk and precautions via education/demonstration with occasional prompting    Patient Positioning: Upright in bed       SPEECH LANGUAGE COGNITIVE ASSESSMENT:     Speech Diagnosis:   Cognitive-Linguistic Deficits     Impressions: The pt presents with cognitive-linguistic deficits in the context of new CVA. Pt upright in bed with family present at bedside. Pt and pt's family reported that cognition, speech and language are at baseline. Pt administered cognitive-linguistic quick test. Pt with accuracy of 2/4 for immediate recall. Pt followed commands with 4/6 opportunities, accuracy decrease as direction became more complex. Pt answered yes/no questions with 5/5 accuracy. Noted difficulties with divergent naming which is consistent with chart review and pt's family reporting anomia after his stroke in 9/2023. Pt with reduced thought organization evidenced by difficulties with repetition of words and phrases. Pt also demonstrated deficits with delayed memory.  Pt benefited from repetition of questions and statements for clarification throughout assessments an in conversation. Based on assessment pt may benefit from skilled speech therapy and further assessment of cognitive linguistic function. COMPREHENSION  Auditory Comprehension: Mild  and Moderate   Impaired Complex questions  Impaired Two step commands  Impaired Multi-step commands    EXPRESSION  Verbal Expression: Mild   Impaired Divergent Naming  Impaired Thought Organization      Pragmatics/Social Functioning: Mild   Impaired Topic maintenance      MOTOR SPEECH  Motor Speech: Mild   Dysarthria: Blended word boundaries  and Imprecise articulation      VOICE  Voice: Within functional limits        COGNITION    Overall Orientation : Within functional limits  and To be further assessed    Oriented to self  Oriented to place    Attention: To be assessed           Memory: Mild    Impaired Short-term Memory  Impaired Recall of New Learning   Impaired Immediate/working Memory    Problem Solving: Mild  and Moderate    Impaired Verbal Reasoning    Safety/Judgement: Mild  and Moderate    Impaired Insight  Impulsive     GOALS:  Short Term Speech/Language/Cognitive Goals:   Pt will improve verbal expression for functional expression via graded tasks to 80%  Pt will participate in ongoing cognitive assessment with goals to be established as indicated     Plan of care: 3-5 times per week during acute care stay. Discharge Recommendations:  Discharge recommendations to be determined pending ongoing follow-up during acute care stay    EDUCATION:   Provided education regarding role of SLP, results of assessment, recommendations and general speech pathology plan of care. [x] Pt verbalized understanding and agreement   [x] Pt requires ongoing learning   [] No evidence of comprehension     If patient discharges prior to next visit, this note will serve as discharge.      Treatment time:  Timed Code Treatment Minutes:  0 minutes  Total

## 2023-11-28 NOTE — PROGRESS NOTES
[address] [facility] Department   Phone: [phone]    Occupational Therapy    [x] Initial Evaluation            [] Daily Treatment Note         [] Discharge Summary      Patient: Partha Mcgregor   : 1945   MRN: [id number]   Date of Service:  2023    Admitting Diagnosis:  Acute respiratory failure with hypercapnia [facility])  Current Admission Summary: 70y.o.  years old male with multiple medical problems, history of COPD, prior CVA, hypertension, sleep apnea and other medical problems was admitted to the hospital yesterday with acute fatigue and respiratory failure. Symptoms started hours prior to admission. He described difficulty with breathing requiring more oxygen supplementation. He was admitted for medical management. He was diagnosed with respiratory failure and is currently in the ICU. Further work-up showed elevated troponin and seen by cardiology for possible NSTEMI. He was started on heparin drip. Patient also was given antibiotics to rule out sepsis. Further imaging with CT head showed left hemispheric encephalomalacia and concern for new stroke. Neurology was consulted. The patient today denies any new deficits today. He still feels short of breath but no chest pain or headache. No focal weakness today. No speech impairment. Past Medical History:  has a past medical history of Anemia, COPD (chronic obstructive pulmonary disease) ([address]), Diabetes mellitus ([address]), Edema, GI (gastrointestinal bleed), Gout, Hypertension, Morbid obesity ([address]), Neuropathy, Obstructive sleep apnea, and Peripheral vascular disease ([address]). Past Surgical History:  has a past surgical history that includes Appendectomy; Total hip arthroplasty; and Colonoscopy (N/A, 2019). Discharge Recommendations: Partha Mcgregor scored a 15/24 on the AM-PAC ADL Inpatient form.  Current research shows that an AM-PAC score of 17 or less is typically not associated with a discharge to the

## 2023-11-28 NOTE — PLAN OF CARE
Problem: Discharge Planning  Goal: Discharge to home or other facility with appropriate resources  Outcome: Progressing  Flowsheets (Taken 11/27/2023 2000)  Discharge to home or other facility with appropriate resources:   Arrange for needed discharge resources and transportation as appropriate   Identify barriers to discharge with patient and caregiver   Identify discharge learning needs (meds, wound care, etc)     Problem: Safety - Adult  Goal: Free from fall injury  Outcome: Progressing     Problem: ABCDS Injury Assessment  Goal: Absence of physical injury  Outcome: Progressing

## 2023-11-28 NOTE — PROGRESS NOTES
4 Eyes Skin Assessment     NAME:  Chon Valles  YOB: 1945  MEDICAL RECORD NUMBER:  [id number]    The patient is being assessed for  Admission    I agree that at least one RN has performed a thorough Head to Toe Skin Assessment on the patient. ALL assessment sites listed below have been assessed. Areas assessed by both nurses:    Head, Face, Ears, Shoulders, Back, Chest, Arms, Elbows, Hands, Sacrum. Buttock, Coccyx, Ischium, Legs. Feet and Heels, and Under Medical Devices         Does the Patient have a Wound?  No noted wound(s)       Hussain Prevention initiated by RN: Yes  Wound Care Orders initiated by RN: No    Pressure Injury (Stage 3,4, Unstageable, DTI, NWPT, and Complex wounds) if present, place Wound referral order by RN under : No    New Ostomies, if present place, Ostomy referral order under : No     Nurse 1 eSignature: Electronically signed by Lucy Dennison RN on 11/27/23 at 7:03 PM EST    **SHARE this note so that the co-signing nurse can place an eSignature**    Nurse 2 eSignature: Electronically signed by Jeffrey Dance, RN on 11/27/23 at 8:36 PM EST

## 2023-11-29 ENCOUNTER — APPOINTMENT (OUTPATIENT)
Dept: MRI IMAGING | Age: 78
End: 2023-11-29
Payer: MEDICARE

## 2023-11-29 LAB
ALBUMIN SERPL-MCNC: 3 G/DL (ref 3.4–5)
ANION GAP SERPL CALCULATED.3IONS-SCNC: 12 MMOL/L (ref 3–16)
APTT BLD: 110.7 SEC (ref 22.7–35.9)
APTT BLD: 114.6 SEC (ref 22.7–35.9)
APTT BLD: 144.2 SEC (ref 22.7–35.9)
APTT BLD: 50.5 SEC (ref 22.7–35.9)
APTT BLD: 69.3 SEC (ref 22.7–35.9)
BASOPHILS # BLD: 0 K/UL (ref 0–0.2)
BASOPHILS NFR BLD: 0.1 %
BUN SERPL-MCNC: 55 MG/DL (ref 7–20)
CALCIUM SERPL-MCNC: 8.1 MG/DL (ref 8.3–10.6)
CHLORIDE SERPL-SCNC: 97 MMOL/L (ref 99–110)
CO2 SERPL-SCNC: 28 MMOL/L (ref 21–32)
CREAT SERPL-MCNC: 1.7 MG/DL (ref 0.8–1.3)
DEPRECATED RDW RBC AUTO: 14.9 % (ref 12.4–15.4)
EOSINOPHIL # BLD: 0 K/UL (ref 0–0.6)
EOSINOPHIL NFR BLD: 0 %
GFR SERPLBLD CREATININE-BSD FMLA CKD-EPI: 41 ML/MIN/{1.73_M2}
GLUCOSE BLD-MCNC: 170 MG/DL (ref 70–99)
GLUCOSE BLD-MCNC: 178 MG/DL (ref 70–99)
GLUCOSE BLD-MCNC: 196 MG/DL (ref 70–99)
GLUCOSE BLD-MCNC: 198 MG/DL (ref 70–99)
GLUCOSE BLD-MCNC: 212 MG/DL (ref 70–99)
GLUCOSE BLD-MCNC: 220 MG/DL (ref 70–99)
GLUCOSE SERPL-MCNC: 167 MG/DL (ref 70–99)
HCT VFR BLD AUTO: 31.9 % (ref 40.5–52.5)
HGB BLD-MCNC: 10.3 G/DL (ref 13.5–17.5)
LACTATE BLDV-SCNC: 1.2 MMOL/L (ref 0.4–2)
LACTATE BLDV-SCNC: 1.4 MMOL/L (ref 0.4–2)
LACTATE BLDV-SCNC: 1.9 MMOL/L (ref 0.4–2)
LYMPHOCYTES # BLD: 0.3 K/UL (ref 1–5.1)
LYMPHOCYTES NFR BLD: 3.5 %
MAGNESIUM SERPL-MCNC: 2.4 MG/DL (ref 1.8–2.4)
MCH RBC QN AUTO: 33.2 PG (ref 26–34)
MCHC RBC AUTO-ENTMCNC: 32.4 G/DL (ref 31–36)
MCV RBC AUTO: 102.5 FL (ref 80–100)
MONOCYTES # BLD: 0.4 K/UL (ref 0–1.3)
MONOCYTES NFR BLD: 4.5 %
NEUTROPHILS # BLD: 7.6 K/UL (ref 1.7–7.7)
NEUTROPHILS NFR BLD: 91.9 %
NT-PROBNP SERPL-MCNC: 1871 PG/ML (ref 0–449)
PERFORMED ON: ABNORMAL
PHOSPHATE SERPL-MCNC: 4 MG/DL (ref 2.5–4.9)
PLATELET # BLD AUTO: 205 K/UL (ref 135–450)
PMV BLD AUTO: 7 FL (ref 5–10.5)
POTASSIUM SERPL-SCNC: 4.4 MMOL/L (ref 3.5–5.1)
RBC # BLD AUTO: 3.11 M/UL (ref 4.2–5.9)
REASON FOR REJECTION: NORMAL
REJECTED TEST: NORMAL
SODIUM SERPL-SCNC: 137 MMOL/L (ref 136–145)
VANCOMYCIN SERPL-MCNC: 15.1 UG/ML
WBC # BLD AUTO: 8.3 K/UL (ref 4–11)

## 2023-11-29 PROCEDURE — 70551 MRI BRAIN STEM W/O DYE: CPT

## 2023-11-29 PROCEDURE — APPNB30 APP NON BILLABLE TIME 0-30 MINS

## 2023-11-29 PROCEDURE — 2580000003 HC RX 258: Performed by: STUDENT IN AN ORGANIZED HEALTH CARE EDUCATION/TRAINING PROGRAM

## 2023-11-29 PROCEDURE — 85025 COMPLETE CBC W/AUTO DIFF WBC: CPT

## 2023-11-29 PROCEDURE — 6370000000 HC RX 637 (ALT 250 FOR IP): Performed by: STUDENT IN AN ORGANIZED HEALTH CARE EDUCATION/TRAINING PROGRAM

## 2023-11-29 PROCEDURE — 85730 THROMBOPLASTIN TIME PARTIAL: CPT

## 2023-11-29 PROCEDURE — 83735 ASSAY OF MAGNESIUM: CPT

## 2023-11-29 PROCEDURE — 36415 COLL VENOUS BLD VENIPUNCTURE: CPT

## 2023-11-29 PROCEDURE — 2000000000 HC ICU R&B

## 2023-11-29 PROCEDURE — 83880 ASSAY OF NATRIURETIC PEPTIDE: CPT

## 2023-11-29 PROCEDURE — C9113 INJ PANTOPRAZOLE SODIUM, VIA: HCPCS | Performed by: STUDENT IN AN ORGANIZED HEALTH CARE EDUCATION/TRAINING PROGRAM

## 2023-11-29 PROCEDURE — 94640 AIRWAY INHALATION TREATMENT: CPT

## 2023-11-29 PROCEDURE — 6360000002 HC RX W HCPCS: Performed by: STUDENT IN AN ORGANIZED HEALTH CARE EDUCATION/TRAINING PROGRAM

## 2023-11-29 PROCEDURE — 92526 ORAL FUNCTION THERAPY: CPT

## 2023-11-29 PROCEDURE — 87040 BLOOD CULTURE FOR BACTERIA: CPT

## 2023-11-29 PROCEDURE — 6370000000 HC RX 637 (ALT 250 FOR IP): Performed by: INTERNAL MEDICINE

## 2023-11-29 PROCEDURE — 94660 CPAP INITIATION&MGMT: CPT

## 2023-11-29 PROCEDURE — 80202 ASSAY OF VANCOMYCIN: CPT

## 2023-11-29 PROCEDURE — 99232 SBSQ HOSP IP/OBS MODERATE 35: CPT | Performed by: PSYCHIATRY & NEUROLOGY

## 2023-11-29 PROCEDURE — 2700000000 HC OXYGEN THERAPY PER DAY

## 2023-11-29 PROCEDURE — 94761 N-INVAS EAR/PLS OXIMETRY MLT: CPT

## 2023-11-29 PROCEDURE — 80069 RENAL FUNCTION PANEL: CPT

## 2023-11-29 PROCEDURE — 99232 SBSQ HOSP IP/OBS MODERATE 35: CPT | Performed by: INTERNAL MEDICINE

## 2023-11-29 PROCEDURE — 83605 ASSAY OF LACTIC ACID: CPT

## 2023-11-29 RX ORDER — SIMETHICONE 80 MG
80 TABLET,CHEWABLE ORAL EVERY 6 HOURS PRN
Status: DISCONTINUED | OUTPATIENT
Start: 2023-11-29 | End: 2023-12-01 | Stop reason: HOSPADM

## 2023-11-29 RX ADMIN — HEPARIN SODIUM 13 UNITS/KG/HR: 10000 INJECTION, SOLUTION INTRAVENOUS at 10:08

## 2023-11-29 RX ADMIN — WATER 40 MG: 1 INJECTION INTRAMUSCULAR; INTRAVENOUS; SUBCUTANEOUS at 16:10

## 2023-11-29 RX ADMIN — ALLOPURINOL 200 MG: 100 TABLET ORAL at 08:50

## 2023-11-29 RX ADMIN — SIMETHICONE 80 MG: 80 TABLET, CHEWABLE ORAL at 17:25

## 2023-11-29 RX ADMIN — PANTOPRAZOLE SODIUM 40 MG: 40 INJECTION, POWDER, FOR SOLUTION INTRAVENOUS at 08:51

## 2023-11-29 RX ADMIN — Medication 100 MG: at 08:50

## 2023-11-29 RX ADMIN — Medication 10 ML: at 08:52

## 2023-11-29 RX ADMIN — HEPARIN SODIUM 4000 UNITS: 1000 INJECTION INTRAVENOUS; SUBCUTANEOUS at 05:52

## 2023-11-29 RX ADMIN — INSULIN LISPRO 2 UNITS: 100 INJECTION, SOLUTION INTRAVENOUS; SUBCUTANEOUS at 20:22

## 2023-11-29 RX ADMIN — INSULIN LISPRO 2 UNITS: 100 INJECTION, SOLUTION INTRAVENOUS; SUBCUTANEOUS at 16:10

## 2023-11-29 RX ADMIN — HEPARIN SODIUM 2000 UNITS: 1000 INJECTION INTRAVENOUS; SUBCUTANEOUS at 19:19

## 2023-11-29 RX ADMIN — IPRATROPIUM BROMIDE AND ALBUTEROL SULFATE 1 DOSE: .5; 3 SOLUTION RESPIRATORY (INHALATION) at 22:11

## 2023-11-29 RX ADMIN — IPRATROPIUM BROMIDE AND ALBUTEROL SULFATE 1 DOSE: .5; 3 SOLUTION RESPIRATORY (INHALATION) at 15:46

## 2023-11-29 RX ADMIN — FUROSEMIDE 40 MG: 10 INJECTION, SOLUTION INTRAMUSCULAR; INTRAVENOUS at 08:51

## 2023-11-29 RX ADMIN — FUROSEMIDE 40 MG: 10 INJECTION, SOLUTION INTRAMUSCULAR; INTRAVENOUS at 17:24

## 2023-11-29 RX ADMIN — AZITHROMYCIN MONOHYDRATE 500 MG: 500 INJECTION, POWDER, LYOPHILIZED, FOR SOLUTION INTRAVENOUS at 11:22

## 2023-11-29 RX ADMIN — Medication 2 PUFF: at 08:21

## 2023-11-29 RX ADMIN — IPRATROPIUM BROMIDE AND ALBUTEROL SULFATE 1 DOSE: .5; 3 SOLUTION RESPIRATORY (INHALATION) at 04:11

## 2023-11-29 RX ADMIN — IPRATROPIUM BROMIDE AND ALBUTEROL SULFATE 1 DOSE: .5; 3 SOLUTION RESPIRATORY (INHALATION) at 08:21

## 2023-11-29 RX ADMIN — IPRATROPIUM BROMIDE AND ALBUTEROL SULFATE 1 DOSE: .5; 3 SOLUTION RESPIRATORY (INHALATION) at 12:31

## 2023-11-29 RX ADMIN — WATER 40 MG: 1 INJECTION INTRAMUSCULAR; INTRAVENOUS; SUBCUTANEOUS at 04:29

## 2023-11-29 RX ADMIN — Medication 2 PUFF: at 22:12

## 2023-11-29 RX ADMIN — Medication 10 ML: at 20:22

## 2023-11-29 RX ADMIN — ASPIRIN 81 MG: 81 TABLET, CHEWABLE ORAL at 08:50

## 2023-11-29 RX ADMIN — ATORVASTATIN CALCIUM 80 MG: 80 TABLET, FILM COATED ORAL at 20:22

## 2023-11-29 RX ADMIN — CEFTRIAXONE SODIUM 1000 MG: 1 INJECTION, POWDER, FOR SOLUTION INTRAMUSCULAR; INTRAVENOUS at 09:02

## 2023-11-29 ASSESSMENT — PAIN SCALES - GENERAL
PAINLEVEL_OUTOF10: 0

## 2023-11-29 NOTE — PROGRESS NOTES
Hospitalist Progress Note    Name:  Morelia Sharma    /Age/Sex: 1945  (70 y.o. male)  MRN & CSN:  [id number] & [id number]    PCP: Jacqui Munoz MD    Date of Admission: 2023    Patient Status:  Inpatient     Chief Complaint:   Chief Complaint   Patient presents with    Fatigue     Pt arrives from home via [facility]. Pt c/o more weakness than normal around 0500. Pt has history of mesothemia, copd, chf. Trouble breathing increased. Baseline 6L, pt states he just couldn't get up       Hospital Course: 70 y.o. male with PMHx of COPD, HTN, HLD, DM II who presented to [facility] with complaints of fatigue. Patient states he has been feeling short of breath for the last 1-2 days. He always feels short of breath with his COPD, but he states it has been declining recently. His daughter, who is a nurse, thought that the patient had pneumonia and should go to the emergency department. Patient also admits he has a history of heart failure and is on diuretics. Patient states he has been compliant with all his medications. He is on CPAP for STEF and is compliant. Denies chest pain, nausea, vomiting, GI/ symptoms, fever, or chills. Patient is edematous on exam with chronic venous stasis changes in his lower extremities, but states that is pretty normal for him. Former tobacco user. Quit 10 years ago. Used to smoke 2 packs/day. Endorses daily alcohol use. Drinks 3 beers per day. No history of DTs or seizures from alcohol withdrawal.  Denies illicit drug use. Subjective: Today is:  Hospital Day: 2. Patient seen and examined in [id number].            Medications:  Reviewed    Infusion Medications    heparin (PORCINE) Infusion 7 Units/kg/hr (23 1442)    dextrose      sodium chloride      dexmedetomidine 0.3 mcg/kg/hr (23 1142)     Scheduled Medications    allopurinol  200 mg Oral Daily    mometasone-formoterol  2 puff Inhalation BID RT

## 2023-11-29 NOTE — PLAN OF CARE
Problem: Discharge Planning  Goal: Discharge to home or other facility with appropriate resources  11/28/2023 1944 by Samantha Curiel RN  Outcome: Progressing  11/28/2023 0617 by Jose Francisco Boyd RN  Outcome: Progressing  Flowsheets (Taken 11/27/2023 2000)  Discharge to home or other facility with appropriate resources:   Arrange for needed discharge resources and transportation as appropriate   Identify barriers to discharge with patient and caregiver   Identify discharge learning needs (meds, wound care, etc)     Problem: Pain  Goal: Verbalizes/displays adequate comfort level or baseline comfort level  Outcome: Progressing     Problem: Safety - Adult  Goal: Free from fall injury  11/28/2023 1944 by Samantha Curiel RN  Outcome: Progressing  11/28/2023 0617 by Jose Francisco Boyd RN  Outcome: Progressing     Problem: Skin/Tissue Integrity  Goal: Absence of new skin breakdown  Description: 1. Monitor for areas of redness and/or skin breakdown  2. Assess vascular access sites hourly  3. Every 4-6 hours minimum:  Change oxygen saturation probe site  4. Every 4-6 hours:  If on nasal continuous positive airway pressure, respiratory therapy assess nares and determine need for appliance change or resting period.   Outcome: Progressing     Problem: ABCDS Injury Assessment  Goal: Absence of physical injury  11/28/2023 1944 by Samantha Curiel RN  Outcome: Progressing  11/28/2023 0617 by Jose Francisco Boyd RN  Outcome: Progressing     Problem: Chronic Conditions and Co-morbidities  Goal: Patient's chronic conditions and co-morbidity symptoms are monitored and maintained or improved  Outcome: Progressing

## 2023-11-29 NOTE — PLAN OF CARE
Alert and oriented. Settled in the chair all night as per pt request.  Ventilating via NC on 6L (baseline), saturations WNL. Sinus rhythm, afebrile, BG WNL. Wore the Bipap at midnight and took it off at 0400 and then back to NC 6L. Submitted resp panel specimen but got rejected again. No other complaints noted. Problem: Discharge Planning  Goal: Discharge to home or other facility with appropriate resources  11/29/2023 0629 by Jossy Delacruz RN  Outcome: Progressing    Problem: Pain  Goal: Verbalizes/displays adequate comfort level or baseline comfort level  11/29/2023 0629 by Jossy Delacruz RN  Outcome: Progressing  Flowsheets  Taken 11/29/2023 0100  Verbalizes/displays adequate comfort level or baseline comfort level:   Encourage patient to monitor pain and request assistance   Assess pain using appropriate pain scale   Administer analgesics based on type and severity of pain and evaluate response   Implement non-pharmacological measures as appropriate and evaluate response     Problem: Safety - Adult  Goal: Free from fall injury  11/29/2023 0629 by Jossy Delacruz RN  Outcome: Progressing    Problem: Skin/Tissue Integrity  Goal: Absence of new skin breakdown  Description: 1. Monitor for areas of redness and/or skin breakdown  2. Assess vascular access sites hourly  3. Every 4-6 hours minimum:  Change oxygen saturation probe site  4. Every 4-6 hours:  If on nasal continuous positive airway pressure, respiratory therapy assess nares and determine need for appliance change or resting period.   11/29/2023 0629 by Jossy Delacruz RN  Outcome: Progressing    Problem: ABCDS Injury Assessment  Goal: Absence of physical injury  11/29/2023 9141 by Jossy Delacruz RN  Outcome: Progressing    Problem: Chronic Conditions and Co-morbidities  Goal: Patient's chronic conditions and co-morbidity symptoms are monitored and maintained or improved  11/29/2023 0629 by Maryan Tejada

## 2023-11-29 NOTE — CONSULTS
Clinical Pharmacy Note: Pharmacy to Dose Vancomycin    Lori Villegas is a 70 y.o. male started on Vancomycin for bacteremia; consult received from Dr. Tc Figueredo to manage therapy. Also receiving the following antibiotics: ceftriaxone, azithromycin. Goal AUC: 400-600 mg/L*hr    Assessment/Plan:  A 2000 mg loading dose was given on 11/28 at 2300  Initiate vancomycin 1000 mg IV every 24 hours. Bayesian modeling predicts an AUC of 410 mg/L*hr and a trough of 10.7 mcg/mL at steady state concentration. A vancomycin random level has been ordered for 11/29 at 0600. Changes in regimen will be determined based on culture results, renal function, and clinical response. Pharmacy will continue to monitor and adjust regimen as necessary. Allergies:  Patient has no known allergies. Recent Labs     11/27/23  0934 11/28/23  0433   CREATININE 1.4* 1.5*       Recent Labs     11/27/23  0934 11/28/23  0433   WBC 15.1* 7.1       Ht Readings from Last 1 Encounters:   11/27/23 1.829 m (6')        Wt Readings from Last 1 Encounters:   11/28/23 (!) 153.4 kg (338 lb 3 oz)         Estimated Creatinine Clearance: 64 mL/min (A) (based on SCr of 1.5 mg/dL (H)).       Thank you for the consult,    Prashant Santillan, [facility]

## 2023-11-29 NOTE — PROGRESS NOTES
Palliative Care: Attended IDT rounds. Patient sitting up in chair. Verbalizing mild SOB with any exertion. 02 set @ 6L; currently @ 96%. PT/OT therapy recommends SNF. Patient denying and stating when medically stable his goal is to return to home setting. In agreement for [address] [address]. YENNIFER Spence addressing. No new concerns verbalized. Emotional support ongoing. Will continue to follow as needed.      Electronically signed by Carli Carson RN, BSN, [facility] (Palliative Care) [phone]

## 2023-11-29 NOTE — PROGRESS NOTES
chronic illnesses or  [x]     1 acute illness with systemic symptoms     ---------------------------------------------------------------------  B. Risk of Treatment (any 1)   [] Drugs/treatments that require intensive monitoring for toxicity include:    [] Change in code status:    [] Decision to escalate care:    [] Major surgery/procedure with associated risk factors:    [x] Prescription drug management  ----------------------------------------------------------------------  [] High (any 2)  [x] Moderate (any 1)    C.  Data (any 2 for high and any 1 for moderate)  [x] Discussed management of the case with:    [] Imaging personally reviewed and interpreted, includes:    [x] Data Review (any 3)  [] Collateral history obtained from:    [x] All available Consultant notes from yesterday/today were reviewed  [x] All current labs were reviewed and interpreted for clinical significance   [x] Appropriate follow-up labs were ordered      Data  LABS:   Lab Results   Component Value Date/Time     11/29/2023 04:40 AM    K 4.4 11/29/2023 04:40 AM    K 4.1 11/27/2023 09:34 AM    CL 97 11/29/2023 04:40 AM    CO2 28 11/29/2023 04:40 AM    BUN 55 11/29/2023 04:40 AM    CREATININE 1.7 11/29/2023 04:40 AM    GFRAA 60 09/27/2022 10:30 AM    GFRAA >60 04/17/2013 08:55 AM    LABGLOM 41 11/29/2023 04:40 AM    GLUCOSE 167 11/29/2023 04:40 AM    PHOS 4.0 11/29/2023 04:40 AM    MG 2.40 11/29/2023 04:40 AM    CALCIUM 8.1 11/29/2023 04:40 AM     Lab Results   Component Value Date/Time    WBC 8.3 11/29/2023 07:16 AM    RBC 3.11 11/29/2023 07:16 AM    HGB 10.3 11/29/2023 07:16 AM    HCT 31.9 11/29/2023 07:16 AM    .5 11/29/2023 07:16 AM    RDW 14.9 11/29/2023 07:16 AM     11/29/2023 07:16 AM     Lab Results   Component Value Date    INR 1.28 (H) 11/27/2023    PROTIME 16.0 (H) 11/27/2023       I reviewed blood testing and other test results and discussed results with the patient      Impression:  Acute respiratory failure with hypoxia and hypercapnia, improving  Remote left MCA stroke  CAD  Sepsis  Sleep apnea  Hypertension      Recommendation  Awaiting MRI brain  Continue current supportive care  Follow culture results  Respiratory support  Antibiotics  Aspirin  Statin  Restart oral AC once cleared from other specialties  We will follow if MRI showed acute stroke , otherwise call for ?mirza Schwartz MD   [phone]      This dictation was generated by voice recognition computer software. Although all attempts are made to edit the dictation for accuracy, there may be errors in the transcription that are not intended.

## 2023-11-29 NOTE — CARE COORDINATION
Case Management Assessment  Initial Evaluation    Date/Time of Evaluation: 11/29/2023 11:03 AM  Assessment Completed by: Darylene Hageman, MSW    If patient is discharged prior to next notation, then this note serves as note for discharge by case management. Patient Name: Erika Gomez                   YOB: 1945  Diagnosis: Generalized weakness [R53.1]  Elevated troponin [R79.89]  Acute on chronic respiratory failure with hypercapnia (HCC) [J96.22]  Pneumonia of both lower lobes due to infectious organism [J18.9]  Chronic respiratory failure with hypoxia and hypercapnia (HCC) [J96.11, J96.12]  Stage 3a chronic kidney disease ([address]) [N18.31]  Acute on chronic congestive heart failure, unspecified heart failure type ([address]) [I50.9]                   Date / Time: 11/27/2023  9:18 AM    Patient Admission Status: Inpatient   Readmission Risk (Low < 19, Mod (19-27), High > 27): Readmission Risk Score: 19.1    Current PCP: Mary Mock MD  PCP verified by CM? Yes    Chart Reviewed: Yes      History Provided by: Patient  Patient Orientation: Alert and Oriented    Patient Cognition: Alert    Hospitalization in the last 30 days (Readmission):  No    If yes, Readmission Assessment in CM Navigator will be completed.     Advance Directives:      Code Status: DNR-CCA   Patient's Primary Decision Maker is: Named in [facility]    Primary Decision MakerEulene Boeck  Child - [phone]    Secondary Decision Maker: Melvin Sanchez Child - [phone]    Discharge Planning:    Patient lives with: Children, Family Members (The patient's daughter and granddaughter lives with the patient.) Type of Home: House  Primary Care Giver: Self  Patient Support Systems include: Family Members, Children (Daughter and Granddaughter lives with the patient)   Current Financial resources: Medicare  Current community resources: None  Current services prior to admission: Durable Medical Equipment [R79.89]  Acute on chronic respiratory failure with hypercapnia (HCC) [J96.22]  Pneumonia of both lower lobes due to infectious organism [J18.9]  Chronic respiratory failure with hypoxia and hypercapnia (HCC) [J96.11, J96.12]  Stage 3a chronic kidney disease ([address]) [N18.31]  Acute on chronic congestive heart failure, unspecified heart failure type ([address]) [T20.8]    IF APPLICABLE: The Patient and/or patient representative Carley Ochoa and his family were provided with a choice of provider and agrees with the discharge plan. Freedom of choice list with basic dialogue that supports the patient's individualized plan of care/goals and shares the quality data associated with the providers was provided to: Patient   Patient Representative Name:       The Patient and/or Patient Representative Agree with the Discharge Plan?  Yes    FRANCA Gonsalez  Case Management Department

## 2023-11-29 NOTE — PROGRESS NOTES
Facility/Department: [facility] ICU  Speech Language Pathology   Dysphagia and Speech Language/Cognitive Treatment Note    Patient: Everardo Elmore   : 1945   MRN: [id number]      Evaluation Date: 2023      Admitting Dx: Generalized weakness [R53.1]  Elevated troponin [R79.89]  Acute on chronic respiratory failure with hypercapnia (HCC) [J96.22]  Pneumonia of both lower lobes due to infectious organism [J18.9]  Chronic respiratory failure with hypoxia and hypercapnia (HCC) [J96.11, J96.12]  Stage 3a chronic kidney disease ([address]) [N18.31]  Acute on chronic congestive heart failure, unspecified heart failure type ([address]) [I50.9]  Treatment Diagnosis: Word Finding Difficulty, Cognitive-Linguistic Deficits , Oropharyngeal Dysphagia   Pain: Did not state                                                Subjective:  Pt alert and upright in recliner, participative with treatment. Dysphagia Treatment:   Diet and Treatment Recommendations 2023:  Diet Solids Recommendation:  Regular texture diet  Liquid Consistency Recommendation: Thin liquids  Recommended form of Meds: Meds whole with water          Compensatory strategies: Upright as possible with all PO intake , Small bites/sips , Eat/feed slowly, Remain upright 30-45 min     Assessment of Texture Tolerance:  Diet level prior to treatment: Regular texture diet , Thin liquids   Tolerance of Current Diet Level:RN reported pt appears to be tolerating current diet level      -Impressions: Pt was positioned Upright in bed , awake and alert. Currently on  6L O2 via nasal cannula . Trials of thin liquids were provided to assess swallow function. Pt politely declined regular solids, reporting having just finished lunch. Thin liquids revealed a seemingly timely swallow initiation with no overt clinical s/s of aspiration in single sips and consecutive drinks. Pt demonstrates increased risk for aspiration due to old CVA and suspicion for new CVA.  Based on today's session this note will serve as a discharge summary.       Signature:   Brian Salas M.A., [address] [facility]  Speech-Language Pathologist

## 2023-11-29 NOTE — PROGRESS NOTES
Nutrition Education    Pt triggered for heart failure diet education. Upon visiting, reported familiar with diet guidelines and limits sodium/fluid intake at home. Said he only adds salt to steaks. Denied need for verbal education but briefly reviewed avoiding adding salt to foods, opting for low sodium food items, and limiting fluids to 64 ounces per day. This RD left handout on heart failure nutrition therapy with name and phone number should pt have any questions regarding materials provided. Pt states understanding of education. Time spent with patient: 5 minutes. Educated on 11/29  Learners: Patient  Readiness: Acceptance  Method: Handout  Response: Verbalizes Understanding  Contact name and number provided.     Nagi Barnes MS, RD, LD  Contact Number: [phone]

## 2023-11-30 LAB
ALBUMIN SERPL-MCNC: 3.2 G/DL (ref 3.4–5)
ANION GAP SERPL CALCULATED.3IONS-SCNC: 7 MMOL/L (ref 3–16)
ANTI-XA UNFRAC HEPARIN: 0.92 IU/ML (ref 0.3–0.7)
APTT BLD: 115.1 SEC (ref 22.7–35.9)
APTT BLD: 62.4 SEC (ref 22.7–35.9)
BACTERIA BLD CULT ORG #2: ABNORMAL
BACTERIA BLD CULT ORG #2: ABNORMAL
BASOPHILS # BLD: 0 K/UL (ref 0–0.2)
BASOPHILS NFR BLD: 0.1 %
BUN SERPL-MCNC: 67 MG/DL (ref 7–20)
CALCIUM SERPL-MCNC: 8.4 MG/DL (ref 8.3–10.6)
CHLORIDE SERPL-SCNC: 99 MMOL/L (ref 99–110)
CO2 SERPL-SCNC: 33 MMOL/L (ref 21–32)
CREAT SERPL-MCNC: 1.8 MG/DL (ref 0.8–1.3)
DEPRECATED RDW RBC AUTO: 14.7 % (ref 12.4–15.4)
EOSINOPHIL # BLD: 0 K/UL (ref 0–0.6)
EOSINOPHIL NFR BLD: 0 %
GFR SERPLBLD CREATININE-BSD FMLA CKD-EPI: 38 ML/MIN/{1.73_M2}
GLUCOSE BLD-MCNC: 200 MG/DL (ref 70–99)
GLUCOSE BLD-MCNC: 204 MG/DL (ref 70–99)
GLUCOSE BLD-MCNC: 221 MG/DL (ref 70–99)
GLUCOSE BLD-MCNC: 226 MG/DL (ref 70–99)
GLUCOSE BLD-MCNC: 227 MG/DL (ref 70–99)
GLUCOSE BLD-MCNC: 261 MG/DL (ref 70–99)
GLUCOSE SERPL-MCNC: 189 MG/DL (ref 70–99)
HCT VFR BLD AUTO: 31.4 % (ref 40.5–52.5)
HGB BLD-MCNC: 10.1 G/DL (ref 13.5–17.5)
LACTATE BLDV-SCNC: 1.4 MMOL/L (ref 0.4–2)
LACTATE BLDV-SCNC: 2.1 MMOL/L (ref 0.4–2)
LYMPHOCYTES # BLD: 0.3 K/UL (ref 1–5.1)
LYMPHOCYTES NFR BLD: 3.7 %
MAGNESIUM SERPL-MCNC: 2.5 MG/DL (ref 1.8–2.4)
MCH RBC QN AUTO: 33.1 PG (ref 26–34)
MCHC RBC AUTO-ENTMCNC: 32.3 G/DL (ref 31–36)
MCV RBC AUTO: 102.5 FL (ref 80–100)
MONOCYTES # BLD: 0.4 K/UL (ref 0–1.3)
MONOCYTES NFR BLD: 5.9 %
MRSA SPEC QL CULT: NORMAL
NEUTROPHILS # BLD: 6.5 K/UL (ref 1.7–7.7)
NEUTROPHILS NFR BLD: 90.3 %
NT-PROBNP SERPL-MCNC: 1338 PG/ML (ref 0–449)
ORGANISM: ABNORMAL
ORGANISM: ABNORMAL
PERFORMED ON: ABNORMAL
PHOSPHATE SERPL-MCNC: 3.9 MG/DL (ref 2.5–4.9)
PLATELET # BLD AUTO: 212 K/UL (ref 135–450)
PMV BLD AUTO: 7.2 FL (ref 5–10.5)
POTASSIUM SERPL-SCNC: 4.6 MMOL/L (ref 3.5–5.1)
RBC # BLD AUTO: 3.06 M/UL (ref 4.2–5.9)
SODIUM SERPL-SCNC: 139 MMOL/L (ref 136–145)
WBC # BLD AUTO: 7.1 K/UL (ref 4–11)

## 2023-11-30 PROCEDURE — 85730 THROMBOPLASTIN TIME PARTIAL: CPT

## 2023-11-30 PROCEDURE — C9113 INJ PANTOPRAZOLE SODIUM, VIA: HCPCS | Performed by: STUDENT IN AN ORGANIZED HEALTH CARE EDUCATION/TRAINING PROGRAM

## 2023-11-30 PROCEDURE — 85025 COMPLETE CBC W/AUTO DIFF WBC: CPT

## 2023-11-30 PROCEDURE — 83735 ASSAY OF MAGNESIUM: CPT

## 2023-11-30 PROCEDURE — 97116 GAIT TRAINING THERAPY: CPT

## 2023-11-30 PROCEDURE — 6370000000 HC RX 637 (ALT 250 FOR IP): Performed by: STUDENT IN AN ORGANIZED HEALTH CARE EDUCATION/TRAINING PROGRAM

## 2023-11-30 PROCEDURE — 6370000000 HC RX 637 (ALT 250 FOR IP): Performed by: INTERNAL MEDICINE

## 2023-11-30 PROCEDURE — 94640 AIRWAY INHALATION TREATMENT: CPT

## 2023-11-30 PROCEDURE — 6360000002 HC RX W HCPCS: Performed by: STUDENT IN AN ORGANIZED HEALTH CARE EDUCATION/TRAINING PROGRAM

## 2023-11-30 PROCEDURE — 80069 RENAL FUNCTION PANEL: CPT

## 2023-11-30 PROCEDURE — 2580000003 HC RX 258: Performed by: INTERNAL MEDICINE

## 2023-11-30 PROCEDURE — 6360000002 HC RX W HCPCS: Performed by: INTERNAL MEDICINE

## 2023-11-30 PROCEDURE — 87040 BLOOD CULTURE FOR BACTERIA: CPT

## 2023-11-30 PROCEDURE — 2000000000 HC ICU R&B

## 2023-11-30 PROCEDURE — 94761 N-INVAS EAR/PLS OXIMETRY MLT: CPT

## 2023-11-30 PROCEDURE — 83880 ASSAY OF NATRIURETIC PEPTIDE: CPT

## 2023-11-30 PROCEDURE — 85520 HEPARIN ASSAY: CPT

## 2023-11-30 PROCEDURE — 36415 COLL VENOUS BLD VENIPUNCTURE: CPT

## 2023-11-30 PROCEDURE — 2700000000 HC OXYGEN THERAPY PER DAY

## 2023-11-30 PROCEDURE — 83605 ASSAY OF LACTIC ACID: CPT

## 2023-11-30 PROCEDURE — 99233 SBSQ HOSP IP/OBS HIGH 50: CPT | Performed by: INTERNAL MEDICINE

## 2023-11-30 PROCEDURE — APPNB30 APP NON BILLABLE TIME 0-30 MINS

## 2023-11-30 PROCEDURE — 2580000003 HC RX 258: Performed by: STUDENT IN AN ORGANIZED HEALTH CARE EDUCATION/TRAINING PROGRAM

## 2023-11-30 RX ORDER — PANTOPRAZOLE SODIUM 40 MG/1
40 TABLET, DELAYED RELEASE ORAL
Status: DISCONTINUED | OUTPATIENT
Start: 2023-12-01 | End: 2023-12-01 | Stop reason: HOSPADM

## 2023-11-30 RX ORDER — PREDNISONE 20 MG/1
40 TABLET ORAL DAILY
Status: DISCONTINUED | OUTPATIENT
Start: 2023-12-01 | End: 2023-12-01 | Stop reason: HOSPADM

## 2023-11-30 RX ORDER — CEFUROXIME AXETIL 250 MG/1
500 TABLET ORAL EVERY 12 HOURS SCHEDULED
Status: DISCONTINUED | OUTPATIENT
Start: 2023-11-30 | End: 2023-12-01 | Stop reason: HOSPADM

## 2023-11-30 RX ORDER — FUROSEMIDE 40 MG/1
40 TABLET ORAL 2 TIMES DAILY
Status: DISCONTINUED | OUTPATIENT
Start: 2023-11-30 | End: 2023-12-01 | Stop reason: HOSPADM

## 2023-11-30 RX ADMIN — INSULIN LISPRO 2 UNITS: 100 INJECTION, SOLUTION INTRAVENOUS; SUBCUTANEOUS at 09:22

## 2023-11-30 RX ADMIN — FUROSEMIDE 40 MG: 10 INJECTION, SOLUTION INTRAMUSCULAR; INTRAVENOUS at 09:21

## 2023-11-30 RX ADMIN — CEFUROXIME AXETIL 500 MG: 250 TABLET ORAL at 20:49

## 2023-11-30 RX ADMIN — WATER 40 MG: 1 INJECTION INTRAMUSCULAR; INTRAVENOUS; SUBCUTANEOUS at 03:36

## 2023-11-30 RX ADMIN — APIXABAN 5 MG: 5 TABLET, FILM COATED ORAL at 20:49

## 2023-11-30 RX ADMIN — ALLOPURINOL 200 MG: 100 TABLET ORAL at 09:22

## 2023-11-30 RX ADMIN — WATER 40 MG: 1 INJECTION INTRAMUSCULAR; INTRAVENOUS; SUBCUTANEOUS at 15:54

## 2023-11-30 RX ADMIN — ASPIRIN 81 MG: 81 TABLET, CHEWABLE ORAL at 09:22

## 2023-11-30 RX ADMIN — INSULIN LISPRO 2 UNITS: 100 INJECTION, SOLUTION INTRAVENOUS; SUBCUTANEOUS at 12:26

## 2023-11-30 RX ADMIN — PANTOPRAZOLE SODIUM 40 MG: 40 INJECTION, POWDER, FOR SOLUTION INTRAVENOUS at 09:21

## 2023-11-30 RX ADMIN — INSULIN LISPRO 4 UNITS: 100 INJECTION, SOLUTION INTRAVENOUS; SUBCUTANEOUS at 15:54

## 2023-11-30 RX ADMIN — Medication 10 ML: at 20:49

## 2023-11-30 RX ADMIN — IPRATROPIUM BROMIDE AND ALBUTEROL SULFATE 1 DOSE: .5; 3 SOLUTION RESPIRATORY (INHALATION) at 16:46

## 2023-11-30 RX ADMIN — CEFUROXIME AXETIL 500 MG: 250 TABLET ORAL at 10:21

## 2023-11-30 RX ADMIN — Medication 2 PUFF: at 20:35

## 2023-11-30 RX ADMIN — Medication 2 PUFF: at 09:03

## 2023-11-30 RX ADMIN — INSULIN LISPRO 2 UNITS: 100 INJECTION, SOLUTION INTRAVENOUS; SUBCUTANEOUS at 00:26

## 2023-11-30 RX ADMIN — IPRATROPIUM BROMIDE AND ALBUTEROL SULFATE 1 DOSE: .5; 3 SOLUTION RESPIRATORY (INHALATION) at 01:42

## 2023-11-30 RX ADMIN — INSULIN LISPRO 2 UNITS: 100 INJECTION, SOLUTION INTRAVENOUS; SUBCUTANEOUS at 03:45

## 2023-11-30 RX ADMIN — ATORVASTATIN CALCIUM 80 MG: 80 TABLET, FILM COATED ORAL at 20:49

## 2023-11-30 RX ADMIN — Medication 100 MG: at 09:22

## 2023-11-30 RX ADMIN — IPRATROPIUM BROMIDE AND ALBUTEROL SULFATE 1 DOSE: .5; 3 SOLUTION RESPIRATORY (INHALATION) at 04:55

## 2023-11-30 RX ADMIN — FUROSEMIDE 40 MG: 40 TABLET ORAL at 17:29

## 2023-11-30 RX ADMIN — HEPARIN SODIUM 11 UNITS/KG/HR: 10000 INJECTION, SOLUTION INTRAVENOUS at 00:28

## 2023-11-30 RX ADMIN — IPRATROPIUM BROMIDE AND ALBUTEROL SULFATE 1 DOSE: .5; 3 SOLUTION RESPIRATORY (INHALATION) at 09:03

## 2023-11-30 RX ADMIN — INSULIN LISPRO 2 UNITS: 100 INJECTION, SOLUTION INTRAVENOUS; SUBCUTANEOUS at 21:28

## 2023-11-30 RX ADMIN — Medication 10 ML: at 09:22

## 2023-11-30 RX ADMIN — IPRATROPIUM BROMIDE AND ALBUTEROL SULFATE 1 DOSE: .5; 3 SOLUTION RESPIRATORY (INHALATION) at 13:02

## 2023-11-30 RX ADMIN — IPRATROPIUM BROMIDE AND ALBUTEROL SULFATE 1 DOSE: .5; 3 SOLUTION RESPIRATORY (INHALATION) at 20:34

## 2023-11-30 ASSESSMENT — PAIN SCALES - GENERAL
PAINLEVEL_OUTOF10: 0

## 2023-11-30 NOTE — PROGRESS NOTES
Patient had MRI brain done yesterday. MRI reviewed which showed no acute stroke, and left temporal encephalomalacia and right cerebellar infarcts. We fell  follow from periphery. Please call if you have any questions.

## 2023-11-30 NOTE — PROGRESS NOTES
Nursing reassessment completed. Declined bed tonight several times. Provided bed bath, gown change, shave, fernandez care. Some old dried blood noted at fernandez insertion site. Heparin drip 11 units/kg/hr. Last adjusted on Heparin was done at 2314. Next aptt due at 0514 am and informed lab. Still prefers to sit in chair and tells nurse he does not wish to lay in his bed. Call light in reach.

## 2023-11-30 NOTE — PLAN OF CARE
Flowsheets  Problem: Skin/Tissue Integrity  Goal: Absence of new skin breakdown  Description: 1. Monitor for areas of redness and/or skin breakdown  2. Assess vascular access sites hourly  3. Every 4-6 hours minimum:  Change oxygen saturation probe site  4. Every 4-6 hours:  If on nasal continuous positive airway pressure, respiratory therapy assess nares and determine need for appliance change or resting period. Outcome: Progressing    5. Patient will turn and reposition Q2H.   6.  Will switch off chair back to bed to lessen pressure to skin.

## 2023-11-30 NOTE — PLAN OF CARE
Problem: Discharge Planning  Goal: Discharge to home or other facility with appropriate resources  Outcome: Progressing  Flowsheets    Discharge to home or other facility with appropriate resources:   Identify barriers to discharge with patient and caregiver   Arrange for needed discharge resources and transportation as appropriate   Identify discharge learning needs (meds, wound care, etc)   Refer to discharge planning if patient needs post-hospital services based on physician order or complex needs related to functional status, cognitive ability or social support system

## 2023-11-30 NOTE — PROGRESS NOTES
Patient cooperative with returning back to bed. Then asked nurse to place him back to chair. States he prefers to sleep in chair as doing so helps his breathing. Refused to wear bipap stating that his mouth gets dry.

## 2023-11-30 NOTE — PROGRESS NOTES
[facility] Department   Phone: [phone]    Physical Therapy    [] Initial Evaluation            [x] Daily Treatment Note         [] Discharge Summary      Patient: Morelia Sharma   : 1945   MRN: [id number]   Date of Service:  2023  Admitting Diagnosis: Acute respiratory failure with hypercapnia [facility])  Current Admission Summary: Morelia Sharma is a 70 y.o. male who presents for generalized weakness that started worse around 5 AM this morning. Patient also having mild headache. Patient has history of previous mesothelioma, COPD, CHF, STEF, obesity, hypertension, prior CVA. Patient wears 4 to 6 L nasal cannula at baseline wears CPAP at night. Patient was too weak to get up out of his chair which is why his daughter called 911. Patient does admit that he has had worsening breathing recently has had dyspnea on exertion. Worsening bilateral lower extremity swelling. No fevers or chills. Denies any chest pain. No numbness or weakness. No vision changes. Past Medical History:  has a past medical history of Anemia, COPD (chronic obstructive pulmonary disease) ([address]), Diabetes mellitus ([address]), Edema, GI (gastrointestinal bleed), Gout, Hypertension, Morbid obesity ([address]), Neuropathy, Obstructive sleep apnea, and Peripheral vascular disease ([address]). Past Surgical History:  has a past surgical history that includes Appendectomy; Total hip arthroplasty; and Colonoscopy (N/A, 2019). Discharge Recommendations: Morelia Sharma scored a 17/24 on the AM-PAC short mobility form. Current research shows that an AM-PAC score of 17 or less is typically not associated with a discharge to the patient's home setting. Based on the patient's AM-PAC score and their current functional mobility deficits, it is recommended that the patient have 3-5 sessions per week of Physical Therapy at d/c to increase the patient's independence.   Please see assessment section for further patient treatment. Pain: 0/10  Pain Interventions: not applicable       Functional Mobility  Bed Mobility:  Bed mobility not completed on this date. Comments:  Transfers:  Sit to stand transfer: stand by assistance  Stand to sit transfer: stand by assistance  Comments:  Ambulation:  Surface:level surface  Assistive Device: rolling walker  Assistance: contact guard assistance  Distance: ~50 ft + ~40 ft x2 + ~50 ft  Gait Mechanics: Pt ambulates with decreased step length, slow roxana, forward flexed posture, no LOB. Comments:  Pt taking x3 standing rest breaks after each \"lap\" of nursing desk for SOB. Stair Mobility:  Stair mobility not completed on this date. Comments:  Wheelchair Mobility:  No w/c mobility completed on this date. Comments:  Balance:  Static Sitting Balance: fair (+): maintains balance at SBA/supervision without use of UE support  Dynamic Sitting Balance: fair (+): maintains balance at SBA/supervision without use of UE support  Static Standing Balance: fair (-): maintains balance at CGA with use of UE support  Dynamic Standing Balance: fair (-): maintains balance at CGA with use of UE support  Comments:    Other Therapeutic Interventions    Functional Outcomes  AM-PAC Inpatient Mobility Raw Score : 17              Cognition  WFL  Orientation:    alert and oriented x 4  Command Following:   [facility]    Education  Barriers To Learning: none  Patient Education: patient educated on goals, PT role and benefits, plan of care, general safety, discharge recommendations  Learning Assessment:  patient verbalizes and demonstrates understanding    Assessment  Activity Tolerance: Fair  Impairments Requiring Therapeutic Intervention: decreased functional mobility, decreased ADL status, decreased ROM, decreased strength, decreased endurance, decreased balance  Prognosis: good  Clinical Assessment: Pt presents as below his baseline function and would benefit from skilled PT services to promote safe return to PLOF.  PT

## 2023-11-30 NOTE — PROGRESS NOTES
Hospitalist Progress Note    Name:  Erika Gomez    /Age/Sex: 1945  (70 y.o. male)  MRN & CSN:  [id number] & [id number]    PCP: Mary Mock MD    Date of Admission: 2023    Patient Status:  Inpatient     Chief Complaint:   Chief Complaint   Patient presents with    Fatigue     Pt arrives from home via [facility]. Pt c/o more weakness than normal around 0500. Pt has history of mesothemia, copd, chf. Trouble breathing increased. Baseline 6L, pt states he just couldn't get up       Hospital Course: 70 y.o. male with PMHx of COPD, HTN, HLD, DM II who presented to [facility] with complaints of fatigue. Patient states he has been feeling short of breath for the last 1-2 days. He always feels short of breath with his COPD, but he states it has been declining recently. His daughter, who is a nurse, thought that the patient had pneumonia and should go to the emergency department. Patient also admits he has a history of heart failure and is on diuretics. Patient states he has been compliant with all his medications. He is on CPAP for STEF and is compliant. Denies chest pain, nausea, vomiting, GI/ symptoms, fever, or chills. Patient is edematous on exam with chronic venous stasis changes in his lower extremities, but states that is pretty normal for him. Former tobacco user. Quit 10 years ago. Used to smoke 2 packs/day. Endorses daily alcohol use. Drinks 3 beers per day. No history of DTs or seizures from alcohol withdrawal.  Denies illicit drug use. Subjective: Today is:  Hospital Day: 3. Patient seen and examined in [facility]-5062/1728-33.            Medications:  Reviewed    Infusion Medications    heparin (PORCINE) Infusion 13 Units/kg/hr (23 1920)    dextrose      sodium chloride      dexmedetomidine Stopped (23 7839)     Scheduled Medications    allopurinol  200 mg Oral Daily    mometasone-formoterol  2 puff Inhalation BID RT

## 2023-11-30 NOTE — PROGRESS NOTES
Aptt called as 115.1. Reduced Heparin from 11 units/kg/hr to 9 units/kg/hr. Aptt ordered in 6 hours and lab notified.

## 2023-11-30 NOTE — PROGRESS NOTES
4 beat run of Vtach. Patient unaware and denies discomfort. Resting comfortably in chair. Remains on Heparin drip.

## 2023-12-01 ENCOUNTER — TELEPHONE (OUTPATIENT)
Dept: ADMINISTRATIVE | Age: 78
End: 2023-12-01

## 2023-12-01 VITALS
DIASTOLIC BLOOD PRESSURE: 75 MMHG | TEMPERATURE: 97.6 F | RESPIRATION RATE: 18 BRPM | HEIGHT: 72 IN | SYSTOLIC BLOOD PRESSURE: 134 MMHG | HEART RATE: 78 BPM | BODY MASS INDEX: 42.66 KG/M2 | OXYGEN SATURATION: 97 % | WEIGHT: 315 LBS

## 2023-12-01 PROBLEM — I50.33 ACUTE ON CHRONIC HEART FAILURE WITH PRESERVED EJECTION FRACTION (HCC): Status: ACTIVE | Noted: 2023-12-01

## 2023-12-01 LAB
ALBUMIN SERPL-MCNC: 3.4 G/DL (ref 3.4–5)
ANION GAP SERPL CALCULATED.3IONS-SCNC: 7 MMOL/L (ref 3–16)
BACTERIA BLD CULT ORG #2: NORMAL
BACTERIA BLD CULT: NORMAL
BASOPHILS # BLD: 0 K/UL (ref 0–0.2)
BASOPHILS NFR BLD: 0 %
BUN SERPL-MCNC: 74 MG/DL (ref 7–20)
CALCIUM SERPL-MCNC: 8.3 MG/DL (ref 8.3–10.6)
CHLORIDE SERPL-SCNC: 97 MMOL/L (ref 99–110)
CO2 SERPL-SCNC: 34 MMOL/L (ref 21–32)
CREAT SERPL-MCNC: 1.7 MG/DL (ref 0.8–1.3)
DEPRECATED RDW RBC AUTO: 14.5 % (ref 12.4–15.4)
EOSINOPHIL # BLD: 0 K/UL (ref 0–0.6)
EOSINOPHIL NFR BLD: 0 %
GFR SERPLBLD CREATININE-BSD FMLA CKD-EPI: 41 ML/MIN/{1.73_M2}
GLUCOSE BLD-MCNC: 161 MG/DL (ref 70–99)
GLUCOSE BLD-MCNC: 220 MG/DL (ref 70–99)
GLUCOSE SERPL-MCNC: 181 MG/DL (ref 70–99)
HCT VFR BLD AUTO: 32.6 % (ref 40.5–52.5)
HGB BLD-MCNC: 10.8 G/DL (ref 13.5–17.5)
LACTATE BLDV-SCNC: 1.1 MMOL/L (ref 0.4–2)
LACTATE BLDV-SCNC: 1.3 MMOL/L (ref 0.4–2)
LYMPHOCYTES # BLD: 0.3 K/UL (ref 1–5.1)
LYMPHOCYTES NFR BLD: 5.1 %
MAGNESIUM SERPL-MCNC: 2.8 MG/DL (ref 1.8–2.4)
MCH RBC QN AUTO: 33.7 PG (ref 26–34)
MCHC RBC AUTO-ENTMCNC: 33 G/DL (ref 31–36)
MCV RBC AUTO: 102.1 FL (ref 80–100)
MONOCYTES # BLD: 0.5 K/UL (ref 0–1.3)
MONOCYTES NFR BLD: 7.7 %
NEUTROPHILS # BLD: 5.6 K/UL (ref 1.7–7.7)
NEUTROPHILS NFR BLD: 87.2 %
NT-PROBNP SERPL-MCNC: 1687 PG/ML (ref 0–449)
PERFORMED ON: ABNORMAL
PERFORMED ON: ABNORMAL
PHOSPHATE SERPL-MCNC: 4.5 MG/DL (ref 2.5–4.9)
PLATELET # BLD AUTO: 226 K/UL (ref 135–450)
PMV BLD AUTO: 7 FL (ref 5–10.5)
POTASSIUM SERPL-SCNC: 4.4 MMOL/L (ref 3.5–5.1)
RBC # BLD AUTO: 3.2 M/UL (ref 4.2–5.9)
SODIUM SERPL-SCNC: 138 MMOL/L (ref 136–145)
WBC # BLD AUTO: 6.4 K/UL (ref 4–11)

## 2023-12-01 PROCEDURE — 80069 RENAL FUNCTION PANEL: CPT

## 2023-12-01 PROCEDURE — 83605 ASSAY OF LACTIC ACID: CPT

## 2023-12-01 PROCEDURE — 6370000000 HC RX 637 (ALT 250 FOR IP): Performed by: INTERNAL MEDICINE

## 2023-12-01 PROCEDURE — 6370000000 HC RX 637 (ALT 250 FOR IP): Performed by: STUDENT IN AN ORGANIZED HEALTH CARE EDUCATION/TRAINING PROGRAM

## 2023-12-01 PROCEDURE — 94761 N-INVAS EAR/PLS OXIMETRY MLT: CPT

## 2023-12-01 PROCEDURE — 94640 AIRWAY INHALATION TREATMENT: CPT

## 2023-12-01 PROCEDURE — 83880 ASSAY OF NATRIURETIC PEPTIDE: CPT

## 2023-12-01 PROCEDURE — 2580000003 HC RX 258: Performed by: STUDENT IN AN ORGANIZED HEALTH CARE EDUCATION/TRAINING PROGRAM

## 2023-12-01 PROCEDURE — 99233 SBSQ HOSP IP/OBS HIGH 50: CPT | Performed by: NURSE PRACTITIONER

## 2023-12-01 PROCEDURE — 85025 COMPLETE CBC W/AUTO DIFF WBC: CPT

## 2023-12-01 PROCEDURE — 2700000000 HC OXYGEN THERAPY PER DAY

## 2023-12-01 PROCEDURE — 83735 ASSAY OF MAGNESIUM: CPT

## 2023-12-01 PROCEDURE — 36415 COLL VENOUS BLD VENIPUNCTURE: CPT

## 2023-12-01 RX ORDER — CEFUROXIME AXETIL 500 MG/1
500 TABLET ORAL EVERY 12 HOURS SCHEDULED
Qty: 20 TABLET | Refills: 0 | Status: SHIPPED | OUTPATIENT
Start: 2023-12-01 | End: 2023-12-11

## 2023-12-01 RX ORDER — ASPIRIN 81 MG/1
81 TABLET, CHEWABLE ORAL DAILY
Qty: 30 TABLET | Refills: 3 | Status: SHIPPED | OUTPATIENT
Start: 2023-12-02

## 2023-12-01 RX ORDER — FUROSEMIDE 40 MG/1
40 TABLET ORAL 2 TIMES DAILY
Qty: 60 TABLET | Refills: 3 | Status: SHIPPED | OUTPATIENT
Start: 2023-12-01

## 2023-12-01 RX ORDER — PREDNISONE 20 MG/1
40 TABLET ORAL DAILY
Qty: 10 TABLET | Refills: 0 | Status: SHIPPED | OUTPATIENT
Start: 2023-12-02 | End: 2023-12-07

## 2023-12-01 RX ADMIN — PANTOPRAZOLE SODIUM 40 MG: 40 TABLET, DELAYED RELEASE ORAL at 06:10

## 2023-12-01 RX ADMIN — IPRATROPIUM BROMIDE AND ALBUTEROL SULFATE 1 DOSE: .5; 3 SOLUTION RESPIRATORY (INHALATION) at 00:21

## 2023-12-01 RX ADMIN — APIXABAN 5 MG: 5 TABLET, FILM COATED ORAL at 09:30

## 2023-12-01 RX ADMIN — FUROSEMIDE 40 MG: 40 TABLET ORAL at 09:29

## 2023-12-01 RX ADMIN — INSULIN LISPRO 2 UNITS: 100 INJECTION, SOLUTION INTRAVENOUS; SUBCUTANEOUS at 09:30

## 2023-12-01 RX ADMIN — PREDNISONE 40 MG: 20 TABLET ORAL at 09:30

## 2023-12-01 RX ADMIN — ALLOPURINOL 200 MG: 100 TABLET ORAL at 09:29

## 2023-12-01 RX ADMIN — IPRATROPIUM BROMIDE AND ALBUTEROL SULFATE 1 DOSE: .5; 3 SOLUTION RESPIRATORY (INHALATION) at 03:46

## 2023-12-01 RX ADMIN — Medication 2 PUFF: at 07:49

## 2023-12-01 RX ADMIN — Medication 10 ML: at 11:22

## 2023-12-01 RX ADMIN — ASPIRIN 81 MG: 81 TABLET, CHEWABLE ORAL at 09:29

## 2023-12-01 RX ADMIN — IPRATROPIUM BROMIDE AND ALBUTEROL SULFATE 1 DOSE: .5; 3 SOLUTION RESPIRATORY (INHALATION) at 12:32

## 2023-12-01 RX ADMIN — IPRATROPIUM BROMIDE AND ALBUTEROL SULFATE 1 DOSE: .5; 3 SOLUTION RESPIRATORY (INHALATION) at 07:49

## 2023-12-01 RX ADMIN — CEFUROXIME AXETIL 500 MG: 250 TABLET ORAL at 09:30

## 2023-12-01 RX ADMIN — Medication 100 MG: at 09:30

## 2023-12-01 NOTE — DISCHARGE INSTRUCTIONS
Guidelines for Heart Failure home management:    1. Continue to monitor weight first thing each morning. You should weigh yourself after using the bathroom and before you eat breakfast.    2. Report to your doctor any significant weight change. Remember that weight change of 2-3 lbs. in 1 day or 5 lbs in a week is \"significant\" and likely represents changes in \"fluid\" status. If you are experiencing any swelling in your feet, ankles or abdomen, or shortness of breath, call your doctor. 3. You should restrict all sodium intake to 3000 milligrams (3 grams) a day. Depending on your status, you may also be asked to restrict fluid intake to no more that 64 oz/2 Liters a day. If uncertain, ask the nurse or physician. 4. Regular aerobic exercise is encouraged 30 minutes a day (walking, bike, swimming, etc.). For specific exercise recommendations, ask your physician. 5. Report to your doctor any change in symptoms (chest pain, worsening shortness of breath, increased dizziness or passing out, increased palpitations or ICD shock, trouble catching breath while lying down, increased edema or abdominal bloating). Remember that even \"minor\" changes in symptoms may be important. Also report any changes in medications including \"over the counter\" medications. 6. DO NOT take NSAID's for pain (i.e, Advil, Aleve, Motrin, ibuprofen, and many more) since these may cause serious problems in those with a history of CHF. If uncertain about the medication, call your doctor. 7. If you have new significant or ongoing diarrhea or vomiting, please call your doctor for further instructions. Taking a diuretic (water pill) with these symptoms can worsen dehydration. 8. If you have any questions or concerns you can always call the CHF  Resource Line( [phone]. It is available Monday thru Friday 8 am- 5 pm. Please leave a message and your call will be returned shortly.      Extra Heart Failure

## 2023-12-01 NOTE — TELEPHONE ENCOUNTER
Submitted PA for Accu-Chek Lulu Plus strips   Via CMM Key: [id number]  STATUS: approved    \"PA Case: [id number], Status: Approved, Coverage Starts on: 7/5/2023 12:00:00 AM, Coverage Ends on: 10/3/2024 12:00:00 AM. \"    If this requires a response please respond to the pool ( [id number] [id number]). Thank you please advise patient.

## 2023-12-01 NOTE — PROGRESS NOTES
[facility]   Cardiology Progress Note     Date: 12/1/2023  Admit Date: 11/27/2023     Reason for consultation:     Chief Complaint   Patient presents with    Fatigue     Pt arrives from home via [facility]. Pt c/o more weakness than normal around 0500. Pt has history of mesothemia, copd, chf. Trouble breathing increased. Baseline 6L, pt states he just couldn't get up       History of Present Illness: History obtained from patient and medical record. Rayo Carter is a 70 y.o. male  who presented to the hospital with complaints of fatigue, weakness and shortness of breath more than his usual. He has a history of dHF, COPD, Htn, Hld. H/o CVA 9/2022, PFO revealed on echo, not felt to be the cause. ILR with no evidence of Afib. Normal heart cath in '09  Daughters at bedside. He reports he was feeling fatigue and had difficulty breathing. Daughter reports he was confused at home, did not know where he was, oxygen was in the 80's, he is currently on Bipap and alert and oriented. (per consult note)    Interval Hx: Today, he is feeling much better than when he came into hospital in regards to breathing. On chronic home dose of oxygen at 6L. No chest pain. Tele with ectopy. He's feeling ready to dc home     Patient seen and examined. Clinical notes reviewed. Telemetry reviewed. No new complaints today. No major events overnight. Denies having chest pain, palpitations, shortness of breath, orthopnea/PND, cough, or dizziness at the time of this visit. Past Medical History:  Past Medical History:   Diagnosis Date    Anemia     COPD (chronic obstructive pulmonary disease) (HCC)     Diabetes mellitus (HCC)     Edema     GI (gastrointestinal bleed)     Gout     Hypertension     Morbid obesity (HCC)     Neuropathy     Obstructive sleep apnea     uses CPAP    Peripheral vascular disease ([address])         Past Surgical History:    has a past surgical history that includes Appendectomy;  Total hip hemorrhage. MRI brain without acute ischemia     CKD  ~ creatinine peaked yesterday at 1.8, today 1.7    COPD- on chronic oxygen at 6L   HTN- controlled   DM   STEF  Obesity     Noted plan for dc today. Reinforced daily weights. Follow up in 1 wk. Multiple medical conditions with risk of decompensation. All pertinent information and plan of care discussed with the physician. All questions and concerns were addressed to the patient. Alternatives to my treatment were discussed. I have discussed the above stated plan with patient and the nurse. The patient verbalized understanding and agreed with the plan. Thank you for allowing to us to participate in the care of Darleen Coats. Total visit time > 55 minutes; > 50% spend counseling / coordinating care. I reviewed interval history, physical exam, review of data including labs, imaging, development and implementation of treatment plan and coordination of complex care. Counseled on risk factor modifications. Landon ANDINO-[address]   Office: [phone]    NOTE:  This report was transcribed using voice recognition software. Every effort was made to ensure accuracy; however, inadvertent computerized transcription errors may be present.

## 2023-12-01 NOTE — CARE COORDINATION
Case Management -  Discharge Note      Patient Name: Karolyn Guillory                   YOB: 1945            Readmission Risk (Low < 19, Mod (19-27), High > 27): Readmission Risk Score: 18.8    Current PCP: Daylene Hamman, MD      PT AM-PAC: 17 /24  OT AM-PAC: 15 /24    Patient/patient representative has been educated on the benefits of [facility] as well as the possible risks of declining recommended services. Patient/patient representative has acknowledged the information provided and decided on the following discharge plan. Patient/ patient representative has been provided freedom of choice regarding service provider, supported by basic dialogue that supports the patient's individualized plan of care/goals. Home Care Information:   Is patient resuming current home health care services: No    [facility]:   [place] [address]  Phone: [phone]  Fax: [phone]              Services: PT, OT, Nursing  Home Health Order Obtained: yes    Home health agency notified of discharge.        Financial    Payor: Meron Freitas / Plan: Maddi Ogden ESSENTIAL/PLUS / Product Type: *No Product type* /     Pharmacy:  Potential assistance Purchasing Medications: No  Meds-to-Beds request:        [facility] - [address] [place] [address] [phone] [place] Number [phone]  [address] [place] [address]  Phone: [phone] Fax: [phone]      Electronically signed by Filemon Loera RN on 12/1/2023 at 3:20 PM

## 2023-12-01 NOTE — PROGRESS NOTES
Hospitalist Progress Note    Name:  Pool Ken    /Age/Sex: 1945  (70 y.o. male)  MRN & CSN:  [id number] & [id number]    PCP: Galileo Vargas MD    Date of Admission: 2023    Patient Status:  Inpatient     Chief Complaint:   Chief Complaint   Patient presents with    Fatigue     Pt arrives from home via [facility]. Pt c/o more weakness than normal around 0500. Pt has history of mesothemia, copd, chf. Trouble breathing increased. Baseline 6L, pt states he just couldn't get up       Hospital Course: 70 y.o. male with PMHx of COPD, HTN, HLD, DM II who presented to [facility] with complaints of fatigue. Patient states he has been feeling short of breath for the last 1-2 days. He always feels short of breath with his COPD, but he states it has been declining recently. His daughter, who is a nurse, thought that the patient had pneumonia and should go to the emergency department. Patient also admits he has a history of heart failure and is on diuretics. Patient states he has been compliant with all his medications. He is on CPAP for STEF and is compliant. Denies chest pain, nausea, vomiting, GI/ symptoms, fever, or chills. Patient is edematous on exam with chronic venous stasis changes in his lower extremities, but states that is pretty normal for him. Former tobacco user. Quit 10 years ago. Used to smoke 2 packs/day. Endorses daily alcohol use. Drinks 3 beers per day. No history of DTs or seizures from alcohol withdrawal.  Denies illicit drug use. Subjective: Today is:  Hospital Day: 4. Patient seen and examined in [facility]-2359/6124-62.            Medications:  Reviewed    Infusion Medications    dextrose      sodium chloride      dexmedetomidine Stopped (23 4737)     Scheduled Medications    [START ON 2023] pantoprazole  40 mg Oral QAM AC    cefUROXime  500 mg Oral 2 times per day    [START ON 2023] predniSONE  40 mg Oral Daily abnormal septal motion; indeterminate diastolic function  -Repeat echo ordered  -Heparin drip started; defer continuation to cardiology  -Continue home lasix dose  -I/Os  -Cardiology consulted  -stop heparin gtt and resume eliquis for a.fib. Sepsis  -Tachycardia, leukocytosis on admission  -Possible pneumonia  -Blood cultures ordered; results pending  -UA with urine culture ordered  -Rocephin and azithromycin; de-escalate when able  -Lactic acid of 1.7 on admission     CVA  -CT head showed infarct in left temporal lobe with encephalomalacia; no hemorrhage or shift  -MRI brain ordered  -No TNK given;  stroke team contacted  -ASA and statin started  -Neurology consulted     COPD  -Continue home inhaler therapies     Hypertension  -Hold home metoprolol for now  -PRN hydralazine    Type 2 diabetes  -SSI     Obesity  -Complicates all aspects of care     STEF  -Continue home CPAP at night     ACP  -Hx of DNR CC in past  -Full code also listed  -ACP note from 9/7/22 from pulmonology listed DNR CC but not ready for hospice  -Palliative care consulted         Discussed management of the case with neuro who recommended complete neuro work up    Drugs that require monitoring for toxicity include: Heparin and the method of monitoring was/is Anti-Xa    DVT ppx: Heparin  GI ppx: PPI  Diet: ADULT DIET; Regular; No Added Salt (3-4 gm)  Code Status: DNR-CCA    PT/OT Eval Status: eval and treat when appropriate     Disposition:  Remains in ICU  Abx for sepsis, medical decision making remains high.   Possible transfer out of icu            Servando Agarwal MD  11/30/2023  8:01 PM

## 2023-12-03 LAB — BACTERIA BLD CULT: NORMAL

## 2023-12-04 ENCOUNTER — TELEPHONE (OUTPATIENT)
Dept: OTHER | Age: 78
End: 2023-12-04

## 2023-12-04 ENCOUNTER — CARE COORDINATION (OUTPATIENT)
Dept: CASE MANAGEMENT | Age: 78
End: 2023-12-04

## 2023-12-04 DIAGNOSIS — J96.02 ACUTE RESPIRATORY FAILURE WITH HYPERCAPNIA (HCC): Primary | ICD-10-CM

## 2023-12-04 LAB — BACTERIA BLD CULT ORG #2: NORMAL

## 2023-12-04 PROCEDURE — 1111F DSCHRG MED/CURRENT MED MERGE: CPT | Performed by: INTERNAL MEDICINE

## 2023-12-04 NOTE — CARE COORDINATION
Care Transitions Initial Follow Up Call    Call within 2 business days of discharge: Yes    Patient Current Location:  Home: [address]  [facility] [address]    Care Transition Nurse contacted the patient by telephone to perform post hospital discharge assessment. Verified name and  with patient as identifiers. Provided introduction to self, and explanation of the Care Transition Nurse role. Patient: Leticia Gaviria Patient : 1945   MRN: [id number]  Reason for Admission: resp failure, COPD, CHF, PNA, sepsis, CKD  Discharge Date: 23 RARS: Readmission Risk Score: 18.8      Last Discharge Facility       Date Complaint Diagnosis Description Type Department Provider    23 Fatigue Pneumonia of both lower lobes due to infectious organism . .. ED to Hosp-Admission (Discharged) (ADMITTED) [facility] Gianfranco Hudson MD; [facility]... Was this an external facility discharge? No Discharge Facility:     Challenges to be reviewed by the provider   Additional needs identified to be addressed with provider: No  none               Method of communication with provider: none. Patient requests that CTN speak with granddaughter \"Moreno\". She reports that she thinks patient was prematurely discharged, that he remains weak and SOB. His oxygen saturations have been min to low 90's, he is on 6 Lpm O2. Discussed discharge instructions and reviewed medications, 1111F completed. He is taking all of his medications as prescribed. CTN attempted to get patient in with PCP, granddaughter does not feel they will be able to get him out of the house to see the provider. CTN offered [facility] and granddaughter accepted. He will follow up with cardiology 23. CTN submitted referral.  Daughter denies any questions or concerns at this time. CTN will continue with outreach follow up calls.       Care Transition Nurse reviewed discharge instructions, medical action plan, and red flags with family who

## 2023-12-04 NOTE — TELEPHONE ENCOUNTER
[address] [facility] FAILURE PROGRAM  TELEPHONE ENCOUNTER FORM    Rola Maria 1945    ASSESSMENT:   Baseline weight: 340 lbs  Current weight: not weighing yet  Patient weighing daily: knows he needs to check weights  What are your symptoms of heart failure: dyspnea, early saiety, edema  Are you having any symptoms:  feeling some weakness  Patient knows who to call with symptoms: Yes  Diet history:      Patient states sodium limitation is : 3000 mg       Patient states fluid limitation is 64 oz  Patient following diet as instructed: Yes  Medication history: taking medications as instructed Yes; medication side effects noted No  Patient is being active at home: Yes  Patient knows date and time of follow up appointment: Yes   Patient has transportation to appointments: Yes    RECOMMENDATIONS:   Medication: taking as prescribed  Diet: granddaughter watching sodium--not eating like usually  MD/ Clinic appointment: 12/6 with cardiology-- having [facility] out to see patient. Other: Patient doing okay- feeling weak- Encouraged patient to weigh self daily, encourage to call with any questions or concerns.

## 2023-12-05 NOTE — ADT AUTH CERT
11/29 [facility]  Last Updated by Aarti Ballesteros RN on 11/30/2023 1357     Review Status Created By   In Star Guzman RN       Review Type Associated Date   -- 11/30/2023      Criteria Review   DATE: 11/29/23        Relevant baselines:   ra  -------------------------------------------------  Pertinent Updates:  Remains in icu, on increased o2, iv heparin, iv abx, iv solumedrol. Nebs,   -------------------------------------------------  Vitals:  98 20 98 92/61 93% 6L NC  -------------------------------------------------  Abnl/Pertinent Labs/Radiology/Diagnostic Studies:    11/29/23 04:40 11/29/23 07:16 11/29/23 09:56 11/29/23 11:00 11/29/23 18:19 11/29/23 22:08   Chloride 97 (L)             BUN,BUNPL 55 (H)             Creatinine 1.7 (H)             Est, Glom Filt Rate 41 ! CALCIUM, SERUM, 449174 8.1 (L)             Pro-BNP 1,871 (H)             Albumin 3.0 (L)             RBC   3.11 (L)           Hemoglobin Quant   10.3 (L)           Hematocrit   31.9 (L)           aPTT 50.5 (H)   144.2 (HH) 114.6 (HH) 69.3 (H) 110.7 (HH)         Mri brain-  IMPRESSION:  No acute ischemia. Left temporal lobe encephalomalacia. Tiny old right cerebellar lacunar infarct.     -------------------------------------------------  Physical Exam:  Respiratory: Moderate expiratory wheezes bilaterally. Cardiovascular:    2+ pitting edema bilateral lower extremities.   -------------------------------------------------  MD Consults/Assessments & Plans:  IM-     This is a 70 y.o. male who presented to [facility] on 11/27/2023 and is being treated for:  Acute hypercapnic respiratory failure  -Likely 2/2 COPD exacerbation; possibly some component of CHF  -On BiPAP  -VBG pH 7.29, pCO2 73, pO2 89  -Chest x-ray shows bibasilar airspace disease indicating PNA vs atelectasis  -Infectious work-up started  -Antibiotics as below  -Lasix 40mg BID  -Solumedrol 40mg IV BID  -DuoNebs  -Wean oxygenation supplementation as

## 2023-12-07 ENCOUNTER — CARE COORDINATION (OUTPATIENT)
Dept: CASE MANAGEMENT | Age: 78
End: 2023-12-07

## 2023-12-07 NOTE — CARE COORDINATION
Care Transitions Initial Follow Up Call    Call within 2 business days of discharge: Yes    Patient Current Location:  Home: [address]   [facility] [address]    Care Transition Nurse contacted the patient by telephone to perform post hospital discharge assessment. Verified name and  with patient as identifiers. Provided introduction to self, and explanation of the Care Transition Nurse role. Patient: Erika Gomez Patient : 1945   MRN: [id number]  Reason for Admission: resp failure, COPD, CHF, PNA, sepsis, CKD  Discharge Date: 23 RARS: Readmission Risk Score: 18.8      Last Discharge Facility       Date Complaint Diagnosis Description Type Department Provider    23 Fatigue Pneumonia of both lower lobes due to infectious organism . .. ED to Hosp-Admission (Discharged) (ADMITTED) [facility] Madie Lane MD; Magi Lay... Was this an external facility discharge? No Discharge Facility:     Challenges to be reviewed by the provider   Additional needs identified to be addressed with provider: No  none               Method of communication with provider: none. Patient reports that he is doing well, O2 saturations are 97% on 6 Lpm O2. He said that he was unable to go to cardiology follow up, having hard time getting out of the house. CTN offered to schedule a VV with PCP, he agreed but there were no available appointments that would work for him. Wiliam Samson in Dr. Olayinka Sidhu office know, she will reach out to patient. CTN did submit a request for [facility] to go out and see patient. CTN will continue with outreach follow up calls. Care Transition Nurse reviewed red flags with patient who verbalized understanding. The patient was given an opportunity to ask questions and does not have any further questions or concerns at this time. Were discharge instructions available to patient? Yes.  Reviewed appropriate site of care based on symptoms and resources available to

## 2023-12-07 NOTE — CARE COORDINATION
ACM routed message to CTN requesting assistance with getting a lift chair. CTN explained that Dr. Nahum Fairchild will need to write an order and that the office can send the order to the DME company that they typically use. CTN asked ACM to reach out with any further needs.

## 2023-12-08 ENCOUNTER — TELEPHONE (OUTPATIENT)
Dept: CARDIOLOGY CLINIC | Age: 78
End: 2023-12-08

## 2023-12-08 NOTE — TELEPHONE ENCOUNTER
----- Message from Siddhartha Freeman MD sent at 12/8/2023  2:48 PM EST -----  Pt of Dr. Coleman with 5% afib burden.

## 2023-12-08 NOTE — TELEPHONE ENCOUNTER
CMV saw while inpatient 2/2023 and placed ILR.   Please review and advise on message from Dr. Freeman

## 2023-12-11 ENCOUNTER — CARE COORDINATION (OUTPATIENT)
Dept: CASE MANAGEMENT | Age: 78
End: 2023-12-11

## 2023-12-11 DIAGNOSIS — J96.11 CHRONIC RESPIRATORY FAILURE WITH HYPOXIA (HCC): ICD-10-CM

## 2023-12-11 DIAGNOSIS — J44.9 COPD, SEVERE (HCC): ICD-10-CM

## 2023-12-11 DIAGNOSIS — E66.01 MORBID OBESITY (HCC): Primary | Chronic | ICD-10-CM

## 2023-12-11 DIAGNOSIS — I63.9 CEREBROVASCULAR ACCIDENT (CVA), UNSPECIFIED MECHANISM (HCC): ICD-10-CM

## 2023-12-11 NOTE — CARE COORDINATION
In reviewing chart, it does not appear that lift chair has been ordered. CTN will route message to Dr. Marcia Castillo requesting order for lift chair and then contact PCP office to determine DME company typically used. CTN will send order to DME provider and follow up with any further needs.

## 2023-12-12 ENCOUNTER — CARE COORDINATION (OUTPATIENT)
Dept: CASE MANAGEMENT | Age: 78
End: 2023-12-12

## 2023-12-12 NOTE — CARE COORDINATION
[facility] routed message to MA requesting order for lift chair. CTN reached out to [facility] to let her know that a message was routed to Dr. Cholo Guzman last evening requesting order for lift chair. CTN also explained that a DME request needs to have an order and that needs to come from the provider or other authorized personnel in the office. CTN will continue to follow up and work with office staff to get this order submitted to a DME provider as soon as possible.

## 2023-12-13 ENCOUNTER — CARE COORDINATION (OUTPATIENT)
Dept: CASE MANAGEMENT | Age: 78
End: 2023-12-13

## 2023-12-13 NOTE — CARE COORDINATION
Care Transitions Follow Up Call    Patient Current Location:  Home: [facility]   [facility]     Care Transition Nurse contacted the patient by telephone. Verified name and  with patient as identifiers. Patient: Chon Valles  Patient : 1945   MRN: [id number]  Reason for Admission: resp failure, COPD, CHF, PNA, sepsis, CKD  Discharge Date: 23 RARS: Readmission Risk Score: 18.8      Needs to be reviewed by the provider   Additional needs identified to be addressed with provider: No  none             Method of communication with provider: none. Patient reports that he is doing well. He reports that he is breathing without difficulty and denies any concerns at this time. He is inquiring about lift chair. CTN explained that Dr. Claudia John has put the order in and it just needs to be sent over to the [employer]. CTN inquired if he had a DME company preference and will call to inquire about insurance benefits. CTN will continue with outreach calls and remain available. Follow Up  Future Appointments   Date Time Provider [address]   2024  1:30 PM Giuseppe Barone MD  [address]   2024  2:00 PM Rosangela Daniels MD [facility]     External follow up appointment(s):     Care Transition Nurse reviewed red flags with patient and discussed any barriers to care and/or understanding of plan of care after discharge. Discussed appropriate site of care based on symptoms and resources available to patient including: PCP  Urgent care clinics  When to call 911. The patient agrees to contact the PCP office for questions related to their healthcare.         Care Transitions Subsequent and Final Call    Subsequent and Final Calls  Do you have any ongoing symptoms?: No  Have your medications changed?: No  Do you have any questions related to your medications?: No  Do you currently have any active services?: No  Do you have any needs or concerns that I can assist you

## 2023-12-13 NOTE — CARE COORDINATION
CTN routed message to Dr. Silvia Galloway MA to confirm lift chair order will be sent to DME provider today. CTN will update patient as indicated.

## 2023-12-13 NOTE — CARE COORDINATION
CTN contacted [employer], per Elzbieta Pate MA, spoke with \"Mcchristy\" They do not accept insurance and they do not carry lift chairs. CTN spoke with patient and he reports that he is close to [address] [place] [address] on [address]. CTN attempted outreach to check insurance coverage, there was a recording. CTN did not leave a message. CTN attempted another number for [facility] [phone], spoke with \"Samson\" He reports that Mitchel Mcnair is in network and covers 80% of the mechanical mechanism, the rest of the chair is considered furniture and will be self pay. CTN will fax order for lift chair to [address] [place] [address] in [place], [address] [phone]. CTN routed message to Dr. Hua Bailey, GUERRERO, & MA letting them know the order has been faxed. CTN will follow up with [facility] tomorrow and confirm receipt.

## 2023-12-14 ENCOUNTER — TELEPHONE (OUTPATIENT)
Dept: INTERNAL MEDICINE CLINIC | Age: 78
End: 2023-12-14

## 2023-12-14 ENCOUNTER — CARE COORDINATION (OUTPATIENT)
Dept: CASE MANAGEMENT | Age: 78
End: 2023-12-14

## 2023-12-14 DIAGNOSIS — E66.01 MORBID OBESITY (HCC): Chronic | ICD-10-CM

## 2023-12-14 DIAGNOSIS — I63.9 CEREBROVASCULAR ACCIDENT (CVA), UNSPECIFIED MECHANISM (HCC): ICD-10-CM

## 2023-12-14 DIAGNOSIS — J96.11 CHRONIC RESPIRATORY FAILURE WITH HYPOXIA (HCC): ICD-10-CM

## 2023-12-14 DIAGNOSIS — J44.9 COPD, SEVERE (HCC): ICD-10-CM

## 2023-12-14 PROCEDURE — 93298 REM INTERROG DEV EVAL SCRMS: CPT | Performed by: INTERNAL MEDICINE

## 2023-12-14 PROCEDURE — G2066 INTER DEVC REMOTE 30D: HCPCS | Performed by: INTERNAL MEDICINE

## 2023-12-14 NOTE — TELEPHONE ENCOUNTER
----- Message from Miesha Angela RN sent at 12/13/2023 11:10 AM EST -----  Issac Hinds,    I see that Dr. Bravo put the order in for the lift chair on the 11th.  Will you be sending that to a DME provider today?  I will update patient, just let me know.    Thank you for your help!    Miesha

## 2023-12-14 NOTE — CARE COORDINATION
[address] [facility] [phone], left  requesting confirmation of lift chair order receipt. The recording states that it may take 1-2 business days for a response. CTN updated patient on status of lift chair.

## 2023-12-16 PROBLEM — J44.1 COPD EXACERBATION (HCC): Status: ACTIVE | Noted: 2023-12-16

## 2023-12-19 ENCOUNTER — TELEPHONE (OUTPATIENT)
Dept: INTERNAL MEDICINE CLINIC | Age: 78
End: 2023-12-19

## 2023-12-19 NOTE — TELEPHONE ENCOUNTER
12/19 called pts daughter Makayla @   [phone] (Home Phone)   And spoke w/ her and she stated she is not wanting to schedule at this moment due to not being able to get pt out of the house due to sob.

## 2023-12-19 NOTE — TELEPHONE ENCOUNTER
Care Transitions Initial Follow Up Call    Outreach made within 2 business days of discharge: Yes    Patient: Jacqueline Noguera Patient : 1945   MRN: [id number]  Reason for Admission: There are no discharge diagnoses documented for the most recent discharge. Discharge Date: 23       Spoke with: LVM requesting pt call back to schedule \"hospital follow up\" with PCP Tory Cruz or one of his colleagues. Called emir Suazo (RN at [facility]) - reports \"impossibile\" to get pt into office for HFU. Was agreeable to virtual visit. Msg to PCP.     Discharge department/facility: [facility]    Follow Up  Future Appointments   Date Time Provider [address] [place] [address]   2024  1:30 PM Espinoza Bridges MD [address]   2024  2:00 PM Jenni Mo MD [facility] Dixon RN

## 2023-12-19 NOTE — TELEPHONE ENCOUNTER
Jg Coleman MD  [facility] Ep14 hours ago (6:44 PM)       Does he feel this? Can we have him seen in clinic to discuss.    Thanks    Jared   I spoke with the patient and he has been hospitalized recently for pneumonia and sepsis. He does not recall having symptoms. I dicussed this finding with the patient and he V/U. He would like us to reach out to his daughter Makayla whose number is listed under emergency contact to schedule appointment with CMV first available.

## 2023-12-20 ENCOUNTER — HOSPITAL ENCOUNTER (INPATIENT)
Age: 78
LOS: 26 days | Discharge: NURSING, REHAB OR LONG-TERM CARE | DRG: 682 | End: 2024-01-15
Attending: EMERGENCY MEDICINE | Admitting: INTERNAL MEDICINE
Payer: MEDICARE

## 2023-12-20 ENCOUNTER — APPOINTMENT (OUTPATIENT)
Dept: CT IMAGING | Age: 78
DRG: 682 | End: 2023-12-20
Payer: MEDICARE

## 2023-12-20 ENCOUNTER — APPOINTMENT (OUTPATIENT)
Dept: GENERAL RADIOLOGY | Age: 78
DRG: 682 | End: 2023-12-20
Payer: MEDICARE

## 2023-12-20 DIAGNOSIS — M25.551 RIGHT HIP PAIN: ICD-10-CM

## 2023-12-20 DIAGNOSIS — M54.41 ACUTE RIGHT-SIDED LOW BACK PAIN WITH RIGHT-SIDED SCIATICA: ICD-10-CM

## 2023-12-20 DIAGNOSIS — D64.9 ANEMIA, UNSPECIFIED TYPE: ICD-10-CM
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
## 2023-12-20 PROBLEM — N17.9 AKI (ACUTE KIDNEY INJURY) (HCC): Status: ACTIVE | Noted: 2023-12-20

## 2023-12-20 LAB
ALBUMIN SERPL-MCNC: 3.3 G/DL (ref 3.4–5)
ALBUMIN/GLOB SERPL: 0.9 {RATIO} (ref 1.1–2.2)
ALP SERPL-CCNC: 116 U/L (ref 40–129)
ALT SERPL-CCNC: 75 U/L (ref 10–40)
ANION GAP SERPL CALCULATED.3IONS-SCNC: 6 MMOL/L (ref 3–16)
AST SERPL-CCNC: 81 U/L (ref 15–37)
BACTERIA URNS QL MICRO: ABNORMAL /HPF
BASE EXCESS BLDA CALC-SCNC: 5.5 MMOL/L (ref -3–3)
BASE EXCESS BLDV CALC-SCNC: 5 MMOL/L (ref -3–3)
BASOPHILS # BLD: 0 K/UL (ref 0–0.2)
BASOPHILS NFR BLD: 0.2 %
BILIRUB SERPL-MCNC: <0.2 MG/DL (ref 0–1)
BILIRUB UR QL STRIP.AUTO: NEGATIVE
BUN SERPL-MCNC: 79 MG/DL (ref 7–20)
CALCIUM SERPL-MCNC: 9.1 MG/DL (ref 8.3–10.6)
CHLORIDE SERPL-SCNC: 100 MMOL/L (ref 99–110)
CLARITY UR: ABNORMAL
CO2 BLDA-SCNC: 76.9 MMOL/L
CO2 BLDV-SCNC: 80 MMOL/L
CO2 SERPL-SCNC: 34 MMOL/L (ref 21–32)
COHGB MFR BLDA: 1.8 % (ref 0–1.5)
COHGB MFR BLDV: 3.1 % (ref 0–1.5)
COLOR UR: YELLOW
CREAT SERPL-MCNC: 1.7 MG/DL (ref 0.8–1.3)
DEPRECATED RDW RBC AUTO: 15 % (ref 12.4–15.4)
DEPRECATED RDW RBC AUTO: 15.4 % (ref 12.4–15.4)
DO-HGB MFR BLDV: 10 %
EKG ATRIAL RATE: 104 BPM
EKG DIAGNOSIS: NORMAL
EKG Q-T INTERVAL: 342 MS
EKG QRS DURATION: 104 MS
EKG QTC CALCULATION (BAZETT): 449 MS
EKG R AXIS: -25 DEGREES
EKG T AXIS: 52 DEGREES
EKG VENTRICULAR RATE: 104 BPM
EOSINOPHIL # BLD: 0 K/UL (ref 0–0.6)
EOSINOPHIL NFR BLD: 0.3 %
EPI CELLS #/AREA URNS AUTO: 7 /HPF (ref 0–5)
GFR SERPLBLD CREATININE-BSD FMLA CKD-EPI: 41 ML/MIN/{1.73_M2}
GLUCOSE BLD-MCNC: 166 MG/DL (ref 70–99)
GLUCOSE SERPL-MCNC: 153 MG/DL (ref 70–99)
GLUCOSE UR STRIP.AUTO-MCNC: NEGATIVE MG/DL
HCO3 BLDA-SCNC: 32.4 MMOL/L (ref 21–29)
HCO3 BLDV-SCNC: 33.5 MMOL/L (ref 23–29)
HCT VFR BLD AUTO: 26 % (ref 40.5–52.5)
HCT VFR BLD AUTO: 28.8 % (ref 40.5–52.5)
HGB BLD-MCNC: 8.4 G/DL (ref 13.5–17.5)
HGB BLD-MCNC: 9.4 G/DL (ref 13.5–17.5)
HGB BLDA-MCNC: 9 G/DL (ref 13.5–17.5)
HGB UR QL STRIP.AUTO: ABNORMAL
HYALINE CASTS #/AREA URNS AUTO: 3 /LPF (ref 0–8)
KETONES UR STRIP.AUTO-MCNC: NEGATIVE MG/DL
LACTATE BLDV-SCNC: 0.8 MMOL/L (ref 0.4–1.9)
LACTATE BLDV-SCNC: 0.9 MMOL/L (ref 0.4–1.9)
LEUKOCYTE ESTERASE UR QL STRIP.AUTO: ABNORMAL
LYMPHOCYTES # BLD: 0.2 K/UL (ref 1–5.1)
LYMPHOCYTES NFR BLD: 2.2 %
MCH RBC QN AUTO: 33 PG (ref 26–34)
MCH RBC QN AUTO: 33.7 PG (ref 26–34)
MCHC RBC AUTO-ENTMCNC: 32.1 G/DL (ref 31–36)
MCHC RBC AUTO-ENTMCNC: 32.5 G/DL (ref 31–36)
MCV RBC AUTO: 102.7 FL (ref 80–100)
MCV RBC AUTO: 103.4 FL (ref 80–100)
METHGB MFR BLDA: 0.8 %
METHGB MFR BLDV: 0.5 %
MONOCYTES # BLD: 0.4 K/UL (ref 0–1.3)
MONOCYTES NFR BLD: 5 %
NEUTROPHILS # BLD: 7.3 K/UL (ref 1.7–7.7)
NEUTROPHILS NFR BLD: 92.3 %
NITRITE UR QL STRIP.AUTO: NEGATIVE
O2 CT VFR BLDV CALC: 11 VOL %
O2 THERAPY: ABNORMAL
O2 THERAPY: ABNORMAL
PCO2 BLDA: 61.1 MMHG (ref 35–45)
PCO2 BLDV: 75 MMHG (ref 40–50)
PERFORMED ON: ABNORMAL
PH BLDA: 7.33 [PH] (ref 7.35–7.45)
PH BLDV: 7.26 [PH] (ref 7.35–7.45)
PH UR STRIP.AUTO: 5 [PH] (ref 5–8)
PLATELET # BLD AUTO: 173 K/UL (ref 135–450)
PLATELET # BLD AUTO: 201 K/UL (ref 135–450)
PMV BLD AUTO: 6.8 FL (ref 5–10.5)
PMV BLD AUTO: 7.3 FL (ref 5–10.5)
PO2 BLDA: 75 MMHG (ref 75–108)
PO2 BLDV: 62.9 MMHG (ref 25–40)
POTASSIUM SERPL-SCNC: 4.6 MMOL/L (ref 3.5–5.1)
PROT SERPL-MCNC: 6.9 G/DL (ref 6.4–8.2)
PROT UR STRIP.AUTO-MCNC: ABNORMAL MG/DL
RBC # BLD AUTO: 2.53 M/UL (ref 4.2–5.9)
RBC # BLD AUTO: 2.78 M/UL (ref 4.2–5.9)
RBC CLUMPS #/AREA URNS AUTO: 45 /HPF (ref 0–4)
SAO2 % BLDA: 95.2 %
SAO2 % BLDV: 89 %
SODIUM SERPL-SCNC: 140 MMOL/L (ref 136–145)
SP GR UR STRIP.AUTO: 1.01 (ref 1–1.03)
TROPONIN, HIGH SENSITIVITY: 62 NG/L (ref 0–22)
UA COMPLETE W REFLEX CULTURE PNL UR: YES
UA DIPSTICK W REFLEX MICRO PNL UR: YES
URN SPEC COLLECT METH UR: ABNORMAL
UROBILINOGEN UR STRIP-ACNC: 1 E.U./DL
WBC # BLD AUTO: 11.3 K/UL (ref 4–11)
WBC # BLD AUTO: 7.9 K/UL (ref 4–11)
WBC #/AREA URNS AUTO: 11 /HPF (ref 0–5)

## 2023-12-20 PROCEDURE — 71045 X-RAY EXAM CHEST 1 VIEW: CPT

## 2023-12-20 PROCEDURE — 93005 ELECTROCARDIOGRAM TRACING: CPT

## 2023-12-20 PROCEDURE — 94761 N-INVAS EAR/PLS OXIMETRY MLT: CPT

## 2023-12-20 PROCEDURE — 87086 URINE CULTURE/COLONY COUNT: CPT

## 2023-12-20 PROCEDURE — 93010 ELECTROCARDIOGRAM REPORT: CPT | Performed by: INTERNAL MEDICINE

## 2023-12-20 PROCEDURE — 72131 CT LUMBAR SPINE W/O DYE: CPT

## 2023-12-20 PROCEDURE — 6370000000 HC RX 637 (ALT 250 FOR IP): Performed by: INTERNAL MEDICINE

## 2023-12-20 PROCEDURE — 6370000000 HC RX 637 (ALT 250 FOR IP): Performed by: EMERGENCY MEDICINE

## 2023-12-20 PROCEDURE — 6360000002 HC RX W HCPCS: Performed by: EMERGENCY MEDICINE

## 2023-12-20 PROCEDURE — 2700000000 HC OXYGEN THERAPY PER DAY

## 2023-12-20 PROCEDURE — 82803 BLOOD GASES ANY COMBINATION: CPT

## 2023-12-20 PROCEDURE — 83605 ASSAY OF LACTIC ACID: CPT

## 2023-12-20 PROCEDURE — 87150 DNA/RNA AMPLIFIED PROBE: CPT

## 2023-12-20 PROCEDURE — 96374 THER/PROPH/DIAG INJ IV PUSH: CPT

## 2023-12-20 PROCEDURE — 87186 SC STD MICRODIL/AGAR DIL: CPT

## 2023-12-20 PROCEDURE — 36415 COLL VENOUS BLD VENIPUNCTURE: CPT

## 2023-12-20 PROCEDURE — 81001 URINALYSIS AUTO W/SCOPE: CPT

## 2023-12-20 PROCEDURE — 85027 COMPLETE CBC AUTOMATED: CPT

## 2023-12-20 PROCEDURE — 85025 COMPLETE CBC W/AUTO DIFF WBC: CPT

## 2023-12-20 PROCEDURE — 99285 EMERGENCY DEPT VISIT HI MDM: CPT

## 2023-12-20 PROCEDURE — 84484 ASSAY OF TROPONIN QUANT: CPT

## 2023-12-20 PROCEDURE — 72192 CT PELVIS W/O DYE: CPT

## 2023-12-20 PROCEDURE — 70450 CT HEAD/BRAIN W/O DYE: CPT

## 2023-12-20 PROCEDURE — 2060000000 HC ICU INTERMEDIATE R&B

## 2023-12-20 PROCEDURE — 87040 BLOOD CULTURE FOR BACTERIA: CPT

## 2023-12-20 PROCEDURE — 2580000003 HC RX 258: Performed by: INTERNAL MEDICINE

## 2023-12-20 PROCEDURE — 94640 AIRWAY INHALATION TREATMENT: CPT

## 2023-12-20 PROCEDURE — 80053 COMPREHEN METABOLIC PANEL: CPT

## 2023-12-20 RX ORDER — SODIUM CHLORIDE, SODIUM LACTATE, POTASSIUM CHLORIDE, CALCIUM CHLORIDE 600; 310; 30; 20 MG/100ML; MG/100ML; MG/100ML; MG/100ML
INJECTION, SOLUTION INTRAVENOUS CONTINUOUS
Status: ACTIVE | OUTPATIENT
Start: 2023-12-20 | End: 2023-12-21

## 2023-12-20 RX ORDER — GAUZE BANDAGE 2" X 2"
100 BANDAGE TOPICAL DAILY
Status: DISCONTINUED | OUTPATIENT
Start: 2023-12-20 | End: 2024-01-15 | Stop reason: HOSPADM

## 2023-12-20 RX ORDER — ALBUTEROL SULFATE 90 UG/1
2 AEROSOL, METERED RESPIRATORY (INHALATION) EVERY 6 HOURS PRN
Status: DISCONTINUED | OUTPATIENT
Start: 2023-12-20 | End: 2024-01-15 | Stop reason: HOSPADM

## 2023-12-20 RX ORDER — IPRATROPIUM BROMIDE AND ALBUTEROL SULFATE 2.5; .5 MG/3ML; MG/3ML
1 SOLUTION RESPIRATORY (INHALATION) ONCE
Status: COMPLETED | OUTPATIENT
Start: 2023-12-20 | End: 2023-12-20

## 2023-12-20 RX ORDER — ACETAMINOPHEN 650 MG/1
650 SUPPOSITORY RECTAL EVERY 6 HOURS PRN
Status: DISCONTINUED | OUTPATIENT
Start: 2023-12-20 | End: 2024-01-15 | Stop reason: HOSPADM

## 2023-12-20 RX ORDER — POLYETHYLENE GLYCOL 3350 17 G/17G
17 POWDER, FOR SOLUTION ORAL DAILY PRN
Status: DISCONTINUED | OUTPATIENT
Start: 2023-12-20 | End: 2024-01-15 | Stop reason: HOSPADM

## 2023-12-20 RX ORDER — AZITHROMYCIN 250 MG/1
500 TABLET, FILM COATED ORAL DAILY
Status: DISCONTINUED | OUTPATIENT
Start: 2023-12-20 | End: 2023-12-26 | Stop reason: ALTCHOICE

## 2023-12-20 RX ORDER — SODIUM CHLORIDE 0.9 % (FLUSH) 0.9 %
5-40 SYRINGE (ML) INJECTION EVERY 12 HOURS SCHEDULED
Status: DISCONTINUED | OUTPATIENT
Start: 2023-12-20 | End: 2024-01-15 | Stop reason: HOSPADM

## 2023-12-20 RX ORDER — PREDNISONE 20 MG/1
20 TABLET ORAL DAILY
Status: DISCONTINUED | OUTPATIENT
Start: 2023-12-20 | End: 2023-12-21

## 2023-12-20 RX ORDER — ALLOPURINOL 100 MG/1
200 TABLET ORAL DAILY
Status: DISCONTINUED | OUTPATIENT
Start: 2023-12-20 | End: 2024-01-15 | Stop reason: HOSPADM

## 2023-12-20 RX ORDER — AMOXICILLIN AND CLAVULANATE POTASSIUM 875; 125 MG/1; MG/1
1 TABLET, FILM COATED ORAL 2 TIMES DAILY
Status: DISCONTINUED | OUTPATIENT
Start: 2023-12-20 | End: 2023-12-22

## 2023-12-20 RX ORDER — ACETAMINOPHEN 325 MG/1
650 TABLET ORAL EVERY 6 HOURS PRN
Status: DISCONTINUED | OUTPATIENT
Start: 2023-12-20 | End: 2024-01-15 | Stop reason: HOSPADM

## 2023-12-20 RX ORDER — ASPIRIN 81 MG/1
81 TABLET, CHEWABLE ORAL DAILY
Status: DISCONTINUED | OUTPATIENT
Start: 2023-12-20 | End: 2024-01-15 | Stop reason: HOSPADM

## 2023-12-20 RX ORDER — SODIUM CHLORIDE 9 MG/ML
INJECTION, SOLUTION INTRAVENOUS PRN
Status: DISCONTINUED | OUTPATIENT
Start: 2023-12-20 | End: 2024-01-15 | Stop reason: HOSPADM

## 2023-12-20 RX ORDER — MORPHINE SULFATE 4 MG/ML
4 INJECTION, SOLUTION INTRAMUSCULAR; INTRAVENOUS
Status: COMPLETED | OUTPATIENT
Start: 2023-12-20 | End: 2023-12-20

## 2023-12-20 RX ORDER — ACETAMINOPHEN 500 MG
500 TABLET ORAL EVERY 6 HOURS PRN
Status: DISCONTINUED | OUTPATIENT
Start: 2023-12-20 | End: 2023-12-20 | Stop reason: ALTCHOICE

## 2023-12-20 RX ORDER — SODIUM CHLORIDE 0.9 % (FLUSH) 0.9 %
5-40 SYRINGE (ML) INJECTION PRN
Status: DISCONTINUED | OUTPATIENT
Start: 2023-12-20 | End: 2024-01-15 | Stop reason: HOSPADM

## 2023-12-20 RX ORDER — IPRATROPIUM BROMIDE AND ALBUTEROL SULFATE 2.5; .5 MG/3ML; MG/3ML
1 SOLUTION RESPIRATORY (INHALATION)
Status: DISCONTINUED | OUTPATIENT
Start: 2023-12-20 | End: 2023-12-28

## 2023-12-20 RX ADMIN — IPRATROPIUM BROMIDE AND ALBUTEROL SULFATE 1 DOSE: 2.5; .5 SOLUTION RESPIRATORY (INHALATION) at 19:44

## 2023-12-20 RX ADMIN — Medication 10 ML: at 21:22

## 2023-12-20 RX ADMIN — AMOXICILLIN AND CLAVULANATE POTASSIUM 1 TABLET: 875; 125 TABLET, FILM COATED ORAL at 21:23

## 2023-12-20 RX ADMIN — Medication 2 PUFF: at 19:44

## 2023-12-20 RX ADMIN — SODIUM CHLORIDE, POTASSIUM CHLORIDE, SODIUM LACTATE AND CALCIUM CHLORIDE: 600; 310; 30; 20 INJECTION, SOLUTION INTRAVENOUS at 22:50

## 2023-12-20 RX ADMIN — IPRATROPIUM BROMIDE AND ALBUTEROL SULFATE 1 DOSE: .5; 3 SOLUTION RESPIRATORY (INHALATION) at 05:57

## 2023-12-20 RX ADMIN — ACETAMINOPHEN 650 MG: 325 TABLET ORAL at 21:22

## 2023-12-20 RX ADMIN — MORPHINE SULFATE 4 MG: 4 INJECTION, SOLUTION INTRAMUSCULAR; INTRAVENOUS at 05:19

## 2023-12-20 RX ADMIN — APIXABAN 5 MG: 5 TABLET, FILM COATED ORAL at 21:23

## 2023-12-20 ASSESSMENT — PAIN - FUNCTIONAL ASSESSMENT: PAIN_FUNCTIONAL_ASSESSMENT: 0-10

## 2023-12-20 ASSESSMENT — PAIN SCALES - GENERAL
PAINLEVEL_OUTOF10: 5
PAINLEVEL_OUTOF10: 9
PAINLEVEL_OUTOF10: 5
PAINLEVEL_OUTOF10: 5
PAINLEVEL_OUTOF10: 3
PAINLEVEL_OUTOF10: 5

## 2023-12-20 ASSESSMENT — PAIN DESCRIPTION - PAIN TYPE: TYPE: ACUTE PAIN

## 2023-12-20 ASSESSMENT — PAIN DESCRIPTION - LOCATION
LOCATION: BUTTOCKS
LOCATION: BACK
LOCATION: BACK

## 2023-12-20 ASSESSMENT — PAIN DESCRIPTION - DESCRIPTORS
DESCRIPTORS: SHARP;PRESSURE
DESCRIPTORS: ACHING

## 2023-12-20 ASSESSMENT — PAIN DESCRIPTION - FREQUENCY: FREQUENCY: CONTINUOUS

## 2023-12-20 ASSESSMENT — LIFESTYLE VARIABLES
HOW MANY STANDARD DRINKS CONTAINING ALCOHOL DO YOU HAVE ON A TYPICAL DAY: 1 OR 2
HOW OFTEN DO YOU HAVE A DRINK CONTAINING ALCOHOL: 4 OR MORE TIMES A WEEK

## 2023-12-20 ASSESSMENT — PAIN DESCRIPTION - ORIENTATION: ORIENTATION: MID

## 2023-12-20 NOTE — ACP (ADVANCE CARE PLANNING)
Advanced Care Planning Note.    Purpose of Encounter: Advanced care planning in light of hospitalization  Parties In Attendance: Patient,    Decisional Capacity: Yes  Subjective: Patient  understand that this conversation is to address long term care goal  Objective: [facility] with falls weakness and ABRAHAM history of COPD on 50 of oxygen at baseline  Goals of Care Determination: Patient would pursue CPR and Intubation if required. No tracheostomy or long term ventilation if required.     Code Status: full code  Time spent on Advanced care Plannin minutes  Advanced Care Planning Documents: documented patient's wishes, would like Child Makayla Urban to make medical decisions if unable to make decisions    Davey Farias MD  2023 2:27 PM

## 2023-12-20 NOTE — H&P
HOSPITALISTS HISTORY AND PHYSICAL    12/20/2023 2:27 PM    Patient Information:  ISHMAEL GR is a 78 y.o. male [id number]  PCP:  Anthony Bravo MD (Tel: [phone] )    Chief complaint:    Chief Complaint   Patient presents with    Fall     Pt arrived to ED via [facility] ems from home after a fall. Per pt he was getting up to go to the bathroom and fell. Pt has knot above left eye and skin tear to left forearm. Pt on blood thinners. Per pt he is on 6L of oxygen at all times. Pt A&Ox4     History of Present Illness:  Ishmael Gr is a 78 y.o. male spittle with acute COPD exacerbation will discharge patient is having difficulty ambulating at home denies increased shortness of breath cough fever chills nausea vomiting does complain of pain due to fall CT in the ED was negative for any acute fractures    REVIEW OF SYSTEMS:   Constitutional: Negative for fever,chills or night sweats  ENT: Negative for rhinorrhea, epistaxis, hoarseness, sore throat.  Respiratory: Negative for shortness of breath,wheezing  Cardiovascular: Negative for chest pain, palpitations   Gastrointestinal: Negative for nausea, vomiting, diarrhea  Genitourinary: Negative for polyuria, dysuria   Hematologic/Lymphatic: Negative for bleeding tendency, easy bruising    Neurologic: Negative for confusion,dysarthria.  Skin: Negative for itching,rash  Psychiatric: Negative for depression,anxiety, agitation.  Endocrine: Negative for polydipsia,polyuria,heat /cold intolerance.    Past Medical History:   has a past medical history of Anemia, COPD (chronic obstructive pulmonary disease) ([facility]), Diabetes mellitus ([facility]), Edema, GI (gastrointestinal bleed), Gout, Hypertension, Morbid obesity ([facility]), Neuropathy, Obstructive sleep apnea, and Peripheral vascular disease ([facility]).     Past Surgical History:   has a past surgical history that includes  problems. *        Assessment/Plan:   ABRAHAM: ivf fluids    Chronic copd with chronic hypoxic and hyperpcanic resp failure  - continue home meds 6L baseline    HFpEF  -Not in exacerbation  -Echo 11/28/2023 showed normal EF and indeterminate diastolic function.  -Strict in and out  -Daily weight      Hypertension  -PRN hydralazine    Type 2 diabetes  -SSI    DVT prophylaxis eliquis  Code status full code        Admit as inpatient I anticipate hospitalization spanning more than two midnights for investigation and treatment of the above medically necessary diagnoses.    Please note that some part of this chart was generated using Dragon dictation software. Although every effort was made to ensure the accuracy of this automated transcription, some errors in transcription may have occurred inadvertently. If you may need any clarification, please do not hesitate to contact me through EPIC.       Davey Farias MD    12/20/2023 2:27 PM

## 2023-12-20 NOTE — ED NOTES
Daughter at bedside, brought pt home cpap machine. States patient code status is DNR-CCA, not reflected on most recent admission but was on admission prior to that. Confirmed that patient is still DNR-CCA, verbal confirmation given by daughter and patient. Also requesting pt have bariatric recliner in his inpatient room. Will attempt to meet this request.

## 2023-12-20 NOTE — ED PROVIDER NOTES
EMERGENCY MEDICINE ATTENDING NOTE  Sherif Juarez Jr., DO, FACEP, FAAEM        CHIEF COMPLAINT  Chief Complaint   Patient presents with    Fall     Pt arrived to ED via [facility] ems from home after a fall. Per pt he was getting up to go to the bathroom and fell. Pt has knot above left eye and skin tear to left forearm. Pt on blood thinners. Per pt he is on 6L of oxygen at all times. Pt A&Ox4        HISTORY OF PRESENT ILLNESS  Ishmael Gr is a 78 y.o. male who presents to the ED for evaluation for fall.  Patient was just discharged from the hospital after being brought in for respiratory issues and a fall at that time.  States today he fell and hit his head.  Does not fully remember it.  He was on his way to the bathroom when it happened.  States the only pain he is feeling is in his low back and going down his right leg.  Denies any new numbness or tingling or weakness.  Does have significant respiratory issues and feels short of breath initially with it but states he feels back to normal at this time.  He is on 6 L normally.    Nursing/triage notes reviewed.  No other complaints, modifying factors or associated symptoms.     REVIEW OF SYSTEMS:  All systems are reviewed and are negative unless noted in the HPI.    PAST MEDICAL HISTORY  Past Medical History:   Diagnosis Date    Anemia     COPD (chronic obstructive pulmonary disease) (HCC)     Diabetes mellitus (HCC)     Edema     GI (gastrointestinal bleed)     Gout     Hypertension     Morbid obesity (HCC)     Neuropathy     Obstructive sleep apnea     uses CPAP    Peripheral vascular disease (HCC)        SURGICAL HISTORY  Past Surgical History:   Procedure Laterality Date    APPENDECTOMY      COLONOSCOPY N/A 5/7/2019    COLONOSCOPY DIAGNOSTIC performed by Anthony Yoon MD at [facility] ENDOSCOPY    TOTAL HIP ARTHROPLASTY      bilateral       FAMILY HISTORY  Family History   Problem Relation Age of Onset    High Blood Pressure Mother     High

## 2023-12-20 NOTE — ED PROVIDER NOTES
Emergency Department Encounter  Location: [facility] EMERGENCY DEPARTMENT    Patient: Ishmael Gr  MRN: [id number]  : 1945  Date of evaluation: 2023  ED Provider: Noah Woods MD    Ishmael Gr was checked out to me by Dr. Juarez 600 Please see his/her initial documentation for details of the patient's initial ED presentation, physical exam and completed studies.    In brief, Ishmael Gr is a 78 y.o. male that presented to the emergency department with back pain to R hip. Had a fall this am at home. Just discharged on 18 after hospital admission for respiratory distress thought to be related to pain medication for another fall..    I have reviewed and interpreted all of the currently available lab results and diagnostics from this visit:    Labs  Results for orders placed or performed during the hospital encounter of 23   CBC with Auto Differential   Result Value Ref Range    WBC 7.9 4.0 - 11.0 K/uL    RBC 2.78 (L) 4.20 - 5.90 M/uL    Hemoglobin 9.4 (L) 13.5 - 17.5 g/dL    Hematocrit 28.8 (L) 40.5 - 52.5 %    .4 (H) 80.0 - 100.0 fL    MCH 33.7 26.0 - 34.0 pg    MCHC 32.5 31.0 - 36.0 g/dL    RDW 15.0 12.4 - 15.4 %    Platelets 201 135 - 450 K/uL    MPV 6.8 5.0 - 10.5 fL    Neutrophils % 92.3 %    Lymphocytes % 2.2 %    Monocytes % 5.0 %    Eosinophils % 0.3 %    Basophils % 0.2 %    Neutrophils Absolute 7.3 1.7 - 7.7 K/uL    Lymphocytes Absolute 0.2 (L) 1.0 - 5.1 K/uL    Monocytes Absolute 0.4 0.0 - 1.3 K/uL    Eosinophils Absolute 0.0 0.0 - 0.6 K/uL    Basophils Absolute 0.0 0.0 - 0.2 K/uL   CMP w/ Reflex to MG   Result Value Ref Range    Sodium 140 136 - 145 mmol/L    Potassium reflex Magnesium 4.6 3.5 - 5.1 mmol/L    Chloride 100 99 - 110 mmol/L    CO2 34 (H) 21 - 32 mmol/L    Anion Gap 6 3 - 16    Glucose 153 (H) 70 - 99 mg/dL    BUN 79 (H) 7 - 20 mg/dL    Creatinine 1.7 (H) 0.8 - 1.3 mg/dL    Est, Glom Filt Rate 41 (A) >60    Calcium 9.1 8.3 - 10.6 mg/dL     showing some chronic CO2 retention for which she was given 1 breathing treatment here.  He is not in any respiratory distress.  Initial high-sensitivity troponin is elevated at 62, in keeping with priors.  He denies chest pain.  EKG is nonischemic.  ACS considered unlikely.  On reevaluation his heart rate is mildly elevated likely after albuterol treatments.  He states that his breathing feels normal.  He states that he never passed out, he felt because he missed his walker while putting his oxygen tubing on.    He is unable to ambulate here, cannot even get to the edge of the bed without significant help from nursing staff.  He does admit that he will not be able to go home like this.  This is his second fall in 4 days.  At this point we would recommend admission for PT OT and likely placement.  He is agreeable.  Final Impression  1. Fall, initial encounter    2. Acute right-sided low back pain with right-sided sciatica    3. Right hip pain        Blood pressure 116/64, pulse (!) 110, temperature 98.3 °F (36.8 °C), temperature source Oral, resp. rate 24, height 1.829 m (6'), weight (!) 153.3 kg (338 lb), SpO2 100 %.     Disposition:  DISPOSITION Decision To Discharge 12/20/2023 08:27:28 AM      Patient Referrals:  Anthony Bravo MD  [address] [facility] [address]  [phone]          [facility] Emergency Department  [address] [facility] [address]  [phone]    If symptoms worsen      Discharge Medications:  New Prescriptions    No medications on file         [employer]    This chart was generated using the Dragon dictation system. I created this record but it may contain dictation errors given the limitations of this technology.        Noah Woods MD  12/20/23 7907       Noah Woods MD  12/20/23 5834

## 2023-12-20 NOTE — ED NOTES
ED TO INPATIENT SBAR HANDOFF    Patient Name: Ishmael Gr   :  1945  78 y.o.   MRN:  [id number]  Preferred Name  Pedro Pablo  ED Room #:  ED-0020/20  Family/Caregiver Present no   Restraints no   Sitter no   Sepsis Risk Score Sepsis Risk Score: 9.93    Situation  Code Status: Prior No additional code details.    Allergies: Patient has no known allergies.  Weight: Patient Vitals for the past 96 hrs (Last 3 readings):   Weight   23 0451 (!) 153.3 kg (338 lb)     Arrived from: home  Chief Complaint:   Chief Complaint   Patient presents with    Fall     Pt arrived to ED via [facility] ems from home after a fall. Per pt he was getting up to go to the bathroom and fell. Pt has knot above left eye and skin tear to left forearm. Pt on blood thinners. Per pt he is on 6L of oxygen at all times. Pt A&Ox4     Hospital Problem/Diagnosis:  Principal Problem:    ABRAHAM (acute kidney injury) (HCC)  Resolved Problems:    * No resolved hospital problems. *    Imaging:   CT PELVIS WO CONTRAST Additional Contrast? None   Final Result   Bilateral hip prostheses.      No periprosthetic fracture.         CT LUMBAR SPINE WO CONTRAST   Final Result   Levoconvex scoliosis with moderate to advanced degenerative disc disease and   facet arthropathy.      No fracture.         CT Head W/O Contrast   Final Result   No acute intracranial abnormality.         XR CHEST PORTABLE   Final Result   1. Improved aeration.           Abnormal labs:   Abnormal Labs Reviewed   CBC WITH AUTO DIFFERENTIAL - Abnormal; Notable for the following components:       Result Value    RBC 2.78 (*)     Hemoglobin 9.4 (*)     Hematocrit 28.8 (*)     .4 (*)     Lymphocytes Absolute 0.2 (*)     All other components within normal limits   COMPREHENSIVE METABOLIC PANEL W/ REFLEX TO MG FOR LOW K - Abnormal; Notable for the following components:    CO2 34 (*)     Glucose 153 (*)     BUN 79 (*)     Creatinine 1.7 (*)     Est, Glom Filt Rate 41 (*)     Albumin

## 2023-12-20 NOTE — ED NOTES
Pt lives at home with daughter, is normally able to get around with walker. Attempted to get pt up to stand and use urinal - pt not able to support his own body weight. Very unsteady. Pt voicing concerns about being able to safely go home. Concerns relayed to Dr. Woods, will cancel discharge.

## 2023-12-21 LAB
ANION GAP SERPL CALCULATED.3IONS-SCNC: 9 MMOL/L (ref 3–16)
BACTERIA UR CULT: NORMAL
BASOPHILS # BLD: 0 K/UL (ref 0–0.2)
BASOPHILS NFR BLD: 0.1 %
BUN SERPL-MCNC: 86 MG/DL (ref 7–20)
CALCIUM SERPL-MCNC: 8.5 MG/DL (ref 8.3–10.6)
CHLORIDE SERPL-SCNC: 101 MMOL/L (ref 99–110)
CO2 SERPL-SCNC: 32 MMOL/L (ref 21–32)
CREAT SERPL-MCNC: 1.8 MG/DL (ref 0.8–1.3)
DEPRECATED RDW RBC AUTO: 15.1 % (ref 12.4–15.4)
EOSINOPHIL # BLD: 0.1 K/UL (ref 0–0.6)
EOSINOPHIL NFR BLD: 1.4 %
GFR SERPLBLD CREATININE-BSD FMLA CKD-EPI: 38 ML/MIN/{1.73_M2}
GLUCOSE BLD-MCNC: 220 MG/DL (ref 70–99)
GLUCOSE SERPL-MCNC: 168 MG/DL (ref 70–99)
HCT VFR BLD AUTO: 25.3 % (ref 40.5–52.5)
HGB BLD-MCNC: 8.3 G/DL (ref 13.5–17.5)
LYMPHOCYTES # BLD: 0.2 K/UL (ref 1–5.1)
LYMPHOCYTES NFR BLD: 2 %
MCH RBC QN AUTO: 33.8 PG (ref 26–34)
MCHC RBC AUTO-ENTMCNC: 32.8 G/DL (ref 31–36)
MCV RBC AUTO: 103 FL (ref 80–100)
MONOCYTES # BLD: 0.5 K/UL (ref 0–1.3)
MONOCYTES NFR BLD: 5.3 %
NEUTROPHILS # BLD: 9.3 K/UL (ref 1.7–7.7)
NEUTROPHILS NFR BLD: 91.2 %
NT-PROBNP SERPL-MCNC: 5515 PG/ML (ref 0–449)
PERFORMED ON: ABNORMAL
PLATELET # BLD AUTO: 163 K/UL (ref 135–450)
PMV BLD AUTO: 7 FL (ref 5–10.5)
POTASSIUM SERPL-SCNC: 4.4 MMOL/L (ref 3.5–5.1)
RBC # BLD AUTO: 2.46 M/UL (ref 4.2–5.9)
SODIUM SERPL-SCNC: 142 MMOL/L (ref 136–145)
WBC # BLD AUTO: 10.2 K/UL (ref 4–11)

## 2023-12-21 PROCEDURE — 85025 COMPLETE CBC W/AUTO DIFF WBC: CPT

## 2023-12-21 PROCEDURE — 80048 BASIC METABOLIC PNL TOTAL CA: CPT

## 2023-12-21 PROCEDURE — 97162 PT EVAL MOD COMPLEX 30 MIN: CPT

## 2023-12-21 PROCEDURE — 97530 THERAPEUTIC ACTIVITIES: CPT

## 2023-12-21 PROCEDURE — 2580000003 HC RX 258: Performed by: INTERNAL MEDICINE

## 2023-12-21 PROCEDURE — 97165 OT EVAL LOW COMPLEX 30 MIN: CPT

## 2023-12-21 PROCEDURE — 6370000000 HC RX 637 (ALT 250 FOR IP): Performed by: INTERNAL MEDICINE

## 2023-12-21 PROCEDURE — 94640 AIRWAY INHALATION TREATMENT: CPT

## 2023-12-21 PROCEDURE — 94761 N-INVAS EAR/PLS OXIMETRY MLT: CPT

## 2023-12-21 PROCEDURE — 99223 1ST HOSP IP/OBS HIGH 75: CPT | Performed by: STUDENT IN AN ORGANIZED HEALTH CARE EDUCATION/TRAINING PROGRAM

## 2023-12-21 PROCEDURE — 6360000002 HC RX W HCPCS: Performed by: STUDENT IN AN ORGANIZED HEALTH CARE EDUCATION/TRAINING PROGRAM

## 2023-12-21 PROCEDURE — 2700000000 HC OXYGEN THERAPY PER DAY

## 2023-12-21 PROCEDURE — 36415 COLL VENOUS BLD VENIPUNCTURE: CPT

## 2023-12-21 PROCEDURE — 5A09357 ASSISTANCE WITH RESPIRATORY VENTILATION, LESS THAN 24 CONSECUTIVE HOURS, CONTINUOUS POSITIVE AIRWAY PRESSURE: ICD-10-PCS | Performed by: RADIOLOGY

## 2023-12-21 PROCEDURE — 2580000003 HC RX 258: Performed by: STUDENT IN AN ORGANIZED HEALTH CARE EDUCATION/TRAINING PROGRAM

## 2023-12-21 PROCEDURE — 2060000000 HC ICU INTERMEDIATE R&B

## 2023-12-21 PROCEDURE — 97535 SELF CARE MNGMENT TRAINING: CPT

## 2023-12-21 PROCEDURE — 83880 ASSAY OF NATRIURETIC PEPTIDE: CPT

## 2023-12-21 RX ORDER — BUDESONIDE 0.5 MG/2ML
0.5 INHALANT ORAL
Status: DISCONTINUED | OUTPATIENT
Start: 2023-12-21 | End: 2024-01-15 | Stop reason: HOSPADM

## 2023-12-21 RX ORDER — ASCORBIC ACID 500 MG
500 TABLET ORAL DAILY
Status: COMPLETED | OUTPATIENT
Start: 2023-12-21 | End: 2023-12-23

## 2023-12-21 RX ORDER — SODIUM CHLORIDE 9 MG/ML
INJECTION, SOLUTION INTRAVENOUS CONTINUOUS
Status: ACTIVE | OUTPATIENT
Start: 2023-12-21 | End: 2023-12-22

## 2023-12-21 RX ORDER — ARFORMOTEROL TARTRATE 15 UG/2ML
15 SOLUTION RESPIRATORY (INHALATION)
Status: DISCONTINUED | OUTPATIENT
Start: 2023-12-21 | End: 2024-01-15 | Stop reason: HOSPADM

## 2023-12-21 RX ADMIN — SODIUM CHLORIDE: 9 INJECTION, SOLUTION INTRAVENOUS at 18:18

## 2023-12-21 RX ADMIN — AZITHROMYCIN DIHYDRATE 500 MG: 250 TABLET ORAL at 10:00

## 2023-12-21 RX ADMIN — IPRATROPIUM BROMIDE AND ALBUTEROL SULFATE 1 DOSE: 2.5; .5 SOLUTION RESPIRATORY (INHALATION) at 08:03

## 2023-12-21 RX ADMIN — OXYCODONE HYDROCHLORIDE AND ACETAMINOPHEN 500 MG: 500 TABLET ORAL at 18:15

## 2023-12-21 RX ADMIN — APIXABAN 5 MG: 5 TABLET, FILM COATED ORAL at 09:59

## 2023-12-21 RX ADMIN — ARFORMOTEROL TARTRATE 15 MCG: 15 SOLUTION RESPIRATORY (INHALATION) at 11:45

## 2023-12-21 RX ADMIN — ASPIRIN 81 MG: 81 TABLET, CHEWABLE ORAL at 10:00

## 2023-12-21 RX ADMIN — Medication 2 PUFF: at 02:05

## 2023-12-21 RX ADMIN — APIXABAN 5 MG: 5 TABLET, FILM COATED ORAL at 20:13

## 2023-12-21 RX ADMIN — ACETAMINOPHEN 650 MG: 325 TABLET ORAL at 04:22

## 2023-12-21 RX ADMIN — BUDESONIDE INHALATION 500 MCG: 0.5 SUSPENSION RESPIRATORY (INHALATION) at 21:04

## 2023-12-21 RX ADMIN — WATER 40 MG: 1 INJECTION INTRAMUSCULAR; INTRAVENOUS; SUBCUTANEOUS at 13:28

## 2023-12-21 RX ADMIN — IPRATROPIUM BROMIDE AND ALBUTEROL SULFATE 1 DOSE: 2.5; .5 SOLUTION RESPIRATORY (INHALATION) at 11:44

## 2023-12-21 RX ADMIN — IPRATROPIUM BROMIDE AND ALBUTEROL SULFATE 1 DOSE: 2.5; .5 SOLUTION RESPIRATORY (INHALATION) at 15:26

## 2023-12-21 RX ADMIN — IPRATROPIUM BROMIDE AND ALBUTEROL SULFATE 1 DOSE: 2.5; .5 SOLUTION RESPIRATORY (INHALATION) at 21:03

## 2023-12-21 RX ADMIN — AMOXICILLIN AND CLAVULANATE POTASSIUM 1 TABLET: 875; 125 TABLET, FILM COATED ORAL at 09:59

## 2023-12-21 RX ADMIN — PREDNISONE 20 MG: 20 TABLET ORAL at 10:00

## 2023-12-21 RX ADMIN — ARFORMOTEROL TARTRATE 15 MCG: 15 SOLUTION RESPIRATORY (INHALATION) at 21:03

## 2023-12-21 RX ADMIN — THIAMINE HCL TAB 100 MG 100 MG: 100 TAB at 09:59

## 2023-12-21 RX ADMIN — ALLOPURINOL 200 MG: 100 TABLET ORAL at 09:59

## 2023-12-21 RX ADMIN — AMOXICILLIN AND CLAVULANATE POTASSIUM 1 TABLET: 875; 125 TABLET, FILM COATED ORAL at 20:13

## 2023-12-21 RX ADMIN — TIOTROPIUM BROMIDE INHALATION SPRAY 2 PUFF: 3.12 SPRAY, METERED RESPIRATORY (INHALATION) at 08:03

## 2023-12-21 RX ADMIN — Medication 10 ML: at 10:00

## 2023-12-21 RX ADMIN — Medication 2 PUFF: at 08:02

## 2023-12-21 RX ADMIN — BUDESONIDE INHALATION 500 MCG: 0.5 SUSPENSION RESPIRATORY (INHALATION) at 11:45

## 2023-12-21 ASSESSMENT — PAIN SCALES - GENERAL
PAINLEVEL_OUTOF10: 4
PAINLEVEL_OUTOF10: 0
PAINLEVEL_OUTOF10: 6
PAINLEVEL_OUTOF10: 5

## 2023-12-21 ASSESSMENT — PAIN DESCRIPTION - DESCRIPTORS
DESCRIPTORS: ACHING
DESCRIPTORS: ACHING

## 2023-12-21 ASSESSMENT — PAIN DESCRIPTION - LOCATION
LOCATION: BACK
LOCATION: BACK

## 2023-12-21 NOTE — PROGRESS NOTES
12/21/23 0800   RT Protocol   History Pulmonary Disease 2   Respiratory pattern 4   Breath sounds 4   Cough 1   Bronchodilator Assessment Score 11

## 2023-12-21 NOTE — CONSULTS
PALLIATIVE MEDICINE CONSULTATION     Patient name:Ishmael Gr   MRN:[id number]    :1945  Room/Bed:N8I-5366/5903-01   LOS: 1 day         Date of consult:2023    Inpatient consult to Palliative Care  Consult performed by: Maxine Evans APRN - CNP  Consult ordered by: Davey Farias MD  Reason for consult: GOC and code status              ASSESSMENT/RECOMMENDATIONS     78 y.o. male with AMS and hypercapnia with severe COPD and CHF      Symptom Management:  Hypercapnia- elevated CO2 on ABG pt is on 6L oxygen at home baseline.   AMS- pt with increased confusion per family he is more confused the last 1-2 weeks with increased edema and dropping oxygen sat at home     Goals of Care- talked to pt and daughter at bedside, Makayla daughter is an RN she states that pt has poor quality of life and has been stating lately that he's ready to die, daughter worked for [employer] in the past and she wants to focus on pts comfort and quality of life he is a DNRCC at baseline she want to speak to [facility] and [employer] to weigh the benefits of 1 hospice vs another. Referral faxed per daughters request.     Patient/Family Goals of Care :     talked to pt and daughter at bedside, Makayla daughter is an RN she states that pt has poor quality of life and has been stating lately that he's ready to die, daughter worked for [employer] in the past and she wants to focus on pts comfort and quality of life he is a DNRCC at baseline she want to speak to [facility] and [employer] to weigh the benefits of 1 hospice vs another. Referral faxed per daughters request.     Disposition/Discharge Plan:   Pending     Advance Directives:      The patient has appointed the following active healthcare agents:    Primary Decision Maker: Makayla Urban - Child - [phone]    Secondary Decision Maker: Rupal Brito - Daniel Child - [phone]    The Patient has the following current code status:    Code Status: DNR-CC      Interactive

## 2023-12-21 NOTE — PROGRESS NOTES
Case Management Assessment  Initial Evaluation    Date/Time of Evaluation: 12/21/2023 2:44 PM  Assessment Completed by: FRANCA Nguyễn    If patient is discharged prior to next notation, then this note serves as note for discharge by case management.    Patient Name: Ishmael Gr                   YOB: 1945  Diagnosis: Right hip pain [M25.551]  ABRAHAM (acute kidney injury) (HCC) [N17.9]  Fall, initial encounter [W19.XXXA]  Acute right-sided low back pain with right-sided sciatica [M54.41]                   Date / Time: 12/20/2023  4:40 AM    Patient Admission Status: Inpatient   Readmission Risk (Low < 19, Mod (19-27), High > 27): Readmission Risk Score: 25.7    Current PCP: Anthony Bravo MD  PCP verified by CM? Yes    Chart Reviewed: Yes      History Provided by: Child/Family (The SW spoke with the pt's daughter Makayla)  Patient Orientation: Alert and Oriented    Patient Cognition: Alert    Hospitalization in the last 30 days (Readmission):  No    If yes, Readmission Assessment in CM Navigator will be completed.    Advance Directives:      Code Status: DNR-CC   Patient's Primary Decision Maker is: Named in Scanned ACP Document    Primary Decision Maker: Makayla Urban - Child - [phone]    Secondary Decision Maker: Rupal Brito - Daniel Child - [phone]    Discharge Planning:    Patient lives with: Children Type of Home: House  Primary Care Giver: Family (Daughter- Makayla)  Patient Support Systems include: Family Members, Children   Current Financial resources: Medicare  Current community resources: ECF/Home Care (The pt is active with [facility])  Current services prior to admission: Durable Medical Equipment, Home Care, Home Bipap (The pt is active with [facility].)            Current DME: Walker            Type of Home Care services:  Nursing Services, Skilled Therapy    ADLS  Prior functional level: Assistance with the following:, Cooking, Mobility, Housework,  injury) (HCC) [N17.9]  Fall, initial encounter [W19.XXXA]  Acute right-sided low back pain with right-sided sciatica [M54.41]    IF APPLICABLE: The Patient and/or patient representative Ishmael and his family were provided with a choice of provider and agrees with the discharge plan. Freedom of choice list with basic dialogue that supports the patient's individualized plan of care/goals and shares the quality data associated with the providers was provided to: Patient Representative   Patient Representative Name: Daughter- Makayla     The Patient and/or Patient Representative Agree with the Discharge Plan? Yes    FRANCA Nguyễn  Case Management Department

## 2023-12-21 NOTE — PLAN OF CARE
Problem: Safety - Adult  Goal: Free from fall injury  Outcome: Progressing     Problem: Discharge Planning  Goal: Discharge to home or other facility with appropriate resources  Outcome: Progressing     Problem: Pain  Goal: Verbalizes/displays adequate comfort level or baseline comfort level  Outcome: Progressing     Problem: Skin/Tissue Integrity  Goal: Absence of new skin breakdown  Description: 1.  Monitor for areas of redness and/or skin breakdown  2.  Assess vascular access sites hourly  3.  Every 4-6 hours minimum:  Change oxygen saturation probe site  4.  Every 4-6 hours:  If on nasal continuous positive airway pressure, respiratory therapy assess nares and determine need for appliance change or resting period.  Outcome: Progressing     Problem: ABCDS Injury Assessment  Goal: Absence of physical injury  Outcome: Progressing     Problem: Neurosensory - Adult  Goal: Achieves stable or improved neurological status  Outcome: Progressing  Goal: Achieves maximal functionality and self care  Outcome: Progressing     Problem: Respiratory - Adult  Goal: Achieves optimal ventilation and oxygenation  Outcome: Progressing     Problem: Cardiovascular - Adult  Goal: Maintains optimal cardiac output and hemodynamic stability  Outcome: Progressing  Goal: Absence of cardiac dysrhythmias or at baseline  Outcome: Progressing     Problem: Skin/Tissue Integrity - Adult  Goal: Skin integrity remains intact  Outcome: Progressing  Goal: Incisions, wounds, or drain sites healing without S/S of infection  Outcome: Progressing  Goal: Oral mucous membranes remain intact  Outcome: Progressing     Problem: Musculoskeletal - Adult  Goal: Return mobility to safest level of function  Outcome: Progressing  Goal: Maintain proper alignment of affected body part  Outcome: Progressing  Goal: Return ADL status to a safe level of function  Outcome: Progressing     Problem: Gastrointestinal - Adult  Goal: Minimal or absence of nausea and

## 2023-12-21 NOTE — CONSULTS
°F (36.7 °C)  Max: 99.8 °F (37.7 °C)  Pulse  Av.6  Min: 93  Max: 115  BP  Min: 103/55  Max: 126/61  SpO2  Av.5 %  Min: 79 %  Max: 97 %    General: NAD  Neuro: awake, following commands, mild confusion   Lung: bilateral expiratory wheezing, equal non labored  Heart: tachycardia  Abd: soft  LE: wrapped, bilateral equal swelling.     MEDICATIONS: Reviewed  Scheduled Meds:   allopurinol  200 mg Oral Daily    amoxicillin-clavulanate  1 tablet Oral BID    apixaban  5 mg Oral BID    aspirin  81 mg Oral Daily    azithromycin  500 mg Oral Daily    mometasone-formoterol  2 puff Inhalation BID RT    ipratropium 0.5 mg-albuterol 2.5 mg  1 Dose Inhalation 4x Daily RT    predniSONE  20 mg Oral Daily    vitamin B-1  100 mg Oral Daily    sodium chloride flush  5-40 mL IntraVENous 2 times per day    tiotropium  2 puff Inhalation Daily RT     Continuous Infusions:   sodium chloride       PRN Meds:.albuterol sulfate HFA, sodium chloride flush, sodium chloride, polyethylene glycol, acetaminophen **OR** acetaminophen     LABS: Reviewed.   Recent Labs     23  0522 23  0535    142   K 4.6 4.4    101   CO2 34* 32   BUN 79* 86*   CREATININE 1.7* 1.8*     Recent Labs     23  0522 23  2241 23  0535   WBC 7.9 11.3* 10.2   HGB 9.4* 8.4* 8.3*   HCT 28.8* 26.0* 25.3*   .4* 102.7* 103.0*    173 163     Lab Results   Component Value Date/Time    PROTIME 16.0 2023 02:37 PM    INR 1.28 2023 02:37 PM     Lab Results   Component Value Date/Time    LOZ2XZS 32.4 2023 02:09 PM    BEART 5.5 2023 02:09 PM    K8RCTWKQ 95.2 2023 02:09 PM    PHART 7.333 2023 02:09 PM    QNJ3TPB 61.1 2023 02:09 PM    PO2ART 75.0 2023 02:09 PM    PID2TJM 76.9 2023 02:09 PM       Intake/Output Summary (Last 24 hours) at 2023 1108  Last data filed at 2023 0449  Gross per 24 hour   Intake 480 ml   Output 1000 ml   Net -520 ml      In: 480  [P.O.:480]  Out: 1000      IMAGES: Reviewed     ASSESSMENT AND PLAN:     Acute on chronic hypoxic respiratory failure - baseline 6L  Acute on ?chronic hypercapnic respiratory failure  AE-COPD  STEF  ABRAHAM  AMS  HFpEF    Recommendations:   - AMS can be explained by acute hypercapnia  - I will start bipap 16/6, Fi02 goal sat 88-92  - will change steroids to IV solu-medrol 40 q 12  - continue duoneb q 4  - added brovana/Pulmicort BID  - I would be judicious with fluids given hx of HFpEF    Discussed with RT, daughter at the bedside updated     High risk 75 minutes     Koe Bourgeois MD  Pulmonary and Critical Care Medicine   [facility]

## 2023-12-21 NOTE — CONSULTS
[facility]  Consultation Note    Reason for Consult: ABRAHAM  Requesting Physician:  Dr. ROSALINA Farias    CHIEF COMPLAINT: Fall    History obtained from records and patient.    HISTORY OF PRESENT ILLNESS:                Ishmael Gr  is 78 y.o. y.o. male with significant past medical history of anemia, COPD, diabetes mellitus type 2, gout, hypertension, obesity, obstructive sleep apnea, peripheral vascular disease who presents with fall.  His baseline oxygen need is at 6L.  He is currently admitted for ABRAHAM possible COPD exacerbation.  I am asked to see the patient because his creatinine has increased to 1.7 yesterday and 1.8 today.  On 12/16/2023 creatinine was 1.1.  Lowest systolic blood pressure in the 100s range.  He is on furosemide 80 mg twice daily as an outpatient, celecoxib and ibuprofen 600 mg every 4 hours for the past few days.  Denies any vomiting or diarrhea.  No new skin rash.  No change in urine output.  CT PE done 12/16/23.  Lowest SBP in the 100s range.  -500 mL since admission.    Past Medical History:     has a past medical history of Anemia, COPD (chronic obstructive pulmonary disease) (HCC), Diabetes mellitus (HCC), Edema, GI (gastrointestinal bleed), Gout, Hypertension, Morbid obesity (HCC), Neuropathy, Obstructive sleep apnea, and Peripheral vascular disease (HCC).   Past Surgical History:     has a past surgical history that includes Appendectomy; Total hip arthroplasty; and Colonoscopy (N/A, 5/7/2019).   Current Medications:    Current Facility-Administered Medications: methylPREDNISolone sodium succ (SOLU-MEDROL) 40 mg in sterile water 1 mL injection, 40 mg, IntraVENous, Q12H  arformoterol tartrate (BROVANA) nebulizer solution 15 mcg, 15 mcg, Nebulization, BID RT  budesonide (PULMICORT) nebulizer suspension 500 mcg, 0.5 mg, Nebulization, BID RT  albuterol sulfate HFA (PROVENTIL;VENTOLIN;PROAIR) 108 (90 Base) MCG/ACT inhaler 2 puff, 2 puff, Inhalation, Q6H

## 2023-12-21 NOTE — CARE COORDINATION
12/21/23 1439   Readmission Assessment   Number of Days since last admission? 1-7 days   Who is being Interviewed Caregiver  (The SW spoke with the pt's daughter Makayla)   What was the patient's/caregiver's perception as to why they think they needed to return back to the hospital? Other (Comment)  (The pt returned to the hospitals due to a fall at home.)   Did you visit your Primary Care Physician after you left the hospital, before you returned this time? No   Why weren't you able to visit your PCP? Did not have an appointment   Did you see a specialist, such as Cardiac, Pulmonary, Orthopedic Physician, etc. after you left the hospital? No   Who advised the patient to return to the hospital? Other (Comment)  (Daughter)   Does the patient report anything that got in the way of taking their medications? No   In our efforts to provide the best possible care to you and others like you, can you think of anything that we could have done to help you after you left the hospital the first time, so that you might not have needed to return so soon? Other (Comment)  (No concerns reports at this time.)

## 2023-12-21 NOTE — PROGRESS NOTES
[facility] - Inpatient Rehabilitation Department   Phone: [phone]    Physical Therapy    [x] Initial Evaluation            [] Daily Treatment Note         [] Discharge Summary      Patient: Ishmael Gr   : 1945   MRN: [id number]   Date of Service:  2023  Admitting Diagnosis: ABRAHAM (acute kidney injury) (HCC)  Current Admission Summary: The pt was admitted s/p a fall.  He has severe back and R hip pain.  He discharged home from the hospital a few days before he was readmitted.   Past Medical History:  has a past medical history of Anemia, COPD (chronic obstructive pulmonary disease) (HCC), Diabetes mellitus (HCC), Edema, GI (gastrointestinal bleed), Gout, Hypertension, Morbid obesity (HCC), Neuropathy, Obstructive sleep apnea, and Peripheral vascular disease (HCC).  Past Surgical History:  has a past surgical history that includes Appendectomy; Total hip arthroplasty; and Colonoscopy (N/A, 2019).  Discharge Recommendations: Ishmael Gr scored a / on the AM-PAC short mobility form. Current research shows that an AM-PAC score of 17 or less is typically not associated with a discharge to the patient's home setting. Based on the patient's AM-PAC score and their current functional mobility deficits, it is recommended that the patient have 3-5 sessions per week of Physical Therapy at d/c to increase the patient's independence.  Please see assessment section for further patient specific details.    If patient discharges prior to next session this note will serve as a discharge summary.  Please see below for the latest assessment towards goals.     DME Required For Discharge: DME to be determined at next level of care  Precautions/Restrictions: high fall risk  Weight Bearing Restrictions: no restrictions      Pre-Admission Information   Lives With: daughter 52 Who is an RN at [employer] working full-time And granddaughter 23,  reports family members are at home intermittently  Type of Home:  house  Home Layout: two level, bedroom/bathroom on main level  Home Access:  2 step to enter with handrail.  Handrails are located on R side.  Bathroom Layout: walk in shower  Bathroom Equipment: grab bars in shower, shower chair, hand held shower head  Toilet Height: elevated height  Home Equipment: rolling walker, single point cane, reacher, sock aide, oxygen--sleeps in recliner  Transfer Assistance:  mod I with RW   Ambulation Assistance: mod I with RW within his home, modified independent with use of SPC vs. RW when going out. Pt reports difficulty with advancing (R) LE during mobility tasks.   ADL Assistance: independent with all ADL's  IADL Assistance: independent with homemaking tasks, requires assistance with laundry from daughter (laundry in basement)   Active :        [] Yes                 [x] No--daughter/granddaughter drive  Hand Dominance: [] Left                 [x] Right  Current Employment: retired.  Occupation: , welding   Hobbies: genealogy, history    Recent Falls: 3 falls. Tripping over the oxygen line     Examination   Vision:   Vision Corrective Device: wears glasses for reading  Hearing:   hard of hearing  Observation:   General Observation:  pt was on 7 L/min of O2 at rest.  Not tender to palpation over lumbar paraspinal mm.  Posture:   Difficulty with lumbar extension sitting EOB, suspect pt flexes forward in standing, unable to assess standing posture today  Sensation:   reports numbness and tingling in (B) UE, (B) LE -due to neuropathy  Proprioception:    Unable to formally test secondary to back pain  Tone:   Normotonic  Coordination Testing:   Unable to formally test secondary to back pain     ROM:   Able to extend B hips and knees in supine and then flex to a 90/90 position sitting EOB, stiffness in B hips with bed mobility due to hip and back pain  Strength:   Unable to complete a SLR, unable to stand from bed, unsure if this due to strength impairment or

## 2023-12-21 NOTE — PROGRESS NOTES
Pt c/o severe buttocks pain from the bed. Pt placed on pressure relief overlay mattress and stated immediate relief of pain. Noted irregular shaped bruising to bilateral coccyx/ buttocks with right side bruising greater than left. Sacral mepilex applied. BLE has redness and +3 pitting edema, legs wrapped with ace bandage and heels elevated off bed. Mepilex placed to skin tear on L FA. Call light within reach, non skid socks on, bed alarm engaged. Pt on 7L HF.

## 2023-12-21 NOTE — PROGRESS NOTES
[facility]    12/21/2023 11:03 AM        Name: Ishmael Gr .              Admitted: 12/20/2023  Primary Care Provider: Anthony Bravo MD (Tel: [phone])    =      Subjective:    Sitting in chair more short of breath is now requiring 7 L of high flow oxygen    Reviewed interval ancillary notes    Current Medications  albuterol sulfate HFA (PROVENTIL;VENTOLIN;PROAIR) 108 (90 Base) MCG/ACT inhaler 2 puff, Q6H PRN  allopurinol (ZYLOPRIM) tablet 200 mg, Daily  amoxicillin-clavulanate (AUGMENTIN) 875-125 MG per tablet 1 tablet, BID  apixaban (ELIQUIS) tablet 5 mg, BID  aspirin chewable tablet 81 mg, Daily  azithromycin (ZITHROMAX) tablet 500 mg, Daily  mometasone-formoterol (DULERA) 200-5 MCG/ACT inhaler 2 puff, BID RT  ipratropium 0.5 mg-albuterol 2.5 mg (DUONEB) nebulizer solution 1 Dose, 4x Daily RT  predniSONE (DELTASONE) tablet 20 mg, Daily  thiamine mononitrate tablet 100 mg, Daily  sodium chloride flush 0.9 % injection 5-40 mL, 2 times per day  sodium chloride flush 0.9 % injection 5-40 mL, PRN  0.9 % sodium chloride infusion, PRN  polyethylene glycol (GLYCOLAX) packet 17 g, Daily PRN  acetaminophen (TYLENOL) tablet 650 mg, Q6H PRN   Or  acetaminophen (TYLENOL) suppository 650 mg, Q6H PRN  tiotropium (SPIRIVA RESPIMAT) 2.5 MCG/ACT inhaler 2 puff, Daily RT        Objective:  BP (!) 108/51   Pulse (!) 107   Temp 98 °F (36.7 °C)   Resp 18   Ht 1.829 m (6' 0.01\")   Wt (!) 157.3 kg (346 lb 12.8 oz) Comment: wt includes overlay mattress  SpO2 96%   BMI 47.02 kg/m²     Intake/Output Summary (Last 24 hours) at 12/21/2023 1103  Last data filed at 12/21/2023 0449  Gross per 24 hour   Intake 480 ml   Output 1000 ml   Net -520 ml      Wt Readings from Last 3 Encounters:   12/21/23 (!) 157.3 kg (346 lb 12.8 oz)   12/18/23 (!) 153.6 kg (338 lb 9.6 oz)   12/01/23 (!) 154.8 kg (341 lb 3.2 oz)       General  appearance:  Appears comfortable. AAOx3  HEENT: atraumatic, Pupils equal, muscous membranes moist, no masses appreciated  Cardiovascular: tachycardia no murmurs appreciated  Respiratory: dimished breath sounds  Gastrointestinal: Abdomen soft, non-tender, BS+  EXT: no edema  Neurology: no gross focal deficts  Psychiatry: Appropriate affect. Not agitated  Skin: Warm, dry, no rashes appreciated    Labs and Tests:  CBC:   Recent Labs     12/20/23  0522 12/20/23  2241 12/21/23  0535   WBC 7.9 11.3* 10.2   HGB 9.4* 8.4* 8.3*    173 163     BMP:    Recent Labs     12/20/23  0522 12/21/23  0535    142   K 4.6 4.4    101   CO2 34* 32   BUN 79* 86*   CREATININE 1.7* 1.8*   GLUCOSE 153* 168*     Hepatic:   Recent Labs     12/20/23  0522   AST 81*   ALT 75*   BILITOT <0.2   ALKPHOS 116     CT PELVIS WO CONTRAST Additional Contrast? None   Final Result   Bilateral hip prostheses.      No periprosthetic fracture.         CT LUMBAR SPINE WO CONTRAST   Final Result   Levoconvex scoliosis with moderate to advanced degenerative disc disease and   facet arthropathy.      No fracture.         CT Head W/O Contrast   Final Result   No acute intracranial abnormality.         XR CHEST PORTABLE   Final Result   1. Improved aeration.             Recent imaging reviewed    Problem List  Principal Problem:    ABRAHAM (acute kidney injury) (HCC)  Resolved Problems:    * No resolved hospital problems. *       Assessment & Plan:   ABRAHAM:bmp in am nephro consulted signnificant uremia    Acute on chronic hypoxic and hyperpcanic resp failure  - abg reviwed  - check bnp  - pulm consult      Hypertension  -PRN hydralazine    Type 2 diabetes  -SSI     DVT prophylaxis eliquis  Code status full code      Davey Farias MD   12/21/2023 11:03 AM

## 2023-12-21 NOTE — PLAN OF CARE
Problem: Safety - Adult  Goal: Free from fall injury  12/20/2023 2209 by Mindy Yoon RN  Outcome: Progressing  12/20/2023 2106 by Lynne Jackson RN  Outcome: Progressing     Problem: Discharge Planning  Goal: Discharge to home or other facility with appropriate resources  12/20/2023 2209 by Mindy Yoon RN  Outcome: Progressing  12/20/2023 2106 by Lynne Jackson RN  Outcome: Progressing     Problem: Pain  Goal: Verbalizes/displays adequate comfort level or baseline comfort level  12/20/2023 2209 by Mindy Yoon RN  Outcome: Progressing  12/20/2023 2106 by Lynne Jackson RN  Outcome: Progressing     Problem: Skin/Tissue Integrity  Goal: Absence of new skin breakdown  Description: 1.  Monitor for areas of redness and/or skin breakdown  2.  Assess vascular access sites hourly  3.  Every 4-6 hours minimum:  Change oxygen saturation probe site  4.  Every 4-6 hours:  If on nasal continuous positive airway pressure, respiratory therapy assess nares and determine need for appliance change or resting period.  12/20/2023 2209 by Mindy Yoon RN  Outcome: Progressing  12/20/2023 2106 by Lynne Jackson RN  Outcome: Progressing     Problem: ABCDS Injury Assessment  Goal: Absence of physical injury  12/20/2023 2209 by Mindy Yoon RN  Outcome: Progressing  12/20/2023 2106 by Lynne Jackson RN  Outcome: Progressing     Problem: Neurosensory - Adult  Goal: Achieves stable or improved neurological status  12/20/2023 2209 by Mindy Yoon RN  Outcome: Progressing  12/20/2023 2106 by Lynne Jackson RN  Outcome: Progressing  Goal: Achieves maximal functionality and self care  12/20/2023 2209 by Mindy Yoon RN  Outcome: Progressing  12/20/2023 2106 by Lynne Jackson RN  Outcome: Progressing     Problem: Respiratory - Adult  Goal: Achieves optimal ventilation and oxygenation  12/20/2023 2209 by Mindy Yoon RN  Outcome:  indicated  12/20/2023 2209 by Mindy Yoon RN  Outcome: Progressing  12/20/2023 2106 by Lynne Jackson, RN  Outcome: Progressing     Problem: Behavior  Goal: Pt/Family maintain appropriate behavior and adhere to behavioral management agreement, if implemented  Description: INTERVENTIONS:  1. Assess patient/family's coping skills and  non-compliant behavior (including use of illegal substances)  2. Notify security of behavior or suspected illegal substances which indicate the need for search of the family and/or belongings  3. Encourage verbalization of thoughts and concerns in a socially appropriate manner  4. Utilize positive, consistent limit setting strategies supporting safety of patient, staff and others  5. Encourage participation in the decision making process about the behavioral management agreement  6. If a visitor's behavior poses a threat to safety call refer to organization policy.  7. Initiate consult with , Psychosocial CNS, Spiritual Care as appropriate  12/20/2023 2209 by Mindy Yoon, RN  Outcome: Progressing  12/20/2023 2106 by Lynne Jackson, RN  Outcome: Progressing

## 2023-12-21 NOTE — TELEPHONE ENCOUNTER
Jg Coleman MD  You10 hours ago (10:26 PM)       Should we recommend to daughter that EMS be called to bring him in? Did he sound very symptomatic. A bit concerning that he can't leave the house due to symptoms.    Jared   I spoke with the daughter Makayla and she states that the patient is hospitalized now after a fall due to weakness. FAYE AGRAWAL.

## 2023-12-21 NOTE — PROGRESS NOTES
Called and left voicemail for daughter Makayla. Maddi was able to go to the bedside to see if the daughter was present, however she had left to go back to work. Will continue to follow up.         Thank you for allowing [facility] to be a part of the Maile family's care.    KIAH Shah RN  [facility] [email or web address]  Cell: [phone]  Main: [phone]

## 2023-12-21 NOTE — PROGRESS NOTES
[facility] - Inpatient Rehabilitation Department   Phone: [phone]    Occupational Therapy    [x] Initial Evaluation            [] Daily Treatment Note         [] Discharge Summary      Patient: Ishmael Gr   : 1945   MRN: [id number]   Date of Service:  2023    Admitting Diagnosis:  ABRAHAM (acute kidney injury) (HCC)  Current Admission Summary:   Ishmael Gr is a 78 y.o. male who presents to the ED for evaluation for fall.  Patient was just discharged from the hospital after being brought in for respiratory issues and a fall at that time.  States today he fell and hit his head.  Does not fully remember it.  He was on his way to the bathroom when it happened.  States the only pain he is feeling is in his low back and going down his right leg.  Denies any new numbness or tingling or weakness.  Does have significant respiratory issues and feels short of breath initially with it but states he feels back to normal at this time.  He is on 6 L normally.   Past Medical History:  has a past medical history of Anemia, COPD (chronic obstructive pulmonary disease) ([facility]), Diabetes mellitus (HCC), Edema, GI (gastrointestinal bleed), Gout, Hypertension, Morbid obesity (HCC), Neuropathy, Obstructive sleep apnea, and Peripheral vascular disease (HCC).  Past Surgical History:  has a past surgical history that includes Appendectomy; Total hip arthroplasty; and Colonoscopy (N/A, 2019).    Discharge Recommendations: Ishmael Gr scored a 13/24 on the AM-PAC ADL Inpatient form. Current research shows that an AM-PAC score of 17 or less is typically not associated with a discharge to the patient's home setting. Based on the patient's AM-PAC score and their current ADL deficits, it is recommended that the patient have 3-5 sessions per week of Occupational Therapy at d/c to increase the patient's independence.  Please see assessment section for further patient specific details.    If patient discharges

## 2023-12-22 ENCOUNTER — APPOINTMENT (OUTPATIENT)
Dept: ULTRASOUND IMAGING | Age: 78
DRG: 682 | End: 2023-12-22
Payer: MEDICARE

## 2023-12-22 PROBLEM — J44.9 CHRONIC OBSTRUCTIVE PULMONARY DISEASE (HCC): Status: ACTIVE | Noted: 2023-12-16
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
## 2023-12-22 PROBLEM — R78.81 BACTEREMIA DUE TO METHICILLIN SUSCEPTIBLE STAPHYLOCOCCUS AUREUS (MSSA): Status: ACTIVE | Noted: 2023-12-22

## 2023-12-22 PROBLEM — G93.89 ENCEPHALOMALACIA: Status: ACTIVE | Noted: 2022-09-03

## 2023-12-22 PROBLEM — B95.61 BACTEREMIA DUE TO METHICILLIN SUSCEPTIBLE STAPHYLOCOCCUS AUREUS (MSSA): Status: ACTIVE | Noted: 2023-12-22

## 2023-12-22 PROBLEM — M41.9 SCOLIOSIS OF LUMBAR SPINE: Status: ACTIVE | Noted: 2023-12-22

## 2023-12-22 PROBLEM — J69.0 ASPIRATION PNEUMONIA (HCC): Status: ACTIVE | Noted: 2023-12-22

## 2023-12-22 PROBLEM — M54.41 ACUTE RIGHT-SIDED LOW BACK PAIN WITH RIGHT-SIDED SCIATICA: Status: ACTIVE | Noted: 2023-12-22

## 2023-12-22 PROBLEM — M25.551 RIGHT HIP PAIN: Status: ACTIVE | Noted: 2023-12-22
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
## 2023-12-22 PROBLEM — Z96.649 PAIN DUE TO HIP JOINT PROSTHESIS, INITIAL ENCOUNTER (HCC): Status: ACTIVE | Noted: 2023-12-22

## 2023-12-22 PROBLEM — T84.84XA PAIN DUE TO HIP JOINT PROSTHESIS, INITIAL ENCOUNTER (HCC): Status: ACTIVE | Noted: 2023-12-22

## 2023-12-22 LAB
ANION GAP SERPL CALCULATED.3IONS-SCNC: 6 MMOL/L (ref 3–16)
BASE EXCESS BLDA CALC-SCNC: 5.4 MMOL/L (ref -3–3)
BASOPHILS # BLD: 0 K/UL (ref 0–0.2)
BASOPHILS NFR BLD: 0 %
BUN SERPL-MCNC: 81 MG/DL (ref 7–20)
CALCIUM SERPL-MCNC: 9 MG/DL (ref 8.3–10.6)
CHLORIDE SERPL-SCNC: 100 MMOL/L (ref 99–110)
CO2 BLDA-SCNC: 73 MMOL/L
CO2 SERPL-SCNC: 32 MMOL/L (ref 21–32)
COHGB MFR BLDA: 1.5 % (ref 0–1.5)
CREAT SERPL-MCNC: 1.9 MG/DL (ref 0.8–1.3)
DEPRECATED RDW RBC AUTO: 15 % (ref 12.4–15.4)
EOSINOPHIL # BLD: 0 K/UL (ref 0–0.6)
EOSINOPHIL NFR BLD: 0 %
FOLATE SERPL-MCNC: 13.75 NG/ML (ref 4.78–24.2)
GFR SERPLBLD CREATININE-BSD FMLA CKD-EPI: 36 ML/MIN/{1.73_M2}
GLUCOSE SERPL-MCNC: 292 MG/DL (ref 70–99)
HCO3 BLDA-SCNC: 31 MMOL/L (ref 21–29)
HCT VFR BLD AUTO: 24.7 % (ref 40.5–52.5)
HGB BLD-MCNC: 8 G/DL (ref 13.5–17.5)
HGB BLDA-MCNC: 8.6 G/DL (ref 13.5–17.5)
LYMPHOCYTES # BLD: 0.6 K/UL (ref 1–5.1)
LYMPHOCYTES NFR BLD: 5 %
MACROCYTES BLD QL SMEAR: ABNORMAL
MCH RBC QN AUTO: 33 PG (ref 26–34)
MCHC RBC AUTO-ENTMCNC: 32.4 G/DL (ref 31–36)
MCV RBC AUTO: 101.8 FL (ref 80–100)
METHGB MFR BLDA: ABNORMAL %
MONOCYTES # BLD: 1.1 K/UL (ref 0–1.3)
MONOCYTES NFR BLD: 9 %
NEUTROPHILS # BLD: 10.1 K/UL (ref 1.7–7.7)
NEUTROPHILS NFR BLD: 86 %
NEUTS VAC BLD QL SMEAR: PRESENT
O2 THERAPY: ABNORMAL
PCO2 BLDA: 50.4 MMHG (ref 35–45)
PH BLDA: 7.4 [PH] (ref 7.35–7.45)
PLATELET # BLD AUTO: 191 K/UL (ref 135–450)
PLATELET BLD QL SMEAR: ADEQUATE
PMV BLD AUTO: 7.6 FL (ref 5–10.5)
PO2 BLDA: 86.9 MMHG (ref 75–108)
POTASSIUM SERPL-SCNC: 4.5 MMOL/L (ref 3.5–5.1)
PROCALCITONIN SERPL IA-MCNC: 3.42 NG/ML (ref 0–0.15)
RBC # BLD AUTO: 2.43 M/UL (ref 4.2–5.9)
REPORT: NORMAL
REPORT: NORMAL
RESP PATH DNA+RNA PNL NPH NAA+NON-PROBE: NORMAL
SAO2 % BLDA: 97.5 %
SLIDE REVIEW: ABNORMAL
SODIUM SERPL-SCNC: 138 MMOL/L (ref 136–145)
VIT B12 SERPL-MCNC: 540 PG/ML (ref 211–911)
WBC # BLD AUTO: 11.8 K/UL (ref 4–11)

## 2023-12-22 PROCEDURE — 82803 BLOOD GASES ANY COMBINATION: CPT

## 2023-12-22 PROCEDURE — 2060000000 HC ICU INTERMEDIATE R&B

## 2023-12-22 PROCEDURE — 94640 AIRWAY INHALATION TREATMENT: CPT

## 2023-12-22 PROCEDURE — C8924 2D TTE W OR W/O FOL W/CON,FU: HCPCS

## 2023-12-22 PROCEDURE — 2700000000 HC OXYGEN THERAPY PER DAY

## 2023-12-22 PROCEDURE — 6360000002 HC RX W HCPCS: Performed by: STUDENT IN AN ORGANIZED HEALTH CARE EDUCATION/TRAINING PROGRAM

## 2023-12-22 PROCEDURE — 84145 PROCALCITONIN (PCT): CPT

## 2023-12-22 PROCEDURE — 6360000002 HC RX W HCPCS: Performed by: INTERNAL MEDICINE

## 2023-12-22 PROCEDURE — 6360000004 HC RX CONTRAST MEDICATION: Performed by: INTERNAL MEDICINE

## 2023-12-22 PROCEDURE — 6370000000 HC RX 637 (ALT 250 FOR IP): Performed by: INTERNAL MEDICINE

## 2023-12-22 PROCEDURE — 2580000003 HC RX 258: Performed by: STUDENT IN AN ORGANIZED HEALTH CARE EDUCATION/TRAINING PROGRAM

## 2023-12-22 PROCEDURE — 36600 WITHDRAWAL OF ARTERIAL BLOOD: CPT

## 2023-12-22 PROCEDURE — 94761 N-INVAS EAR/PLS OXIMETRY MLT: CPT

## 2023-12-22 PROCEDURE — 82746 ASSAY OF FOLIC ACID SERUM: CPT

## 2023-12-22 PROCEDURE — 36415 COLL VENOUS BLD VENIPUNCTURE: CPT

## 2023-12-22 PROCEDURE — 0202U NFCT DS 22 TRGT SARS-COV-2: CPT

## 2023-12-22 PROCEDURE — 2580000003 HC RX 258: Performed by: INTERNAL MEDICINE

## 2023-12-22 PROCEDURE — 85025 COMPLETE CBC W/AUTO DIFF WBC: CPT

## 2023-12-22 PROCEDURE — 80048 BASIC METABOLIC PNL TOTAL CA: CPT

## 2023-12-22 PROCEDURE — 82607 VITAMIN B-12: CPT

## 2023-12-22 PROCEDURE — 6370000000 HC RX 637 (ALT 250 FOR IP): Performed by: NURSE PRACTITIONER

## 2023-12-22 PROCEDURE — 99223 1ST HOSP IP/OBS HIGH 75: CPT | Performed by: INTERNAL MEDICINE

## 2023-12-22 PROCEDURE — 87040 BLOOD CULTURE FOR BACTERIA: CPT

## 2023-12-22 PROCEDURE — 99232 SBSQ HOSP IP/OBS MODERATE 35: CPT | Performed by: STUDENT IN AN ORGANIZED HEALTH CARE EDUCATION/TRAINING PROGRAM

## 2023-12-22 PROCEDURE — 76770 US EXAM ABDO BACK WALL COMP: CPT

## 2023-12-22 RX ORDER — PREDNISONE 20 MG/1
40 TABLET ORAL DAILY
Status: DISCONTINUED | OUTPATIENT
Start: 2023-12-23 | End: 2023-12-25

## 2023-12-22 RX ORDER — SIMETHICONE 80 MG
80 TABLET,CHEWABLE ORAL EVERY 6 HOURS PRN
Status: DISCONTINUED | OUTPATIENT
Start: 2023-12-22 | End: 2024-01-15 | Stop reason: HOSPADM

## 2023-12-22 RX ADMIN — SODIUM CHLORIDE 1500 MG: 900 INJECTION INTRAVENOUS at 16:01

## 2023-12-22 RX ADMIN — APIXABAN 5 MG: 5 TABLET, FILM COATED ORAL at 08:27

## 2023-12-22 RX ADMIN — PERFLUTREN 1.5 ML: 6.52 INJECTION, SUSPENSION INTRAVENOUS at 13:06

## 2023-12-22 RX ADMIN — AMOXICILLIN AND CLAVULANATE POTASSIUM 1 TABLET: 875; 125 TABLET, FILM COATED ORAL at 08:27

## 2023-12-22 RX ADMIN — SIMETHICONE 80 MG: 80 TABLET, CHEWABLE ORAL at 21:42

## 2023-12-22 RX ADMIN — SODIUM CHLORIDE: 9 INJECTION, SOLUTION INTRAVENOUS at 16:00

## 2023-12-22 RX ADMIN — IPRATROPIUM BROMIDE AND ALBUTEROL SULFATE 1 DOSE: 2.5; .5 SOLUTION RESPIRATORY (INHALATION) at 09:26

## 2023-12-22 RX ADMIN — IPRATROPIUM BROMIDE AND ALBUTEROL SULFATE 1 DOSE: 2.5; .5 SOLUTION RESPIRATORY (INHALATION) at 15:38

## 2023-12-22 RX ADMIN — BUDESONIDE INHALATION 500 MCG: 0.5 SUSPENSION RESPIRATORY (INHALATION) at 09:28

## 2023-12-22 RX ADMIN — THIAMINE HCL TAB 100 MG 100 MG: 100 TAB at 08:27

## 2023-12-22 RX ADMIN — ARFORMOTEROL TARTRATE 15 MCG: 15 SOLUTION RESPIRATORY (INHALATION) at 09:28

## 2023-12-22 RX ADMIN — WATER 40 MG: 1 INJECTION INTRAMUSCULAR; INTRAVENOUS; SUBCUTANEOUS at 11:25

## 2023-12-22 RX ADMIN — SODIUM CHLORIDE: 9 INJECTION, SOLUTION INTRAVENOUS at 11:31

## 2023-12-22 RX ADMIN — BUDESONIDE INHALATION 500 MCG: 0.5 SUSPENSION RESPIRATORY (INHALATION) at 21:25

## 2023-12-22 RX ADMIN — VANCOMYCIN HYDROCHLORIDE 2000 MG: 10 INJECTION, POWDER, LYOPHILIZED, FOR SOLUTION INTRAVENOUS at 11:32

## 2023-12-22 RX ADMIN — SODIUM CHLORIDE 1500 MG: 900 INJECTION INTRAVENOUS at 21:12

## 2023-12-22 RX ADMIN — AZITHROMYCIN DIHYDRATE 500 MG: 250 TABLET ORAL at 08:26

## 2023-12-22 RX ADMIN — OXYCODONE HYDROCHLORIDE AND ACETAMINOPHEN 500 MG: 500 TABLET ORAL at 11:25

## 2023-12-22 RX ADMIN — ASPIRIN 81 MG: 81 TABLET, CHEWABLE ORAL at 08:27

## 2023-12-22 RX ADMIN — APIXABAN 5 MG: 5 TABLET, FILM COATED ORAL at 21:05

## 2023-12-22 RX ADMIN — ARFORMOTEROL TARTRATE 15 MCG: 15 SOLUTION RESPIRATORY (INHALATION) at 21:25

## 2023-12-22 RX ADMIN — Medication 10 ML: at 21:06

## 2023-12-22 RX ADMIN — Medication 10 ML: at 11:25

## 2023-12-22 RX ADMIN — IPRATROPIUM BROMIDE AND ALBUTEROL SULFATE 1 DOSE: 2.5; .5 SOLUTION RESPIRATORY (INHALATION) at 21:26

## 2023-12-22 RX ADMIN — ALLOPURINOL 200 MG: 100 TABLET ORAL at 08:27

## 2023-12-22 ASSESSMENT — PAIN SCALES - GENERAL
PAINLEVEL_OUTOF10: 0

## 2023-12-22 NOTE — PROGRESS NOTES
Called and left voicemail for daughter Makayla, no return phone call.       Thank you for allowing [facility] to be a part of the Maile family's care.    KIAH Shah RN  [facility] Liaison  [email or web address]  Cell: [phone]  Main: [phone]

## 2023-12-22 NOTE — CONSULTS
All allergies were reviewed and updated    No Known Allergies    Social history:     Social History:  All social andepidemiologic history was reviewed and updated by me today as needed.     Tobacco use:   reports that he quit smoking about 11 years ago. His smoking use included cigarettes. He started smoking about 61 years ago. He has a 50.0 pack-year smoking history. He has never used smokeless tobacco.  Alcohol use:   reports current alcohol use of about 14.0 standard drinks of alcohol per week.  Currently lives in: [place] [address]   reports no history of drug use.     COVID VACCINATION AND LAB RESULT RECORDS:     Internal Administration   First Dose COVID-19, PFIZER PURPLE top, DILUTE for use, (age 12 y+), 30mcg/0.3mL  03/05/2021   Second Dose COVID-19, PFIZER PURPLE top, DILUTE for use, (age 12 y+), 30mcg/0.3mL   04/02/2021       Last COVID Lab SARS-CoV-2 RNA, RT PCR (no units)   Date Value   11/27/2023 NOT DETECTED     SARS-CoV-2, NAAT (no units)   Date Value   02/13/2023 Not Detected     POC Glucose (mg/dl)   Date Value   12/21/2023 220 (H)            Assessment:     The patient is a 78 y.o. old male who  has a past medical history of Anemia, COPD (chronic obstructive pulmonary disease) ([facility]), Diabetes mellitus ([facility]), Edema, GI (gastrointestinal bleed), Gout, Hypertension, Morbid obesity ([facility]), Neuropathy, Obstructive sleep apnea, and Peripheral vascular disease ([facility]). with following problems:    Sepsis on admission with tachycardia, tachypnea, altered mental status  Methicillin-susceptible Staphylococcus aureus bacteremia  Acute respiratory failure with hypoxia, requiring 6 L oxygen-nasal cannula  Concern for aspiration pneumonia  Acute kidney injury  Bilateral leg prosthesis  Lumbar scoliosis  Fall at home  Left temporal lobe encephalomalacia on MRI of the brain done 11/29/2023  COPD  Essential hypertension  Type 2 diabetes mellitus  Mixed hyperlipidemia  Peripheral vascular disease  Obesity Class 3  aorta ([facility]) I71.40    Personal history of tobacco use, presenting hazards to health Z87.891    Encephalomalacia G93.89    Altered mental status R41.82    Cerebrovascular accident (CVA) due to thrombosis of left middle cerebral artery ([facility]) I63.312    Acute respiratory failure with hypercapnia ([facility]) J96.02    Cerebrovascular accident (CVA) ([facility]) I63.9    Chronic heart failure with preserved ejection fraction ([facility]) I50.32    Ventricular ectopy I49.3    Leg swelling M79.89    TGA (transient global amnesia) G45.4    Metabolic encephalopathy G93.41    PFO (patent foramen ovale) Q21.12    Peripheral edema R60.9    Tachycardia R00.0    Encounter for electronic analysis of reveal event recorder Z45.09    Pulmonary nodule R91.1    PVD (peripheral vascular disease) ([facility]) I73.9    Sepsis ([facility]) A41.9    NSTEMI (non-ST elevated myocardial infarction) ([facility]) I21.4    Acute on chronic respiratory failure with hypercapnia ([facility]) J96.22    Coronary artery disease due to calcified coronary lesion I25.10, I25.84    Coronary artery calcification seen on CT scan I25.10    Pneumonia of both lower lobes due to infectious organism J18.9    Acute on chronic heart failure with preserved ejection fraction ([facility]) I50.33    Chronic obstructive pulmonary disease ([facility]) J44.9    ABRAHAM (acute kidney injury) ([facility]) N17.9    Fall W19.XXXA    Acute right-sided low back pain with right-sided sciatica M54.41    Right hip pain M25.551    Fall at home W19.XXXA, Y92.009    Scoliosis of lumbar spine M41.9    Pain due to hip joint prosthesis, initial encounter ([facility]) T84.84XA, Z96.649    Aspiration pneumonia ([facility]) J69.0    Bacteremia due to methicillin susceptible Staphylococcus aureus (MSSA) R78.81, B95.61         Please note that this chart was generated using Dragon dictation software. Although every effort was made to ensure the accuracy of this automated transcription, some errors in transcription may have occurred inadvertently. If you may need any

## 2023-12-22 NOTE — PROGRESS NOTES
[facility]  Consultation Note    Reason for Consult: ABRAHAM  Requesting Physician:  Dr. ROSALINA Farias    CHIEF COMPLAINT: Fall    History obtained from records and patient.    HISTORY OF PRESENT ILLNESS:                Ishmael Gr  is 78 y.o. y.o. male with significant past medical history of anemia, COPD, diabetes mellitus type 2, gout, hypertension, obesity, obstructive sleep apnea, peripheral vascular disease who presents with fall.  His baseline oxygen need is at 6L.  He is currently admitted for ABRAHAM possible COPD exacerbation.  I am asked to see the patient because his creatinine has increased to 1.7 yesterday and 1.8 today.  On 12/16/2023 creatinine was 1.1.  Lowest systolic blood pressure in the 100s range.  He is on furosemide 80 mg twice daily as an outpatient, celecoxib and ibuprofen 600 mg every 4 hours for the past few days.  Denies any vomiting or diarrhea.  No new skin rash.  No change in urine output.  CT PE done 12/16/23.  Lowest SBP in the 100s range.  -500 mL since admission.    Past Medical History:     has a past medical history of Anemia, COPD (chronic obstructive pulmonary disease) (HCC), Diabetes mellitus (HCC), Edema, GI (gastrointestinal bleed), Gout, Hypertension, Morbid obesity (HCC), Neuropathy, Obstructive sleep apnea, and Peripheral vascular disease (HCC).   Past Surgical History:     has a past surgical history that includes Appendectomy; Total hip arthroplasty; and Colonoscopy (N/A, 5/7/2019).   Current Medications:    Current Facility-Administered Medications: perflutren lipid microspheres (DEFINITY) injection 1.5 mL, 1.5 mL, IntraVENous, ONCE PRN  vancomycin (VANCOCIN) 2,000 mg in sodium chloride 0.9 % 500 mL IVPB, 2,000 mg, IntraVENous, Once  methylPREDNISolone sodium succ (SOLU-MEDROL) 40 mg in sterile water 1 mL injection, 40 mg, IntraVENous, Q12H  arformoterol tartrate (BROVANA) nebulizer solution 15 mcg, 15 mcg, Nebulization, BID RT  budesonide

## 2023-12-22 NOTE — PROGRESS NOTES
Day RN to call DIT for PIV replacement per Sunshine HEWITT. Continuous fluids not running provider made aware.

## 2023-12-22 NOTE — DISCHARGE INSTR - COC
Continuity of Care Form    Patient Name: Ishmael Gr   :  1945  MRN:  [id number]    Admit date:  2023  Discharge date:  1/15/24    Code Status Order: DNR-CC   Advance Directives:     Admitting Physician:  Davey Farias MD  PCP: Anthony Bravo MD    Discharging Nurse: Delmy Lima  Discharging Hospital Unit/Room#: L7X-3988/5903-01  Discharging Unit Phone Number: [phone]    Emergency Contact:   Extended Emergency Contact Information  Primary Emergency Contact: Makayla Urban  Address: [address]           [address]  Home Phone: [phone]  Relation: Child  Secondary Emergency Contact: Makayla Urban  Address: [address]            [address]  Home Phone: [phone]  Relation: Child    Past Surgical History:  Past Surgical History:   Procedure Laterality Date    APPENDECTOMY      COLONOSCOPY N/A 2019    COLONOSCOPY DIAGNOSTIC performed by Anthony Yoon MD at [facility] END[facility]COPY    TOTAL HIP ARTHROPLASTY      bilateral       Immunization History:   Immunization History   Administered Date(s) Administered    COVID-19, PFIZER PURPLE top, DILUTE for use, (age 12 y+), 30mcg/0.3mL 2021, 2021, 10/06/2021    DTaP 10/31/2014    Influenza Vaccine, unspecified formulation 2016    Influenza Virus Vaccine 2016    Influenza, FLUAD, (age 65 y+), Adjuvanted, 0.5mL 10/03/2022, 10/12/2023    Influenza, FLUZONE (age 65 y+), High Dose, 0.7mL 10/21/2020, 10/06/2021    Influenza, High Dose (Fluzone 65 yrs and older) 10/04/2017, 10/04/2019    Pneumococcal, PCV-13, PREVNAR 13, (age 6w+), IM, 0.5mL 2015    Pneumococcal, PPSV23, PNEUMOVAX 23, (age 2y+), SC/IM, 0.5mL 2018    Zoster Live (Zostavax) 2016    Zoster Recombinant (Shingrix) 2022, 2022       Active Problems:  Patient Active Problem List   Diagnosis Code    Essential hypertension I10    Syncope R55    Diabetes

## 2023-12-22 NOTE — CARE COORDINATION
Discharge Planning.     The SW received a call from Stella the admissions coordinator at [facility] [phone] who stated they can accept the pt but unsure when a bed will become available.    The SW informed Stella that the pt and daughter are meeting with hospice today and will follow up after.      Electronically signed by FRANCA Nguyễn on 12/22/2023 at 11:55 AM

## 2023-12-22 NOTE — PROGRESS NOTES
Clinical Pharmacy Note: Positive Blood Culture Documentation    Pharmacy was notified by lab that Ishmael Gr has positive blood cultures.    Patient is positive for Staphylococcus aureus in one out of two sets.  Resistance markers present: mec A/C and MREJ (indicates MRSA)    Name of lab caller: Vesna  Name of receiving pharmacist: Bobbi  Time: 0859    Patient's current antimicrobial regimen of Augmentin does not provide strong empiric coverage.      Dr. Farias was notified of the positive result at 0916.    New antimicrobials initiated after physician discussion: vancomycin     Thank you,  Ella aSmaniego, PharmD, [facility]  [id number]

## 2023-12-22 NOTE — CARE COORDINATION
The SW spoke with the [facility] Nurse Elsi Tracey [phone] who stated is trying to reach out to the pt's daughter to discuss Hospice. The SW will continue to follow.     Electronically signed by FRANCA Nguyễn on 12/22/2023 at 2:10 PM

## 2023-12-22 NOTE — PROGRESS NOTES
[facility]    12/22/2023 10:21 AM        Name: Ishmael Gr .              Admitted: 12/20/2023  Primary Care Provider: Anthony Bravo MD (Tel: [phone])    =      Subjective:    Sob about the same no n/v or chest pain    Reviewed interval ancillary notes    Current Medications  perflutren lipid microspheres (DEFINITY) injection 1.5 mL, ONCE PRN  vancomycin (VANCOCIN) 2,000 mg in sodium chloride 0.9 % 500 mL IVPB, Once  methylPREDNISolone sodium succ (SOLU-MEDROL) 40 mg in sterile water 1 mL injection, Q12H  arformoterol tartrate (BROVANA) nebulizer solution 15 mcg, BID RT  budesonide (PULMICORT) nebulizer suspension 500 mcg, BID RT  ascorbic acid (VITAMIN C) tablet 500 mg, Daily  albuterol sulfate HFA (PROVENTIL;VENTOLIN;PROAIR) 108 (90 Base) MCG/ACT inhaler 2 puff, Q6H PRN  allopurinol (ZYLOPRIM) tablet 200 mg, Daily  apixaban (ELIQUIS) tablet 5 mg, BID  aspirin chewable tablet 81 mg, Daily  azithromycin (ZITHROMAX) tablet 500 mg, Daily  ipratropium 0.5 mg-albuterol 2.5 mg (DUONEB) nebulizer solution 1 Dose, 4x Daily RT  thiamine mononitrate tablet 100 mg, Daily  sodium chloride flush 0.9 % injection 5-40 mL, 2 times per day  sodium chloride flush 0.9 % injection 5-40 mL, PRN  0.9 % sodium chloride infusion, PRN  polyethylene glycol (GLYCOLAX) packet 17 g, Daily PRN  acetaminophen (TYLENOL) tablet 650 mg, Q6H PRN   Or  acetaminophen (TYLENOL) suppository 650 mg, Q6H PRN  tiotropium (SPIRIVA RESPIMAT) 2.5 MCG/ACT inhaler 2 puff, Daily RT        Objective:  /66   Pulse 91   Temp 97.6 °F (36.4 °C)   Resp 16   Ht 1.829 m (6' 0.01\")   Wt (!) 157.3 kg (346 lb 12.8 oz) Comment: wt includes overlay mattress  SpO2 98%   BMI 47.02 kg/m²     Intake/Output Summary (Last 24 hours) at 12/22/2023 1021  Last data filed at 12/21/2023 2030  Gross per 24 hour   Intake 240 ml   Output --   Net 240 ml        Wt

## 2023-12-22 NOTE — PROGRESS NOTES
Clinical Pharmacy Note: Pharmacy to Dose Vancomycin    Ishmael Gr is a 78 y.o. male started on Vancomycin for MRSA bacteremia; consult received from Dr. Farias to manage therapy. Also receiving the following antibiotics: azith.    Goal AUC: 400-600 mg/L*hr  Goal Trough Level: 15-20 mcg/mL    Assessment/Plan:  A 2000 mg loading dose was given on 12/22/2023 at 1132  Initiate vancomycin 1000 mg IV every 24 hours. Bayesian modeling predicts an AUC of 514 mg/L*hr and a trough of 14.7 mcg/mL at steady state concentration.  A vancomycin random level has been ordered for 12/23/2023 at 0600  Changes in regimen will be determined based on culture results, renal function, and clinical response.  Pharmacy will continue to monitor and adjust regimen as necessary.    Allergies:  Patient has no known allergies.     Recent Labs     12/21/23  0535 12/22/23  0557   CREATININE 1.8* 1.9*       Recent Labs     12/21/23  0535 12/22/23  0557   WBC 10.2 11.8*       Ht Readings from Last 1 Encounters:   12/20/23 1.829 m (6' 0.01\")        Wt Readings from Last 1 Encounters:   12/21/23 (!) 157.3 kg (346 lb 12.8 oz)         Estimated Creatinine Clearance: 51 mL/min (A) (based on SCr of 1.9 mg/dL (H)).      Thank you for the consult,    Ella Samaniego, PharmD, [facility]  [id number]

## 2023-12-22 NOTE — PROGRESS NOTES
Pulmonary and Critical Care Medicine    CC:  SOB  Date: 2023  Admit Date:  2023  Reason for Consultation: Hypoxic respiratory failure  Consult Requesting Physician: Davey Farias MD     HPI     This is a 77 y/o M w/ a hx of COPD, chronic hypoxic respiratory failure 6L, DM, STEF, pulmonary nodule who follows my partner Dr. Tillman who presented to [facility] on 23 with fall.     Overnight:   Today ABG much improved he is back to baseline 02  Oriented x 3 feels much better     ROS: negative except as stated above     OBJECTIVE DATA       PHYSICAL EXAM:   Temp  Av.9 °F (36.6 °C)  Min: 97.3 °F (36.3 °C)  Max: 98.4 °F (36.9 °C)  Pulse  Av.6  Min: 91  Max: 109  BP  Min: 108/50  Max: 134/70  SpO2  Av.5 %  Min: 90 %  Max: 100 %    General: NAD  Neuro: A0x3  Lung: scant expiratory wheezing, equal   Heart: regular rate     MEDICATIONS: Reviewed  Scheduled Meds:   ampicillin-sulbactam  1,500 mg IntraVENous Q6H    methylPREDNISolone  40 mg IntraVENous Q12H    arformoterol tartrate  15 mcg Nebulization BID RT    budesonide  0.5 mg Nebulization BID RT    ascorbic acid  500 mg Oral Daily    allopurinol  200 mg Oral Daily    apixaban  5 mg Oral BID    aspirin  81 mg Oral Daily    azithromycin  500 mg Oral Daily    ipratropium 0.5 mg-albuterol 2.5 mg  1 Dose Inhalation 4x Daily RT    vitamin B-1  100 mg Oral Daily    sodium chloride flush  5-40 mL IntraVENous 2 times per day    tiotropium  2 puff Inhalation Daily RT     Continuous Infusions:   sodium chloride 100 mL/hr at 23 1131     PRN Meds:.perflutren lipid microspheres, albuterol sulfate HFA, sodium chloride flush, sodium chloride, polyethylene glycol, acetaminophen **OR** acetaminophen     LABS: Reviewed.   Recent Labs     23  0522 23  0535 23  0557    142 138   K 4.6 4.4 4.5    101 100   CO2 34* 32 32   BUN 79* 86* 81*   CREATININE 1.7* 1.8* 1.9*     Recent Labs     23  2241 23  0535

## 2023-12-23 LAB
ANION GAP SERPL CALCULATED.3IONS-SCNC: 5 MMOL/L (ref 3–16)
BASOPHILS # BLD: 0 K/UL (ref 0–0.2)
BASOPHILS NFR BLD: 0 %
BUN SERPL-MCNC: 79 MG/DL (ref 7–20)
CALCIUM SERPL-MCNC: 8.7 MG/DL (ref 8.3–10.6)
CHLORIDE SERPL-SCNC: 98 MMOL/L (ref 99–110)
CO2 SERPL-SCNC: 32 MMOL/L (ref 21–32)
CREAT SERPL-MCNC: 1.5 MG/DL (ref 0.8–1.3)
DEPRECATED RDW RBC AUTO: 14.7 % (ref 12.4–15.4)
EOSINOPHIL # BLD: 0 K/UL (ref 0–0.6)
EOSINOPHIL NFR BLD: 0 %
GFR SERPLBLD CREATININE-BSD FMLA CKD-EPI: 48 ML/MIN/{1.73_M2}
GLUCOSE BLD-MCNC: 399 MG/DL (ref 70–99)
GLUCOSE BLD-MCNC: 426 MG/DL (ref 70–99)
GLUCOSE BLD-MCNC: 428 MG/DL (ref 70–99)
GLUCOSE BLD-MCNC: 435 MG/DL (ref 70–99)
GLUCOSE BLD-MCNC: 460 MG/DL (ref 70–99)
GLUCOSE BLD-MCNC: 492 MG/DL (ref 70–99)
GLUCOSE SERPL-MCNC: 407 MG/DL (ref 70–99)
HCT VFR BLD AUTO: 24.7 % (ref 40.5–52.5)
HGB BLD-MCNC: 8 G/DL (ref 13.5–17.5)
LYMPHOCYTES # BLD: 0.6 K/UL (ref 1–5.1)
LYMPHOCYTES NFR BLD: 7 %
MACROCYTES BLD QL SMEAR: ABNORMAL
MCH RBC QN AUTO: 33.2 PG (ref 26–34)
MCHC RBC AUTO-ENTMCNC: 32.6 G/DL (ref 31–36)
MCV RBC AUTO: 101.8 FL (ref 80–100)
MONOCYTES # BLD: 0.1 K/UL (ref 0–1.3)
MONOCYTES NFR BLD: 1 %
MYELOCYTES NFR BLD MANUAL: 2 %
NEUTROPHILS # BLD: 7.9 K/UL (ref 1.7–7.7)
NEUTROPHILS NFR BLD: 90 %
PERFORMED ON: ABNORMAL
PLATELET # BLD AUTO: 217 K/UL (ref 135–450)
PLATELET BLD QL SMEAR: ADEQUATE
PMV BLD AUTO: 7.8 FL (ref 5–10.5)
POLYCHROMASIA BLD QL SMEAR: ABNORMAL
POTASSIUM SERPL-SCNC: 5.3 MMOL/L (ref 3.5–5.1)
RBC # BLD AUTO: 2.42 M/UL (ref 4.2–5.9)
SLIDE REVIEW: ABNORMAL
SODIUM SERPL-SCNC: 135 MMOL/L (ref 136–145)
WBC # BLD AUTO: 8.6 K/UL (ref 4–11)

## 2023-12-23 PROCEDURE — 36415 COLL VENOUS BLD VENIPUNCTURE: CPT

## 2023-12-23 PROCEDURE — 85025 COMPLETE CBC W/AUTO DIFF WBC: CPT

## 2023-12-23 PROCEDURE — 6360000002 HC RX W HCPCS: Performed by: INTERNAL MEDICINE

## 2023-12-23 PROCEDURE — 2700000000 HC OXYGEN THERAPY PER DAY

## 2023-12-23 PROCEDURE — 2580000003 HC RX 258: Performed by: INTERNAL MEDICINE

## 2023-12-23 PROCEDURE — 83036 HEMOGLOBIN GLYCOSYLATED A1C: CPT

## 2023-12-23 PROCEDURE — 6370000000 HC RX 637 (ALT 250 FOR IP): Performed by: INTERNAL MEDICINE

## 2023-12-23 PROCEDURE — 94640 AIRWAY INHALATION TREATMENT: CPT

## 2023-12-23 PROCEDURE — 6370000000 HC RX 637 (ALT 250 FOR IP): Performed by: NURSE PRACTITIONER

## 2023-12-23 PROCEDURE — 6360000002 HC RX W HCPCS: Performed by: STUDENT IN AN ORGANIZED HEALTH CARE EDUCATION/TRAINING PROGRAM

## 2023-12-23 PROCEDURE — 6370000000 HC RX 637 (ALT 250 FOR IP): Performed by: STUDENT IN AN ORGANIZED HEALTH CARE EDUCATION/TRAINING PROGRAM

## 2023-12-23 PROCEDURE — 2060000000 HC ICU INTERMEDIATE R&B

## 2023-12-23 PROCEDURE — 80048 BASIC METABOLIC PNL TOTAL CA: CPT

## 2023-12-23 PROCEDURE — 94761 N-INVAS EAR/PLS OXIMETRY MLT: CPT

## 2023-12-23 PROCEDURE — 2580000003 HC RX 258: Performed by: STUDENT IN AN ORGANIZED HEALTH CARE EDUCATION/TRAINING PROGRAM

## 2023-12-23 RX ORDER — INSULIN LISPRO 100 [IU]/ML
0-8 INJECTION, SOLUTION INTRAVENOUS; SUBCUTANEOUS
Status: DISCONTINUED | OUTPATIENT
Start: 2023-12-23 | End: 2023-12-23

## 2023-12-23 RX ORDER — GLUCAGON 1 MG/ML
1 KIT INJECTION PRN
Status: DISCONTINUED | OUTPATIENT
Start: 2023-12-23 | End: 2024-01-15 | Stop reason: HOSPADM

## 2023-12-23 RX ORDER — DEXTROSE MONOHYDRATE 100 MG/ML
INJECTION, SOLUTION INTRAVENOUS CONTINUOUS PRN
Status: DISCONTINUED | OUTPATIENT
Start: 2023-12-23 | End: 2024-01-15 | Stop reason: HOSPADM

## 2023-12-23 RX ORDER — INSULIN LISPRO 100 [IU]/ML
0-4 INJECTION, SOLUTION INTRAVENOUS; SUBCUTANEOUS NIGHTLY
Status: DISCONTINUED | OUTPATIENT
Start: 2023-12-23 | End: 2024-01-15 | Stop reason: HOSPADM

## 2023-12-23 RX ORDER — INSULIN GLARGINE 100 [IU]/ML
15 INJECTION, SOLUTION SUBCUTANEOUS ONCE
Status: COMPLETED | OUTPATIENT
Start: 2023-12-23 | End: 2023-12-23

## 2023-12-23 RX ORDER — INSULIN LISPRO 100 [IU]/ML
6 INJECTION, SOLUTION INTRAVENOUS; SUBCUTANEOUS ONCE
Status: COMPLETED | OUTPATIENT
Start: 2023-12-23 | End: 2023-12-23

## 2023-12-23 RX ORDER — INSULIN LISPRO 100 [IU]/ML
0-8 INJECTION, SOLUTION INTRAVENOUS; SUBCUTANEOUS
Status: DISCONTINUED | OUTPATIENT
Start: 2023-12-23 | End: 2024-01-15 | Stop reason: HOSPADM

## 2023-12-23 RX ADMIN — ARFORMOTEROL TARTRATE 15 MCG: 15 SOLUTION RESPIRATORY (INHALATION) at 07:53

## 2023-12-23 RX ADMIN — SODIUM CHLORIDE 1500 MG: 900 INJECTION INTRAVENOUS at 23:02

## 2023-12-23 RX ADMIN — ALLOPURINOL 200 MG: 100 TABLET ORAL at 08:23

## 2023-12-23 RX ADMIN — ASPIRIN 81 MG: 81 TABLET, CHEWABLE ORAL at 08:23

## 2023-12-23 RX ADMIN — BUDESONIDE INHALATION 500 MCG: 0.5 SUSPENSION RESPIRATORY (INHALATION) at 07:53

## 2023-12-23 RX ADMIN — INSULIN LISPRO 8 UNITS: 100 INJECTION, SOLUTION INTRAVENOUS; SUBCUTANEOUS at 10:00

## 2023-12-23 RX ADMIN — INSULIN LISPRO 6 UNITS: 100 INJECTION, SOLUTION INTRAVENOUS; SUBCUTANEOUS at 20:41

## 2023-12-23 RX ADMIN — AZITHROMYCIN DIHYDRATE 500 MG: 250 TABLET ORAL at 08:22

## 2023-12-23 RX ADMIN — IPRATROPIUM BROMIDE AND ALBUTEROL SULFATE 1 DOSE: 2.5; .5 SOLUTION RESPIRATORY (INHALATION) at 11:40

## 2023-12-23 RX ADMIN — ARFORMOTEROL TARTRATE 15 MCG: 15 SOLUTION RESPIRATORY (INHALATION) at 21:18

## 2023-12-23 RX ADMIN — SODIUM CHLORIDE 25 ML: 9 INJECTION, SOLUTION INTRAVENOUS at 23:01

## 2023-12-23 RX ADMIN — INSULIN LISPRO 4 UNITS: 100 INJECTION, SOLUTION INTRAVENOUS; SUBCUTANEOUS at 20:40

## 2023-12-23 RX ADMIN — SODIUM CHLORIDE 1500 MG: 900 INJECTION INTRAVENOUS at 16:24

## 2023-12-23 RX ADMIN — BUDESONIDE INHALATION 500 MCG: 0.5 SUSPENSION RESPIRATORY (INHALATION) at 21:18

## 2023-12-23 RX ADMIN — IPRATROPIUM BROMIDE AND ALBUTEROL SULFATE 1 DOSE: 2.5; .5 SOLUTION RESPIRATORY (INHALATION) at 15:35

## 2023-12-23 RX ADMIN — INSULIN LISPRO 8 UNITS: 100 INJECTION, SOLUTION INTRAVENOUS; SUBCUTANEOUS at 17:30

## 2023-12-23 RX ADMIN — WATER 40 MG: 1 INJECTION INTRAMUSCULAR; INTRAVENOUS; SUBCUTANEOUS at 00:07

## 2023-12-23 RX ADMIN — OXYCODONE HYDROCHLORIDE AND ACETAMINOPHEN 500 MG: 500 TABLET ORAL at 08:22

## 2023-12-23 RX ADMIN — SIMETHICONE 80 MG: 80 TABLET, CHEWABLE ORAL at 17:33

## 2023-12-23 RX ADMIN — APIXABAN 5 MG: 5 TABLET, FILM COATED ORAL at 08:22

## 2023-12-23 RX ADMIN — SODIUM CHLORIDE 1500 MG: 900 INJECTION INTRAVENOUS at 04:31

## 2023-12-23 RX ADMIN — IPRATROPIUM BROMIDE AND ALBUTEROL SULFATE 1 DOSE: 2.5; .5 SOLUTION RESPIRATORY (INHALATION) at 21:18

## 2023-12-23 RX ADMIN — PREDNISONE 40 MG: 20 TABLET ORAL at 08:22

## 2023-12-23 RX ADMIN — INSULIN GLARGINE 15 UNITS: 100 INJECTION, SOLUTION SUBCUTANEOUS at 17:29

## 2023-12-23 RX ADMIN — Medication 10 ML: at 20:17

## 2023-12-23 RX ADMIN — TIOTROPIUM BROMIDE INHALATION SPRAY 2 PUFF: 3.12 SPRAY, METERED RESPIRATORY (INHALATION) at 07:55

## 2023-12-23 RX ADMIN — APIXABAN 5 MG: 5 TABLET, FILM COATED ORAL at 20:17

## 2023-12-23 RX ADMIN — Medication 10 ML: at 08:25

## 2023-12-23 RX ADMIN — IPRATROPIUM BROMIDE AND ALBUTEROL SULFATE 1 DOSE: 2.5; .5 SOLUTION RESPIRATORY (INHALATION) at 07:47

## 2023-12-23 RX ADMIN — THIAMINE HCL TAB 100 MG 100 MG: 100 TAB at 08:22

## 2023-12-23 RX ADMIN — SODIUM CHLORIDE 1500 MG: 900 INJECTION INTRAVENOUS at 10:18

## 2023-12-23 RX ADMIN — SIMETHICONE 80 MG: 80 TABLET, CHEWABLE ORAL at 04:20

## 2023-12-23 NOTE — PROGRESS NOTES
[facility]    12/23/2023 11:11 AM        Name: Ishmael Gr .              Admitted: 12/20/2023  Primary Care Provider: Anthony Bravo MD (Tel: [phone])    =      Subjective:    Shortness of breath improving no nausea vomiting or chest pain    Reviewed interval ancillary notes    Current Medications  insulin lispro (HUMALOG) injection vial 0-4 Units, Nightly  dextrose bolus 10% 125 mL, PRN   Or  dextrose bolus 10% 250 mL, PRN  glucagon injection 1 mg, PRN  dextrose 10 % infusion, Continuous PRN  insulin lispro (HUMALOG) injection vial 0-8 Units, TID WC  ampicillin-sulbactam (UNASYN) 1,500 mg in sodium chloride 0.9 % 50 mL IVPB (mini-bag), Q6H  predniSONE (DELTASONE) tablet 40 mg, Daily  simethicone (MYLICON) chewable tablet 80 mg, Q6H PRN  arformoterol tartrate (BROVANA) nebulizer solution 15 mcg, BID RT  budesonide (PULMICORT) nebulizer suspension 500 mcg, BID RT  albuterol sulfate HFA (PROVENTIL;VENTOLIN;PROAIR) 108 (90 Base) MCG/ACT inhaler 2 puff, Q6H PRN  allopurinol (ZYLOPRIM) tablet 200 mg, Daily  apixaban (ELIQUIS) tablet 5 mg, BID  aspirin chewable tablet 81 mg, Daily  azithromycin (ZITHROMAX) tablet 500 mg, Daily  ipratropium 0.5 mg-albuterol 2.5 mg (DUONEB) nebulizer solution 1 Dose, 4x Daily RT  thiamine mononitrate tablet 100 mg, Daily  sodium chloride flush 0.9 % injection 5-40 mL, 2 times per day  sodium chloride flush 0.9 % injection 5-40 mL, PRN  0.9 % sodium chloride infusion, PRN  polyethylene glycol (GLYCOLAX) packet 17 g, Daily PRN  acetaminophen (TYLENOL) tablet 650 mg, Q6H PRN   Or  acetaminophen (TYLENOL) suppository 650 mg, Q6H PRN  tiotropium (SPIRIVA RESPIMAT) 2.5 MCG/ACT inhaler 2 puff, Daily RT        Objective:  /68   Pulse 82   Temp 97.5 °F (36.4 °C) (Temporal)   Resp 16   Ht 1.829 m (6' 0.01\")   Wt (!) 157.3 kg (346 lb 12.8 oz) Comment: wt includes overlay

## 2023-12-23 NOTE — PROGRESS NOTES
[facility]    Spoke with pt's daughter Makayla regarding hospice care. Makayla states she would like pt to go to a [facility] for skilled therapy once bed available ,and pt medical ready for discharge. Makayla states after skilled therapy is completed she would then consider home hospice care. Update provided to VERONICA Melgar.    Echo Griffith RN  [phone]

## 2023-12-24 LAB
ANION GAP SERPL CALCULATED.3IONS-SCNC: 6 MMOL/L (ref 3–16)
ANISOCYTOSIS BLD QL SMEAR: ABNORMAL
BACTERIA BLD CULT ORG #2: NORMAL
BACTERIA BLD CULT: ABNORMAL
BACTERIA BLD CULT: ABNORMAL
BASOPHILS # BLD: 0 K/UL (ref 0–0.2)
BASOPHILS NFR BLD: 0 %
BUN SERPL-MCNC: 82 MG/DL (ref 7–20)
CALCIUM SERPL-MCNC: 8.6 MG/DL (ref 8.3–10.6)
CHLORIDE SERPL-SCNC: 99 MMOL/L (ref 99–110)
CO2 SERPL-SCNC: 30 MMOL/L (ref 21–32)
CREAT SERPL-MCNC: 1.5 MG/DL (ref 0.8–1.3)
DEPRECATED RDW RBC AUTO: 14.7 % (ref 12.4–15.4)
EOSINOPHIL # BLD: 0 K/UL (ref 0–0.6)
EOSINOPHIL NFR BLD: 0 %
EST. AVERAGE GLUCOSE BLD GHB EST-MCNC: 131.2 MG/DL
GFR SERPLBLD CREATININE-BSD FMLA CKD-EPI: 48 ML/MIN/{1.73_M2}
GLUCOSE BLD-MCNC: 180 MG/DL (ref 70–99)
GLUCOSE BLD-MCNC: 299 MG/DL (ref 70–99)
GLUCOSE BLD-MCNC: 303 MG/DL (ref 70–99)
GLUCOSE BLD-MCNC: 391 MG/DL (ref 70–99)
GLUCOSE BLD-MCNC: 453 MG/DL (ref 70–99)
GLUCOSE BLD-MCNC: 464 MG/DL (ref 70–99)
GLUCOSE SERPL-MCNC: 386 MG/DL (ref 70–99)
HBA1C MFR BLD: 6.2 %
HCT VFR BLD AUTO: 22.3 % (ref 40.5–52.5)
HCT VFR BLD AUTO: 24.4 % (ref 40.5–52.5)
HGB BLD-MCNC: 7.5 G/DL (ref 13.5–17.5)
HGB BLD-MCNC: 7.8 G/DL (ref 13.5–17.5)
IRON SATN MFR SERPL: 30 % (ref 20–50)
IRON SERPL-MCNC: 68 UG/DL (ref 59–158)
LYMPHOCYTES # BLD: 0.3 K/UL (ref 1–5.1)
LYMPHOCYTES NFR BLD: 3 %
MCH RBC QN AUTO: 34 PG (ref 26–34)
MCHC RBC AUTO-ENTMCNC: 33.8 G/DL (ref 31–36)
MCV RBC AUTO: 100.5 FL (ref 80–100)
METAMYELOCYTES NFR BLD MANUAL: 3 %
MONOCYTES # BLD: 0.2 K/UL (ref 0–1.3)
MONOCYTES NFR BLD: 2 %
MYELOCYTES NFR BLD MANUAL: 3 %
NEUTROPHILS # BLD: 9.2 K/UL (ref 1.7–7.7)
NEUTROPHILS NFR BLD: 87 %
NEUTS BAND NFR BLD MANUAL: 2 % (ref 0–7)
NRBC BLD-RTO: 1 /100 WBC
ORGANISM: ABNORMAL
ORGANISM: ABNORMAL
PERFORMED ON: ABNORMAL
PLATELET # BLD AUTO: 257 K/UL (ref 135–450)
PLATELET BLD QL SMEAR: ADEQUATE
PMV BLD AUTO: 7.4 FL (ref 5–10.5)
POTASSIUM SERPL-SCNC: 4.9 MMOL/L (ref 3.5–5.1)
RBC # BLD AUTO: 2.22 M/UL (ref 4.2–5.9)
SLIDE REVIEW: ABNORMAL
SODIUM SERPL-SCNC: 135 MMOL/L (ref 136–145)
TIBC SERPL-MCNC: 228 UG/DL (ref 260–445)
WBC # BLD AUTO: 9.7 K/UL (ref 4–11)

## 2023-12-24 PROCEDURE — 2060000000 HC ICU INTERMEDIATE R&B

## 2023-12-24 PROCEDURE — 6360000002 HC RX W HCPCS: Performed by: INTERNAL MEDICINE

## 2023-12-24 PROCEDURE — 83550 IRON BINDING TEST: CPT

## 2023-12-24 PROCEDURE — 83540 ASSAY OF IRON: CPT

## 2023-12-24 PROCEDURE — 94640 AIRWAY INHALATION TREATMENT: CPT

## 2023-12-24 PROCEDURE — 6370000000 HC RX 637 (ALT 250 FOR IP): Performed by: NURSE PRACTITIONER

## 2023-12-24 PROCEDURE — 80048 BASIC METABOLIC PNL TOTAL CA: CPT

## 2023-12-24 PROCEDURE — 6370000000 HC RX 637 (ALT 250 FOR IP): Performed by: STUDENT IN AN ORGANIZED HEALTH CARE EDUCATION/TRAINING PROGRAM

## 2023-12-24 PROCEDURE — 85018 HEMOGLOBIN: CPT

## 2023-12-24 PROCEDURE — 94761 N-INVAS EAR/PLS OXIMETRY MLT: CPT

## 2023-12-24 PROCEDURE — 85025 COMPLETE CBC W/AUTO DIFF WBC: CPT

## 2023-12-24 PROCEDURE — 6370000000 HC RX 637 (ALT 250 FOR IP): Performed by: INTERNAL MEDICINE

## 2023-12-24 PROCEDURE — 2700000000 HC OXYGEN THERAPY PER DAY

## 2023-12-24 PROCEDURE — 2580000003 HC RX 258: Performed by: INTERNAL MEDICINE

## 2023-12-24 PROCEDURE — 85014 HEMATOCRIT: CPT

## 2023-12-24 PROCEDURE — 36415 COLL VENOUS BLD VENIPUNCTURE: CPT

## 2023-12-24 PROCEDURE — 6360000002 HC RX W HCPCS: Performed by: STUDENT IN AN ORGANIZED HEALTH CARE EDUCATION/TRAINING PROGRAM

## 2023-12-24 RX ORDER — INSULIN GLARGINE 100 [IU]/ML
15 INJECTION, SOLUTION SUBCUTANEOUS NIGHTLY
Status: DISCONTINUED | OUTPATIENT
Start: 2023-12-24 | End: 2023-12-27

## 2023-12-24 RX ORDER — INSULIN LISPRO 100 [IU]/ML
5 INJECTION, SOLUTION INTRAVENOUS; SUBCUTANEOUS
Status: DISCONTINUED | OUTPATIENT
Start: 2023-12-24 | End: 2024-01-15 | Stop reason: HOSPADM

## 2023-12-24 RX ORDER — FUROSEMIDE 10 MG/ML
20 INJECTION INTRAMUSCULAR; INTRAVENOUS 2 TIMES DAILY
Status: DISCONTINUED | OUTPATIENT
Start: 2023-12-24 | End: 2023-12-25

## 2023-12-24 RX ORDER — FUROSEMIDE 10 MG/ML
20 INJECTION INTRAMUSCULAR; INTRAVENOUS 2 TIMES DAILY
Status: DISCONTINUED | OUTPATIENT
Start: 2023-12-24 | End: 2023-12-24

## 2023-12-24 RX ORDER — INSULIN LISPRO 100 [IU]/ML
10 INJECTION, SOLUTION INTRAVENOUS; SUBCUTANEOUS ONCE
Status: COMPLETED | OUTPATIENT
Start: 2023-12-24 | End: 2023-12-24

## 2023-12-24 RX ADMIN — INSULIN LISPRO 5 UNITS: 100 INJECTION, SOLUTION INTRAVENOUS; SUBCUTANEOUS at 16:24

## 2023-12-24 RX ADMIN — Medication 10 ML: at 07:53

## 2023-12-24 RX ADMIN — Medication 10 ML: at 20:19

## 2023-12-24 RX ADMIN — ARFORMOTEROL TARTRATE 15 MCG: 15 SOLUTION RESPIRATORY (INHALATION) at 09:55

## 2023-12-24 RX ADMIN — THIAMINE HCL TAB 100 MG 100 MG: 100 TAB at 07:52

## 2023-12-24 RX ADMIN — IPRATROPIUM BROMIDE AND ALBUTEROL SULFATE 1 DOSE: 2.5; .5 SOLUTION RESPIRATORY (INHALATION) at 09:55

## 2023-12-24 RX ADMIN — INSULIN LISPRO 6 UNITS: 100 INJECTION, SOLUTION INTRAVENOUS; SUBCUTANEOUS at 07:52

## 2023-12-24 RX ADMIN — IPRATROPIUM BROMIDE AND ALBUTEROL SULFATE 1 DOSE: 2.5; .5 SOLUTION RESPIRATORY (INHALATION) at 13:26

## 2023-12-24 RX ADMIN — PREDNISONE 40 MG: 20 TABLET ORAL at 07:52

## 2023-12-24 RX ADMIN — IPRATROPIUM BROMIDE AND ALBUTEROL SULFATE 1 DOSE: 2.5; .5 SOLUTION RESPIRATORY (INHALATION) at 20:50

## 2023-12-24 RX ADMIN — APIXABAN 5 MG: 5 TABLET, FILM COATED ORAL at 20:18

## 2023-12-24 RX ADMIN — INSULIN LISPRO 5 UNITS: 100 INJECTION, SOLUTION INTRAVENOUS; SUBCUTANEOUS at 12:10

## 2023-12-24 RX ADMIN — SODIUM CHLORIDE 1500 MG: 900 INJECTION INTRAVENOUS at 16:23

## 2023-12-24 RX ADMIN — INSULIN LISPRO 4 UNITS: 100 INJECTION, SOLUTION INTRAVENOUS; SUBCUTANEOUS at 16:25

## 2023-12-24 RX ADMIN — ALLOPURINOL 200 MG: 100 TABLET ORAL at 07:51

## 2023-12-24 RX ADMIN — SIMETHICONE 80 MG: 80 TABLET, CHEWABLE ORAL at 03:46

## 2023-12-24 RX ADMIN — BUDESONIDE INHALATION 500 MCG: 0.5 SUSPENSION RESPIRATORY (INHALATION) at 09:55

## 2023-12-24 RX ADMIN — SODIUM CHLORIDE 1500 MG: 900 INJECTION INTRAVENOUS at 03:45

## 2023-12-24 RX ADMIN — APIXABAN 5 MG: 5 TABLET, FILM COATED ORAL at 07:52

## 2023-12-24 RX ADMIN — SODIUM CHLORIDE 1500 MG: 900 INJECTION INTRAVENOUS at 10:35

## 2023-12-24 RX ADMIN — IPRATROPIUM BROMIDE AND ALBUTEROL SULFATE 1 DOSE: 2.5; .5 SOLUTION RESPIRATORY (INHALATION) at 17:35

## 2023-12-24 RX ADMIN — SODIUM CHLORIDE 25 ML: 9 INJECTION, SOLUTION INTRAVENOUS at 03:44

## 2023-12-24 RX ADMIN — BUDESONIDE INHALATION 500 MCG: 0.5 SUSPENSION RESPIRATORY (INHALATION) at 20:51

## 2023-12-24 RX ADMIN — ASPIRIN 81 MG: 81 TABLET, CHEWABLE ORAL at 07:52

## 2023-12-24 RX ADMIN — FUROSEMIDE 20 MG: 10 INJECTION, SOLUTION INTRAMUSCULAR; INTRAVENOUS at 13:43

## 2023-12-24 RX ADMIN — TIOTROPIUM BROMIDE INHALATION SPRAY 2 PUFF: 3.12 SPRAY, METERED RESPIRATORY (INHALATION) at 09:55

## 2023-12-24 RX ADMIN — INSULIN LISPRO 10 UNITS: 100 INJECTION, SOLUTION INTRAVENOUS; SUBCUTANEOUS at 02:57

## 2023-12-24 RX ADMIN — AZITHROMYCIN DIHYDRATE 500 MG: 250 TABLET ORAL at 07:52

## 2023-12-24 RX ADMIN — INSULIN LISPRO 4 UNITS: 100 INJECTION, SOLUTION INTRAVENOUS; SUBCUTANEOUS at 20:19

## 2023-12-24 RX ADMIN — SODIUM CHLORIDE 1500 MG: 900 INJECTION INTRAVENOUS at 20:24

## 2023-12-24 RX ADMIN — ARFORMOTEROL TARTRATE 15 MCG: 15 SOLUTION RESPIRATORY (INHALATION) at 20:50

## 2023-12-24 RX ADMIN — INSULIN GLARGINE 15 UNITS: 100 INJECTION, SOLUTION SUBCUTANEOUS at 20:19

## 2023-12-24 ASSESSMENT — PAIN SCALES - GENERAL
PAINLEVEL_OUTOF10: 0
PAINLEVEL_OUTOF10: 0

## 2023-12-24 NOTE — PLAN OF CARE
Problem: Safety - Adult  Goal: Free from fall injury  12/24/2023 0917 by Eve Miller RN  Outcome: Progressing     Problem: Discharge Planning  Goal: Discharge to home or other facility with appropriate resources  12/24/2023 0917 by Eve Miller RN  Outcome: Progressing     Problem: Pain  Goal: Verbalizes/displays adequate comfort level or baseline comfort level  12/24/2023 0917 by Eve Miller RN  Outcome: Progressing     Problem: Skin/Tissue Integrity  Goal: Absence of new skin breakdown  Description: 1.  Monitor for areas of redness and/or skin breakdown  2.  Assess vascular access sites hourly  3.  Every 4-6 hours minimum:  Change oxygen saturation probe site  4.  Every 4-6 hours:  If on nasal continuous positive airway pressure, respiratory therapy assess nares and determine need for appliance change or resting period.  12/24/2023 0917 by Eve Miller RN  Outcome: Progressing     Problem: ABCDS Injury Assessment  Goal: Absence of physical injury  12/24/2023 0917 by Eve Miller RN  Outcome: Progressing

## 2023-12-24 NOTE — PROGRESS NOTES
[facility]  Consultation Note    Reason for Consult: ABRAHAM  Requesting Physician:  Dr. ROSALINA Farias    CHIEF COMPLAINT: Fall    History obtained from records and patient.    HISTORY OF PRESENT ILLNESS:                Ishmael Gr  is 78 y.o. y.o. male with significant past medical history of anemia, COPD, diabetes mellitus type 2, gout, hypertension, obesity, obstructive sleep apnea, peripheral vascular disease who presents with fall.  His baseline oxygen need is at 6L.  He is currently admitted for ABRAHAM possible COPD exacerbation.  I am asked to see the patient because his creatinine has increased to 1.7 yesterday and 1.8 today.  On 12/16/2023 creatinine was 1.1.  Lowest systolic blood pressure in the 100s range.  He is on furosemide 80 mg twice daily as an outpatient, celecoxib and ibuprofen 600 mg every 4 hours for the past few days.  Denies any vomiting or diarrhea.  No new skin rash.  No change in urine output.  CT PE done 12/16/23.  Lowest SBP in the 100s range.  -500 mL since admission.    Past Medical History:     has a past medical history of Anemia, COPD (chronic obstructive pulmonary disease) (HCC), Diabetes mellitus (HCC), Edema, GI (gastrointestinal bleed), Gout, Hypertension, Morbid obesity (HCC), Neuropathy, Obstructive sleep apnea, and Peripheral vascular disease (HCC).   Past Surgical History:     has a past surgical history that includes Appendectomy; Total hip arthroplasty; and Colonoscopy (N/A, 5/7/2019).   Current Medications:    Current Facility-Administered Medications: insulin lispro (HUMALOG) injection vial 5 Units, 5 Units, SubCUTAneous, TID WC  insulin glargine (LANTUS) injection vial 15 Units, 15 Units, SubCUTAneous, Nightly  insulin lispro (HUMALOG) injection vial 0-4 Units, 0-4 Units, SubCUTAneous, Nightly  dextrose bolus 10% 125 mL, 125 mL, IntraVENous, PRN **OR** dextrose bolus 10% 250 mL, 250 mL, IntraVENous, PRN  glucagon injection 1 mg, 1 mg,  opacification  of the distal pulmonary arteries which is limiting evaluation of the distal  pulmonary arteries along the lung bases.  Within the limitations of the exam,  no obvious acute pulmonary embolus can be seen     Mildly dilated and atherosclerotic thoracic aorta with no aneurysm or  dissection and no mediastinal mass or adenopathy.     Chronic obstructive lung changes with mild subsegmental atelectasis vs early  infiltrate or scarring along the right lung base posteriorly    IMPRESSION/RECOMMENDATIONS:    ABRAHAM-baseline creatinine 1.1.  Probably from prerenal azotemia in the setting of relative hypotension plus contrast exposure plus COPD exacerbation.  At risk for ATN.  Nonoliguric.  Blood pressure now acceptable.  Normal saline at 80 mL/h for 12 hours.  Vitamin C x 3 days.    Acute kidney injury-improved to baseline.  Hypertension.  Improving.  Multiple electrolyte imbalance-been stable.  Hypotension remains stable.    Medications reviewed and appropriate.  Anemia levels are stable.    Renal stable.    Respiratory acidosis-management as per Medicine and Pulmonary.  History of COPD.  Elevated proBNP can be secondary to ABRAHAM.  Hypoalbuminemia-UA only showed trace protein  Hematuria-repeat UA.  Check renal ultrasound.  Anemia-macrocytic.  Check B12 and folate level.  Relative hypotension-better        Jame Carmona MD

## 2023-12-24 NOTE — SIGNIFICANT EVENT
THU notified by RN of blood sugar 453  -s/p 10 units Lispro last evening for blood sugar 460  -rec'd Lantus 15 units last evening  -repeat 10 units Lispro  -hypoglycemia protocol in place    THU English - NP

## 2023-12-24 NOTE — PROGRESS NOTES
[facility]    12/24/2023 11:28 AM        Name: Ishmael Gr .              Admitted: 12/20/2023  Primary Care Provider: Anthony Bravo MD (Tel: [phone])    =      Subjective:    Improving no nausea vomiting or chest pain    Reviewed interval ancillary notes    Current Medications  insulin lispro (HUMALOG) injection vial 5 Units, TID WC  insulin glargine (LANTUS) injection vial 15 Units, Nightly  insulin lispro (HUMALOG) injection vial 0-4 Units, Nightly  dextrose bolus 10% 125 mL, PRN   Or  dextrose bolus 10% 250 mL, PRN  glucagon injection 1 mg, PRN  dextrose 10 % infusion, Continuous PRN  insulin lispro (HUMALOG) injection vial 0-8 Units, TID WC  ampicillin-sulbactam (UNASYN) 1,500 mg in sodium chloride 0.9 % 50 mL IVPB (mini-bag), Q6H  predniSONE (DELTASONE) tablet 40 mg, Daily  simethicone (MYLICON) chewable tablet 80 mg, Q6H PRN  arformoterol tartrate (BROVANA) nebulizer solution 15 mcg, BID RT  budesonide (PULMICORT) nebulizer suspension 500 mcg, BID RT  albuterol sulfate HFA (PROVENTIL;VENTOLIN;PROAIR) 108 (90 Base) MCG/ACT inhaler 2 puff, Q6H PRN  allopurinol (ZYLOPRIM) tablet 200 mg, Daily  apixaban (ELIQUIS) tablet 5 mg, BID  aspirin chewable tablet 81 mg, Daily  azithromycin (ZITHROMAX) tablet 500 mg, Daily  ipratropium 0.5 mg-albuterol 2.5 mg (DUONEB) nebulizer solution 1 Dose, 4x Daily RT  thiamine mononitrate tablet 100 mg, Daily  sodium chloride flush 0.9 % injection 5-40 mL, 2 times per day  sodium chloride flush 0.9 % injection 5-40 mL, PRN  0.9 % sodium chloride infusion, PRN  polyethylene glycol (GLYCOLAX) packet 17 g, Daily PRN  acetaminophen (TYLENOL) tablet 650 mg, Q6H PRN   Or  acetaminophen (TYLENOL) suppository 650 mg, Q6H PRN  tiotropium (SPIRIVA RESPIMAT) 2.5 MCG/ACT inhaler 2 puff, Daily RT        Objective:  /66   Pulse 89   Temp 98 °F (36.7 °C) (Temporal)   Resp 18

## 2023-12-24 NOTE — CARE COORDINATION
Discharge Planning.     The SW received a call from Alaina the admissions coordinator who stated they can still accept the pt and requesting updated PT/OT notes.Alaina stated Simone will be back on Tuesday and will follow up. Pre-cert will need to be started once the pt is medically ready.       The pt's family declining hospice at this time and wants to move forward with SNF per HOC note.    Electronically signed by FRANCA Nguyễn on 12/24/2023 at 3:36 PM

## 2023-12-24 NOTE — PROGRESS NOTES
[facility] ([place], [place])  Consultation Note    Reason for Consult: ABRAHAM  Requesting Physician:  Dr. ROSALINA Farias    CHIEF COMPLAINT: Fall    History obtained from records and patient.    HISTORY OF PRESENT ILLNESS:                Ishmael Gr  is 78 y.o. y.o. male with significant past medical history of anemia, COPD, diabetes mellitus type 2, gout, hypertension, obesity, obstructive sleep apnea, peripheral vascular disease who presents with fall.  His baseline oxygen need is at 6L.  He is currently admitted for ABRAHAM possible COPD exacerbation.  I am asked to see the patient because his creatinine has increased to 1.7 yesterday and 1.8 today.  On 12/16/2023 creatinine was 1.1.  Lowest systolic blood pressure in the 100s range.  He is on furosemide 80 mg twice daily as an outpatient, celecoxib and ibuprofen 600 mg every 4 hours for the past few days.  Denies any vomiting or diarrhea.  No new skin rash.  No change in urine output.  CT PE done 12/16/23.  Lowest SBP in the 100s range.  -500 mL since admission.    Subjective / interval history / nephrology update / medical decision making:   Patient was seen comfortably sitting up in chair,   Reported no active complaints/distress,   Renal labs noted    No SOB  Stable on NC    But leg edema - not improving,   Will start Lasix .      Past Medical History:     has a past medical history of Anemia, COPD (chronic obstructive pulmonary disease) ([facility]), Diabetes mellitus ([facility]), Edema, GI (gastrointestinal bleed), Gout, Hypertension, Morbid obesity ([facility]), Neuropathy, Obstructive sleep apnea, and Peripheral vascular disease ([facility]).   Past Surgical History:     has a past surgical history that includes Appendectomy; Total hip arthroplasty; and Colonoscopy (N/A, 5/7/2019).   Current Medications:    Current Facility-Administered Medications: insulin lispro (HUMALOG) injection vial 5 Units, 5 Units, SubCUTAneous, TID WC  insulin glargine (LANTUS) injection vial 15 Units,

## 2023-12-25 LAB
ANION GAP SERPL CALCULATED.3IONS-SCNC: 5 MMOL/L (ref 3–16)
BASOPHILS # BLD: 0 K/UL (ref 0–0.2)
BASOPHILS NFR BLD: 0 %
BUN SERPL-MCNC: 78 MG/DL (ref 7–20)
CALCIUM SERPL-MCNC: 8.7 MG/DL (ref 8.3–10.6)
CHLORIDE SERPL-SCNC: 100 MMOL/L (ref 99–110)
CO2 SERPL-SCNC: 33 MMOL/L (ref 21–32)
CREAT SERPL-MCNC: 1.5 MG/DL (ref 0.8–1.3)
DEPRECATED RDW RBC AUTO: 15.2 % (ref 12.4–15.4)
EOSINOPHIL # BLD: 0 K/UL (ref 0–0.6)
EOSINOPHIL NFR BLD: 0 %
GFR SERPLBLD CREATININE-BSD FMLA CKD-EPI: 48 ML/MIN/{1.73_M2}
GLUCOSE BLD-MCNC: 189 MG/DL (ref 70–99)
GLUCOSE BLD-MCNC: 192 MG/DL (ref 70–99)
GLUCOSE BLD-MCNC: 355 MG/DL (ref 70–99)
GLUCOSE BLD-MCNC: 386 MG/DL (ref 70–99)
GLUCOSE SERPL-MCNC: 207 MG/DL (ref 70–99)
HCT VFR BLD AUTO: 22.4 % (ref 40.5–52.5)
HGB BLD-MCNC: 7.4 G/DL (ref 13.5–17.5)
LYMPHOCYTES # BLD: 2.8 K/UL (ref 1–5.1)
LYMPHOCYTES NFR BLD: 19 %
MCH RBC QN AUTO: 33.2 PG (ref 26–34)
MCHC RBC AUTO-ENTMCNC: 33 G/DL (ref 31–36)
MCV RBC AUTO: 100.7 FL (ref 80–100)
MONOCYTES # BLD: 0.6 K/UL (ref 0–1.3)
MONOCYTES NFR BLD: 4 %
MYELOCYTES NFR BLD MANUAL: 4 %
NEUTROPHILS # BLD: 11.3 K/UL (ref 1.7–7.7)
NEUTROPHILS NFR BLD: 72 %
NEUTS BAND NFR BLD MANUAL: 1 % (ref 0–7)
NRBC BLD-RTO: 1 /100 WBC
PERFORMED ON: ABNORMAL
PLATELET # BLD AUTO: 347 K/UL (ref 135–450)
PLATELET BLD QL SMEAR: ADEQUATE
PMV BLD AUTO: 7.2 FL (ref 5–10.5)
POLYCHROMASIA BLD QL SMEAR: ABNORMAL
POTASSIUM SERPL-SCNC: 4.4 MMOL/L (ref 3.5–5.1)
RBC # BLD AUTO: 2.23 M/UL (ref 4.2–5.9)
SLIDE REVIEW: ABNORMAL
SODIUM SERPL-SCNC: 138 MMOL/L (ref 136–145)
WBC # BLD AUTO: 14.7 K/UL (ref 4–11)

## 2023-12-25 PROCEDURE — 6360000002 HC RX W HCPCS: Performed by: STUDENT IN AN ORGANIZED HEALTH CARE EDUCATION/TRAINING PROGRAM

## 2023-12-25 PROCEDURE — 6360000002 HC RX W HCPCS: Performed by: INTERNAL MEDICINE

## 2023-12-25 PROCEDURE — 94761 N-INVAS EAR/PLS OXIMETRY MLT: CPT

## 2023-12-25 PROCEDURE — 6370000000 HC RX 637 (ALT 250 FOR IP): Performed by: STUDENT IN AN ORGANIZED HEALTH CARE EDUCATION/TRAINING PROGRAM

## 2023-12-25 PROCEDURE — 94640 AIRWAY INHALATION TREATMENT: CPT

## 2023-12-25 PROCEDURE — 36415 COLL VENOUS BLD VENIPUNCTURE: CPT

## 2023-12-25 PROCEDURE — 2580000003 HC RX 258: Performed by: INTERNAL MEDICINE

## 2023-12-25 PROCEDURE — 2700000000 HC OXYGEN THERAPY PER DAY

## 2023-12-25 PROCEDURE — 6370000000 HC RX 637 (ALT 250 FOR IP): Performed by: INTERNAL MEDICINE

## 2023-12-25 PROCEDURE — 2060000000 HC ICU INTERMEDIATE R&B

## 2023-12-25 PROCEDURE — 80048 BASIC METABOLIC PNL TOTAL CA: CPT

## 2023-12-25 PROCEDURE — 85025 COMPLETE CBC W/AUTO DIFF WBC: CPT

## 2023-12-25 PROCEDURE — 6370000000 HC RX 637 (ALT 250 FOR IP): Performed by: NURSE PRACTITIONER

## 2023-12-25 RX ORDER — PREDNISONE 20 MG/1
20 TABLET ORAL DAILY
Status: COMPLETED | OUTPATIENT
Start: 2023-12-26 | End: 2023-12-26

## 2023-12-25 RX ORDER — FUROSEMIDE 10 MG/ML
40 INJECTION INTRAMUSCULAR; INTRAVENOUS 2 TIMES DAILY
Status: COMPLETED | OUTPATIENT
Start: 2023-12-25 | End: 2023-12-27

## 2023-12-25 RX ADMIN — THIAMINE HCL TAB 100 MG 100 MG: 100 TAB at 10:12

## 2023-12-25 RX ADMIN — BUDESONIDE INHALATION 500 MCG: 0.5 SUSPENSION RESPIRATORY (INHALATION) at 11:09

## 2023-12-25 RX ADMIN — INSULIN GLARGINE 15 UNITS: 100 INJECTION, SOLUTION SUBCUTANEOUS at 20:18

## 2023-12-25 RX ADMIN — ARFORMOTEROL TARTRATE 15 MCG: 15 SOLUTION RESPIRATORY (INHALATION) at 11:09

## 2023-12-25 RX ADMIN — AZITHROMYCIN DIHYDRATE 500 MG: 250 TABLET ORAL at 10:12

## 2023-12-25 RX ADMIN — APIXABAN 5 MG: 5 TABLET, FILM COATED ORAL at 10:12

## 2023-12-25 RX ADMIN — Medication 10 ML: at 10:13

## 2023-12-25 RX ADMIN — PREDNISONE 40 MG: 20 TABLET ORAL at 10:12

## 2023-12-25 RX ADMIN — INSULIN LISPRO 4 UNITS: 100 INJECTION, SOLUTION INTRAVENOUS; SUBCUTANEOUS at 20:18

## 2023-12-25 RX ADMIN — SODIUM CHLORIDE 1500 MG: 900 INJECTION INTRAVENOUS at 16:07

## 2023-12-25 RX ADMIN — BUDESONIDE INHALATION 500 MCG: 0.5 SUSPENSION RESPIRATORY (INHALATION) at 20:44

## 2023-12-25 RX ADMIN — TIOTROPIUM BROMIDE INHALATION SPRAY 2 PUFF: 3.12 SPRAY, METERED RESPIRATORY (INHALATION) at 11:15

## 2023-12-25 RX ADMIN — IPRATROPIUM BROMIDE AND ALBUTEROL SULFATE 1 DOSE: 2.5; .5 SOLUTION RESPIRATORY (INHALATION) at 20:45

## 2023-12-25 RX ADMIN — SIMETHICONE 80 MG: 80 TABLET, CHEWABLE ORAL at 16:03

## 2023-12-25 RX ADMIN — SODIUM CHLORIDE 1500 MG: 900 INJECTION INTRAVENOUS at 05:07

## 2023-12-25 RX ADMIN — Medication 10 ML: at 20:20

## 2023-12-25 RX ADMIN — INSULIN LISPRO 5 UNITS: 100 INJECTION, SOLUTION INTRAVENOUS; SUBCUTANEOUS at 17:12

## 2023-12-25 RX ADMIN — INSULIN LISPRO 8 UNITS: 100 INJECTION, SOLUTION INTRAVENOUS; SUBCUTANEOUS at 17:11

## 2023-12-25 RX ADMIN — ARFORMOTEROL TARTRATE 15 MCG: 15 SOLUTION RESPIRATORY (INHALATION) at 20:44

## 2023-12-25 RX ADMIN — SODIUM CHLORIDE 1500 MG: 900 INJECTION INTRAVENOUS at 10:21

## 2023-12-25 RX ADMIN — ASPIRIN 81 MG: 81 TABLET, CHEWABLE ORAL at 10:12

## 2023-12-25 RX ADMIN — SODIUM CHLORIDE 1500 MG: 900 INJECTION INTRAVENOUS at 20:19

## 2023-12-25 RX ADMIN — INSULIN LISPRO 5 UNITS: 100 INJECTION, SOLUTION INTRAVENOUS; SUBCUTANEOUS at 10:13

## 2023-12-25 RX ADMIN — FUROSEMIDE 40 MG: 10 INJECTION, SOLUTION INTRAMUSCULAR; INTRAVENOUS at 17:10

## 2023-12-25 RX ADMIN — IPRATROPIUM BROMIDE AND ALBUTEROL SULFATE 1 DOSE: 2.5; .5 SOLUTION RESPIRATORY (INHALATION) at 11:09

## 2023-12-25 RX ADMIN — ALLOPURINOL 200 MG: 100 TABLET ORAL at 10:12

## 2023-12-25 RX ADMIN — APIXABAN 5 MG: 5 TABLET, FILM COATED ORAL at 20:18

## 2023-12-25 RX ADMIN — IPRATROPIUM BROMIDE AND ALBUTEROL SULFATE 1 DOSE: 2.5; .5 SOLUTION RESPIRATORY (INHALATION) at 17:02

## 2023-12-25 RX ADMIN — FUROSEMIDE 20 MG: 10 INJECTION, SOLUTION INTRAMUSCULAR; INTRAVENOUS at 10:12

## 2023-12-25 ASSESSMENT — PAIN SCALES - GENERAL
PAINLEVEL_OUTOF10: 0

## 2023-12-25 NOTE — PROGRESS NOTES
Pt awake in chair talking to visitor. VSS and pt sitting up in chair. Denies any needs. Call light in reach and chair alarm engaged.

## 2023-12-25 NOTE — PROGRESS NOTES
[facility]  Inpatient Progress Note    Reason for Consult: ABRAHAM  Requesting Physician:  Dr. ROSALINA Farias    CHIEF COMPLAINT: Fall    History obtained from records and patient.    HISTORY OF PRESENT ILLNESS:                Ishmael Gr  is 78 y.o. y.o. male with significant past medical history of anemia, COPD, diabetes mellitus type 2, gout, hypertension, obesity, obstructive sleep apnea, peripheral vascular disease who presents with fall.  His baseline oxygen need is at 6L.  He is currently admitted for ABRAHAM possible COPD exacerbation.  I am asked to see the patient because his creatinine has increased to 1.7 yesterday and 1.8 today.  On 12/16/2023 creatinine was 1.1.  Lowest systolic blood pressure in the 100s range.  He is on furosemide 80 mg twice daily as an outpatient, celecoxib and ibuprofen 600 mg every 4 hours for the past few days.  Denies any vomiting or diarrhea.  No new skin rash.  No change in urine output.  CT PE done 12/16/23.  Lowest SBP in the 100s range.  -500 mL since admission.    Subjective / interval history / nephrology update / medical decision making:   Patient was seen comfortably sitting up in chair,   Reported no active complaints/distress,   Renal labs noted    No SOB  Stable on NC    But leg edema - not improving,   Will start Lasix .      Past Medical History:     has a past medical history of Anemia, COPD (chronic obstructive pulmonary disease) (HCC), Diabetes mellitus ([facility]), Edema, GI (gastrointestinal bleed), Gout, Hypertension, Morbid obesity ([facility]), Neuropathy, Obstructive sleep apnea, and Peripheral vascular disease ([facility]).   Past Surgical History:     has a past surgical history that includes Appendectomy; Total hip arthroplasty; and Colonoscopy (N/A, 5/7/2019).   Current Medications:    Current Facility-Administered Medications: [START ON 12/26/2023] predniSONE (DELTASONE) tablet 20 mg, 20 mg, Oral, Daily  insulin lispro (HUMALOG) injection vial 5

## 2023-12-25 NOTE — PROGRESS NOTES
Pt awake in bed. VSS, assessment complete and medications given. Legs elevated and pt sitting up in chair and does not want to reposition or go back to bed. External catheter placed bc pt stated he is getting weak making so many trips to the bathroom. Denies any other needs. Call light in reach and bed alarm engaged.

## 2023-12-25 NOTE — PROGRESS NOTES
[facility]    12/25/2023 12:13 PM        Name: Ishmael Gr .              Admitted: 12/20/2023  Primary Care Provider: Anthony Bravo MD (Tel: [phone])    =      Subjective:    Still has leg swelling no nausea vomiting or chest pain  Reviewed interval ancillary notes    Current Medications  [START ON 12/26/2023] predniSONE (DELTASONE) tablet 20 mg, Daily  insulin lispro (HUMALOG) injection vial 5 Units, TID WC  insulin glargine (LANTUS) injection vial 15 Units, Nightly  furosemide (LASIX) injection 20 mg, BID  insulin lispro (HUMALOG) injection vial 0-4 Units, Nightly  dextrose bolus 10% 125 mL, PRN   Or  dextrose bolus 10% 250 mL, PRN  glucagon injection 1 mg, PRN  dextrose 10 % infusion, Continuous PRN  insulin lispro (HUMALOG) injection vial 0-8 Units, TID WC  ampicillin-sulbactam (UNASYN) 1,500 mg in sodium chloride 0.9 % 50 mL IVPB (mini-bag), Q6H  simethicone (MYLICON) chewable tablet 80 mg, Q6H PRN  arformoterol tartrate (BROVANA) nebulizer solution 15 mcg, BID RT  budesonide (PULMICORT) nebulizer suspension 500 mcg, BID RT  albuterol sulfate HFA (PROVENTIL;VENTOLIN;PROAIR) 108 (90 Base) MCG/ACT inhaler 2 puff, Q6H PRN  allopurinol (ZYLOPRIM) tablet 200 mg, Daily  apixaban (ELIQUIS) tablet 5 mg, BID  aspirin chewable tablet 81 mg, Daily  azithromycin (ZITHROMAX) tablet 500 mg, Daily  ipratropium 0.5 mg-albuterol 2.5 mg (DUONEB) nebulizer solution 1 Dose, 4x Daily RT  thiamine mononitrate tablet 100 mg, Daily  sodium chloride flush 0.9 % injection 5-40 mL, 2 times per day  sodium chloride flush 0.9 % injection 5-40 mL, PRN  0.9 % sodium chloride infusion, PRN  polyethylene glycol (GLYCOLAX) packet 17 g, Daily PRN  acetaminophen (TYLENOL) tablet 650 mg, Q6H PRN   Or  acetaminophen (TYLENOL) suppository 650 mg, Q6H PRN  tiotropium (SPIRIVA RESPIMAT) 2.5 MCG/ACT inhaler 2 puff, Daily    No acute intracranial abnormality.         XR CHEST PORTABLE   Final Result   1. Improved aeration.             Recent imaging reviewed    Problem List  Principal Problem:    ABRAHAM (acute kidney injury) ([facility])  Active Problems:    Encephalomalacia    Essential hypertension    Diabetes mellitus type 2 in obese ([facility])    Morbid obesity due to excess calories ([facility])    PVD (peripheral vascular disease) ([facility])    Chronic obstructive pulmonary disease ([facility])    Fall    Acute right-sided low back pain with right-sided sciatica    Right hip pain    Fall at home    Scoliosis of lumbar spine    Pain due to hip joint prosthesis, initial encounter ([facility])    Aspiration pneumonia ([facility])    Bacteremia due to methicillin susceptible Staphylococcus aureus ([facility])  Resolved Problems:    * No resolved hospital problems. *       Assessment & Plan:   ABRAHAM:monitor bmp [place]  - iv lasix 20mg tid     Acute on chronic hypoxic and hyperpcanic resp failure secondary to copd exacerbation  Reduce to 20mg prednisone  - duo nesb  =- pulm on board    Staph bacteremiea  Iv atbx  - rpeat cultures negative  - increased wbc ? Steroids, cbc in am  - id on board    Hypertension  -PRN hydralazine    Type 2 diabetes withy hyperglycemia due to steroids a1c 6.2 add lantus lispro  DVT prophylaxis eliquis  Code status full code      Davey Farias MD   12/25/2023 12:13 PM

## 2023-12-25 NOTE — PLAN OF CARE
Problem: Safety - Adult  Goal: Free from fall injury  Outcome: Progressing   Pt will remain free from falls. Fall precautions in place and alarm engaged. Bedside table and call light within reach. Pt aware to use call light for assistance ambulating.    Problem: Pain  Goal: Verbalizes/displays adequate comfort level or baseline comfort level  Outcome: Progressing   Pt can rate pain on a 0-10 scale. Pt pain will remain at a level that is tolerable to pt. PRN pain medication as needed.      Problem: Skin/Tissue Integrity  Goal: Absence of new skin breakdown  Description: 1.  Monitor for areas of redness and/or skin breakdown  2.  Assess vascular access sites hourly  3.  Every 4-6 hours minimum:  Change oxygen saturation probe site  4.  Every 4-6 hours:  If on nasal continuous positive airway pressure, respiratory therapy assess nares and determine need for appliance change or resting period.  Outcome: Progressing   Pt will remain free from skin breakdown. Pt turned Q2hrs, skin kept clean and dry, and skin assessed per shift or as needed.     Problem: Cardiovascular - Adult  Goal: Maintains optimal cardiac output and hemodynamic stability  Outcome: Progressing  Flowsheets (Taken 12/25/2023 1530)  Maintains optimal cardiac output and hemodynamic stability:   Monitor blood pressure and heart rate   Monitor urine output and notify Licensed Independent Practitioner for values outside of normal range

## 2023-12-26 ENCOUNTER — APPOINTMENT (OUTPATIENT)
Dept: GENERAL RADIOLOGY | Age: 78
DRG: 682 | End: 2023-12-26
Payer: MEDICARE

## 2023-12-26 LAB
ALBUMIN SERPL-MCNC: 2.9 G/DL (ref 3.4–5)
ALBUMIN SERPL-MCNC: 2.9 G/DL (ref 3.4–5)
ALP SERPL-CCNC: 62 U/L (ref 40–129)
ALT SERPL-CCNC: 31 U/L (ref 10–40)
ANION GAP SERPL CALCULATED.3IONS-SCNC: 6 MMOL/L (ref 3–16)
ANISOCYTOSIS BLD QL SMEAR: ABNORMAL
AST SERPL-CCNC: 13 U/L (ref 15–37)
BACTERIA BLD CULT ORG #2: NORMAL
BACTERIA BLD CULT: NORMAL
BASOPHILS # BLD: 0 K/UL (ref 0–0.2)
BASOPHILS NFR BLD: 0 %
BILIRUB DIRECT SERPL-MCNC: <0.2 MG/DL (ref 0–0.3)
BILIRUB INDIRECT SERPL-MCNC: ABNORMAL MG/DL (ref 0–1)
BILIRUB SERPL-MCNC: 0.3 MG/DL (ref 0–1)
BUN SERPL-MCNC: 71 MG/DL (ref 7–20)
CALCIUM SERPL-MCNC: 8.8 MG/DL (ref 8.3–10.6)
CHLORIDE SERPL-SCNC: 99 MMOL/L (ref 99–110)
CO2 SERPL-SCNC: 34 MMOL/L (ref 21–32)
CREAT SERPL-MCNC: 1.5 MG/DL (ref 0.8–1.3)
DEPRECATED RDW RBC AUTO: 15 % (ref 12.4–15.4)
EOSINOPHIL # BLD: 0.2 K/UL (ref 0–0.6)
EOSINOPHIL NFR BLD: 1 %
GFR SERPLBLD CREATININE-BSD FMLA CKD-EPI: 48 ML/MIN/{1.73_M2}
GLUCOSE BLD-MCNC: 161 MG/DL (ref 70–99)
GLUCOSE BLD-MCNC: 182 MG/DL (ref 70–99)
GLUCOSE BLD-MCNC: 218 MG/DL (ref 70–99)
GLUCOSE BLD-MCNC: 231 MG/DL (ref 70–99)
GLUCOSE SERPL-MCNC: 170 MG/DL (ref 70–99)
HCT VFR BLD AUTO: 23.1 % (ref 40.5–52.5)
HGB BLD-MCNC: 7.3 G/DL (ref 13.5–17.5)
LYMPHOCYTES # BLD: 2.7 K/UL (ref 1–5.1)
LYMPHOCYTES NFR BLD: 14 %
MAGNESIUM SERPL-MCNC: 2 MG/DL (ref 1.8–2.4)
MCH RBC QN AUTO: 32 PG (ref 26–34)
MCHC RBC AUTO-ENTMCNC: 31.8 G/DL (ref 31–36)
MCV RBC AUTO: 100.7 FL (ref 80–100)
MONOCYTES # BLD: 0.8 K/UL (ref 0–1.3)
MONOCYTES NFR BLD: 4 %
NEUTROPHILS # BLD: 15.8 K/UL (ref 1.7–7.7)
NEUTROPHILS NFR BLD: 80 %
NEUTS BAND NFR BLD MANUAL: 1 % (ref 0–7)
NEUTS VAC BLD QL SMEAR: PRESENT
PERFORMED ON: ABNORMAL
PHOSPHATE SERPL-MCNC: 4.6 MG/DL (ref 2.5–4.9)
PLATELET # BLD AUTO: 393 K/UL (ref 135–450)
PLATELET BLD QL SMEAR: ADEQUATE
PMV BLD AUTO: 7.3 FL (ref 5–10.5)
POLYCHROMASIA BLD QL SMEAR: ABNORMAL
POTASSIUM SERPL-SCNC: 4.1 MMOL/L (ref 3.5–5.1)
PROCALCITONIN SERPL IA-MCNC: 0.42 NG/ML (ref 0–0.15)
PROT SERPL-MCNC: 5.9 G/DL (ref 6.4–8.2)
RBC # BLD AUTO: 2.29 M/UL (ref 4.2–5.9)
SLIDE REVIEW: ABNORMAL
SODIUM SERPL-SCNC: 139 MMOL/L (ref 136–145)
WBC # BLD AUTO: 19.5 K/UL (ref 4–11)

## 2023-12-26 PROCEDURE — 6360000002 HC RX W HCPCS: Performed by: INTERNAL MEDICINE

## 2023-12-26 PROCEDURE — 2060000000 HC ICU INTERMEDIATE R&B

## 2023-12-26 PROCEDURE — 6370000000 HC RX 637 (ALT 250 FOR IP): Performed by: INTERNAL MEDICINE

## 2023-12-26 PROCEDURE — 2580000003 HC RX 258: Performed by: INTERNAL MEDICINE

## 2023-12-26 PROCEDURE — 94761 N-INVAS EAR/PLS OXIMETRY MLT: CPT

## 2023-12-26 PROCEDURE — 2700000000 HC OXYGEN THERAPY PER DAY

## 2023-12-26 PROCEDURE — 84145 PROCALCITONIN (PCT): CPT

## 2023-12-26 PROCEDURE — 97535 SELF CARE MNGMENT TRAINING: CPT

## 2023-12-26 PROCEDURE — 80076 HEPATIC FUNCTION PANEL: CPT

## 2023-12-26 PROCEDURE — 71045 X-RAY EXAM CHEST 1 VIEW: CPT

## 2023-12-26 PROCEDURE — 80069 RENAL FUNCTION PANEL: CPT

## 2023-12-26 PROCEDURE — 97530 THERAPEUTIC ACTIVITIES: CPT

## 2023-12-26 PROCEDURE — 6360000002 HC RX W HCPCS: Performed by: STUDENT IN AN ORGANIZED HEALTH CARE EDUCATION/TRAINING PROGRAM

## 2023-12-26 PROCEDURE — 83735 ASSAY OF MAGNESIUM: CPT

## 2023-12-26 PROCEDURE — 6370000000 HC RX 637 (ALT 250 FOR IP): Performed by: NURSE PRACTITIONER

## 2023-12-26 PROCEDURE — 85025 COMPLETE CBC W/AUTO DIFF WBC: CPT

## 2023-12-26 PROCEDURE — 36415 COLL VENOUS BLD VENIPUNCTURE: CPT

## 2023-12-26 PROCEDURE — 99233 SBSQ HOSP IP/OBS HIGH 50: CPT | Performed by: INTERNAL MEDICINE

## 2023-12-26 PROCEDURE — 94640 AIRWAY INHALATION TREATMENT: CPT

## 2023-12-26 RX ORDER — LACTOBACILLUS RHAMNOSUS GG 10B CELL
1 CAPSULE ORAL 2 TIMES DAILY WITH MEALS
Status: DISCONTINUED | OUTPATIENT
Start: 2023-12-26 | End: 2024-01-15 | Stop reason: HOSPADM

## 2023-12-26 RX ORDER — VANCOMYCIN HYDROCHLORIDE 50 MG/ML
250 KIT ORAL EVERY 12 HOURS SCHEDULED
Status: DISCONTINUED | OUTPATIENT
Start: 2023-12-26 | End: 2024-01-02

## 2023-12-26 RX ADMIN — SIMETHICONE 80 MG: 80 TABLET, CHEWABLE ORAL at 09:02

## 2023-12-26 RX ADMIN — SODIUM CHLORIDE 1500 MG: 900 INJECTION INTRAVENOUS at 22:19

## 2023-12-26 RX ADMIN — TIOTROPIUM BROMIDE INHALATION SPRAY 2 PUFF: 3.12 SPRAY, METERED RESPIRATORY (INHALATION) at 08:43

## 2023-12-26 RX ADMIN — FUROSEMIDE 40 MG: 10 INJECTION, SOLUTION INTRAMUSCULAR; INTRAVENOUS at 09:00

## 2023-12-26 RX ADMIN — AZITHROMYCIN DIHYDRATE 500 MG: 250 TABLET ORAL at 09:00

## 2023-12-26 RX ADMIN — BUDESONIDE INHALATION 500 MCG: 0.5 SUSPENSION RESPIRATORY (INHALATION) at 08:44

## 2023-12-26 RX ADMIN — SODIUM CHLORIDE 1500 MG: 900 INJECTION INTRAVENOUS at 17:24

## 2023-12-26 RX ADMIN — APIXABAN 5 MG: 5 TABLET, FILM COATED ORAL at 20:15

## 2023-12-26 RX ADMIN — SODIUM CHLORIDE 1500 MG: 900 INJECTION INTRAVENOUS at 04:20

## 2023-12-26 RX ADMIN — IPRATROPIUM BROMIDE AND ALBUTEROL SULFATE 1 DOSE: 2.5; .5 SOLUTION RESPIRATORY (INHALATION) at 15:32

## 2023-12-26 RX ADMIN — PREDNISONE 20 MG: 20 TABLET ORAL at 09:00

## 2023-12-26 RX ADMIN — ARFORMOTEROL TARTRATE 15 MCG: 15 SOLUTION RESPIRATORY (INHALATION) at 22:09

## 2023-12-26 RX ADMIN — BUDESONIDE INHALATION 500 MCG: 0.5 SUSPENSION RESPIRATORY (INHALATION) at 22:09

## 2023-12-26 RX ADMIN — FUROSEMIDE 40 MG: 10 INJECTION, SOLUTION INTRAMUSCULAR; INTRAVENOUS at 17:16

## 2023-12-26 RX ADMIN — SIMETHICONE 80 MG: 80 TABLET, CHEWABLE ORAL at 20:41

## 2023-12-26 RX ADMIN — APIXABAN 5 MG: 5 TABLET, FILM COATED ORAL at 09:00

## 2023-12-26 RX ADMIN — ARFORMOTEROL TARTRATE 15 MCG: 15 SOLUTION RESPIRATORY (INHALATION) at 08:44

## 2023-12-26 RX ADMIN — ASPIRIN 81 MG: 81 TABLET, CHEWABLE ORAL at 09:00

## 2023-12-26 RX ADMIN — IPRATROPIUM BROMIDE AND ALBUTEROL SULFATE 1 DOSE: 2.5; .5 SOLUTION RESPIRATORY (INHALATION) at 22:09

## 2023-12-26 RX ADMIN — Medication 10 ML: at 09:00

## 2023-12-26 RX ADMIN — INSULIN LISPRO 2 UNITS: 100 INJECTION, SOLUTION INTRAVENOUS; SUBCUTANEOUS at 17:16

## 2023-12-26 RX ADMIN — Medication 10 ML: at 20:15

## 2023-12-26 RX ADMIN — INSULIN LISPRO 5 UNITS: 100 INJECTION, SOLUTION INTRAVENOUS; SUBCUTANEOUS at 12:23

## 2023-12-26 RX ADMIN — SODIUM CHLORIDE 1500 MG: 900 INJECTION INTRAVENOUS at 10:10

## 2023-12-26 RX ADMIN — INSULIN GLARGINE 15 UNITS: 100 INJECTION, SOLUTION SUBCUTANEOUS at 20:15

## 2023-12-26 RX ADMIN — INSULIN LISPRO 5 UNITS: 100 INJECTION, SOLUTION INTRAVENOUS; SUBCUTANEOUS at 09:02

## 2023-12-26 RX ADMIN — Medication 250 MG: at 20:14

## 2023-12-26 RX ADMIN — IPRATROPIUM BROMIDE AND ALBUTEROL SULFATE 1 DOSE: 2.5; .5 SOLUTION RESPIRATORY (INHALATION) at 08:44

## 2023-12-26 RX ADMIN — Medication 1 CAPSULE: at 17:16

## 2023-12-26 RX ADMIN — INSULIN LISPRO 5 UNITS: 100 INJECTION, SOLUTION INTRAVENOUS; SUBCUTANEOUS at 17:16

## 2023-12-26 RX ADMIN — IPRATROPIUM BROMIDE AND ALBUTEROL SULFATE 1 DOSE: 2.5; .5 SOLUTION RESPIRATORY (INHALATION) at 11:59

## 2023-12-26 RX ADMIN — ALLOPURINOL 200 MG: 100 TABLET ORAL at 09:00

## 2023-12-26 RX ADMIN — THIAMINE HCL TAB 100 MG 100 MG: 100 TAB at 09:00

## 2023-12-26 ASSESSMENT — PAIN SCALES - GENERAL
PAINLEVEL_OUTOF10: 0

## 2023-12-26 NOTE — PROGRESS NOTES
[facility]  Inpatient Progress Note    Reason for Consult: ABRAHAM  Requesting Physician:  Dr. ROSALINA Farias    CHIEF COMPLAINT: Fall    History obtained from records and patient.    HISTORY OF PRESENT ILLNESS:                Ishmael Gr  is 78 y.o. y.o. male with significant past medical history of anemia, COPD, diabetes mellitus type 2, gout, hypertension, obesity, obstructive sleep apnea, peripheral vascular disease who presents with fall.  His baseline oxygen need is at 6L.  He is currently admitted for ABRAHAM possible COPD exacerbation.  I am asked to see the patient because his creatinine has increased to 1.7 yesterday and 1.8 today.  On 12/16/2023 creatinine was 1.1.  Lowest systolic blood pressure in the 100s range.  He is on furosemide 80 mg twice daily as an outpatient, celecoxib and ibuprofen 600 mg every 4 hours for the past few days.  Denies any vomiting or diarrhea.  No new skin rash.  No change in urine output.  CT PE done 12/16/23.  Lowest SBP in the 100s range.  -500 mL since admission.    Subjective / interval history / nephrology update / medical decision making:   Patient was seen comfortably sitting up in chair,   Reported no active complaints/distress,   Renal labs noted    No SOB  Stable on NC    But leg edema - not improving,   Will start Lasix .      Past Medical History:     has a past medical history of Anemia, COPD (chronic obstructive pulmonary disease) ([facility]), Diabetes mellitus ([facility]), Edema, GI (gastrointestinal bleed), Gout, Hypertension, Morbid obesity ([facility]), Neuropathy, Obstructive sleep apnea, and Peripheral vascular disease ([facility]).   Past Surgical History:     has a past surgical history that includes Appendectomy; Total hip arthroplasty; and Colonoscopy (N/A, 5/7/2019).   Current Medications:    Current Facility-Administered Medications: furosemide (LASIX) injection 40 mg, 40 mg, IntraVENous, BID  insulin lispro (HUMALOG) injection vial 5 Units, 5 Units,

## 2023-12-26 NOTE — PROGRESS NOTES
Infectious Diseases   Progress Note      Admission Date: 12/20/2023  Hospital Day: Hospital Day: 7   Attending: Davey Farias MD  Date of service: 12/26/2023     Chief complaint/ Reason for consult:     Sepsis on admission with tachycardia, tachypnea, altered mental status  Methicillin-susceptible Staphylococcus aureus bacteremia  Acute respiratory failure with hypoxia, requiring 6 L oxygen-nasal cannula  Concern for aspiration pneumonia  Acute kidney injury  Bilateral hip prosthesis in place    Microbiology:        I have reviewed allavailable micro lab data and cultures    Blood culture (2/2) - collected on 12/20/2023: Methicillin-susceptible Staphylococcus aureus    Susceptibility    Staphylococcus aureus (2)    Antibiotic Interpretation Microscan  Method Status    clindamycin Sensitive <=0.25 mcg/mL BACTERIAL SUSCEPTIBILITY PANEL BY LAURYN     erythromycin Resistant >=8 mcg/mL BACTERIAL SUSCEPTIBILITY PANEL BY LAURYN     oxacillin Sensitive 0.5 mcg/mL BACTERIAL SUSCEPTIBILITY PANEL BY LAURYN     tetracycline Resistant >=16 mcg/mL BACTERIAL SUSCEPTIBILITY PANEL BY LAURYN     trimethoprim-sulfamethoxazole Sensitive <=10 mcg/mL BACTERIAL SUSCEPTIBILITY PANEL BY LAURYN         Blood culture  - collected on 12/22/2023: Negative so far      Antibiotics and immunizations:       Current antibiotics: All antibiotics and their doses were reviewed by me    Recent Abx Admin                     ampicillin-sulbactam (UNASYN) 1,500 mg in sodium chloride 0.9 % 50 mL IVPB (mini-bag) (mg) 1,500 mg New Bag 12/26/23 1010     1,500 mg New Bag  0420     1,500 mg New Bag 12/25/23 2019     1,500 mg New Bag  1607    azithromycin (ZITHROMAX) tablet 500 mg (mg) 500 mg Given 12/26/23 0900                      Immunization History: All immunization history was reviewed by me today.    Immunization History   Administered Date(s) Administered    COVID-19, PFIZER PURPLE top, DILUTE for use, (age 12 y+), 30mcg/0.3mL 03/05/2021, 04/02/2021, 10/06/2021

## 2023-12-26 NOTE — CARE COORDINATION
Discharge Planning.     The pt will need a pre-cert started for SNF. SW message therapy to have PT/OT see the pt for updated therapy notes.    Electronically signed by FRANCA Nguyễn on 12/26/2023 at 8:36 AM

## 2023-12-26 NOTE — PROGRESS NOTES
[facility] - Inpatient Rehabilitation Department   Phone: [phone]    Occupational Therapy    [] Initial Evaluation            [x] Daily Treatment Note         [] Discharge Summary      Patient: Ishmael Gr   : 1945   MRN: [id number]   Date of Service:  2023    Admitting Diagnosis:  ABRAHAM (acute kidney injury) (HCC)  Current Admission Summary:   Ishmael Gr is a 78 y.o. male who presents to the ED for evaluation for fall.  Patient was just discharged from the hospital after being brought in for respiratory issues and a fall at that time.  States today he fell and hit his head.  Does not fully remember it.  He was on his way to the bathroom when it happened.  States the only pain he is feeling is in his low back and going down his right leg.  Denies any new numbness or tingling or weakness.  Does have significant respiratory issues and feels short of breath initially with it but states he feels back to normal at this time.  He is on 6 L normally.   Past Medical History:  has a past medical history of Anemia, COPD (chronic obstructive pulmonary disease) ([facility]), Diabetes mellitus (HCC), Edema, GI (gastrointestinal bleed), Gout, Hypertension, Morbid obesity (HCC), Neuropathy, Obstructive sleep apnea, and Peripheral vascular disease (HCC).  Past Surgical History:  has a past surgical history that includes Appendectomy; Total hip arthroplasty; and Colonoscopy (N/A, 2019).    Discharge Recommendations: Ishmael Gr scored a 13/24 on the AM-PAC ADL Inpatient form. Current research shows that an AM-PAC score of 17 or less is typically not associated with a discharge to the patient's home setting. Based on the patient's AM-PAC score and their current ADL deficits, it is recommended that the patient have 3-5 sessions per week of Occupational Therapy at d/c to increase the patient's independence.  Please see assessment section for further patient specific details.    If patient discharges

## 2023-12-26 NOTE — PROGRESS NOTES
[facility]    12/26/2023 12:05 PM        Name: Ishmael Gr .              Admitted: 12/20/2023  Primary Care Provider: Anthony Bravo MD (Tel: [phone])    =      Subjective:    In bed denies any nausea vomiting or chest pain shortness of breath has improved  Current Medications  furosemide (LASIX) injection 40 mg, BID  insulin lispro (HUMALOG) injection vial 5 Units, TID WC  insulin glargine (LANTUS) injection vial 15 Units, Nightly  insulin lispro (HUMALOG) injection vial 0-4 Units, Nightly  dextrose bolus 10% 125 mL, PRN   Or  dextrose bolus 10% 250 mL, PRN  glucagon injection 1 mg, PRN  dextrose 10 % infusion, Continuous PRN  insulin lispro (HUMALOG) injection vial 0-8 Units, TID WC  ampicillin-sulbactam (UNASYN) 1,500 mg in sodium chloride 0.9 % 50 mL IVPB (mini-bag), Q6H  simethicone (MYLICON) chewable tablet 80 mg, Q6H PRN  arformoterol tartrate (BROVANA) nebulizer solution 15 mcg, BID RT  budesonide (PULMICORT) nebulizer suspension 500 mcg, BID RT  albuterol sulfate HFA (PROVENTIL;VENTOLIN;PROAIR) 108 (90 Base) MCG/ACT inhaler 2 puff, Q6H PRN  allopurinol (ZYLOPRIM) tablet 200 mg, Daily  apixaban (ELIQUIS) tablet 5 mg, BID  aspirin chewable tablet 81 mg, Daily  azithromycin (ZITHROMAX) tablet 500 mg, Daily  ipratropium 0.5 mg-albuterol 2.5 mg (DUONEB) nebulizer solution 1 Dose, 4x Daily RT  thiamine mononitrate tablet 100 mg, Daily  sodium chloride flush 0.9 % injection 5-40 mL, 2 times per day  sodium chloride flush 0.9 % injection 5-40 mL, PRN  0.9 % sodium chloride infusion, PRN  polyethylene glycol (GLYCOLAX) packet 17 g, Daily PRN  acetaminophen (TYLENOL) tablet 650 mg, Q6H PRN   Or  acetaminophen (TYLENOL) suppository 650 mg, Q6H PRN  tiotropium (SPIRIVA RESPIMAT) 2.5 MCG/ACT inhaler 2 puff, Daily RT        Objective:  /61   Pulse 82   Temp 98 °F (36.7 °C) (Temporal)   Resp 16   Ht  1.829 m (6' 0.01\")   Wt (!) 152.2 kg (335 lb 8.6 oz)   SpO2 95%   BMI 45.50 kg/m²     Intake/Output Summary (Last 24 hours) at 12/26/2023 1205  Last data filed at 12/26/2023 1040  Gross per 24 hour   Intake 915 ml   Output 4800 ml   Net -3885 ml        Wt Readings from Last 3 Encounters:   12/26/23 (!) 152.2 kg (335 lb 8.6 oz)   12/18/23 (!) 153.6 kg (338 lb 9.6 oz)   12/01/23 (!) 154.8 kg (341 lb 3.2 oz)       General appearance:  Appears comfortable. AAOx3  HEENT: atraumatic, Pupils equal, muscous membranes moist, no masses appreciated  Cardiovascular: rrr no murmurs appreciated  Respiratory: dimished breath sounds  Gastrointestinal: Abdomen soft, non-tender, BS+  EXT +  edema  Neurology: no gross focal deficts  Psychiatry: Appropriate affect. Not agitated  Skin: Warm, dry, no rashes appreciated    Labs and Tests:  CBC:   Recent Labs     12/24/23  0435 12/24/23  0953 12/25/23  0504 12/26/23  0450   WBC 9.7  --  14.7* 19.5*   HGB 7.5* 7.8* 7.4* 7.3*     --  347 393       BMP:    Recent Labs     12/24/23  0435 12/25/23  0504 12/26/23  0450   * 138 139   K 4.9 4.4 4.1   CL 99 100 99   CO2 30 33* 34*   BUN 82* 78* 71*   CREATININE 1.5* 1.5* 1.5*   GLUCOSE 386* 207* 170*       Hepatic:   No results for input(s): \"AST\", \"ALT\", \"ALB\", \"BILITOT\", \"ALKPHOS\" in the last 72 hours.    US RENAL COMPLETE   Final Result   No hydronephrosis or nephrolithiasis identified sonographically.      Increased renal parenchymal echogenicity bilaterally suggesting medical renal   disease.         CT PELVIS WO CONTRAST Additional Contrast? None   Final Result   Bilateral hip prostheses.      No periprosthetic fracture.         CT LUMBAR SPINE WO CONTRAST   Final Result   Levoconvex scoliosis with moderate to advanced degenerative disc disease and   facet arthropathy.      No fracture.         CT Head W/O Contrast   Final Result   No acute intracranial abnormality.         XR CHEST PORTABLE   Final Result   1. Improved

## 2023-12-26 NOTE — PROGRESS NOTES
[facility] - Inpatient Rehabilitation Department   Phone: [phone]    Physical Therapy    [] Initial Evaluation            [x] Daily Treatment Note         [] Discharge Summary      Patient: Ishmael Gr   : 1945   MRN: [id number]   Date of Service:  2023  Admitting Diagnosis: ABRAHAM (acute kidney injury) (HCC)    Current Admission Summary: The pt was admitted s/p a fall.  He has severe back and R hip pain.  He discharged home from the hospital a few days before he was readmitted.     Past Medical History:  has a past medical history of Anemia, COPD (chronic obstructive pulmonary disease) (HCC), Diabetes mellitus (HCC), Edema, GI (gastrointestinal bleed), Gout, Hypertension, Morbid obesity (HCC), Neuropathy, Obstructive sleep apnea, and Peripheral vascular disease (HCC).  Past Surgical History:  has a past surgical history that includes Appendectomy; Total hip arthroplasty; and Colonoscopy (N/A, 2019).    Discharge Recommendations: Ishmael Gr scored a 13/24 on the AM-PAC short mobility form. Current research shows that an AM-PAC score of 17 or less is typically not associated with a discharge to the patient's home setting. Based on the patient's AM-PAC score and their current functional mobility deficits, it is recommended that the patient have 3-5 sessions per week of Physical Therapy at d/c to increase the patient's independence.  Please see assessment section for further patient specific details.  If patient discharges prior to next session this note will serve as a discharge summary.  Please see below for the latest assessment towards goals.     DME Required For Discharge: DME to be determined at next level of care  Precautions/Restrictions: high fall risk  Weight Bearing Restrictions: no restrictions      Pre-Admission Information   Lives With: daughter 52 Who is an RN at [employer] working full-time And granddaughter 23,  reports family members are at home intermittently  Type of  strength, decreased endurance, decreased sensation, decreased balance, increased pain  Prognosis: fair  Clinical Assessment: Patient appears to have less pain this session than last.  He does require assistance to stand from chair but was unable to stand at all during initial evaluation.  He declined ambulation this session, reports fatigue with standing.  Recommend 24 hour assist and continued therapy at d/c.    Safety Interventions: patient left in chair, chair alarm in place, call light within reach, nurse notified, and maxi move recommended for transfers, appropriate lift sheet left in room    Plan  Frequency: 3-5 x/per week  Current Treatment Recommendations: strengthening, ROM, balance training, functional mobility training, transfer training, gait training, endurance training, neuromuscular re-education, and safety education    Goals  Patient Goals: pt is agreeable to pursuing rehab to regain his independence   Short Term Goals:  Time Frame: To be met prior to DC  Patient will complete bed mobility at maximum assistance   Patient will complete transfers at maximum assistance   Patient will ambulate 10 ft with use of rolling walker at maximum assistance  Patient to maintain standing at moderate assistance for 2 minutes.    Above goals reviewed on 12/26/2023.  All goals are ongoing at this time unless indicated above.      Therapy Session Time      Individual Group Co-treatment   Time In      1357   Time Out      1436   Minutes      39     Timed Code Treatment Minutes:   39  Total Treatment Minutes:  39       Electronically Signed By: Brendon Coleman PT, DPT, ATC-R [id number]

## 2023-12-26 NOTE — CARE COORDINATION
Discharge Planning.     The  called Simone  [phone] from [facility] who stated they have already pulled the therapy notes and have started pre-cert.    Pre-cert started for [facility] on 12/26/2023.      Electronically signed by FRANCA Nguyễn on 12/26/2023 at 3:43 PM

## 2023-12-27 PROBLEM — Z71.89 DIABETES EDUCATION, ENCOUNTER FOR: Status: ACTIVE | Noted: 2023-02-21

## 2023-12-27 LAB
ALBUMIN SERPL-MCNC: 3 G/DL (ref 3.4–5)
ANION GAP SERPL CALCULATED.3IONS-SCNC: 7 MMOL/L (ref 3–16)
BASOPHILS # BLD: 0 K/UL (ref 0–0.2)
BASOPHILS NFR BLD: 0 %
BUN SERPL-MCNC: 62 MG/DL (ref 7–20)
CALCIUM SERPL-MCNC: 8.8 MG/DL (ref 8.3–10.6)
CHLORIDE SERPL-SCNC: 96 MMOL/L (ref 99–110)
CO2 SERPL-SCNC: 35 MMOL/L (ref 21–32)
CREAT SERPL-MCNC: 1.5 MG/DL (ref 0.8–1.3)
DEPRECATED RDW RBC AUTO: 15.1 % (ref 12.4–15.4)
EKG ATRIAL RATE: 94 BPM
EKG DIAGNOSIS: NORMAL
EKG P AXIS: 40 DEGREES
EKG P-R INTERVAL: 220 MS
EKG Q-T INTERVAL: 388 MS
EKG QRS DURATION: 118 MS
EKG QTC CALCULATION (BAZETT): 485 MS
EKG R AXIS: -43 DEGREES
EKG T AXIS: 49 DEGREES
EKG VENTRICULAR RATE: 94 BPM
EOSINOPHIL # BLD: 0 K/UL (ref 0–0.6)
EOSINOPHIL NFR BLD: 0 %
GFR SERPLBLD CREATININE-BSD FMLA CKD-EPI: 48 ML/MIN/{1.73_M2}
GLUCOSE BLD-MCNC: 142 MG/DL (ref 70–99)
GLUCOSE BLD-MCNC: 146 MG/DL (ref 70–99)
GLUCOSE BLD-MCNC: 164 MG/DL (ref 70–99)
GLUCOSE BLD-MCNC: 223 MG/DL (ref 70–99)
GLUCOSE SERPL-MCNC: 133 MG/DL (ref 70–99)
HCT VFR BLD AUTO: 21.9 % (ref 40.5–52.5)
HGB BLD-MCNC: 7.2 G/DL (ref 13.5–17.5)
LYMPHOCYTES # BLD: 1.2 K/UL (ref 1–5.1)
LYMPHOCYTES NFR BLD: 6 %
MACROCYTES BLD QL SMEAR: ABNORMAL
MAGNESIUM SERPL-MCNC: 1.9 MG/DL (ref 1.8–2.4)
MCH RBC QN AUTO: 33.6 PG (ref 26–34)
MCHC RBC AUTO-ENTMCNC: 33 G/DL (ref 31–36)
MCV RBC AUTO: 101.8 FL (ref 80–100)
METAMYELOCYTES NFR BLD MANUAL: 3 %
MONOCYTES # BLD: 0.6 K/UL (ref 0–1.3)
MONOCYTES NFR BLD: 3 %
NEUTROPHILS # BLD: 18.5 K/UL (ref 1.7–7.7)
NEUTROPHILS NFR BLD: 83 %
NEUTS BAND NFR BLD MANUAL: 5 % (ref 0–7)
PERFORMED ON: ABNORMAL
PHOSPHATE SERPL-MCNC: 4.9 MG/DL (ref 2.5–4.9)
PLATELET # BLD AUTO: 469 K/UL (ref 135–450)
PLATELET BLD QL SMEAR: ABNORMAL
PMV BLD AUTO: 6.9 FL (ref 5–10.5)
POLYCHROMASIA BLD QL SMEAR: ABNORMAL
POTASSIUM SERPL-SCNC: 3.7 MMOL/L (ref 3.5–5.1)
PROCALCITONIN SERPL IA-MCNC: 0.3 NG/ML (ref 0–0.15)
RBC # BLD AUTO: 2.15 M/UL (ref 4.2–5.9)
SLIDE REVIEW: ABNORMAL
SODIUM SERPL-SCNC: 138 MMOL/L (ref 136–145)
WBC # BLD AUTO: 20.3 K/UL (ref 4–11)

## 2023-12-27 PROCEDURE — 6360000002 HC RX W HCPCS: Performed by: INTERNAL MEDICINE

## 2023-12-27 PROCEDURE — 94640 AIRWAY INHALATION TREATMENT: CPT

## 2023-12-27 PROCEDURE — 6370000000 HC RX 637 (ALT 250 FOR IP): Performed by: INTERNAL MEDICINE

## 2023-12-27 PROCEDURE — 97535 SELF CARE MNGMENT TRAINING: CPT

## 2023-12-27 PROCEDURE — 93005 ELECTROCARDIOGRAM TRACING: CPT | Performed by: INTERNAL MEDICINE

## 2023-12-27 PROCEDURE — 84145 PROCALCITONIN (PCT): CPT

## 2023-12-27 PROCEDURE — 80069 RENAL FUNCTION PANEL: CPT

## 2023-12-27 PROCEDURE — 2060000000 HC ICU INTERMEDIATE R&B

## 2023-12-27 PROCEDURE — 94761 N-INVAS EAR/PLS OXIMETRY MLT: CPT

## 2023-12-27 PROCEDURE — 85025 COMPLETE CBC W/AUTO DIFF WBC: CPT

## 2023-12-27 PROCEDURE — 2700000000 HC OXYGEN THERAPY PER DAY

## 2023-12-27 PROCEDURE — 2580000003 HC RX 258: Performed by: INTERNAL MEDICINE

## 2023-12-27 PROCEDURE — 92526 ORAL FUNCTION THERAPY: CPT

## 2023-12-27 PROCEDURE — 6360000002 HC RX W HCPCS: Performed by: STUDENT IN AN ORGANIZED HEALTH CARE EDUCATION/TRAINING PROGRAM

## 2023-12-27 PROCEDURE — 83735 ASSAY OF MAGNESIUM: CPT

## 2023-12-27 PROCEDURE — 93010 ELECTROCARDIOGRAM REPORT: CPT | Performed by: INTERNAL MEDICINE

## 2023-12-27 PROCEDURE — 6370000000 HC RX 637 (ALT 250 FOR IP): Performed by: NURSE PRACTITIONER

## 2023-12-27 PROCEDURE — 92610 EVALUATE SWALLOWING FUNCTION: CPT

## 2023-12-27 PROCEDURE — 36415 COLL VENOUS BLD VENIPUNCTURE: CPT

## 2023-12-27 PROCEDURE — 99233 SBSQ HOSP IP/OBS HIGH 50: CPT | Performed by: INTERNAL MEDICINE

## 2023-12-27 RX ADMIN — INSULIN LISPRO 5 UNITS: 100 INJECTION, SOLUTION INTRAVENOUS; SUBCUTANEOUS at 12:34

## 2023-12-27 RX ADMIN — SODIUM CHLORIDE 1500 MG: 900 INJECTION INTRAVENOUS at 04:27

## 2023-12-27 RX ADMIN — Medication 1 CAPSULE: at 09:26

## 2023-12-27 RX ADMIN — BUDESONIDE INHALATION 500 MCG: 0.5 SUSPENSION RESPIRATORY (INHALATION) at 22:54

## 2023-12-27 RX ADMIN — Medication 250 MG: at 09:26

## 2023-12-27 RX ADMIN — SODIUM CHLORIDE 1500 MG: 900 INJECTION INTRAVENOUS at 12:35

## 2023-12-27 RX ADMIN — IPRATROPIUM BROMIDE AND ALBUTEROL SULFATE 1 DOSE: 2.5; .5 SOLUTION RESPIRATORY (INHALATION) at 09:35

## 2023-12-27 RX ADMIN — ASPIRIN 81 MG: 81 TABLET, CHEWABLE ORAL at 09:26

## 2023-12-27 RX ADMIN — INSULIN LISPRO 5 UNITS: 100 INJECTION, SOLUTION INTRAVENOUS; SUBCUTANEOUS at 09:30

## 2023-12-27 RX ADMIN — SODIUM CHLORIDE 1500 MG: 900 INJECTION INTRAVENOUS at 22:28

## 2023-12-27 RX ADMIN — ALLOPURINOL 200 MG: 100 TABLET ORAL at 09:26

## 2023-12-27 RX ADMIN — ARFORMOTEROL TARTRATE 15 MCG: 15 SOLUTION RESPIRATORY (INHALATION) at 22:54

## 2023-12-27 RX ADMIN — INSULIN LISPRO 5 UNITS: 100 INJECTION, SOLUTION INTRAVENOUS; SUBCUTANEOUS at 17:24

## 2023-12-27 RX ADMIN — Medication 10 ML: at 20:50

## 2023-12-27 RX ADMIN — Medication 10 ML: at 09:26

## 2023-12-27 RX ADMIN — IPRATROPIUM BROMIDE AND ALBUTEROL SULFATE 1 DOSE: 2.5; .5 SOLUTION RESPIRATORY (INHALATION) at 12:45

## 2023-12-27 RX ADMIN — IPRATROPIUM BROMIDE AND ALBUTEROL SULFATE 1 DOSE: 2.5; .5 SOLUTION RESPIRATORY (INHALATION) at 16:18

## 2023-12-27 RX ADMIN — ARFORMOTEROL TARTRATE 15 MCG: 15 SOLUTION RESPIRATORY (INHALATION) at 09:35

## 2023-12-27 RX ADMIN — IPRATROPIUM BROMIDE AND ALBUTEROL SULFATE 1 DOSE: 2.5; .5 SOLUTION RESPIRATORY (INHALATION) at 22:54

## 2023-12-27 RX ADMIN — BUDESONIDE INHALATION 500 MCG: 0.5 SUSPENSION RESPIRATORY (INHALATION) at 09:35

## 2023-12-27 RX ADMIN — SIMETHICONE 80 MG: 80 TABLET, CHEWABLE ORAL at 17:24

## 2023-12-27 RX ADMIN — SIMETHICONE 80 MG: 80 TABLET, CHEWABLE ORAL at 09:26

## 2023-12-27 RX ADMIN — SODIUM CHLORIDE 1500 MG: 900 INJECTION INTRAVENOUS at 17:24

## 2023-12-27 RX ADMIN — Medication 250 MG: at 20:50

## 2023-12-27 RX ADMIN — THIAMINE HCL TAB 100 MG 100 MG: 100 TAB at 09:26

## 2023-12-27 RX ADMIN — APIXABAN 5 MG: 5 TABLET, FILM COATED ORAL at 09:26

## 2023-12-27 RX ADMIN — Medication 1 CAPSULE: at 17:24

## 2023-12-27 RX ADMIN — APIXABAN 5 MG: 5 TABLET, FILM COATED ORAL at 20:50

## 2023-12-27 RX ADMIN — FUROSEMIDE 40 MG: 10 INJECTION, SOLUTION INTRAMUSCULAR; INTRAVENOUS at 09:26

## 2023-12-27 ASSESSMENT — PAIN SCALES - GENERAL
PAINLEVEL_OUTOF10: 0

## 2023-12-27 NOTE — PLAN OF CARE
Musculoskeletal - Adult  Goal: Return mobility to safest level of function  Outcome: Progressing  Goal: Maintain proper alignment of affected body part  Outcome: Progressing  Goal: Return ADL status to a safe level of function  Outcome: Progressing     Problem: Gastrointestinal - Adult  Goal: Minimal or absence of nausea and vomiting  Outcome: Progressing  Goal: Maintains or returns to baseline bowel function  Outcome: Progressing  Goal: Maintains adequate nutritional intake  Outcome: Progressing     Problem: Genitourinary - Adult  Goal: Absence of urinary retention  Outcome: Progressing     Problem: Infection - Adult  Goal: Absence of infection at discharge  Outcome: Progressing  Goal: Absence of infection during hospitalization  Outcome: Progressing     Problem: Metabolic/Fluid and Electrolytes - Adult  Goal: Electrolytes maintained within normal limits  Outcome: Progressing  Goal: Hemodynamic stability and optimal renal function maintained  Outcome: Progressing  Goal: Glucose maintained within prescribed range  Outcome: Progressing     Problem: Hematologic - Adult  Goal: Maintains hematologic stability  Outcome: Progressing     Problem: Anxiety  Goal: Will report anxiety at manageable levels  Description: INTERVENTIONS:  1. Administer medication as ordered  2. Teach and rehearse alternative coping skills  3. Provide emotional support with 1:1 interaction with staff  12/27/2023 0828 by Lynne Jackson, RN  Outcome: Progressing  12/27/2023 0249 by Britney Saba RN  Outcome: Progressing     Problem: Decision Making  Goal: Pt/Family able to effectively weigh alternatives and participate in decision making related to treatment and care  Description: INTERVENTIONS:  1. Determine when there are differences between patient's view, family's view, and healthcare provider's view of condition  2. Facilitate patient and family articulation of goals for care  3. Help patient and family identify pros/cons of  threat to safety call refer to organization policy.  7. Initiate consult with , Psychosocial CNS, Spiritual Care as appropriate  Outcome: Progressing     Problem: Chronic Conditions and Co-morbidities  Goal: Patient's chronic conditions and co-morbidity symptoms are monitored and maintained or improved  Outcome: Progressing

## 2023-12-27 NOTE — PROGRESS NOTES
Infectious Diseases   Progress Note      Admission Date: 12/20/2023  Hospital Day: Hospital Day: 8   Attending: Davey Farias MD  Date of service: 12/27/2023     Chief complaint/ Reason for consult:     Sepsis on admission with tachycardia, tachypnea, altered mental status  Methicillin-susceptible Staphylococcus aureus bacteremia  Acute respiratory failure with hypoxia, requiring 6 L oxygen-nasal cannula  Concern for aspiration pneumonia  Acute kidney injury  Bilateral hip prosthesis in place    Microbiology:        I have reviewed allavailable micro lab data and cultures    Blood culture (2/2) - collected on 12/20/2023: Methicillin-susceptible Staphylococcus aureus    Susceptibility    Staphylococcus aureus (2)    Antibiotic Interpretation Microscan  Method Status    clindamycin Sensitive <=0.25 mcg/mL BACTERIAL SUSCEPTIBILITY PANEL BY LAURYN     erythromycin Resistant >=8 mcg/mL BACTERIAL SUSCEPTIBILITY PANEL BY LAURYN     oxacillin Sensitive 0.5 mcg/mL BACTERIAL SUSCEPTIBILITY PANEL BY LAURYN     tetracycline Resistant >=16 mcg/mL BACTERIAL SUSCEPTIBILITY PANEL BY LAURYN     trimethoprim-sulfamethoxazole Sensitive <=10 mcg/mL BACTERIAL SUSCEPTIBILITY PANEL BY LAURYN         Blood culture  - collected on 12/22/2023: Negative so far      Antibiotics and immunizations:       Current antibiotics: All antibiotics and their doses were reviewed by me    Recent Abx Admin                     ampicillin-sulbactam (UNASYN) 1,500 mg in sodium chloride 0.9 % 50 mL IVPB (mini-bag) (mg) 1,500 mg New Bag 12/27/23 1235     1,500 mg New Bag  0427     1,500 mg New Bag 12/26/23 2219     1,500 mg New Bag  1724    vancomycin (FIRVANQ) 50 MG/ML oral solution 250 mg (mg) 250 mg Given 12/27/23 0926     250 mg Given 12/26/23 2014                      Immunization History: All immunization history was reviewed by me today.    Immunization History   Administered Date(s) Administered    COVID-19, PFIZER PURPLE top, DILUTE for use, (age 12 y+),

## 2023-12-27 NOTE — PROGRESS NOTES
[facility] - Inpatient Rehabilitation Department   Phone: [phone]    Occupational Therapy    [] Initial Evaluation            [x] Daily Treatment Note         [] Discharge Summary      Patient: Ishmael Gr   : 1945   MRN: [id number]   Date of Service:  2023    Admitting Diagnosis:  ABRAHAM (acute kidney injury) (HCC)  Current Admission Summary:   Ishmael Gr is a 78 y.o. male who presents to the ED for evaluation for fall.  Patient was just discharged from the hospital after being brought in for respiratory issues and a fall at that time.  States today he fell and hit his head.  Does not fully remember it.  He was on his way to the bathroom when it happened.  States the only pain he is feeling is in his low back and going down his right leg.  Denies any new numbness or tingling or weakness.  Does have significant respiratory issues and feels short of breath initially with it but states he feels back to normal at this time.  He is on 6 L normally.   Past Medical History:  has a past medical history of Anemia, COPD (chronic obstructive pulmonary disease) ([facility]), Diabetes mellitus (HCC), Edema, GI (gastrointestinal bleed), Gout, Hypertension, Morbid obesity (HCC), Neuropathy, Obstructive sleep apnea, and Peripheral vascular disease (HCC).  Past Surgical History:  has a past surgical history that includes Appendectomy; Total hip arthroplasty; and Colonoscopy (N/A, 2019).    Discharge Recommendations: Ishmael Gr scored a 13/24 on the AM-PAC ADL Inpatient form. Current research shows that an AM-PAC score of 17 or less is typically not associated with a discharge to the patient's home setting. Based on the patient's AM-PAC score and their current ADL deficits, it is recommended that the patient have 3-5 sessions per week of Occupational Therapy at d/c to increase the patient's independence.  Please see assessment section for further patient specific details.    If patient discharges  requires min A for transfer and total A for LB ADLs. Patient is unsafe to return home at this time. Patient will benefit from continued OT services to address above deficits and maximize safety and independence.   Safety Interventions: patient left in chair, chair alarm in place, call light within reach, patient at risk for falls, and nurse notified    Plan  Frequency: 3-5 x/per week  Current Treatment Recommendations: strengthening, balance training, functional mobility training, transfer training, endurance training, patient/caregiver education, ADL/self-care training, and pain management    Goals  Patient Goals: return to PLOF   Short Term Goals:  Time Frame: discharge    Patient will complete upper body ADL at set up assistance   Patient will complete lower body ADL at maximum assistance   Patient will complete toileting at maximum assistance   Patient will complete functional transfers at maximum assistance   Patient will increase [facility] ADL score = to or > than 17/24    Above goals reviewed on 12/27/2023.  All goals are ongoing at this time unless indicated above.       Therapy Session Time     Individual Group Co-treatment   Time In 1458     Time Out 1524     Minutes 26          Timed Code Treatment Minutes:  26 Minutes  Total Treatment Minutes:  26 Minutes        Electronically Signed By: SAVAGE Ruvalcaba OTR/L [id number]

## 2023-12-27 NOTE — PROGRESS NOTES
[facility]    12/27/2023 10:43 AM        Name: Ishmael Gr .              Admitted: 12/20/2023  Primary Care Provider: Anthony Bravo MD (Tel: [phone])    =      Subjective:    Sob improved no n.v or chest pain  Current Medications  vancomycin (FIRVANQ) 50 MG/ML oral solution 250 mg, 2 times per day  lactobacillus (CULTURELLE) capsule 1 capsule, BID WC  insulin lispro (HUMALOG) injection vial 5 Units, TID WC  insulin lispro (HUMALOG) injection vial 0-4 Units, Nightly  dextrose bolus 10% 125 mL, PRN   Or  dextrose bolus 10% 250 mL, PRN  glucagon injection 1 mg, PRN  dextrose 10 % infusion, Continuous PRN  insulin lispro (HUMALOG) injection vial 0-8 Units, TID WC  ampicillin-sulbactam (UNASYN) 1,500 mg in sodium chloride 0.9 % 50 mL IVPB (mini-bag), Q6H  simethicone (MYLICON) chewable tablet 80 mg, Q6H PRN  arformoterol tartrate (BROVANA) nebulizer solution 15 mcg, BID RT  budesonide (PULMICORT) nebulizer suspension 500 mcg, BID RT  albuterol sulfate HFA (PROVENTIL;VENTOLIN;PROAIR) 108 (90 Base) MCG/ACT inhaler 2 puff, Q6H PRN  allopurinol (ZYLOPRIM) tablet 200 mg, Daily  apixaban (ELIQUIS) tablet 5 mg, BID  aspirin chewable tablet 81 mg, Daily  ipratropium 0.5 mg-albuterol 2.5 mg (DUONEB) nebulizer solution 1 Dose, 4x Daily RT  thiamine mononitrate tablet 100 mg, Daily  sodium chloride flush 0.9 % injection 5-40 mL, 2 times per day  sodium chloride flush 0.9 % injection 5-40 mL, PRN  0.9 % sodium chloride infusion, PRN  polyethylene glycol (GLYCOLAX) packet 17 g, Daily PRN  acetaminophen (TYLENOL) tablet 650 mg, Q6H PRN   Or  acetaminophen (TYLENOL) suppository 650 mg, Q6H PRN  tiotropium (SPIRIVA RESPIMAT) 2.5 MCG/ACT inhaler 2 puff, Daily RT        Objective:  BP (!) 121/59   Pulse 75   Temp 98.1 °F (36.7 °C) (Temporal)   Resp 18   Ht 1.829 m (6' 0.01\")   Wt (!) 147.9 kg (326 lb)   SpO2 96%   BMI  Result   1. Improved aeration.             Recent imaging reviewed    Problem List  Principal Problem:    ABRAHAM (acute kidney injury) ([facility])  Active Problems:    Encephalomalacia    Essential hypertension    Diabetes mellitus type 2 in obese ([facility])    Morbid obesity due to excess calories (HCC)    PVD (peripheral vascular disease) ([facility])    Chronic obstructive pulmonary disease ([facility])    Fall    Acute right-sided low back pain with right-sided sciatica    Right hip pain    Fall at home    Scoliosis of lumbar spine    Pain due to hip joint prosthesis, initial encounter ([facility])    Aspiration pneumonia ([facility])    Bacteremia due to methicillin susceptible Staphylococcus aureus (MSSA)  Resolved Problems:    * No resolved hospital problems. *       Assessment & Plan:   ABRAHAM: cr stable bmp in am    Acute on chronic hypoxic and hyperpcanic resp failure secondary to copd exacerbation  Off steroids  - duo nesb  =- pulm on board    Staph bacteremiea  Iv atbx  - rpeat cultures negative  Wbc up but procal improved will repeat in am    Aspiraiton: speech eval    Hypertension  -PRN hydralazine    Type 2 diabetes withy hyperglycemia due to steroids a1c 6.2 , off steroids stop lantus conintue ssi  DVT prophylaxis eliquis  Code status full code      Davey Farias MD   12/27/2023 10:43 AM

## 2023-12-27 NOTE — PLAN OF CARE
Problem: Safety - Adult  Goal: Free from fall injury  Outcome: Progressing     Problem: Pain  Goal: Verbalizes/displays adequate comfort level or baseline comfort level  Outcome: Progressing     Problem: ABCDS Injury Assessment  Goal: Absence of physical injury  Outcome: Progressing     Problem: Anxiety  Goal: Will report anxiety at manageable levels  Description: INTERVENTIONS:  1. Administer medication as ordered  2. Teach and rehearse alternative coping skills  3. Provide emotional support with 1:1 interaction with staff  Outcome: Progressing

## 2023-12-27 NOTE — CARE COORDINATION
Discharge Planning:     (CM) Placed call to [facility] LVM for Simone in admissions, report this CM awaiting approval on for SNF authorization.   CM provided contact information.     Electronically signed by Lynne Costa on 12/27/2023 at 8:15 AM     CM received a call from Stella at [facility] [phone].     Stella reports she is following Patient for SNF admission. Stella states cannot do PIV Lasix at SNF. YENNIFER confirms, look like lasix IV order is complete.   Pre-Cert Approved and valid until 12/29. MD made aware of the above  via PS.     Electronically signed by Lynne Costa on 12/27/2023 at 2:13 PM

## 2023-12-27 NOTE — PROGRESS NOTES
Facility/Department: [address]  Speech Language Pathology  DYSPHAGIA BEDSIDE SWALLOW EVALUATION     Patient: Ishmael Gr   : 1945   MRN: [id number]      Evaluation Date: 2023   Admitting Diagnosis: Right hip pain [M25.551]  ABRAHAM (acute kidney injury) (HCC) [N17.9]  Fall, initial encounter [W19.XXXA]  Acute right-sided low back pain with right-sided sciatica [M54.41]  Pain: Did not state                                                       H&P: 2023  \"Ishmael Gr is a 78 y.o. male spittle with acute COPD exacerbation will discharge patient is having difficulty ambulating at home denies increased shortness of breath cough fever chills nausea vomiting does complain of pain due to fall CT in the ED was negative for any acute fractures\"    Imaging:  Chest X-ray: 2023  IMPRESSION:  1. Improved aeration.    2023: Preliminary    Head CT: 2023  IMPRESSION:  No acute intracranial abnormality.    History/Prior Level of Function:   Living Status: Private residence  Prior Dysphagia History: Seen by speech therapy at this facility  s/p CVA and most recently -. Recommendations have been for regular diet and thin liquids. Pt denies concerns with swallowing at this time.   Reason for referral: SLP evaluation orders received due to new diagnosis of PNA  and respiratory status     Dysphagia Impressions/Diagnosis: Oropharyngeal Dysphagia   Pt alert and responsive, with appropriate participation in conversation. Positioned Upright in chair. On  6L O2 via nasal cannula  with RR <20/min. Oral motor exam revealed functional lingual, labial, and buccal ROM and coordination. Dentition was adequate for mastication. Laryngeal function assessment revealed present volitional swallow, strong and congested volitional cough, and clear vocal quality. Pt assessed with PO presentations, fed with set-up assistance from SLP: thin liquids, puree, soft and bite-sized, and solids. Oral phase  characterized by functional labial seal with no anterior spillage, timely oral manipulation/mastication, and complete A-P transit with no oral residue post swallow. Pt demonstrating large bites at an increased rate of intake despite education re: safe swallowing strategies. No overt clinical s/s aspiration across PO intake; however, cannot rule out silent aspiration at bedside.     Overall, pt with dysphagia risk factors of prior CVA, co-morbidities (COPD, STEF), and current respiratory status. Therefore, aspiration precautions should be in place. Recommend continuation of regular diet with thin liquids and ongoing intervention for diet tolerance, with consideration of further assessment of pharyngeal phase via instrumental swallow evaluation if concern for aspiration contributing to current respiratory status.      Recommended Diet and Intervention 12/27/2023:  Diet Solids Recommendation:  Regular texture diet  Liquid Consistency Recommendation:  Thin liquids  Recommended form of Meds: Meds whole with water       Compensatory Swallowing Strategies:  Upright as possible with all PO intake , Small bites/sips , Eat/feed slowly, Remain upright 30-45 min     SHORT TERM DYSPHAGIA GOALS/PLAN OF CARE: Speech therapy for dysphagia tx 3-5 times per week during acute care stay.    Pt will functionally tolerate recommended diet with no overt clinical s/s of aspiration   Pt will demonstrate understanding of aspiration risk and precautions via education/demonstration with occasional prompting  If clinical s/s of aspiration/penetration continue to be noted, Pt will participate in Modified Barium Swallow Study     Dysphagia Therapeutic Intervention:  Diet Tolerance Monitoring , Patient/Family Education , Therapeutic Trials with SLP     Discharge Recommendations: Discharge recommendations to be determined pending ongoing follow-up during acute care stay    Patient Positioning: Upright in chair    Current Diet Level (prior to

## 2023-12-27 NOTE — PROGRESS NOTES
[facility]  Inpatient Progress Note    Reason for Consult: ABRAHAM  Requesting Physician:  Dr. ROSALINA Farias    CHIEF COMPLAINT: Fall    History obtained from records and patient.    HISTORY OF PRESENT ILLNESS:                Ishmael Gr  is 78 y.o. y.o. male with significant past medical history of anemia, COPD, diabetes mellitus type 2, gout, hypertension, obesity, obstructive sleep apnea, peripheral vascular disease who presents with fall.  His baseline oxygen need is at 6L.  He is currently admitted for ABRAHAM possible COPD exacerbation.  I am asked to see the patient because his creatinine has increased to 1.7 yesterday and 1.8 today.  On 12/16/2023 creatinine was 1.1.  Lowest systolic blood pressure in the 100s range.  He is on furosemide 80 mg twice daily as an outpatient, celecoxib and ibuprofen 600 mg every 4 hours for the past few days.  Denies any vomiting or diarrhea.  No new skin rash.  No change in urine output.  CT PE done 12/16/23.  Lowest SBP in the 100s range.  -500 mL since admission.    Subjective / interval history / nephrology update / medical decision making:   Patient was seen comfortably sitting up in chair,   Reported no active complaints/distress,   Renal labs noted    No SOB  Stable on NC        Past Medical History:     has a past medical history of Anemia, COPD (chronic obstructive pulmonary disease) (HCC), Diabetes mellitus (HCC), Edema, GI (gastrointestinal bleed), Gout, Hypertension, Morbid obesity (HCC), Neuropathy, Obstructive sleep apnea, and Peripheral vascular disease ([facility]).   Past Surgical History:     has a past surgical history that includes Appendectomy; Total hip arthroplasty; and Colonoscopy (N/A, 5/7/2019).   Current Medications:    Current Facility-Administered Medications: vancomycin (FIRVANQ) 50 MG/ML oral solution 250 mg, 250 mg, Oral, 2 times per day  lactobacillus (CULTURELLE) capsule 1 capsule, 1 capsule, Oral, BID WC  insulin lispro  participate in this patient's care. Please do not hesitate to contact me for any questions/concerns. We will follow along with you.     Osmin Herrera MD  [facility]   Phone: [phone] or [facility]  Fax: [phone]    Severally ill, at risk of impending organ failure needing  higher level of care/monitoring.   Time spent that included face-to-face meeting/discussion with patient, patient's family -as available, and treatment team (including primary/referring team and other consultants; included coordination of care with the treatment team; and review of patient's electronic medical records and ordering appropriates tests.

## 2023-12-28 PROBLEM — Z79.2 RECEIVING INTRAVENOUS ANTIBIOTIC TREATMENT AS OUTPATIENT: Status: ACTIVE | Noted: 2023-12-28

## 2023-12-28 LAB
ABO + RH BLD: NORMAL
ALBUMIN SERPL-MCNC: 2.9 G/DL (ref 3.4–5)
ANION GAP SERPL CALCULATED.3IONS-SCNC: 7 MMOL/L (ref 3–16)
BASO STIPL BLD QL SMEAR: ABNORMAL
BASOPHILS # BLD: 0 K/UL (ref 0–0.2)
BASOPHILS NFR BLD: 0 %
BLD GP AB SCN SERPL QL: NORMAL
BLOOD BANK DISPENSE STATUS: NORMAL
BLOOD BANK PRODUCT CODE: NORMAL
BPU ID: NORMAL
BUN SERPL-MCNC: 53 MG/DL (ref 7–20)
CALCIUM SERPL-MCNC: 8.6 MG/DL (ref 8.3–10.6)
CHLORIDE SERPL-SCNC: 99 MMOL/L (ref 99–110)
CO2 SERPL-SCNC: 35 MMOL/L (ref 21–32)
CREAT SERPL-MCNC: 1.4 MG/DL (ref 0.8–1.3)
DEPRECATED RDW RBC AUTO: 15.7 % (ref 12.4–15.4)
DESCRIPTION BLOOD BANK: NORMAL
EOSINOPHIL # BLD: 0.5 K/UL (ref 0–0.6)
EOSINOPHIL NFR BLD: 3 %
GFR SERPLBLD CREATININE-BSD FMLA CKD-EPI: 52 ML/MIN/{1.73_M2}
GLUCOSE BLD-MCNC: 127 MG/DL (ref 70–99)
GLUCOSE BLD-MCNC: 150 MG/DL (ref 70–99)
GLUCOSE BLD-MCNC: 166 MG/DL (ref 70–99)
GLUCOSE BLD-MCNC: 201 MG/DL (ref 70–99)
GLUCOSE SERPL-MCNC: 155 MG/DL (ref 70–99)
HCT VFR BLD AUTO: 21.1 % (ref 40.5–52.5)
HCT VFR BLD AUTO: 23.6 % (ref 40.5–52.5)
HGB BLD-MCNC: 7 G/DL (ref 13.5–17.5)
HGB BLD-MCNC: 7.6 G/DL (ref 13.5–17.5)
LYMPHOCYTES # BLD: 0.9 K/UL (ref 1–5.1)
LYMPHOCYTES NFR BLD: 5 %
MAGNESIUM SERPL-MCNC: 1.8 MG/DL (ref 1.8–2.4)
MCH RBC QN AUTO: 33.5 PG (ref 26–34)
MCHC RBC AUTO-ENTMCNC: 33.1 G/DL (ref 31–36)
MCV RBC AUTO: 101.1 FL (ref 80–100)
METAMYELOCYTES NFR BLD MANUAL: 1 %
MONOCYTES # BLD: 0.5 K/UL (ref 0–1.3)
MONOCYTES NFR BLD: 3 %
MYELOCYTES NFR BLD MANUAL: 6 %
NEUTROPHILS # BLD: 15.1 K/UL (ref 1.7–7.7)
NEUTROPHILS NFR BLD: 80 %
NEUTS BAND NFR BLD MANUAL: 2 % (ref 0–7)
OVALOCYTES BLD QL SMEAR: ABNORMAL
PERFORMED ON: ABNORMAL
PHOSPHATE SERPL-MCNC: 4.3 MG/DL (ref 2.5–4.9)
PLATELET # BLD AUTO: 485 K/UL (ref 135–450)
PLATELET BLD QL SMEAR: ABNORMAL
PMV BLD AUTO: 6.5 FL (ref 5–10.5)
POLYCHROMASIA BLD QL SMEAR: ABNORMAL
POTASSIUM SERPL-SCNC: 4 MMOL/L (ref 3.5–5.1)
RBC # BLD AUTO: 2.09 M/UL (ref 4.2–5.9)
SLIDE REVIEW: ABNORMAL
SODIUM SERPL-SCNC: 141 MMOL/L (ref 136–145)
WBC # BLD AUTO: 17 K/UL (ref 4–11)

## 2023-12-28 PROCEDURE — 2580000003 HC RX 258: Performed by: INTERNAL MEDICINE

## 2023-12-28 PROCEDURE — 6360000002 HC RX W HCPCS: Performed by: INTERNAL MEDICINE

## 2023-12-28 PROCEDURE — 6370000000 HC RX 637 (ALT 250 FOR IP): Performed by: NURSE PRACTITIONER

## 2023-12-28 PROCEDURE — 80069 RENAL FUNCTION PANEL: CPT

## 2023-12-28 PROCEDURE — 99233 SBSQ HOSP IP/OBS HIGH 50: CPT | Performed by: INTERNAL MEDICINE

## 2023-12-28 PROCEDURE — 94640 AIRWAY INHALATION TREATMENT: CPT

## 2023-12-28 PROCEDURE — 2060000000 HC ICU INTERMEDIATE R&B

## 2023-12-28 PROCEDURE — 6360000002 HC RX W HCPCS: Performed by: NURSE PRACTITIONER

## 2023-12-28 PROCEDURE — 86901 BLOOD TYPING SEROLOGIC RH(D): CPT

## 2023-12-28 PROCEDURE — 94761 N-INVAS EAR/PLS OXIMETRY MLT: CPT

## 2023-12-28 PROCEDURE — C1751 CATH, INF, PER/CENT/MIDLINE: HCPCS

## 2023-12-28 PROCEDURE — 6370000000 HC RX 637 (ALT 250 FOR IP): Performed by: INTERNAL MEDICINE

## 2023-12-28 PROCEDURE — 86900 BLOOD TYPING SEROLOGIC ABO: CPT

## 2023-12-28 PROCEDURE — 86850 RBC ANTIBODY SCREEN: CPT

## 2023-12-28 PROCEDURE — C9113 INJ PANTOPRAZOLE SODIUM, VIA: HCPCS | Performed by: NURSE PRACTITIONER

## 2023-12-28 PROCEDURE — 85018 HEMOGLOBIN: CPT

## 2023-12-28 PROCEDURE — 02HV33Z INSERTION OF INFUSION DEVICE INTO SUPERIOR VENA CAVA, PERCUTANEOUS APPROACH: ICD-10-PCS | Performed by: INTERNAL MEDICINE

## 2023-12-28 PROCEDURE — 6360000002 HC RX W HCPCS: Performed by: STUDENT IN AN ORGANIZED HEALTH CARE EDUCATION/TRAINING PROGRAM

## 2023-12-28 PROCEDURE — 92526 ORAL FUNCTION THERAPY: CPT

## 2023-12-28 PROCEDURE — 85025 COMPLETE CBC W/AUTO DIFF WBC: CPT

## 2023-12-28 PROCEDURE — P9016 RBC LEUKOCYTES REDUCED: HCPCS

## 2023-12-28 PROCEDURE — 85014 HEMATOCRIT: CPT

## 2023-12-28 PROCEDURE — 97530 THERAPEUTIC ACTIVITIES: CPT

## 2023-12-28 PROCEDURE — 83735 ASSAY OF MAGNESIUM: CPT

## 2023-12-28 PROCEDURE — 36430 TRANSFUSION BLD/BLD COMPNT: CPT

## 2023-12-28 PROCEDURE — 36569 INSJ PICC 5 YR+ W/O IMAGING: CPT

## 2023-12-28 PROCEDURE — 30233N1 TRANSFUSION OF NONAUTOLOGOUS RED BLOOD CELLS INTO PERIPHERAL VEIN, PERCUTANEOUS APPROACH: ICD-10-PCS | Performed by: INTERNAL MEDICINE

## 2023-12-28 PROCEDURE — 2700000000 HC OXYGEN THERAPY PER DAY

## 2023-12-28 PROCEDURE — 05HD33Z INSERTION OF INFUSION DEVICE INTO RIGHT CEPHALIC VEIN, PERCUTANEOUS APPROACH: ICD-10-PCS | Performed by: INTERNAL MEDICINE

## 2023-12-28 PROCEDURE — 97535 SELF CARE MNGMENT TRAINING: CPT

## 2023-12-28 PROCEDURE — 36415 COLL VENOUS BLD VENIPUNCTURE: CPT

## 2023-12-28 PROCEDURE — 86923 COMPATIBILITY TEST ELECTRIC: CPT

## 2023-12-28 PROCEDURE — 2500000003 HC RX 250 WO HCPCS: Performed by: INTERNAL MEDICINE

## 2023-12-28 RX ORDER — VANCOMYCIN HYDROCHLORIDE 50 MG/ML
250 KIT ORAL EVERY 12 HOURS SCHEDULED
Qty: 100 ML | Refills: 0 | Status: SHIPPED | OUTPATIENT
Start: 2023-12-28 | End: 2024-01-02 | Stop reason: HOSPADM

## 2023-12-28 RX ORDER — PANTOPRAZOLE SODIUM 40 MG/10ML
40 INJECTION, POWDER, LYOPHILIZED, FOR SOLUTION INTRAVENOUS DAILY
Status: DISCONTINUED | OUTPATIENT
Start: 2023-12-28 | End: 2023-12-30

## 2023-12-28 RX ORDER — ALBUTEROL SULFATE 2.5 MG/3ML
2.5 SOLUTION RESPIRATORY (INHALATION)
Status: DISCONTINUED | OUTPATIENT
Start: 2023-12-28 | End: 2024-01-15 | Stop reason: HOSPADM

## 2023-12-28 RX ORDER — SODIUM CHLORIDE 0.9 % (FLUSH) 0.9 %
5-40 SYRINGE (ML) INJECTION PRN
Status: DISCONTINUED | OUTPATIENT
Start: 2023-12-28 | End: 2024-01-15 | Stop reason: HOSPADM

## 2023-12-28 RX ORDER — LACTOBACILLUS RHAMNOSUS GG 10B CELL
1 CAPSULE ORAL 2 TIMES DAILY WITH MEALS
Qty: 60 CAPSULE | Refills: 0 | Status: SHIPPED | OUTPATIENT
Start: 2023-12-28 | End: 2024-01-27

## 2023-12-28 RX ORDER — SODIUM CHLORIDE 9 MG/ML
25 INJECTION, SOLUTION INTRAVENOUS PRN
Status: DISCONTINUED | OUTPATIENT
Start: 2023-12-28 | End: 2024-01-15 | Stop reason: HOSPADM

## 2023-12-28 RX ORDER — SODIUM CHLORIDE 9 MG/ML
INJECTION, SOLUTION INTRAVENOUS PRN
Status: DISCONTINUED | OUTPATIENT
Start: 2023-12-28 | End: 2024-01-04 | Stop reason: SDUPTHER

## 2023-12-28 RX ORDER — FUROSEMIDE 10 MG/ML
40 INJECTION INTRAMUSCULAR; INTRAVENOUS ONCE
Status: COMPLETED | OUTPATIENT
Start: 2023-12-28 | End: 2023-12-28

## 2023-12-28 RX ORDER — SODIUM CHLORIDE 0.9 % (FLUSH) 0.9 %
5-40 SYRINGE (ML) INJECTION EVERY 12 HOURS SCHEDULED
Status: DISCONTINUED | OUTPATIENT
Start: 2023-12-28 | End: 2024-01-15 | Stop reason: HOSPADM

## 2023-12-28 RX ORDER — LIDOCAINE HYDROCHLORIDE 10 MG/ML
5 INJECTION, SOLUTION EPIDURAL; INFILTRATION; INTRACAUDAL; PERINEURAL ONCE
Status: COMPLETED | OUTPATIENT
Start: 2023-12-28 | End: 2023-12-28

## 2023-12-28 RX ADMIN — INSULIN LISPRO 5 UNITS: 100 INJECTION, SOLUTION INTRAVENOUS; SUBCUTANEOUS at 12:49

## 2023-12-28 RX ADMIN — IPRATROPIUM BROMIDE AND ALBUTEROL SULFATE 1 DOSE: 2.5; .5 SOLUTION RESPIRATORY (INHALATION) at 09:36

## 2023-12-28 RX ADMIN — CEFAZOLIN 2000 MG: 2 INJECTION, POWDER, FOR SOLUTION INTRAMUSCULAR; INTRAVENOUS at 22:10

## 2023-12-28 RX ADMIN — TIOTROPIUM BROMIDE INHALATION SPRAY 2 PUFF: 3.12 SPRAY, METERED RESPIRATORY (INHALATION) at 09:37

## 2023-12-28 RX ADMIN — FUROSEMIDE 40 MG: 10 INJECTION, SOLUTION INTRAMUSCULAR; INTRAVENOUS at 16:16

## 2023-12-28 RX ADMIN — Medication 10 ML: at 20:39

## 2023-12-28 RX ADMIN — THIAMINE HCL TAB 100 MG 100 MG: 100 TAB at 09:35

## 2023-12-28 RX ADMIN — BUDESONIDE INHALATION 500 MCG: 0.5 SUSPENSION RESPIRATORY (INHALATION) at 09:37

## 2023-12-28 RX ADMIN — CEFAZOLIN 2000 MG: 2 INJECTION, POWDER, FOR SOLUTION INTRAMUSCULAR; INTRAVENOUS at 16:16

## 2023-12-28 RX ADMIN — ALLOPURINOL 200 MG: 100 TABLET ORAL at 09:34

## 2023-12-28 RX ADMIN — SODIUM CHLORIDE 1500 MG: 900 INJECTION INTRAVENOUS at 04:29

## 2023-12-28 RX ADMIN — Medication 250 MG: at 09:34

## 2023-12-28 RX ADMIN — Medication 1 CAPSULE: at 16:16

## 2023-12-28 RX ADMIN — SIMETHICONE 80 MG: 80 TABLET, CHEWABLE ORAL at 10:58

## 2023-12-28 RX ADMIN — ARFORMOTEROL TARTRATE 15 MCG: 15 SOLUTION RESPIRATORY (INHALATION) at 09:36

## 2023-12-28 RX ADMIN — APIXABAN 5 MG: 5 TABLET, FILM COATED ORAL at 09:35

## 2023-12-28 RX ADMIN — LIDOCAINE HYDROCHLORIDE 5 ML: 10 INJECTION, SOLUTION EPIDURAL; INFILTRATION; INTRACAUDAL; PERINEURAL at 17:41

## 2023-12-28 RX ADMIN — INSULIN LISPRO 5 UNITS: 100 INJECTION, SOLUTION INTRAVENOUS; SUBCUTANEOUS at 18:01

## 2023-12-28 RX ADMIN — SIMETHICONE 80 MG: 80 TABLET, CHEWABLE ORAL at 02:10

## 2023-12-28 RX ADMIN — ARFORMOTEROL TARTRATE 15 MCG: 15 SOLUTION RESPIRATORY (INHALATION) at 22:34

## 2023-12-28 RX ADMIN — INSULIN LISPRO 5 UNITS: 100 INJECTION, SOLUTION INTRAVENOUS; SUBCUTANEOUS at 09:35

## 2023-12-28 RX ADMIN — ALBUTEROL SULFATE 2.5 MG: 2.5 SOLUTION RESPIRATORY (INHALATION) at 22:33

## 2023-12-28 RX ADMIN — PANTOPRAZOLE SODIUM 40 MG: 40 INJECTION, POWDER, FOR SOLUTION INTRAVENOUS at 16:20

## 2023-12-28 RX ADMIN — IPRATROPIUM BROMIDE AND ALBUTEROL SULFATE 1 DOSE: 2.5; .5 SOLUTION RESPIRATORY (INHALATION) at 15:16

## 2023-12-28 RX ADMIN — ASPIRIN 81 MG: 81 TABLET, CHEWABLE ORAL at 09:35

## 2023-12-28 RX ADMIN — Medication 250 MG: at 20:38

## 2023-12-28 RX ADMIN — SODIUM CHLORIDE 1500 MG: 900 INJECTION INTRAVENOUS at 10:59

## 2023-12-28 RX ADMIN — Medication 10 ML: at 09:35

## 2023-12-28 RX ADMIN — Medication 1 CAPSULE: at 09:35

## 2023-12-28 RX ADMIN — SIMETHICONE 80 MG: 80 TABLET, CHEWABLE ORAL at 19:47

## 2023-12-28 RX ADMIN — BUDESONIDE INHALATION 500 MCG: 0.5 SUSPENSION RESPIRATORY (INHALATION) at 22:34

## 2023-12-28 ASSESSMENT — PAIN SCALES - GENERAL
PAINLEVEL_OUTOF10: 0

## 2023-12-28 NOTE — PROGRESS NOTES
Palliative Care:        Follow up visit with patient sitting up in chair. VSS. 02 @ 6L with saturation of  96%. A/) x3. Pleasant. Verbalizing no concerns at this time.     ID following: patient remains with IV ATB intervention. WBC currently @ 17. Will require IV ATB therapy with discharge likely. Hgb today down from 7.8 to 7.0.     Nephrology following: ABRAHAM-baseline creatinine 1.1. Currently 1.4 showing improvement.     Therapy scores on 12/27/23: OT 13/24 and PT 13/24. Pending discharge plan for SNF/[facility].  Pascale reviewing again today.    Palliative will continue to follow as needed.     Electronically signed by Loida Gray RN, BSN, CHPN (Palliative Care) [phone]

## 2023-12-28 NOTE — PROGRESS NOTES
6    Infectious Diseases   Progress Note      Admission Date: 12/20/2023  Hospital Day: Hospital Day: 9   Attending: Davey Farias MD  Date of service: 12/28/2023     Chief complaint/ Reason for consult:     Sepsis on admission with tachycardia, tachypnea, altered mental status  Methicillin-susceptible Staphylococcus aureus bacteremia  Acute respiratory failure with hypoxia, requiring 6 L oxygen-nasal cannula  Concern for aspiration pneumonia  Acute kidney injury  Bilateral hip prosthesis in place    Microbiology:        I have reviewed allavailable micro lab data and cultures    Blood culture (2/2) - collected on 12/20/2023: Methicillin-susceptible Staphylococcus aureus    Susceptibility    Staphylococcus aureus (2)    Antibiotic Interpretation Microscan  Method Status    clindamycin Sensitive <=0.25 mcg/mL BACTERIAL SUSCEPTIBILITY PANEL BY LAURYN     erythromycin Resistant >=8 mcg/mL BACTERIAL SUSCEPTIBILITY PANEL BY LAURYN     oxacillin Sensitive 0.5 mcg/mL BACTERIAL SUSCEPTIBILITY PANEL BY LAURYN     tetracycline Resistant >=16 mcg/mL BACTERIAL SUSCEPTIBILITY PANEL BY LAURYN     trimethoprim-sulfamethoxazole Sensitive <=10 mcg/mL BACTERIAL SUSCEPTIBILITY PANEL BY LAURYN         Blood culture  - collected on 12/22/2023: Negative so far      Antibiotics and immunizations:       Current antibiotics: All antibiotics and their doses were reviewed by me    Recent Abx Admin                     ampicillin-sulbactam (UNASYN) 1,500 mg in sodium chloride 0.9 % 50 mL IVPB (mini-bag) (mg) 1,500 mg New Bag 12/28/23 1059     1,500 mg New Bag  0429     1,500 mg New Bag 12/27/23 2228     1,500 mg New Bag  1724     1,500 mg New Bag  1235    vancomycin (FIRVANQ) 50 MG/ML oral solution 250 mg (mg) 250 mg Given 12/28/23 0934     250 mg Given 12/27/23 2050                      Immunization History: All immunization history was reviewed by me today.    Immunization History   Administered Date(s) Administered    COVID-19, PFIZER PURPLE top,  CT scan I25.10    Pneumonia of both lower lobes due to infectious organism J18.9    Acute on chronic heart failure with preserved ejection fraction ([facility]) I50.33    Chronic obstructive pulmonary disease ([facility]) J44.9    ABRAHAM (acute kidney injury) ([facility]) N17.9    Fall W19.XXXA    Acute right-sided low back pain with right-sided sciatica M54.41    Right hip pain M25.551    Fall at home W19.XXXA, Y92.009    Scoliosis of lumbar spine M41.9    Pain due to hip joint prosthesis, initial encounter ([facility]) T84.84XA, Z96.649    Aspiration pneumonia ([facility]) J69.0    Bacteremia due to methicillin susceptible Staphylococcus aureus (MSSA) R78.81, B95.61       Please note that this chart was generated using Dragon dictation software. Although every effort was made to ensure the accuracy of this automated transcription, some errors in transcription may have occurred inadvertently. If you may need any clarification, please do not hesitate to contact me through EPIC or at the phone number provided below with my electronic signature.  Any pictures or media included in this note were obtained after taking informed verbal consent from the patient and with their approval to include those in the patient's medical record.        Curtis Nettles MD, MPH, FACP, FID  12/28/2023, 12:15 PM  [facility]ious [facility]   [address] ([facility]  [place] [address]  Office: [phone]  Fax: [phone]  In-person Clinic days:  Tuesday & Thursday a.m.  Virtual clinic days: Monday, Wednesday & Friday a.m.

## 2023-12-28 NOTE — PROGRESS NOTES
[facility]  Inpatient Progress Note    Reason for Consult: ABRAHAM  Requesting Physician:  Dr. ROSALINA Farias    CHIEF COMPLAINT: Fall    History obtained from records and patient.    HISTORY OF PRESENT ILLNESS:                Ishmael Gr  is 78 y.o. y.o. male with significant past medical history of anemia, COPD, diabetes mellitus type 2, gout, hypertension, obesity, obstructive sleep apnea, peripheral vascular disease who presents with fall.  His baseline oxygen need is at 6L.  He is currently admitted for ABRAHAM possible COPD exacerbation.  I am asked to see the patient because his creatinine has increased to 1.7 yesterday and 1.8 today.  On 12/16/2023 creatinine was 1.1.  Lowest systolic blood pressure in the 100s range.  He is on furosemide 80 mg twice daily as an outpatient, celecoxib and ibuprofen 600 mg every 4 hours for the past few days.  Denies any vomiting or diarrhea.  No new skin rash.  No change in urine output.  CT PE done 12/16/23.  Lowest SBP in the 100s range.  -500 mL since admission.    Subjective / interval history / nephrology update / medical decision making:   Patient was seen comfortably sitting up in chair,   Reported no active complaints/distress,   Renal labs noted    No SOB  Stable on NC        Past Medical History:     has a past medical history of Anemia, COPD (chronic obstructive pulmonary disease) (HCC), Diabetes mellitus (HCC), Edema, GI (gastrointestinal bleed), Gout, Hypertension, Morbid obesity ([facility]), Neuropathy, Obstructive sleep apnea, and Peripheral vascular disease ([facility]).   Past Surgical History:     has a past surgical history that includes Appendectomy; Total hip arthroplasty; and Colonoscopy (N/A, 5/7/2019).   Current Medications:    Current Facility-Administered Medications: ceFAZolin (ANCEF) 2,000 mg in sodium chloride 0.9 % 50 mL IVPB (mini-bag), 2,000 mg, IntraVENous, Q8H  0.9 % sodium chloride infusion, , IntraVENous, PRN  furosemide (LASIX)  cardiomegaly with mild central pulmonary congestion or pulmonary  artery hypertension which is more prominent.    12/16 CT PE  IMPRESSION:  Limited exam due to the extensive motion artifact and decreased opacification  of the distal pulmonary arteries which is limiting evaluation of the distal  pulmonary arteries along the lung bases.  Within the limitations of the exam,  no obvious acute pulmonary embolus can be seen     Mildly dilated and atherosclerotic thoracic aorta with no aneurysm or  dissection and no mediastinal mass or adenopathy.     Chronic obstructive lung changes with mild subsegmental atelectasis vs early  infiltrate or scarring along the right lung base posteriorly      Renal us -     IMPRESSION:  No hydronephrosis or nephrolithiasis identified sonographically.     Increased renal parenchymal echogenicity bilaterally suggesting medical renal  disease.              Specimen Collected: 12/22/23 18:03 EST Last Resulted: 12/22/23 18:53 EST               IMPRESSION/RECOMMENDATIONS:    ABRAHAM-baseline creatinine 1.1.  Probably from prerenal azotemia in the setting of relative hypotension plus contrast exposure plus COPD exacerbation.  At risk for ATN.  Nonoliguric.  Blood pressure now acceptable.  Normal saline at 80 mL/h for 12 hours.  Vitamin C x 3 days.    Acute kidney injury-improved to baseline.  Hypertension.  Improving.  Multiple electrolyte imbalance-been stable.  Hypotension remains stable.    Medications reviewed and appropriate.  Anemia levels are stable.    Patient currently on Lasix 40 mg IV twice daily.  - Repeat chest x-ray.-I reviewed the chest x-ray.  - Suggestive of possible pneumonia    -reduce Lasix to 20 mg IV     Okay to place PICC line.    BUN is 53 and a creatinine of 1.4    Renal functions are stable.  Electrolytes are stable and needs close monitoring.  Developing mild metabolic alkalosis from diuretics  -  Currently on Unasyn antibiotic-continue to monitor renal functions

## 2023-12-28 NOTE — CONSULTS
Gastroenterology Consult Note        Patient: Ishmael Gr  : 1945  Acct#:      Date:  2023    Subjective:       History of Present Illness  Patient is a 78 y.o.  male admitted with Right hip pain [M25.551]  ABRAHAM (acute kidney injury) (HCC) [N17.9]  Fall, initial encounter [W19.XXXA]  Acute right-sided low back pain with right-sided sciatica [M54.41] who is seen in consult for Anemia.     Admitted with sepsis, ID following Staph bacteremia. Acute respiratory failure with hypoxia requiring 6L of oxygen due to COPD.  Also acute kidney injury, his creatinine is now stable.  Earlier today while working with physical therapy he had a black formed bowel movement.  So GI was since consulted.  He denies abdominal pain, nausea, vomiting.  He ate today and tolerated meal without symptoms.  Denies NSAIDs at home, only takes Tylenol.    Past Medical History:   Diagnosis Date    Anemia     COPD (chronic obstructive pulmonary disease) (HCC)     Diabetes mellitus (HCC)     Edema     GI (gastrointestinal bleed)     Gout     Hypertension     Morbid obesity (HCC)     Neuropathy     Obstructive sleep apnea     uses CPAP    Peripheral vascular disease (HCC)       Past Surgical History:   Procedure Laterality Date    APPENDECTOMY      COLONOSCOPY N/A 2019    COLONOSCOPY DIAGNOSTIC performed by Anthony Yoon MD at [facility] END[facility]COPY    TOTAL HIP ARTHROPLASTY      bilateral      Past Endoscopic History  Colonoscopy 2019 with dr. Yoon   Impression:                1) Moderate left colon diverticulosis                                      2) Otherwise normal colon.                                      3) Normal terminal ileum.  Admission Meds  No current facility-administered medications on file prior to encounter.     Current Outpatient Medications on File Prior to Encounter   Medication Sig Dispense Refill    lidocaine 4 % external patch Place 1 patch onto the skin daily 30 patch 0    ibuprofen

## 2023-12-28 NOTE — PROGRESS NOTES
[facility] - Inpatient Rehabilitation Department   Phone: [phone]    Occupational Therapy    [] Initial Evaluation            [x] Daily Treatment Note         [] Discharge Summary      Patient: Ishmael Gr   : 1945   MRN: [id number]   Date of Service:  2023    Admitting Diagnosis:  ABRAHAM (acute kidney injury) (HCC)  Current Admission Summary:   Ishmael Gr is a 78 y.o. male who presents to the ED for evaluation for fall.  Patient was just discharged from the hospital after being brought in for respiratory issues and a fall at that time.  States today he fell and hit his head.  Does not fully remember it.  He was on his way to the bathroom when it happened.  States the only pain he is feeling is in his low back and going down his right leg.  Denies any new numbness or tingling or weakness.  Does have significant respiratory issues and feels short of breath initially with it but states he feels back to normal at this time.  He is on 6 L normally.   Past Medical History:  has a past medical history of Anemia, COPD (chronic obstructive pulmonary disease) ([facility]), Diabetes mellitus (HCC), Edema, GI (gastrointestinal bleed), Gout, Hypertension, Morbid obesity (HCC), Neuropathy, Obstructive sleep apnea, and Peripheral vascular disease (HCC).  Past Surgical History:  has a past surgical history that includes Appendectomy; Total hip arthroplasty; and Colonoscopy (N/A, 2019).    Discharge Recommendations: Ishmael Gr scored a 13/24 on the AM-PAC ADL Inpatient form. Current research shows that an AM-PAC score of 17 or less is typically not associated with a discharge to the patient's home setting. Based on the patient's AM-PAC score and their current ADL deficits, it is recommended that the patient have 3-5 sessions per week of Occupational Therapy at d/c to increase the patient's independence.  Please see assessment section for further patient specific details.    If patient discharges  and requesting to transfer to [facility]. He is otherwise pleasant and agreeable to PT/OT cotx for safety.  Pain: Patient does not rate upon questioning, but reports discomfort due to incontinent of urine   Pain Interventions: not applicable        Activities of Daily Living  Basic Activities of Daily Living  Dressing Comments: brief change  Toileting: dependent Increased time to complete task pt transferred from recliner to wide BS; dependent for LB clothing management; pt had BM (black and tarry but formed--nurse notified and aware). Dependent for pericare and breif change.    Toileting Comments:  New pure wick placed and connected at end of session due to urinary incontinence--female device used due to penile inversion.  General Comments: Patient declined additional ADLs   Instrumental Activities of Daily Living  No IADL completed on this date.    Functional Mobility  Bed Mobility:  Bed mobility not completed on this date.  Comments: pt in recliner upon entry and exit  Transfers:  Sit to stand transfer:2 person assistance with mod A   Stand to sit transfer: 2 person assistance with Min A with decreased eccentric control.    Stand step transfer: contact guard assistance  Comments:to/from recliner<>[facility] with increased time. Increased physical assist due to generalized weakness.   Functional Mobility  No functional mobility completed on this date secondary to pt declined due to fatigue .    Other Therapeutic Interventions    Functional Outcomes  AM-PAC Inpatient Daily Activity Raw Score: 13    Cognition  WFL  Safety Judgement: decreased awareness of need for assistance  Problem Solving: assistance required to generate solutions, decreased awareness of errors  Insights: decreased awareness of deficits  Orientation:    alert and oriented x 4  Command Following:   accurately follows one step commands     Education  Barriers To Learning: none  Patient Education: patient educated on goals, OT role and benefits, plan of care,

## 2023-12-28 NOTE — PLAN OF CARE
Musculoskeletal - Adult  Goal: Return mobility to safest level of function  Outcome: Progressing  Goal: Maintain proper alignment of affected body part  Outcome: Progressing  Goal: Return ADL status to a safe level of function  Outcome: Progressing     Problem: Gastrointestinal - Adult  Goal: Minimal or absence of nausea and vomiting  Outcome: Progressing  Goal: Maintains or returns to baseline bowel function  Outcome: Progressing  Goal: Maintains adequate nutritional intake  Outcome: Progressing     Problem: Genitourinary - Adult  Goal: Absence of urinary retention  Outcome: Progressing     Problem: Infection - Adult  Goal: Absence of infection at discharge  Outcome: Progressing  Goal: Absence of infection during hospitalization  Outcome: Progressing     Problem: Metabolic/Fluid and Electrolytes - Adult  Goal: Electrolytes maintained within normal limits  Outcome: Progressing  Goal: Hemodynamic stability and optimal renal function maintained  Outcome: Progressing  Goal: Glucose maintained within prescribed range  Outcome: Progressing     Problem: Hematologic - Adult  Goal: Maintains hematologic stability  Outcome: Progressing     Problem: Anxiety  Goal: Will report anxiety at manageable levels  Description: INTERVENTIONS:  1. Administer medication as ordered  2. Teach and rehearse alternative coping skills  3. Provide emotional support with 1:1 interaction with staff  Outcome: Progressing     Problem: Decision Making  Goal: Pt/Family able to effectively weigh alternatives and participate in decision making related to treatment and care  Description: INTERVENTIONS:  1. Determine when there are differences between patient's view, family's view, and healthcare provider's view of condition  2. Facilitate patient and family articulation of goals for care  3. Help patient and family identify pros/cons of alternative solutions  4. Provide information as requested by patient/family  5. Respect patient/family right to  receive or not to receive information  6. Serve as a liaison between patient and family and health care team  7. Initiate Consults from Ethics, Palliative Care or initiate Family Care Conference as is appropriate  Outcome: Progressing     Problem: Confusion  Goal: Confusion, delirium, dementia, or psychosis is improved or at baseline  Description: INTERVENTIONS:  1. Assess for possible contributors to thought disturbance, including medications, impaired vision or hearing, underlying metabolic abnormalities, dehydration, psychiatric diagnoses, and notify attending LIP  2. [place] high risk fall precautions, as indicated  3. Provide frequent short contacts to provide reality reorientation, refocusing and direction  4. Decrease environmental stimuli, including noise as appropriate  5. Monitor and intervene to maintain adequate nutrition, hydration, elimination, sleep and activity  6. If unable to ensure safety without constant attention obtain sitter and review sitter guidelines with assigned personnel  7. Initiate Psychosocial CNS and Spiritual Care consult, as indicated  Outcome: Progressing     Problem: Behavior  Goal: Pt/Family maintain appropriate behavior and adhere to behavioral management agreement, if implemented  Description: INTERVENTIONS:  1. Assess patient/family's coping skills and  non-compliant behavior (including use of illegal substances)  2. Notify security of behavior or suspected illegal substances which indicate the need for search of the family and/or belongings  3. Encourage verbalization of thoughts and concerns in a socially appropriate manner  4. Utilize positive, consistent limit setting strategies supporting safety of patient, staff and others  5. Encourage participation in the decision making process about the behavioral management agreement  6. If a visitor's behavior poses a threat to safety call refer to organization policy.  7. Initiate consult with , Psychosocial CNS,

## 2023-12-28 NOTE — CONSULTS
TRIMMABLE POWER MIDLINE PROCEDURE NOTE  Chart reviewed for allergies, diagnosis, labs, known contraindications, reason for line placement and planned length of treatment.  Insertion procedure discussed with patient/family member.  Informed consent not required for Midline placement.  Three patient identifiers - Patient name,   and MRN -  completed &  confirmed verbally.   Hat, mask and eye shield donned.  Midline site scrubbed with Chloraprep  One-Step applicator  for 30 seconds x 1.   Hand Hygiene  performed with 3% Chlorhexidine surgical scrub x1 min prior to  Sterile gloves, sterile gown being donned.  Patient draped using maximal sterile barrier technique ( head to toe ).  Midline site scrubbed a 2nd time with Chloraprep One-Step applicator x 30 sec. Vein located by [employer] and site marked with sterile pen.  1% Lidocaine 5 ml injected intradermal pre-insertion.  Midline inserted.  Positive brisk blood return obtained.  Valve applied to lumen.  Midline flushed with 10 mls  0.9% Sterile Sodium Chloride. Midline flushes easily with no resistance.   Skin prep applied to site. Catheter secured with non-sutured locking device per hospital protocol.  Bio-patch/CHG impregnated sterile tegaderm dressing applied. Alcohol Swab Caps applied to valve.   Sterile field maintained during procedure. Positioning wire accounted for post procedure. Pt. Response to procedure, tolerated well. Appearance of site: Clean dry and intact without bleeding or edema. All edges of Tegaderm occlusive.   Site marked with date and initials of RN placing line.Top 2 side rails in up position, call button in reach, RN notified of all of the above.   A Trimmable Power Midline  14  CM placed in the  MONIQUE  Cephalic vein.0  cm showing from insertion site.

## 2023-12-28 NOTE — PROGRESS NOTES
Home: house  Home Layout: two level, bedroom/bathroom on main level  Home Access:  2 step to enter with handrail.  Handrails are located on R side.  Bathroom Layout: walk in shower  Bathroom Equipment: grab bars in shower, shower chair, hand held shower head  Toilet Height: elevated height  Home Equipment: rolling walker, single point cane, reacher, sock aide, oxygen--sleeps in recliner  Transfer Assistance:  mod I with RW   Ambulation Assistance: mod I with RW within his home, modified independent with use of SPC vs. RW when going out. Pt reports difficulty with advancing (R) LE during mobility tasks.   ADL Assistance: independent with all ADL's  IADL Assistance: independent with homemaking tasks, requires assistance with laundry from daughter (laundry in basement)   Active :        [] Yes                 [x] No--daughter/granddaughter drive  Hand Dominance: [] Left                 [x] Right  Current Employment: retired.  Occupation: , welding   Hobbies: genealogy, history    Recent Falls: 3 falls. Tripping over the oxygen line     Examination   Vision:   Vision Corrective Device: wears glasses for reading  Hearing:   hard of hearing  Observation:   General Observation:  Patient sitting in recliner, on nasal cannula..  Posture:   Difficulty with lumbar extension sitting EOB, suspect pt flexes forward in standing, unable to assess standing posture today  Sensation:   reports numbness and tingling in (B) UE, (B) LE -due to neuropathy  Proprioception:    Unable to formally test secondary to back pain  Tone:   Normotonic  Coordination Testing:   Unable to formally test secondary to back pain     ROM:   Grossly WFL for transfers this date.  Strength:   Functional weakness noted with sit-stand transfers.      Subjective  General: Patient sitting in recliner, requesting to transfer to commode to toilet.  Pain: 0/10, Patient does not rate upon questioning, and Pain rating taken based on observed faces and  No apparent pain this session.  Patient remains weak with endurance deficits requiring MOD x2 assist to stand.  Patient does not have the endurance to ambulate.  Heart rate 143 bpm with transfers.  Recommend 24 hour assist and continued therapy at d/c as patient is able to tolerate..    Safety Interventions: patient left in chair, chair alarm in place, call light within reach, nurse notified, and Destiny Cevallos x2 recommended for transfers w/ nursing or use of lift equipment if patient unable to stand.    Plan  Frequency: 3-5 x/per week  Current Treatment Recommendations: strengthening, ROM, balance training, functional mobility training, transfer training, gait training, endurance training, neuromuscular re-education, and safety education    Goals  Patient Goals: pt is agreeable to pursuing rehab to regain his independence   Short Term Goals:  Time Frame: To be met prior to DC  Patient will complete bed mobility at maximum assistance   Patient will complete transfers at maximum assistance   Patient will ambulate 10 ft with use of rolling walker at maximum assistance  Patient to maintain standing at moderate assistance for 2 minutes.    Above goals reviewed on 12/28/2023.  All goals are ongoing at this time unless indicated above.      Therapy Session Time      Individual Group Co-treatment   Time In      1336   Time Out      1400   Minutes      24     Timed Code Treatment Minutes:   24  Total Treatment Minutes:  24       Electronically Signed By: Brendon Coleman PT, DPT, ATC-R [id number]

## 2023-12-28 NOTE — PROGRESS NOTES
Facility/Department: [address]  Speech Language Pathology   Dysphagia Treatment Note    Patient: Ishmael Gr   : 1945   MRN: [id number]      Evaluation Date: 2023      Admitting Dx: Right hip pain [M25.551]  ABRAHAM (acute kidney injury) (HCC) [N17.9]  Fall, initial encounter [W19.XXXA]  Acute right-sided low back pain with right-sided sciatica [M54.41]  Treatment Diagnosis: Oropharyngeal Dysphagia   Pain: Did not state                                              Chest X-ray: 2023  IMPRESSION:  Left lower lobe infiltrate likely represents pneumonia.  Aspiration is  possible.    Diet and Treatment Recommendations 2023:  Diet Solids Recommendation:  Regular texture diet  Liquid Consistency Recommendation:  Thin liquids  Recommended form of Meds: Meds whole with water       Compensatory strategies:   Upright as possible with all PO intake , Small bites/sips , Eat/feed slowly, Remain upright 30-45 min     Assessment of Texture Tolerance:  Diet level prior to treatment: Regular texture diet , Thin liquids   Tolerance of Current Diet Level: Per chart, no noted difficulty with current diet level     Impressions: Pt was positioned Upright in chair, awake and alert. Currently on  5.5L O2 via nasal cannula . Pt denies concerns with swallowing at this time, endorses some shortness of breath when moving and during meals. Trials of thin liquids and regular solids  were provided to assess swallow function. Oral phase characterized by functional labial seal, timely oral manipulation/mastication, and complete oral transit. No anterior bolus loss or oral residue post swallow observed. Swallow initiation appeared timely subjectively, appreciated via laryngeal palpation. No overt clinical s/s aspiration across PO intake. Education provided re: safe swallowing strategies, including slow rate of intake and small bites/sips. Overall, pt demonstrates increased risk for aspiration due to co morbidities  and

## 2023-12-28 NOTE — CONSENT
Informed Consent for Blood Component Transfusion Note    I have discussed with the patient the rationale for blood component transfusion; its benefits in treating or preventing fatigue, organ damage, or death; and its risk which includes mild transfusion reactions, rare risk of blood borne infection, or more serious but rare reactions. I have discussed the alternatives to transfusion, including the risk and consequences of not receiving transfusion. The patient had an opportunity to ask questions and had agreed to proceed with transfusion of blood components.    Electronically signed by Davey Farias MD on 12/28/23 at 5:30 PM EST

## 2023-12-28 NOTE — PROGRESS NOTES
Physician Progress Note      PATIENT:               FABY WHITE  CSN #:                  [id number]  :                       1945  ADMIT DATE:       2023 4:40 AM  DISCH DATE:  RESPONDING  PROVIDER #:        Davey Farias MD          QUERY TEXT:    Pt admitted with ABRAHAM. Noted documentation of Sepsis on  by ordered ID   consultant.  If possible, please document in progress notes and/or discharge   summary whether:    The medical record reflects the following:  Risk Factors: Diagnosed with Bacteremia and ? Asp PNA  Clinical Indicators: WBC on admit 11.8, on : 20.3, Procalcitonin on    was 3.42, diagnosed with ABRAHAM.  Per ID consult: Sepsis on admission with   tachycardia, tachypnea, altered mental status  - Methicillin-susceptible Staphylococcus aureus bacteremia  - Acute respiratory failure with hypoxia, requiring 6 L oxygen-nasal cannula  - Concern for aspiration pneumonia  Treatment: IV Unasyn, PO Vanc, ID consult  Options provided:  -- Sepsis present on admission due to Bacteremia and Asp PNA  -- Sepsis not present on admission but developed during stay due to Bacteremia   and Asp PNA  -- Sepsis was ruled out  -- Other - I will add my own diagnosis  -- Disagree - Not applicable / Not valid  -- Disagree - Clinically unable to determine / Unknown  -- Refer to Clinical Documentation Reviewer    PROVIDER RESPONSE TEXT:    Sepsis not present on admission but developed during stay due to Bacteremia   and Asp PNA.    Query created by: Aicha Moser on 2023 10:49 PM      Electronically signed by:  Davey Farias MD 2023 11:08 AM

## 2023-12-28 NOTE — DISCHARGE SUMMARY
lungs every 4 hours      Lift Chair MISC by Does not apply route Please dispense a lift chair  Qty: 1 each, Refills: 0    Associated Diagnoses: Morbid obesity ([facility]); Cerebrovascular accident (CVA), unspecified mechanism ([facility]); COPD, severe ([facility]); Chronic respiratory failure with hypoxia ([facility])      furosemide (LASIX) 40 MG tablet Take 1 tablet by mouth in the morning and 1 tablet in the evening.  Qty: 60 tablet, Refills: 3      aspirin 81 MG chewable tablet Take 1 tablet by mouth daily  Qty: 30 tablet, Refills: 3      apixaban (ELIQUIS) 5 MG TABS tablet Take 1 tablet by mouth 2 times daily  Qty: 180 tablet, Refills: 3    Comments: LOT [id number] EXP march 2025 - boxes      fluticasone-salmeterol (ADVAIR DISKUS) 250-50 MCG/ACT AEPB diskus inhaler Inhale 1 puff into the lungs in the morning and 1 puff in the evening.  Qty: 60 each, Refills: 3      blood glucose test strips (ACCU-CHEK CLARISSA PLUS) strip USE AS NEEDED  Qty: 100 strip, Refills: 1    Associated Diagnoses: Type 2 diabetes mellitus without complication, without long-term current use of insulin ([facility])      triamcinolone (KENALOG) 0.1 % cream Apply to affected areas twice daily.  Send a 3 month supply  Qty: 240 g, Refills: 1    Associated Diagnoses: Dermatitis      vitamin B-1 (THIAMINE) 100 MG tablet Take 1 tablet by mouth daily      allopurinol (ZYLOPRIM) 100 MG tablet TAKE TWO TABLETS BY MOUTH DAILY  Qty: 180 tablet, Refills: 3      Budeson-Glycopyrrol-Formoterol (BREZTRI AEROSPHERE) 160-9-4.8 MCG/ACT AERO Inhale 2 puffs into the lungs in the morning and at bedtime  Qty: 3 each, Refills: 3    Associated Diagnoses: COPD, severe ([facility])      albuterol sulfate HFA (PROVENTIL;VENTOLIN;PROAIR) 108 (90 Base) MCG/ACT inhaler INHALE TWO PUFFS BY MOUTH EVERY 6 HOURS AS NEEDED  Qty: 54 g, Refills: 3      gabapentin (NEURONTIN) 300 MG capsule Take 1 capsule by mouth 3 times daily for 90 days.  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 COMPARISON: None HISTORY: ORDERING SYSTEM PROVIDED HISTORY:  Infarct L temporal lobe. TECHNOLOGIST PROVIDED HISTORY: Reason for Exam:  Infarct L temporal lobe Reason for Exam:  Weakness, difficulty breathing. FINDINGS: INTRACRANIAL STRUCTURES/VENTRICLES: There is no acute infarct. No mass effect or midline shift. No evidence of an acute intracranial hemorrhage.  The ventricles and sulci are normal in size and configuration.  The sellar/suprasellar regions appear unremarkable.  The normal signal voids within the major intracranial vessels appear maintained. Mild cerebral atrophy is identified.  There is encephalomalacia involving the left temporal lobe. Tiny old right cerebellar lacunar infarct is noted. ORBITS: The visualized portion of the orbits demonstrate no acute abnormality. SINUSES: The visualized paranasal sinuses and mastoid air cells demonstrate no acute abnormality. BONES/SOFT TISSUES: The bone marrow signal intensity appears normal. The soft tissues demonstrate no acute abnormality.     No acute ischemia. Left temporal lobe encephalomalacia. Tiny old right cerebellar lacunar infarct.         Signed:    Davey Farias MD   12/28/2023

## 2023-12-28 NOTE — PROGRESS NOTES
Nutrition Note    RECOMMENDATIONS  PO Diet: CCC  ONS: N/a     NUTRITION ASSESSMENT   Nutrition intervention triggered d/t 8d los.  Pt evaluated by SLP d/t risk of aspiration.  Receives a regular textured , CCC diet.  PO intake greater than 50% of meals.  No signficant wt changes per EMR.  Awaiting d/c as precert obtained.  Pt is at low risk for nutrition compromise.     Nutrition Related Findings: Edema: +3 pitting BLE, nonpitting BUE; LBM 12/26  Wounds: Skin Tears (left arm)  Nutrition Education:  Education not indicated   Nutrition Goals: PO intake 50% or greater     MALNUTRITION ASSESSMENT      Malnutrition Status: No malnutrition    NUTRITION DIAGNOSIS   No nutrition diagnosis at this time    CURRENT NUTRITION THERAPIES  ADULT DIET; Regular; 5 carb choices (75 gm/meal)     PO Intake: 51-75%   PO Supplement Intake:None Ordered    ANTHROPOMETRICS  Current Height: 182.9 cm (6')  Current Weight - Scale: (!) 151.1 kg (333 lb 1.6 oz)    Ideal Body Weight (IBW): 178 lbs  (81 kg)        BMI: 45.2      The patient will be monitored per nutrition standards of care. Consult dietitian if additional nutrition interventions are needed prior to RD reassessment.     Marjorie Oakley RD, LD    Contact: [phone]

## 2023-12-28 NOTE — PLAN OF CARE
Problem: Pain  Goal: Verbalizes/displays adequate comfort level or baseline comfort level  Outcome: Progressing     Problem: Skin/Tissue Integrity  Goal: Absence of new skin breakdown  Description: 1.  Monitor for areas of redness and/or skin breakdown  2.  Assess vascular access sites hourly  3.  Every 4-6 hours minimum:  Change oxygen saturation probe site  4.  Every 4-6 hours:  If on nasal continuous positive airway pressure, respiratory therapy assess nares and determine need for appliance change or resting period.  Outcome: Progressing     Problem: ABCDS Injury Assessment  Goal: Absence of physical injury  Outcome: Progressing

## 2023-12-28 NOTE — PROGRESS NOTES
Pt unable to stand on the standing scale or ambulate to the bed to be weighed at this time. Stating 'I just feel too weak'

## 2023-12-29 ENCOUNTER — ANESTHESIA (OUTPATIENT)
Dept: ENDOSCOPY | Age: 78
End: 2023-12-29
Payer: MEDICARE

## 2023-12-29 ENCOUNTER — ANESTHESIA EVENT (OUTPATIENT)
Dept: ENDOSCOPY | Age: 78
End: 2023-12-29
Payer: MEDICARE

## 2023-12-29 PROBLEM — K92.1 GASTROINTESTINAL HEMORRHAGE WITH MELENA: Status: ACTIVE | Noted: 2023-12-29

## 2023-12-29 PROBLEM — I48.0 PAROXYSMAL ATRIAL FIBRILLATION (HCC): Status: ACTIVE | Noted: 2023-12-29

## 2023-12-29 PROBLEM — Z01.810 PREOPERATIVE CARDIOVASCULAR EXAMINATION: Status: ACTIVE | Noted: 2023-02-21

## 2023-12-29 LAB
ALBUMIN SERPL-MCNC: 3.1 G/DL (ref 3.4–5)
ANION GAP SERPL CALCULATED.3IONS-SCNC: 8 MMOL/L (ref 3–16)
BASO STIPL BLD QL SMEAR: ABNORMAL
BASOPHILS # BLD: 0 K/UL (ref 0–0.2)
BASOPHILS NFR BLD: 0 %
BUN SERPL-MCNC: 46 MG/DL (ref 7–20)
BURR CELLS BLD QL SMEAR: ABNORMAL
CALCIUM SERPL-MCNC: 8.7 MG/DL (ref 8.3–10.6)
CHLORIDE SERPL-SCNC: 97 MMOL/L (ref 99–110)
CO2 SERPL-SCNC: 34 MMOL/L (ref 21–32)
CREAT SERPL-MCNC: 1.4 MG/DL (ref 0.8–1.3)
DEPRECATED RDW RBC AUTO: 17 % (ref 12.4–15.4)
EOSINOPHIL # BLD: 0.5 K/UL (ref 0–0.6)
EOSINOPHIL NFR BLD: 3 %
GFR SERPLBLD CREATININE-BSD FMLA CKD-EPI: 52 ML/MIN/{1.73_M2}
GLUCOSE BLD-MCNC: 132 MG/DL (ref 70–99)
GLUCOSE BLD-MCNC: 139 MG/DL (ref 70–99)
GLUCOSE BLD-MCNC: 169 MG/DL (ref 70–99)
GLUCOSE BLD-MCNC: 178 MG/DL (ref 70–99)
GLUCOSE BLD-MCNC: 213 MG/DL (ref 70–99)
GLUCOSE SERPL-MCNC: 146 MG/DL (ref 70–99)
HCT VFR BLD AUTO: 23.2 % (ref 40.5–52.5)
HGB BLD-MCNC: 7.5 G/DL (ref 13.5–17.5)
LYMPHOCYTES # BLD: 0.9 K/UL (ref 1–5.1)
LYMPHOCYTES NFR BLD: 5 %
MCH RBC QN AUTO: 31.8 PG (ref 26–34)
MCHC RBC AUTO-ENTMCNC: 32.3 G/DL (ref 31–36)
MCV RBC AUTO: 98.3 FL (ref 80–100)
MONOCYTES # BLD: 0 K/UL (ref 0–1.3)
MONOCYTES NFR BLD: 0 %
MYELOCYTES NFR BLD MANUAL: 2 %
NEUTROPHILS # BLD: 13.9 K/UL (ref 1.7–7.7)
NEUTROPHILS NFR BLD: 89 %
NEUTS VAC BLD QL SMEAR: PRESENT
PERFORMED ON: ABNORMAL
PHOSPHATE SERPL-MCNC: 4 MG/DL (ref 2.5–4.9)
PLATELET # BLD AUTO: 530 K/UL (ref 135–450)
PLATELET BLD QL SMEAR: ABNORMAL
PMV BLD AUTO: 6.8 FL (ref 5–10.5)
POLYCHROMASIA BLD QL SMEAR: ABNORMAL
POTASSIUM SERPL-SCNC: 3.6 MMOL/L (ref 3.5–5.1)
RBC # BLD AUTO: 2.36 M/UL (ref 4.2–5.9)
SLIDE REVIEW: ABNORMAL
SODIUM SERPL-SCNC: 139 MMOL/L (ref 136–145)
VARIANT LYMPHS NFR BLD MANUAL: 1 % (ref 0–6)
WBC # BLD AUTO: 15.3 K/UL (ref 4–11)

## 2023-12-29 PROCEDURE — 94640 AIRWAY INHALATION TREATMENT: CPT

## 2023-12-29 PROCEDURE — 2580000003 HC RX 258: Performed by: INTERNAL MEDICINE

## 2023-12-29 PROCEDURE — 99232 SBSQ HOSP IP/OBS MODERATE 35: CPT | Performed by: INTERNAL MEDICINE

## 2023-12-29 PROCEDURE — 94761 N-INVAS EAR/PLS OXIMETRY MLT: CPT

## 2023-12-29 PROCEDURE — 7100000000 HC PACU RECOVERY - FIRST 15 MIN: Performed by: INTERNAL MEDICINE

## 2023-12-29 PROCEDURE — 6370000000 HC RX 637 (ALT 250 FOR IP): Performed by: INTERNAL MEDICINE

## 2023-12-29 PROCEDURE — 6360000002 HC RX W HCPCS: Performed by: NURSE ANESTHETIST, CERTIFIED REGISTERED

## 2023-12-29 PROCEDURE — 3609012400 HC EGD TRANSORAL BIOPSY SINGLE/MULTIPLE: Performed by: INTERNAL MEDICINE

## 2023-12-29 PROCEDURE — 88305 TISSUE EXAM BY PATHOLOGIST: CPT

## 2023-12-29 PROCEDURE — 6360000002 HC RX W HCPCS: Performed by: INTERNAL MEDICINE

## 2023-12-29 PROCEDURE — C9113 INJ PANTOPRAZOLE SODIUM, VIA: HCPCS | Performed by: NURSE PRACTITIONER

## 2023-12-29 PROCEDURE — 85025 COMPLETE CBC W/AUTO DIFF WBC: CPT

## 2023-12-29 PROCEDURE — 2500000003 HC RX 250 WO HCPCS: Performed by: NURSE ANESTHETIST, CERTIFIED REGISTERED

## 2023-12-29 PROCEDURE — 2700000000 HC OXYGEN THERAPY PER DAY

## 2023-12-29 PROCEDURE — 99223 1ST HOSP IP/OBS HIGH 75: CPT | Performed by: INTERNAL MEDICINE

## 2023-12-29 PROCEDURE — 7100000001 HC PACU RECOVERY - ADDTL 15 MIN: Performed by: INTERNAL MEDICINE

## 2023-12-29 PROCEDURE — 80069 RENAL FUNCTION PANEL: CPT

## 2023-12-29 PROCEDURE — 0DB68ZX EXCISION OF STOMACH, VIA NATURAL OR ARTIFICIAL OPENING ENDOSCOPIC, DIAGNOSTIC: ICD-10-PCS | Performed by: INTERNAL MEDICINE

## 2023-12-29 PROCEDURE — 6360000002 HC RX W HCPCS: Performed by: STUDENT IN AN ORGANIZED HEALTH CARE EDUCATION/TRAINING PROGRAM

## 2023-12-29 PROCEDURE — 6370000000 HC RX 637 (ALT 250 FOR IP): Performed by: NURSE PRACTITIONER

## 2023-12-29 PROCEDURE — 2709999900 HC NON-CHARGEABLE SUPPLY: Performed by: INTERNAL MEDICINE

## 2023-12-29 PROCEDURE — 6360000002 HC RX W HCPCS: Performed by: NURSE PRACTITIONER

## 2023-12-29 PROCEDURE — 2060000000 HC ICU INTERMEDIATE R&B

## 2023-12-29 PROCEDURE — 3700000000 HC ANESTHESIA ATTENDED CARE: Performed by: INTERNAL MEDICINE

## 2023-12-29 PROCEDURE — 3700000001 HC ADD 15 MINUTES (ANESTHESIA): Performed by: INTERNAL MEDICINE

## 2023-12-29 RX ORDER — FUROSEMIDE 10 MG/ML
40 INJECTION INTRAMUSCULAR; INTRAVENOUS ONCE
Status: COMPLETED | OUTPATIENT
Start: 2023-12-29 | End: 2023-12-29

## 2023-12-29 RX ORDER — DEXMEDETOMIDINE HYDROCHLORIDE 100 UG/ML
INJECTION, SOLUTION INTRAVENOUS PRN
Status: DISCONTINUED | OUTPATIENT
Start: 2023-12-29 | End: 2023-12-29 | Stop reason: SDUPTHER

## 2023-12-29 RX ORDER — PROPOFOL 10 MG/ML
INJECTION, EMULSION INTRAVENOUS PRN
Status: DISCONTINUED | OUTPATIENT
Start: 2023-12-29 | End: 2023-12-29 | Stop reason: SDUPTHER

## 2023-12-29 RX ADMIN — ARFORMOTEROL TARTRATE 15 MCG: 15 SOLUTION RESPIRATORY (INHALATION) at 22:18

## 2023-12-29 RX ADMIN — CEFAZOLIN 2000 MG: 2 INJECTION, POWDER, FOR SOLUTION INTRAMUSCULAR; INTRAVENOUS at 22:39

## 2023-12-29 RX ADMIN — PROPOFOL 20 MG: 10 INJECTION, EMULSION INTRAVENOUS at 15:39

## 2023-12-29 RX ADMIN — PROPOFOL 20 MG: 10 INJECTION, EMULSION INTRAVENOUS at 15:43

## 2023-12-29 RX ADMIN — BUDESONIDE INHALATION 500 MCG: 0.5 SUSPENSION RESPIRATORY (INHALATION) at 08:18

## 2023-12-29 RX ADMIN — Medication 10 ML: at 09:20

## 2023-12-29 RX ADMIN — PROPOFOL 30 MG: 10 INJECTION, EMULSION INTRAVENOUS at 15:41

## 2023-12-29 RX ADMIN — FUROSEMIDE 40 MG: 10 INJECTION, SOLUTION INTRAMUSCULAR; INTRAVENOUS at 13:54

## 2023-12-29 RX ADMIN — Medication 10 ML: at 21:23

## 2023-12-29 RX ADMIN — Medication 1 CAPSULE: at 09:18

## 2023-12-29 RX ADMIN — DEXMEDETOMIDINE HYDROCHLORIDE 10 MCG: 100 INJECTION, SOLUTION INTRAVENOUS at 15:41

## 2023-12-29 RX ADMIN — SODIUM CHLORIDE, PRESERVATIVE FREE 10 ML: 5 INJECTION INTRAVENOUS at 21:22

## 2023-12-29 RX ADMIN — ALLOPURINOL 200 MG: 100 TABLET ORAL at 09:19

## 2023-12-29 RX ADMIN — ALBUTEROL SULFATE 2.5 MG: 2.5 SOLUTION RESPIRATORY (INHALATION) at 22:18

## 2023-12-29 RX ADMIN — SODIUM CHLORIDE, PRESERVATIVE FREE 10 ML: 5 INJECTION INTRAVENOUS at 09:20

## 2023-12-29 RX ADMIN — ARFORMOTEROL TARTRATE 15 MCG: 15 SOLUTION RESPIRATORY (INHALATION) at 08:15

## 2023-12-29 RX ADMIN — Medication 250 MG: at 21:22

## 2023-12-29 RX ADMIN — DEXMEDETOMIDINE HYDROCHLORIDE 10 MCG: 100 INJECTION, SOLUTION INTRAVENOUS at 15:40

## 2023-12-29 RX ADMIN — SIMETHICONE 80 MG: 80 TABLET, CHEWABLE ORAL at 09:33

## 2023-12-29 RX ADMIN — INSULIN LISPRO 5 UNITS: 100 INJECTION, SOLUTION INTRAVENOUS; SUBCUTANEOUS at 17:09

## 2023-12-29 RX ADMIN — PROPOFOL 20 MG: 10 INJECTION, EMULSION INTRAVENOUS at 15:40

## 2023-12-29 RX ADMIN — CEFAZOLIN 2000 MG: 2 INJECTION, POWDER, FOR SOLUTION INTRAMUSCULAR; INTRAVENOUS at 06:01

## 2023-12-29 RX ADMIN — SODIUM CHLORIDE: 9 INJECTION, SOLUTION INTRAVENOUS at 22:38

## 2023-12-29 RX ADMIN — SODIUM CHLORIDE, PRESERVATIVE FREE 10 ML: 5 INJECTION INTRAVENOUS at 09:19

## 2023-12-29 RX ADMIN — SIMETHICONE 80 MG: 80 TABLET, CHEWABLE ORAL at 22:32

## 2023-12-29 RX ADMIN — THIAMINE HCL TAB 100 MG 100 MG: 100 TAB at 09:19

## 2023-12-29 RX ADMIN — TIOTROPIUM BROMIDE INHALATION SPRAY 2 PUFF: 3.12 SPRAY, METERED RESPIRATORY (INHALATION) at 08:18

## 2023-12-29 RX ADMIN — Medication 250 MG: at 09:19

## 2023-12-29 RX ADMIN — CEFAZOLIN 2000 MG: 2 INJECTION, POWDER, FOR SOLUTION INTRAMUSCULAR; INTRAVENOUS at 13:55

## 2023-12-29 RX ADMIN — ALBUTEROL SULFATE 2.5 MG: 2.5 SOLUTION RESPIRATORY (INHALATION) at 08:14

## 2023-12-29 RX ADMIN — BUDESONIDE INHALATION 500 MCG: 0.5 SUSPENSION RESPIRATORY (INHALATION) at 22:18

## 2023-12-29 RX ADMIN — PANTOPRAZOLE SODIUM 40 MG: 40 INJECTION, POWDER, FOR SOLUTION INTRAVENOUS at 09:19

## 2023-12-29 RX ADMIN — Medication 1 CAPSULE: at 17:09

## 2023-12-29 RX ADMIN — ALBUTEROL SULFATE 2.5 MG: 2.5 SOLUTION RESPIRATORY (INHALATION) at 12:16

## 2023-12-29 ASSESSMENT — PAIN - FUNCTIONAL ASSESSMENT
PAIN_FUNCTIONAL_ASSESSMENT: 0-10
PAIN_FUNCTIONAL_ASSESSMENT: 0-10

## 2023-12-29 ASSESSMENT — PAIN SCALES - GENERAL
PAINLEVEL_OUTOF10: 0
PAINLEVEL_OUTOF10: 5
PAINLEVEL_OUTOF10: 0

## 2023-12-29 ASSESSMENT — PAIN DESCRIPTION - LOCATION: LOCATION: NECK

## 2023-12-29 NOTE — PROGRESS NOTES
Speech Language Pathology  Attempt/Hold Note    Ishmael Gr  1945    SLP attempted to see pt this date for dysphagia intervention. Chart reviewed, spoke with RN. Pt currently NPO for EGD this date. Will hold and attempt again as pt is medically appropriate.    Giovana Villarreal MA CCC-SLP  Speech-Language Pathologist  SP. [phone]

## 2023-12-29 NOTE — ANESTHESIA POSTPROCEDURE EVALUATION
Department of Anesthesiology  Postprocedure Note    Patient: Nancy Mays  MRN: [id number]  YOB: 1945  Date of evaluation: 12/29/2023    Procedure Summary       Date: 12/29/23 Room / Location: [address] [place] [address]    Anesthesia Start: 7612 Anesthesia Stop: 9601    Procedure: EGD BIOPSY and rectal exam (Abdomen) Diagnosis:       Anemia, unspecified type      (Anemia, unspecified type [D64.9])    Surgeons: Ernesto Colón MD Responsible Provider: Ellen Arroyo MD    Anesthesia Type: MAC ASA Status: 4            Anesthesia Type: No value filed. Goldie Phase I: Goldie Score: 9    Goldie Phase II:      Anesthesia Post Evaluation    Patient location during evaluation: bedside  Patient participation: complete - patient participated  Level of consciousness: awake and alert  Pain score: 2  Airway patency: patent  Nausea & Vomiting: no vomiting  Cardiovascular status: hemodynamically stable  Respiratory status: nonlabored ventilation  Hydration status: stable  Multimodal analgesia pain management approach  Pain management: adequate    No notable events documented.

## 2023-12-29 NOTE — BRIEF OP NOTE
Brief Postoperative Note      Patient: Ishmael Gr  YOB: 1945  MRN: [id number]    Date of Procedure: 12/29/2023    Pre-Op Diagnosis Codes:     * Anemia, unspecified type [D64.9]         Procedure(s):  EGD BIOPSY and rectal exam    Surgeon(s):  Alexsander Nj MD    Anesthesia: Monitor Anesthesia Care    Estimated Blood Loss (mL): Minimal    Complications: None    Specimens:   ID Type Source Tests Collected by Time Destination   A : gastric biopsy for HPylori Tissue Stomach SURGICAL PATHOLOGY Alexsander Nj MD 12/29/2023 1544        Findings:   No current active upper GI bleeding.  Patient has multiple antral erosions that could have bled if patient is on blood thinners.  Gastric biopsies to check for H. pylori.    Plans:  Await biopsies.  Oral PPI twice daily for 8 weeks then daily thereafter.  Absolutely no NSAIDs.  Hold anticoagulation for 1-2 weeks.  Monitor H&H transfuse to keep hemoglobin between 7-8.  GI will sign off.  Please call if you have any questions.      Electronically signed by Alexsander Nj MD on 12/29/2023 at 3:59 PM

## 2023-12-29 NOTE — PROGRESS NOTES
Pt awake in bed. VSS, assessment complete and medications given. Pt's legs are very swollen and told pt we need to elevate bc he is sitting in chair but refusing bc he said they cramp up and he needs to lower them. Denies any other needs. Call light in reach and chair alarm engaged.

## 2023-12-29 NOTE — PROGRESS NOTES
Pt ready for return to 5T.  VSS. A&Ox3. Report called to 5T RN.  All questions answered.  Pt taken back to Rm 5903 via stretcher accompanied by PACU RN & transporter.  Portable monitor in place with A-Fib showing on screen.

## 2023-12-29 NOTE — CARE COORDINATION
Discharge Planning:     (CM) received call :   Paul Brun[facility] Pre- cert now valid until 12/30, if he doesnt admit then can re-do pre-cert on tuesday . Md made aware.       Kannan on call this weekend is Marko Palmer [phone] 8:00 am - 3pm Sat and Sun     Electronically signed by Lynne Costa on 12/29/2023 at 4:12 PM

## 2023-12-29 NOTE — PROGRESS NOTES
[facility]    12/29/2023 11:15 AM        Name: Ishmael Gr .              Admitted: 12/20/2023  Primary Care Provider: Anthony Bravo MD (Tel: [phone])    =      Subjective:    Sob improved no n.v or chest pain tolerated  Current Medications  furosemide (LASIX) injection 40 mg, Once  ceFAZolin (ANCEF) 2,000 mg in sodium chloride 0.9 % 50 mL IVPB (mini-bag), Q8H  0.9 % sodium chloride infusion, PRN  sodium chloride flush 0.9 % injection 5-40 mL, 2 times per day  sodium chloride flush 0.9 % injection 5-40 mL, PRN  0.9 % sodium chloride infusion, PRN  pantoprazole (PROTONIX) injection 40 mg, Daily  albuterol (PROVENTIL) (2.5 MG/3ML) 0.083% nebulizer solution 2.5 mg, 4x Daily RT  vancomycin (FIRVANQ) 50 MG/ML oral solution 250 mg, 2 times per day  lactobacillus (CULTURELLE) capsule 1 capsule, BID WC  insulin lispro (HUMALOG) injection vial 5 Units, TID WC  insulin lispro (HUMALOG) injection vial 0-4 Units, Nightly  dextrose bolus 10% 125 mL, PRN   Or  dextrose bolus 10% 250 mL, PRN  glucagon injection 1 mg, PRN  dextrose 10 % infusion, Continuous PRN  insulin lispro (HUMALOG) injection vial 0-8 Units, TID WC  simethicone (MYLICON) chewable tablet 80 mg, Q6H PRN  arformoterol tartrate (BROVANA) nebulizer solution 15 mcg, BID RT  budesonide (PULMICORT) nebulizer suspension 500 mcg, BID RT  albuterol sulfate HFA (PROVENTIL;VENTOLIN;PROAIR) 108 (90 Base) MCG/ACT inhaler 2 puff, Q6H PRN  allopurinol (ZYLOPRIM) tablet 200 mg, Daily  [Held by provider] apixaban (ELIQUIS) tablet 5 mg, BID  [Held by provider] aspirin chewable tablet 81 mg, Daily  thiamine mononitrate tablet 100 mg, Daily  sodium chloride flush 0.9 % injection 5-40 mL, 2 times per day  sodium chloride flush 0.9 % injection 5-40 mL, PRN  0.9 % sodium chloride infusion, PRN  polyethylene glycol (GLYCOLAX) packet 17 g, Daily PRN  acetaminophen (TYLENOL)  tablet 650 mg, Q6H PRN   Or  acetaminophen (TYLENOL) suppository 650 mg, Q6H PRN  tiotropium (SPIRIVA RESPIMAT) 2.5 MCG/ACT inhaler 2 puff, Daily RT        Objective:  /68   Pulse 98   Temp 97.2 °F (36.2 °C) (Temporal)   Resp 18   Ht 1.829 m (6')   Wt (!) 151.1 kg (333 lb 1.6 oz)   SpO2 95%   BMI 45.18 kg/m²     Intake/Output Summary (Last 24 hours) at 12/29/2023 1115  Last data filed at 12/28/2023 2210  Gross per 24 hour   Intake 1376.67 ml   Output 1900 ml   Net -523.33 ml        Wt Readings from Last 3 Encounters:   12/28/23 (!) 151.1 kg (333 lb 1.6 oz)   12/18/23 (!) 153.6 kg (338 lb 9.6 oz)   12/01/23 (!) 154.8 kg (341 lb 3.2 oz)       General appearance:  Appears comfortable. AAOx3  HEENT: atraumatic, Pupils equal, muscous membranes moist, no masses appreciated  Cardiovascular: rrr no murmurs appreciated  Respiratory: dimished breath sounds  Gastrointestinal: Abdomen soft, non-tender, BS+  EXT +  edema  Neurology: no gross focal deficts  Psychiatry: Appropriate affect. Not agitated  Skin: Warm, dry, no rashes appreciated    Labs and Tests:  CBC:   Recent Labs     12/27/23  0437 12/28/23  0459 12/28/23  2200 12/29/23  0409   WBC 20.3* 17.0*  --  15.3*   HGB 7.2* 7.0* 7.6* 7.5*   * 485*  --  530*       BMP:    Recent Labs     12/27/23  0437 12/28/23  0459 12/29/23  0409    141 139   K 3.7 4.0 3.6   CL 96* 99 97*   CO2 35* 35* 34*   BUN 62* 53* 46*   CREATININE 1.5* 1.4* 1.4*   GLUCOSE 133* 155* 146*       Hepatic:   No results for input(s): \"AST\", \"ALT\", \"ALB\", \"BILITOT\", \"ALKPHOS\" in the last 72 hours.    XR CHEST PORTABLE   Final Result   Left lower lobe infiltrate likely represents pneumonia.  Aspiration is   possible.         US RENAL COMPLETE   Final Result   No hydronephrosis or nephrolithiasis identified sonographically.      Increased renal parenchymal echogenicity bilaterally suggesting medical renal   disease.         CT PELVIS WO CONTRAST Additional Contrast? None   Final

## 2023-12-29 NOTE — ANESTHESIA PRE PROCEDURE
Department of Anesthesiology  Preprocedure Note       Name:  Chon Valles   Age:  70 y.o.  :  1945                                          MRN:  [id number]         Date:  2023      Surgeon: Sen Wynn):  Veronique Alejandra MD    Procedure: Procedure(s):  EGD DIAGNOSTIC ONLY    Medications prior to admission:   Prior to Admission medications    Medication Sig Start Date End Date Taking? Authorizing Provider   lactobacillus (CULTURELLE) capsule Take 1 capsule by mouth 2 times daily (with meals) 23 Yes Ifeoma Ding MD   vancomycin [facility]) 50 MG/ML SOLR oral solution Take 5 mLs by mouth every 12 hours for 10 days 23 Yes Ifeoma Ding MD   lidocaine 4 % external patch Place 1 patch onto the skin daily 12/16/23 1/15/24  Narendra Johansen APRN - CNP   ibuprofen (ADVIL;MOTRIN) 600 MG tablet Take 1 tablet by mouth 4 times daily as needed for Pain 23   Narendra Johansen APRN - CNP   ipratropium 0.5 mg-albuterol 2.5 mg (DUONEB) 0.5-2.5 (3) MG/3ML SOLN nebulizer solution Inhale 3 mLs into the lungs every 4 hours    Provider, MD Radha   Lift Chair MISC by Does not apply route Please dispense a lift chair 23   Giuseppe Barone MD   furosemide (LASIX) 40 MG tablet Take 1 tablet by mouth in the morning and 1 tablet in the evening. 23   Suresh Karimi MD   aspirin 81 MG chewable tablet Take 1 tablet by mouth daily 23   Suresh Karimi MD   apixaban Gailen Tabby) 5 MG TABS tablet Take 1 tablet by mouth 2 times daily 23   Suresh Karimi MD   fluticasone-salmeterol (ADVAIR DISKUS) 250-50 MCG/ACT AEPB diskus inhaler Inhale 1 puff into the lungs in the morning and 1 puff in the evening. 23   Rosangela Daniels MD   blood glucose test strips (ACCU-CHEK CLARISSA PLUS) strip USE AS NEEDED 10/4/23   Giuseppe Barone MD   triamcinolone (KENALOG) 0.1 % cream Apply to affected areas twice daily.   Send a 3 month supply 23   [address],

## 2023-12-29 NOTE — PROGRESS NOTES
[facility]    12/29/2023 11:18 AM        Name: Ishmael Gr .              Admitted: 12/20/2023  Primary Care Provider: Anthony Bravo MD (Tel: [phone])    =      Subjective:    Tolerated transfusion yesterday shortness of breath stable.  No nausea vomiting chest pain  Current Medications  furosemide (LASIX) injection 40 mg, Once  ceFAZolin (ANCEF) 2,000 mg in sodium chloride 0.9 % 50 mL IVPB (mini-bag), Q8H  0.9 % sodium chloride infusion, PRN  sodium chloride flush 0.9 % injection 5-40 mL, 2 times per day  sodium chloride flush 0.9 % injection 5-40 mL, PRN  0.9 % sodium chloride infusion, PRN  pantoprazole (PROTONIX) injection 40 mg, Daily  albuterol (PROVENTIL) (2.5 MG/3ML) 0.083% nebulizer solution 2.5 mg, 4x Daily RT  vancomycin (FIRVANQ) 50 MG/ML oral solution 250 mg, 2 times per day  lactobacillus (CULTURELLE) capsule 1 capsule, BID WC  insulin lispro (HUMALOG) injection vial 5 Units, TID WC  insulin lispro (HUMALOG) injection vial 0-4 Units, Nightly  dextrose bolus 10% 125 mL, PRN   Or  dextrose bolus 10% 250 mL, PRN  glucagon injection 1 mg, PRN  dextrose 10 % infusion, Continuous PRN  insulin lispro (HUMALOG) injection vial 0-8 Units, TID WC  simethicone (MYLICON) chewable tablet 80 mg, Q6H PRN  arformoterol tartrate (BROVANA) nebulizer solution 15 mcg, BID RT  budesonide (PULMICORT) nebulizer suspension 500 mcg, BID RT  albuterol sulfate HFA (PROVENTIL;VENTOLIN;PROAIR) 108 (90 Base) MCG/ACT inhaler 2 puff, Q6H PRN  allopurinol (ZYLOPRIM) tablet 200 mg, Daily  [Held by provider] apixaban (ELIQUIS) tablet 5 mg, BID  [Held by provider] aspirin chewable tablet 81 mg, Daily  thiamine mononitrate tablet 100 mg, Daily  sodium chloride flush 0.9 % injection 5-40 mL, 2 times per day  sodium chloride flush 0.9 % injection 5-40 mL, PRN  0.9 % sodium chloride infusion, PRN  polyethylene glycol (GLYCOLAX)

## 2023-12-29 NOTE — PROGRESS NOTES
Pt awake in chair and VSS. Pt denies any needs. Call light in reach and chair alarm engaged. Daughter Makayla called and updated on plan of care. ENDO called and pt going for EGD in the next half hr.

## 2023-12-29 NOTE — PLAN OF CARE
Problem: Safety - Adult  Goal: Free from fall injury  Outcome: Progressing     Problem: Pain  Goal: Verbalizes/displays adequate comfort level or baseline comfort level  Outcome: Progressing     Problem: Skin/Tissue Integrity  Goal: Absence of new skin breakdown  Description: 1.  Monitor for areas of redness and/or skin breakdown  2.  Assess vascular access sites hourly  3.  Every 4-6 hours minimum:  Change oxygen saturation probe site  4.  Every 4-6 hours:  If on nasal continuous positive airway pressure, respiratory therapy assess nares and determine need for appliance change or resting period.  Outcome: Progressing     Problem: ABCDS Injury Assessment  Goal: Absence of physical injury  Outcome: Progressing

## 2023-12-29 NOTE — CARE COORDINATION
YENNIFER placed call to Stella at [facility] to report that discharge is pending GI follow up this  morning, EGD vs Rectal Exam due to abnormal H&H.      Electronically signed by Lynne Costa on 2023 at 11:25 AM     Update: YENNIFER was updated by MD, that Patient will no be ready to discharge to SNF today.   YENNIFER placed call to Stella at [facility] [phone] reported Patient not ready.   Stella reports she will check with UR department regarding how to return pre-cert due to it may need to  today  then be restarted. Stella will advise when they can start pre-cert due to holiday schedule.     Electronically signed by Lynne Costa on 2023 at 12:55 PM

## 2023-12-29 NOTE — PROGRESS NOTES
[facility]  Inpatient Progress Note    Reason for Consult: ABRAHAM  Requesting Physician:  Dr. ROSALINA Farias    CHIEF COMPLAINT: Fall    History obtained from records and patient.    HISTORY OF PRESENT ILLNESS:                Ishmael Gr  is 78 y.o. y.o. male with significant past medical history of anemia, COPD, diabetes mellitus type 2, gout, hypertension, obesity, obstructive sleep apnea, peripheral vascular disease who presents with fall.  His baseline oxygen need is at 6L.  He is currently admitted for ABRAHAM possible COPD exacerbation.  I am asked to see the patient because his creatinine has increased to 1.7 yesterday and 1.8 today.  On 12/16/2023 creatinine was 1.1.  Lowest systolic blood pressure in the 100s range.  He is on furosemide 80 mg twice daily as an outpatient, celecoxib and ibuprofen 600 mg every 4 hours for the past few days.  Denies any vomiting or diarrhea.  No new skin rash.  No change in urine output.  CT PE done 12/16/23.  Lowest SBP in the 100s range.  -500 mL since admission.    Subjective / interval history / nephrology update / medical decision making:   Patient was seen comfortably sitting up in chair,   Reported no active complaints/distress,   Renal labs noted    No SOB  Stable on NC    Getting blood transfusion        Past Medical History:     has a past medical history of Anemia, COPD (chronic obstructive pulmonary disease) (HCC), Diabetes mellitus (HCC), Edema, GI (gastrointestinal bleed), Gout, Hypertension, Morbid obesity (HCC), Neuropathy, Obstructive sleep apnea, and Peripheral vascular disease (HCC).   Past Surgical History:     has a past surgical history that includes Appendectomy; Total hip arthroplasty; and Colonoscopy (N/A, 5/7/2019).   Current Medications:    Current Facility-Administered Medications: ceFAZolin (ANCEF) 2,000 mg in sodium chloride 0.9 % 50 mL IVPB (mini-bag), 2,000 mg, IntraVENous, Q8H  0.9 % sodium chloride infusion, ,  diuretics  -    - No worsening of creatinine levels  High complexity.        Respiratory acidosis-management as per Medicine and Pulmonary.  History of COPD.  Elevated proBNP can be secondary to ABRAHAM.  Hypoalbuminemia-UA only showed trace protein  Hematuria-repeat UA.    renal ultrasound.  Anemia-macrocytic.    B12 and folate level.  Relative hypotension-better      Medical decision making- high complexity. Multiple complex health problems.   Discussed with patient and treatment team.  Nursing   Thank you for allowing me to participate in this patient's care. Please do not hesitate to contact me for any questions/concerns. We will follow along with you.     Osmin Herrera MD  [facility]   Phone: [phone] or [employer]  Fax: [phone]    Severally ill, at risk of impending organ failure needing  higher level of care/monitoring.   Time spent that included face-to-face meeting/discussion with patient, patient's family -as available, and treatment team (including primary/referring team and other consultants; included coordination of care with the treatment team; and review of patient's electronic medical records and ordering appropriates tests.

## 2023-12-29 NOTE — PROGRESS NOTES
6    Infectious Diseases   Progress Note      Admission Date: 12/20/2023  Hospital Day: Hospital Day: 10   Attending: Davey Farias MD  Date of service: 12/29/2023     Chief complaint/ Reason for consult:     Sepsis on admission with tachycardia, tachypnea, altered mental status  Methicillin-susceptible Staphylococcus aureus bacteremia  Acute respiratory failure with hypoxia, requiring 6 L oxygen-nasal cannula  Concern for aspiration pneumonia  Acute kidney injury  Bilateral hip prosthesis in place    Microbiology:        I have reviewed allavailable micro lab data and cultures    Blood culture (2/2) - collected on 12/20/2023: Methicillin-susceptible Staphylococcus aureus    Susceptibility    Staphylococcus aureus (2)    Antibiotic Interpretation Microscan  Method Status    clindamycin Sensitive <=0.25 mcg/mL BACTERIAL SUSCEPTIBILITY PANEL BY LAURYN     erythromycin Resistant >=8 mcg/mL BACTERIAL SUSCEPTIBILITY PANEL BY LAURYN     oxacillin Sensitive 0.5 mcg/mL BACTERIAL SUSCEPTIBILITY PANEL BY LAURYN     tetracycline Resistant >=16 mcg/mL BACTERIAL SUSCEPTIBILITY PANEL BY LAURYN     trimethoprim-sulfamethoxazole Sensitive <=10 mcg/mL BACTERIAL SUSCEPTIBILITY PANEL BY LAURYN         Blood culture  - collected on 12/22/2023: Negative so far      Antibiotics and immunizations:       Current antibiotics: All antibiotics and their doses were reviewed by me    Recent Abx Admin                     ceFAZolin (ANCEF) 2,000 mg in sodium chloride 0.9 % 50 mL IVPB (mini-bag) (mg) 2,000 mg New Bag 12/29/23 1355     2,000 mg New Bag  0601     2,000 mg New Bag 12/28/23 2210     2,000 mg New Bag  1616    vancomycin (FIRVANQ) 50 MG/ML oral solution 250 mg (mg) 250 mg Given 12/29/23 0919     250 mg Given 12/28/23 2038                      Immunization History: All immunization history was reviewed by me today.    Immunization History   Administered Date(s) Administered    COVID-19, PFIZER PURPLE top, DILUTE for use, (age 12 y+), 30mcg/0.3mL  some errors in transcription may have occurred inadvertently. If you may need any clarification, please do not hesitate to contact me through [employer] or at the phone number provided below with my electronic signature.  Any pictures or media included in this note were obtained after taking informed verbal consent from the patient and with their approval to include those in the patient's medical record.        Curtis Nettles MD, MPH, FACP, FIDSA  12/29/2023, 2:48 PM  [facility]   [facility] ([facility]  [place] [address]  Office: [phone]  Fax: [phone]  In-person Clinic days:  Tuesday & Thursday a.m.  Virtual clinic days: Monday, Wednesday & Friday a.m.

## 2023-12-29 NOTE — CONSULTS
[facility]   CONSULTATION  [phone]      Chief Complaint   Patient presents with    Fall     Pt arrived to ED via [facility] ems from home after a fall. Per pt he was getting up to go to the bathroom and fell. Pt has knot above left eye and skin tear to left forearm. Pt on blood thinners. Per pt he is on 6L of oxygen at all times. Pt A&Ox4            History of Present Illness:  Ishmael Gr is a 78 y.o. patient who presented to the hospital with complaints of fall. I have been asked to provide consultation regarding further management and testing. The patient reports that he can walk at baseline, but cannot walk up a flight of stairs or 4 city blocks. He denies exertional chest pain or pressure. No nausea or vomiting. He states that he cannot tell when he is in atrial fibrillation. He reports that he is on his baseline 6L of oxygen. He states that his legs have been more edematous. He denies any palpitations, nausea or vomiting. He was found to have anemia and cardiology was consulted to stop the blood thinner.       Past Medical History:   has a past medical history of Anemia, COPD (chronic obstructive pulmonary disease) (HCC), Diabetes mellitus (HCC), Edema, GI (gastrointestinal bleed), Gout, Hypertension, Morbid obesity (HCC), Neuropathy, Obstructive sleep apnea, and Peripheral vascular disease (HCC).    Surgical History:   has a past surgical history that includes Appendectomy; Total hip arthroplasty; and Colonoscopy (N/A, 5/7/2019).     Social History:   reports that he quit smoking about 11 years ago. His smoking use included cigarettes. He started smoking about 61 years ago. He has a 50.0 pack-year smoking history. He has never used smokeless tobacco. He reports current alcohol use of about 14.0 standard drinks of alcohol per week. He reports that he does not use drugs.     Family History:  Heart disease in mother    Home Medications:  Were reviewed and are listed in nursing record.

## 2023-12-29 NOTE — PROGRESS NOTES
Pt arrived from [facility]. S. Call light within reach. Bed locked and in lowest position.  Vitals:    12/29/23 1643   BP: 123/68   Pulse: 92   Resp: 17   Temp: 98 °F (36.7 °C)   SpO2: 93%

## 2023-12-30 LAB
ALBUMIN SERPL-MCNC: 3.1 G/DL (ref 3.4–5)
ANION GAP SERPL CALCULATED.3IONS-SCNC: 9 MMOL/L (ref 3–16)
ANISOCYTOSIS BLD QL SMEAR: ABNORMAL
BASO STIPL BLD QL SMEAR: ABNORMAL
BASOPHILS # BLD: 0 K/UL (ref 0–0.2)
BASOPHILS NFR BLD: 0 %
BUN SERPL-MCNC: 38 MG/DL (ref 7–20)
CALCIUM SERPL-MCNC: 8.9 MG/DL (ref 8.3–10.6)
CHLORIDE SERPL-SCNC: 96 MMOL/L (ref 99–110)
CO2 SERPL-SCNC: 34 MMOL/L (ref 21–32)
CREAT SERPL-MCNC: 1.4 MG/DL (ref 0.8–1.3)
DEPRECATED RDW RBC AUTO: 17.4 % (ref 12.4–15.4)
EOSINOPHIL # BLD: 0.3 K/UL (ref 0–0.6)
EOSINOPHIL NFR BLD: 2 %
GFR SERPLBLD CREATININE-BSD FMLA CKD-EPI: 52 ML/MIN/{1.73_M2}
GLUCOSE BLD-MCNC: 109 MG/DL (ref 70–99)
GLUCOSE BLD-MCNC: 154 MG/DL (ref 70–99)
GLUCOSE BLD-MCNC: 177 MG/DL (ref 70–99)
GLUCOSE BLD-MCNC: 221 MG/DL (ref 70–99)
GLUCOSE SERPL-MCNC: 148 MG/DL (ref 70–99)
HCT VFR BLD AUTO: 22.4 % (ref 40.5–52.5)
HGB BLD-MCNC: 7.3 G/DL (ref 13.5–17.5)
LYMPHOCYTES # BLD: 1.2 K/UL (ref 1–5.1)
LYMPHOCYTES NFR BLD: 9 %
MACROCYTES BLD QL SMEAR: ABNORMAL
MCH RBC QN AUTO: 32.1 PG (ref 26–34)
MCHC RBC AUTO-ENTMCNC: 32.4 G/DL (ref 31–36)
MCV RBC AUTO: 98.9 FL (ref 80–100)
METAMYELOCYTES NFR BLD MANUAL: 5 %
MONOCYTES # BLD: 0.4 K/UL (ref 0–1.3)
MONOCYTES NFR BLD: 3 %
NEUTROPHILS # BLD: 11.7 K/UL (ref 1.7–7.7)
NEUTROPHILS NFR BLD: 81 %
NT-PROBNP SERPL-MCNC: 619 PG/ML (ref 0–449)
PERFORMED ON: ABNORMAL
PHOSPHATE SERPL-MCNC: 3.5 MG/DL (ref 2.5–4.9)
PLATELET # BLD AUTO: 530 K/UL (ref 135–450)
PLATELET BLD QL SMEAR: ABNORMAL
PMV BLD AUTO: 6.9 FL (ref 5–10.5)
POLYCHROMASIA BLD QL SMEAR: ABNORMAL
POTASSIUM SERPL-SCNC: 3.8 MMOL/L (ref 3.5–5.1)
RBC # BLD AUTO: 2.27 M/UL (ref 4.2–5.9)
SLIDE REVIEW: ABNORMAL
SODIUM SERPL-SCNC: 139 MMOL/L (ref 136–145)
WBC # BLD AUTO: 13.6 K/UL (ref 4–11)

## 2023-12-30 PROCEDURE — 83880 ASSAY OF NATRIURETIC PEPTIDE: CPT

## 2023-12-30 PROCEDURE — 85025 COMPLETE CBC W/AUTO DIFF WBC: CPT

## 2023-12-30 PROCEDURE — 2580000003 HC RX 258: Performed by: INTERNAL MEDICINE

## 2023-12-30 PROCEDURE — 80069 RENAL FUNCTION PANEL: CPT

## 2023-12-30 PROCEDURE — 6370000000 HC RX 637 (ALT 250 FOR IP): Performed by: INTERNAL MEDICINE

## 2023-12-30 PROCEDURE — 6360000002 HC RX W HCPCS: Performed by: INTERNAL MEDICINE

## 2023-12-30 PROCEDURE — 94640 AIRWAY INHALATION TREATMENT: CPT

## 2023-12-30 PROCEDURE — 94761 N-INVAS EAR/PLS OXIMETRY MLT: CPT

## 2023-12-30 PROCEDURE — 99233 SBSQ HOSP IP/OBS HIGH 50: CPT | Performed by: NURSE PRACTITIONER

## 2023-12-30 PROCEDURE — 2700000000 HC OXYGEN THERAPY PER DAY

## 2023-12-30 PROCEDURE — C9113 INJ PANTOPRAZOLE SODIUM, VIA: HCPCS | Performed by: INTERNAL MEDICINE

## 2023-12-30 PROCEDURE — 6360000002 HC RX W HCPCS: Performed by: NURSE PRACTITIONER

## 2023-12-30 PROCEDURE — 2060000000 HC ICU INTERMEDIATE R&B

## 2023-12-30 PROCEDURE — 36415 COLL VENOUS BLD VENIPUNCTURE: CPT

## 2023-12-30 RX ORDER — SENNA AND DOCUSATE SODIUM 50; 8.6 MG/1; MG/1
2 TABLET, FILM COATED ORAL DAILY
Status: DISCONTINUED | OUTPATIENT
Start: 2023-12-30 | End: 2024-01-15 | Stop reason: HOSPADM

## 2023-12-30 RX ORDER — FUROSEMIDE 10 MG/ML
40 INJECTION INTRAMUSCULAR; INTRAVENOUS ONCE
Status: COMPLETED | OUTPATIENT
Start: 2023-12-30 | End: 2023-12-30

## 2023-12-30 RX ORDER — PANTOPRAZOLE SODIUM 40 MG/10ML
40 INJECTION, POWDER, LYOPHILIZED, FOR SOLUTION INTRAVENOUS 2 TIMES DAILY
Status: DISCONTINUED | OUTPATIENT
Start: 2023-12-30 | End: 2024-01-15 | Stop reason: HOSPADM

## 2023-12-30 RX ADMIN — Medication 10 ML: at 20:28

## 2023-12-30 RX ADMIN — ALLOPURINOL 200 MG: 100 TABLET ORAL at 10:43

## 2023-12-30 RX ADMIN — Medication 250 MG: at 10:43

## 2023-12-30 RX ADMIN — STANDARDIZED SENNA CONCENTRATE AND DOCUSATE SODIUM 2 TABLET: 8.6; 5 TABLET ORAL at 17:28

## 2023-12-30 RX ADMIN — ALBUTEROL SULFATE 2.5 MG: 2.5 SOLUTION RESPIRATORY (INHALATION) at 16:21

## 2023-12-30 RX ADMIN — SODIUM CHLORIDE: 9 INJECTION, SOLUTION INTRAVENOUS at 05:43

## 2023-12-30 RX ADMIN — Medication 1 CAPSULE: at 10:44

## 2023-12-30 RX ADMIN — CEFAZOLIN 2000 MG: 2 INJECTION, POWDER, FOR SOLUTION INTRAMUSCULAR; INTRAVENOUS at 05:44

## 2023-12-30 RX ADMIN — INSULIN LISPRO 5 UNITS: 100 INJECTION, SOLUTION INTRAVENOUS; SUBCUTANEOUS at 17:29

## 2023-12-30 RX ADMIN — INSULIN LISPRO 2 UNITS: 100 INJECTION, SOLUTION INTRAVENOUS; SUBCUTANEOUS at 14:52

## 2023-12-30 RX ADMIN — Medication 250 MG: at 20:27

## 2023-12-30 RX ADMIN — CEFAZOLIN 2000 MG: 2 INJECTION, POWDER, FOR SOLUTION INTRAMUSCULAR; INTRAVENOUS at 15:00

## 2023-12-30 RX ADMIN — TIOTROPIUM BROMIDE INHALATION SPRAY 2 PUFF: 3.12 SPRAY, METERED RESPIRATORY (INHALATION) at 09:17

## 2023-12-30 RX ADMIN — THIAMINE HCL TAB 100 MG 100 MG: 100 TAB at 10:43

## 2023-12-30 RX ADMIN — ALBUTEROL SULFATE 2.5 MG: 2.5 SOLUTION RESPIRATORY (INHALATION) at 20:01

## 2023-12-30 RX ADMIN — INSULIN LISPRO 5 UNITS: 100 INJECTION, SOLUTION INTRAVENOUS; SUBCUTANEOUS at 14:52

## 2023-12-30 RX ADMIN — PANTOPRAZOLE SODIUM 40 MG: 40 INJECTION, POWDER, FOR SOLUTION INTRAVENOUS at 20:08

## 2023-12-30 RX ADMIN — ARFORMOTEROL TARTRATE 15 MCG: 15 SOLUTION RESPIRATORY (INHALATION) at 20:01

## 2023-12-30 RX ADMIN — SODIUM CHLORIDE, PRESERVATIVE FREE 10 ML: 5 INJECTION INTRAVENOUS at 20:08

## 2023-12-30 RX ADMIN — INSULIN LISPRO 5 UNITS: 100 INJECTION, SOLUTION INTRAVENOUS; SUBCUTANEOUS at 10:44

## 2023-12-30 RX ADMIN — Medication 10 ML: at 17:29

## 2023-12-30 RX ADMIN — FUROSEMIDE 40 MG: 10 INJECTION, SOLUTION INTRAMUSCULAR; INTRAVENOUS at 14:52

## 2023-12-30 RX ADMIN — BUDESONIDE INHALATION 500 MCG: 0.5 SUSPENSION RESPIRATORY (INHALATION) at 20:01

## 2023-12-30 RX ADMIN — ARFORMOTEROL TARTRATE 15 MCG: 15 SOLUTION RESPIRATORY (INHALATION) at 09:16

## 2023-12-30 RX ADMIN — Medication 1 CAPSULE: at 17:28

## 2023-12-30 RX ADMIN — PANTOPRAZOLE SODIUM 40 MG: 40 INJECTION, POWDER, FOR SOLUTION INTRAVENOUS at 10:43

## 2023-12-30 RX ADMIN — BUDESONIDE INHALATION 500 MCG: 0.5 SUSPENSION RESPIRATORY (INHALATION) at 09:16

## 2023-12-30 RX ADMIN — ALBUTEROL SULFATE 2.5 MG: 2.5 SOLUTION RESPIRATORY (INHALATION) at 12:22

## 2023-12-30 RX ADMIN — SODIUM CHLORIDE, PRESERVATIVE FREE 10 ML: 5 INJECTION INTRAVENOUS at 10:44

## 2023-12-30 RX ADMIN — SODIUM CHLORIDE 25 ML: 9 INJECTION, SOLUTION INTRAVENOUS at 14:59

## 2023-12-30 RX ADMIN — CEFAZOLIN 2000 MG: 2 INJECTION, POWDER, FOR SOLUTION INTRAMUSCULAR; INTRAVENOUS at 22:29

## 2023-12-30 RX ADMIN — SODIUM CHLORIDE: 9 INJECTION, SOLUTION INTRAVENOUS at 22:27

## 2023-12-30 RX ADMIN — ALBUTEROL SULFATE 2.5 MG: 2.5 SOLUTION RESPIRATORY (INHALATION) at 09:17

## 2023-12-30 ASSESSMENT — PAIN SCALES - GENERAL
PAINLEVEL_OUTOF10: 0

## 2023-12-30 NOTE — PROGRESS NOTES
[facility]  Inpatient Progress Note    Reason for Consult: ABRAHAM  Requesting Physician:  Dr. ROSALINA Farias    CHIEF COMPLAINT: Fall    History obtained from records and patient.    HISTORY OF PRESENT ILLNESS:                Ishmael Gr  is 78 y.o. y.o. male with significant past medical history of anemia, COPD, diabetes mellitus type 2, gout, hypertension, obesity, obstructive sleep apnea, peripheral vascular disease who presents with fall.  His baseline oxygen need is at 6L.  He is currently admitted for ABRAHAM possible COPD exacerbation.  I am asked to see the patient because his creatinine has increased to 1.7 yesterday and 1.8 today.  On 12/16/2023 creatinine was 1.1.  Lowest systolic blood pressure in the 100s range.  He is on furosemide 80 mg twice daily as an outpatient, celecoxib and ibuprofen 600 mg every 4 hours for the past few days.  Denies any vomiting or diarrhea.  No new skin rash.  No change in urine output.  CT PE done 12/16/23.  Lowest SBP in the 100s range.  -500 mL since admission.    Subjective / interval history / nephrology update / medical decision making:   Patient was seen comfortably sitting up in chair,   Reported no active complaints/distress,   Renal labs noted    No SOB  Stable on NC    Getting blood transfusion        Past Medical History:     has a past medical history of Anemia, COPD (chronic obstructive pulmonary disease) (HCC), Diabetes mellitus (HCC), Edema, GI (gastrointestinal bleed), Gout, Hypertension, Morbid obesity (HCC), Neuropathy, Obstructive sleep apnea, and Peripheral vascular disease (HCC).   Past Surgical History:     has a past surgical history that includes Appendectomy; Total hip arthroplasty; and Colonoscopy (N/A, 5/7/2019).   Current Medications:    Current Facility-Administered Medications: perflutren lipid microspheres (DEFINITY) injection 1.5 mL, 1.5 mL, IntraVENous, ONCE PRN  ceFAZolin (ANCEF) 2,000 mg in sodium chloride 0.9 % 50 mL

## 2023-12-30 NOTE — CARE COORDINATION
Discharge Planning:     (YENNIFER) spoke with the staff nurse on 5C, the patient is not medically ready today for discharge. CM called and LVM for [facility] [phone] at [facility] to update on discharge plans and pending pre-cert.    Electronically signed by Gonzalo Desai on 12/30/23 at 12:35 PM EST

## 2023-12-30 NOTE — PROGRESS NOTES
[facility]    12/30/2023 4:32 PM        Name: Ishmael Gr .              Admitted: 12/20/2023  Primary Care Provider: Anthony Bravo MD (Tel: [phone])    =      Subjective:    Patient lying in bed feels about the same as yesterday no nausea vomiting no chest pain  Current Medications  perflutren lipid microspheres (DEFINITY) injection 1.5 mL, ONCE PRN  ceFAZolin (ANCEF) 2,000 mg in sodium chloride 0.9 % 50 mL IVPB (mini-bag), Q8H  0.9 % sodium chloride infusion, PRN  sodium chloride flush 0.9 % injection 5-40 mL, 2 times per day  sodium chloride flush 0.9 % injection 5-40 mL, PRN  0.9 % sodium chloride infusion, PRN  pantoprazole (PROTONIX) injection 40 mg, Daily  albuterol (PROVENTIL) (2.5 MG/3ML) 0.083% nebulizer solution 2.5 mg, 4x Daily RT  vancomycin (FIRVANQ) 50 MG/ML oral solution 250 mg, 2 times per day  lactobacillus (CULTURELLE) capsule 1 capsule, BID WC  insulin lispro (HUMALOG) injection vial 5 Units, TID WC  insulin lispro (HUMALOG) injection vial 0-4 Units, Nightly  dextrose bolus 10% 125 mL, PRN   Or  dextrose bolus 10% 250 mL, PRN  glucagon injection 1 mg, PRN  dextrose 10 % infusion, Continuous PRN  insulin lispro (HUMALOG) injection vial 0-8 Units, TID WC  simethicone (MYLICON) chewable tablet 80 mg, Q6H PRN  arformoterol tartrate (BROVANA) nebulizer solution 15 mcg, BID RT  budesonide (PULMICORT) nebulizer suspension 500 mcg, BID RT  albuterol sulfate HFA (PROVENTIL;VENTOLIN;PROAIR) 108 (90 Base) MCG/ACT inhaler 2 puff, Q6H PRN  allopurinol (ZYLOPRIM) tablet 200 mg, Daily  [Held by provider] apixaban (ELIQUIS) tablet 5 mg, BID  [Held by provider] aspirin chewable tablet 81 mg, Daily  thiamine mononitrate tablet 100 mg, Daily  sodium chloride flush 0.9 % injection 5-40 mL, 2 times per day  sodium chloride flush 0.9 % injection 5-40 mL, PRN  0.9 % sodium chloride infusion,

## 2023-12-30 NOTE — PLAN OF CARE
Problem: Safety - Adult  Goal: Free from fall injury  12/30/2023 1851 by Sunshine Murdock, RN  Outcome: Progressing     Problem: Discharge Planning  Goal: Discharge to home or other facility with appropriate resources  12/30/2023 1851 by Sunshine Murdock, RN  Outcome: Progressing     Problem: Skin/Tissue Integrity  Goal: Absence of new skin breakdown  Description: 1.  Monitor for areas of redness and/or skin breakdown  2.  Assess vascular access sites hourly  3.  Every 4-6 hours minimum:  Change oxygen saturation probe site  4.  Every 4-6 hours:  If on nasal continuous positive airway pressure, respiratory therapy assess nares and determine need for appliance change or resting period.  12/30/2023 1851 by Sunshine Murdock, RN  Outcome: Progressing

## 2023-12-30 NOTE — PROGRESS NOTES
1251   BP: (!) 127/57   Pulse: (!) 105   Resp: 16   Temp: 97.3 °F (36.3 °C)   SpO2: 94%      In: 250 [P.O.:200; I.V.:50]  Out: 2675    Wt Readings from Last 3 Encounters:   12/28/23 (!) 151.1 kg (333 lb 1.6 oz)   12/18/23 (!) 153.6 kg (338 lb 9.6 oz)   12/01/23 (!) 154.8 kg (341 lb 3.2 oz)       Intake/Output Summary (Last 24 hours) at 12/30/2023 1334  Last data filed at 12/30/2023 1041  Gross per 24 hour   Intake 250 ml   Output 1975 ml   Net -1725 ml       Telemetry: Personally Reviewed  - Sinus rhythm   Constitutional: Cooperative and in no apparent distress, and appears overweight   Skin: Warm and pink; no pallor, cyanosis, clubbing. + scattered bruising   HEENT: Symmetric and normocephalic. PERRL.  Conjunctiva pink with clear sclera. Mucus membranes moist.   Cardiovascular: Regular rate and rhythm. S1/S2 present without murmurs, no rubs or gallops. Peripheral pulses present. No elevation of JVP. +2 peripheral edema  Respiratory: Respirations symmetric and unlabored. Lungs clear to auscultation bilaterally, no wheezing, crackles, or rhonchi  Gastrointestinal: Abdomen soft and round. Without tenderness.  Musculoskeletal: Bilateral upper and lower extremity strength gen weakness   Neurologic/Psych: Awake and orientated to person, place and time. Calm affect, appropriate mood    Pertinent labs, diagnostic, device, and imaging results reviewed as a part of this visit    Labs:    BMP:   Recent Labs     12/28/23  0459 12/29/23  0409 12/30/23  0530    139 139   K 4.0 3.6 3.8   CL 99 97* 96*   CO2 35* 34* 34*   PHOS 4.3 4.0 3.5   BUN 53* 46* 38*   CREATININE 1.4* 1.4* 1.4*   MG 1.80  --   --      Estimated Creatinine Clearance: 68 mL/min (A) (based on SCr of 1.4 mg/dL (H)).   CBC:   Recent Labs     12/28/23  0459 12/28/23  2200 12/29/23  0409 12/30/23  0530   WBC 17.0*  --  15.3* 13.6*   HGB 7.0* 7.6* 7.5* 7.3*   HCT 21.1* 23.6* 23.2* 22.4*   .1*  --  98.3 98.9   *  --  530* 530*     Thyroid:   Lab  administered.   Normal left ventricle size, wall thickness, and systolic function with an   estimated ejection fraction of 55-60%. No regional wall motion abnormalities   are seen.   Indeterminate diastolic function.   The aortic root is dilated, measuring 4.0cm.     Echo: 12/22/23  Summary   Technically difficult study. Valves not well visualized.   Left ventricular cavity size is normal.   There is increased left ventricular wall thickness.   Left ventricular ejection fraction is normal at 50-55%.   The right atrium is dilated.   IVC size is dilated (>2.1 cm) and collapses < 50% with respiration   consistent with elevated RA pressure (15 mmHg).    Stress lexiscan 2/15/23  Summary -Myocardial perfusion is abnormal.  -There is a moderate size, moderate intensity, fixed inferior lateral defect  c/w scar.  -No significant ischemia is seen.  -Overall LV function is normal with EF=59%. There is mild inferior  hypokinesis.  -Intermediate risk      Cardiac cath 2/13/09:  Normal coronaries  LVEF 70%     RA-mean 12  RV-45/2, 10  PA-56/10 mean 35  PW-mean 30  LV-128/-9, 13  Ao-121/71    Problem List:   Patient Active Problem List    Diagnosis Date Noted    Coronary artery disease due to calcified coronary lesion 11/27/2023    Coronary artery calcification seen on CT scan 11/27/2023    Tachycardia 02/14/2023    Peripheral edema 02/13/2023    PFO (patent foramen ovale) 01/17/2023    Metabolic encephalopathy 09/09/2022    TGA (transient global amnesia) 09/08/2022    Altered mental status 09/07/2022    Cerebrovascular accident (CVA) due to thrombosis of left middle cerebral artery (HCC) 09/07/2022    Acute respiratory failure with hypercapnia (HCC) 09/07/2022    Cerebrovascular accident (CVA) (HCC) 09/07/2022    Chronic heart failure with preserved ejection fraction (HCC) 09/07/2022    Ventricular ectopy 09/07/2022    Leg swelling 09/07/2022    Encephalomalacia 09/03/2022    Paroxysmal atrial fibrillation (HCC) 12/29/2023

## 2023-12-30 NOTE — PLAN OF CARE
Problem: Safety - Adult  Goal: Free from fall injury  Outcome: Progressing     Problem: Discharge Planning  Goal: Discharge to home or other facility with appropriate resources  Outcome: Progressing     Problem: Pain  Goal: Verbalizes/displays adequate comfort level or baseline comfort level  Outcome: Progressing     Problem: Skin/Tissue Integrity  Goal: Absence of new skin breakdown  Description: 1.  Monitor for areas of redness and/or skin breakdown  2.  Assess vascular access sites hourly  3.  Every 4-6 hours minimum:  Change oxygen saturation probe site  4.  Every 4-6 hours:  If on nasal continuous positive airway pressure, respiratory therapy assess nares and determine need for appliance change or resting period.  Outcome: Progressing

## 2023-12-31 LAB
ALBUMIN SERPL-MCNC: 3.1 G/DL (ref 3.4–5)
ANION GAP SERPL CALCULATED.3IONS-SCNC: 7 MMOL/L (ref 3–16)
ANISOCYTOSIS BLD QL SMEAR: ABNORMAL
BASOPHILS # BLD: 0 K/UL (ref 0–0.2)
BASOPHILS NFR BLD: 0 %
BUN SERPL-MCNC: 34 MG/DL (ref 7–20)
CALCIUM SERPL-MCNC: 8.9 MG/DL (ref 8.3–10.6)
CHLORIDE SERPL-SCNC: 95 MMOL/L (ref 99–110)
CO2 SERPL-SCNC: 35 MMOL/L (ref 21–32)
CREAT SERPL-MCNC: 1.5 MG/DL (ref 0.8–1.3)
DEPRECATED RDW RBC AUTO: 17.4 % (ref 12.4–15.4)
EOSINOPHIL # BLD: 0.6 K/UL (ref 0–0.6)
EOSINOPHIL NFR BLD: 6 %
GFR SERPLBLD CREATININE-BSD FMLA CKD-EPI: 48 ML/MIN/{1.73_M2}
GLUCOSE BLD-MCNC: 131 MG/DL (ref 70–99)
GLUCOSE BLD-MCNC: 140 MG/DL (ref 70–99)
GLUCOSE BLD-MCNC: 149 MG/DL (ref 70–99)
GLUCOSE BLD-MCNC: 164 MG/DL (ref 70–99)
GLUCOSE SERPL-MCNC: 149 MG/DL (ref 70–99)
HCT VFR BLD AUTO: 21.4 % (ref 40.5–52.5)
HGB BLD-MCNC: 7.2 G/DL (ref 13.5–17.5)
LYMPHOCYTES # BLD: 0.8 K/UL (ref 1–5.1)
LYMPHOCYTES NFR BLD: 8 %
MCH RBC QN AUTO: 33.1 PG (ref 26–34)
MCHC RBC AUTO-ENTMCNC: 33.5 G/DL (ref 31–36)
MCV RBC AUTO: 98.8 FL (ref 80–100)
MONOCYTES # BLD: 0.4 K/UL (ref 0–1.3)
MONOCYTES NFR BLD: 4 %
MYELOCYTES NFR BLD MANUAL: 4 %
NEUTROPHILS # BLD: 8.2 K/UL (ref 1.7–7.7)
NEUTROPHILS NFR BLD: 78 %
PERFORMED ON: ABNORMAL
PHOSPHATE SERPL-MCNC: 4.3 MG/DL (ref 2.5–4.9)
PLATELET # BLD AUTO: 530 K/UL (ref 135–450)
PLATELET BLD QL SMEAR: ABNORMAL
PMV BLD AUTO: 6.7 FL (ref 5–10.5)
POLYCHROMASIA BLD QL SMEAR: ABNORMAL
POTASSIUM SERPL-SCNC: 3.3 MMOL/L (ref 3.5–5.1)
RBC # BLD AUTO: 2.16 M/UL (ref 4.2–5.9)
SLIDE REVIEW: ABNORMAL
SODIUM SERPL-SCNC: 137 MMOL/L (ref 136–145)
WBC # BLD AUTO: 10 K/UL (ref 4–11)

## 2023-12-31 PROCEDURE — 2060000000 HC ICU INTERMEDIATE R&B

## 2023-12-31 PROCEDURE — 6360000002 HC RX W HCPCS: Performed by: INTERNAL MEDICINE

## 2023-12-31 PROCEDURE — 99233 SBSQ HOSP IP/OBS HIGH 50: CPT | Performed by: NURSE PRACTITIONER

## 2023-12-31 PROCEDURE — 6370000000 HC RX 637 (ALT 250 FOR IP): Performed by: INTERNAL MEDICINE

## 2023-12-31 PROCEDURE — 2580000003 HC RX 258: Performed by: INTERNAL MEDICINE

## 2023-12-31 PROCEDURE — 94640 AIRWAY INHALATION TREATMENT: CPT

## 2023-12-31 PROCEDURE — 80069 RENAL FUNCTION PANEL: CPT

## 2023-12-31 PROCEDURE — 94761 N-INVAS EAR/PLS OXIMETRY MLT: CPT

## 2023-12-31 PROCEDURE — 85025 COMPLETE CBC W/AUTO DIFF WBC: CPT

## 2023-12-31 PROCEDURE — 2700000000 HC OXYGEN THERAPY PER DAY

## 2023-12-31 PROCEDURE — C9113 INJ PANTOPRAZOLE SODIUM, VIA: HCPCS | Performed by: INTERNAL MEDICINE

## 2023-12-31 RX ORDER — POTASSIUM CHLORIDE 20 MEQ/1
40 TABLET, EXTENDED RELEASE ORAL ONCE
Status: COMPLETED | OUTPATIENT
Start: 2023-12-31 | End: 2023-12-31

## 2023-12-31 RX ORDER — FUROSEMIDE 10 MG/ML
40 INJECTION INTRAMUSCULAR; INTRAVENOUS 2 TIMES DAILY
Status: COMPLETED | OUTPATIENT
Start: 2023-12-31 | End: 2023-12-31

## 2023-12-31 RX ADMIN — FUROSEMIDE 40 MG: 10 INJECTION, SOLUTION INTRAMUSCULAR; INTRAVENOUS at 12:55

## 2023-12-31 RX ADMIN — PANTOPRAZOLE SODIUM 40 MG: 40 INJECTION, POWDER, FOR SOLUTION INTRAVENOUS at 09:08

## 2023-12-31 RX ADMIN — INSULIN LISPRO 5 UNITS: 100 INJECTION, SOLUTION INTRAVENOUS; SUBCUTANEOUS at 09:08

## 2023-12-31 RX ADMIN — CEFAZOLIN 2000 MG: 2 INJECTION, POWDER, FOR SOLUTION INTRAMUSCULAR; INTRAVENOUS at 06:18

## 2023-12-31 RX ADMIN — STANDARDIZED SENNA CONCENTRATE AND DOCUSATE SODIUM 2 TABLET: 8.6; 5 TABLET ORAL at 09:07

## 2023-12-31 RX ADMIN — Medication 1 CAPSULE: at 18:45

## 2023-12-31 RX ADMIN — Medication 1 CAPSULE: at 09:08

## 2023-12-31 RX ADMIN — CEFAZOLIN 2000 MG: 2 INJECTION, POWDER, FOR SOLUTION INTRAMUSCULAR; INTRAVENOUS at 23:38

## 2023-12-31 RX ADMIN — CEFAZOLIN 2000 MG: 2 INJECTION, POWDER, FOR SOLUTION INTRAMUSCULAR; INTRAVENOUS at 15:19

## 2023-12-31 RX ADMIN — ARFORMOTEROL TARTRATE 15 MCG: 15 SOLUTION RESPIRATORY (INHALATION) at 19:11

## 2023-12-31 RX ADMIN — SODIUM CHLORIDE 25 ML: 9 INJECTION, SOLUTION INTRAVENOUS at 15:18

## 2023-12-31 RX ADMIN — SIMETHICONE 80 MG: 80 TABLET, CHEWABLE ORAL at 23:45

## 2023-12-31 RX ADMIN — THIAMINE HCL TAB 100 MG 100 MG: 100 TAB at 09:08

## 2023-12-31 RX ADMIN — Medication 10 ML: at 21:16

## 2023-12-31 RX ADMIN — INSULIN LISPRO 5 UNITS: 100 INJECTION, SOLUTION INTRAVENOUS; SUBCUTANEOUS at 12:56

## 2023-12-31 RX ADMIN — PANTOPRAZOLE SODIUM 40 MG: 40 INJECTION, POWDER, FOR SOLUTION INTRAVENOUS at 21:16

## 2023-12-31 RX ADMIN — POTASSIUM CHLORIDE 40 MEQ: 1500 TABLET, EXTENDED RELEASE ORAL at 12:56

## 2023-12-31 RX ADMIN — Medication 250 MG: at 21:16

## 2023-12-31 RX ADMIN — Medication 10 ML: at 09:10

## 2023-12-31 RX ADMIN — SIMETHICONE 80 MG: 80 TABLET, CHEWABLE ORAL at 13:03

## 2023-12-31 RX ADMIN — INSULIN LISPRO 5 UNITS: 100 INJECTION, SOLUTION INTRAVENOUS; SUBCUTANEOUS at 18:45

## 2023-12-31 RX ADMIN — BUDESONIDE INHALATION 500 MCG: 0.5 SUSPENSION RESPIRATORY (INHALATION) at 19:10

## 2023-12-31 RX ADMIN — FUROSEMIDE 40 MG: 10 INJECTION, SOLUTION INTRAMUSCULAR; INTRAVENOUS at 18:45

## 2023-12-31 RX ADMIN — SODIUM CHLORIDE: 9 INJECTION, SOLUTION INTRAVENOUS at 06:17

## 2023-12-31 RX ADMIN — SODIUM CHLORIDE, PRESERVATIVE FREE 10 ML: 5 INJECTION INTRAVENOUS at 21:16

## 2023-12-31 RX ADMIN — SODIUM CHLORIDE, PRESERVATIVE FREE 10 ML: 5 INJECTION INTRAVENOUS at 09:08

## 2023-12-31 RX ADMIN — TIOTROPIUM BROMIDE INHALATION SPRAY 2 PUFF: 3.12 SPRAY, METERED RESPIRATORY (INHALATION) at 10:26

## 2023-12-31 RX ADMIN — ARFORMOTEROL TARTRATE 15 MCG: 15 SOLUTION RESPIRATORY (INHALATION) at 10:24

## 2023-12-31 RX ADMIN — ALBUTEROL SULFATE 2.5 MG: 2.5 SOLUTION RESPIRATORY (INHALATION) at 19:10

## 2023-12-31 RX ADMIN — ALLOPURINOL 200 MG: 100 TABLET ORAL at 09:08

## 2023-12-31 RX ADMIN — BUDESONIDE INHALATION 500 MCG: 0.5 SUSPENSION RESPIRATORY (INHALATION) at 10:25

## 2023-12-31 RX ADMIN — ALBUTEROL SULFATE 2.5 MG: 2.5 SOLUTION RESPIRATORY (INHALATION) at 16:33

## 2023-12-31 RX ADMIN — ALBUTEROL SULFATE 2.5 MG: 2.5 SOLUTION RESPIRATORY (INHALATION) at 10:24

## 2023-12-31 RX ADMIN — Medication 250 MG: at 09:08

## 2023-12-31 ASSESSMENT — PAIN SCALES - GENERAL
PAINLEVEL_OUTOF10: 0

## 2023-12-31 NOTE — PROGRESS NOTES
[facility]  Inpatient Progress Note    Reason for Consult: ABRAHAM  Requesting Physician:  Dr. ROSALINA Farias    CHIEF COMPLAINT: Fall    History obtained from records and patient.    HISTORY OF PRESENT ILLNESS:                Ishmael Gr  is 78 y.o. y.o. male with significant past medical history of anemia, COPD, diabetes mellitus type 2, gout, hypertension, obesity, obstructive sleep apnea, peripheral vascular disease who presents with fall.  His baseline oxygen need is at 6L.  He is currently admitted for ABRAHAM possible COPD exacerbation.  I am asked to see the patient because his creatinine has increased to 1.7 yesterday and 1.8 today.  On 12/16/2023 creatinine was 1.1.  Lowest systolic blood pressure in the 100s range.  He is on furosemide 80 mg twice daily as an outpatient, celecoxib and ibuprofen 600 mg every 4 hours for the past few days.  Denies any vomiting or diarrhea.  No new skin rash.  No change in urine output.  CT PE done 12/16/23.  Lowest SBP in the 100s range.  -500 mL since admission.    Subjective / interval history / nephrology update / medical decision making:   Patient was seen comfortably sitting up in chair,   Reported no active complaints/distress,   Renal labs noted    No SOB  Stable on NC    Getting blood transfusion        Past Medical History:     has a past medical history of Anemia, COPD (chronic obstructive pulmonary disease) (HCC), Diabetes mellitus (HCC), Edema, GI (gastrointestinal bleed), Gout, Hypertension, Morbid obesity (HCC), Neuropathy, Obstructive sleep apnea, and Peripheral vascular disease (HCC).   Past Surgical History:     has a past surgical history that includes Appendectomy; Total hip arthroplasty; and Colonoscopy (N/A, 5/7/2019).   Current Medications:    Current Facility-Administered Medications: furosemide (LASIX) injection 40 mg, 40 mg, IntraVENous, BID  sennosides-docusate sodium (SENOKOT-S) 8.6-50 MG tablet 2 tablet, 2 tablet, Oral,  complexity. Multiple complex health problems.   Discussed with patient and treatment team.  Nursing   Thank you for allowing me to participate in this patient's care. Please do not hesitate to contact me for any questions/concerns. We will follow along with you.     Osmin Herrera MD  [facility]   Phone: [phone] or [employer]  Fax: [phone]    Severally ill, at risk of impending organ failure needing  higher level of care/monitoring.   Time spent that included face-to-face meeting/discussion with patient, patient's family -as available, and treatment team (including primary/referring team and other consultants; included coordination of care with the treatment team; and review of patient's electronic medical records and ordering appropriates tests.

## 2023-12-31 NOTE — PROGRESS NOTES
[facility]    12/31/2023 11:56 AM        Name: Ishmael Gr .              Admitted: 12/20/2023  Primary Care Provider: Anthony Bravo MD (Tel: [phone])    =      Subjective:    No nausea vomiting no chest pain still swollen n  Current Medications  potassium chloride (KLOR-CON M) extended release tablet 40 mEq, Once  furosemide (LASIX) injection 40 mg, BID  sennosides-docusate sodium (SENOKOT-S) 8.6-50 MG tablet 2 tablet, Daily  pantoprazole (PROTONIX) injection 40 mg, BID  perflutren lipid microspheres (DEFINITY) injection 1.5 mL, ONCE PRN  ceFAZolin (ANCEF) 2,000 mg in sodium chloride 0.9 % 50 mL IVPB (mini-bag), Q8H  0.9 % sodium chloride infusion, PRN  sodium chloride flush 0.9 % injection 5-40 mL, 2 times per day  sodium chloride flush 0.9 % injection 5-40 mL, PRN  0.9 % sodium chloride infusion, PRN  albuterol (PROVENTIL) (2.5 MG/3ML) 0.083% nebulizer solution 2.5 mg, 4x Daily RT  vancomycin (FIRVANQ) 50 MG/ML oral solution 250 mg, 2 times per day  lactobacillus (CULTURELLE) capsule 1 capsule, BID WC  insulin lispro (HUMALOG) injection vial 5 Units, TID WC  insulin lispro (HUMALOG) injection vial 0-4 Units, Nightly  dextrose bolus 10% 125 mL, PRN   Or  dextrose bolus 10% 250 mL, PRN  glucagon injection 1 mg, PRN  dextrose 10 % infusion, Continuous PRN  insulin lispro (HUMALOG) injection vial 0-8 Units, TID WC  simethicone (MYLICON) chewable tablet 80 mg, Q6H PRN  arformoterol tartrate (BROVANA) nebulizer solution 15 mcg, BID RT  budesonide (PULMICORT) nebulizer suspension 500 mcg, BID RT  albuterol sulfate HFA (PROVENTIL;VENTOLIN;PROAIR) 108 (90 Base) MCG/ACT inhaler 2 puff, Q6H PRN  allopurinol (ZYLOPRIM) tablet 200 mg, Daily  [Held by provider] apixaban (ELIQUIS) tablet 5 mg, BID  [Held by provider] aspirin chewable tablet 81 mg, Daily  thiamine mononitrate tablet 100 mg, Daily  sodium chloride       Increased renal parenchymal echogenicity bilaterally suggesting medical renal   disease.         CT PELVIS WO CONTRAST Additional Contrast? None   Final Result   Bilateral hip prostheses.      No periprosthetic fracture.         CT LUMBAR SPINE WO CONTRAST   Final Result   Levoconvex scoliosis with moderate to advanced degenerative disc disease and   facet arthropathy.      No fracture.         CT Head W/O Contrast   Final Result   No acute intracranial abnormality.         XR CHEST PORTABLE   Final Result   1. Improved aeration.             Recent imaging reviewed    Problem List  Principal Problem:    ABRAHAM (acute kidney injury) ([facility])  Active Problems:    Encephalomalacia    Essential hypertension    Diabetes mellitus type 2 in obese ([facility])    Morbid obesity due to excess calories ([facility])    Preoperative cardiovascular examination    PVD (peripheral vascular disease) ([facility])    Chronic obstructive pulmonary disease ([facility])    Fall    Acute right-sided low back pain with right-sided sciatica    Right hip pain    Fall at home    Scoliosis of lumbar spine    Pain due to hip joint prosthesis, initial encounter ([facility])    Aspiration pneumonia ([facility])    Bacteremia due to methicillin susceptible Staphylococcus aureus (MSSA)    Receiving intravenous antibiotic treatment as outpatient    Paroxysmal atrial fibrillation ([facility])    Gastrointestinal hemorrhage with melena  Resolved Problems:    * No resolved hospital problems. *       Assessment & Plan:   Acute gi bleed  S/p 1units prbcs  EGD with  Patient has multiple antral erosions that could have bled if patient is on blood thinners.  Gastric biopsies to check for H. pylori.  - ppi  bid x8 weeks then dailyand hold ac for atleast 1 week no saids    PAF home meds hold eliquis     ABRAHAM: stable iv lasix 40mg bid bmp in am    Acute on chronic hypoxic and hyperpcanic resp failure secondary to copd exacerbation  Off steroids  - duo nesb  =- pulm on board    Staph bacteremiea  Iv atbx  -

## 2023-12-31 NOTE — PROGRESS NOTES
Midline dressing changed at this time due to the current dressing being soiled. Pt tolerated well.

## 2023-12-31 NOTE — PLAN OF CARE
Problem: Safety - Adult  Goal: Free from fall injury  12/31/2023 1017 by Sunshine Murdock, RN  Outcome: Progressing     Problem: Discharge Planning  Goal: Discharge to home or other facility with appropriate resources  12/31/2023 1017 by Sunshine Murdock RN  Outcome: Progressing     Problem: Pain  Goal: Verbalizes/displays adequate comfort level or baseline comfort level  12/31/2023 1017 by Sunshine Murdock RN  Outcome: Progressing     Problem: Skin/Tissue Integrity  Goal: Absence of new skin breakdown  Description: 1.  Monitor for areas of redness and/or skin breakdown  2.  Assess vascular access sites hourly  3.  Every 4-6 hours minimum:  Change oxygen saturation probe site  4.  Every 4-6 hours:  If on nasal continuous positive airway pressure, respiratory therapy assess nares and determine need for appliance change or resting period.  Outcome: Progressing     Problem: Neurosensory - Adult  Goal: Achieves stable or improved neurological status  Outcome: Progressing     Problem: Neurosensory - Adult  Goal: Achieves maximal functionality and self care  Outcome: Progressing     Problem: Respiratory - Adult  Goal: Achieves optimal ventilation and oxygenation  Outcome: Progressing

## 2023-12-31 NOTE — PLAN OF CARE
Problem: Safety - Adult  Goal: Free from fall injury  12/31/2023 0257 by Kesha Rodriguez, RN  Outcome: Progressing  Outcome: Progressing     Problem: Discharge Planning  Goal: Discharge to home or other facility with appropriate resources  12/31/2023 0257 by Kesha Rodriguez, RN  Outcome: Progressing  Outcome: Progressing     Problem: Skin/Tissue Integrity  Goal: Absence of new skin breakdown  Description: 1.  Monitor for areas of redness and/or skin breakdown  2.  Assess vascular access sites hourly  3.  Every 4-6 hours minimum:  Change oxygen saturation probe site  4.  Every 4-6 hours:  If on nasal continuous positive airway pressure, respiratory therapy assess nares and determine need for appliance change or resting period.  Outcome: Progressing

## 2023-12-31 NOTE — PROGRESS NOTES
[facility]   Cardiology Progress Note     Date: 12/31/2023  Admit Date: 12/20/2023     Reason for consultation:     Chief Complaint   Patient presents with    Fall     Pt arrived to ED via [facility] ems from home after a fall. Per pt he was getting up to go to the bathroom and fell. Pt has knot above left eye and skin tear to left forearm. Pt on blood thinners. Per pt he is on 6L of oxygen at all times. Pt A&Ox4       History of Present Illness: History obtained from patient and medical record.     Ishmael Gr is a 78 y.o. male presented to the hospital with complaints of fall. I have been asked to provide consultation regarding further management and testing. The patient reports that he can walk at baseline, but cannot walk up a flight of stairs or 4 city blocks. He denies exertional chest pain or pressure. No nausea or vomiting. He states that he cannot tell when he is in atrial fibrillation. He reports that he is on his baseline 6L of oxygen. He states that his legs have been more edematous. He denies any palpitations, nausea or vomiting. He was found to have anemia and cardiology was consulted to stop the blood thinner.    (per consult note)    Interval Hx: Today, he is feeling better. Up in chair. No chest pain. On baseline oxygen at 6L. Legs and arms dependent. Hands and LE edematous.     Patient seen and examined. Clinical notes reviewed. Telemetry reviewed.  No new complaints today. No major events overnight.   Denies having chest pain, palpitations, shortness of breath, orthopnea/PND, cough, or dizziness at the time of this visit.      Past Medical History:  Past Medical History:   Diagnosis Date    Anemia     COPD (chronic obstructive pulmonary disease) (HCC)     Diabetes mellitus (HCC)     Edema     GI (gastrointestinal bleed)     Gout     Hypertension     Morbid obesity (HCC)     Neuropathy     Obstructive sleep apnea     uses CPAP    Peripheral vascular disease (HCC)         Past

## 2023-12-31 NOTE — PROGRESS NOTES
12/30/23 2237   Oxygen Therapy/Pulse Ox   O2 Therapy Oxygen humidified   O2 Device PAP (positive airway pressure)  (Home Unit)   O2 Flow Rate (L/min) 8 L/min   Pulse 96   Respirations 18   SpO2 93 %

## 2024-01-01 PROBLEM — N18.30 STAGE 3 CHRONIC KIDNEY DISEASE (HCC): Status: ACTIVE | Noted: 2024-01-01

## 2024-01-01 PROBLEM — D64.9 ANEMIA: Status: ACTIVE | Noted: 2024-01-01

## 2024-01-01 LAB
ALBUMIN SERPL-MCNC: 3 G/DL (ref 3.4–5)
ANION GAP SERPL CALCULATED.3IONS-SCNC: 6 MMOL/L (ref 3–16)
ANISOCYTOSIS BLD QL SMEAR: ABNORMAL
BASOPHILS # BLD: 0 K/UL (ref 0–0.2)
BASOPHILS NFR BLD: 0 %
BUN SERPL-MCNC: 31 MG/DL (ref 7–20)
CALCIUM SERPL-MCNC: 9.3 MG/DL (ref 8.3–10.6)
CHLORIDE SERPL-SCNC: 97 MMOL/L (ref 99–110)
CO2 SERPL-SCNC: 36 MMOL/L (ref 21–32)
CREAT SERPL-MCNC: 1.5 MG/DL (ref 0.8–1.3)
DEPRECATED RDW RBC AUTO: 16.8 % (ref 12.4–15.4)
EOSINOPHIL # BLD: 0.1 K/UL (ref 0–0.6)
EOSINOPHIL NFR BLD: 1 %
GFR SERPLBLD CREATININE-BSD FMLA CKD-EPI: 48 ML/MIN/{1.73_M2}
GLUCOSE BLD-MCNC: 138 MG/DL (ref 70–99)
GLUCOSE BLD-MCNC: 165 MG/DL (ref 70–99)
GLUCOSE BLD-MCNC: 184 MG/DL (ref 70–99)
GLUCOSE BLD-MCNC: 99 MG/DL (ref 70–99)
GLUCOSE SERPL-MCNC: 158 MG/DL (ref 70–99)
HCT VFR BLD AUTO: 22 % (ref 40.5–52.5)
HEMOCCULT STL QL: ABNORMAL
HGB BLD-MCNC: 7.1 G/DL (ref 13.5–17.5)
LYMPHOCYTES # BLD: 1 K/UL (ref 1–5.1)
LYMPHOCYTES NFR BLD: 9 %
MCH RBC QN AUTO: 32 PG (ref 26–34)
MCHC RBC AUTO-ENTMCNC: 32.4 G/DL (ref 31–36)
MCV RBC AUTO: 98.7 FL (ref 80–100)
MONOCYTES # BLD: 1.1 K/UL (ref 0–1.3)
MONOCYTES NFR BLD: 10 %
NEUTROPHILS # BLD: 8.6 K/UL (ref 1.7–7.7)
NEUTROPHILS NFR BLD: 80 %
PERFORMED ON: ABNORMAL
PERFORMED ON: NORMAL
PHOSPHATE SERPL-MCNC: 4.1 MG/DL (ref 2.5–4.9)
PLATELET # BLD AUTO: 551 K/UL (ref 135–450)
PLATELET BLD QL SMEAR: ABNORMAL
PMV BLD AUTO: 6.9 FL (ref 5–10.5)
POLYCHROMASIA BLD QL SMEAR: ABNORMAL
POTASSIUM SERPL-SCNC: 3.7 MMOL/L (ref 3.5–5.1)
RBC # BLD AUTO: 2.23 M/UL (ref 4.2–5.9)
SLIDE REVIEW: ABNORMAL
SODIUM SERPL-SCNC: 139 MMOL/L (ref 136–145)
WBC # BLD AUTO: 10.8 K/UL (ref 4–11)

## 2024-01-01 PROCEDURE — 6370000000 HC RX 637 (ALT 250 FOR IP): Performed by: INTERNAL MEDICINE

## 2024-01-01 PROCEDURE — 2700000000 HC OXYGEN THERAPY PER DAY

## 2024-01-01 PROCEDURE — 2580000003 HC RX 258: Performed by: INTERNAL MEDICINE

## 2024-01-01 PROCEDURE — 94761 N-INVAS EAR/PLS OXIMETRY MLT: CPT

## 2024-01-01 PROCEDURE — 80069 RENAL FUNCTION PANEL: CPT

## 2024-01-01 PROCEDURE — 6360000002 HC RX W HCPCS: Performed by: INTERNAL MEDICINE

## 2024-01-01 PROCEDURE — 99232 SBSQ HOSP IP/OBS MODERATE 35: CPT | Performed by: INTERNAL MEDICINE

## 2024-01-01 PROCEDURE — 82270 OCCULT BLOOD FECES: CPT

## 2024-01-01 PROCEDURE — C9113 INJ PANTOPRAZOLE SODIUM, VIA: HCPCS | Performed by: INTERNAL MEDICINE

## 2024-01-01 PROCEDURE — 85025 COMPLETE CBC W/AUTO DIFF WBC: CPT

## 2024-01-01 PROCEDURE — 94640 AIRWAY INHALATION TREATMENT: CPT

## 2024-01-01 PROCEDURE — 2060000000 HC ICU INTERMEDIATE R&B

## 2024-01-01 RX ADMIN — ALBUTEROL SULFATE 2.5 MG: 2.5 SOLUTION RESPIRATORY (INHALATION) at 12:04

## 2024-01-01 RX ADMIN — SODIUM CHLORIDE 25 ML: 9 INJECTION, SOLUTION INTRAVENOUS at 07:06

## 2024-01-01 RX ADMIN — THIAMINE HCL TAB 100 MG 100 MG: 100 TAB at 08:50

## 2024-01-01 RX ADMIN — Medication 250 MG: at 21:52

## 2024-01-01 RX ADMIN — SODIUM CHLORIDE: 9 INJECTION, SOLUTION INTRAVENOUS at 21:58

## 2024-01-01 RX ADMIN — Medication 10 ML: at 21:55

## 2024-01-01 RX ADMIN — TIOTROPIUM BROMIDE INHALATION SPRAY 2 PUFF: 3.12 SPRAY, METERED RESPIRATORY (INHALATION) at 12:16

## 2024-01-01 RX ADMIN — Medication 1 CAPSULE: at 08:50

## 2024-01-01 RX ADMIN — ALLOPURINOL 200 MG: 100 TABLET ORAL at 08:50

## 2024-01-01 RX ADMIN — SODIUM CHLORIDE, PRESERVATIVE FREE 10 ML: 5 INJECTION INTRAVENOUS at 08:50

## 2024-01-01 RX ADMIN — INSULIN LISPRO 5 UNITS: 100 INJECTION, SOLUTION INTRAVENOUS; SUBCUTANEOUS at 08:51

## 2024-01-01 RX ADMIN — ALBUTEROL SULFATE 2.5 MG: 2.5 SOLUTION RESPIRATORY (INHALATION) at 16:38

## 2024-01-01 RX ADMIN — CEFAZOLIN 2000 MG: 2 INJECTION, POWDER, FOR SOLUTION INTRAMUSCULAR; INTRAVENOUS at 21:58

## 2024-01-01 RX ADMIN — STANDARDIZED SENNA CONCENTRATE AND DOCUSATE SODIUM 2 TABLET: 8.6; 5 TABLET ORAL at 08:50

## 2024-01-01 RX ADMIN — INSULIN LISPRO 5 UNITS: 100 INJECTION, SOLUTION INTRAVENOUS; SUBCUTANEOUS at 16:16

## 2024-01-01 RX ADMIN — CEFAZOLIN 2000 MG: 2 INJECTION, POWDER, FOR SOLUTION INTRAMUSCULAR; INTRAVENOUS at 07:08

## 2024-01-01 RX ADMIN — ARFORMOTEROL TARTRATE 15 MCG: 15 SOLUTION RESPIRATORY (INHALATION) at 12:04

## 2024-01-01 RX ADMIN — Medication 250 MG: at 08:50

## 2024-01-01 RX ADMIN — Medication 10 ML: at 08:51

## 2024-01-01 RX ADMIN — PANTOPRAZOLE SODIUM 40 MG: 40 INJECTION, POWDER, FOR SOLUTION INTRAVENOUS at 21:52

## 2024-01-01 RX ADMIN — PANTOPRAZOLE SODIUM 40 MG: 40 INJECTION, POWDER, FOR SOLUTION INTRAVENOUS at 08:50

## 2024-01-01 RX ADMIN — CEFAZOLIN 2000 MG: 2 INJECTION, POWDER, FOR SOLUTION INTRAMUSCULAR; INTRAVENOUS at 14:20

## 2024-01-01 RX ADMIN — BUDESONIDE INHALATION 500 MCG: 0.5 SUSPENSION RESPIRATORY (INHALATION) at 12:04

## 2024-01-01 RX ADMIN — SODIUM CHLORIDE, PRESERVATIVE FREE 10 ML: 5 INJECTION INTRAVENOUS at 21:53

## 2024-01-01 RX ADMIN — SIMETHICONE 80 MG: 80 TABLET, CHEWABLE ORAL at 08:54

## 2024-01-01 RX ADMIN — Medication 1 CAPSULE: at 16:15

## 2024-01-01 ASSESSMENT — PAIN SCALES - GENERAL: PAINLEVEL_OUTOF10: 0

## 2024-01-01 NOTE — PLAN OF CARE
Problem: Safety - Adult  Goal: Free from fall injury  Outcome: Progressing     Problem: Discharge Planning  Goal: Discharge to home or other facility with appropriate resources  Outcome: Progressing     Problem: Pain  Goal: Verbalizes/displays adequate comfort level or baseline comfort level  Outcome: Progressing     Problem: Skin/Tissue Integrity  Goal: Absence of new skin breakdown  Description: 1.  Monitor for areas of redness and/or skin breakdown  2.  Assess vascular access sites hourly  3.  Every 4-6 hours minimum:  Change oxygen saturation probe site  4.  Every 4-6 hours:  If on nasal continuous positive airway pressure, respiratory therapy assess nares and determine need for appliance change or resting period.  Outcome: Progressing     Problem: ABCDS Injury Assessment  Goal: Absence of physical injury  Outcome: Progressing     Problem: Neurosensory - Adult  Goal: Achieves stable or improved neurological status  Outcome: Progressing  Goal: Achieves maximal functionality and self care  Outcome: Progressing     Problem: Respiratory - Adult  Goal: Achieves optimal ventilation and oxygenation  Outcome: Progressing     Problem: Cardiovascular - Adult  Goal: Maintains optimal cardiac output and hemodynamic stability  Outcome: Progressing  Goal: Absence of cardiac dysrhythmias or at baseline  Outcome: Progressing     Problem: Skin/Tissue Integrity - Adult  Goal: Skin integrity remains intact  Outcome: Progressing  Goal: Incisions, wounds, or drain sites healing without S/S of infection  Outcome: Progressing  Goal: Oral mucous membranes remain intact  Outcome: Progressing     Problem: Musculoskeletal - Adult  Goal: Return mobility to safest level of function  Outcome: Progressing  Goal: Maintain proper alignment of affected body part  Outcome: Progressing  Goal: Return ADL status to a safe level of function  Outcome: Progressing     Problem: Gastrointestinal - Adult  Goal: Minimal or absence of nausea and  vomiting  Outcome: Progressing  Goal: Maintains or returns to baseline bowel function  Outcome: Progressing  Goal: Maintains adequate nutritional intake  Outcome: Progressing     Problem: Genitourinary - Adult  Goal: Absence of urinary retention  Outcome: Progressing     Problem: Metabolic/Fluid and Electrolytes - Adult  Goal: Electrolytes maintained within normal limits  Outcome: Progressing  Goal: Hemodynamic stability and optimal renal function maintained  Outcome: Progressing  Goal: Glucose maintained within prescribed range  Outcome: Progressing     Problem: Hematologic - Adult  Goal: Maintains hematologic stability  Outcome: Progressing     Problem: Anxiety  Goal: Will report anxiety at manageable levels  Description: INTERVENTIONS:  1. Administer medication as ordered  2. Teach and rehearse alternative coping skills  3. Provide emotional support with 1:1 interaction with staff  Outcome: Progressing     Problem: Decision Making  Goal: Pt/Family able to effectively weigh alternatives and participate in decision making related to treatment and care  Description: INTERVENTIONS:  1. Determine when there are differences between patient's view, family's view, and healthcare provider's view of condition  2. Facilitate patient and family articulation of goals for care  3. Help patient and family identify pros/cons of alternative solutions  4. Provide information as requested by patient/family  5. Respect patient/family right to receive or not to receive information  6. Serve as a liaison between patient and family and health care team  7. Initiate Consults from Ethics, Palliative Care or initiate Family Care Conference as is appropriate  Outcome: Progressing     Problem: Confusion  Goal: Confusion, delirium, dementia, or psychosis is improved or at baseline  Description: INTERVENTIONS:  1. Assess for possible contributors to thought disturbance, including medications, impaired vision or hearing, underlying metabolic

## 2024-01-01 NOTE — PROGRESS NOTES
[facility]   Cardiology Progress Note     Date: 1/1/2024  Admit Date: 12/20/2023     Reason for consultation:     Chief Complaint   Patient presents with    Fall     Pt arrived to ED via [facility] ems from home after a fall. Per pt he was getting up to go to the bathroom and fell. Pt has knot above left eye and skin tear to left forearm. Pt on blood thinners. Per pt he is on 6L of oxygen at all times. Pt A&Ox4       History of Present Illness: History obtained from patient and medical record.     Ishmael Gr is a 78 y.o. male presented to the hospital with complaints of fall. I have been asked to provide consultation regarding further management and testing. The patient reports that he can walk at baseline, but cannot walk up a flight of stairs or 4 city blocks. He denies exertional chest pain or pressure. No nausea or vomiting. He states that he cannot tell when he is in atrial fibrillation. He reports that he is on his baseline 6L of oxygen. He states that his legs have been more edematous. He denies any palpitations, nausea or vomiting. He was found to have anemia and cardiology was consulted to stop the blood thinner.    (per consult note)    Interval Hx: Today, he is feeling better. Up in chair. No chest pain. On baseline oxygen at 6L. Legs and arms dependent. Hands and LE edematous.     BUN/Cre 31/1.5 today  H/H 7.1/22.0 (continues to trickle down)    I/O's negative 13 liters    Patient seen and examined. Clinical notes reviewed. Telemetry reviewed.  No new complaints today. No major events overnight.   Denies having chest pain, palpitations, shortness of breath, orthopnea/PND, cough, or dizziness at the time of this visit.      Past Medical History:  Past Medical History:   Diagnosis Date    Anemia     COPD (chronic obstructive pulmonary disease) (HCC)     Diabetes mellitus (HCC)     Edema     GI (gastrointestinal bleed)     Gout     Hypertension     Morbid obesity (HCC)     Neuropathy      performed and fails to show evidence of shunting.     Echo 11/28/2023:  Summary   Technically difficult study due to poor acoustical windows.   Definity contrast administered.   Normal left ventricle size, wall thickness, and systolic function with an   estimated ejection fraction of 55-60%. No regional wall motion abnormalities   are seen.   Indeterminate diastolic function.   The aortic root is dilated, measuring 4.0cm.     Echo: 12/22/23  Summary   Technically difficult study. Valves not well visualized.   Left ventricular cavity size is normal.   There is increased left ventricular wall thickness.   Left ventricular ejection fraction is normal at 50-55%.   The right atrium is dilated.   IVC size is dilated (>2.1 cm) and collapses < 50% with respiration   consistent with elevated RA pressure (15 mmHg).    Stress lexiscan 2/15/23  Summary -Myocardial perfusion is abnormal.  -There is a moderate size, moderate intensity, fixed inferior lateral defect  c/w scar.  -No significant ischemia is seen.  -Overall LV function is normal with EF=59%. There is mild inferior  hypokinesis.  -Intermediate risk      Cardiac cath 2/13/09:  Normal coronaries  LVEF 70%     RA-mean 12  RV-45/2, 10  PA-56/10 mean 35  PW-mean 30  LV-128/-9, 13  Ao-121/71    Problem List:   Patient Active Problem List    Diagnosis Date Noted    Coronary artery disease due to calcified coronary lesion 11/27/2023    Coronary artery calcification seen on CT scan 11/27/2023    Tachycardia 02/14/2023    Peripheral edema 02/13/2023    PFO (patent foramen ovale) 01/17/2023    Metabolic encephalopathy 09/09/2022    TGA (transient global amnesia) 09/08/2022    Altered mental status 09/07/2022    Cerebrovascular accident (CVA) due to thrombosis of left middle cerebral artery (HCC) 09/07/2022    Acute respiratory failure with hypercapnia (HCC) 09/07/2022    Cerebrovascular accident (CVA) (HCC) 09/07/2022    Chronic heart failure with preserved ejection fraction

## 2024-01-01 NOTE — PROGRESS NOTES
[facility]  Inpatient Progress Note    Reason for Consult: ABRAHAM  Requesting Physician:  Dr. ROSALINA Farias    CHIEF COMPLAINT: Fall    History obtained from records and patient.    HISTORY OF PRESENT ILLNESS:                Ishmael Gr  is 78 y.o. y.o. male with significant past medical history of anemia, COPD, diabetes mellitus type 2, gout, hypertension, obesity, obstructive sleep apnea, peripheral vascular disease who presents with fall.  His baseline oxygen need is at 6L.  He is currently admitted for ABRAHAM possible COPD exacerbation.  I am asked to see the patient because his creatinine has increased to 1.7 yesterday and 1.8 today.  On 12/16/2023 creatinine was 1.1.  Lowest systolic blood pressure in the 100s range.  He is on furosemide 80 mg twice daily as an outpatient, celecoxib and ibuprofen 600 mg every 4 hours for the past few days.  Denies any vomiting or diarrhea.  No new skin rash.  No change in urine output.  CT PE done 12/16/23.  Lowest SBP in the 100s range.  -500 mL since admission.    Subjective / interval history / nephrology update / medical decision making:   Patient was seen comfortably sitting up in chair,   Reported no active complaints/distress,   Renal labs noted    No SOB  Stable on NC    Getting blood transfusion        Past Medical History:     has a past medical history of Anemia, COPD (chronic obstructive pulmonary disease) (HCC), Diabetes mellitus (HCC), Edema, GI (gastrointestinal bleed), Gout, Hypertension, Morbid obesity (HCC), Neuropathy, Obstructive sleep apnea, and Peripheral vascular disease (HCC).   Past Surgical History:     has a past surgical history that includes Appendectomy; Total hip arthroplasty; Colonoscopy (N/A, 5/7/2019); and Upper gastrointestinal endoscopy (N/A, 12/29/2023).   Current Medications:    Current Facility-Administered Medications: sennosides-docusate sodium (SENOKOT-S) 8.6-50 MG tablet 2 tablet, 2 tablet, Oral,  decision making- high complexity. Multiple complex health problems.   Discussed with patient and treatment team.  Nursing   Thank you for allowing me to participate in this patient's care. Please do not hesitate to contact me for any questions/concerns. We will follow along with you.     Osmin Herrera MD  [facility]   Phone: [phone] or Via [employer]  Fax: [phone]    Severally ill, at risk of impending organ failure needing  higher level of care/monitoring.   Time spent that included face-to-face meeting/discussion with patient, patient's family -as available, and treatment team (including primary/referring team and other consultants; included coordination of care with the treatment team; and review of patient's electronic medical records and ordering appropriates tests.

## 2024-01-01 NOTE — PROGRESS NOTES
[facility]    1/1/2024 1:55 PM        Name: Ishmael Gr .              Admitted: 12/20/2023  Primary Care Provider: Anthony Bravo MD (Tel: [phone])    Sob improved no chest pain n.v  Current Medications  sennosides-docusate sodium (SENOKOT-S) 8.6-50 MG tablet 2 tablet, Daily  pantoprazole (PROTONIX) injection 40 mg, BID  perflutren lipid microspheres (DEFINITY) injection 1.5 mL, ONCE PRN  ceFAZolin (ANCEF) 2,000 mg in sodium chloride 0.9 % 50 mL IVPB (mini-bag), Q8H  0.9 % sodium chloride infusion, PRN  sodium chloride flush 0.9 % injection 5-40 mL, 2 times per day  sodium chloride flush 0.9 % injection 5-40 mL, PRN  0.9 % sodium chloride infusion, PRN  albuterol (PROVENTIL) (2.5 MG/3ML) 0.083% nebulizer solution 2.5 mg, 4x Daily RT  vancomycin (FIRVANQ) 50 MG/ML oral solution 250 mg, 2 times per day  lactobacillus (CULTURELLE) capsule 1 capsule, BID WC  insulin lispro (HUMALOG) injection vial 5 Units, TID WC  insulin lispro (HUMALOG) injection vial 0-4 Units, Nightly  dextrose bolus 10% 125 mL, PRN   Or  dextrose bolus 10% 250 mL, PRN  glucagon injection 1 mg, PRN  dextrose 10 % infusion, Continuous PRN  insulin lispro (HUMALOG) injection vial 0-8 Units, TID WC  simethicone (MYLICON) chewable tablet 80 mg, Q6H PRN  arformoterol tartrate (BROVANA) nebulizer solution 15 mcg, BID RT  budesonide (PULMICORT) nebulizer suspension 500 mcg, BID RT  albuterol sulfate HFA (PROVENTIL;VENTOLIN;PROAIR) 108 (90 Base) MCG/ACT inhaler 2 puff, Q6H PRN  allopurinol (ZYLOPRIM) tablet 200 mg, Daily  [Held by provider] apixaban (ELIQUIS) tablet 5 mg, BID  [Held by provider] aspirin chewable tablet 81 mg, Daily  thiamine mononitrate tablet 100 mg, Daily  sodium chloride flush 0.9 % injection 5-40 mL, 2 times per day  sodium chloride flush 0.9 % injection 5-40 mL, PRN  0.9 % sodium chloride infusion, PRN  polyethylene glycol

## 2024-01-02 LAB
ABO + RH BLD: NORMAL
ANION GAP SERPL CALCULATED.3IONS-SCNC: 7 MMOL/L (ref 3–16)
BASOPHILS # BLD: 0.1 K/UL (ref 0–0.2)
BASOPHILS NFR BLD: 0.6 %
BLD GP AB SCN SERPL QL: NORMAL
BUN SERPL-MCNC: 29 MG/DL (ref 7–20)
CALCIUM SERPL-MCNC: 9.1 MG/DL (ref 8.3–10.6)
CHLORIDE SERPL-SCNC: 96 MMOL/L (ref 99–110)
CO2 SERPL-SCNC: 34 MMOL/L (ref 21–32)
CREAT SERPL-MCNC: 1.6 MG/DL (ref 0.8–1.3)
DEPRECATED RDW RBC AUTO: 16.5 % (ref 12.4–15.4)
EOSINOPHIL # BLD: 0.2 K/UL (ref 0–0.6)
EOSINOPHIL NFR BLD: 2.7 %
GFR SERPLBLD CREATININE-BSD FMLA CKD-EPI: 44 ML/MIN/{1.73_M2}
GLUCOSE BLD-MCNC: 103 MG/DL (ref 70–99)
GLUCOSE BLD-MCNC: 122 MG/DL (ref 70–99)
GLUCOSE BLD-MCNC: 129 MG/DL (ref 70–99)
GLUCOSE BLD-MCNC: 142 MG/DL (ref 70–99)
GLUCOSE SERPL-MCNC: 140 MG/DL (ref 70–99)
HCT VFR BLD AUTO: 20 % (ref 40.5–52.5)
HCT VFR BLD AUTO: 20.6 % (ref 40.5–52.5)
HCT VFR BLD AUTO: 21.7 % (ref 40.5–52.5)
HGB BLD-MCNC: 6.6 G/DL (ref 13.5–17.5)
HGB BLD-MCNC: 6.8 G/DL (ref 13.5–17.5)
HGB BLD-MCNC: 7.3 G/DL (ref 13.5–17.5)
LYMPHOCYTES # BLD: 0.7 K/UL (ref 1–5.1)
LYMPHOCYTES NFR BLD: 7.7 %
MCH RBC QN AUTO: 32.7 PG (ref 26–34)
MCHC RBC AUTO-ENTMCNC: 33.1 G/DL (ref 31–36)
MCV RBC AUTO: 98.8 FL (ref 80–100)
MONOCYTES # BLD: 0.6 K/UL (ref 0–1.3)
MONOCYTES NFR BLD: 7.1 %
NEUTROPHILS # BLD: 7 K/UL (ref 1.7–7.7)
NEUTROPHILS NFR BLD: 81.9 %
PERFORMED ON: ABNORMAL
PLATELET # BLD AUTO: 515 K/UL (ref 135–450)
PMV BLD AUTO: 6.3 FL (ref 5–10.5)
POTASSIUM SERPL-SCNC: 3.8 MMOL/L (ref 3.5–5.1)
RBC # BLD AUTO: 2.02 M/UL (ref 4.2–5.9)
SODIUM SERPL-SCNC: 137 MMOL/L (ref 136–145)
WBC # BLD AUTO: 8.6 K/UL (ref 4–11)

## 2024-01-02 PROCEDURE — 85014 HEMATOCRIT: CPT

## 2024-01-02 PROCEDURE — C9113 INJ PANTOPRAZOLE SODIUM, VIA: HCPCS | Performed by: INTERNAL MEDICINE

## 2024-01-02 PROCEDURE — 94761 N-INVAS EAR/PLS OXIMETRY MLT: CPT

## 2024-01-02 PROCEDURE — 6360000002 HC RX W HCPCS: Performed by: INTERNAL MEDICINE

## 2024-01-02 PROCEDURE — 6370000000 HC RX 637 (ALT 250 FOR IP): Performed by: INTERNAL MEDICINE

## 2024-01-02 PROCEDURE — 2060000000 HC ICU INTERMEDIATE R&B

## 2024-01-02 PROCEDURE — 2580000003 HC RX 258: Performed by: INTERNAL MEDICINE

## 2024-01-02 PROCEDURE — P9016 RBC LEUKOCYTES REDUCED: HCPCS

## 2024-01-02 PROCEDURE — 80048 BASIC METABOLIC PNL TOTAL CA: CPT

## 2024-01-02 PROCEDURE — 97530 THERAPEUTIC ACTIVITIES: CPT

## 2024-01-02 PROCEDURE — 86923 COMPATIBILITY TEST ELECTRIC: CPT

## 2024-01-02 PROCEDURE — 94640 AIRWAY INHALATION TREATMENT: CPT

## 2024-01-02 PROCEDURE — 85018 HEMOGLOBIN: CPT

## 2024-01-02 PROCEDURE — 97110 THERAPEUTIC EXERCISES: CPT

## 2024-01-02 PROCEDURE — 99232 SBSQ HOSP IP/OBS MODERATE 35: CPT | Performed by: INTERNAL MEDICINE

## 2024-01-02 PROCEDURE — 86850 RBC ANTIBODY SCREEN: CPT

## 2024-01-02 PROCEDURE — 85025 COMPLETE CBC W/AUTO DIFF WBC: CPT

## 2024-01-02 PROCEDURE — 2700000000 HC OXYGEN THERAPY PER DAY

## 2024-01-02 PROCEDURE — 36430 TRANSFUSION BLD/BLD COMPNT: CPT

## 2024-01-02 PROCEDURE — 99233 SBSQ HOSP IP/OBS HIGH 50: CPT | Performed by: NURSE PRACTITIONER

## 2024-01-02 PROCEDURE — 97535 SELF CARE MNGMENT TRAINING: CPT

## 2024-01-02 PROCEDURE — 86900 BLOOD TYPING SEROLOGIC ABO: CPT

## 2024-01-02 PROCEDURE — 36415 COLL VENOUS BLD VENIPUNCTURE: CPT

## 2024-01-02 PROCEDURE — 86901 BLOOD TYPING SEROLOGIC RH(D): CPT

## 2024-01-02 RX ORDER — FUROSEMIDE 10 MG/ML
40 INJECTION INTRAMUSCULAR; INTRAVENOUS ONCE
Status: COMPLETED | OUTPATIENT
Start: 2024-01-02 | End: 2024-01-02

## 2024-01-02 RX ORDER — SODIUM CHLORIDE 9 MG/ML
INJECTION, SOLUTION INTRAVENOUS PRN
Status: DISCONTINUED | OUTPATIENT
Start: 2024-01-02 | End: 2024-01-04 | Stop reason: SDUPTHER

## 2024-01-02 RX ORDER — FUROSEMIDE 10 MG/ML
40 INJECTION INTRAMUSCULAR; INTRAVENOUS SEE ADMIN INSTRUCTIONS
Status: COMPLETED | OUTPATIENT
Start: 2024-01-02 | End: 2024-01-02

## 2024-01-02 RX ADMIN — Medication 1 CAPSULE: at 09:43

## 2024-01-02 RX ADMIN — SODIUM CHLORIDE, PRESERVATIVE FREE 10 ML: 5 INJECTION INTRAVENOUS at 20:14

## 2024-01-02 RX ADMIN — ARFORMOTEROL TARTRATE 15 MCG: 15 SOLUTION RESPIRATORY (INHALATION) at 22:09

## 2024-01-02 RX ADMIN — ACETAMINOPHEN 650 MG: 325 TABLET ORAL at 23:30

## 2024-01-02 RX ADMIN — PANTOPRAZOLE SODIUM 40 MG: 40 INJECTION, POWDER, FOR SOLUTION INTRAVENOUS at 09:43

## 2024-01-02 RX ADMIN — CEFAZOLIN 2000 MG: 2 INJECTION, POWDER, FOR SOLUTION INTRAMUSCULAR; INTRAVENOUS at 20:16

## 2024-01-02 RX ADMIN — ALBUTEROL SULFATE 2.5 MG: 2.5 SOLUTION RESPIRATORY (INHALATION) at 15:31

## 2024-01-02 RX ADMIN — FUROSEMIDE 40 MG: 10 INJECTION, SOLUTION INTRAMUSCULAR; INTRAVENOUS at 16:29

## 2024-01-02 RX ADMIN — PANTOPRAZOLE SODIUM 40 MG: 40 INJECTION, POWDER, FOR SOLUTION INTRAVENOUS at 20:14

## 2024-01-02 RX ADMIN — Medication 250 MG: at 09:43

## 2024-01-02 RX ADMIN — ALBUTEROL SULFATE 2.5 MG: 2.5 SOLUTION RESPIRATORY (INHALATION) at 07:24

## 2024-01-02 RX ADMIN — INSULIN LISPRO 5 UNITS: 100 INJECTION, SOLUTION INTRAVENOUS; SUBCUTANEOUS at 13:44

## 2024-01-02 RX ADMIN — SODIUM CHLORIDE: 9 INJECTION, SOLUTION INTRAVENOUS at 06:12

## 2024-01-02 RX ADMIN — BUDESONIDE INHALATION 500 MCG: 0.5 SUSPENSION RESPIRATORY (INHALATION) at 22:09

## 2024-01-02 RX ADMIN — INSULIN LISPRO 5 UNITS: 100 INJECTION, SOLUTION INTRAVENOUS; SUBCUTANEOUS at 18:07

## 2024-01-02 RX ADMIN — Medication 1 CAPSULE: at 16:29

## 2024-01-02 RX ADMIN — THIAMINE HCL TAB 100 MG 100 MG: 100 TAB at 09:43

## 2024-01-02 RX ADMIN — ALLOPURINOL 200 MG: 100 TABLET ORAL at 09:43

## 2024-01-02 RX ADMIN — Medication 10 ML: at 09:50

## 2024-01-02 RX ADMIN — CEFAZOLIN 2000 MG: 2 INJECTION, POWDER, FOR SOLUTION INTRAMUSCULAR; INTRAVENOUS at 14:54

## 2024-01-02 RX ADMIN — SODIUM CHLORIDE, PRESERVATIVE FREE 10 ML: 5 INJECTION INTRAVENOUS at 09:50

## 2024-01-02 RX ADMIN — FUROSEMIDE 40 MG: 10 INJECTION, SOLUTION INTRAMUSCULAR; INTRAVENOUS at 09:43

## 2024-01-02 RX ADMIN — ALBUTEROL SULFATE 2.5 MG: 2.5 SOLUTION RESPIRATORY (INHALATION) at 22:09

## 2024-01-02 RX ADMIN — TIOTROPIUM BROMIDE INHALATION SPRAY 2 PUFF: 3.12 SPRAY, METERED RESPIRATORY (INHALATION) at 07:27

## 2024-01-02 RX ADMIN — CEFAZOLIN 2000 MG: 2 INJECTION, POWDER, FOR SOLUTION INTRAMUSCULAR; INTRAVENOUS at 06:15

## 2024-01-02 RX ADMIN — ALBUTEROL SULFATE 2.5 MG: 2.5 SOLUTION RESPIRATORY (INHALATION) at 11:42

## 2024-01-02 RX ADMIN — INSULIN LISPRO 5 UNITS: 100 INJECTION, SOLUTION INTRAVENOUS; SUBCUTANEOUS at 09:45

## 2024-01-02 RX ADMIN — BUDESONIDE INHALATION 500 MCG: 0.5 SUSPENSION RESPIRATORY (INHALATION) at 07:24

## 2024-01-02 ASSESSMENT — PAIN DESCRIPTION - ORIENTATION: ORIENTATION: LOWER

## 2024-01-02 ASSESSMENT — PAIN DESCRIPTION - DESCRIPTORS: DESCRIPTORS: DISCOMFORT

## 2024-01-02 ASSESSMENT — PAIN SCALES - GENERAL
PAINLEVEL_OUTOF10: 0
PAINLEVEL_OUTOF10: 4

## 2024-01-02 ASSESSMENT — PAIN DESCRIPTION - LOCATION: LOCATION: BACK

## 2024-01-02 NOTE — PROGRESS NOTES
[facility]   Cardiology Progress Note     Date: 1/2/2024  Admit Date: 12/20/2023     Reason for consultation:     Chief Complaint   Patient presents with    Fall     Pt arrived to ED via [facility] ems from home after a fall. Per pt he was getting up to go to the bathroom and fell. Pt has knot above left eye and skin tear to left forearm. Pt on blood thinners. Per pt he is on 6L of oxygen at all times. Pt A&Ox4       History of Present Illness: History obtained from patient and medical record.     Ishmael Gr is a 78 y.o. male presented to the hospital with complaints of fall. I have been asked to provide consultation regarding further management and testing. The patient reports that he can walk at baseline, but cannot walk up a flight of stairs or 4 city blocks. He denies exertional chest pain or pressure. No nausea or vomiting. He states that he cannot tell when he is in atrial fibrillation. He reports that he is on his baseline 6L of oxygen. He states that his legs have been more edematous. He denies any palpitations, nausea or vomiting. He was found to have anemia and cardiology was consulted to stop the blood thinner.    (per consult note)    Interval Hx: Today, he is feeling ok. Feeling discouraged, wondering if he will ever go home as he has been either hospitalized or at [facility] for some time. No significant complaints. O2 requirements stable, on chronic 6L.    Patient seen and examined. Clinical notes reviewed. Telemetry reviewed.  No new complaints today. No major events overnight.   Denies having chest pain, palpitations, shortness of breath, orthopnea/PND, cough, or dizziness at the time of this visit.      Past Medical History:  Past Medical History:   Diagnosis Date    Anemia     COPD (chronic obstructive pulmonary disease) (HCC)     Diabetes mellitus (HCC)     Edema     GI (gastrointestinal bleed)     Gout     Hypertension     Morbid obesity (HCC)     Neuropathy

## 2024-01-02 NOTE — PROGRESS NOTES
understanding, would benefit from continued reinforcement    Assessment  Activity Tolerance: Fair; limited by edema in B UE/LE and generalized weakness   Impairments Requiring Therapeutic Intervention: decreased functional mobility, decreased ROM, decreased strength, decreased endurance, decreased sensation, decreased balance, increased pain  Prognosis: fair  Clinical Assessment: Patient is 77 y/o male presenting to [facility] secondary to ABRAHAM. Patient demonstrates mild regression since prior session. Patient now requires mod Ax2 for STS with STEDY and/or bariatric RW. He is unable to demonstrate ambulation secondary to edema and generalized weakness of B LE. Patient will continue to benefit from skilled PT in order to return to OF with all functional mobility prior to d/c from hospital.     Safety Interventions: patient left in chair, chair alarm in place, call light within reach, nurse notified, and STEDY x2 OR maxi move recommended for transfers, appropriate lift sheet left in room    Plan  Frequency: 3-5 x/per week  Current Treatment Recommendations: strengthening, ROM, balance training, functional mobility training, transfer training, gait training, endurance training, neuromuscular re-education, and safety education    Goals  Patient Goals: pt is agreeable to pursuing rehab to regain his independence   Short Term Goals:  Time Frame: To be met prior to DC  Patient will complete bed mobility at maximum assistance   Patient will complete transfers at maximum assistance   Patient will ambulate 10 ft with use of rolling walker at maximum assistance  Patient to maintain standing at moderate assistance for 2 minutes.    Above goals reviewed on 1/2/2024.  All goals are ongoing at this time unless indicated above.      Therapy Session Time      Individual Group Co-treatment   Time In     0808   Time Out     0931   Minutes     83     Timed Code Treatment Minutes:  83 Minutes  Total Treatment Minutes:  83 Minutes        Electronically Signed By: Lilly Victoria PT, DPT, [id number]

## 2024-01-02 NOTE — PROGRESS NOTES
6    Infectious Diseases   Progress Note      Admission Date: 12/20/2023  Hospital Day: Hospital Day: 14   Attending: Davey Farias MD  Date of service: 1/2/2024     Chief complaint/ Reason for consult:     Sepsis on admission with tachycardia, tachypnea, altered mental status  Methicillin-susceptible Staphylococcus aureus bacteremia  Acute respiratory failure with hypoxia, requiring 6 L oxygen-nasal cannula  Concern for aspiration pneumonia  Acute kidney injury  Bilateral hip prosthesis in place    Microbiology:        I have reviewed allavailable micro lab data and cultures    Blood culture (2/2) - collected on 12/20/2023: Methicillin-susceptible Staphylococcus aureus    Susceptibility    Staphylococcus aureus (2)    Antibiotic Interpretation Microscan  Method Status    clindamycin Sensitive <=0.25 mcg/mL BACTERIAL SUSCEPTIBILITY PANEL BY LAURYN     erythromycin Resistant >=8 mcg/mL BACTERIAL SUSCEPTIBILITY PANEL BY LAURYN     oxacillin Sensitive 0.5 mcg/mL BACTERIAL SUSCEPTIBILITY PANEL BY LAURYN     tetracycline Resistant >=16 mcg/mL BACTERIAL SUSCEPTIBILITY PANEL BY LAURYN     trimethoprim-sulfamethoxazole Sensitive <=10 mcg/mL BACTERIAL SUSCEPTIBILITY PANEL BY LAURYN         Blood culture  - collected on 12/22/2023: Negative so far      Antibiotics and immunizations:       Current antibiotics: All antibiotics and their doses were reviewed by me    Recent Abx Admin                     ceFAZolin (ANCEF) 2,000 mg in sodium chloride 0.9 % 50 mL IVPB (mini-bag) (mg) 2,000 mg New Bag 01/02/24 1454     2,000 mg New Bag  0615     2,000 mg New Bag 01/01/24 2158    vancomycin (FIRVANQ) 50 MG/ML oral solution 250 mg (mg) 250 mg Given 01/02/24 0943     250 mg Given 01/01/24 2152                      Immunization History: All immunization history was reviewed by me today.    Immunization History   Administered Date(s) Administered    COVID-19, PFIZER PURPLE top, DILUTE for use, (age 12 y+), 30mcg/0.3mL 03/05/2021, 04/02/2021,  seen earlier today.   HENT:      Head: Normocephalic and atraumatic.      Right Ear: External ear normal. There is no impacted cerumen.      Left Ear: External ear normal. There is no impacted cerumen.      Nose: Nose normal.      Mouth/Throat:      Mouth: Mucous membranes are moist.      Pharynx: Oropharynx is clear. No oropharyngeal exudate.   Eyes:      General: No scleral icterus.        Right eye: No discharge.         Left eye: No discharge.      Conjunctiva/sclera: Conjunctivae normal.      Pupils: Pupils are equal, round, and reactive to light.   Neck:      Thyroid: No thyromegaly.   Cardiovascular:      Rate and Rhythm: Normal rate and regular rhythm.      Heart sounds: Normal heart sounds. No murmur heard.     No friction rub.   Pulmonary:      Effort: No respiratory distress.      Breath sounds: No stridor. No wheezing or rales.   Abdominal:      General: Bowel sounds are normal.      Palpations: Abdomen is soft.      Tenderness: There is no abdominal tenderness. There is no guarding or rebound.   Musculoskeletal:         General: No swelling, tenderness or deformity. Normal range of motion.      Cervical back: Normal range of motion and neck supple.      Right lower leg: Edema present.      Left lower leg: Edema present.   Lymphadenopathy:      Cervical: No cervical adenopathy.   Skin:     General: Skin is warm and dry.      Coloration: Skin is not jaundiced.      Findings: No bruising, erythema or rash.   Neurological:      General: No focal deficit present.      Mental Status: He is alert and oriented to person, place, and time. Mental status is at baseline.      Motor: No abnormal muscle tone.   Psychiatric:         Mood and Affect: Mood normal.         Behavior: Behavior normal.         Lines and drains: All vascular access sites are healthy with no local erythema, discharge or tenderness.      Intake and output:    I/O last 3 completed shifts:  In: 2094.3 [P.O.:1900; I.V.:45.4; IV

## 2024-01-02 NOTE — PROGRESS NOTES
[facility] - Inpatient Rehabilitation Department   Phone: [phone]    Occupational Therapy    [] Initial Evaluation            [x] Daily Treatment Note         [] Discharge Summary      Patient: Ishmael Gr   : 1945   MRN: [id number]   Date of Service:  2024    Admitting Diagnosis:  ABRAHAM (acute kidney injury) (HCC)  Current Admission Summary:   Ishmael Gr is a 78 y.o. male who presents to the ED for evaluation for fall.  Patient was just discharged from the hospital after being brought in for respiratory issues and a fall at that time.  States today he fell and hit his head.  Does not fully remember it.  He was on his way to the bathroom when it happened.  States the only pain he is feeling is in his low back and going down his right leg.  Denies any new numbness or tingling or weakness.  Does have significant respiratory issues and feels short of breath initially with it but states he feels back to normal at this time.  He is on 6 L normally.   Past Medical History:  has a past medical history of Anemia, COPD (chronic obstructive pulmonary disease) ([facility]), Diabetes mellitus (HCC), Edema, GI (gastrointestinal bleed), Gout, Hypertension, Morbid obesity (HCC), Neuropathy, Obstructive sleep apnea, and Peripheral vascular disease ([facility]).  Past Surgical History:  has a past surgical history that includes Appendectomy; Total hip arthroplasty; Colonoscopy (N/A, 2019); and Upper gastrointestinal endoscopy (N/A, 2023).    Discharge Recommendations: Ishmael Gr scored a 13/24 on the AM-PAC ADL Inpatient form. Current research shows that an AM-PAC score of 17 or less is typically not associated with a discharge to the patient's home setting. Based on the patient's AM-PAC score and their current ADL deficits, it is recommended that the patient have 3-5 sessions per week of Occupational Therapy at d/c to increase the patient's independence.  Please see assessment section for further  stand to/from Destiny Stedy and rw with dycem both on floor and Destiny Stedy due to XXL socks don't fit pt's feet  Functional Mobility  No functional mobility completed on this date secondary to not safe to attempt, pt with 4 marches left/right in place.    Other Therapeutic Interventions    Functional Outcomes  AM-PAC Inpatient Daily Activity Raw Score: 13    Cognition  WFL  Insights: decreased awareness of deficits  Orientation:    alert and oriented x 4  Command Following:   accurately follows one step commands     Education  Barriers To Learning: none  Patient Education: patient educated on goals, OT role and benefits, plan of care, transfer training, discharge recommendations  Learning Assessment:  patient verbalizes understanding, would benefit from continued reinforcement    Assessment  Activity Tolerance: Good with extended time and rest breaks, 6L O2 and vitals WNL  Impairments Requiring Therapeutic Intervention: decreased functional mobility, decreased ADL status, decreased strength, decreased endurance, decreased balance, increased pain, decreased posture  Prognosis: fair  Clinical Assessment: Patient presenting below baseline function secondary to fall at home. Patient reporting he is typically independent with ADLs and mobility with RW. Patient requiring Mod A x2 for sit to stand from recliner to Destiny Stedy and bariatric rw. Pt Dependent for toileting/LB dressing. Pt will need continued inpt therapy at d/c and is not safe to d/c home. Continue with POC.  Safety Interventions: patient left in chair, chair alarm in place, call light within reach, patient at risk for falls, and nurse notified    Plan  Frequency: 3-5 x/per week  Current Treatment Recommendations: strengthening, balance training, functional mobility training, transfer training, endurance training, patient/caregiver education, ADL/self-care training, and pain management    Goals  Patient Goals: return to PLOF   Short Term Goals:  Time Frame:

## 2024-01-02 NOTE — PLAN OF CARE
Problem: Safety - Adult  Goal: Free from fall injury  1/2/2024 1427 by Soniya Adams, RN  Outcome: Progressing     Problem: Skin/Tissue Integrity  Goal: Absence of new skin breakdown  Description: 1.  Monitor for areas of redness and/or skin breakdown  2.  Assess vascular access sites hourly  3.  Every 4-6 hours minimum:  Change oxygen saturation probe site  4.  Every 4-6 hours:  If on nasal continuous positive airway pressure, respiratory therapy assess nares and determine need for appliance change or resting period.  1/2/2024 1427 by Soniya Adams, RN  Outcome: Progressing     Problem: Respiratory - Adult  Goal: Achieves optimal ventilation and oxygenation  Outcome: Progressing     Problem: Metabolic/Fluid and Electrolytes - Adult  Goal: Electrolytes maintained within normal limits  Outcome: Progressing  CHF pt, cardiology consult. IV lasix, strict I&O and daily weight being done.

## 2024-01-02 NOTE — PROGRESS NOTES
nephrolithiasis identified sonographically.      Increased renal parenchymal echogenicity bilaterally suggesting medical renal   disease.         CT PELVIS WO CONTRAST Additional Contrast? None   Final Result   Bilateral hip prostheses.      No periprosthetic fracture.         CT LUMBAR SPINE WO CONTRAST   Final Result   Levoconvex scoliosis with moderate to advanced degenerative disc disease and   facet arthropathy.      No fracture.         CT Head W/O Contrast   Final Result   No acute intracranial abnormality.         XR CHEST PORTABLE   Final Result   1. Improved aeration.             Recent imaging reviewed    Problem List  Principal Problem:    ABRAHAM (acute kidney injury) ([facility])  Active Problems:    Encephalomalacia    Essential hypertension    Diabetes mellitus type 2 in obese ([facility])    Morbid obesity due to excess calories ([facility])    Preoperative cardiovascular examination    PVD (peripheral vascular disease) ([facility])    Chronic obstructive pulmonary disease ([facility])    Fall    Acute right-sided low back pain with right-sided sciatica    Right hip pain    Fall at home    Scoliosis of lumbar spine    Pain due to hip joint prosthesis, initial encounter ([facility])    Aspiration pneumonia ([facility])    Bacteremia due to methicillin susceptible Staphylococcus aureus (MSSA)    Receiving intravenous antibiotic treatment as outpatient    Paroxysmal atrial fibrillation ([facility])    Gastrointestinal hemorrhage with melena    Anemia    Stage 3 chronic kidney disease ([facility])  Resolved Problems:    * No resolved hospital problems. *       Assessment & Plan:   Acute gi bleed  S/p 1units prbcs  EGD with  Patient has multiple antral erosions that could have bled if patient is on blood thinners.  Gastric biopsies to check for H. pylori.  - ppi  bid x8 weeks then dailyand hold ac for atleast 1 week no nosaid  Transfuse 1 more unit prbc bmp in am    PAF home meds hold eliquis     ABRAHAM: stable iv lasix 40mg bid bmp in am    Acute on chronic hypoxic and  hyperpcanic resp failure secondary to copd exacerbation  Off steroids  - duo nesb  =- pulm on board    Staph bacteremiea  Iv atbx  - rpeat cultures negative      Hypertension  -PRN hydralazine    Type 2 diabetes withy hyperglycemia due to steroids a1c 6.2 , off steroids stop lantus mary ssi        Davey Farias MD   1/2/2024 11:50 AM

## 2024-01-02 NOTE — FLOWSHEET NOTE
01/02/24 1144   Vitals   BP (!) 125/58   Temp 97.6 °F (36.4 °C)   Temp Source Temporal   Pulse 94   Respirations 18   SpO2 99 %   Transfuse RBC   Patient Observed Initial 15 Min  Yes   Suspected Reaction No   Neurological   Level of Consciousness 0     Blood transfusion started at 1129, No s/s of transfusion reaction noted. VSS on 6 L NC.

## 2024-01-03 LAB
ANION GAP SERPL CALCULATED.3IONS-SCNC: 7 MMOL/L (ref 3–16)
BASOPHILS # BLD: 0.1 K/UL (ref 0–0.2)
BASOPHILS NFR BLD: 1 %
BLOOD BANK DISPENSE STATUS: NORMAL
BLOOD BANK DISPENSE STATUS: NORMAL
BLOOD BANK PRODUCT CODE: NORMAL
BLOOD BANK PRODUCT CODE: NORMAL
BPU ID: NORMAL
BPU ID: NORMAL
BUN SERPL-MCNC: 27 MG/DL (ref 7–20)
CALCIUM SERPL-MCNC: 8.6 MG/DL (ref 8.3–10.6)
CHLORIDE SERPL-SCNC: 96 MMOL/L (ref 99–110)
CO2 SERPL-SCNC: 36 MMOL/L (ref 21–32)
CREAT SERPL-MCNC: 1.7 MG/DL (ref 0.8–1.3)
DEPRECATED RDW RBC AUTO: 16.4 % (ref 12.4–15.4)
DESCRIPTION BLOOD BANK: NORMAL
DESCRIPTION BLOOD BANK: NORMAL
EOSINOPHIL # BLD: 0.2 K/UL (ref 0–0.6)
EOSINOPHIL NFR BLD: 2.6 %
FERRITIN SERPL IA-MCNC: 96.8 NG/ML (ref 30–400)
GFR SERPLBLD CREATININE-BSD FMLA CKD-EPI: 41 ML/MIN/{1.73_M2}
GLUCOSE BLD-MCNC: 109 MG/DL (ref 70–99)
GLUCOSE BLD-MCNC: 128 MG/DL (ref 70–99)
GLUCOSE BLD-MCNC: 165 MG/DL (ref 70–99)
GLUCOSE BLD-MCNC: 171 MG/DL (ref 70–99)
GLUCOSE SERPL-MCNC: 112 MG/DL (ref 70–99)
HCT VFR BLD AUTO: 21.9 % (ref 40.5–52.5)
HCT VFR BLD AUTO: 23.8 % (ref 40.5–52.5)
HGB BLD-MCNC: 7.3 G/DL (ref 13.5–17.5)
HGB BLD-MCNC: 8 G/DL (ref 13.5–17.5)
LYMPHOCYTES # BLD: 0.8 K/UL (ref 1–5.1)
LYMPHOCYTES NFR BLD: 11.8 %
MCH RBC QN AUTO: 32.7 PG (ref 26–34)
MCHC RBC AUTO-ENTMCNC: 33.5 G/DL (ref 31–36)
MCV RBC AUTO: 97.6 FL (ref 80–100)
MONOCYTES # BLD: 0.6 K/UL (ref 0–1.3)
MONOCYTES NFR BLD: 8.7 %
NEUTROPHILS # BLD: 5.4 K/UL (ref 1.7–7.7)
NEUTROPHILS NFR BLD: 75.9 %
PERFORMED ON: ABNORMAL
PLATELET # BLD AUTO: 509 K/UL (ref 135–450)
PMV BLD AUTO: 6.4 FL (ref 5–10.5)
POTASSIUM SERPL-SCNC: 3.9 MMOL/L (ref 3.5–5.1)
RBC # BLD AUTO: 2.25 M/UL (ref 4.2–5.9)
SODIUM SERPL-SCNC: 139 MMOL/L (ref 136–145)
WBC # BLD AUTO: 7 K/UL (ref 4–11)

## 2024-01-03 PROCEDURE — 6360000002 HC RX W HCPCS: Performed by: INTERNAL MEDICINE

## 2024-01-03 PROCEDURE — 92526 ORAL FUNCTION THERAPY: CPT

## 2024-01-03 PROCEDURE — 6370000000 HC RX 637 (ALT 250 FOR IP): Performed by: INTERNAL MEDICINE

## 2024-01-03 PROCEDURE — 85018 HEMOGLOBIN: CPT

## 2024-01-03 PROCEDURE — 2580000003 HC RX 258: Performed by: INTERNAL MEDICINE

## 2024-01-03 PROCEDURE — 82728 ASSAY OF FERRITIN: CPT

## 2024-01-03 PROCEDURE — 80048 BASIC METABOLIC PNL TOTAL CA: CPT

## 2024-01-03 PROCEDURE — 84155 ASSAY OF PROTEIN SERUM: CPT

## 2024-01-03 PROCEDURE — 94761 N-INVAS EAR/PLS OXIMETRY MLT: CPT

## 2024-01-03 PROCEDURE — 6370000000 HC RX 637 (ALT 250 FOR IP): Performed by: NURSE PRACTITIONER

## 2024-01-03 PROCEDURE — 84166 PROTEIN E-PHORESIS/URINE/CSF: CPT

## 2024-01-03 PROCEDURE — C9113 INJ PANTOPRAZOLE SODIUM, VIA: HCPCS | Performed by: INTERNAL MEDICINE

## 2024-01-03 PROCEDURE — 84156 ASSAY OF PROTEIN URINE: CPT

## 2024-01-03 PROCEDURE — 99233 SBSQ HOSP IP/OBS HIGH 50: CPT | Performed by: INTERNAL MEDICINE

## 2024-01-03 PROCEDURE — 85025 COMPLETE CBC W/AUTO DIFF WBC: CPT

## 2024-01-03 PROCEDURE — 86335 IMMUNFIX E-PHORSIS/URINE/CSF: CPT

## 2024-01-03 PROCEDURE — 83883 ASSAY NEPHELOMETRY NOT SPEC: CPT

## 2024-01-03 PROCEDURE — 2700000000 HC OXYGEN THERAPY PER DAY

## 2024-01-03 PROCEDURE — 36430 TRANSFUSION BLD/BLD COMPNT: CPT

## 2024-01-03 PROCEDURE — 2060000000 HC ICU INTERMEDIATE R&B

## 2024-01-03 PROCEDURE — 84165 PROTEIN E-PHORESIS SERUM: CPT

## 2024-01-03 PROCEDURE — 94640 AIRWAY INHALATION TREATMENT: CPT

## 2024-01-03 PROCEDURE — 85014 HEMATOCRIT: CPT

## 2024-01-03 RX ORDER — OXYCODONE HYDROCHLORIDE 5 MG/1
5 TABLET ORAL ONCE
Status: COMPLETED | OUTPATIENT
Start: 2024-01-03 | End: 2024-01-03

## 2024-01-03 RX ORDER — FUROSEMIDE 10 MG/ML
40 INJECTION INTRAMUSCULAR; INTRAVENOUS 3 TIMES DAILY
Status: DISCONTINUED | OUTPATIENT
Start: 2024-01-03 | End: 2024-01-06

## 2024-01-03 RX ORDER — FUROSEMIDE 10 MG/ML
40 INJECTION INTRAMUSCULAR; INTRAVENOUS 2 TIMES DAILY
Status: DISCONTINUED | OUTPATIENT
Start: 2024-01-03 | End: 2024-01-03

## 2024-01-03 RX ORDER — SODIUM CHLORIDE 9 MG/ML
INJECTION, SOLUTION INTRAVENOUS PRN
Status: DISCONTINUED | OUTPATIENT
Start: 2024-01-03 | End: 2024-01-04 | Stop reason: SDUPTHER

## 2024-01-03 RX ADMIN — BUDESONIDE INHALATION 500 MCG: 0.5 SUSPENSION RESPIRATORY (INHALATION) at 22:38

## 2024-01-03 RX ADMIN — CEFAZOLIN 2000 MG: 2 INJECTION, POWDER, FOR SOLUTION INTRAMUSCULAR; INTRAVENOUS at 22:32

## 2024-01-03 RX ADMIN — PANTOPRAZOLE SODIUM 40 MG: 40 INJECTION, POWDER, FOR SOLUTION INTRAVENOUS at 22:24

## 2024-01-03 RX ADMIN — SODIUM CHLORIDE, PRESERVATIVE FREE 10 ML: 5 INJECTION INTRAVENOUS at 22:24

## 2024-01-03 RX ADMIN — ALBUTEROL SULFATE 2.5 MG: 2.5 SOLUTION RESPIRATORY (INHALATION) at 09:30

## 2024-01-03 RX ADMIN — INSULIN LISPRO 5 UNITS: 100 INJECTION, SOLUTION INTRAVENOUS; SUBCUTANEOUS at 17:08

## 2024-01-03 RX ADMIN — Medication 10 ML: at 07:56

## 2024-01-03 RX ADMIN — INSULIN LISPRO 5 UNITS: 100 INJECTION, SOLUTION INTRAVENOUS; SUBCUTANEOUS at 07:58

## 2024-01-03 RX ADMIN — SODIUM CHLORIDE, PRESERVATIVE FREE 10 ML: 5 INJECTION INTRAVENOUS at 07:56

## 2024-01-03 RX ADMIN — THIAMINE HCL TAB 100 MG 100 MG: 100 TAB at 07:54

## 2024-01-03 RX ADMIN — TIOTROPIUM BROMIDE INHALATION SPRAY 2 PUFF: 3.12 SPRAY, METERED RESPIRATORY (INHALATION) at 09:30

## 2024-01-03 RX ADMIN — ACETAMINOPHEN 650 MG: 325 TABLET ORAL at 14:09

## 2024-01-03 RX ADMIN — ACETAMINOPHEN 650 MG: 325 TABLET ORAL at 22:24

## 2024-01-03 RX ADMIN — ALLOPURINOL 200 MG: 100 TABLET ORAL at 07:54

## 2024-01-03 RX ADMIN — CEFAZOLIN 2000 MG: 2 INJECTION, POWDER, FOR SOLUTION INTRAMUSCULAR; INTRAVENOUS at 06:32

## 2024-01-03 RX ADMIN — ACETAMINOPHEN 650 MG: 325 TABLET ORAL at 06:38

## 2024-01-03 RX ADMIN — ALBUTEROL SULFATE 2.5 MG: 2.5 SOLUTION RESPIRATORY (INHALATION) at 22:38

## 2024-01-03 RX ADMIN — ALBUTEROL SULFATE 2.5 MG: 2.5 SOLUTION RESPIRATORY (INHALATION) at 17:00

## 2024-01-03 RX ADMIN — SODIUM CHLORIDE 25 ML: 9 INJECTION, SOLUTION INTRAVENOUS at 22:30

## 2024-01-03 RX ADMIN — Medication 1 CAPSULE: at 17:09

## 2024-01-03 RX ADMIN — FUROSEMIDE 40 MG: 10 INJECTION, SOLUTION INTRAMUSCULAR; INTRAVENOUS at 17:11

## 2024-01-03 RX ADMIN — FUROSEMIDE 40 MG: 10 INJECTION, SOLUTION INTRAMUSCULAR; INTRAVENOUS at 10:04

## 2024-01-03 RX ADMIN — Medication 1 CAPSULE: at 07:54

## 2024-01-03 RX ADMIN — BUDESONIDE INHALATION 500 MCG: 0.5 SUSPENSION RESPIRATORY (INHALATION) at 09:30

## 2024-01-03 RX ADMIN — FUROSEMIDE 40 MG: 10 INJECTION, SOLUTION INTRAMUSCULAR; INTRAVENOUS at 22:24

## 2024-01-03 RX ADMIN — CEFAZOLIN 2000 MG: 2 INJECTION, POWDER, FOR SOLUTION INTRAMUSCULAR; INTRAVENOUS at 14:09

## 2024-01-03 RX ADMIN — ARFORMOTEROL TARTRATE 15 MCG: 15 SOLUTION RESPIRATORY (INHALATION) at 09:30

## 2024-01-03 RX ADMIN — ARFORMOTEROL TARTRATE 15 MCG: 15 SOLUTION RESPIRATORY (INHALATION) at 22:38

## 2024-01-03 RX ADMIN — EPOETIN ALFA-EPBX 5000 UNITS: 10000 INJECTION, SOLUTION INTRAVENOUS; SUBCUTANEOUS at 12:39

## 2024-01-03 RX ADMIN — ALBUTEROL SULFATE 2.5 MG: 2.5 SOLUTION RESPIRATORY (INHALATION) at 12:51

## 2024-01-03 RX ADMIN — OXYCODONE HYDROCHLORIDE 5 MG: 5 TABLET ORAL at 00:32

## 2024-01-03 RX ADMIN — PANTOPRAZOLE SODIUM 40 MG: 40 INJECTION, POWDER, FOR SOLUTION INTRAVENOUS at 07:54

## 2024-01-03 RX ADMIN — INSULIN LISPRO 5 UNITS: 100 INJECTION, SOLUTION INTRAVENOUS; SUBCUTANEOUS at 12:39

## 2024-01-03 ASSESSMENT — PAIN SCALES - GENERAL
PAINLEVEL_OUTOF10: 3
PAINLEVEL_OUTOF10: 8
PAINLEVEL_OUTOF10: 3
PAINLEVEL_OUTOF10: 3
PAINLEVEL_OUTOF10: 0
PAINLEVEL_OUTOF10: 3

## 2024-01-03 ASSESSMENT — PAIN - FUNCTIONAL ASSESSMENT
PAIN_FUNCTIONAL_ASSESSMENT: PREVENTS OR INTERFERES WITH MANY ACTIVE NOT PASSIVE ACTIVITIES
PAIN_FUNCTIONAL_ASSESSMENT: PREVENTS OR INTERFERES SOME ACTIVE ACTIVITIES AND ADLS

## 2024-01-03 ASSESSMENT — PAIN DESCRIPTION - DESCRIPTORS
DESCRIPTORS: ACHING
DESCRIPTORS: DISCOMFORT
DESCRIPTORS: ACHING

## 2024-01-03 ASSESSMENT — PAIN DESCRIPTION - ONSET
ONSET: ON-GOING
ONSET: ON-GOING

## 2024-01-03 ASSESSMENT — PAIN DESCRIPTION - PAIN TYPE
TYPE: CHRONIC PAIN
TYPE: CHRONIC PAIN

## 2024-01-03 ASSESSMENT — PAIN DESCRIPTION - LOCATION
LOCATION: BACK

## 2024-01-03 ASSESSMENT — PAIN DESCRIPTION - ORIENTATION
ORIENTATION: LOWER

## 2024-01-03 ASSESSMENT — PAIN DESCRIPTION - FREQUENCY
FREQUENCY: CONTINUOUS
FREQUENCY: CONTINUOUS

## 2024-01-03 NOTE — PROGRESS NOTES
[facility]    1/3/2024 8:32 AM        Name: Ishmael Gr .              Admitted: 12/20/2023  Primary Care Provider: Anthony Bravo MD (Tel: [phone])      In bed denies any nausea vomiting or chest pain still some shortness of breath  Current Medications  furosemide (LASIX) injection 40 mg, BID  0.9 % sodium chloride infusion, PRN  0.9 % sodium chloride infusion, PRN  sennosides-docusate sodium (SENOKOT-S) 8.6-50 MG tablet 2 tablet, Daily  pantoprazole (PROTONIX) injection 40 mg, BID  perflutren lipid microspheres (DEFINITY) injection 1.5 mL, ONCE PRN  ceFAZolin (ANCEF) 2,000 mg in sodium chloride 0.9 % 50 mL IVPB (mini-bag), Q8H  0.9 % sodium chloride infusion, PRN  sodium chloride flush 0.9 % injection 5-40 mL, 2 times per day  sodium chloride flush 0.9 % injection 5-40 mL, PRN  0.9 % sodium chloride infusion, PRN  albuterol (PROVENTIL) (2.5 MG/3ML) 0.083% nebulizer solution 2.5 mg, 4x Daily RT  lactobacillus (CULTURELLE) capsule 1 capsule, BID WC  insulin lispro (HUMALOG) injection vial 5 Units, TID WC  insulin lispro (HUMALOG) injection vial 0-4 Units, Nightly  dextrose bolus 10% 125 mL, PRN   Or  dextrose bolus 10% 250 mL, PRN  glucagon injection 1 mg, PRN  dextrose 10 % infusion, Continuous PRN  insulin lispro (HUMALOG) injection vial 0-8 Units, TID WC  simethicone (MYLICON) chewable tablet 80 mg, Q6H PRN  arformoterol tartrate (BROVANA) nebulizer solution 15 mcg, BID RT  budesonide (PULMICORT) nebulizer suspension 500 mcg, BID RT  albuterol sulfate HFA (PROVENTIL;VENTOLIN;PROAIR) 108 (90 Base) MCG/ACT inhaler 2 puff, Q6H PRN  allopurinol (ZYLOPRIM) tablet 200 mg, Daily  [Held by provider] apixaban (ELIQUIS) tablet 5 mg, BID  [Held by provider] aspirin chewable tablet 81 mg, Daily  thiamine mononitrate tablet 100 mg, Daily  sodium chloride flush 0.9 % injection 5-40 mL, 2 times per day  sodium  Result   No hydronephrosis or nephrolithiasis identified sonographically.      Increased renal parenchymal echogenicity bilaterally suggesting medical renal   disease.         CT PELVIS WO CONTRAST Additional Contrast? None   Final Result   Bilateral hip prostheses.      No periprosthetic fracture.         CT LUMBAR SPINE WO CONTRAST   Final Result   Levoconvex scoliosis with moderate to advanced degenerative disc disease and   facet arthropathy.      No fracture.         CT Head W/O Contrast   Final Result   No acute intracranial abnormality.         XR CHEST PORTABLE   Final Result   1. Improved aeration.             Recent imaging reviewed    Problem List  Principal Problem:    ABRAHAM (acute kidney injury) ([facility])  Active Problems:    Encephalomalacia    Essential hypertension    Diabetes mellitus type 2 in obese ([facility])    Morbid obesity due to excess calories ([facility])    Diabetes education, encounter for    PVD (peripheral vascular disease) ([facility])    Chronic obstructive pulmonary disease ([facility])    Fall    Acute right-sided low back pain with right-sided sciatica    Right hip pain    Fall at home    Scoliosis of lumbar spine    Pain due to hip joint prosthesis, initial encounter ([facility])    Aspiration pneumonia ([facility])    Bacteremia due to methicillin susceptible Staphylococcus aureus (MSSA)    Receiving intravenous antibiotic treatment as outpatient    Paroxysmal atrial fibrillation ([facility])    Gastrointestinal hemorrhage with melena    Anemia    Stage 3 chronic kidney disease ([facility])  Resolved Problems:    * No resolved hospital problems. *       Assessment & Plan:   Acute gi bleed  S/p 2units prbcs  EGD with  Patient has multiple antral erosions that could have bled if patient is on blood thinners.  Gastric biopsies to check for H. pylori.  - ppi  bid x8 weeks then dailyand hold ac for atleast 1 week no nosaid  Transfuse 1  unit    PAF home meds hold eliquis     ABRAHAM: stable iv lasix 40mg bid   Bmp in am    Acute on chronic hypoxic

## 2024-01-03 NOTE — CARE COORDINATION
Discharge Planning  YENNIFER spoke with Stella - [phone] with [facility]  admissions  who was informed  not to restart the Precert yet.

## 2024-01-03 NOTE — PROGRESS NOTES
[facility]  Inpatient Progress Note      IMPRESSION/RECOMMENDATIONS:    ABRAHAM-baseline creatinine 1.1.  Probably from prerenal azotemia in the setting of relative hypotension plus contrast exposure plus COPD exacerbation.  At risk for ATN.  Nonoliguric.  Blood pressure now acceptable.  Normal saline at 80 mL/h for 12 hours.  Vitamin C x 3 days.         Subjective / interval history / nephrology update / medical decision making:   Patient was seen comfortably sitting up in chair,   Reported no active complaints/distress,   Renal labs noted    No SOB  Stable on NC but still 6 L per    But leg edema - not improving,   Here Started w/ lasix 20 mg IV BID     Lasix .-Increase to 40 IV twice to 3x daily    Continue to work on anemia  Check for paraproteinemia  Iron stores -recently, not lower, could have likely underlying anemia of chronic disease-give RAJESH    Ordered daily labs, daily weight.    Voltaren listed in home meds-d/w pt to avoid      D/c plans - likely 1-2 days, for fluid overload mx  Patient was lost for follow-up with nephrology  -: follow up w/ us at  [facility]  in 2 weeks after d/c. (I updated our information in discharge follow up providers' list.)         Respiratory acidosis-management as per Medicine and Pulmonary.  History of COPD.  Elevated proBNP can be secondary to ABRAHAM.  Hypoalbuminemia-UA only showed trace protein  Hematuria-repeat UA.    renal ultrasound.  Anemia-macrocytic.    B12 and folate level.  Relative hypotension-better      Medical decision making- high complexity. Multiple complex health problems.   Discussed with patient and treatment team.  Nursing . Davey Farias MD   Thank you for allowing me to participate in this patient's care. Please do not hesitate to contact me for any questions/concerns. We will follow along with you.     Jame Carmona MD  [facility]   Phone: [phone] or [employer]  Fax: [phone]    Lab Results   Component Value Date/Time    MG 1.80 12/28/2023 04:59 AM   Phosphorus:    Lab Results   Component Value Date/Time    PHOS 4.1 01/01/2024 07:27 AM     proBNP 5515  Albumin 3.3    Urinalysis shows specific gravity of 1.015, pH 5, blood large, protein trace, small leukocyte esterase, WBC 11, RBC 4-5    VBG pH 7.26/pCO2 75    Cxray  IMPRESSION:  Stable cardiomegaly with mild central pulmonary congestion or pulmonary  artery hypertension which is more prominent.    12/16 CT PE  IMPRESSION:  Limited exam due to the extensive motion artifact and decreased opacification  of the distal pulmonary arteries which is limiting evaluation of the distal  pulmonary arteries along the lung bases.  Within the limitations of the exam,  no obvious acute pulmonary embolus can be seen     Mildly dilated and atherosclerotic thoracic aorta with no aneurysm or  dissection and no mediastinal mass or adenopathy.     Chronic obstructive lung changes with mild subsegmental atelectasis vs early  infiltrate or scarring along the right lung base posteriorly      Renal us -     IMPRESSION:  No hydronephrosis or nephrolithiasis identified sonographically.     Increased renal parenchymal echogenicity bilaterally suggesting medical renal  disease.              Specimen Collected: 12/22/23 18:03 EST Last Resulted: 12/22/23 18:53 EST

## 2024-01-03 NOTE — PROGRESS NOTES
Blood transfusion started at 1007, initial 15 min observation ended at 1022. No s/s of transfusion reaction noted.   /78   Pulse 88   Temp 97.2 °F (36.2 °C) (Temporal)   Resp 28   Ht 1.829 m (6')   Wt (!) 146.8 kg (323 lb 9.6 oz)   SpO2 99%   BMI 43.89 kg/m²

## 2024-01-03 NOTE — PROGRESS NOTES
sodium chloride 50 mL/hr at 24 0612     sodium chloride, sodium chloride, perflutren lipid microspheres, sodium chloride, sodium chloride flush, sodium chloride, dextrose bolus **OR** dextrose bolus, glucagon (rDNA), dextrose, simethicone, albuterol sulfate HFA, sodium chloride flush, sodium chloride, polyethylene glycol, acetaminophen **OR** acetaminophen    TELEMETRY (Personally reviewed by me): Sinus     Lab Data:  CBC:   Recent Labs     24  0727 24  0610 24  1011 24  1654 24  0635   WBC 10.8  --  8.6  --  7.0   HGB 7.1*   < > 6.6* 7.3* 7.3*   HCT 22.0*   < > 20.0* 21.7* 21.9*   MCV 98.7  --  98.8  --  97.6   *  --  515*  --  509*    < > = values in this interval not displayed.     BMP:   Recent Labs     24  0727 24  1011 24  0635    137 139   K 3.7 3.8 3.9   CL 97* 96* 96*   CO2 36* 34* 36*   PHOS 4.1  --   --    BUN 31* 29* 27*   CREATININE 1.5* 1.6* 1.7*     LIVER PROFILE: No results for input(s): \"AST\", \"ALT\", \"LIPASE\", \"AMYLASE\", \"ALB\", \"BILIDIR\", \"BILITOT\", \"ALKPHOS\" in the last 72 hours.  PT/INR: No results for input(s): \"PROTIME\", \"INR\" in the last 72 hours.  APTT: No results for input(s): \"APTT\" in the last 72 hours.  BNP:  No results for input(s): \"BNP\" in the last 72 hours.  Imaging/Procedures:     EC23  Sinus rhythm with 1st degree A-V block with occasional Premature ventricular complexes  Left axis deviation  Incomplete left bundle branch block  Prolonged QT  Abnormal ECG  When compared with ECG of 20-DEC-2023 04:50,  Premature ventricular complexes are now Present  UT interval has increased  Incomplete left bundle branch block is now Present  Criteria for Septal infarct are no longer Present     Echo 2/15/23  Summary   Technically difficult study. Definity was used to assist with endocardial border delineation.   Mild concentric left ventricular hypertrophy. Mildly dilated left ventricular cavity size. Borderline left  ventricular systolic function with an estimated ejection fraction of 50-55%. Abnormal septal motion.   Indeterminate diastolic function.   The right ventricle is severely enlarged.   The right atrium is not well visualized but appears enlarged.   The aortic root is mildly dilated.   A bubble study was performed and fails to show evidence of shunting.     Echo 11/28/2023:  Summary   Technically difficult study due to poor acoustical windows.   Definity contrast administered.   Normal left ventricle size, wall thickness, and systolic function with an   estimated ejection fraction of 55-60%. No regional wall motion abnormalities   are seen.   Indeterminate diastolic function.   The aortic root is dilated, measuring 4.0cm.     Echo: 12/22/23  Summary   Technically difficult study. Valves not well visualized.   Left ventricular cavity size is normal.   There is increased left ventricular wall thickness.   Left ventricular ejection fraction is normal at 50-55%.   The right atrium is dilated.   IVC size is dilated (>2.1 cm) and collapses < 50% with respiration   consistent with elevated RA pressure (15 mmHg).     Stress lexiscan 2/15/23  Summary -Myocardial perfusion is abnormal.  -There is a moderate size, moderate intensity, fixed inferior lateral defect  c/w scar.  -No significant ischemia is seen.  -Overall LV function is normal with EF=59%. There is mild inferior  hypokinesis.  -Intermediate risk      Cardiac cath 2/13/09:  Normal coronaries  LVEF 70%     RA-mean 12  RV-45/2, 10  PA-56/10 mean 35  PW-mean 30  LV-128/-9, 13  Ao-121/71      Assessment/Plan:    PAF  ~ hold anticoagulation short term given gi bleeding. Patient accepts small risk of stroke off of blood thinner.  Consider watchman as outpatient. Resume when ok with GI and hgb stable.      Anemia   ~ hgb 7.3 today.  Recommend hemoglobin above 8 from a cardiac standpoint  ~ EGD no active bleed, antral erosions, bx taken 12/29/23     Elevated troponin /

## 2024-01-03 NOTE — PROGRESS NOTES
Facility/Department: [address]  Speech Language Pathology   Dysphagia Treatment Note    Patient: Ishmael Gr   : 1945   MRN: [id number]      Evaluation Date: 1/3/2024      Admitting Dx: Right hip pain [M25.551]  ABRAHAM (acute kidney injury) (HCC) [N17.9]  Fall, initial encounter [W19.XXXA]  Acute right-sided low back pain with right-sided sciatica [M54.41]  Treatment Diagnosis: Oropharyngeal Dysphagia   Pain: Did not state                                              Chest X-ray: 2023  IMPRESSION:  Left lower lobe infiltrate likely represents pneumonia.  Aspiration is  possible.    Diet and Treatment Recommendations 1/3/2024:  Diet Solids Recommendation:  Regular texture diet  Liquid Consistency Recommendation:  Thin liquids  Recommended form of Meds: Meds whole with water     Compensatory strategies:   Upright as possible with all PO intake , Small bites/sips , Eat/feed slowly, Remain upright 30-45 min     Assessment of Texture Tolerance:  Diet level prior to treatment: Regular texture diet , Thin liquids   Tolerance of Current Diet Level: Per chart, no noted difficulty with current diet level     Impressions: Pt was positioned upright in chair, awake and alert. Currently on 6L O2 via nasal cannula. Pt denies concerns with swallowing at this time. Trials of thin liquids and regular solids were provided to assess swallow function. Oral phase characterized by functional mastication and adequate oral clearance. Swallow initiation judged to be timely subjectively. No overt s/s of aspiration assessed across PO intake. SLP provided education re: safe swallow strategies and plan of care. Overall, pt demonstrates increased risk for aspiration due to co morbidities and respiratory status. Based on today's assessment, recommend continuation of regular texture diet with thin liquids with ongoing diet tolerance monitoring. If concern for aspiration as contributing factor to respiratory status persists, may consider

## 2024-01-04 LAB
ANION GAP SERPL CALCULATED.3IONS-SCNC: 9 MMOL/L (ref 3–16)
BASOPHILS # BLD: 0 K/UL (ref 0–0.2)
BASOPHILS NFR BLD: 0.5 %
BUN SERPL-MCNC: 25 MG/DL (ref 7–20)
CALCIUM SERPL-MCNC: 8.9 MG/DL (ref 8.3–10.6)
CHLORIDE SERPL-SCNC: 90 MMOL/L (ref 99–110)
CO2 SERPL-SCNC: 32 MMOL/L (ref 21–32)
CREAT SERPL-MCNC: 1.8 MG/DL (ref 0.8–1.3)
DEPRECATED RDW RBC AUTO: 16.9 % (ref 12.4–15.4)
EOSINOPHIL # BLD: 0.2 K/UL (ref 0–0.6)
EOSINOPHIL NFR BLD: 2.5 %
GFR SERPLBLD CREATININE-BSD FMLA CKD-EPI: 38 ML/MIN/{1.73_M2}
GLUCOSE BLD-MCNC: 121 MG/DL (ref 70–99)
GLUCOSE BLD-MCNC: 139 MG/DL (ref 70–99)
GLUCOSE BLD-MCNC: 141 MG/DL (ref 70–99)
GLUCOSE BLD-MCNC: 154 MG/DL (ref 70–99)
GLUCOSE SERPL-MCNC: 167 MG/DL (ref 70–99)
HCT VFR BLD AUTO: 24.5 % (ref 40.5–52.5)
HGB BLD-MCNC: 8 G/DL (ref 13.5–17.5)
LYMPHOCYTES # BLD: 0.7 K/UL (ref 1–5.1)
LYMPHOCYTES NFR BLD: 9.8 %
MCH RBC QN AUTO: 31.6 PG (ref 26–34)
MCHC RBC AUTO-ENTMCNC: 32.5 G/DL (ref 31–36)
MCV RBC AUTO: 97 FL (ref 80–100)
MONOCYTES # BLD: 0.5 K/UL (ref 0–1.3)
MONOCYTES NFR BLD: 6.9 %
NEUTROPHILS # BLD: 5.7 K/UL (ref 1.7–7.7)
NEUTROPHILS NFR BLD: 80.3 %
PERFORMED ON: ABNORMAL
PLATELET # BLD AUTO: 512 K/UL (ref 135–450)
PMV BLD AUTO: 6.4 FL (ref 5–10.5)
POTASSIUM SERPL-SCNC: 3.7 MMOL/L (ref 3.5–5.1)
PROT UR-MCNC: 0.01 G/DL
PROT UR-MCNC: 7 MG/DL
RBC # BLD AUTO: 2.53 M/UL (ref 4.2–5.9)
SODIUM SERPL-SCNC: 131 MMOL/L (ref 136–145)
WBC # BLD AUTO: 7.1 K/UL (ref 4–11)

## 2024-01-04 PROCEDURE — 2580000003 HC RX 258: Performed by: INTERNAL MEDICINE

## 2024-01-04 PROCEDURE — 80048 BASIC METABOLIC PNL TOTAL CA: CPT

## 2024-01-04 PROCEDURE — 6370000000 HC RX 637 (ALT 250 FOR IP): Performed by: INTERNAL MEDICINE

## 2024-01-04 PROCEDURE — C9113 INJ PANTOPRAZOLE SODIUM, VIA: HCPCS | Performed by: INTERNAL MEDICINE

## 2024-01-04 PROCEDURE — 99232 SBSQ HOSP IP/OBS MODERATE 35: CPT | Performed by: INTERNAL MEDICINE

## 2024-01-04 PROCEDURE — 94761 N-INVAS EAR/PLS OXIMETRY MLT: CPT

## 2024-01-04 PROCEDURE — 6360000002 HC RX W HCPCS: Performed by: INTERNAL MEDICINE

## 2024-01-04 PROCEDURE — 2700000000 HC OXYGEN THERAPY PER DAY

## 2024-01-04 PROCEDURE — 94640 AIRWAY INHALATION TREATMENT: CPT

## 2024-01-04 PROCEDURE — 36415 COLL VENOUS BLD VENIPUNCTURE: CPT

## 2024-01-04 PROCEDURE — 85025 COMPLETE CBC W/AUTO DIFF WBC: CPT

## 2024-01-04 PROCEDURE — 2060000000 HC ICU INTERMEDIATE R&B

## 2024-01-04 RX ORDER — DIAZEPAM 2 MG/1
2 TABLET ORAL EVERY 6 HOURS PRN
Status: DISCONTINUED | OUTPATIENT
Start: 2024-01-04 | End: 2024-01-15 | Stop reason: HOSPADM

## 2024-01-04 RX ORDER — LANOLIN ALCOHOL/MO/W.PET/CERES
3 CREAM (GRAM) TOPICAL NIGHTLY PRN
Status: DISCONTINUED | OUTPATIENT
Start: 2024-01-04 | End: 2024-01-15 | Stop reason: HOSPADM

## 2024-01-04 RX ADMIN — TIOTROPIUM BROMIDE INHALATION SPRAY 2 PUFF: 3.12 SPRAY, METERED RESPIRATORY (INHALATION) at 11:21

## 2024-01-04 RX ADMIN — SODIUM CHLORIDE, PRESERVATIVE FREE 10 ML: 5 INJECTION INTRAVENOUS at 05:15

## 2024-01-04 RX ADMIN — CEFAZOLIN 2000 MG: 2 INJECTION, POWDER, FOR SOLUTION INTRAMUSCULAR; INTRAVENOUS at 21:08

## 2024-01-04 RX ADMIN — ALBUTEROL SULFATE 2.5 MG: 2.5 SOLUTION RESPIRATORY (INHALATION) at 08:48

## 2024-01-04 RX ADMIN — INSULIN LISPRO 5 UNITS: 100 INJECTION, SOLUTION INTRAVENOUS; SUBCUTANEOUS at 12:49

## 2024-01-04 RX ADMIN — Medication 1 CAPSULE: at 16:50

## 2024-01-04 RX ADMIN — FUROSEMIDE 40 MG: 10 INJECTION, SOLUTION INTRAMUSCULAR; INTRAVENOUS at 12:48

## 2024-01-04 RX ADMIN — ALLOPURINOL 200 MG: 100 TABLET ORAL at 08:04

## 2024-01-04 RX ADMIN — CEFAZOLIN 2000 MG: 2 INJECTION, POWDER, FOR SOLUTION INTRAMUSCULAR; INTRAVENOUS at 12:50

## 2024-01-04 RX ADMIN — DIAZEPAM 2 MG: 2 TABLET ORAL at 20:59

## 2024-01-04 RX ADMIN — PANTOPRAZOLE SODIUM 40 MG: 40 INJECTION, POWDER, FOR SOLUTION INTRAVENOUS at 20:59

## 2024-01-04 RX ADMIN — BUDESONIDE INHALATION 500 MCG: 0.5 SUSPENSION RESPIRATORY (INHALATION) at 21:38

## 2024-01-04 RX ADMIN — ALBUTEROL SULFATE 2.5 MG: 2.5 SOLUTION RESPIRATORY (INHALATION) at 21:38

## 2024-01-04 RX ADMIN — PANTOPRAZOLE SODIUM 40 MG: 40 INJECTION, POWDER, FOR SOLUTION INTRAVENOUS at 08:03

## 2024-01-04 RX ADMIN — ACETAMINOPHEN 650 MG: 325 TABLET ORAL at 05:15

## 2024-01-04 RX ADMIN — FUROSEMIDE 40 MG: 10 INJECTION, SOLUTION INTRAMUSCULAR; INTRAVENOUS at 08:03

## 2024-01-04 RX ADMIN — ALBUTEROL SULFATE 2.5 MG: 2.5 SOLUTION RESPIRATORY (INHALATION) at 15:03

## 2024-01-04 RX ADMIN — INSULIN LISPRO 5 UNITS: 100 INJECTION, SOLUTION INTRAVENOUS; SUBCUTANEOUS at 08:04

## 2024-01-04 RX ADMIN — BUDESONIDE INHALATION 500 MCG: 0.5 SUSPENSION RESPIRATORY (INHALATION) at 08:48

## 2024-01-04 RX ADMIN — THIAMINE HCL TAB 100 MG 100 MG: 100 TAB at 08:04

## 2024-01-04 RX ADMIN — INSULIN LISPRO 5 UNITS: 100 INJECTION, SOLUTION INTRAVENOUS; SUBCUTANEOUS at 18:09

## 2024-01-04 RX ADMIN — MELATONIN TAB 3 MG 3 MG: 3 TAB at 20:59

## 2024-01-04 RX ADMIN — SODIUM CHLORIDE, PRESERVATIVE FREE 10 ML: 5 INJECTION INTRAVENOUS at 20:59

## 2024-01-04 RX ADMIN — SODIUM CHLORIDE 25 ML: 9 INJECTION, SOLUTION INTRAVENOUS at 05:16

## 2024-01-04 RX ADMIN — ARFORMOTEROL TARTRATE 15 MCG: 15 SOLUTION RESPIRATORY (INHALATION) at 21:39

## 2024-01-04 RX ADMIN — CEFAZOLIN 2000 MG: 2 INJECTION, POWDER, FOR SOLUTION INTRAMUSCULAR; INTRAVENOUS at 05:17

## 2024-01-04 RX ADMIN — FUROSEMIDE 40 MG: 10 INJECTION, SOLUTION INTRAMUSCULAR; INTRAVENOUS at 21:00

## 2024-01-04 RX ADMIN — SODIUM CHLORIDE, PRESERVATIVE FREE 10 ML: 5 INJECTION INTRAVENOUS at 08:11

## 2024-01-04 RX ADMIN — ALBUTEROL SULFATE 2.5 MG: 2.5 SOLUTION RESPIRATORY (INHALATION) at 11:21

## 2024-01-04 RX ADMIN — Medication 1 CAPSULE: at 08:04

## 2024-01-04 RX ADMIN — Medication 10 ML: at 08:11

## 2024-01-04 RX ADMIN — ARFORMOTEROL TARTRATE 15 MCG: 15 SOLUTION RESPIRATORY (INHALATION) at 08:48

## 2024-01-04 ASSESSMENT — PAIN DESCRIPTION - FREQUENCY
FREQUENCY: CONTINUOUS

## 2024-01-04 ASSESSMENT — PAIN - FUNCTIONAL ASSESSMENT
PAIN_FUNCTIONAL_ASSESSMENT: PREVENTS OR INTERFERES WITH ALL ACTIVE AND SOME PASSIVE ACTIVITIES
PAIN_FUNCTIONAL_ASSESSMENT: PREVENTS OR INTERFERES WITH ALL ACTIVE AND SOME PASSIVE ACTIVITIES
PAIN_FUNCTIONAL_ASSESSMENT: PREVENTS OR INTERFERES SOME ACTIVE ACTIVITIES AND ADLS

## 2024-01-04 ASSESSMENT — PAIN DESCRIPTION - PAIN TYPE
TYPE: ACUTE PAIN

## 2024-01-04 ASSESSMENT — PAIN DESCRIPTION - LOCATION
LOCATION: BACK
LOCATION: LEG

## 2024-01-04 ASSESSMENT — PAIN DESCRIPTION - ONSET
ONSET: ON-GOING

## 2024-01-04 ASSESSMENT — PAIN SCALES - GENERAL
PAINLEVEL_OUTOF10: 0
PAINLEVEL_OUTOF10: 9
PAINLEVEL_OUTOF10: 5
PAINLEVEL_OUTOF10: 0
PAINLEVEL_OUTOF10: 3
PAINLEVEL_OUTOF10: 7
PAINLEVEL_OUTOF10: 9

## 2024-01-04 ASSESSMENT — PAIN DESCRIPTION - ORIENTATION
ORIENTATION: LOWER
ORIENTATION: RIGHT;LEFT

## 2024-01-04 ASSESSMENT — PAIN DESCRIPTION - DESCRIPTORS
DESCRIPTORS: DISCOMFORT
DESCRIPTORS: CRAMPING
DESCRIPTORS: CRAMPING

## 2024-01-04 NOTE — PROGRESS NOTES
Occupational Therapy/Physical Therapy  OT and PT tx attempt this morning; however, RT just beginning tx with pt. Will f/u later this date per POC. Thank you, GAUTAM Cartwright, OTR/L, [id number], Salma Choi, PT, [id number].     Second attempt this afternoon--pt seated in chair and declined participation with OT and PT secondary to fatigue and request to take a nap.  No further needs requested. Pt remains seated in chair with needs in reach. Thank you, GAUTAM Cartwright, OTR/L, [id number], Salma Choi, PT, [id number].

## 2024-01-04 NOTE — PROGRESS NOTES
[facility]    1/4/2024 8:36 AM        Name: Ishmael Gr .              Admitted: 12/20/2023  Primary Care Provider: Anthony Bravo MD (Tel: [phone])      Sob is improving  down to 3L o2 no n/v  Current Medications  furosemide (LASIX) injection 40 mg, TID  sennosides-docusate sodium (SENOKOT-S) 8.6-50 MG tablet 2 tablet, Daily  pantoprazole (PROTONIX) injection 40 mg, BID  perflutren lipid microspheres (DEFINITY) injection 1.5 mL, ONCE PRN  ceFAZolin (ANCEF) 2,000 mg in sodium chloride 0.9 % 50 mL IVPB (mini-bag), Q8H  sodium chloride flush 0.9 % injection 5-40 mL, 2 times per day  sodium chloride flush 0.9 % injection 5-40 mL, PRN  0.9 % sodium chloride infusion, PRN  albuterol (PROVENTIL) (2.5 MG/3ML) 0.083% nebulizer solution 2.5 mg, 4x Daily RT  lactobacillus (CULTURELLE) capsule 1 capsule, BID WC  insulin lispro (HUMALOG) injection vial 5 Units, TID WC  insulin lispro (HUMALOG) injection vial 0-4 Units, Nightly  dextrose bolus 10% 125 mL, PRN   Or  dextrose bolus 10% 250 mL, PRN  glucagon injection 1 mg, PRN  dextrose 10 % infusion, Continuous PRN  insulin lispro (HUMALOG) injection vial 0-8 Units, TID WC  simethicone (MYLICON) chewable tablet 80 mg, Q6H PRN  arformoterol tartrate (BROVANA) nebulizer solution 15 mcg, BID RT  budesonide (PULMICORT) nebulizer suspension 500 mcg, BID RT  albuterol sulfate HFA (PROVENTIL;VENTOLIN;PROAIR) 108 (90 Base) MCG/ACT inhaler 2 puff, Q6H PRN  allopurinol (ZYLOPRIM) tablet 200 mg, Daily  [Held by provider] apixaban (ELIQUIS) tablet 5 mg, BID  [Held by provider] aspirin chewable tablet 81 mg, Daily  thiamine mononitrate tablet 100 mg, Daily  sodium chloride flush 0.9 % injection 5-40 mL, 2 times per day  sodium chloride flush 0.9 % injection 5-40 mL, PRN  0.9 % sodium chloride infusion, PRN  polyethylene glycol (GLYCOLAX) packet 17 g, Daily PRN  acetaminophen    No periprosthetic fracture.         CT LUMBAR SPINE WO CONTRAST   Final Result   Levoconvex scoliosis with moderate to advanced degenerative disc disease and   facet arthropathy.      No fracture.         CT Head W/O Contrast   Final Result   No acute intracranial abnormality.         XR CHEST PORTABLE   Final Result   1. Improved aeration.             Recent imaging reviewed    Problem List  Principal Problem:    ABRAHAM (acute kidney injury) ([facility])  Active Problems:    Encephalomalacia    Essential hypertension    Diabetes mellitus type 2 in obese ([facility])    Morbid obesity due to excess calories ([facility])    Diabetes education, encounter for    PVD (peripheral vascular disease) ([facility])    Chronic obstructive pulmonary disease ([facility])    Fall    Acute right-sided low back pain with right-sided sciatica    Right hip pain    Fall at home    Scoliosis of lumbar spine    Pain due to hip joint prosthesis, initial encounter ([facility])    Aspiration pneumonia ([facility])    Bacteremia due to methicillin susceptible Staphylococcus aureus (MSSA)    Receiving intravenous antibiotic treatment as outpatient    Paroxysmal atrial fibrillation ([facility])    Gastrointestinal hemorrhage with melena    Anemia    Stage 3 chronic kidney disease ([facility])  Resolved Problems:    * No resolved hospital problems. *       Assessment & Plan:   Acute gi bleed  S/p 3units prbcs  EGD with  Patient has multiple antral erosions that could have bled if patient is on blood thinners.  Gastric biopsies to check for H. pylori.  - ppi  bid x8 weeks then dailyand hold ac for atleast 1 week no nosaid    PAF home meds hold eliquis     ABRAHAM: await bmp and decide on further lasix    Acute on chronic hypoxic and hyperpcanic resp failure secondary to copd exacerbation  Off steroids  - duo nesb  =- pulm on board    Staph bacteremiea  Iv atbx  - rpeat cultures negative      Hypertension  -PRN hydralazine    Type 2 diabetes withy hyperglycemia due to steroids a1c 6.2 , off steroids stop lantus

## 2024-01-04 NOTE — PROGRESS NOTES
Pt denies need for his home Bipap tonight, home bipap unit at bedside if needed. Pt is currently on 3 LPMNC, SpO2 WNL, will cont to monitor.

## 2024-01-04 NOTE — CARE COORDINATION
Discharge Planning;  YENNIFER spoke with Simone with [facility]- [phone] who was informed to go ahead and restart the Precert . Updated PT & OT Notes on the chart.

## 2024-01-04 NOTE — PROGRESS NOTES
[facility] Daily Progress Note      Admit Date:  12/20/2023    Chief Complaint: Shortness of breath    Subjective:  Mr. Gr is well-known to me from prior care.  No complaints of chest discomfort.  Still some shortness of breath.  His legs are wrapped today, he is up to the chair. He is feeling better today.     Objective:   BP (!) 143/51   Pulse (!) 114   Temp 97.8 °F (36.6 °C) (Temporal)   Resp 22   Ht 1.829 m (6')   Wt (!) 146.8 kg (323 lb 9.6 oz)   SpO2 95%   BMI 43.89 kg/m²     Intake/Output Summary (Last 24 hours) at 1/4/2024 1740  Last data filed at 1/4/2024 1200  Gross per 24 hour   Intake 7409.04 ml   Output 1600 ml   Net 5809.04 ml       Physical Exam:  General:  Awake, alert, oriented x 3, NAD  Skin:  Warm and dry  Neck:  JVD is difficult to assess  Chest:  decreased air exchange bilaterally  Cardiovascular:  RRR S1S2, no S3,   Abdomen:  Soft, ND, NT, No HSM  Extremities:  3+ edema into thighs.  Wrapped    Medications:    furosemide  40 mg IntraVENous TID    sennosides-docusate sodium  2 tablet Oral Daily    pantoprazole  40 mg IntraVENous BID    ceFAZolin  2,000 mg IntraVENous Q8H    sodium chloride flush  5-40 mL IntraVENous 2 times per day    albuterol  2.5 mg Nebulization 4x Daily RT    lactobacillus  1 capsule Oral BID WC    insulin lispro  5 Units SubCUTAneous TID WC    insulin lispro  0-4 Units SubCUTAneous Nightly    insulin lispro  0-8 Units SubCUTAneous TID WC    arformoterol tartrate  15 mcg Nebulization BID RT    budesonide  0.5 mg Nebulization BID RT    allopurinol  200 mg Oral Daily    [Held by provider] apixaban  5 mg Oral BID    [Held by provider] aspirin  81 mg Oral Daily    vitamin B-1  100 mg Oral Daily    sodium chloride flush  5-40 mL IntraVENous 2 times per day    tiotropium  2 puff Inhalation Daily RT      sodium chloride Stopped (01/04/24 0610)    dextrose      sodium chloride Stopped (01/03/24 2304)     perflutren lipid microspheres, sodium chloride flush,

## 2024-01-04 NOTE — PROGRESS NOTES
[facility]  Inpatient Progress Note      IMPRESSION/RECOMMENDATIONS:    ABRAHAM-baseline creatinine 1.1.  Probably from prerenal azotemia in the setting of relative hypotension plus contrast exposure plus COPD exacerbation.  At risk for ATN.  Nonoliguric.  Blood pressure now acceptable.  Normal saline at 80 mL/h for 12 hours.  Vitamin C x 3 days.         Subjective / interval history / nephrology update / medical decision making:   Patient was seen comfortably sitting up in chair,   Reported no active complaints/distress,   Renal labs noted    No SOB  Stable on NC but still 6 L per    But leg edema - not improving,   Here Started w/ lasix 20 mg IV BID     Lasix - Keep Increased to 40 IV twice to 3x daily    Continue to work on anemia  Check for paraproteinemia  Iron stores -recently, not lower, could have likely underlying anemia of chronic disease-give RAJESH    Ordered daily labs, daily weight.    Voltaren listed in home meds-d/w pt to avoid      D/c plans - likely 1-2 days, for fluid overload mx  Patient was lost for follow-up with nephrology  -: follow up w/ us at  [facility]  in 2 weeks after d/c. (I updated our information in discharge follow up providers' list.)         Respiratory acidosis-management as per Medicine and Pulmonary.  History of COPD.  Elevated proBNP can be secondary to ABRAHAM.  Hypoalbuminemia-UA only showed trace protein  Hematuria-repeat UA.    renal ultrasound.  Anemia-macrocytic.    B12 and folate level.  Relative hypotension-better      Medical decision making- high complexity. Multiple complex health problems.   Discussed with patient and treatment team.  Nursing . Davey Farias MD   Thank you for allowing me to participate in this patient's care. Please do not hesitate to contact me for any questions/concerns. We will follow along with you.     Jame Carmona MD  [facility]   Phone: [phone] or [facility] [employer]  Fax:  01/01/2024 07:27 AM     proBNP 5515  Albumin 3.3    Urinalysis shows specific gravity of 1.015, pH 5, blood large, protein trace, small leukocyte esterase, WBC 11, RBC 4-5    VBG pH 7.26/pCO2 75    Cxray  IMPRESSION:  Stable cardiomegaly with mild central pulmonary congestion or pulmonary  artery hypertension which is more prominent.    12/16 CT PE  IMPRESSION:  Limited exam due to the extensive motion artifact and decreased opacification  of the distal pulmonary arteries which is limiting evaluation of the distal  pulmonary arteries along the lung bases.  Within the limitations of the exam,  no obvious acute pulmonary embolus can be seen     Mildly dilated and atherosclerotic thoracic aorta with no aneurysm or  dissection and no mediastinal mass or adenopathy.     Chronic obstructive lung changes with mild subsegmental atelectasis vs early  infiltrate or scarring along the right lung base posteriorly      Renal us -     IMPRESSION:  No hydronephrosis or nephrolithiasis identified sonographically.     Increased renal parenchymal echogenicity bilaterally suggesting medical renal  disease.              Specimen Collected: 12/22/23 18:03 EST Last Resulted: 12/22/23 18:53 EST

## 2024-01-05 LAB
ALBUMIN SERPL ELPH-MCNC: 2.2 G/DL (ref 3.1–4.9)
ALPHA1 GLOB SERPL ELPH-MCNC: 0.5 G/DL (ref 0.2–0.4)
ALPHA2 GLOB SERPL ELPH-MCNC: 1 G/DL (ref 0.4–1.1)
ANION GAP SERPL CALCULATED.3IONS-SCNC: 9 MMOL/L (ref 3–16)
B-GLOBULIN SERPL ELPH-MCNC: 1.5 G/DL (ref 0.9–1.6)
BACTERIA URNS QL MICRO: ABNORMAL /HPF
BASOPHILS # BLD: 0 K/UL (ref 0–0.2)
BASOPHILS NFR BLD: 0.6 %
BILIRUB UR QL STRIP.AUTO: NEGATIVE
BUN SERPL-MCNC: 29 MG/DL (ref 7–20)
CALCIUM SERPL-MCNC: 8.9 MG/DL (ref 8.3–10.6)
CHLORIDE SERPL-SCNC: 94 MMOL/L (ref 99–110)
CLARITY UR: CLEAR
CO2 SERPL-SCNC: 34 MMOL/L (ref 21–32)
COLOR UR: YELLOW
CREAT SERPL-MCNC: 1.9 MG/DL (ref 0.8–1.3)
DEPRECATED RDW RBC AUTO: 17 % (ref 12.4–15.4)
EOSINOPHIL # BLD: 0.1 K/UL (ref 0–0.6)
EOSINOPHIL NFR BLD: 2 %
EPI CELLS #/AREA URNS AUTO: 2 /HPF (ref 0–5)
GAMMA GLOB SERPL ELPH-MCNC: 1.3 G/DL (ref 0.6–1.8)
GFR SERPLBLD CREATININE-BSD FMLA CKD-EPI: 36 ML/MIN/{1.73_M2}
GLUCOSE BLD-MCNC: 102 MG/DL (ref 70–99)
GLUCOSE BLD-MCNC: 117 MG/DL (ref 70–99)
GLUCOSE BLD-MCNC: 121 MG/DL (ref 70–99)
GLUCOSE BLD-MCNC: 133 MG/DL (ref 70–99)
GLUCOSE SERPL-MCNC: 124 MG/DL (ref 70–99)
GLUCOSE UR STRIP.AUTO-MCNC: NEGATIVE MG/DL
HCT VFR BLD AUTO: 22.9 % (ref 40.5–52.5)
HGB BLD-MCNC: 7.6 G/DL (ref 13.5–17.5)
HGB UR QL STRIP.AUTO: ABNORMAL
HYALINE CASTS #/AREA URNS AUTO: 10 /LPF (ref 0–8)
KAPPA LC FREE SER-MCNC: 175.51 MG/L (ref 3.3–19.4)
KAPPA LC FREE/LAMBDA FREE SER: 1.55 {RATIO} (ref 0.26–1.65)
KETONES UR STRIP.AUTO-MCNC: ABNORMAL MG/DL
LAMBDA LC FREE SERPL-MCNC: 113.1 MG/L (ref 5.71–26.3)
LEUKOCYTE ESTERASE UR QL STRIP.AUTO: ABNORMAL
LYMPHOCYTES # BLD: 0.8 K/UL (ref 1–5.1)
LYMPHOCYTES NFR BLD: 10.9 %
MAGNESIUM SERPL-MCNC: 1.8 MG/DL (ref 1.8–2.4)
MCH RBC QN AUTO: 31.9 PG (ref 26–34)
MCHC RBC AUTO-ENTMCNC: 33.3 G/DL (ref 31–36)
MCV RBC AUTO: 95.8 FL (ref 80–100)
MONOCYTES # BLD: 0.7 K/UL (ref 0–1.3)
MONOCYTES NFR BLD: 10.2 %
NEUTROPHILS # BLD: 5.4 K/UL (ref 1.7–7.7)
NEUTROPHILS NFR BLD: 76.3 %
NITRITE UR QL STRIP.AUTO: NEGATIVE
PERFORMED ON: ABNORMAL
PH UR STRIP.AUTO: 5 [PH] (ref 5–8)
PLATELET # BLD AUTO: 476 K/UL (ref 135–450)
PMV BLD AUTO: 6.2 FL (ref 5–10.5)
POTASSIUM SERPL-SCNC: 3.5 MMOL/L (ref 3.5–5.1)
PROT PATTERN UR ELPH-IMP: NORMAL
PROT SERPL-MCNC: 6.4 G/DL (ref 6.4–8.2)
PROT UR STRIP.AUTO-MCNC: NEGATIVE MG/DL
RBC # BLD AUTO: 2.39 M/UL (ref 4.2–5.9)
RBC CLUMPS #/AREA URNS AUTO: 17 /HPF (ref 0–4)
RPT COMMENT: ABNORMAL
SODIUM SERPL-SCNC: 137 MMOL/L (ref 136–145)
SP GR UR STRIP.AUTO: 1.01 (ref 1–1.03)
SPE/IFE INTERPRETATION: NORMAL
UA DIPSTICK W REFLEX MICRO PNL UR: YES
URN SPEC COLLECT METH UR: ABNORMAL
UROBILINOGEN UR STRIP-ACNC: 0.2 E.U./DL
WBC # BLD AUTO: 7.1 K/UL (ref 4–11)
WBC #/AREA URNS AUTO: 3 /HPF (ref 0–5)

## 2024-01-05 PROCEDURE — 36415 COLL VENOUS BLD VENIPUNCTURE: CPT

## 2024-01-05 PROCEDURE — 2700000000 HC OXYGEN THERAPY PER DAY

## 2024-01-05 PROCEDURE — 6370000000 HC RX 637 (ALT 250 FOR IP): Performed by: INTERNAL MEDICINE

## 2024-01-05 PROCEDURE — 6360000002 HC RX W HCPCS: Performed by: INTERNAL MEDICINE

## 2024-01-05 PROCEDURE — 85025 COMPLETE CBC W/AUTO DIFF WBC: CPT

## 2024-01-05 PROCEDURE — 2060000000 HC ICU INTERMEDIATE R&B

## 2024-01-05 PROCEDURE — C9113 INJ PANTOPRAZOLE SODIUM, VIA: HCPCS | Performed by: INTERNAL MEDICINE

## 2024-01-05 PROCEDURE — 99233 SBSQ HOSP IP/OBS HIGH 50: CPT | Performed by: NURSE PRACTITIONER

## 2024-01-05 PROCEDURE — 92526 ORAL FUNCTION THERAPY: CPT

## 2024-01-05 PROCEDURE — 2580000003 HC RX 258: Performed by: INTERNAL MEDICINE

## 2024-01-05 PROCEDURE — 83735 ASSAY OF MAGNESIUM: CPT

## 2024-01-05 PROCEDURE — 81001 URINALYSIS AUTO W/SCOPE: CPT

## 2024-01-05 PROCEDURE — 97530 THERAPEUTIC ACTIVITIES: CPT

## 2024-01-05 PROCEDURE — 51702 INSERT TEMP BLADDER CATH: CPT

## 2024-01-05 PROCEDURE — 80048 BASIC METABOLIC PNL TOTAL CA: CPT

## 2024-01-05 PROCEDURE — 94640 AIRWAY INHALATION TREATMENT: CPT

## 2024-01-05 PROCEDURE — 94761 N-INVAS EAR/PLS OXIMETRY MLT: CPT

## 2024-01-05 PROCEDURE — 6370000000 HC RX 637 (ALT 250 FOR IP): Performed by: NURSE PRACTITIONER

## 2024-01-05 PROCEDURE — 97535 SELF CARE MNGMENT TRAINING: CPT

## 2024-01-05 RX ORDER — MIDODRINE HYDROCHLORIDE 5 MG/1
5 TABLET ORAL 3 TIMES DAILY PRN
Status: DISCONTINUED | OUTPATIENT
Start: 2024-01-05 | End: 2024-01-15 | Stop reason: HOSPADM

## 2024-01-05 RX ORDER — OXYCODONE HYDROCHLORIDE AND ACETAMINOPHEN 5; 325 MG/1; MG/1
1 TABLET ORAL EVERY 6 HOURS PRN
Status: DISCONTINUED | OUTPATIENT
Start: 2024-01-05 | End: 2024-01-15 | Stop reason: HOSPADM

## 2024-01-05 RX ADMIN — Medication 1 CAPSULE: at 17:16

## 2024-01-05 RX ADMIN — ALBUTEROL SULFATE 2.5 MG: 2.5 SOLUTION RESPIRATORY (INHALATION) at 11:37

## 2024-01-05 RX ADMIN — ALLOPURINOL 200 MG: 100 TABLET ORAL at 09:16

## 2024-01-05 RX ADMIN — Medication 10 ML: at 09:19

## 2024-01-05 RX ADMIN — FUROSEMIDE 40 MG: 10 INJECTION, SOLUTION INTRAMUSCULAR; INTRAVENOUS at 20:46

## 2024-01-05 RX ADMIN — ARFORMOTEROL TARTRATE 15 MCG: 15 SOLUTION RESPIRATORY (INHALATION) at 21:24

## 2024-01-05 RX ADMIN — BUDESONIDE INHALATION 500 MCG: 0.5 SUSPENSION RESPIRATORY (INHALATION) at 07:56

## 2024-01-05 RX ADMIN — OXYCODONE AND ACETAMINOPHEN 1 TABLET: 5; 325 TABLET ORAL at 20:46

## 2024-01-05 RX ADMIN — CEFAZOLIN 2000 MG: 2 INJECTION, POWDER, FOR SOLUTION INTRAMUSCULAR; INTRAVENOUS at 05:19

## 2024-01-05 RX ADMIN — INSULIN LISPRO 5 UNITS: 100 INJECTION, SOLUTION INTRAVENOUS; SUBCUTANEOUS at 17:17

## 2024-01-05 RX ADMIN — MELATONIN TAB 3 MG 3 MG: 3 TAB at 20:46

## 2024-01-05 RX ADMIN — INSULIN LISPRO 5 UNITS: 100 INJECTION, SOLUTION INTRAVENOUS; SUBCUTANEOUS at 12:35

## 2024-01-05 RX ADMIN — SIMETHICONE 80 MG: 80 TABLET, CHEWABLE ORAL at 12:41

## 2024-01-05 RX ADMIN — CEFAZOLIN 2000 MG: 2 INJECTION, POWDER, FOR SOLUTION INTRAMUSCULAR; INTRAVENOUS at 12:37

## 2024-01-05 RX ADMIN — SODIUM CHLORIDE, PRESERVATIVE FREE 10 ML: 5 INJECTION INTRAVENOUS at 09:19

## 2024-01-05 RX ADMIN — METOPROLOL TARTRATE 12.5 MG: 25 TABLET, FILM COATED ORAL at 20:44

## 2024-01-05 RX ADMIN — FUROSEMIDE 40 MG: 10 INJECTION, SOLUTION INTRAMUSCULAR; INTRAVENOUS at 09:17

## 2024-01-05 RX ADMIN — SODIUM CHLORIDE, PRESERVATIVE FREE 10 ML: 5 INJECTION INTRAVENOUS at 20:46

## 2024-01-05 RX ADMIN — DIAZEPAM 2 MG: 2 TABLET ORAL at 20:46

## 2024-01-05 RX ADMIN — ALBUTEROL SULFATE 2.5 MG: 2.5 SOLUTION RESPIRATORY (INHALATION) at 16:01

## 2024-01-05 RX ADMIN — CEFAZOLIN 2000 MG: 2 INJECTION, POWDER, FOR SOLUTION INTRAMUSCULAR; INTRAVENOUS at 21:08

## 2024-01-05 RX ADMIN — FUROSEMIDE 40 MG: 10 INJECTION, SOLUTION INTRAMUSCULAR; INTRAVENOUS at 12:36

## 2024-01-05 RX ADMIN — BUDESONIDE INHALATION 500 MCG: 0.5 SUSPENSION RESPIRATORY (INHALATION) at 21:24

## 2024-01-05 RX ADMIN — ARFORMOTEROL TARTRATE 15 MCG: 15 SOLUTION RESPIRATORY (INHALATION) at 07:56

## 2024-01-05 RX ADMIN — Medication 10 ML: at 21:10

## 2024-01-05 RX ADMIN — PANTOPRAZOLE SODIUM 40 MG: 40 INJECTION, POWDER, FOR SOLUTION INTRAVENOUS at 09:17

## 2024-01-05 RX ADMIN — ALBUTEROL SULFATE 2.5 MG: 2.5 SOLUTION RESPIRATORY (INHALATION) at 07:55

## 2024-01-05 RX ADMIN — THIAMINE HCL TAB 100 MG 100 MG: 100 TAB at 09:16

## 2024-01-05 RX ADMIN — MIDODRINE HYDROCHLORIDE 5 MG: 5 TABLET ORAL at 12:41

## 2024-01-05 RX ADMIN — Medication 1 CAPSULE: at 09:16

## 2024-01-05 RX ADMIN — OXYCODONE AND ACETAMINOPHEN 1 TABLET: 5; 325 TABLET ORAL at 10:14

## 2024-01-05 RX ADMIN — ALBUTEROL SULFATE 2.5 MG: 2.5 SOLUTION RESPIRATORY (INHALATION) at 21:24

## 2024-01-05 RX ADMIN — INSULIN LISPRO 5 UNITS: 100 INJECTION, SOLUTION INTRAVENOUS; SUBCUTANEOUS at 09:19

## 2024-01-05 RX ADMIN — TIOTROPIUM BROMIDE INHALATION SPRAY 2 PUFF: 3.12 SPRAY, METERED RESPIRATORY (INHALATION) at 07:57

## 2024-01-05 RX ADMIN — PANTOPRAZOLE SODIUM 40 MG: 40 INJECTION, POWDER, FOR SOLUTION INTRAVENOUS at 20:46

## 2024-01-05 RX ADMIN — OXYCODONE AND ACETAMINOPHEN 1 TABLET: 5; 325 TABLET ORAL at 14:54

## 2024-01-05 RX ADMIN — SIMETHICONE 80 MG: 80 TABLET, CHEWABLE ORAL at 01:08

## 2024-01-05 RX ADMIN — STANDARDIZED SENNA CONCENTRATE AND DOCUSATE SODIUM 2 TABLET: 8.6; 5 TABLET ORAL at 09:16

## 2024-01-05 ASSESSMENT — PAIN SCALES - GENERAL
PAINLEVEL_OUTOF10: 6
PAINLEVEL_OUTOF10: 5
PAINLEVEL_OUTOF10: 4
PAINLEVEL_OUTOF10: 3
PAINLEVEL_OUTOF10: 8
PAINLEVEL_OUTOF10: 6
PAINLEVEL_OUTOF10: 5
PAINLEVEL_OUTOF10: 5
PAINLEVEL_OUTOF10: 6

## 2024-01-05 ASSESSMENT — PAIN DESCRIPTION - LOCATION
LOCATION: LEG;BACK
LOCATION: LEG;KNEE
LOCATION: LEG

## 2024-01-05 ASSESSMENT — PAIN DESCRIPTION - ORIENTATION
ORIENTATION: LEFT;RIGHT
ORIENTATION: LEFT;RIGHT

## 2024-01-05 ASSESSMENT — PAIN DESCRIPTION - DESCRIPTORS: DESCRIPTORS: CRAMPING;TIGHTNESS

## 2024-01-05 NOTE — PROGRESS NOTES
[facility] - Inpatient Rehabilitation Department   Phone: [phone]    Occupational Therapy    [] Initial Evaluation            [x] Daily Treatment Note         [] Discharge Summary      Patient: Ishmael Gr   : 1945   MRN: [id number]   Date of Service:  2024    Admitting Diagnosis:  ABRAHAM (acute kidney injury) (HCC)  Current Admission Summary:   Ishmael Gr is a 78 y.o. male who presents to the ED for evaluation for fall.  Patient was just discharged from the hospital after being brought in for respiratory issues and a fall at that time.  States today he fell and hit his head.  Does not fully remember it.  He was on his way to the bathroom when it happened.  States the only pain he is feeling is in his low back and going down his right leg.  Denies any new numbness or tingling or weakness.  Does have significant respiratory issues and feels short of breath initially with it but states he feels back to normal at this time.  He is on 6 L normally.   Past Medical History:  has a past medical history of Anemia, COPD (chronic obstructive pulmonary disease) ([facility]), Diabetes mellitus (HCC), Edema, GI (gastrointestinal bleed), Gout, Hypertension, Morbid obesity (HCC), Neuropathy, Obstructive sleep apnea, and Peripheral vascular disease (HCC).  Past Surgical History:  has a past surgical history that includes Appendectomy; Total hip arthroplasty; Colonoscopy (N/A, 2019); and Upper gastrointestinal endoscopy (N/A, 2023).    Discharge Recommendations: Ishmael Gr scored a 13/24 on the AM-PAC ADL Inpatient form. Current research shows that an AM-PAC score of 17 or less is typically not associated with a discharge to the patient's home setting. Based on the patient's AM-PAC score and their current ADL deficits, it is recommended that the patient have 3-5 sessions per week of Occupational Therapy at d/c to increase the patient's independence.  Please see assessment section for further    Pain: 0/10, patient with knee pain with stand to sit transfer did not rate pain level   Pain Interventions: not applicable         Activities of Daily Living  Basic Activities of Daily Living  Lower Extremity Dressing: dependent requires verbal cueing Increased time to complete task  Dressing Equipment: none  Dressing Comments: dependent for donning socks, patient with socks cut and taped to ace wrap due to unable to fully fit due to BLE feet size and increased edema, patient with BLE ace wrappings replaced by PT, increased time needed for task completion   Toileting: dependent.    Toileting Equipment: none  Toileting Comments:  catheter in place  General Comments: Patient declined any further ADLs, patient with increased time needed for all functional ADLs.   Instrumental Activities of Daily Living  No IADL completed on this date.    Functional Mobility  Bed Mobility:  Supine to Sit: maximum assistance  Rolling Right: maximum assistance  Scooting: maximum assistance  Comments: Patient with increased time needed for bed mobility, patient with use of side rail  Transfers:  Sit to stand transfer:stedy utilized requiring modA of 2   Stand to sit transfer: stedy utilized requiring maxA of 2   Comments: Patient with increased time needed for functional transfers, patient with minimal  verbal cueing needed for hand placement, sequencing, initiation.  Patient with bed height elevation needed.  Patient with SPO2 at 88% following functional transfer and quickly recovered to 94% on 6L O2.  Patient completed 2 stands in the stedy  Functional Mobility  No functional mobility completed on this date secondary to not safe to attempt.    Other Therapeutic Interventions    Functional Outcomes  AM-PAC Inpatient Daily Activity Raw Score: 13    Cognition  WFL  Insights: decreased awareness of deficits  Orientation:    alert and oriented x 4  Command Following:   accurately follows one step commands     Education  Barriers To

## 2024-01-05 NOTE — PROGRESS NOTES
Nutrition Note    RECOMMENDATIONS  No new nutrition rec's @ this time.    NUTRITION ASSESSMENT   Pt remains on a CCC, cardiac diet with po intake greater than 75% of meals. Wt down 7 lb in a week, likely d/t -7L per I/O's.  Pt remains at low risk for nutrition compromise.     Nutrition Related Findings: Diarrhea 1/5; lytes WNL; gluc 124; +4 pitting edema BLE; nonpitting BUE; -7L  Wounds: Skin Tears (left arm)  Nutrition Education:  Education not indicated   Nutrition Goals: PO intake 50% or greater     MALNUTRITION ASSESSMENT      Malnutrition Status: No malnutrition    NUTRITION DIAGNOSIS   No nutrition diagnosis at this time     CURRENT NUTRITION THERAPIES  ADULT DIET; Regular; 5 carb choices (75 gm/meal); Low Fat/Low Chol/High Fiber/YVONNE     PO Intake: %   PO Supplement Intake:None Ordered    ANTHROPOMETRICS  Current Height: 182.9 cm (6')  Current Weight - Scale: (!) 148 kg (326 lb 4.5 oz)    Ideal Body Weight (IBW): 178 lbs  (81 kg)        BMI: 44.2    The patient will be monitored per nutrition standards of care. Consult dietitian if additional nutrition interventions are needed prior to RD reassessment.     Marjorie Oakley, MANSOOR, LD    Contact: [phone]

## 2024-01-05 NOTE — PROGRESS NOTES
[facility]  Inpatient Progress Note      IMPRESSION/RECOMMENDATIONS:    ABRAHAM-baseline creatinine 1.1.  Probably from prerenal azotemia in the setting of relative hypotension plus contrast exposure plus COPD exacerbation.  At risk for ATN.  Nonoliguric.  Blood pressure now acceptable.  Normal saline at 80 mL/h for 12 hours.  Vitamin C x 3 days.         Subjective / interval history / nephrology update / medical decision making:   Patient was seen comfortably sitting up in chair,   Reported no active complaints/distress,   Renal labs noted    No SOB  Stable on NC but still 6 L per    But leg edema -still present currently  Here Started w/ lasix 20 mg IV BID     Lasix - Keep Increased to 40 IV twice to 3x daily  -Slightly worse kidney function, but needs aggressive diuresis, will continue Lasix 3 times daily IV  - Add as needed midodrine as BP has been on the lower side  - No antihypertensive currently    Continue to work on anemia  Check for paraproteinemia  Iron stores -recently, not lower, could have likely underlying anemia of chronic disease-give RAJESH    Ordered daily labs, daily weight.    Voltaren listed in home meds-d/w pt to avoid      D/c plans - likely this weekend.  For fluid overload mx  Patient was lost for follow-up with nephrology  -: follow up w/ us at  [facility] OFFICE  in 2 weeks after d/c. (I updated our information in discharge follow up providers' list.)         Respiratory acidosis-management as per Medicine and Pulmonary.  History of COPD.  Elevated proBNP can be secondary to ABRAHAM.  Hypoalbuminemia-UA only showed trace protein  Hematuria-repeat UA.    renal ultrasound.  Anemia-macrocytic.    B12 and folate level.  Relative hypotension-better      Medical decision making- high complexity. Multiple complex health problems.   Discussed with patient and treatment team.  Nursing . Davey Farias MD   Thank you for allowing me to participate in this patient's

## 2024-01-05 NOTE — PROGRESS NOTES
[facility]    1/5/2024 10:19 AM        Name: Ishmael Gr .              Admitted: 12/20/2023  Primary Care Provider: Anthony Bravo MD (Tel: [phone])      In bed still having significant swelling in lower extremities denies any nausea vomiting does have some cramping in lower extremities  Current Medications  oxyCODONE-acetaminophen (PERCOCET) 5-325 MG per tablet 1 tablet, Q6H PRN  melatonin tablet 3 mg, Nightly PRN  diazePAM (VALIUM) tablet 2 mg, Q6H PRN  furosemide (LASIX) injection 40 mg, TID  sennosides-docusate sodium (SENOKOT-S) 8.6-50 MG tablet 2 tablet, Daily  pantoprazole (PROTONIX) injection 40 mg, BID  perflutren lipid microspheres (DEFINITY) injection 1.5 mL, ONCE PRN  ceFAZolin (ANCEF) 2,000 mg in sodium chloride 0.9 % 50 mL IVPB (mini-bag), Q8H  sodium chloride flush 0.9 % injection 5-40 mL, 2 times per day  sodium chloride flush 0.9 % injection 5-40 mL, PRN  0.9 % sodium chloride infusion, PRN  albuterol (PROVENTIL) (2.5 MG/3ML) 0.083% nebulizer solution 2.5 mg, 4x Daily RT  lactobacillus (CULTURELLE) capsule 1 capsule, BID WC  insulin lispro (HUMALOG) injection vial 5 Units, TID WC  insulin lispro (HUMALOG) injection vial 0-4 Units, Nightly  dextrose bolus 10% 125 mL, PRN   Or  dextrose bolus 10% 250 mL, PRN  glucagon injection 1 mg, PRN  dextrose 10 % infusion, Continuous PRN  insulin lispro (HUMALOG) injection vial 0-8 Units, TID WC  simethicone (MYLICON) chewable tablet 80 mg, Q6H PRN  arformoterol tartrate (BROVANA) nebulizer solution 15 mcg, BID RT  budesonide (PULMICORT) nebulizer suspension 500 mcg, BID RT  albuterol sulfate HFA (PROVENTIL;VENTOLIN;PROAIR) 108 (90 Base) MCG/ACT inhaler 2 puff, Q6H PRN  allopurinol (ZYLOPRIM) tablet 200 mg, Daily  [Held by provider] apixaban (ELIQUIS) tablet 5 mg, BID  [Held by provider] aspirin chewable tablet 81 mg, Daily  thiamine mononitrate

## 2024-01-05 NOTE — PROGRESS NOTES
Facility/Department: [address]  Speech Language Pathology   Dysphagia Treatment Note    Patient: Ishmael Gr   : 1945   MRN: [id number]      Evaluation Date: 2024      Admitting Dx: Right hip pain [M25.551]  ABRAHAM (acute kidney injury) (HCC) [N17.9]  Fall, initial encounter [W19.XXXA]  Acute right-sided low back pain with right-sided sciatica [M54.41]  Treatment Diagnosis: Oropharyngeal Dysphagia   Pain: Denies                                           Chest X-ray: 2023  IMPRESSION:  Left lower lobe infiltrate likely represents pneumonia.  Aspiration is  possible.    Diet and Treatment Recommendations 2024:  Diet Solids Recommendation:  Regular texture diet  Liquid Consistency Recommendation:  Thin liquids  Recommended form of Meds: Meds whole with water     Compensatory strategies:   Upright as possible with all PO intake, Small bites/sips, Alternate solids/liquids, Eat/feed slowly, Remain upright 30-45 min     Assessment of Texture Tolerance:  Diet level prior to treatment: Regular texture diet , Thin liquids   Tolerance of Current Diet Level: RN reports no noted difficulty with current diet level     Impressions: Pt was positioned upright in chair, alert and agreeable to participate in therapy session. Currently on 6L O2 via nasal cannula. Pt denies concerns with swallowing at this time. Trials of thin liquids, mixed consistencies (peaches), and regular solids were provided to assess swallow function. Oral phase characterized by adequate labial seal, functional mastication, and adequate oral clearance. Swallow subjectively assessed to be timely via laryngeal palpitation. No overt s/s of aspiration assessed across PO trials. SLP reviewed education re: safe swallow strategies, aspiration risk, and POC. Overall, pt demonstrates increased risk for aspiration due to co morbidities and respiratory status. Based on today's assessment, recommend continuation of regular texture diet with thin liquids

## 2024-01-05 NOTE — PLAN OF CARE
Problem: Safety - Adult  Goal: Free from fall injury  Outcome: Progressing     Problem: Pain  Goal: Verbalizes/displays adequate comfort level or baseline comfort level  Outcome: Progressing     Problem: Skin/Tissue Integrity  Goal: Absence of new skin breakdown  Description: 1.  Monitor for areas of redness and/or skin breakdown  2.  Assess vascular access sites hourly  3.  Every 4-6 hours minimum:  Change oxygen saturation probe site  4.  Every 4-6 hours:  If on nasal continuous positive airway pressure, respiratory therapy assess nares and determine need for appliance change or resting period.  Outcome: Progressing     Problem: Skin/Tissue Integrity - Adult  Goal: Skin integrity remains intact  Outcome: Progressing     Problem: Chronic Conditions and Co-morbidities  Goal: Patient's chronic conditions and co-morbidity symptoms are monitored and maintained or improved  Outcome: Progressing  IV lasix TID, Nephrology and cardiology consult. Albarado catheter for strict I&Os.

## 2024-01-05 NOTE — PROGRESS NOTES
[facility] - Inpatient Rehabilitation Department   Phone: [phone]    Physical Therapy                [x] Daily Treatment Note         [] Discharge Summary      Patient: Ishmael Gr   : 1945   MRN: [id number]   Date of Service:  2024  Admitting Diagnosis: ABRAHAM (acute kidney injury) (HCC)    Current Admission Summary: The pt was admitted s/p a fall.  He has severe back and R hip pain.  He discharged home from the hospital a few days before he was readmitted.     Updated 24: acute GI bleed, s/p 3 units PRBC, EGD (multiple antral erosions that could have bled if patient is on blood thinners. Gastric biopsies to check for H. Pylori); ABRAHAM; acute on chronic hypoxic and hypercapnic respiratory failure secondary to COPD exacerbation    Past Medical History:  has a past medical history of Anemia, COPD (chronic obstructive pulmonary disease) ([facility]), Diabetes mellitus (HCC), Edema, GI (gastrointestinal bleed), Gout, Hypertension, Morbid obesity (HCC), Neuropathy, Obstructive sleep apnea, and Peripheral vascular disease ([facility]).  Past Surgical History:  has a past surgical history that includes Appendectomy; Total hip arthroplasty; Colonoscopy (N/A, 2019); and Upper gastrointestinal endoscopy (N/A, 2023).    Discharge Recommendations: Ishmael Gr scored a 8 on the AM-PAC short mobility form. Current research shows that an AM-PAC score of 17 or less is typically not associated with a discharge to the patient's home setting. Based on the patient's AM-PAC score and their current functional mobility deficits, it is recommended that the patient have 3-5 sessions per week of Physical Therapy at d/c to increase the patient's independence.  Please see assessment section for further patient specific details.  If patient discharges prior to next session this note will serve as a discharge summary.  Please see below for the latest assessment towards goals.     DME Required For Discharge: DME to  be determined at next level of care - plan to continue to assess pending progress  Precautions/Restrictions: high fall risk, up with assistance, adult diet - regular, 5 carb choices, low fat/low chol/high fiber, YVONNE, 6LO2 at baseline  Weight Bearing Restrictions: no restrictions      Pre-Admission Information from Initial Evaluation  Lives With: daughter 52 Who is an RN at [employer] working full-time And granddaughter 23,  reports family members are at home intermittently  Type of Home: house  Home Layout: two level, bedroom/bathroom on main level  Home Access:  2 step to enter with handrail.  Handrails are located on R side.  Bathroom Layout: walk in shower  Bathroom Equipment: grab bars in shower, shower chair, hand held shower head  Toilet Height: elevated height  Home Equipment: rolling walker, single point cane, reacher, sock aide, oxygen--sleeps in recliner  Transfer Assistance:  mod I with RW   Ambulation Assistance: mod I with RW within his home, modified independent with use of SPC vs. RW when going out. Pt reports difficulty with advancing (R) LE during mobility tasks.   ADL Assistance: independent with all ADL's  IADL Assistance: independent with homemaking tasks, requires assistance with laundry from daughter (laundry in basement)   Active :        [] Yes                 [x] No--daughter/granddaughter drive  Hand Dominance: [] Left                 [x] Right  Current Employment: retired.  Occupation: , welding   Hobbies: genealogy, history    Recent Falls: 3 falls. Tripping over the oxygen line     Examination   Vision:   Vision Corrective Device: wears glasses for reading  Hearing:   hard of hearing  Observation:   General Observation:  Patient on 6 L/min of O2 at rest; edematous B LE and B UE, grade 3 pitting on dorsum of feet.  Posture:   Forward head, rounded shoulders  Sensation:   reports numbness and tingling in (B) UE, (B) LE -due to neuropathy        Subjective  General: Patient supine

## 2024-01-05 NOTE — PROGRESS NOTES
[facility]   Cardiology Progress Note     Date: 1/5/2024  Admit Date: 12/20/2023     Reason for consultation:     Chief Complaint   Patient presents with    Fall     Pt arrived to ED via [facility] ems from home after a fall. Per pt he was getting up to go to the bathroom and fell. Pt has knot above left eye and skin tear to left forearm. Pt on blood thinners. Per pt he is on 6L of oxygen at all times. Pt A&Ox4       History of Present Illness: History obtained from patient and medical record.     Ishmael Gr is a 78 y.o. male presented to the hospital with complaints of fall. I have been asked to provide consultation regarding further management and testing. The patient reports that he can walk at baseline, but cannot walk up a flight of stairs or 4 city blocks. He denies exertional chest pain or pressure. No nausea or vomiting. He states that he cannot tell when he is in atrial fibrillation. He reports that he is on his baseline 6L of oxygen. He states that his legs have been more edematous. He denies any palpitations, nausea or vomiting. He was found to have anemia and cardiology was consulted to stop the blood thinner.    (per consult note)    Interval Hx: Today, he is feeling ok. Starting to feel a little better. Extremity edema improving. Remains on baseline oxygen. Tele with tachycardia      Patient seen and examined. Clinical notes reviewed. Telemetry reviewed.  No new complaints today. No major events overnight.   Denies having chest pain, palpitations, shortness of breath, orthopnea/PND, cough, or dizziness at the time of this visit.      Past Medical History:  Past Medical History:   Diagnosis Date    Anemia     COPD (chronic obstructive pulmonary disease) (HCC)     Diabetes mellitus (HCC)     Edema     GI (gastrointestinal bleed)     Gout     Hypertension     Morbid obesity (HCC)     Neuropathy     Obstructive sleep apnea     uses CPAP    Peripheral vascular disease (HCC)         Past

## 2024-01-05 NOTE — CARE COORDINATION
Discharge Planning;  YENNIFER spoke with Simone ( [phone]) with [facility] admissions who confirmed that the SNF precert was restarted on 1/4/24.

## 2024-01-06 LAB
ANION GAP SERPL CALCULATED.3IONS-SCNC: 8 MMOL/L (ref 3–16)
BACTERIA URNS QL MICRO: ABNORMAL /HPF
BASOPHILS # BLD: 0 K/UL (ref 0–0.2)
BASOPHILS NFR BLD: 0.7 %
BILIRUB UR QL STRIP.AUTO: NEGATIVE
BUN SERPL-MCNC: 30 MG/DL (ref 7–20)
CALCIUM SERPL-MCNC: 8.9 MG/DL (ref 8.3–10.6)
CHLORIDE SERPL-SCNC: 94 MMOL/L (ref 99–110)
CLARITY UR: ABNORMAL
CO2 SERPL-SCNC: 36 MMOL/L (ref 21–32)
COLOR UR: YELLOW
CREAT SERPL-MCNC: 2 MG/DL (ref 0.8–1.3)
DEPRECATED RDW RBC AUTO: 17.1 % (ref 12.4–15.4)
EOSINOPHIL # BLD: 0.1 K/UL (ref 0–0.6)
EOSINOPHIL NFR BLD: 2.2 %
EPI CELLS #/AREA URNS AUTO: 3 /HPF (ref 0–5)
GFR SERPLBLD CREATININE-BSD FMLA CKD-EPI: 34 ML/MIN/{1.73_M2}
GLUCOSE BLD-MCNC: 121 MG/DL (ref 70–99)
GLUCOSE BLD-MCNC: 124 MG/DL (ref 70–99)
GLUCOSE BLD-MCNC: 135 MG/DL (ref 70–99)
GLUCOSE BLD-MCNC: 140 MG/DL (ref 70–99)
GLUCOSE SERPL-MCNC: 120 MG/DL (ref 70–99)
GLUCOSE UR STRIP.AUTO-MCNC: NEGATIVE MG/DL
HCT VFR BLD AUTO: 22.1 % (ref 40.5–52.5)
HGB BLD-MCNC: 7.4 G/DL (ref 13.5–17.5)
HGB UR QL STRIP.AUTO: ABNORMAL
HYALINE CASTS #/AREA URNS AUTO: 52 /LPF (ref 0–8)
KETONES UR STRIP.AUTO-MCNC: NEGATIVE MG/DL
LEUKOCYTE ESTERASE UR QL STRIP.AUTO: ABNORMAL
LYMPHOCYTES # BLD: 0.7 K/UL (ref 1–5.1)
LYMPHOCYTES NFR BLD: 12.6 %
MCH RBC QN AUTO: 32.1 PG (ref 26–34)
MCHC RBC AUTO-ENTMCNC: 33.2 G/DL (ref 31–36)
MCV RBC AUTO: 96.5 FL (ref 80–100)
MONOCYTES # BLD: 0.6 K/UL (ref 0–1.3)
MONOCYTES NFR BLD: 10.2 %
NEUTROPHILS # BLD: 4.2 K/UL (ref 1.7–7.7)
NEUTROPHILS NFR BLD: 74.3 %
NITRITE UR QL STRIP.AUTO: NEGATIVE
PERFORMED ON: ABNORMAL
PH UR STRIP.AUTO: 5 [PH] (ref 5–8)
PLATELET # BLD AUTO: 431 K/UL (ref 135–450)
PMV BLD AUTO: 6 FL (ref 5–10.5)
POTASSIUM SERPL-SCNC: 3.7 MMOL/L (ref 3.5–5.1)
PROT UR STRIP.AUTO-MCNC: ABNORMAL MG/DL
RBC # BLD AUTO: 2.29 M/UL (ref 4.2–5.9)
RBC CLUMPS #/AREA URNS AUTO: 187 /HPF (ref 0–4)
SODIUM SERPL-SCNC: 138 MMOL/L (ref 136–145)
SP GR UR STRIP.AUTO: 1.01 (ref 1–1.03)
UA DIPSTICK W REFLEX MICRO PNL UR: YES
URN SPEC COLLECT METH UR: ABNORMAL
UROBILINOGEN UR STRIP-ACNC: 0.2 E.U./DL
WBC # BLD AUTO: 5.6 K/UL (ref 4–11)
WBC #/AREA URNS AUTO: 35 /HPF (ref 0–5)

## 2024-01-06 PROCEDURE — 2580000003 HC RX 258: Performed by: INTERNAL MEDICINE

## 2024-01-06 PROCEDURE — 6370000000 HC RX 637 (ALT 250 FOR IP): Performed by: INTERNAL MEDICINE

## 2024-01-06 PROCEDURE — 2700000000 HC OXYGEN THERAPY PER DAY

## 2024-01-06 PROCEDURE — 94640 AIRWAY INHALATION TREATMENT: CPT

## 2024-01-06 PROCEDURE — 94761 N-INVAS EAR/PLS OXIMETRY MLT: CPT

## 2024-01-06 PROCEDURE — 84156 ASSAY OF PROTEIN URINE: CPT

## 2024-01-06 PROCEDURE — 6360000002 HC RX W HCPCS: Performed by: INTERNAL MEDICINE

## 2024-01-06 PROCEDURE — 6370000000 HC RX 637 (ALT 250 FOR IP): Performed by: NURSE PRACTITIONER

## 2024-01-06 PROCEDURE — P9047 ALBUMIN (HUMAN), 25%, 50ML: HCPCS | Performed by: INTERNAL MEDICINE

## 2024-01-06 PROCEDURE — 36415 COLL VENOUS BLD VENIPUNCTURE: CPT

## 2024-01-06 PROCEDURE — 2060000000 HC ICU INTERMEDIATE R&B

## 2024-01-06 PROCEDURE — 99232 SBSQ HOSP IP/OBS MODERATE 35: CPT | Performed by: NURSE PRACTITIONER

## 2024-01-06 PROCEDURE — 80048 BASIC METABOLIC PNL TOTAL CA: CPT

## 2024-01-06 PROCEDURE — 81001 URINALYSIS AUTO W/SCOPE: CPT

## 2024-01-06 PROCEDURE — C9113 INJ PANTOPRAZOLE SODIUM, VIA: HCPCS | Performed by: INTERNAL MEDICINE

## 2024-01-06 PROCEDURE — 85025 COMPLETE CBC W/AUTO DIFF WBC: CPT

## 2024-01-06 RX ORDER — ALBUMIN (HUMAN) 12.5 G/50ML
25 SOLUTION INTRAVENOUS EVERY 8 HOURS
Status: COMPLETED | OUTPATIENT
Start: 2024-01-06 | End: 2024-01-08

## 2024-01-06 RX ADMIN — ALBUMIN HUMAN 25 G: 0.25 SOLUTION INTRAVENOUS at 12:30

## 2024-01-06 RX ADMIN — ARFORMOTEROL TARTRATE 15 MCG: 15 SOLUTION RESPIRATORY (INHALATION) at 12:10

## 2024-01-06 RX ADMIN — FUROSEMIDE 40 MG: 10 INJECTION, SOLUTION INTRAMUSCULAR; INTRAVENOUS at 09:03

## 2024-01-06 RX ADMIN — SODIUM CHLORIDE 25 ML: 9 INJECTION, SOLUTION INTRAVENOUS at 22:57

## 2024-01-06 RX ADMIN — CEFAZOLIN 2000 MG: 2 INJECTION, POWDER, FOR SOLUTION INTRAMUSCULAR; INTRAVENOUS at 14:04

## 2024-01-06 RX ADMIN — BUDESONIDE INHALATION 500 MCG: 0.5 SUSPENSION RESPIRATORY (INHALATION) at 08:14

## 2024-01-06 RX ADMIN — PANTOPRAZOLE SODIUM 40 MG: 40 INJECTION, POWDER, FOR SOLUTION INTRAVENOUS at 22:28

## 2024-01-06 RX ADMIN — THIAMINE HCL TAB 100 MG 100 MG: 100 TAB at 09:03

## 2024-01-06 RX ADMIN — SODIUM CHLORIDE, PRESERVATIVE FREE 10 ML: 5 INJECTION INTRAVENOUS at 22:28

## 2024-01-06 RX ADMIN — ALLOPURINOL 200 MG: 100 TABLET ORAL at 09:03

## 2024-01-06 RX ADMIN — SIMETHICONE 80 MG: 80 TABLET, CHEWABLE ORAL at 12:38

## 2024-01-06 RX ADMIN — METOPROLOL TARTRATE 12.5 MG: 25 TABLET, FILM COATED ORAL at 22:28

## 2024-01-06 RX ADMIN — PANTOPRAZOLE SODIUM 40 MG: 40 INJECTION, POWDER, FOR SOLUTION INTRAVENOUS at 09:03

## 2024-01-06 RX ADMIN — CEFAZOLIN 2000 MG: 2 INJECTION, POWDER, FOR SOLUTION INTRAMUSCULAR; INTRAVENOUS at 22:43

## 2024-01-06 RX ADMIN — MIDODRINE HYDROCHLORIDE 5 MG: 5 TABLET ORAL at 22:28

## 2024-01-06 RX ADMIN — BUDESONIDE INHALATION 500 MCG: 0.5 SUSPENSION RESPIRATORY (INHALATION) at 21:23

## 2024-01-06 RX ADMIN — OXYCODONE AND ACETAMINOPHEN 1 TABLET: 5; 325 TABLET ORAL at 04:36

## 2024-01-06 RX ADMIN — ARFORMOTEROL TARTRATE 15 MCG: 15 SOLUTION RESPIRATORY (INHALATION) at 21:23

## 2024-01-06 RX ADMIN — TIOTROPIUM BROMIDE INHALATION SPRAY 2 PUFF: 3.12 SPRAY, METERED RESPIRATORY (INHALATION) at 08:14

## 2024-01-06 RX ADMIN — INSULIN LISPRO 5 UNITS: 100 INJECTION, SOLUTION INTRAVENOUS; SUBCUTANEOUS at 18:00

## 2024-01-06 RX ADMIN — METOPROLOL TARTRATE 12.5 MG: 25 TABLET, FILM COATED ORAL at 09:03

## 2024-01-06 RX ADMIN — INSULIN LISPRO 5 UNITS: 100 INJECTION, SOLUTION INTRAVENOUS; SUBCUTANEOUS at 12:30

## 2024-01-06 RX ADMIN — Medication 1 CAPSULE: at 18:01

## 2024-01-06 RX ADMIN — ALBUTEROL SULFATE 2.5 MG: 2.5 SOLUTION RESPIRATORY (INHALATION) at 21:23

## 2024-01-06 RX ADMIN — Medication 1 CAPSULE: at 09:03

## 2024-01-06 RX ADMIN — INSULIN LISPRO 5 UNITS: 100 INJECTION, SOLUTION INTRAVENOUS; SUBCUTANEOUS at 09:04

## 2024-01-06 RX ADMIN — OXYCODONE AND ACETAMINOPHEN 1 TABLET: 5; 325 TABLET ORAL at 23:33

## 2024-01-06 RX ADMIN — SODIUM CHLORIDE, PRESERVATIVE FREE 10 ML: 5 INJECTION INTRAVENOUS at 09:04

## 2024-01-06 RX ADMIN — Medication 10 ML: at 22:29

## 2024-01-06 RX ADMIN — ALBUTEROL SULFATE 2.5 MG: 2.5 SOLUTION RESPIRATORY (INHALATION) at 08:13

## 2024-01-06 RX ADMIN — ALBUTEROL SULFATE 2.5 MG: 2.5 SOLUTION RESPIRATORY (INHALATION) at 12:10

## 2024-01-06 RX ADMIN — STANDARDIZED SENNA CONCENTRATE AND DOCUSATE SODIUM 2 TABLET: 8.6; 5 TABLET ORAL at 09:03

## 2024-01-06 RX ADMIN — FUROSEMIDE 10 MG/HR: 10 INJECTION, SOLUTION INTRAMUSCULAR; INTRAVENOUS at 16:00

## 2024-01-06 RX ADMIN — SODIUM CHLORIDE 25 ML: 9 INJECTION, SOLUTION INTRAVENOUS at 22:42

## 2024-01-06 RX ADMIN — CEFAZOLIN 2000 MG: 2 INJECTION, POWDER, FOR SOLUTION INTRAMUSCULAR; INTRAVENOUS at 04:38

## 2024-01-06 RX ADMIN — ALBUTEROL SULFATE 2.5 MG: 2.5 SOLUTION RESPIRATORY (INHALATION) at 15:33

## 2024-01-06 RX ADMIN — ALBUMIN HUMAN 25 G: 0.25 SOLUTION INTRAVENOUS at 22:58

## 2024-01-06 ASSESSMENT — PAIN SCALES - GENERAL
PAINLEVEL_OUTOF10: 3
PAINLEVEL_OUTOF10: 6
PAINLEVEL_OUTOF10: 6
PAINLEVEL_OUTOF10: 0
PAINLEVEL_OUTOF10: 0

## 2024-01-06 ASSESSMENT — PAIN DESCRIPTION - DESCRIPTORS: DESCRIPTORS: ACHING

## 2024-01-06 ASSESSMENT — PAIN DESCRIPTION - LOCATION: LOCATION: BACK

## 2024-01-06 ASSESSMENT — PAIN DESCRIPTION - ORIENTATION: ORIENTATION: LOWER

## 2024-01-06 NOTE — PROGRESS NOTES
[facility]    1/6/2024 12:32 PM        Name: Ishmael Gr .              Admitted: 12/20/2023  Primary Care Provider: Anthony Bravo MD (Tel: [phone])      Still with significant swelling no nausea vomiting no chest pain eyes any black or bloody stools  Current Medications  furosemide (LASIX) 100 mg in sodium chloride 0.9 % 100 mL infusion, Continuous  albumin human 25% IV solution 25 g, Q8H  oxyCODONE-acetaminophen (PERCOCET) 5-325 MG per tablet 1 tablet, Q6H PRN  midodrine (PROAMATINE) tablet 5 mg, TID PRN  metoprolol tartrate (LOPRESSOR) tablet 12.5 mg, BID  melatonin tablet 3 mg, Nightly PRN  diazePAM (VALIUM) tablet 2 mg, Q6H PRN  sennosides-docusate sodium (SENOKOT-S) 8.6-50 MG tablet 2 tablet, Daily  pantoprazole (PROTONIX) injection 40 mg, BID  perflutren lipid microspheres (DEFINITY) injection 1.5 mL, ONCE PRN  ceFAZolin (ANCEF) 2,000 mg in sodium chloride 0.9 % 50 mL IVPB (mini-bag), Q8H  sodium chloride flush 0.9 % injection 5-40 mL, 2 times per day  sodium chloride flush 0.9 % injection 5-40 mL, PRN  0.9 % sodium chloride infusion, PRN  albuterol (PROVENTIL) (2.5 MG/3ML) 0.083% nebulizer solution 2.5 mg, 4x Daily RT  lactobacillus (CULTURELLE) capsule 1 capsule, BID WC  insulin lispro (HUMALOG) injection vial 5 Units, TID WC  insulin lispro (HUMALOG) injection vial 0-4 Units, Nightly  dextrose bolus 10% 125 mL, PRN   Or  dextrose bolus 10% 250 mL, PRN  glucagon injection 1 mg, PRN  dextrose 10 % infusion, Continuous PRN  insulin lispro (HUMALOG) injection vial 0-8 Units, TID WC  simethicone (MYLICON) chewable tablet 80 mg, Q6H PRN  arformoterol tartrate (BROVANA) nebulizer solution 15 mcg, BID RT  budesonide (PULMICORT) nebulizer suspension 500 mcg, BID RT  albuterol sulfate HFA (PROVENTIL;VENTOLIN;PROAIR) 108 (90 Base) MCG/ACT inhaler 2 puff, Q6H PRN  allopurinol (ZYLOPRIM) tablet 200 mg,  lower lobe infiltrate likely represents pneumonia.  Aspiration is   possible.         US RENAL COMPLETE   Final Result   No hydronephrosis or nephrolithiasis identified sonographically.      Increased renal parenchymal echogenicity bilaterally suggesting medical renal   disease.         CT PELVIS WO CONTRAST Additional Contrast? None   Final Result   Bilateral hip prostheses.      No periprosthetic fracture.         CT LUMBAR SPINE WO CONTRAST   Final Result   Levoconvex scoliosis with moderate to advanced degenerative disc disease and   facet arthropathy.      No fracture.         CT Head W/O Contrast   Final Result   No acute intracranial abnormality.         XR CHEST PORTABLE   Final Result   1. Improved aeration.             Recent imaging reviewed    Problem List  Principal Problem:    ABRAAHM (acute kidney injury) ([facility])  Active Problems:    Encephalomalacia    Essential hypertension    Diabetes mellitus type 2 in obese ([facility])    Morbid obesity due to excess calories ([facility])    Diabetes education, encounter for    PVD (peripheral vascular disease) ([facility])    Chronic obstructive pulmonary disease ([facility])    Fall    Acute right-sided low back pain with right-sided sciatica    Right hip pain    Fall at home    Scoliosis of lumbar spine    Pain due to hip joint prosthesis, initial encounter ([facility])    Aspiration pneumonia ([facility])    Bacteremia due to methicillin susceptible Staphylococcus aureus (MSSA)    Receiving intravenous antibiotic treatment as outpatient    Paroxysmal atrial fibrillation ([facility])    Gastrointestinal hemorrhage with melena    Anemia    Stage 3 chronic kidney disease ([facility])  Resolved Problems:    * No resolved hospital problems. *       Assessment & Plan:   Acute gi bleed  S/p 3units prbcs  EGD with  Patient has multiple antral erosions that could have bled if patient is on blood thinners.  Gastric biopsies to check for H. pylori.  - ppi  bid x8 weeks then dailyand hold ac for atleast 1 week no nosaid  - hgb

## 2024-01-06 NOTE — PROGRESS NOTES
[facility]  Inpatient Progress Note      IMPRESSION/RECOMMENDATIONS:    ABRAHAM-baseline creatinine 1.1.  Probably from prerenal azotemia in the setting of relative hypotension plus contrast exposure plus COPD exacerbation.  At risk for ATN.  Nonoliguric. -4.7L since admission.  Still hypervolemic.   Crea 2 from 1.9.       Subjective    Patient was seen comfortably sitting up in chair,   B/L leg edema, bandage noted    No SOB  Stable on NC but still 6 L, similar dose as at home    But leg edema -still present currently  Switch to Lasix 10mg/hr.  Albumin 25g TID.  Use Midodrine PRN    Follow Hgb  Check for paraproteinemia.  UA neg protein.  Has moderate blood with 17rbc.  Albumin 2.2.  SPEP neg.  Elevated kappa and lambda but normal ratio.    Check 24H urine protein.  May need renal biopsy.    Iron stores -recently, not lower, could have likely underlying anemia of chronic disease-given RAJESH 1/3/24    Voltaren listed in home meds-d/w pt to avoid    D/c plans - continue inpatient stay  Patient was lost for follow-up with nephrology  -: follow up w/ us at  [facility]  in 2 weeks after d/c. (I updated our information in discharge follow up providers' list.)         Respiratory acidosis-management as per Medicine and Pulmonary.  History of COPD.  Elevated proBNP can be secondary to ABRAHAM.  Hypoalbuminemia-UA only showed trace protein  Hematuria-repeat UA.   Renal ultrasound showed no masses/stones  Anemia-macrocytic.    B12 and folate level WNL.  Relative hypotension-follow with diuresis      Medical decision making- high complexity. Multiple complex health problems.   Discussed with patient and treatment team.  Nursing . Davey Farias MD     Thank you for allowing me to participate in this patient's care. Please do not hesitate to contact me for any questions/concerns. We will follow along with you.     Leandra Cook MD  [facility]   Phone: [phone] or  mg, Oral, BID  melatonin tablet 3 mg, 3 mg, Oral, Nightly PRN  diazePAM (VALIUM) tablet 2 mg, 2 mg, Oral, Q6H PRN  sennosides-docusate sodium (SENOKOT-S) 8.6-50 MG tablet 2 tablet, 2 tablet, Oral, Daily  pantoprazole (PROTONIX) injection 40 mg, 40 mg, IntraVENous, BID  perflutren lipid microspheres (DEFINITY) injection 1.5 mL, 1.5 mL, IntraVENous, ONCE PRN  ceFAZolin (ANCEF) 2,000 mg in sodium chloride 0.9 % 50 mL IVPB (mini-bag), 2,000 mg, IntraVENous, Q8H  sodium chloride flush 0.9 % injection 5-40 mL, 5-40 mL, IntraVENous, 2 times per day  sodium chloride flush 0.9 % injection 5-40 mL, 5-40 mL, IntraVENous, PRN  0.9 % sodium chloride infusion, 25 mL, IntraVENous, PRN  albuterol (PROVENTIL) (2.5 MG/3ML) 0.083% nebulizer solution 2.5 mg, 2.5 mg, Nebulization, 4x Daily RT  lactobacillus (CULTURELLE) capsule 1 capsule, 1 capsule, Oral, BID WC  insulin lispro (HUMALOG) injection vial 5 Units, 5 Units, SubCUTAneous, TID WC  insulin lispro (HUMALOG) injection vial 0-4 Units, 0-4 Units, SubCUTAneous, Nightly  dextrose bolus 10% 125 mL, 125 mL, IntraVENous, PRN **OR** dextrose bolus 10% 250 mL, 250 mL, IntraVENous, PRN  glucagon injection 1 mg, 1 mg, SubCUTAneous, PRN  dextrose 10 % infusion, , IntraVENous, Continuous PRN  insulin lispro (HUMALOG) injection vial 0-8 Units, 0-8 Units, SubCUTAneous, TID WC  simethicone (MYLICON) chewable tablet 80 mg, 80 mg, Oral, Q6H PRN  arformoterol tartrate (BROVANA) nebulizer solution 15 mcg, 15 mcg, Nebulization, BID RT  budesonide (PULMICORT) nebulizer suspension 500 mcg, 0.5 mg, Nebulization, BID RT  albuterol sulfate HFA (PROVENTIL;VENTOLIN;PROAIR) 108 (90 Base) MCG/ACT inhaler 2 puff, 2 puff, Inhalation, Q6H PRN  allopurinol (ZYLOPRIM) tablet 200 mg, 200 mg, Oral, Daily  [Held by provider] apixaban (ELIQUIS) tablet 5 mg, 5 mg, Oral, BID  [Held by provider] aspirin chewable tablet 81 mg, 81 mg, Oral, Daily  thiamine mononitrate tablet 100 mg, 100 mg, Oral, Daily  sodium chloride

## 2024-01-06 NOTE — PROGRESS NOTES
[facility] [place]  DAILY PROGRESS NOTE    Admit Date: 12/20/2023       Chief Complaint: fall     Interval History:   Patient seen and examined and notes reviewed. Patient is being followed for AF. Today he is sitting in the chair and c/o edema but denies CP, palpitations or SOB.     In: 1020 [P.O.:1020]  Out: 1750    Wt Readings from Last 2 Encounters:   01/05/24 (!) 148 kg (326 lb 4.5 oz)   12/18/23 (!) 153.6 kg (338 lb 9.6 oz)         Data:   Scheduled Meds:   Scheduled Meds:   metoprolol tartrate  12.5 mg Oral BID    furosemide  40 mg IntraVENous TID    sennosides-docusate sodium  2 tablet Oral Daily    pantoprazole  40 mg IntraVENous BID    ceFAZolin  2,000 mg IntraVENous Q8H    sodium chloride flush  5-40 mL IntraVENous 2 times per day    albuterol  2.5 mg Nebulization 4x Daily RT    lactobacillus  1 capsule Oral BID WC    insulin lispro  5 Units SubCUTAneous TID WC    insulin lispro  0-4 Units SubCUTAneous Nightly    insulin lispro  0-8 Units SubCUTAneous TID WC    arformoterol tartrate  15 mcg Nebulization BID RT    budesonide  0.5 mg Nebulization BID RT    allopurinol  200 mg Oral Daily    [Held by provider] apixaban  5 mg Oral BID    [Held by provider] aspirin  81 mg Oral Daily    vitamin B-1  100 mg Oral Daily    sodium chloride flush  5-40 mL IntraVENous 2 times per day    tiotropium  2 puff Inhalation Daily RT     Continuous Infusions:   sodium chloride Stopped (01/04/24 0610)    dextrose      sodium chloride Stopped (01/03/24 2304)     PRN Meds:.oxyCODONE-acetaminophen, midodrine, melatonin, diazePAM, perflutren lipid microspheres, sodium chloride flush, sodium chloride, dextrose bolus **OR** dextrose bolus, glucagon (rDNA), dextrose, simethicone, albuterol sulfate HFA, sodium chloride flush, sodium chloride, polyethylene glycol, acetaminophen **OR** acetaminophen  Continuous Infusions:   sodium chloride Stopped (01/04/24 0610)    dextrose      sodium chloride Stopped (01/03/24 2304)  nephrology  ~ Wrap and elevate extremities  6. Anemia             ~ Received blood this admit. Hgb 7.6 today   ~ Recommend hemoglobin above 8 from a cardiac standpoint  ~ EGD no active bleed, antral erosions, bx taken 12/29/23                         7. ABRAHAM on CKD   ~Cr on admit 1.7>1.8>1.9>2.0 today  ~Daily labs; trend creatinine  ~Nephrology consulted, Recs:albumin and lasix gtt           MARKO SAUCEDO, APRN - CNP, ACNP, AGPCNP  Heart Failure  [facility]

## 2024-01-06 NOTE — PROGRESS NOTES
Pt awake in room. Medications given and pt sitting up in chair. Pt not wanting to change positions at this time. Room cleaned up, call light in reach and chair alarm engaged.

## 2024-01-07 LAB
ANION GAP SERPL CALCULATED.3IONS-SCNC: 11 MMOL/L (ref 3–16)
BASOPHILS # BLD: 0 K/UL (ref 0–0.2)
BASOPHILS NFR BLD: 0.7 %
BUN SERPL-MCNC: 30 MG/DL (ref 7–20)
CALCIUM SERPL-MCNC: 8.9 MG/DL (ref 8.3–10.6)
CHLORIDE SERPL-SCNC: 95 MMOL/L (ref 99–110)
CO2 SERPL-SCNC: 33 MMOL/L (ref 21–32)
CREAT SERPL-MCNC: 1.9 MG/DL (ref 0.8–1.3)
DEPRECATED RDW RBC AUTO: 17.1 % (ref 12.4–15.4)
EOSINOPHIL # BLD: 0.1 K/UL (ref 0–0.6)
EOSINOPHIL NFR BLD: 2.8 %
GFR SERPLBLD CREATININE-BSD FMLA CKD-EPI: 36 ML/MIN/{1.73_M2}
GLUCOSE BLD-MCNC: 109 MG/DL (ref 70–99)
GLUCOSE BLD-MCNC: 151 MG/DL (ref 70–99)
GLUCOSE BLD-MCNC: 83 MG/DL (ref 70–99)
GLUCOSE BLD-MCNC: 98 MG/DL (ref 70–99)
GLUCOSE SERPL-MCNC: 101 MG/DL (ref 70–99)
HCT VFR BLD AUTO: 21.2 % (ref 40.5–52.5)
HCT VFR BLD AUTO: 21.5 % (ref 40.5–52.5)
HGB BLD-MCNC: 6.9 G/DL (ref 13.5–17.5)
HGB BLD-MCNC: 7.1 G/DL (ref 13.5–17.5)
LYMPHOCYTES # BLD: 0.6 K/UL (ref 1–5.1)
LYMPHOCYTES NFR BLD: 16.5 %
MAGNESIUM SERPL-MCNC: 2 MG/DL (ref 1.8–2.4)
MCH RBC QN AUTO: 31.4 PG (ref 26–34)
MCHC RBC AUTO-ENTMCNC: 32.5 G/DL (ref 31–36)
MCV RBC AUTO: 96.8 FL (ref 80–100)
MONOCYTES # BLD: 0.5 K/UL (ref 0–1.3)
MONOCYTES NFR BLD: 12.6 %
NEUTROPHILS # BLD: 2.5 K/UL (ref 1.7–7.7)
NEUTROPHILS NFR BLD: 67.4 %
PERFORMED ON: ABNORMAL
PERFORMED ON: ABNORMAL
PERFORMED ON: NORMAL
PERFORMED ON: NORMAL
PLATELET # BLD AUTO: 376 K/UL (ref 135–450)
PMV BLD AUTO: 5.9 FL (ref 5–10.5)
POTASSIUM SERPL-SCNC: 3.4 MMOL/L (ref 3.5–5.1)
RBC # BLD AUTO: 2.19 M/UL (ref 4.2–5.9)
SODIUM SERPL-SCNC: 139 MMOL/L (ref 136–145)
WBC # BLD AUTO: 3.7 K/UL (ref 4–11)

## 2024-01-07 PROCEDURE — 2700000000 HC OXYGEN THERAPY PER DAY

## 2024-01-07 PROCEDURE — 6370000000 HC RX 637 (ALT 250 FOR IP): Performed by: INTERNAL MEDICINE

## 2024-01-07 PROCEDURE — 85025 COMPLETE CBC W/AUTO DIFF WBC: CPT

## 2024-01-07 PROCEDURE — 85014 HEMATOCRIT: CPT

## 2024-01-07 PROCEDURE — 94761 N-INVAS EAR/PLS OXIMETRY MLT: CPT

## 2024-01-07 PROCEDURE — C9113 INJ PANTOPRAZOLE SODIUM, VIA: HCPCS | Performed by: INTERNAL MEDICINE

## 2024-01-07 PROCEDURE — 6360000002 HC RX W HCPCS: Performed by: INTERNAL MEDICINE

## 2024-01-07 PROCEDURE — 80048 BASIC METABOLIC PNL TOTAL CA: CPT

## 2024-01-07 PROCEDURE — 85018 HEMOGLOBIN: CPT

## 2024-01-07 PROCEDURE — 2060000000 HC ICU INTERMEDIATE R&B

## 2024-01-07 PROCEDURE — 83735 ASSAY OF MAGNESIUM: CPT

## 2024-01-07 PROCEDURE — P9047 ALBUMIN (HUMAN), 25%, 50ML: HCPCS | Performed by: INTERNAL MEDICINE

## 2024-01-07 PROCEDURE — 2580000003 HC RX 258: Performed by: INTERNAL MEDICINE

## 2024-01-07 PROCEDURE — 99232 SBSQ HOSP IP/OBS MODERATE 35: CPT | Performed by: NURSE PRACTITIONER

## 2024-01-07 PROCEDURE — 36415 COLL VENOUS BLD VENIPUNCTURE: CPT

## 2024-01-07 PROCEDURE — 94640 AIRWAY INHALATION TREATMENT: CPT

## 2024-01-07 PROCEDURE — 6370000000 HC RX 637 (ALT 250 FOR IP): Performed by: NURSE PRACTITIONER

## 2024-01-07 RX ORDER — LANOLIN ALCOHOL/MO/W.PET/CERES
500 CREAM (GRAM) TOPICAL DAILY
Status: DISCONTINUED | OUTPATIENT
Start: 2024-01-07 | End: 2024-01-15 | Stop reason: HOSPADM

## 2024-01-07 RX ORDER — METOLAZONE 2.5 MG/1
10 TABLET ORAL DAILY
Status: COMPLETED | OUTPATIENT
Start: 2024-01-07 | End: 2024-01-09

## 2024-01-07 RX ORDER — FOLIC ACID 1 MG/1
1 TABLET ORAL DAILY
Status: DISCONTINUED | OUTPATIENT
Start: 2024-01-07 | End: 2024-01-15 | Stop reason: HOSPADM

## 2024-01-07 RX ORDER — FERROUS SULFATE 325(65) MG
325 TABLET ORAL 2 TIMES DAILY WITH MEALS
Status: DISCONTINUED | OUTPATIENT
Start: 2024-01-07 | End: 2024-01-15 | Stop reason: HOSPADM

## 2024-01-07 RX ORDER — POTASSIUM CHLORIDE 20 MEQ/1
40 TABLET, EXTENDED RELEASE ORAL ONCE
Status: COMPLETED | OUTPATIENT
Start: 2024-01-07 | End: 2024-01-07

## 2024-01-07 RX ADMIN — CEFAZOLIN 2000 MG: 2 INJECTION, POWDER, FOR SOLUTION INTRAMUSCULAR; INTRAVENOUS at 14:12

## 2024-01-07 RX ADMIN — SIMETHICONE 80 MG: 80 TABLET, CHEWABLE ORAL at 08:46

## 2024-01-07 RX ADMIN — CYANOCOBALAMIN TAB 1000 MCG 500 MCG: 1000 TAB at 14:18

## 2024-01-07 RX ADMIN — SODIUM CHLORIDE, PRESERVATIVE FREE 10 ML: 5 INJECTION INTRAVENOUS at 08:41

## 2024-01-07 RX ADMIN — ALBUTEROL SULFATE 2.5 MG: 2.5 SOLUTION RESPIRATORY (INHALATION) at 16:23

## 2024-01-07 RX ADMIN — PANTOPRAZOLE SODIUM 40 MG: 40 INJECTION, POWDER, FOR SOLUTION INTRAVENOUS at 08:41

## 2024-01-07 RX ADMIN — FUROSEMIDE 10 MG/HR: 10 INJECTION, SOLUTION INTRAMUSCULAR; INTRAVENOUS at 11:43

## 2024-01-07 RX ADMIN — SIMETHICONE 80 MG: 80 TABLET, CHEWABLE ORAL at 21:08

## 2024-01-07 RX ADMIN — METOPROLOL TARTRATE 12.5 MG: 25 TABLET, FILM COATED ORAL at 20:31

## 2024-01-07 RX ADMIN — INSULIN LISPRO 5 UNITS: 100 INJECTION, SOLUTION INTRAVENOUS; SUBCUTANEOUS at 08:41

## 2024-01-07 RX ADMIN — ALBUMIN HUMAN 25 G: 0.25 SOLUTION INTRAVENOUS at 05:05

## 2024-01-07 RX ADMIN — OXYCODONE AND ACETAMINOPHEN 1 TABLET: 5; 325 TABLET ORAL at 23:58

## 2024-01-07 RX ADMIN — BUDESONIDE INHALATION 500 MCG: 0.5 SUSPENSION RESPIRATORY (INHALATION) at 21:16

## 2024-01-07 RX ADMIN — FOLIC ACID 1 MG: 1 TABLET ORAL at 14:18

## 2024-01-07 RX ADMIN — INSULIN LISPRO 5 UNITS: 100 INJECTION, SOLUTION INTRAVENOUS; SUBCUTANEOUS at 14:12

## 2024-01-07 RX ADMIN — PANTOPRAZOLE SODIUM 40 MG: 40 INJECTION, POWDER, FOR SOLUTION INTRAVENOUS at 20:32

## 2024-01-07 RX ADMIN — ALBUMIN HUMAN 25 G: 0.25 SOLUTION INTRAVENOUS at 11:44

## 2024-01-07 RX ADMIN — CEFAZOLIN 2000 MG: 2 INJECTION, POWDER, FOR SOLUTION INTRAMUSCULAR; INTRAVENOUS at 20:44

## 2024-01-07 RX ADMIN — ALBUTEROL SULFATE 2.5 MG: 2.5 SOLUTION RESPIRATORY (INHALATION) at 12:12

## 2024-01-07 RX ADMIN — POTASSIUM CHLORIDE 40 MEQ: 1500 TABLET, EXTENDED RELEASE ORAL at 10:09

## 2024-01-07 RX ADMIN — ARFORMOTEROL TARTRATE 15 MCG: 15 SOLUTION RESPIRATORY (INHALATION) at 08:48

## 2024-01-07 RX ADMIN — FUROSEMIDE 10 MG/HR: 10 INJECTION, SOLUTION INTRAMUSCULAR; INTRAVENOUS at 00:52

## 2024-01-07 RX ADMIN — THIAMINE HCL TAB 100 MG 100 MG: 100 TAB at 08:41

## 2024-01-07 RX ADMIN — POTASSIUM CHLORIDE 40 MEQ: 1500 TABLET, EXTENDED RELEASE ORAL at 18:20

## 2024-01-07 RX ADMIN — BUDESONIDE INHALATION 500 MCG: 0.5 SUSPENSION RESPIRATORY (INHALATION) at 08:48

## 2024-01-07 RX ADMIN — FERROUS SULFATE TAB 325 MG (65 MG ELEMENTAL FE) 325 MG: 325 (65 FE) TAB at 18:20

## 2024-01-07 RX ADMIN — SODIUM CHLORIDE 25 ML: 9 INJECTION, SOLUTION INTRAVENOUS at 05:01

## 2024-01-07 RX ADMIN — ALBUTEROL SULFATE 2.5 MG: 2.5 SOLUTION RESPIRATORY (INHALATION) at 08:48

## 2024-01-07 RX ADMIN — ALLOPURINOL 200 MG: 100 TABLET ORAL at 08:40

## 2024-01-07 RX ADMIN — Medication 10 ML: at 20:33

## 2024-01-07 RX ADMIN — Medication 1 CAPSULE: at 18:20

## 2024-01-07 RX ADMIN — METOPROLOL TARTRATE 12.5 MG: 25 TABLET, FILM COATED ORAL at 08:40

## 2024-01-07 RX ADMIN — METOLAZONE 10 MG: 2.5 TABLET ORAL at 10:09

## 2024-01-07 RX ADMIN — EPOETIN ALFA-EPBX 7000 UNITS: 10000 INJECTION, SOLUTION INTRAVENOUS; SUBCUTANEOUS at 14:18

## 2024-01-07 RX ADMIN — ALBUMIN HUMAN 25 G: 0.25 SOLUTION INTRAVENOUS at 20:40

## 2024-01-07 RX ADMIN — FUROSEMIDE 10 MG/HR: 10 INJECTION, SOLUTION INTRAMUSCULAR; INTRAVENOUS at 22:11

## 2024-01-07 RX ADMIN — STANDARDIZED SENNA CONCENTRATE AND DOCUSATE SODIUM 2 TABLET: 8.6; 5 TABLET ORAL at 08:41

## 2024-01-07 RX ADMIN — ARFORMOTEROL TARTRATE 15 MCG: 15 SOLUTION RESPIRATORY (INHALATION) at 21:16

## 2024-01-07 RX ADMIN — ALBUTEROL SULFATE 2.5 MG: 2.5 SOLUTION RESPIRATORY (INHALATION) at 21:16

## 2024-01-07 RX ADMIN — TIOTROPIUM BROMIDE INHALATION SPRAY 2 PUFF: 3.12 SPRAY, METERED RESPIRATORY (INHALATION) at 08:48

## 2024-01-07 RX ADMIN — CEFAZOLIN 2000 MG: 2 INJECTION, POWDER, FOR SOLUTION INTRAMUSCULAR; INTRAVENOUS at 05:03

## 2024-01-07 RX ADMIN — SODIUM CHLORIDE 25 ML: 9 INJECTION, SOLUTION INTRAVENOUS at 05:04

## 2024-01-07 RX ADMIN — Medication 1 CAPSULE: at 08:40

## 2024-01-07 ASSESSMENT — PAIN SCALES - GENERAL
PAINLEVEL_OUTOF10: 0
PAINLEVEL_OUTOF10: 0
PAINLEVEL_OUTOF10: 6
PAINLEVEL_OUTOF10: 0

## 2024-01-07 NOTE — PROGRESS NOTES
tablet 3 mg, 3 mg, Oral, Nightly PRN  diazePAM (VALIUM) tablet 2 mg, 2 mg, Oral, Q6H PRN  sennosides-docusate sodium (SENOKOT-S) 8.6-50 MG tablet 2 tablet, 2 tablet, Oral, Daily  pantoprazole (PROTONIX) injection 40 mg, 40 mg, IntraVENous, BID  perflutren lipid microspheres (DEFINITY) injection 1.5 mL, 1.5 mL, IntraVENous, ONCE PRN  ceFAZolin (ANCEF) 2,000 mg in sodium chloride 0.9 % 50 mL IVPB (mini-bag), 2,000 mg, IntraVENous, Q8H  sodium chloride flush 0.9 % injection 5-40 mL, 5-40 mL, IntraVENous, 2 times per day  sodium chloride flush 0.9 % injection 5-40 mL, 5-40 mL, IntraVENous, PRN  0.9 % sodium chloride infusion, 25 mL, IntraVENous, PRN  albuterol (PROVENTIL) (2.5 MG/3ML) 0.083% nebulizer solution 2.5 mg, 2.5 mg, Nebulization, 4x Daily RT  lactobacillus (CULTURELLE) capsule 1 capsule, 1 capsule, Oral, BID WC  insulin lispro (HUMALOG) injection vial 5 Units, 5 Units, SubCUTAneous, TID WC  insulin lispro (HUMALOG) injection vial 0-4 Units, 0-4 Units, SubCUTAneous, Nightly  dextrose bolus 10% 125 mL, 125 mL, IntraVENous, PRN **OR** dextrose bolus 10% 250 mL, 250 mL, IntraVENous, PRN  glucagon injection 1 mg, 1 mg, SubCUTAneous, PRN  dextrose 10 % infusion, , IntraVENous, Continuous PRN  insulin lispro (HUMALOG) injection vial 0-8 Units, 0-8 Units, SubCUTAneous, TID WC  simethicone (MYLICON) chewable tablet 80 mg, 80 mg, Oral, Q6H PRN  arformoterol tartrate (BROVANA) nebulizer solution 15 mcg, 15 mcg, Nebulization, BID RT  budesonide (PULMICORT) nebulizer suspension 500 mcg, 0.5 mg, Nebulization, BID RT  albuterol sulfate HFA (PROVENTIL;VENTOLIN;PROAIR) 108 (90 Base) MCG/ACT inhaler 2 puff, 2 puff, Inhalation, Q6H PRN  allopurinol (ZYLOPRIM) tablet 200 mg, 200 mg, Oral, Daily  [Held by provider] apixaban (ELIQUIS) tablet 5 mg, 5 mg, Oral, BID  [Held by provider] aspirin chewable tablet 81 mg, 81 mg, Oral, Daily  thiamine mononitrate tablet 100 mg, 100 mg, Oral, Daily  sodium chloride flush 0.9 % injection 5-40      proBNP 5515  Albumin 3.3    Urinalysis shows specific gravity of 1.015, pH 5, blood large, protein trace, small leukocyte esterase, WBC 11, RBC 4-5    VBG pH 7.26/pCO2 75    Cxray  IMPRESSION:  Stable cardiomegaly with mild central pulmonary congestion or pulmonary  artery hypertension which is more prominent.    12/16 CT PE  IMPRESSION:  Limited exam due to the extensive motion artifact and decreased opacification  of the distal pulmonary arteries which is limiting evaluation of the distal  pulmonary arteries along the lung bases.  Within the limitations of the exam,  no obvious acute pulmonary embolus can be seen     Mildly dilated and atherosclerotic thoracic aorta with no aneurysm or  dissection and no mediastinal mass or adenopathy.     Chronic obstructive lung changes with mild subsegmental atelectasis vs early  infiltrate or scarring along the right lung base posteriorly      Renal us -     IMPRESSION:  No hydronephrosis or nephrolithiasis identified sonographically.     Increased renal parenchymal echogenicity bilaterally suggesting medical renal  disease.              Specimen Collected: 12/22/23 18:03 EST Last Resulted: 12/22/23 18:53 EST

## 2024-01-07 NOTE — PROGRESS NOTES
[facility] HEART [place]  DAILY PROGRESS NOTE    Admit Date: 12/20/2023       Chief Complaint: fall     Interval History:   Patient seen and examined and notes reviewed. Patient is being followed for AF. Today he feels about the same, is sitting in the chair and c/o edema but denies CP, palpitations or SOB.     In: 855.9 [P.O.:480; I.V.:125.9]  Out: 1225    Wt Readings from Last 2 Encounters:   01/07/24 (!) 149.7 kg (330 lb 1.6 oz)   12/18/23 (!) 153.6 kg (338 lb 9.6 oz)         Data:   Scheduled Meds:   Scheduled Meds:   albumin human 25%  25 g IntraVENous Q8H    metoprolol tartrate  12.5 mg Oral BID    sennosides-docusate sodium  2 tablet Oral Daily    pantoprazole  40 mg IntraVENous BID    ceFAZolin  2,000 mg IntraVENous Q8H    sodium chloride flush  5-40 mL IntraVENous 2 times per day    albuterol  2.5 mg Nebulization 4x Daily RT    lactobacillus  1 capsule Oral BID WC    insulin lispro  5 Units SubCUTAneous TID WC    insulin lispro  0-4 Units SubCUTAneous Nightly    insulin lispro  0-8 Units SubCUTAneous TID WC    arformoterol tartrate  15 mcg Nebulization BID RT    budesonide  0.5 mg Nebulization BID RT    allopurinol  200 mg Oral Daily    [Held by provider] apixaban  5 mg Oral BID    [Held by provider] aspirin  81 mg Oral Daily    vitamin B-1  100 mg Oral Daily    sodium chloride flush  5-40 mL IntraVENous 2 times per day    tiotropium  2 puff Inhalation Daily RT     Continuous Infusions:   furosemide (LASIX) 100 mg in sodium chloride 0.9 % 100 mL infusion 10 mg/hr (01/07/24 0053)    sodium chloride 25 mL (01/07/24 0504)    dextrose      sodium chloride Stopped (01/03/24 2304)     PRN Meds:.oxyCODONE-acetaminophen, midodrine, melatonin, diazePAM, perflutren lipid microspheres, sodium chloride flush, sodium chloride, dextrose bolus **OR** dextrose bolus, glucagon (rDNA), dextrose, simethicone, albuterol sulfate HFA, sodium chloride flush, sodium chloride, polyethylene glycol, acetaminophen **OR**

## 2024-01-07 NOTE — PROGRESS NOTES
Pt awake in chair. VSS,assessment complete and medications given. Denies pain. Albarado emptied into 24 hr urine container that was started at 2353. Teeth brushed and all needs met by patient. Call light in reach and chair alarm engaged.

## 2024-01-07 NOTE — PLAN OF CARE
for suctioning and aspirate as needed   Assess and instruct to report shortness of breath or any respiratory difficulty   Respiratory therapy support as indicated     Problem: Cardiovascular - Adult  Goal: Maintains optimal cardiac output and hemodynamic stability  Outcome: Progressing  Flowsheets (Taken 1/7/2024 0213)  Maintains optimal cardiac output and hemodynamic stability:   Monitor blood pressure and heart rate   Monitor urine output and notify Licensed Independent Practitioner for values outside of normal range   Assess for signs of decreased cardiac output   Administer fluid and/or volume expanders as ordered   Administer vasoactive medications as ordered   For PPHN infants, administer sedation as ordered and minimize all controllable stressors.  Goal: Absence of cardiac dysrhythmias or at baseline  Outcome: Progressing  Flowsheets (Taken 1/7/2024 0213)  Absence of cardiac dysrhythmias or at baseline:   Monitor cardiac rate and rhythm   Assess for signs of decreased cardiac output   Administer antiarrhythmia medication and electrolyte replacement as ordered     Problem: Skin/Tissue Integrity - Adult  Goal: Skin integrity remains intact  Outcome: Progressing  Flowsheets (Taken 1/7/2024 0213)  Skin Integrity Remains Intact:   Monitor for areas of redness and/or skin breakdown   Assess vascular access sites hourly   Every 4-6 hours minimum: Change oxygen saturation probe site   Every 4-6 hours: If on nasal continuous positive airway pressure, respiratory therapy assesses nares and determine need for appliance change or resting period  Goal: Incisions, wounds, or drain sites healing without S/S of infection  Outcome: Progressing  Flowsheets (Taken 1/7/2024 0213)  Incisions, Wounds, or Drain Sites Healing Without Sign and Symptoms of Infection:   ADMISSION and DAILY: Assess and document risk factors for pressure ulcer development   TWICE DAILY: Assess and document skin integrity   TWICE DAILY: Assess and  activity and self care as tolerated     Problem: Gastrointestinal - Adult  Goal: Minimal or absence of nausea and vomiting  Outcome: Progressing  Flowsheets (Taken 1/7/2024 0213)  Minimal or absence of nausea and vomiting:   Administer IV fluids as ordered to ensure adequate hydration   Maintain NPO status until nausea and vomiting are resolved   Nasogastric tube to low intermittent suction as ordered   Administer ordered antiemetic medications as needed   Provide nonpharmacologic comfort measures as appropriate   Advance diet as tolerated, if ordered   Nutrition consult to assist patient with adequate nutrition and appropriate food choices  Goal: Maintains or returns to baseline bowel function  Outcome: Progressing  Flowsheets (Taken 1/7/2024 0213)  Maintains or returns to baseline bowel function:   Assess bowel function   Encourage oral fluids to ensure adequate hydration   Administer IV fluids as ordered to ensure adequate hydration   Administer ordered medications as needed   Encourage mobilization and activity   Nutrition consult to assist patient with appropriate food choices  Goal: Maintains adequate nutritional intake  Outcome: Progressing  Flowsheets (Taken 1/7/2024 0213)  Maintains adequate nutritional intake:   Monitor percentage of each meal consumed   Identify factors contributing to decreased intake, treat as appropriate   Assist with meals as needed   Monitor intake and output, weight and lab values   Obtain nutritional consult as needed     Problem: Genitourinary - Adult  Goal: Absence of urinary retention  Outcome: Progressing  Flowsheets (Taken 1/7/2024 0213)  Absence of urinary retention:   Assess patient’s ability to void and empty bladder   Monitor intake/output and perform bladder scan as needed   Place urinary catheter per Licensed Independent Practitioner order if needed   Discuss with Licensed Independent Practitioner  medications to alleviate retention as needed   Discuss catheterization

## 2024-01-07 NOTE — PLAN OF CARE
Problem: Safety - Adult  Goal: Free from fall injury  Outcome: Progressing   Pt will remain free from falls. Fall precautions in place and alarm engaged. Bedside table and call light within reach. Pt aware to use call light for assistance ambulating.    Problem: Respiratory - Adult  Goal: Achieves optimal ventilation and oxygenation  Outcome: Progressing   Pt still om 6 liters HFNC and sp02 in the mid 90'S

## 2024-01-07 NOTE — PROGRESS NOTES
[facility]    1/7/2024 12:04 PM        Name: Ishmael Gr .              Admitted: 12/20/2023  Primary Care Provider: Anthony Bravo MD (Tel: [phone])      Not much urine output yet no chest pain , hgb down to 6.9 no bleeding per patient slow drift  Current Medications  metOLazone (ZAROXOLYN) tablet 10 mg, Daily  potassium chloride (KLOR-CON M) extended release tablet 40 mEq, Once  furosemide (LASIX) 100 mg in sodium chloride 0.9 % 100 mL infusion, Continuous  albumin human 25% IV solution 25 g, Q8H  oxyCODONE-acetaminophen (PERCOCET) 5-325 MG per tablet 1 tablet, Q6H PRN  midodrine (PROAMATINE) tablet 5 mg, TID PRN  metoprolol tartrate (LOPRESSOR) tablet 12.5 mg, BID  melatonin tablet 3 mg, Nightly PRN  diazePAM (VALIUM) tablet 2 mg, Q6H PRN  sennosides-docusate sodium (SENOKOT-S) 8.6-50 MG tablet 2 tablet, Daily  pantoprazole (PROTONIX) injection 40 mg, BID  perflutren lipid microspheres (DEFINITY) injection 1.5 mL, ONCE PRN  ceFAZolin (ANCEF) 2,000 mg in sodium chloride 0.9 % 50 mL IVPB (mini-bag), Q8H  sodium chloride flush 0.9 % injection 5-40 mL, 2 times per day  sodium chloride flush 0.9 % injection 5-40 mL, PRN  0.9 % sodium chloride infusion, PRN  albuterol (PROVENTIL) (2.5 MG/3ML) 0.083% nebulizer solution 2.5 mg, 4x Daily RT  lactobacillus (CULTURELLE) capsule 1 capsule, BID WC  insulin lispro (HUMALOG) injection vial 5 Units, TID WC  insulin lispro (HUMALOG) injection vial 0-4 Units, Nightly  dextrose bolus 10% 125 mL, PRN   Or  dextrose bolus 10% 250 mL, PRN  glucagon injection 1 mg, PRN  dextrose 10 % infusion, Continuous PRN  insulin lispro (HUMALOG) injection vial 0-8 Units, TID WC  simethicone (MYLICON) chewable tablet 80 mg, Q6H PRN  arformoterol tartrate (BROVANA) nebulizer solution 15 mcg, BID RT  budesonide (PULMICORT) nebulizer suspension 500 mcg, BID RT  albuterol sulfate HFA  120* 101*       Hepatic:   No results for input(s): \"AST\", \"ALT\", \"ALB\", \"BILITOT\", \"ALKPHOS\" in the last 72 hours.    XR CHEST PORTABLE   Final Result   Left lower lobe infiltrate likely represents pneumonia.  Aspiration is   possible.         US RENAL COMPLETE   Final Result   No hydronephrosis or nephrolithiasis identified sonographically.      Increased renal parenchymal echogenicity bilaterally suggesting medical renal   disease.         CT PELVIS WO CONTRAST Additional Contrast? None   Final Result   Bilateral hip prostheses.      No periprosthetic fracture.         CT LUMBAR SPINE WO CONTRAST   Final Result   Levoconvex scoliosis with moderate to advanced degenerative disc disease and   facet arthropathy.      No fracture.         CT Head W/O Contrast   Final Result   No acute intracranial abnormality.         XR CHEST PORTABLE   Final Result   1. Improved aeration.             Recent imaging reviewed    Problem List  Principal Problem:    ABRAHAM (acute kidney injury) ([facility])  Active Problems:    Encephalomalacia    Essential hypertension    Diabetes mellitus type 2 in obese (HCC)    Morbid obesity due to excess calories ([facility])    Diabetes education, encounter for    PVD (peripheral vascular disease) ([facility])    Chronic obstructive pulmonary disease ([facility])    Fall    Acute right-sided low back pain with right-sided sciatica    Right hip pain    Fall at home    Scoliosis of lumbar spine    Pain due to hip joint prosthesis, initial encounter ([facility])    Aspiration pneumonia ([facility])    Bacteremia due to methicillin susceptible Staphylococcus aureus (MSSA)    Receiving intravenous antibiotic treatment as outpatient    Paroxysmal atrial fibrillation ([facility])    Gastrointestinal hemorrhage with melena    Anemia    Stage 3 chronic kidney disease ([facility])  Resolved Problems:    * No resolved hospital problems. *       Assessment & Plan:   Acute gi bleed  S/p 3units prbcs  EGD with  Patient has multiple antral erosions that could have

## 2024-01-07 NOTE — PROGRESS NOTES
Pt awake in chair. VSS and medications given. Family at bedside and pt denies any needs. Call light in reach and  chair alarm engaged.

## 2024-01-07 NOTE — PROGRESS NOTES
Pt assisted to the BSC with walker and returned to chair with alarm engaged. VSS and pt denies any needs. Call light in reach and chair alarm engaged.

## 2024-01-08 LAB
ABO + RH BLD: NORMAL
ANION GAP SERPL CALCULATED.3IONS-SCNC: 11 MMOL/L (ref 3–16)
BASOPHILS # BLD: 0 K/UL (ref 0–0.2)
BASOPHILS NFR BLD: 0.6 %
BLD GP AB SCN SERPL QL: NORMAL
BLOOD BANK DISPENSE STATUS: NORMAL
BLOOD BANK PRODUCT CODE: NORMAL
BPU ID: NORMAL
BUN SERPL-MCNC: 27 MG/DL (ref 7–20)
CALCIUM SERPL-MCNC: 9.1 MG/DL (ref 8.3–10.6)
CHLORIDE SERPL-SCNC: 92 MMOL/L (ref 99–110)
CO2 SERPL-SCNC: 32 MMOL/L (ref 21–32)
CREAT 24H UR-MRATE: 1.5 G/24HR (ref 0.6–2.5)
CREAT SERPL-MCNC: 1.5 MG/DL (ref 0.8–1.3)
DEPRECATED RDW RBC AUTO: 16.8 % (ref 12.4–15.4)
DESCRIPTION BLOOD BANK: NORMAL
EOSINOPHIL # BLD: 0.1 K/UL (ref 0–0.6)
EOSINOPHIL NFR BLD: 2.8 %
GFR SERPLBLD CREATININE-BSD FMLA CKD-EPI: 48 ML/MIN/{1.73_M2}
GLUCOSE BLD-MCNC: 101 MG/DL (ref 70–99)
GLUCOSE BLD-MCNC: 110 MG/DL (ref 70–99)
GLUCOSE BLD-MCNC: 117 MG/DL (ref 70–99)
GLUCOSE BLD-MCNC: 119 MG/DL (ref 70–99)
GLUCOSE SERPL-MCNC: 103 MG/DL (ref 70–99)
HCT VFR BLD AUTO: 21.6 % (ref 40.5–52.5)
HCT VFR BLD AUTO: 24.5 % (ref 40.5–52.5)
HGB BLD-MCNC: 6.9 G/DL (ref 13.5–17.5)
HGB BLD-MCNC: 7.9 G/DL (ref 13.5–17.5)
LYMPHOCYTES # BLD: 0.7 K/UL (ref 1–5.1)
LYMPHOCYTES NFR BLD: 15.4 %
MCH RBC QN AUTO: 30.8 PG (ref 26–34)
MCHC RBC AUTO-ENTMCNC: 32 G/DL (ref 31–36)
MCV RBC AUTO: 96 FL (ref 80–100)
MONOCYTES # BLD: 0.5 K/UL (ref 0–1.3)
MONOCYTES NFR BLD: 11.6 %
NEUTROPHILS # BLD: 3 K/UL (ref 1.7–7.7)
NEUTROPHILS NFR BLD: 69.6 %
PERFORMED ON: ABNORMAL
PLATELET # BLD AUTO: 388 K/UL (ref 135–450)
PMV BLD AUTO: 6.3 FL (ref 5–10.5)
POTASSIUM SERPL-SCNC: 3.7 MMOL/L (ref 3.5–5.1)
PROT 24H UR-MRATE: 0.58 G/24HR
RBC # BLD AUTO: 2.25 M/UL (ref 4.2–5.9)
SODIUM SERPL-SCNC: 135 MMOL/L (ref 136–145)
SPECIMEN VOL 24H UR: ABNORMAL ML
WBC # BLD AUTO: 4.3 K/UL (ref 4–11)

## 2024-01-08 PROCEDURE — 86923 COMPATIBILITY TEST ELECTRIC: CPT

## 2024-01-08 PROCEDURE — 97530 THERAPEUTIC ACTIVITIES: CPT

## 2024-01-08 PROCEDURE — 2580000003 HC RX 258: Performed by: INTERNAL MEDICINE

## 2024-01-08 PROCEDURE — 85018 HEMOGLOBIN: CPT

## 2024-01-08 PROCEDURE — 99233 SBSQ HOSP IP/OBS HIGH 50: CPT | Performed by: NURSE PRACTITIONER

## 2024-01-08 PROCEDURE — 85025 COMPLETE CBC W/AUTO DIFF WBC: CPT

## 2024-01-08 PROCEDURE — 97535 SELF CARE MNGMENT TRAINING: CPT

## 2024-01-08 PROCEDURE — 6360000002 HC RX W HCPCS: Performed by: INTERNAL MEDICINE

## 2024-01-08 PROCEDURE — 6370000000 HC RX 637 (ALT 250 FOR IP): Performed by: INTERNAL MEDICINE

## 2024-01-08 PROCEDURE — P9047 ALBUMIN (HUMAN), 25%, 50ML: HCPCS | Performed by: INTERNAL MEDICINE

## 2024-01-08 PROCEDURE — 86850 RBC ANTIBODY SCREEN: CPT

## 2024-01-08 PROCEDURE — 80048 BASIC METABOLIC PNL TOTAL CA: CPT

## 2024-01-08 PROCEDURE — 2060000000 HC ICU INTERMEDIATE R&B

## 2024-01-08 PROCEDURE — 94640 AIRWAY INHALATION TREATMENT: CPT

## 2024-01-08 PROCEDURE — 36415 COLL VENOUS BLD VENIPUNCTURE: CPT

## 2024-01-08 PROCEDURE — 6370000000 HC RX 637 (ALT 250 FOR IP): Performed by: NURSE PRACTITIONER

## 2024-01-08 PROCEDURE — 86900 BLOOD TYPING SEROLOGIC ABO: CPT

## 2024-01-08 PROCEDURE — 97116 GAIT TRAINING THERAPY: CPT

## 2024-01-08 PROCEDURE — 94761 N-INVAS EAR/PLS OXIMETRY MLT: CPT

## 2024-01-08 PROCEDURE — 86901 BLOOD TYPING SEROLOGIC RH(D): CPT

## 2024-01-08 PROCEDURE — C9113 INJ PANTOPRAZOLE SODIUM, VIA: HCPCS | Performed by: INTERNAL MEDICINE

## 2024-01-08 PROCEDURE — P9016 RBC LEUKOCYTES REDUCED: HCPCS

## 2024-01-08 PROCEDURE — 36430 TRANSFUSION BLD/BLD COMPNT: CPT

## 2024-01-08 PROCEDURE — 85014 HEMATOCRIT: CPT

## 2024-01-08 RX ORDER — ALBUMIN (HUMAN) 12.5 G/50ML
12.5 SOLUTION INTRAVENOUS EVERY 8 HOURS
Status: COMPLETED | OUTPATIENT
Start: 2024-01-08 | End: 2024-01-10

## 2024-01-08 RX ORDER — SODIUM CHLORIDE 9 MG/ML
INJECTION, SOLUTION INTRAVENOUS PRN
Status: DISCONTINUED | OUTPATIENT
Start: 2024-01-08 | End: 2024-01-15 | Stop reason: HOSPADM

## 2024-01-08 RX ADMIN — THIAMINE HCL TAB 100 MG 100 MG: 100 TAB at 08:10

## 2024-01-08 RX ADMIN — FOLIC ACID 1 MG: 1 TABLET ORAL at 08:10

## 2024-01-08 RX ADMIN — METOPROLOL TARTRATE 12.5 MG: 25 TABLET, FILM COATED ORAL at 08:10

## 2024-01-08 RX ADMIN — FUROSEMIDE 10 MG/HR: 10 INJECTION, SOLUTION INTRAMUSCULAR; INTRAVENOUS at 10:34

## 2024-01-08 RX ADMIN — ALBUTEROL SULFATE 2.5 MG: 2.5 SOLUTION RESPIRATORY (INHALATION) at 12:52

## 2024-01-08 RX ADMIN — CEFAZOLIN 2000 MG: 2 INJECTION, POWDER, FOR SOLUTION INTRAMUSCULAR; INTRAVENOUS at 12:40

## 2024-01-08 RX ADMIN — BUDESONIDE INHALATION 500 MCG: 0.5 SUSPENSION RESPIRATORY (INHALATION) at 08:30

## 2024-01-08 RX ADMIN — ALBUMIN HUMAN 25 G: 0.25 SOLUTION INTRAVENOUS at 04:17

## 2024-01-08 RX ADMIN — PANTOPRAZOLE SODIUM 40 MG: 40 INJECTION, POWDER, FOR SOLUTION INTRAVENOUS at 20:19

## 2024-01-08 RX ADMIN — Medication 1 CAPSULE: at 08:10

## 2024-01-08 RX ADMIN — FERROUS SULFATE TAB 325 MG (65 MG ELEMENTAL FE) 325 MG: 325 (65 FE) TAB at 08:10

## 2024-01-08 RX ADMIN — CYANOCOBALAMIN TAB 1000 MCG 500 MCG: 1000 TAB at 08:11

## 2024-01-08 RX ADMIN — ARFORMOTEROL TARTRATE 15 MCG: 15 SOLUTION RESPIRATORY (INHALATION) at 21:08

## 2024-01-08 RX ADMIN — Medication 10 ML: at 20:19

## 2024-01-08 RX ADMIN — FUROSEMIDE 10 MG/HR: 10 INJECTION, SOLUTION INTRAMUSCULAR; INTRAVENOUS at 20:24

## 2024-01-08 RX ADMIN — CEFAZOLIN 2000 MG: 2 INJECTION, POWDER, FOR SOLUTION INTRAMUSCULAR; INTRAVENOUS at 20:23

## 2024-01-08 RX ADMIN — Medication 1 CAPSULE: at 16:30

## 2024-01-08 RX ADMIN — ALBUTEROL SULFATE 2.5 MG: 2.5 SOLUTION RESPIRATORY (INHALATION) at 08:29

## 2024-01-08 RX ADMIN — ALBUMIN (HUMAN) 12.5 G: 0.25 INJECTION, SOLUTION INTRAVENOUS at 16:37

## 2024-01-08 RX ADMIN — ALLOPURINOL 200 MG: 100 TABLET ORAL at 08:10

## 2024-01-08 RX ADMIN — PANTOPRAZOLE SODIUM 40 MG: 40 INJECTION, POWDER, FOR SOLUTION INTRAVENOUS at 08:11

## 2024-01-08 RX ADMIN — CEFAZOLIN 2000 MG: 2 INJECTION, POWDER, FOR SOLUTION INTRAMUSCULAR; INTRAVENOUS at 04:14

## 2024-01-08 RX ADMIN — ARFORMOTEROL TARTRATE 15 MCG: 15 SOLUTION RESPIRATORY (INHALATION) at 08:29

## 2024-01-08 RX ADMIN — BUDESONIDE INHALATION 500 MCG: 0.5 SUSPENSION RESPIRATORY (INHALATION) at 21:08

## 2024-01-08 RX ADMIN — INSULIN LISPRO 5 UNITS: 100 INJECTION, SOLUTION INTRAVENOUS; SUBCUTANEOUS at 12:36

## 2024-01-08 RX ADMIN — POLYETHYLENE GLYCOL-3350 AND ELECTROLYTES 2000 ML: 236; 6.74; 5.86; 2.97; 22.74 POWDER, FOR SOLUTION ORAL at 17:30

## 2024-01-08 RX ADMIN — ALBUTEROL SULFATE 2.5 MG: 2.5 SOLUTION RESPIRATORY (INHALATION) at 21:08

## 2024-01-08 RX ADMIN — STANDARDIZED SENNA CONCENTRATE AND DOCUSATE SODIUM 2 TABLET: 8.6; 5 TABLET ORAL at 08:11

## 2024-01-08 RX ADMIN — INSULIN LISPRO 5 UNITS: 100 INJECTION, SOLUTION INTRAVENOUS; SUBCUTANEOUS at 16:30

## 2024-01-08 RX ADMIN — ALBUTEROL SULFATE 2.5 MG: 2.5 SOLUTION RESPIRATORY (INHALATION) at 16:38

## 2024-01-08 RX ADMIN — Medication 10 ML: at 08:11

## 2024-01-08 RX ADMIN — METOLAZONE 10 MG: 2.5 TABLET ORAL at 08:10

## 2024-01-08 RX ADMIN — SIMETHICONE 80 MG: 80 TABLET, CHEWABLE ORAL at 08:11

## 2024-01-08 RX ADMIN — INSULIN LISPRO 5 UNITS: 100 INJECTION, SOLUTION INTRAVENOUS; SUBCUTANEOUS at 08:11

## 2024-01-08 RX ADMIN — METOPROLOL TARTRATE 12.5 MG: 25 TABLET, FILM COATED ORAL at 20:18

## 2024-01-08 RX ADMIN — FERROUS SULFATE TAB 325 MG (65 MG ELEMENTAL FE) 325 MG: 325 (65 FE) TAB at 16:30

## 2024-01-08 RX ADMIN — TIOTROPIUM BROMIDE INHALATION SPRAY 2 PUFF: 3.12 SPRAY, METERED RESPIRATORY (INHALATION) at 08:29

## 2024-01-08 ASSESSMENT — PAIN SCALES - GENERAL
PAINLEVEL_OUTOF10: 0
PAINLEVEL_OUTOF10: 0

## 2024-01-08 NOTE — PLAN OF CARE
provider's view of condition  2. Facilitate patient and family articulation of goals for care  3. Help patient and family identify pros/cons of alternative solutions  4. Provide information as requested by patient/family  5. Respect patient/family right to receive or not to receive information  6. Serve as a liaison between patient and family and health care team  7. Initiate Consults from Ethics, Palliative Care or initiate Family Care Conference as is appropriate  Outcome: Progressing     Problem: Confusion  Goal: Confusion, delirium, dementia, or psychosis is improved or at baseline  Description: INTERVENTIONS:  1. Assess for possible contributors to thought disturbance, including medications, impaired vision or hearing, underlying metabolic abnormalities, dehydration, psychiatric diagnoses, and notify attending LIP  2. [place] high risk fall precautions, as indicated  3. Provide frequent short contacts to provide reality reorientation, refocusing and direction  4. Decrease environmental stimuli, including noise as appropriate  5. Monitor and intervene to maintain adequate nutrition, hydration, elimination, sleep and activity  6. If unable to ensure safety without constant attention obtain sitter and review sitter guidelines with assigned personnel  7. Initiate Psychosocial CNS and Spiritual Care consult, as indicated  Outcome: Progressing     Problem: Behavior  Goal: Pt/Family maintain appropriate behavior and adhere to behavioral management agreement, if implemented  Description: INTERVENTIONS:  1. Assess patient/family's coping skills and  non-compliant behavior (including use of illegal substances)  2. Notify security of behavior or suspected illegal substances which indicate the need for search of the family and/or belongings  3. Encourage verbalization of thoughts and concerns in a socially appropriate manner  4. Utilize positive, consistent limit setting strategies supporting safety of patient, staff and

## 2024-01-08 NOTE — PROGRESS NOTES
[facility] - Inpatient Rehabilitation Department   Phone: [phone]    Physical Therapy                [x] Daily Treatment Note         [] Discharge Summary      Patient: Ishmael Gr   : 1945   MRN: [id number]   Date of Service:  2024  Admitting Diagnosis: ABRAHAM (acute kidney injury) (HCC)    Current Admission Summary: The pt was admitted s/p a fall.  He has severe back and R hip pain.  He discharged home from the hospital a few days before he was readmitted.     Updated 24: acute GI bleed, s/p 3 units PRBC, EGD (multiple antral erosions that could have bled if patient is on blood thinners. Gastric biopsies to check for H. Pylori); ABRAAHM; acute on chronic hypoxic and hypercapnic respiratory failure secondary to COPD exacerbation    Past Medical History:  has a past medical history of Anemia, COPD (chronic obstructive pulmonary disease) ([facility]), Diabetes mellitus (HCC), Edema, GI (gastrointestinal bleed), Gout, Hypertension, Morbid obesity (HCC), Neuropathy, Obstructive sleep apnea, and Peripheral vascular disease ([facility]).  Past Surgical History:  has a past surgical history that includes Appendectomy; Total hip arthroplasty; Colonoscopy (N/A, 2019); and Upper gastrointestinal endoscopy (N/A, 2023).    Discharge Recommendations: Ishmael Gr scored a 14/24 on the AM-PAC short mobility form. Current research shows that an AM-PAC score of 17 or less is typically not associated with a discharge to the patient's home setting. Based on the patient's AM-PAC score and their current functional mobility deficits, it is recommended that the patient have 3-5 sessions per week of Physical Therapy at d/c to increase the patient's independence.  Please see assessment section for further patient specific details.  If patient discharges prior to next session this note will serve as a discharge summary.  Please see below for the latest assessment towards goals.     DME Required For Discharge: DME to  Following:   WFL    Education  Barriers To Learning: none  Patient Education: patient educated on PT role and benefits, plan of care, transfer training, discharge recommendations, use of call light, PT recommendations   Learning Assessment:  patient verbalizes understanding, would benefit from continued reinforcement    Assessment  Activity Tolerance: Poor; limited by edema in B UE/LE and generalized weakness   Impairments Requiring Therapeutic Intervention: decreased functional mobility, decreased ROM, decreased strength, decreased endurance, decreased sensation, decreased balance, increased pain  Prognosis: fair, guarded secondary to readmission to hospital  Clinical Assessment: Patient continues to have generalized weakness requiring assistance to stand.  Once standing, he is able to ambulate w/ RW and CGA for safety but only able to tolerate standing for approximately 5 minutes before needing to sit d/t fatigue.  Recommend 24 hour assist and continued therapy at d/c for endurance deficits.    Safety Interventions: patient left in chair, chair alarm in place, call light within reach, gait belt, patient at risk for falls, and nurse notified    Plan  Frequency: 3-5 x/per week  Current Treatment Recommendations: strengthening, ROM, balance training, functional mobility training, transfer training, gait training, endurance training, neuromuscular re-education, and safety education    Goals  Patient Goals: Improve breathing, return home.  Short Term Goals:  Time Frame: To be met prior to DC  Patient will complete bed mobility at maximum assistance   Patient will complete transfers at maximum assistance   Patient will ambulate 10 ft with use of rolling walker at maximum assistance  Patient to maintain standing at moderate assistance for 2 minutes.    Above goals reviewed on 1/8/2024.  All goals are ongoing at this time unless indicated above.      Therapy Session Time      Individual Group Co-treatment   Time In

## 2024-01-08 NOTE — PROGRESS NOTES
Progress Note    Patient Ishmael Gr  MRN: [id number]  YOB: 1945 Age: 78 y.o. Sex: male  Room: [place] [address]       Admitting Physician: Davey Farias MD   Date of Admission: 12/20/2023  4:40 AM   Primary Care Physician: Anthony Bravo MD     Subjective:  Ishmael Gr was seen and examined.  We were asked to reevaluate patient for persistent anemia despite transfusion.      Patient was initially seen as a consult for anemia on 12/28/2023.  At that time he reported to be having dark stools.  Other medical issues include acute on chronic respiratory failure requiring O2 at 6 L.  He underwent EGD on 12/29/2023.  Noted to have multiple antral erosions/erythema.  Pathology negative for Helicobacter pylori infection.  Since the EGD patient received 2 units of PRBCs.  Hemoglobin this morning was 6.9.    No evidence for any overt bleeding or any visible blood    --Patient denies any new complaints    ROS:  Constitutional: Denies fever, no change in appetite  Respiratory: Denies cough or shortness of breath  Cardiovascular: Denies chest pain or edema    Objective:  Vital Signs:   Vitals:    01/08/24 1455   BP: (!) 117/56   Pulse: 92   Resp: 18   Temp: 97.3 °F (36.3 °C)   SpO2: 99%         Physical Exam:  Constitutional: Alert and oriented x 4. No acute distress.   HEENT: Sclera anicteric, mucosal membranes moist  Cardiovascular: Regular rate and rhythm.  No murmurs.  Respiratory: Respirations nonlabored, no crepitus  GI: Abdomen nondistended, soft, and nontender.  Normal active bowel sounds.  No masses palpable.   Rectal: Deferred  Musculoskeletal:  No pitting edema of the lower legs.  Neurological: Gross memory appears intact.      Intake/Output:    Intake/Output Summary (Last 24 hours) at 1/8/2024 1510  Last data filed at 1/8/2024 1455  Gross per 24 hour   Intake 649 ml   Output 3450 ml   Net -2801 ml        Current Medications:  Current Facility-Administered Medications   Medication Dose   5-40 mL IntraVENous 2 times per day Alexsander Nj MD   10 mL at 01/07/24 0841    sodium chloride flush 0.9 % injection 5-40 mL  5-40 mL IntraVENous PRN Alexsander Nj MD        0.9 % sodium chloride infusion  25 mL IntraVENous PRN Alexsander Nj  mL/hr at 01/07/24 0504 25 mL at 01/07/24 0504    albuterol (PROVENTIL) (2.5 MG/3ML) 0.083% nebulizer solution 2.5 mg  2.5 mg Nebulization 4x Daily RT Alexsander Nj MD   2.5 mg at 01/08/24 1252    lactobacillus (CULTURELLE) capsule 1 capsule  1 capsule Oral BID Alexsander Melgoza MD   1 capsule at 01/08/24 0810    insulin lispro (HUMALOG) injection vial 5 Units  5 Units SubCUTAneous TID  Alexsander Nj MD   5 Units at 01/08/24 1236    insulin lispro (HUMALOG) injection vial 0-4 Units  0-4 Units SubCUTAneous Nightly Alexsander Nj MD   4 Units at 12/25/23 2018    dextrose bolus 10% 125 mL  125 mL IntraVENous PRN Alexsander Nj MD        Or    dextrose bolus 10% 250 mL  250 mL IntraVENous PRN Alexsander Nj MD        glucagon injection 1 mg  1 mg SubCUTAneous PRN Alexsander Nj MD        dextrose 10 % infusion   IntraVENous Continuous PRN Alexsander Nj MD        insulin lispro (HUMALOG) injection vial 0-8 Units  0-8 Units SubCUTAneous TID  Alexsander Nj MD   2 Units at 12/30/23 1452    simethicone (MYLICON) chewable tablet 80 mg  80 mg Oral Q6H PRN Alexsander Nj MD   80 mg at 01/08/24 0811    arformoterol tartrate (BROVANA) nebulizer solution 15 mcg  15 mcg Nebulization BID RT Alexsander Nj MD   15 mcg at 01/08/24 0829    budesonide (PULMICORT) nebulizer suspension 500 mcg  0.5 mg Nebulization BID RT Alexsander Nj MD   500 mcg at 01/08/24 0830    albuterol sulfate HFA (PROVENTIL;VENTOLIN;PROAIR) 108 (90 Base) MCG/ACT inhaler 2 puff  2 puff Inhalation Q6H PRN Alexsander Nj MD   2 puff at 12/21/23 0205    allopurinol (ZYLOPRIM) tablet 200 mg  200 mg Oral Daily Alexsander Nj MD   200 mg at 01/08/24 0810    [Held by provider] apixaban (ELIQUIS) tablet 5 mg  5 mg Oral BID Davey Farias MD   5 mg at 12/28/23 0935    [Held by

## 2024-01-08 NOTE — PROGRESS NOTES
[facility]   Cardiology Progress Note     Date: 1/8/2024  Admit Date: 12/20/2023     Reason for consultation:     Chief Complaint   Patient presents with    Fall     Pt arrived to ED via [facility] ems from home after a fall. Per pt he was getting up to go to the bathroom and fell. Pt has knot above left eye and skin tear to left forearm. Pt on blood thinners. Per pt he is on 6L of oxygen at all times. Pt A&Ox4       History of Present Illness: History obtained from patient and medical record.     Ishmael Gr is a 78 y.o. male presented to the hospital with complaints of fall. I have been asked to provide consultation regarding further management and testing. The patient reports that he can walk at baseline, but cannot walk up a flight of stairs or 4 city blocks. He denies exertional chest pain or pressure. No nausea or vomiting. He states that he cannot tell when he is in atrial fibrillation. He reports that he is on his baseline 6L of oxygen. He states that his legs have been more edematous. He denies any palpitations, nausea or vomiting. He was found to have anemia and cardiology was consulted to stop the blood thinner.    (per consult note)    Interval Hx: Today, he is feeling ok. Up in chair. No specific complaints. Diuresing with lasix gtt.     Patient seen and examined. Clinical notes reviewed. Telemetry reviewed.  No new complaints today. No major events overnight.   Denies having chest pain, palpitations, shortness of breath, orthopnea/PND, cough, or dizziness at the time of this visit.      Past Medical History:  Past Medical History:   Diagnosis Date    Anemia     COPD (chronic obstructive pulmonary disease) (HCC)     Diabetes mellitus (HCC)     Edema     GI (gastrointestinal bleed)     Gout     Hypertension     Morbid obesity (HCC)     Neuropathy     Obstructive sleep apnea     uses CPAP    Peripheral vascular disease (HCC)         Past Surgical History:    has a past surgical history  with preserved ejection fraction ([facility]) 09/07/2022    Ventricular ectopy 09/07/2022    Leg swelling 09/07/2022    Encephalomalacia 09/03/2022    Anemia 01/01/2024    Stage 3 chronic kidney disease ([facility]) 01/01/2024    Paroxysmal atrial fibrillation ([facility]) 12/29/2023    Gastrointestinal hemorrhage with melena 12/29/2023    Receiving intravenous antibiotic treatment as outpatient 12/28/2023    Fall 12/22/2023    Acute right-sided low back pain with right-sided sciatica 12/22/2023    Right hip pain 12/22/2023    Fall at home 12/22/2023    Scoliosis of lumbar spine 12/22/2023    Pain due to hip joint prosthesis, initial encounter ([facility]) 12/22/2023    Aspiration pneumonia ([facility]) 12/22/2023    Bacteremia due to methicillin susceptible Staphylococcus aureus (MSSA) 12/22/2023    ABRAHAM (acute kidney injury) ([facility]) 12/20/2023    Chronic obstructive pulmonary disease ([facility]) 12/16/2023    Acute on chronic heart failure with preserved ejection fraction ([facility]) 12/01/2023    PVD (peripheral vascular disease) ([facility]) 11/27/2023    Sepsis ([facility]) 11/27/2023    NSTEMI (non-ST elevated myocardial infarction) ([facility]) 11/27/2023    Acute on chronic respiratory failure with hypercapnia ([facility]) 11/27/2023    Pneumonia of both lower lobes due to infectious organism 11/27/2023    Pulmonary nodule 05/03/2023    Diabetes education, encounter for 02/21/2023    Personal history of tobacco use, presenting hazards to health 08/17/2020    Aneurysm of abdominal aorta ([facility]) 04/28/2019    Hyperlipidemia associated with type 2 diabetes mellitus ([facility]) 11/15/2016    Diastolic dysfunction 11/15/2016    COPD, severe ([facility]) 10/21/2015    Open back wound 07/07/2014    Vitamin D deficiency 10/30/2013    Morbid obesity due to excess calories ([facility])     Acute sinusitis 11/30/2012    Venous insufficiency 11/05/2012    Chronic respiratory failure ([facility]) 07/31/2012    STEF (obstructive sleep apnea) 07/31/2012    Arthritis 10/20/2010    Diabetes mellitus type 2 in obese ([facility])

## 2024-01-08 NOTE — CONSULTS
[facility]    Consult Note      Requesting Physician:  The hospitalist    CHIEF COMPLAINT:  Weakness      HISTORY OF PRESENT ILLNESS:    Mr. Gr  is a 78 y.o. male we are seeing in consultation for persistent anemia.  He was admitted for COPD exacerbation.  He fell at home.  He was found to have severe anemia.  He denies abdominal pain, diarrhea or constipation.  He denies hematemesis, melena or hematochezia.  He is getting PRBC transfusion at present.        ICD-10-CM    1. Fall, initial encounter  W19.XXXA       2. Acute right-sided low back pain with right-sided sciatica  M54.41       3. Right hip pain  M25.551       4. Anemia, unspecified type  D64.9 Surgical Pathology     Surgical Pathology             Past Medical History:  Past Medical History:   Diagnosis Date    Anemia     COPD (chronic obstructive pulmonary disease) (HCC)     Diabetes mellitus (HCC)     Edema     GI (gastrointestinal bleed)     Gout     Hypertension     Morbid obesity (HCC)     Neuropathy     Obstructive sleep apnea     uses CPAP    Peripheral vascular disease (HCC)        Past Surgical History:  Past Surgical History:   Procedure Laterality Date    APPENDECTOMY      COLONOSCOPY N/A 5/7/2019    COLONOSCOPY DIAGNOSTIC performed by Anthony Yoon MD at [facility] ENDOSCOPY    TOTAL HIP ARTHROPLASTY      bilateral    UPPER GASTROINTESTINAL ENDOSCOPY N/A 12/29/2023    EGD BIOPSY and rectal exam performed by Alexsander Nj MD at [facility] ENDOSCOPY       Current Medications:  Current Facility-Administered Medications   Medication Dose Route Frequency Provider Last Rate Last Admin    0.9 % sodium chloride infusion   IntraVENous PRN Davey Farias MD        albumin human 25% IV solution 12.5 g  12.5 g IntraVENous Q8H Jame Carmona MD 40 mL/hr at 01/08/24 1637 12.5 g at 01/08/24 1637    polyethylene glycol

## 2024-01-08 NOTE — PROGRESS NOTES
[facility] - Inpatient Rehabilitation Department   Phone: [phone]    Occupational Therapy    [] Initial Evaluation            [x] Daily Treatment Note         [] Discharge Summary      Patient: Ishmael Gr   : 1945   MRN: [id number]   Date of Service:  2024    Admitting Diagnosis:  ABRAHAM (acute kidney injury) (HCC)  Current Admission Summary:   Ishmael Gr is a 78 y.o. male who presents to the ED for evaluation for fall.  Patient was just discharged from the hospital after being brought in for respiratory issues and a fall at that time.  States today he fell and hit his head.  Does not fully remember it.  He was on his way to the bathroom when it happened.  States the only pain he is feeling is in his low back and going down his right leg.  Denies any new numbness or tingling or weakness.  Does have significant respiratory issues and feels short of breath initially with it but states he feels back to normal at this time.  He is on 6 L normally.   Past Medical History:  has a past medical history of Anemia, COPD (chronic obstructive pulmonary disease) ([facility]), Diabetes mellitus (HCC), Edema, GI (gastrointestinal bleed), Gout, Hypertension, Morbid obesity (HCC), Neuropathy, Obstructive sleep apnea, and Peripheral vascular disease (HCC).  Past Surgical History:  has a past surgical history that includes Appendectomy; Total hip arthroplasty; Colonoscopy (N/A, 2019); and Upper gastrointestinal endoscopy (N/A, 2023).    Discharge Recommendations: Ishmael Gr scored a 15/24 on the AM-PAC ADL Inpatient form. Current research shows that an AM-PAC score of 17 or less is typically not associated with a discharge to the patient's home setting. Based on the patient's AM-PAC score and their current ADL deficits, it is recommended that the patient have 3-5 sessions per week of Occupational Therapy at d/c to increase the patient's independence.  Please see assessment section for further    Pain: 0/10,  Pain Interventions: not applicable         Activities of Daily Living  Basic Activities of Daily Living  Grooming: stand by assistance moderate assistance in stance at sink, pt brushes teeth with SBA, max A for shaving seated in recliner, pt fatigued with mobility and requires increased assist  Instrumental Activities of Daily Living  No IADL completed on this date.    Functional Mobility  Bed Mobility:  Bed mobility not completed on this date.  Comments: Patient with increased time needed for bed mobility, patient with use of side rail  Transfers:  Sit to stand transfer:2 person assistance with mod Ax2   Stand to sit transfer: minimal assistance  Comments: recliner to FWW    Functional Mobility  Functional Mobility Activity: to/from bathroom  Device Use: rolling walker  Required Assistance: contact guard assistance  Comment: fatigues with standing at sink, no LOB noted, declines further mobility    Other Therapeutic Interventions      Second session:    OT/PT returned to assist pt to toilet, pt declines ambulating to bathroom but agreeable to BSC. Pt STS from recliner to FWW mod Ax2 and pivots ~3ft to BSC CGA. Pt has soft BM on toilet, RN notified. Completes STS from BSC to FWW min A x2. Requires Dep A for hygiene in stance, no brief donned. Pt pivots back to recliner CGA with FWW. Pt fatigued and SOB with activity, VSS. Pt left seated in recliner, chair alarm in place, all needs met, RN notified        Functional Outcomes  AM-PAC Inpatient Daily Activity Raw Score: 15    Cognition  WFL  Insights: decreased awareness of deficits  Orientation:    alert and oriented x 4  Command Following:   accurately follows one step commands     Education  Barriers To Learning: none  Patient Education: patient educated on goals, OT role and benefits, plan of care, ADL adaptive strategies, energy conservation, transfer training, discharge recommendations  Learning Assessment:  patient verbalizes understanding, would

## 2024-01-08 NOTE — PLAN OF CARE
Problem: Safety - Adult  Goal: Free from fall injury  1/8/2024 0329 by Britney Saba, RN  Outcome: Progressing     Problem: Pain  Goal: Verbalizes/displays adequate comfort level or baseline comfort level  Outcome: Progressing     Problem: Skin/Tissue Integrity  Goal: Absence of new skin breakdown  Description: 1.  Monitor for areas of redness and/or skin breakdown  2.  Assess vascular access sites hourly  3.  Every 4-6 hours minimum:  Change oxygen saturation probe site  4.  Every 4-6 hours:  If on nasal continuous positive airway pressure, respiratory therapy assess nares and determine need for appliance change or resting period.  Outcome: Progressing     Problem: ABCDS Injury Assessment  Goal: Absence of physical injury  Outcome: Progressing

## 2024-01-08 NOTE — CARE COORDINATION
Discharge Planning;  The precert which was restarted on 1/4/24 is Expiring today -1/8/24 as per Stella  ( [phone]) [facility].

## 2024-01-08 NOTE — PROGRESS NOTES
MD Leandra Sandoval MD Samir Brahmbhatt, MD                  Office: [phone]                 Fax: [phone]         [employer]                          NEPHROLOGY INPATIENT PROGRESS  NOTE:     PATIENT NAME: Ishmael Gr  : 1945  MRN: [id number]      IMPRESSION/RECOMMENDATIONS:    Right hip pain [M25.551]  ABRAHAM (acute kidney injury) (HCC) [N17.9]  Fall, initial encounter [W19.XXXA]  Acute right-sided low back pain with right-sided sciatica [M54.41]      ABRAHAM-baseline creatinine 1.1.  Probably from prerenal azotemia in the setting of relative hypotension plus contrast exposure plus COPD exacerbation.  Now probable ATN.  Nonoliguric. -3L since admission.  Still hypervolemic.   Crea 1.9 from 2 from 1.9.       Subjective / interval history / nephrology update / medical decision making:   Patient was seen comfortably sitting up in chair,  PT-OT at bedside     Reported no active complaints/distress,   Renal labs noted.     No SOB  Stable on NC but still 6 L, similar dose as at home, as per patient    But leg edema -still present currently  Switcedh to Lasix 10mg/hr.    Albumin 25g to 12.5g TID.    Added Metolazone daily  Use Midodrine PRN    Follow Hgb.  RAJESH subQ    Checked for paraproteinemia.-Negative SPEP, negative UPEP,   UA neg protein.  Has moderate then worsening to large, blood with 17rbc.  Albumin 2.2.  SPEP neg.  Elevated kappa and lambda but normal ratio.    Iron stores -recently, not lower, could have likely underlying anemia of chronic disease-given RAJESH 1/3/24  -consult hematology    Follow  24H urine protein. -Only 0.6  g    May need renal biopsy. -With improving renal function can be done as an outpatient.  If still indicated.    Voltaren listed in home meds-d/w pt to avoid      D/c plans - continue inpatient stay for fluid overload management  Patient was lost for follow-up with nephrology  -: follow up w/ us at  [facility]  in 2 weeks after d/c. (I updated  inhaler 2 puff, 2 puff, Inhalation, Daily RT        PHYSICAL EXAM:   Weight at 149.7kg from peak of 157.3kg  Vitals:  BP (!) 106/49   Pulse 91   Temp 97.2 °F (36.2 °C)   Resp 18   Ht 1.829 m (6')   Wt (!) 149.1 kg (328 lb 11.2 oz)   SpO2 99%   BMI 44.58 kg/m²     General: Awake, no acute distress , obese   HENT: Atraumatic, normocephalic   Eyes: Normal conjunctiva, Non-icteral sclera.   Neck: Supple, JVD not visible.   CVS:  Heart sounds are normal.   RS: rhonchi b/l  Abd: Soft , bowel sounds are normal, distension .    Extremities/MSK:   2+ Edema     DATA:    CBC:   Lab Results   Component Value Date/Time    WBC 4.3 01/08/2024 02:51 AM    RBC 2.25 01/08/2024 02:51 AM    HGB 6.9 01/08/2024 02:51 AM    HCT 21.6 01/08/2024 02:51 AM    MCV 96.0 01/08/2024 02:51 AM    MCH 30.8 01/08/2024 02:51 AM    MCHC 32.0 01/08/2024 02:51 AM    RDW 16.8 01/08/2024 02:51 AM     01/08/2024 02:51 AM    MPV 6.3 01/08/2024 02:51 AM     BMP:   Lab Results   Component Value Date/Time     01/08/2024 02:51 AM    K 3.7 01/08/2024 02:51 AM    CL 92 01/08/2024 02:51 AM    CO2 32 01/08/2024 02:51 AM    BUN 27 01/08/2024 02:51 AM    CREATININE 1.5 01/08/2024 02:51 AM    CALCIUM 9.1 01/08/2024 02:51 AM    GFRAA 60 09/27/2022 10:30 AM    GFRAA >60 04/17/2013 08:55 AM    LABGLOM 48 01/08/2024 02:51 AM    GLUCOSE 103 01/08/2024 02:51 AM   Magnesium:    Lab Results   Component Value Date/Time    MG 2.00 01/07/2024 06:14 AM   Phosphorus:    Lab Results   Component Value Date/Time    PHOS 4.1 01/01/2024 07:27 AM     proBNP 5515  Albumin 3.3    Urinalysis shows specific gravity of 1.015, pH 5, blood large, protein trace, small leukocyte esterase, WBC 11, RBC 4-5    VBG pH 7.26/pCO2 75    Cxray  IMPRESSION:  Stable cardiomegaly with mild central pulmonary congestion or pulmonary  artery hypertension which is more prominent.    12/16 CT PE  IMPRESSION:  Limited exam due to the extensive motion artifact and decreased opacification  of

## 2024-01-08 NOTE — CARE COORDINATION
Discharge Planning;  Patient's SN[facility] precert  for [facility] is expiring today , patient still not yet Medically stable. He will need a third Precert restarted.

## 2024-01-09 ENCOUNTER — APPOINTMENT (OUTPATIENT)
Dept: CT IMAGING | Age: 79
DRG: 682 | End: 2024-01-09
Payer: MEDICARE

## 2024-01-09 ENCOUNTER — ANESTHESIA EVENT (OUTPATIENT)
Dept: ENDOSCOPY | Age: 79
End: 2024-01-09
Payer: MEDICARE

## 2024-01-09 LAB
ALBUMIN SERPL-MCNC: 3.5 G/DL (ref 3.4–5)
ANION GAP SERPL CALCULATED.3IONS-SCNC: 12 MMOL/L (ref 3–16)
BASOPHILS # BLD: 0 K/UL (ref 0–0.2)
BASOPHILS NFR BLD: 0.6 %
BUN SERPL-MCNC: 26 MG/DL (ref 7–20)
CALCIUM SERPL-MCNC: 9.3 MG/DL (ref 8.3–10.6)
CHLORIDE SERPL-SCNC: 91 MMOL/L (ref 99–110)
CO2 SERPL-SCNC: 37 MMOL/L (ref 21–32)
CREAT SERPL-MCNC: 1.6 MG/DL (ref 0.8–1.3)
DEPRECATED RDW RBC AUTO: 17.8 % (ref 12.4–15.4)
EOSINOPHIL # BLD: 0.2 K/UL (ref 0–0.6)
EOSINOPHIL NFR BLD: 4.3 %
GFR SERPLBLD CREATININE-BSD FMLA CKD-EPI: 44 ML/MIN/{1.73_M2}
GLUCOSE BLD-MCNC: 116 MG/DL (ref 70–99)
GLUCOSE BLD-MCNC: 122 MG/DL (ref 70–99)
GLUCOSE BLD-MCNC: 160 MG/DL (ref 70–99)
GLUCOSE BLD-MCNC: 88 MG/DL (ref 70–99)
GLUCOSE SERPL-MCNC: 110 MG/DL (ref 70–99)
HCT VFR BLD AUTO: 24.3 % (ref 40.5–52.5)
HGB BLD-MCNC: 7.9 G/DL (ref 13.5–17.5)
INR PPP: 1.36 (ref 0.84–1.16)
LYMPHOCYTES # BLD: 0.6 K/UL (ref 1–5.1)
LYMPHOCYTES NFR BLD: 17.6 %
MAGNESIUM SERPL-MCNC: 1.7 MG/DL (ref 1.8–2.4)
MCH RBC QN AUTO: 30.7 PG (ref 26–34)
MCHC RBC AUTO-ENTMCNC: 32.7 G/DL (ref 31–36)
MCV RBC AUTO: 93.8 FL (ref 80–100)
MONOCYTES # BLD: 0.4 K/UL (ref 0–1.3)
MONOCYTES NFR BLD: 10.7 %
NEUTROPHILS # BLD: 2.4 K/UL (ref 1.7–7.7)
NEUTROPHILS NFR BLD: 66.8 %
PERFORMED ON: ABNORMAL
PERFORMED ON: NORMAL
PHOSPHATE SERPL-MCNC: 3.5 MG/DL (ref 2.5–4.9)
PLATELET # BLD AUTO: 325 K/UL (ref 135–450)
PMV BLD AUTO: 6.3 FL (ref 5–10.5)
POTASSIUM SERPL-SCNC: 3.5 MMOL/L (ref 3.5–5.1)
PROTHROMBIN TIME: 16.7 SEC (ref 11.5–14.8)
RBC # BLD AUTO: 2.59 M/UL (ref 4.2–5.9)
SODIUM SERPL-SCNC: 140 MMOL/L (ref 136–145)
WBC # BLD AUTO: 3.5 K/UL (ref 4–11)

## 2024-01-09 PROCEDURE — 6360000002 HC RX W HCPCS: Performed by: RADIOLOGY

## 2024-01-09 PROCEDURE — 6360000002 HC RX W HCPCS: Performed by: INTERNAL MEDICINE

## 2024-01-09 PROCEDURE — 85610 PROTHROMBIN TIME: CPT

## 2024-01-09 PROCEDURE — 6370000000 HC RX 637 (ALT 250 FOR IP): Performed by: INTERNAL MEDICINE

## 2024-01-09 PROCEDURE — 85025 COMPLETE CBC W/AUTO DIFF WBC: CPT

## 2024-01-09 PROCEDURE — 2580000003 HC RX 258: Performed by: INTERNAL MEDICINE

## 2024-01-09 PROCEDURE — 88185 FLOWCYTOMETRY/TC ADD-ON: CPT

## 2024-01-09 PROCEDURE — 99232 SBSQ HOSP IP/OBS MODERATE 35: CPT | Performed by: INTERNAL MEDICINE

## 2024-01-09 PROCEDURE — 97530 THERAPEUTIC ACTIVITIES: CPT

## 2024-01-09 PROCEDURE — 6370000000 HC RX 637 (ALT 250 FOR IP): Performed by: PHYSICIAN ASSISTANT

## 2024-01-09 PROCEDURE — 97535 SELF CARE MNGMENT TRAINING: CPT

## 2024-01-09 PROCEDURE — 88184 FLOWCYTOMETRY/ TC 1 MARKER: CPT

## 2024-01-09 PROCEDURE — 2060000000 HC ICU INTERMEDIATE R&B

## 2024-01-09 PROCEDURE — 88305 TISSUE EXAM BY PATHOLOGIST: CPT

## 2024-01-09 PROCEDURE — 94761 N-INVAS EAR/PLS OXIMETRY MLT: CPT

## 2024-01-09 PROCEDURE — 38221 DX BONE MARROW BIOPSIES: CPT

## 2024-01-09 PROCEDURE — 2700000000 HC OXYGEN THERAPY PER DAY

## 2024-01-09 PROCEDURE — 88313 SPECIAL STAINS GROUP 2: CPT

## 2024-01-09 PROCEDURE — 07DR3ZX EXTRACTION OF ILIAC BONE MARROW, PERCUTANEOUS APPROACH, DIAGNOSTIC: ICD-10-PCS | Performed by: RADIOLOGY

## 2024-01-09 PROCEDURE — C9113 INJ PANTOPRAZOLE SODIUM, VIA: HCPCS | Performed by: INTERNAL MEDICINE

## 2024-01-09 PROCEDURE — 83735 ASSAY OF MAGNESIUM: CPT

## 2024-01-09 PROCEDURE — 36415 COLL VENOUS BLD VENIPUNCTURE: CPT

## 2024-01-09 PROCEDURE — 80069 RENAL FUNCTION PANEL: CPT

## 2024-01-09 PROCEDURE — 94640 AIRWAY INHALATION TREATMENT: CPT

## 2024-01-09 PROCEDURE — P9047 ALBUMIN (HUMAN), 25%, 50ML: HCPCS | Performed by: INTERNAL MEDICINE

## 2024-01-09 PROCEDURE — 6370000000 HC RX 637 (ALT 250 FOR IP): Performed by: NURSE PRACTITIONER

## 2024-01-09 RX ORDER — PEG-3350, SODIUM SULFATE, SODIUM CHLORIDE, POTASSIUM CHLORIDE, SODIUM ASCORBATE AND ASCORBIC ACID 7.5-2.691G
100 KIT ORAL ONCE
Status: COMPLETED | OUTPATIENT
Start: 2024-01-09 | End: 2024-01-09

## 2024-01-09 RX ORDER — POTASSIUM CHLORIDE 20 MEQ/1
40 TABLET, EXTENDED RELEASE ORAL ONCE
Status: COMPLETED | OUTPATIENT
Start: 2024-01-09 | End: 2024-01-09

## 2024-01-09 RX ORDER — MIDAZOLAM HYDROCHLORIDE 2 MG/2ML
INJECTION, SOLUTION INTRAMUSCULAR; INTRAVENOUS PRN
Status: COMPLETED | OUTPATIENT
Start: 2024-01-09 | End: 2024-01-09

## 2024-01-09 RX ORDER — FENTANYL CITRATE 50 UG/ML
INJECTION, SOLUTION INTRAMUSCULAR; INTRAVENOUS PRN
Status: COMPLETED | OUTPATIENT
Start: 2024-01-09 | End: 2024-01-09

## 2024-01-09 RX ORDER — MAGNESIUM SULFATE IN WATER 40 MG/ML
2000 INJECTION, SOLUTION INTRAVENOUS ONCE
Status: COMPLETED | OUTPATIENT
Start: 2024-01-09 | End: 2024-01-09

## 2024-01-09 RX ADMIN — METOLAZONE 10 MG: 2.5 TABLET ORAL at 10:18

## 2024-01-09 RX ADMIN — PANTOPRAZOLE SODIUM 40 MG: 40 INJECTION, POWDER, FOR SOLUTION INTRAVENOUS at 20:37

## 2024-01-09 RX ADMIN — ALBUMIN (HUMAN) 12.5 G: 0.25 INJECTION, SOLUTION INTRAVENOUS at 00:47

## 2024-01-09 RX ADMIN — PEG-3350, SODIUM SULFATE, SODIUM CHLORIDE, POTASSIUM CHLORIDE, SODIUM ASCORBATE AND ASCORBIC ACID 100 G: KIT at 20:33

## 2024-01-09 RX ADMIN — PANTOPRAZOLE SODIUM 40 MG: 40 INJECTION, POWDER, FOR SOLUTION INTRAVENOUS at 10:18

## 2024-01-09 RX ADMIN — ALBUTEROL SULFATE 2.5 MG: 2.5 SOLUTION RESPIRATORY (INHALATION) at 07:46

## 2024-01-09 RX ADMIN — FENTANYL CITRATE 50 MCG: 50 INJECTION, SOLUTION INTRAMUSCULAR; INTRAVENOUS at 09:51

## 2024-01-09 RX ADMIN — PEG-3350, SODIUM SULFATE, SODIUM CHLORIDE, POTASSIUM CHLORIDE, SODIUM ASCORBATE AND ASCORBIC ACID 100 G: KIT at 15:05

## 2024-01-09 RX ADMIN — POTASSIUM CHLORIDE 40 MEQ: 1500 TABLET, EXTENDED RELEASE ORAL at 10:21

## 2024-01-09 RX ADMIN — BUDESONIDE INHALATION 500 MCG: 0.5 SUSPENSION RESPIRATORY (INHALATION) at 07:46

## 2024-01-09 RX ADMIN — TIOTROPIUM BROMIDE INHALATION SPRAY 2 PUFF: 3.12 SPRAY, METERED RESPIRATORY (INHALATION) at 07:46

## 2024-01-09 RX ADMIN — CYANOCOBALAMIN TAB 1000 MCG 500 MCG: 1000 TAB at 10:18

## 2024-01-09 RX ADMIN — INSULIN LISPRO 5 UNITS: 100 INJECTION, SOLUTION INTRAVENOUS; SUBCUTANEOUS at 13:09

## 2024-01-09 RX ADMIN — THIAMINE HCL TAB 100 MG 100 MG: 100 TAB at 10:18

## 2024-01-09 RX ADMIN — ALBUMIN (HUMAN) 12.5 G: 0.25 INJECTION, SOLUTION INTRAVENOUS at 15:58

## 2024-01-09 RX ADMIN — Medication 1 CAPSULE: at 17:07

## 2024-01-09 RX ADMIN — Medication 10 ML: at 20:38

## 2024-01-09 RX ADMIN — METOPROLOL TARTRATE 12.5 MG: 25 TABLET, FILM COATED ORAL at 10:18

## 2024-01-09 RX ADMIN — CEFAZOLIN 2000 MG: 2 INJECTION, POWDER, FOR SOLUTION INTRAMUSCULAR; INTRAVENOUS at 20:43

## 2024-01-09 RX ADMIN — MIDAZOLAM HYDROCHLORIDE 0.5 MG: 1 INJECTION, SOLUTION INTRAMUSCULAR; INTRAVENOUS at 09:39

## 2024-01-09 RX ADMIN — Medication 10 ML: at 10:18

## 2024-01-09 RX ADMIN — POLYETHYLENE GLYCOL-3350 AND ELECTROLYTES 2000 ML: 236; 6.74; 5.86; 2.97; 22.74 POWDER, FOR SOLUTION ORAL at 03:47

## 2024-01-09 RX ADMIN — ALBUMIN (HUMAN) 12.5 G: 0.25 INJECTION, SOLUTION INTRAVENOUS at 05:40

## 2024-01-09 RX ADMIN — ALLOPURINOL 200 MG: 100 TABLET ORAL at 10:18

## 2024-01-09 RX ADMIN — Medication 1 CAPSULE: at 10:18

## 2024-01-09 RX ADMIN — ALBUTEROL SULFATE 2.5 MG: 2.5 SOLUTION RESPIRATORY (INHALATION) at 15:14

## 2024-01-09 RX ADMIN — ALBUTEROL SULFATE 2.5 MG: 2.5 SOLUTION RESPIRATORY (INHALATION) at 11:35

## 2024-01-09 RX ADMIN — OXYCODONE AND ACETAMINOPHEN 1 TABLET: 5; 325 TABLET ORAL at 10:17

## 2024-01-09 RX ADMIN — FERROUS SULFATE TAB 325 MG (65 MG ELEMENTAL FE) 325 MG: 325 (65 FE) TAB at 10:18

## 2024-01-09 RX ADMIN — ARFORMOTEROL TARTRATE 15 MCG: 15 SOLUTION RESPIRATORY (INHALATION) at 20:05

## 2024-01-09 RX ADMIN — MAGNESIUM SULFATE HEPTAHYDRATE 2000 MG: 40 INJECTION, SOLUTION INTRAVENOUS at 10:26

## 2024-01-09 RX ADMIN — ARFORMOTEROL TARTRATE 15 MCG: 15 SOLUTION RESPIRATORY (INHALATION) at 07:46

## 2024-01-09 RX ADMIN — CEFAZOLIN 2000 MG: 2 INJECTION, POWDER, FOR SOLUTION INTRAMUSCULAR; INTRAVENOUS at 13:09

## 2024-01-09 RX ADMIN — ALBUTEROL SULFATE 2.5 MG: 2.5 SOLUTION RESPIRATORY (INHALATION) at 20:04

## 2024-01-09 RX ADMIN — FOLIC ACID 1 MG: 1 TABLET ORAL at 10:18

## 2024-01-09 RX ADMIN — FUROSEMIDE 10 MG/HR: 10 INJECTION, SOLUTION INTRAMUSCULAR; INTRAVENOUS at 19:27

## 2024-01-09 RX ADMIN — CEFAZOLIN 2000 MG: 2 INJECTION, POWDER, FOR SOLUTION INTRAMUSCULAR; INTRAVENOUS at 05:37

## 2024-01-09 RX ADMIN — FUROSEMIDE 10 MG/HR: 10 INJECTION, SOLUTION INTRAMUSCULAR; INTRAVENOUS at 06:36

## 2024-01-09 RX ADMIN — BUDESONIDE INHALATION 500 MCG: 0.5 SUSPENSION RESPIRATORY (INHALATION) at 20:04

## 2024-01-09 ASSESSMENT — PAIN SCALES - GENERAL
PAINLEVEL_OUTOF10: 0
PAINLEVEL_OUTOF10: 7
PAINLEVEL_OUTOF10: 0

## 2024-01-09 ASSESSMENT — COPD QUESTIONNAIRES: CAT_SEVERITY: SEVERE

## 2024-01-09 ASSESSMENT — PAIN DESCRIPTION - DESCRIPTORS: DESCRIPTORS: DISCOMFORT;ACHING

## 2024-01-09 ASSESSMENT — PAIN DESCRIPTION - ORIENTATION: ORIENTATION: LOWER

## 2024-01-09 ASSESSMENT — PAIN - FUNCTIONAL ASSESSMENT: PAIN_FUNCTIONAL_ASSESSMENT: ACTIVITIES ARE NOT PREVENTED

## 2024-01-09 ASSESSMENT — PAIN DESCRIPTION - LOCATION: LOCATION: BACK

## 2024-01-09 ASSESSMENT — PAIN DESCRIPTION - PAIN TYPE: TYPE: ACUTE PAIN

## 2024-01-09 NOTE — PRE SEDATION
Sedation Pre-Procedure Note    Patient Name: Ishmael Gr   YOB: 1945  Room/Bed: T3M-0615/5903-01  Medical Record Number: [id number]  Date: 1/9/2024   Time: 9:56 AM       Indication:  Blood Dyscrasia    Consent: I have discussed with the patient and/or the patient representative the indication, alternatives, and the possible risks and/or complications of the planned procedure and the anesthesia methods. The patient and/or patient representative appear to understand and agree to proceed.    Vital Signs:   Vitals:    01/09/24 0950   BP: 112/66   Pulse: (!) 117   Resp: 30   Temp:    SpO2: 98%       Past Medical History:   has a past medical history of Anemia, COPD (chronic obstructive pulmonary disease) (HCC), Diabetes mellitus (HCC), Edema, GI (gastrointestinal bleed), Gout, Hypertension, Morbid obesity (HCC), Neuropathy, Obstructive sleep apnea, and Peripheral vascular disease (HCC).    Past Surgical History:   has a past surgical history that includes Appendectomy; Total hip arthroplasty; Colonoscopy (N/A, 5/7/2019); and Upper gastrointestinal endoscopy (N/A, 12/29/2023).    Medications:   Scheduled Meds:    albumin human 25%  12.5 g IntraVENous Q8H    metOLazone  10 mg Oral Daily    ferrous sulfate  325 mg Oral BID WC    vitamin B-12  500 mcg Oral Daily    folic acid  1 mg Oral Daily    metoprolol tartrate  12.5 mg Oral BID    sennosides-docusate sodium  2 tablet Oral Daily    pantoprazole  40 mg IntraVENous BID    ceFAZolin  2,000 mg IntraVENous Q8H    sodium chloride flush  5-40 mL IntraVENous 2 times per day    albuterol  2.5 mg Nebulization 4x Daily RT    lactobacillus  1 capsule Oral BID WC    insulin lispro  5 Units SubCUTAneous TID WC    insulin lispro  0-4 Units SubCUTAneous Nightly    insulin lispro  0-8 Units SubCUTAneous TID WC    arformoterol tartrate  15 mcg Nebulization BID RT    budesonide  0.5 mg Nebulization BID RT    allopurinol  200 mg Oral Daily    [Held by provider] apixaban  5 mg

## 2024-01-09 NOTE — PLAN OF CARE
Problem: Safety - Adult  Goal: Free from fall injury  1/9/2024 1621 by Saji Mendoza RN  Outcome: Progressing  1/9/2024 0413 by Britney Saba RN  Outcome: Progressing     Problem: Discharge Planning  Goal: Discharge to home or other facility with appropriate resources  Outcome: Progressing     Problem: Pain  Goal: Verbalizes/displays adequate comfort level or baseline comfort level  1/9/2024 1621 by Saji Mendoza RN  Outcome: Progressing  1/9/2024 0413 by Britney Saba RN  Outcome: Progressing     Problem: Skin/Tissue Integrity  Goal: Absence of new skin breakdown  Description: 1.  Monitor for areas of redness and/or skin breakdown  2.  Assess vascular access sites hourly  3.  Every 4-6 hours minimum:  Change oxygen saturation probe site  4.  Every 4-6 hours:  If on nasal continuous positive airway pressure, respiratory therapy assess nares and determine need for appliance change or resting period.  1/9/2024 1621 by Saji Mendoza RN  Outcome: Progressing  1/9/2024 0413 by Britney Saba RN  Outcome: Progressing     Problem: ABCDS Injury Assessment  Goal: Absence of physical injury  1/9/2024 1621 by Saji Mendoza RN  Outcome: Progressing  1/9/2024 0413 by Britney Saba RN  Outcome: Progressing     Problem: Neurosensory - Adult  Goal: Achieves stable or improved neurological status  Outcome: Progressing  Goal: Achieves maximal functionality and self care  Outcome: Progressing     Problem: Respiratory - Adult  Goal: Achieves optimal ventilation and oxygenation  Outcome: Progressing     Problem: Cardiovascular - Adult  Goal: Maintains optimal cardiac output and hemodynamic stability  Outcome: Progressing  Goal: Absence of cardiac dysrhythmias or at baseline  Outcome: Progressing     Problem: Skin/Tissue Integrity - Adult  Goal: Skin integrity remains intact  Outcome: Progressing  Goal: Incisions, wounds, or drain sites healing without S/S of infection  Outcome: Progressing  Goal: Oral mucous

## 2024-01-09 NOTE — PROGRESS NOTES
[facility] - Inpatient Rehabilitation Department   Phone: [phone]    Occupational Therapy    [] Initial Evaluation            [x] Daily Treatment Note         [] Discharge Summary      Patient: Ishmael Gr   : 1945   MRN: [id number]   Date of Service:  2024    Admitting Diagnosis:  ABRAHAM (acute kidney injury) (HCC)  Current Admission Summary:   Ishmael Gr is a 78 y.o. male who presents to the ED for evaluation for fall.  Patient was just discharged from the hospital after being brought in for respiratory issues and a fall at that time.  States today he fell and hit his head.  Does not fully remember it.  He was on his way to the bathroom when it happened.  States the only pain he is feeling is in his low back and going down his right leg.  Denies any new numbness or tingling or weakness.  Does have significant respiratory issues and feels short of breath initially with it but states he feels back to normal at this time.  He is on 6 L normally.   Past Medical History:  has a past medical history of Anemia, COPD (chronic obstructive pulmonary disease) (HCC), Diabetes mellitus (HCC), Edema, GI (gastrointestinal bleed), Gout, Hypertension, Morbid obesity (HCC), Neuropathy, Obstructive sleep apnea, and Peripheral vascular disease (HCC).  Past Surgical History:  has a past surgical history that includes Appendectomy; Total hip arthroplasty; Colonoscopy (N/A, 2019); Upper gastrointestinal endoscopy (N/A, 2023); and CT BIOPSY BONE MARROW (2024).    Discharge Recommendations: Ishmael Gr scored a 15/24 on the AM-PAC ADL Inpatient form. Current research shows that an AM-PAC score of 17 or less is typically not associated with a discharge to the patient's home setting. Based on the patient's AM-PAC score and their current ADL deficits, it is recommended that the patient have 3-5 sessions per week of Occupational Therapy at d/c to increase the patient's independence.  Please see  Intervention: decreased functional mobility, decreased ADL status, decreased strength, decreased endurance, decreased balance, increased pain, decreased posture  Prognosis: fair  Clinical Assessment: Patient presenting below baseline function secondary to fall at home. Patient reporting he is typically independent with ADLs and mobility with RW. Patient with eFr of 2 for transfers, and able to tolerates ~5 with FWW this date, pt reporting increased back pain while in stance.  Pt will need continued inpt therapy at d/c and is not safe to d/c home. Continue with POC.  Safety Interventions: patient left in chair, chair alarm in place, call light within reach, patient at risk for falls, and nurse notified    Plan  Frequency: 3-5 x/per week  Current Treatment Recommendations: strengthening, balance training, functional mobility training, transfer training, endurance training, patient/caregiver education, ADL/self-care training, pain management, home exercise program, and safety education    Goals  Patient Goals: return to PLOF   Short Term Goals:  Time Frame: discharge    Patient will complete upper body ADL at set up assistance   Patient will complete lower body ADL at maximum assistance   Patient will complete toileting at maximum assistance   Patient will complete functional transfers at maximum assistance  Patient will increase [facility] ADL score = to or > than 17/24      Above goals reviewed on 1/9/2024.  All goals are ongoing at this time unless indicated above.       Therapy Session Time     Individual Group Co-treatment   Time In   1139   Time Out   1217   Minutes   38         Timed Code Treatment Minutes: 38 Minutes  Total Treatment Minutes:  38 minutes        Electronically Signed By: SUBHASH Musa/OTIS [id number]

## 2024-01-09 NOTE — PROGRESS NOTES
MD Leandra Sandoval MD Samir Brahmbhatt, MD                  Office: [phone]                 Fax: [phone]         [employer]                          NEPHROLOGY INPATIENT PROGRESS  NOTE:     PATIENT NAME: Ishmael Gr  : 1945  MRN: [id number]      IMPRESSION/RECOMMENDATIONS:    Right hip pain [M25.551]  ABRAHAM (acute kidney injury) (HCC) [N17.9]  Fall, initial encounter [W19.XXXA]  Acute right-sided low back pain with right-sided sciatica [M54.41]      ABRAHAM-baseline creatinine 1.1.  Probably from prerenal azotemia in the setting of relative hypotension plus contrast exposure plus COPD exacerbation.  Now probable ATN.  Nonoliguric. -3L since admission.  Still hypervolemic.   Crea 1.9 from 2 from 1.9.       Subjective / interval history / nephrology update / medical decision making:   Patient was seen comfortably lying in bed     Reported no active complaints/distress,   Renal labs noted.     No SOB  Stable on NC but still 6 L, similar dose as at home, as per patient    But leg edema -still present currently  Switcedh to Lasix 10mg/hr.    Albumin 25g to 12.5g TID.    Added Metolazone daily  Use Midodrine PRN    Follow Hgb.  RAJESH subQ    Checked for paraproteinemia.-Negative SPEP, negative UPEP,   UA neg protein.  Has moderate then worsening to large, blood with 17rbc.  Albumin 2.2.  SPEP neg.  Elevated kappa and lambda but normal ratio.    Iron stores -recently, not lower, could have likely underlying anemia of chronic disease-given RAJESH 1/3/24  -consult hematology    Follow  24H urine protein. -Only 0.6  g    May need renal biopsy. -With improving renal function can be done as an outpatient.  If still indicated.    S/p Successful CT guided bone marrow aspiration from the iliac bone. On 24    Voltaren listed in home meds-d/w pt to avoid      D/c plans - continue inpatient stay for fluid overload management  Patient was lost for follow-up with nephrology  -: follow up w/ us at   esterase, WBC 11, RBC 4-5    VBG pH 7.26/pCO2 75    Cxray  IMPRESSION:  Stable cardiomegaly with mild central pulmonary congestion or pulmonary  artery hypertension which is more prominent.    12/16 CT PE  IMPRESSION:  Limited exam due to the extensive motion artifact and decreased opacification  of the distal pulmonary arteries which is limiting evaluation of the distal  pulmonary arteries along the lung bases.  Within the limitations of the exam,  no obvious acute pulmonary embolus can be seen     Mildly dilated and atherosclerotic thoracic aorta with no aneurysm or  dissection and no mediastinal mass or adenopathy.     Chronic obstructive lung changes with mild subsegmental atelectasis vs early  infiltrate or scarring along the right lung base posteriorly      Renal us -     IMPRESSION:  No hydronephrosis or nephrolithiasis identified sonographically.     Increased renal parenchymal echogenicity bilaterally suggesting medical renal  disease.              Specimen Collected: 12/22/23 18:03 EST Last Resulted: 12/22/23 18:53 EST

## 2024-01-09 NOTE — PROGRESS NOTES
Pt has consumed all 4000mL of bowel prep over night. Pt has only passed one solid bowel movement.    0600 tap water enema not completed. Pt is hesitant to complete at this time.

## 2024-01-09 NOTE — PROGRESS NOTES
[facility] Daily Progress Note      Admit Date:  12/20/2023    Chief Complaint: Shortness of breath    Subjective:  Mr. Gr is well-known to me from prior care.  No complaints of chest discomfort.  Still some shortness of breath.  His legs are wrapped today, he is up to the chair. He is feeling better today.     Objective:   BP (!) 139/96   Pulse 91   Temp 97.8 °F (36.6 °C) (Temporal)   Resp 18   Ht 1.829 m (6')   Wt (!) 149.7 kg (330 lb)   SpO2 92%   BMI 44.76 kg/m²     Intake/Output Summary (Last 24 hours) at 1/9/2024 1428  Last data filed at 1/9/2024 1026  Gross per 24 hour   Intake 4620 ml   Output 4050 ml   Net 570 ml       Physical Exam:  General:  Awake, alert, oriented x 3, NAD  Skin:  Warm and dry  Neck:  JVD is difficult to assess  Chest: End expiratory wheezes bilaterally  Cardiovascular:  RRR S1S2, no S3,   Abdomen:  Soft, ND, NT, No HSM  Extremities:  3+ edema into thighs.  Wrapped    Medications:    PEG-KCl-NaCl-NaSulf-Na Asc-C  100 g Oral Once    PEG-KCl-NaCl-NaSulf-Na Asc-C  100 g Oral Once    albumin human 25%  12.5 g IntraVENous Q8H    [Held by provider] ferrous sulfate  325 mg Oral BID WC    vitamin B-12  500 mcg Oral Daily    folic acid  1 mg Oral Daily    metoprolol tartrate  12.5 mg Oral BID    sennosides-docusate sodium  2 tablet Oral Daily    pantoprazole  40 mg IntraVENous BID    ceFAZolin  2,000 mg IntraVENous Q8H    sodium chloride flush  5-40 mL IntraVENous 2 times per day    albuterol  2.5 mg Nebulization 4x Daily RT    lactobacillus  1 capsule Oral BID WC    insulin lispro  5 Units SubCUTAneous TID WC    insulin lispro  0-4 Units SubCUTAneous Nightly    insulin lispro  0-8 Units SubCUTAneous TID WC    arformoterol tartrate  15 mcg Nebulization BID RT    budesonide  0.5 mg Nebulization BID RT    allopurinol  200 mg Oral Daily    [Held by provider] apixaban  5 mg Oral BID    [Held by provider] aspirin  81 mg Oral Daily    vitamin B-1  100 mg Oral Daily    sodium

## 2024-01-09 NOTE — CARE COORDINATION
Chart reviewed for discharge planning    CM has continued to follow patient progress to anticipate potential discharge needs. At this time, patient is not ready for discharge.     Inpatient day #-  20    Barrier(s) to discharge-:Patient with  Multiple medical conditions with risk of decompensation.    Chronic macrocytic anemia.  Had PRBC transfusion at that time.    Now worsening anemia -hgb 6.9    Labs showed no iron, B12 or folate deficiency .   He will have EGD today .    Will also schedule bone marrow aspiration and biopsy.   -On Lasix  drip.       Tentative discharge plan- To [facility]    Tentative discharge date- TBD    Case management will continue to follow progress and update discharge plan as needed.

## 2024-01-09 NOTE — PROGRESS NOTES
[facility]    1/9/2024 11:50 AM        Name: Ishmael Gr .              Admitted: 12/20/2023  Primary Care Provider: Anthony Bravo MD (Tel: [phone])    Uo improved hgb 6.9 still no chest pain   Current Medications  magnesium sulfate 2000 mg in 50 mL IVPB premix, Once  PEG-KCl-NaCl-NaSulf-Na Asc-C (MOVIPREP) solution 100 g, Once  PEG-KCl-NaCl-NaSulf-Na Asc-C (MOVIPREP) solution 100 g, Once  0.9 % sodium chloride infusion, PRN  albumin human 25% IV solution 12.5 g, Q8H  ferrous sulfate (IRON 325) tablet 325 mg, BID WC  vitamin B-12 (CYANOCOBALAMIN) tablet 500 mcg, Daily  folic acid (FOLVITE) tablet 1 mg, Daily  furosemide (LASIX) 100 mg in sodium chloride 0.9 % 100 mL infusion, Continuous  oxyCODONE-acetaminophen (PERCOCET) 5-325 MG per tablet 1 tablet, Q6H PRN  midodrine (PROAMATINE) tablet 5 mg, TID PRN  metoprolol tartrate (LOPRESSOR) tablet 12.5 mg, BID  melatonin tablet 3 mg, Nightly PRN  diazePAM (VALIUM) tablet 2 mg, Q6H PRN  sennosides-docusate sodium (SENOKOT-S) 8.6-50 MG tablet 2 tablet, Daily  pantoprazole (PROTONIX) injection 40 mg, BID  perflutren lipid microspheres (DEFINITY) injection 1.5 mL, ONCE PRN  ceFAZolin (ANCEF) 2,000 mg in sodium chloride 0.9 % 50 mL IVPB (mini-bag), Q8H  sodium chloride flush 0.9 % injection 5-40 mL, 2 times per day  sodium chloride flush 0.9 % injection 5-40 mL, PRN  0.9 % sodium chloride infusion, PRN  albuterol (PROVENTIL) (2.5 MG/3ML) 0.083% nebulizer solution 2.5 mg, 4x Daily RT  lactobacillus (CULTURELLE) capsule 1 capsule, BID WC  insulin lispro (HUMALOG) injection vial 5 Units, TID WC  insulin lispro (HUMALOG) injection vial 0-4 Units, Nightly  dextrose bolus 10% 125 mL, PRN   Or  dextrose bolus 10% 250 mL, PRN  glucagon injection 1 mg, PRN  dextrose 10 % infusion, Continuous PRN  insulin lispro (HUMALOG) injection vial 0-8 Units, TID WC  simethicone  = values in this interval not displayed.       BMP:    Recent Labs     01/07/24  0614 01/08/24  0251 01/09/24  0427    135* 140   K 3.4* 3.7 3.5   CL 95* 92* 91*   CO2 33* 32 37*   BUN 30* 27* 26*   CREATININE 1.9* 1.5* 1.6*   GLUCOSE 101* 103* 110*       Hepatic:   No results for input(s): \"AST\", \"ALT\", \"ALB\", \"BILITOT\", \"ALKPHOS\" in the last 72 hours.    CT BIOPSY BONE MARROW   Final Result   Successful CT guided bone marrow aspiration from the iliac bone.      If core biopsy is required for further workup, will plan for repeat bone   marrow biopsy procedure in the near future.         XR CHEST PORTABLE   Final Result   Left lower lobe infiltrate likely represents pneumonia.  Aspiration is   possible.         US RENAL COMPLETE   Final Result   No hydronephrosis or nephrolithiasis identified sonographically.      Increased renal parenchymal echogenicity bilaterally suggesting medical renal   disease.         CT PELVIS WO CONTRAST Additional Contrast? None   Final Result   Bilateral hip prostheses.      No periprosthetic fracture.         CT LUMBAR SPINE WO CONTRAST   Final Result   Levoconvex scoliosis with moderate to advanced degenerative disc disease and   facet arthropathy.      No fracture.         CT Head W/O Contrast   Final Result   No acute intracranial abnormality.         XR CHEST PORTABLE   Final Result   1. Improved aeration.             Recent imaging reviewed    Problem List  Principal Problem:    ABRAHAM (acute kidney injury) (HCC)  Active Problems:    Encephalomalacia    Essential hypertension    Diabetes mellitus type 2 in obese (HCC)    Morbid obesity due to excess calories (HCC)    Diabetes education, encounter for    PVD (peripheral vascular disease) (HCC)    Chronic obstructive pulmonary disease (HCC)    Fall    Acute right-sided low back pain with right-sided sciatica    Right hip pain    Fall at home    Scoliosis of lumbar spine    Pain due to hip joint prosthesis, initial encounter

## 2024-01-09 NOTE — PROGRESS NOTES
[facility] - Inpatient Rehabilitation Department   Phone: [phone]    Physical Therapy                [x] Daily Treatment Note         [] Discharge Summary      Patient: sIhmael Gr   : 1945   MRN: [id number]   Date of Service:  2024  Admitting Diagnosis: ABRAHAM (acute kidney injury) (HCC)    Current Admission Summary: The pt was admitted s/p a fall.  He has severe back and R hip pain.  He discharged home from the hospital a few days before he was readmitted.     Updated 24: acute GI bleed, s/p 3 units PRBC, EGD (multiple antral erosions that could have bled if patient is on blood thinners. Gastric biopsies to check for H. Pylori); ABRAHAM; acute on chronic hypoxic and hypercapnic respiratory failure secondary to COPD exacerbation    Past Medical History:  has a past medical history of Anemia, COPD (chronic obstructive pulmonary disease) ([facility]), Diabetes mellitus (HCC), Edema, GI (gastrointestinal bleed), Gout, Hypertension, Morbid obesity ([facility]), Neuropathy, Obstructive sleep apnea, and Peripheral vascular disease ([facility]).  Past Surgical History:  has a past surgical history that includes Appendectomy; Total hip arthroplasty; Colonoscopy (N/A, 2019); Upper gastrointestinal endoscopy (N/A, 2023); and CT BIOPSY BONE MARROW (2024).    Discharge Recommendations: Ishmael Gr scored a 15/24 on the AM-PAC short mobility form. Current research shows that an AM-PAC score of 17 or less is typically not associated with a discharge to the patient's home setting. Based on the patient's AM-PAC score and their current functional mobility deficits, it is recommended that the patient have 3-5 sessions per week of Physical Therapy at d/c to increase the patient's independence.  Please see assessment section for further patient specific details.  If patient discharges prior to next session this note will serve as a discharge summary.  Please see below for the latest assessment towards goals.      DME Required For Discharge: DME to be determined at next level of care - plan to continue to assess pending progress  Precautions/Restrictions: high fall risk, up with assistance, adult diet - regular, 5 carb choices, low fat/low chol/high fiber, YVONNE, 6LO2 at baseline  Weight Bearing Restrictions: no restrictions      Pre-Admission Information from Initial Evaluation  Lives With: daughter 52 who is an RN at [employer] working full-time And granddaughter 23,  reports family members are at home intermittently  Type of Home: house  Home Layout: two level, bedroom/bathroom on main level  Home Access:  2 step to enter with handrail.  Handrails are located on R side.  Bathroom Layout: walk in shower  Bathroom Equipment: grab bars in shower, shower chair, hand held shower head  Toilet Height: elevated height  Home Equipment: rolling walker, single point cane, reacher, sock aide, oxygen--sleeps in recliner  Transfer Assistance:  mod I with RW   Ambulation Assistance: mod I with RW within his home, modified independent with use of SPC vs. RW when going out. Pt reports difficulty with advancing (R) LE during mobility tasks.   ADL Assistance: independent with all ADL's  IADL Assistance: independent with homemaking tasks, requires assistance with laundry from daughter (laundry in basement)   Active :        [] Yes                 [x] No--daughter/granddaughter drive  Hand Dominance: [] Left                 [x] Right  Current Employment: retired.  Occupation: , welding   Hobbies: genealogy, history    Recent Falls: 3 falls. Tripping over the oxygen line     Examination   Vision:   Vision Corrective Device: wears glasses for reading  Hearing:   hard of hearing  Observation:   General Observation:  Patient on 6 L/min of O2 at rest; edematous B LE and B UE, grade 3 pitting edema BLE.  Posture:   Forward head, rounded shoulders  Sensation:   reports numbness and tingling in (B) UE, (B) LE -due to

## 2024-01-09 NOTE — PROGRESS NOTES
Gastroenterology Progress Note      Ishmael Gr is a 78 y.o. male patient.  1. Fall, initial encounter    2. Acute right-sided low back pain with right-sided sciatica    3. Right hip pain    4. Anemia, unspecified type        SUBJECTIVE: Took bowel prep last night but did not have any bowel movements till this morning.  They were solid and dark per RN report.        Physical    VITALS:  BP (!) 139/96   Pulse 91   Temp 97.8 °F (36.6 °C) (Temporal)   Resp 18   Ht 1.829 m (6')   Wt (!) 149.7 kg (330 lb)   SpO2 92%   BMI 44.76 kg/m²   TEMPERATURE:  Current - Temp: 97.8 °F (36.6 °C); Max - Temp  Av.5 °F (36.4 °C)  Min: 97.2 °F (36.2 °C)  Max: 98 °F (36.7 °C)    Constitutional: Alert and oriented x 4. No acute distress.   Respiratory: Respirations nonlabored, no crepitus  GI: Abdomen nondistended, soft, and nontender.    Neurological: No focal deficits noted. No asterixis.      Data    Data Review:    Recent Labs     24  0614 24  1110 24  0251 246 24   WBC 3.7*  --  4.3  --  3.5*   HGB 6.9*   < > 6.9* 7.9* 7.9*   HCT 21.2*   < > 21.6* 24.5* 24.3*   MCV 96.8  --  96.0  --  93.8     --  388  --  325    < > = values in this interval not displayed.     Recent Labs     24  0614 24  0251 24    135* 140   K 3.4* 3.7 3.5   CL 95* 92* 91*   CO2 33* 32 37*   PHOS  --   --  3.5   BUN 30* 27* 26*   CREATININE 1.9* 1.5* 1.6*     No results for input(s): \"AST\", \"ALT\", \"ALB\", \"BILIDIR\", \"BILITOT\", \"ALKPHOS\" in the last 72 hours.  No results for input(s): \"LIPASE\", \"AMYLASE\" in the last 72 hours.  Recent Labs     24  0754   PROTIME 16.7*   INR 1.36*     No results for input(s): \"PTT\" in the last 72 hours.           ASSESSMENT / PLAN      Acute on chronic anemia -stools have been dark.  EGD last week with gastric erosions only.  Has needed 2 units PRBC since then.  S/p bone marrow biopsy today.  Plan was for repeat EGD as well as

## 2024-01-09 NOTE — PROGRESS NOTES
(HCC)    Aspiration pneumonia (HCC)    Bacteremia due to methicillin susceptible Staphylococcus aureus (MSSA)    Receiving intravenous antibiotic treatment as outpatient    Paroxysmal atrial fibrillation (HCC)    Gastrointestinal hemorrhage with melena    Anemia    Stage 3 chronic kidney disease (HCC)  Resolved Problems:    * No resolved hospital problems. *       Assessment & Plan:   Acute gi bleed  S/p 4units prbcs  EGD with  Patient has multiple antral erosions that could have bled if patient is on blood thinners.  Gastric biopsies to check for H. pylori.  - ppi  bid x8 weeks then dailyand hold ac for atleast 1 week no nosaid  Egd and colon in am  Bonemarrow biopsy today   Cbc in am    PAF home meds hold eliquis     ABRAHAM:  albumin tid and lasix gtt bmpin am    Acute on chronic hypoxic and hyperpcanic resp failure secondary to copd exacerbation  Off steroids  - duo nesb  =- pulm on board    Staph bacteremiea  Iv atbx  - rpeat cultures negative      Hypertension  -PRN hydralazine    Type 2 diabetes withy hyperglycemia due to steroids a1c 6.2 , off steroids stop lantus hernandez ssi        Davey Farias MD   1/9/2024 11:52 AM

## 2024-01-09 NOTE — BRIEF OP NOTE
Brief Postoperative Note    Ishmael Gr  YOB: 1945  [id number]    Pre-operative Diagnosis: anemia    Post-operative Diagnosis: Same    Procedure: CT-guided bone marrow biopsy    Anesthesia: Moderate Sedation    Surgeons: Khurram Hernandez MD    Estimated Blood Loss: Less than 5 mL    Complications: None    Specimens: Was Obtained: 12cc bone marrow aspirate. Core not able to be obtained.    Findings: Successful CT-guided bone marrow aspiration.    Electronically signed by Archie Yousif MD on 1/9/2024 at 9:57 AM

## 2024-01-09 NOTE — PROGRESS NOTES
01/08/24 2109 -- -- -- 95 (!) 31 -- --   01/08/24 2108 -- -- -- 94 21 96 % --   01/08/24 2020 -- -- -- (!) 105 -- -- --   01/08/24 2005 102/60 97.2 °F (36.2 °C) Temporal (!) 102 20 98 % --   01/08/24 1756 129/60 97.4 °F (36.3 °C) Temporal 94 19 98 % --   01/08/24 1455 (!) 117/56 97.3 °F (36.3 °C) -- 92 18 99 % --   01/08/24 1436 (!) 106/49 97.2 °F (36.2 °C) Temporal 91 18 99 % --   01/08/24 1235 120/64 97.5 °F (36.4 °C) Temporal 91 19 100 % --       24HR INTAKE/OUTPUT:    Intake/Output Summary (Last 24 hours) at 1/9/2024 0838  Last data filed at 1/9/2024 0834  Gross per 24 hour   Intake 4620 ml   Output 4450 ml   Net 170 ml       CONSTITUTIONAL: awake, alert, cooperative, no apparent distress.  Obese.  EYES: pupils equal, round and reactive to light, sclera clear and conjunctiva normal  ENT: Normocephalic, without obvious abnormality, atraumatic  NECK: supple, symmetrical, no jugular venous distension and no carotid bruits   HEMATOLOGIC/LYMPHATIC: no cervical, supraclavicular or axillary lymphadenopathy   LUNGS: no increased work of breathing and clear to auscultation   CARDIOVASCULAR: regular rate and rhythm, normal S1 and S2, no murmur noted  ABDOMEN: normal bowel sounds x 4, soft, non-distended, non-tender, no masses palpated, no hepatosplenomgaly   MUSCULOSKELETAL: full range of motion noted, tone is normal  NEUROLOGIC: awake, alert, oriented to name, place and time. Motor skills grossly intact.   SKIN: Normal skin color, texture, turgor and no jaundice. appears intact   EXTREMITIES: no LE edema     DATA:  CBC:    Recent Labs     01/09/24  0427 01/08/24  2216 01/08/24  0251 01/07/24  1110 01/07/24  0614 01/06/24  0452   WBC 3.5*  --  4.3  --  3.7* 5.6   NEUTROABS 2.4  --  3.0  --  2.5 4.2   LYMPHOPCT 17.6  --  15.4  --  16.5 12.6   RBC 2.59*  --  2.25*  --  2.19* 2.29*   HGB 7.9* 7.9* 6.9* 7.1* 6.9* 7.4*   HCT 24.3* 24.5* 21.6* 21.5* 21.2* 22.1*   MCV 93.8  --  96.0  --  96.8 96.5   MCH 30.7  --  30.8  --   --   --   --   --   --   --   --   --   --   --   --   --   --  13*  --  81* 13*   MG 1.70*  --  2.00  --  1.80  --   --   --   --   --   --  1.80   < > 2.00  --   --   --     < > = values in this interval not displayed.       Lab Results   Component Value Date    CALCIUM 9.3 01/09/2024    PHOS 3.5 01/09/2024       LDH:No results for input(s): \"LDH\" in the last 720 hours.    Radiology Review:  XR CHEST PORTABLE  Narrative: EXAMINATION:  ONE XRAY VIEW OF THE CHEST    12/26/2023 5:19 pm    COMPARISON:  December 28, 2023    HISTORY:  ORDERING SYSTEM PROVIDED HISTORY: Rule out aspiration pneumonia  TECHNOLOGIST PROVIDED HISTORY:  Reason for exam:->Rule out aspiration pneumonia  Reason for Exam: rule out aspiration pneumonia    FINDINGS:  Left lower lobe infiltrate likely represents pneumonia.  Heart, mediastinum  and pleural surfaces appear stable.  The right base appears clear.  Impression: Left lower lobe infiltrate likely represents pneumonia.  Aspiration is  possible.      Problem List  Patient Active Problem List   Diagnosis    Essential hypertension    Syncope    Diabetes mellitus type 2 in obese ([facility])    Arthritis    Chronic respiratory failure ([facility])    STEF (obstructive sleep apnea)    Venous insufficiency    Acute sinusitis    Morbid obesity due to excess calories ([facility])    Vitamin D deficiency    Open back wound    COPD, severe ([facility])    Hyperlipidemia associated with type 2 diabetes mellitus ([facility])    Diastolic dysfunction    Aneurysm of abdominal aorta ([facility])    Personal history of tobacco use, presenting hazards to health    Encephalomalacia    Altered mental status    Cerebrovascular accident (CVA) due to thrombosis of left middle cerebral artery ([facility])    Acute respiratory failure with hypercapnia ([facility])    Cerebrovascular accident (CVA) (HCC)    Chronic heart failure with preserved ejection fraction ([facility])    Ventricular ectopy    Leg swelling    TGA (transient global amnesia)    Metabolic encephalopathy    PFO  no

## 2024-01-10 ENCOUNTER — ANESTHESIA (OUTPATIENT)
Dept: ENDOSCOPY | Age: 79
End: 2024-01-10
Payer: MEDICARE

## 2024-01-10 PROBLEM — R60.0 LOCALIZED EDEMA: Status: ACTIVE | Noted: 2024-01-10

## 2024-01-10 LAB
ALBUMIN SERPL-MCNC: 3.5 G/DL (ref 3.4–5)
ANION GAP SERPL CALCULATED.3IONS-SCNC: 12 MMOL/L (ref 3–16)
BASOPHILS # BLD: 0 K/UL (ref 0–0.2)
BASOPHILS NFR BLD: 0.9 %
BUN SERPL-MCNC: 24 MG/DL (ref 7–20)
CALCIUM SERPL-MCNC: 9.4 MG/DL (ref 8.3–10.6)
CHLORIDE SERPL-SCNC: 92 MMOL/L (ref 99–110)
CO2 SERPL-SCNC: 38 MMOL/L (ref 21–32)
CREAT SERPL-MCNC: 1.5 MG/DL (ref 0.8–1.3)
DEPRECATED RDW RBC AUTO: 17.6 % (ref 12.4–15.4)
EOSINOPHIL # BLD: 0.2 K/UL (ref 0–0.6)
EOSINOPHIL NFR BLD: 5 %
GFR SERPLBLD CREATININE-BSD FMLA CKD-EPI: 48 ML/MIN/{1.73_M2}
GLUCOSE BLD-MCNC: 106 MG/DL (ref 70–99)
GLUCOSE BLD-MCNC: 106 MG/DL (ref 70–99)
GLUCOSE BLD-MCNC: 115 MG/DL (ref 70–99)
GLUCOSE BLD-MCNC: 130 MG/DL (ref 70–99)
GLUCOSE BLD-MCNC: 150 MG/DL (ref 70–99)
GLUCOSE SERPL-MCNC: 123 MG/DL (ref 70–99)
HCT VFR BLD AUTO: 24.3 % (ref 40.5–52.5)
HGB BLD-MCNC: 8 G/DL (ref 13.5–17.5)
LYMPHOCYTES # BLD: 0.7 K/UL (ref 1–5.1)
LYMPHOCYTES NFR BLD: 18.3 %
MCH RBC QN AUTO: 31 PG (ref 26–34)
MCHC RBC AUTO-ENTMCNC: 32.9 G/DL (ref 31–36)
MCV RBC AUTO: 94.3 FL (ref 80–100)
MONOCYTES # BLD: 0.5 K/UL (ref 0–1.3)
MONOCYTES NFR BLD: 11.9 %
NEUTROPHILS # BLD: 2.6 K/UL (ref 1.7–7.7)
NEUTROPHILS NFR BLD: 63.9 %
PERFORMED ON: ABNORMAL
PHOSPHATE SERPL-MCNC: 3.5 MG/DL (ref 2.5–4.9)
PLATELET # BLD AUTO: 294 K/UL (ref 135–450)
PMV BLD AUTO: 6 FL (ref 5–10.5)
POTASSIUM SERPL-SCNC: 3.2 MMOL/L (ref 3.5–5.1)
RBC # BLD AUTO: 2.58 M/UL (ref 4.2–5.9)
SODIUM SERPL-SCNC: 142 MMOL/L (ref 136–145)
WBC # BLD AUTO: 4.1 K/UL (ref 4–11)

## 2024-01-10 PROCEDURE — 6360000002 HC RX W HCPCS: Performed by: NURSE ANESTHETIST, CERTIFIED REGISTERED

## 2024-01-10 PROCEDURE — 6370000000 HC RX 637 (ALT 250 FOR IP): Performed by: NURSE PRACTITIONER

## 2024-01-10 PROCEDURE — 3700000001 HC ADD 15 MINUTES (ANESTHESIA): Performed by: INTERNAL MEDICINE

## 2024-01-10 PROCEDURE — 85025 COMPLETE CBC W/AUTO DIFF WBC: CPT

## 2024-01-10 PROCEDURE — 97110 THERAPEUTIC EXERCISES: CPT

## 2024-01-10 PROCEDURE — 0DJD8ZZ INSPECTION OF LOWER INTESTINAL TRACT, VIA NATURAL OR ARTIFICIAL OPENING ENDOSCOPIC: ICD-10-PCS | Performed by: INTERNAL MEDICINE

## 2024-01-10 PROCEDURE — 2500000003 HC RX 250 WO HCPCS: Performed by: NURSE ANESTHETIST, CERTIFIED REGISTERED

## 2024-01-10 PROCEDURE — 99232 SBSQ HOSP IP/OBS MODERATE 35: CPT | Performed by: NURSE PRACTITIONER

## 2024-01-10 PROCEDURE — 6370000000 HC RX 637 (ALT 250 FOR IP): Performed by: INTERNAL MEDICINE

## 2024-01-10 PROCEDURE — 36415 COLL VENOUS BLD VENIPUNCTURE: CPT

## 2024-01-10 PROCEDURE — P9047 ALBUMIN (HUMAN), 25%, 50ML: HCPCS | Performed by: INTERNAL MEDICINE

## 2024-01-10 PROCEDURE — 2060000000 HC ICU INTERMEDIATE R&B

## 2024-01-10 PROCEDURE — 6360000002 HC RX W HCPCS: Performed by: INTERNAL MEDICINE

## 2024-01-10 PROCEDURE — 88305 TISSUE EXAM BY PATHOLOGIST: CPT

## 2024-01-10 PROCEDURE — 80069 RENAL FUNCTION PANEL: CPT

## 2024-01-10 PROCEDURE — 7100000000 HC PACU RECOVERY - FIRST 15 MIN: Performed by: INTERNAL MEDICINE

## 2024-01-10 PROCEDURE — 97530 THERAPEUTIC ACTIVITIES: CPT

## 2024-01-10 PROCEDURE — 2709999900 HC NON-CHARGEABLE SUPPLY: Performed by: INTERNAL MEDICINE

## 2024-01-10 PROCEDURE — 94761 N-INVAS EAR/PLS OXIMETRY MLT: CPT

## 2024-01-10 PROCEDURE — 6360000002 HC RX W HCPCS: Performed by: ANESTHESIOLOGY

## 2024-01-10 PROCEDURE — 97116 GAIT TRAINING THERAPY: CPT

## 2024-01-10 PROCEDURE — 94640 AIRWAY INHALATION TREATMENT: CPT

## 2024-01-10 PROCEDURE — 0DB98ZX EXCISION OF DUODENUM, VIA NATURAL OR ARTIFICIAL OPENING ENDOSCOPIC, DIAGNOSTIC: ICD-10-PCS | Performed by: INTERNAL MEDICINE

## 2024-01-10 PROCEDURE — 2700000000 HC OXYGEN THERAPY PER DAY

## 2024-01-10 PROCEDURE — 2580000003 HC RX 258: Performed by: INTERNAL MEDICINE

## 2024-01-10 PROCEDURE — 2500000003 HC RX 250 WO HCPCS: Performed by: NURSE PRACTITIONER

## 2024-01-10 PROCEDURE — 7100000001 HC PACU RECOVERY - ADDTL 15 MIN: Performed by: INTERNAL MEDICINE

## 2024-01-10 PROCEDURE — 3609027000 HC COLONOSCOPY: Performed by: INTERNAL MEDICINE

## 2024-01-10 PROCEDURE — C9113 INJ PANTOPRAZOLE SODIUM, VIA: HCPCS | Performed by: INTERNAL MEDICINE

## 2024-01-10 PROCEDURE — 3609012400 HC EGD TRANSORAL BIOPSY SINGLE/MULTIPLE: Performed by: INTERNAL MEDICINE

## 2024-01-10 PROCEDURE — 3700000000 HC ANESTHESIA ATTENDED CARE: Performed by: INTERNAL MEDICINE

## 2024-01-10 RX ORDER — PROPOFOL 10 MG/ML
INJECTION, EMULSION INTRAVENOUS PRN
Status: DISCONTINUED | OUTPATIENT
Start: 2024-01-10 | End: 2024-01-10 | Stop reason: SDUPTHER

## 2024-01-10 RX ORDER — POTASSIUM CHLORIDE 20 MEQ/1
40 TABLET, EXTENDED RELEASE ORAL
Status: COMPLETED | OUTPATIENT
Start: 2024-01-10 | End: 2024-01-10

## 2024-01-10 RX ORDER — LIDOCAINE HYDROCHLORIDE 20 MG/ML
INJECTION, SOLUTION EPIDURAL; INFILTRATION; INTRACAUDAL; PERINEURAL PRN
Status: DISCONTINUED | OUTPATIENT
Start: 2024-01-10 | End: 2024-01-10 | Stop reason: SDUPTHER

## 2024-01-10 RX ORDER — DEXMEDETOMIDINE HYDROCHLORIDE 100 UG/ML
INJECTION, SOLUTION INTRAVENOUS PRN
Status: DISCONTINUED | OUTPATIENT
Start: 2024-01-10 | End: 2024-01-10 | Stop reason: SDUPTHER

## 2024-01-10 RX ORDER — HYDROMORPHONE HYDROCHLORIDE 2 MG/ML
0.25 INJECTION, SOLUTION INTRAMUSCULAR; INTRAVENOUS; SUBCUTANEOUS ONCE
Status: COMPLETED | OUTPATIENT
Start: 2024-01-10 | End: 2024-01-10

## 2024-01-10 RX ORDER — POTASSIUM CHLORIDE 20 MEQ/1
40 TABLET, EXTENDED RELEASE ORAL
Status: DISPENSED | OUTPATIENT
Start: 2024-01-10 | End: 2024-01-10

## 2024-01-10 RX ADMIN — ALLOPURINOL 200 MG: 100 TABLET ORAL at 09:22

## 2024-01-10 RX ADMIN — PROPOFOL 10 MG: 10 INJECTION, EMULSION INTRAVENOUS at 11:44

## 2024-01-10 RX ADMIN — PHENYLEPHRINE HYDROCHLORIDE 100 MCG: 10 INJECTION INTRAVENOUS at 11:42

## 2024-01-10 RX ADMIN — OXYCODONE AND ACETAMINOPHEN 1 TABLET: 5; 325 TABLET ORAL at 21:12

## 2024-01-10 RX ADMIN — ALBUTEROL SULFATE 2.5 MG: 2.5 SOLUTION RESPIRATORY (INHALATION) at 11:02

## 2024-01-10 RX ADMIN — PROPOFOL 10 MG: 10 INJECTION, EMULSION INTRAVENOUS at 11:40

## 2024-01-10 RX ADMIN — MICONAZOLE NITRATE: 20 POWDER TOPICAL at 09:28

## 2024-01-10 RX ADMIN — PROPOFOL 25 MG: 10 INJECTION, EMULSION INTRAVENOUS at 11:23

## 2024-01-10 RX ADMIN — DEXMEDETOMIDINE HYDROCHLORIDE 20 MCG: 100 INJECTION, SOLUTION INTRAVENOUS at 11:13

## 2024-01-10 RX ADMIN — SODIUM CHLORIDE, PRESERVATIVE FREE 10 ML: 5 INJECTION INTRAVENOUS at 21:04

## 2024-01-10 RX ADMIN — PANTOPRAZOLE SODIUM 40 MG: 40 INJECTION, POWDER, FOR SOLUTION INTRAVENOUS at 09:22

## 2024-01-10 RX ADMIN — OXYCODONE AND ACETAMINOPHEN 1 TABLET: 5; 325 TABLET ORAL at 04:31

## 2024-01-10 RX ADMIN — ARFORMOTEROL TARTRATE 15 MCG: 15 SOLUTION RESPIRATORY (INHALATION) at 21:39

## 2024-01-10 RX ADMIN — FOLIC ACID 1 MG: 1 TABLET ORAL at 09:22

## 2024-01-10 RX ADMIN — PHENYLEPHRINE HYDROCHLORIDE 100 MCG: 10 INJECTION INTRAVENOUS at 11:38

## 2024-01-10 RX ADMIN — CEFAZOLIN 2000 MG: 2 INJECTION, POWDER, FOR SOLUTION INTRAMUSCULAR; INTRAVENOUS at 13:19

## 2024-01-10 RX ADMIN — Medication 1 CAPSULE: at 09:22

## 2024-01-10 RX ADMIN — PHENYLEPHRINE HYDROCHLORIDE 100 MCG: 10 INJECTION INTRAVENOUS at 11:45

## 2024-01-10 RX ADMIN — ALBUMIN (HUMAN) 12.5 G: 0.25 INJECTION, SOLUTION INTRAVENOUS at 00:17

## 2024-01-10 RX ADMIN — HYDROMORPHONE HYDROCHLORIDE 0.25 MG: 2 INJECTION, SOLUTION INTRAMUSCULAR; INTRAVENOUS; SUBCUTANEOUS at 11:01

## 2024-01-10 RX ADMIN — THIAMINE HCL TAB 100 MG 100 MG: 100 TAB at 09:21

## 2024-01-10 RX ADMIN — STANDARDIZED SENNA CONCENTRATE AND DOCUSATE SODIUM 2 TABLET: 8.6; 5 TABLET ORAL at 09:21

## 2024-01-10 RX ADMIN — BUDESONIDE INHALATION 500 MCG: 0.5 SUSPENSION RESPIRATORY (INHALATION) at 21:39

## 2024-01-10 RX ADMIN — POTASSIUM CHLORIDE 40 MEQ: 1500 TABLET, EXTENDED RELEASE ORAL at 09:21

## 2024-01-10 RX ADMIN — CEFAZOLIN 2000 MG: 2 INJECTION, POWDER, FOR SOLUTION INTRAMUSCULAR; INTRAVENOUS at 05:57

## 2024-01-10 RX ADMIN — Medication 10 ML: at 09:29

## 2024-01-10 RX ADMIN — FUROSEMIDE 10 MG/HR: 10 INJECTION, SOLUTION INTRAMUSCULAR; INTRAVENOUS at 18:48

## 2024-01-10 RX ADMIN — SIMETHICONE 80 MG: 80 TABLET, CHEWABLE ORAL at 21:12

## 2024-01-10 RX ADMIN — FUROSEMIDE 10 MG/HR: 10 INJECTION, SOLUTION INTRAMUSCULAR; INTRAVENOUS at 05:56

## 2024-01-10 RX ADMIN — Medication 1 CAPSULE: at 16:56

## 2024-01-10 RX ADMIN — POTASSIUM CHLORIDE 40 MEQ: 1500 TABLET, EXTENDED RELEASE ORAL at 16:56

## 2024-01-10 RX ADMIN — MICONAZOLE NITRATE: 20 POWDER TOPICAL at 21:05

## 2024-01-10 RX ADMIN — METOPROLOL TARTRATE 12.5 MG: 25 TABLET, FILM COATED ORAL at 09:22

## 2024-01-10 RX ADMIN — PROPOFOL 25 MG: 10 INJECTION, EMULSION INTRAVENOUS at 11:31

## 2024-01-10 RX ADMIN — ALBUTEROL SULFATE 2.5 MG: 2.5 SOLUTION RESPIRATORY (INHALATION) at 21:38

## 2024-01-10 RX ADMIN — CYANOCOBALAMIN TAB 1000 MCG 500 MCG: 1000 TAB at 09:21

## 2024-01-10 RX ADMIN — ALBUMIN (HUMAN) 12.5 G: 0.25 INJECTION, SOLUTION INTRAVENOUS at 06:00

## 2024-01-10 RX ADMIN — PROPOFOL 75 MG: 10 INJECTION, EMULSION INTRAVENOUS at 11:18

## 2024-01-10 RX ADMIN — CEFAZOLIN 2000 MG: 2 INJECTION, POWDER, FOR SOLUTION INTRAMUSCULAR; INTRAVENOUS at 21:08

## 2024-01-10 RX ADMIN — MICONAZOLE NITRATE: 20 POWDER TOPICAL at 02:00

## 2024-01-10 RX ADMIN — ALBUTEROL SULFATE 2.5 MG: 2.5 SOLUTION RESPIRATORY (INHALATION) at 14:54

## 2024-01-10 RX ADMIN — PROPOFOL 10 MG: 10 INJECTION, EMULSION INTRAVENOUS at 11:36

## 2024-01-10 RX ADMIN — PROPOFOL 25 MG: 10 INJECTION, EMULSION INTRAVENOUS at 11:27

## 2024-01-10 RX ADMIN — PHENYLEPHRINE HYDROCHLORIDE 100 MCG: 10 INJECTION INTRAVENOUS at 11:34

## 2024-01-10 RX ADMIN — METOPROLOL TARTRATE 12.5 MG: 25 TABLET, FILM COATED ORAL at 21:04

## 2024-01-10 RX ADMIN — SODIUM CHLORIDE, PRESERVATIVE FREE 10 ML: 5 INJECTION INTRAVENOUS at 09:22

## 2024-01-10 RX ADMIN — LIDOCAINE HYDROCHLORIDE 3 ML: 20 INJECTION, SOLUTION EPIDURAL; INFILTRATION; INTRACAUDAL; PERINEURAL at 11:16

## 2024-01-10 RX ADMIN — PANTOPRAZOLE SODIUM 40 MG: 40 INJECTION, POWDER, FOR SOLUTION INTRAVENOUS at 21:04

## 2024-01-10 ASSESSMENT — COPD QUESTIONNAIRES: CAT_SEVERITY: SEVERE

## 2024-01-10 ASSESSMENT — PAIN SCALES - GENERAL
PAINLEVEL_OUTOF10: 0
PAINLEVEL_OUTOF10: 0
PAINLEVEL_OUTOF10: 7
PAINLEVEL_OUTOF10: 7
PAINLEVEL_OUTOF10: 0
PAINLEVEL_OUTOF10: 7
PAINLEVEL_OUTOF10: 0

## 2024-01-10 ASSESSMENT — PAIN DESCRIPTION - ORIENTATION: ORIENTATION: LOWER

## 2024-01-10 ASSESSMENT — PAIN DESCRIPTION - LOCATION
LOCATION: BACK

## 2024-01-10 ASSESSMENT — PAIN - FUNCTIONAL ASSESSMENT: PAIN_FUNCTIONAL_ASSESSMENT: 0-10

## 2024-01-10 ASSESSMENT — PAIN DESCRIPTION - DESCRIPTORS: DESCRIPTORS: DISCOMFORT

## 2024-01-10 NOTE — PROGRESS NOTES
[facility] - Inpatient Rehabilitation Department   Phone: [phone]    Occupational Therapy    [] Initial Evaluation            [x] Daily Treatment Note         [] Discharge Summary      Patient: Ishmael Gr   : 1945   MRN: [id number]   Date of Service:  1/10/2024    Admitting Diagnosis:  ABRAHAM (acute kidney injury) (HCC)  Current Admission Summary:   Ishmael Gr is a 78 y.o. male who presents to the ED for evaluation for fall.  Patient was just discharged from the hospital after being brought in for respiratory issues and a fall at that time.  States today he fell and hit his head.  Does not fully remember it.  He was on his way to the bathroom when it happened.  States the only pain he is feeling is in his low back and going down his right leg.  Denies any new numbness or tingling or weakness.  Does have significant respiratory issues and feels short of breath initially with it but states he feels back to normal at this time.  He is on 6 L normally.   Past Medical History:  has a past medical history of Anemia, COPD (chronic obstructive pulmonary disease) ([facility]), Diabetes mellitus (HCC), Edema, GI (gastrointestinal bleed), Gout, Hypertension, Morbid obesity (HCC), Neuropathy, Obstructive sleep apnea, and Peripheral vascular disease ([facility]).  Past Surgical History:  has a past surgical history that includes Appendectomy; Total hip arthroplasty; Colonoscopy (N/A, 2019); Upper gastrointestinal endoscopy (N/A, 2023); CT BIOPSY BONE MARROW (2024); Upper gastrointestinal endoscopy (N/A, 1/10/2024); and Colonoscopy (N/A, 1/10/2024).    Discharge Recommendations: Ishmael Gr scored a 16/24 on the AM-PAC ADL Inpatient form. Current research shows that an AM-PAC score of 17 or less is typically not associated with a discharge to the patient's home setting. Based on the patient's AM-PAC score and their current ADL deficits, it is recommended that the patient have 3-5 sessions per week of  Occupational Therapy at d/c to increase the patient's independence.  Please see assessment section for further patient specific details.    If patient discharges prior to next session this note will serve as a discharge summary.  Please see below for the latest assessment towards goals.      DME Required For Discharge: DME to be determined at next level of care, DME to be determined pending patient progress    Precautions/Restrictions: high fall risk  Weight Bearing Restrictions: no restrictions  [] Right Upper Extremity  [] Left Upper Extremity [] Right Lower Extremity  [] Left Lower Extremity     Required Braces/Orthotics: no braces required   [] Right  [] Left  Positional Restrictions:no positional restrictions    Pre-Admission Information   Lives With: daughter (52 y.o.), RN at [employer] working full-time and granddaughter 23,  reports family members are at home intermittently  Type of Home: house  Home Layout: two level, bedroom/bathroom on main level  Home Access:  2 step to enter with handrail.  Handrails are located on R side.  Bathroom Layout: walk in shower  Bathroom Equipment: grab bars in shower, shower chair, hand held shower head  Toilet Height: elevated height  Home Equipment: rolling walker, single point cane, reacher, sock aide, oxygen--sleeps in recliner  Transfer Assistance:  mod I with RW   Ambulation Assistance: mod I with RW within his home, modified independent with use of SPC vs. RW when going out. Pt reports difficulty with advancing (R) LE during mobility tasks.   ADL Assistance: independent with all ADL's  IADL Assistance: independent with homemaking tasks, requires assistance with laundry from daughter (laundry in basement)   Active :        [] Yes                 [x] No--daughter/granddaughter drive  Hand Dominance: [] Left                 [x] Right  Current Employment: retired.  Occupation: , welding   Hobbies: genealogy, history    Recent Falls: 3 falls. Tripping over the

## 2024-01-10 NOTE — PLAN OF CARE
Problem: Safety - Adult  Goal: Free from fall injury  1/10/2024 0215 by Britney Saba, RN  Outcome: Progressing     Problem: Pain  Goal: Verbalizes/displays adequate comfort level or baseline comfort level  1/10/2024 0215 by Britney Saba, RN  Outcome: Progressing     Problem: Skin/Tissue Integrity  Goal: Absence of new skin breakdown  Description: 1.  Monitor for areas of redness and/or skin breakdown  2.  Assess vascular access sites hourly  3.  Every 4-6 hours minimum:  Change oxygen saturation probe site  4.  Every 4-6 hours:  If on nasal continuous positive airway pressure, respiratory therapy assess nares and determine need for appliance change or resting period.  1/10/2024 0215 by Britney Saba, RN  Outcome: Progressing     Problem: ABCDS Injury Assessment  Goal: Absence of physical injury  1/10/2024 0215 by Britney Saba, RN  Outcome: Progressing

## 2024-01-10 NOTE — ANESTHESIA POSTPROCEDURE EVALUATION
Department of Anesthesiology  Postprocedure Note    Patient: Ishmael Gr  MRN: [id number]  YOB: 1945  Date of evaluation: 1/10/2024    Procedure Summary       Date: 01/10/24 Room / Location: [place] [address] /     Anesthesia Start: 1113 Anesthesia Stop: 1200    Procedures:       EGD BIOPSY (Abdomen)      COLONOSCOPY DIAGNOSTIC Diagnosis:       Anemia, unspecified type      (Anemia, unspecified type [D64.9])    Surgeons: Lenin Gomez MD Responsible Provider: Marjorie Strickland MD    Anesthesia Type: MAC ASA Status: 4            Anesthesia Type: No value filed.    Goldie Phase I: Goldie Score: 9    Goldie Phase II:      Anesthesia Post Evaluation    Patient location during evaluation: PACU  Patient participation: complete - patient participated  Level of consciousness: awake  Airway patency: patent  Nausea & Vomiting: no vomiting  Cardiovascular status: hemodynamically stable  Respiratory status: acceptable  Hydration status: euvolemic  There was medical reason for not using a multimodal analgesia pain management approach.Pain management: adequate    No notable events documented.

## 2024-01-10 NOTE — ANESTHESIA PRE PROCEDURE
(patent foramen ovale) Q21.12   • Peripheral edema R60.9   • Tachycardia R00.0   • Diabetes education, encounter for Z71.89   • Pulmonary nodule R91.1   • PVD (peripheral vascular disease) ([facility]) I73.9   • Sepsis ([facility]) A41.9   • NSTEMI (non-ST elevated myocardial infarction) ([facility]) I21.4   • Acute on chronic respiratory failure with hypercapnia ([facility]) J96.22   • Coronary artery disease due to calcified coronary lesion I25.10, I25.84   • Coronary artery calcification seen on CT scan I25.10   • Pneumonia of both lower lobes due to infectious organism J18.9   • Acute on chronic heart failure with preserved ejection fraction ([facility]) I50.33   • Chronic obstructive pulmonary disease ([facility]) J44.9   • ABRAHAM (acute kidney injury) ([facility]) N17.9   • Fall W19.XXXA   • Acute right-sided low back pain with right-sided sciatica M54.41   • Right hip pain M25.551   • Fall at home W19.XXXA, Y92.009   • Scoliosis of lumbar spine M41.9   • Pain due to hip joint prosthesis, initial encounter ([facility]) T84.84XA, Z96.649   • Aspiration pneumonia ([facility]) J69.0   • Bacteremia due to methicillin susceptible Staphylococcus aureus (MSSA) R78.81, B95.61   • Receiving intravenous antibiotic treatment as outpatient Z79.2   • Paroxysmal atrial fibrillation ([facility]) I48.0   • Gastrointestinal hemorrhage with melena K92.1   • Anemia D64.9   • Stage 3 chronic kidney disease ([facility]) N18.30       Past Medical History:        Diagnosis Date   • Anemia    • COPD (chronic obstructive pulmonary disease) ([facility])    • Diabetes mellitus ([facility])    • Edema    • GI (gastrointestinal bleed)    • Gout    • Hypertension    • Morbid obesity ([facility])    • Neuropathy    • Obstructive sleep apnea     uses CPAP   • Peripheral vascular disease ([facility])        Past Surgical History:        Procedure Laterality Date   • APPENDECTOMY     • COLONOSCOPY N/A 5/7/2019    COLONOSCOPY DIAGNOSTIC performed by [facility] Yoon MD at [facility] ENDOSCOPY   • CT BONE MARROW BIOPSY  1/9/2024    CT BONE MARROW 
oxygen requirement  Chronic resp failure   Cardiovascular:    (+) hypertension:, past MI:, CAD:, CHF:, hyperlipidemia      ECG reviewed  Rhythm: regular  Rate: normal  Echocardiogram reviewed    Cleared by cardiology           ROS comment: Narrative & Impression    Transthoracic Echocardiography Report (TTE)      Demographics      Patient Name       CINDY HERNANDEZ      Date of Study      12/22/2023         Gender              Male      Patient Number     [id number]         Date of Birth       1945      Visit Number       [id number]          Age                 78 year(s)      Accession Number   [id number]         Room Number         5903      Corporate ID       [id number]            Sonographer         Kerri Guzman,                                                             RCS, RCS, CCT      Ordering Physician Davey Farias, Interpreting        Batrolo Philip DO MD                 Physician     Procedure     Type of Study      TTE procedure:ECHOCARDIOGRAM LIMITED.     Procedure Date  Date: 12/22/2023 Start: 12:52 PM     Study Location: [facility] - Echo Lab  Technical Quality: Poor visualization due to lung interface.     Additional Indications:R/O endocarditis.     Patient Status: Routine     Contrast Medium: Definity.     Height: 72 inches Weight: 346.01 pounds BSA: 2.69 m2 BMI: 46.93 kg/m2     BP: 124/66 mmHg      Conclusions      Summary   Technically difficult study. Valves not well visualized.   Left ventricular cavity size is normal.   There is increased left ventricular wall thickness.   Left ventricular ejection fraction is normal at 50-55%.   The right atrium is dilated.   IVC size is dilated (>2.1 cm) and collapses < 50% with respiration   consistent with elevated RA pressure (15 mmHg).      Signature      ------------------------------------------------------------------   Electronically signed by Bartolo Philip DO (Interpreting   physician) on 12/22/2023 at

## 2024-01-10 NOTE — PROCEDURES
EGD and Colonoscopy Procedure  Note            Patient: Ishmael Gr  : 1945  CSN:     Procedure: Esophagogastroduodenoscopy with biopsy and colonoscopy    Date:  1/10/2024     Surgeon:  ZANDRA TONY MD     Referring Provider:      Preoperative Diagnosis: Persistent anemia    Postoperative Diagnosis:    -Antral erosion  -Suboptimal to fair prep  -Diverticulosis on the left  -Internal hemorrhoids    Anesthesia: Propofol sedation    EBL: <50 mL    Indications: This is a 78 y.o. year old male who presents today for EGD and colonoscopy due to persistent anemia.          Procedure Details:    With the patient in left lateral position the endoscope was passed through the hypopharynx into the esophagus. The scope was advanced all the way to the duodenum.  The mucosa in the duodenal bulb, post bulbar region in the descending duodenum appeared normal.  Duodenal biopsies were obtained.  The mucosa in the antrum revealed antral erosions.  Recent biopsies were negative for Helicobacter pylori infection.  No biopsies obtained at this time.   The mucosa in the remaining part of the stomach and retroflexion appeared normal.  The GE junction was at around 40 cms. the mucosa in the remainder of the esophagus appeared normal.  The scope was withdrawn and the procedure was terminated.        Procedure Details:    With the patient in left lateral decubitus position the endoscope was inserted through the anorectal area into the rectum. The scope was then advanced through the length of the colon to the cecum. The ileocecal valve was visualized and cannulated. The quality of preparation was suboptimal to fair. Water was insufflated through the biopsy channel to clean out the colon and look at the underlying mucosa. The scope was carefully withdrawn and found to be normal.    Digital rectal examination was performed, no abnormalities noted.  Prep quality was suboptimal to fair.  The scope was advanced all the way to the  cecum, the view of the mucosa was limited in the cecum due to seeds which could not be suctioned.  No gross lesions noted.  Small to medium sized polyps and flat lesions can be missed.  The prep quality in the remaining part of the colon was fair.  Scattered diverticulosis noted but predominantly left side.   On retroflexion no abnormalities were noted.  Scope was withdrawn and the procedure was terminated.        Impression:    -Antral erosion  -Suboptimal to fair prep  -Diverticulosis on the left  -Internal hemorrhoids  No pathology which can explain anemia on either of this exam    Recommendations:    -Await pathology result  -Monitor hemoglobin and hematocrit and transfuse as needed  -If anemia persist may need capsule study to be done on elective basis  -Will sign off please call with questions    ZANDRA TONY MD, MD   [facility]  1/10/2024

## 2024-01-10 NOTE — PROGRESS NOTES
[facility]  DAILY PROGRESS NOTE    Admit Date: 12/20/2023       Chief Complaint: fall     Interval History:   Patient seen and examined and notes reviewed. Patient is being followed for AF and edema. Today he feels better since I last saw him this weekend. He is sitting in the chair and is going to endo for scope today. His edema is improving and he denies CP, palpitations or SOB.     In: 1000 [P.O.:1000]  Out: 1600    Wt Readings from Last 2 Encounters:   01/10/24 (!) 147.9 kg (326 lb)   12/18/23 (!) 153.6 kg (338 lb 9.6 oz)         Data:   Scheduled Meds:   Scheduled Meds:   potassium chloride  40 mEq Oral Q1H    miconazole   Topical BID    [Held by provider] ferrous sulfate  325 mg Oral BID WC    vitamin B-12  500 mcg Oral Daily    folic acid  1 mg Oral Daily    metoprolol tartrate  12.5 mg Oral BID    sennosides-docusate sodium  2 tablet Oral Daily    pantoprazole  40 mg IntraVENous BID    ceFAZolin  2,000 mg IntraVENous Q8H    sodium chloride flush  5-40 mL IntraVENous 2 times per day    albuterol  2.5 mg Nebulization 4x Daily RT    lactobacillus  1 capsule Oral BID WC    insulin lispro  5 Units SubCUTAneous TID WC    insulin lispro  0-4 Units SubCUTAneous Nightly    insulin lispro  0-8 Units SubCUTAneous TID WC    arformoterol tartrate  15 mcg Nebulization BID RT    budesonide  0.5 mg Nebulization BID RT    allopurinol  200 mg Oral Daily    [Held by provider] apixaban  5 mg Oral BID    [Held by provider] aspirin  81 mg Oral Daily    vitamin B-1  100 mg Oral Daily    sodium chloride flush  5-40 mL IntraVENous 2 times per day    tiotropium  2 puff Inhalation Daily RT     Continuous Infusions:   sodium chloride      furosemide (LASIX) 100 mg in sodium chloride 0.9 % 100 mL infusion 10 mg/hr (01/10/24 0556)    sodium chloride 25 mL (01/07/24 0504)    dextrose      sodium chloride Stopped (01/03/24 2304)     PRN Meds:.sodium chloride, oxyCODONE-acetaminophen, midodrine, melatonin, diazePAM, perflutren

## 2024-01-10 NOTE — PROGRESS NOTES
[facility] - Inpatient Rehabilitation Department   Phone: [phone]    Physical Therapy                [x] Daily Treatment Note         [] Discharge Summary      Patient: Ishmael Gr   : 1945   MRN: [id number]   Date of Service:  1/10/2024  Admitting Diagnosis: ABRAHAM (acute kidney injury) (HCC)    Current Admission Summary: The pt was admitted s/p a fall.  He has severe back and R hip pain.  He discharged home from the hospital a few days before he was readmitted.     Updated 24: acute GI bleed, s/p 3 units PRBC, EGD (multiple antral erosions that could have bled if patient is on blood thinners. Gastric biopsies to check for H. Pylori); ABRAHAM; acute on chronic hypoxic and hypercapnic respiratory failure secondary to COPD exacerbation    Past Medical History:  has a past medical history of Anemia, COPD (chronic obstructive pulmonary disease) ([facility]), Diabetes mellitus (HCC), Edema, GI (gastrointestinal bleed), Gout, Hypertension, Morbid obesity ([facility]), Neuropathy, Obstructive sleep apnea, and Peripheral vascular disease ([facility]).  Past Surgical History:  has a past surgical history that includes Appendectomy; Total hip arthroplasty; Colonoscopy (N/A, 2019); Upper gastrointestinal endoscopy (N/A, 2023); CT BIOPSY BONE MARROW (2024); Upper gastrointestinal endoscopy (N/A, 1/10/2024); and Colonoscopy (N/A, 1/10/2024).    Discharge Recommendations: Ishmael Gr scored a 15/24 on the AM-PAC short mobility form. Current research shows that an AM-PAC score of 17 or less is typically not associated with a discharge to the patient's home setting. Based on the patient's AM-PAC score and their current functional mobility deficits, it is recommended that the patient have 3-5 sessions per week of Physical Therapy at d/c to increase the patient's independence.  Please see assessment section for further patient specific details.  If patient discharges prior to next session this note will serve as a

## 2024-01-10 NOTE — PROGRESS NOTES
MD Leandra Sandoval MD Samir Brahmbhatt, MD                  Office: [phone]                 Fax: [phone]         [employer]                          NEPHROLOGY INPATIENT PROGRESS  NOTE:     PATIENT NAME: Ishmael Gr  : 1945  MRN: [id number]      IMPRESSION/RECOMMENDATIONS:    Right hip pain [M25.551]  ABRAHAM (acute kidney injury) (HCC) [N17.9]  Fall, initial encounter [W19.XXXA]  Acute right-sided low back pain with right-sided sciatica [M54.41]      ABRAHAM-baseline creatinine 1.1.  Probably from prerenal azotemia in the setting of relative hypotension plus contrast exposure plus COPD exacerbation.  Now probable ATN.  Nonoliguric. -3L since admission.  Still hypervolemic.   Crea 1.9 from 2 from 1.9.       Subjective / interval history / nephrology update / medical decision making:   Resting in bed   Endoscopy today   Reported no active complaints/distress,   Renal labs noted.     No SOB  Stable on NC but still 6 L, similar dose as at home, as per patient    But leg edema -still present currently  Switcedh to Lasix 10mg/hr.    Albumin 25g to 12.5g TID.    Added Metolazone daily  Use Midodrine PRN    Potassium repletion and monitoring  Hypomagnesemia Monitor also needing repletion    Follow Hgb.  RAJESH subQ    Checked for paraproteinemia.-Negative SPEP, negative UPEP,   UA neg protein.  Has moderate then worsening to large, blood with 17rbc.  Albumin 2.2.  SPEP neg.  Elevated kappa and lambda but normal ratio.    Iron stores -recently, not lower, could have likely underlying anemia of chronic disease-given RAJESH 1/3/24  -consulted hematology    Follow  24H urine protein. -Only 0.6  g    May need renal biopsy. -With improving renal function can be done as an outpatient.  If still indicated.    S/p Successful CT guided bone marrow aspiration from the iliac bone. On 24    Voltaren listed in home meds-d/w pt to avoid      D/c plans -from renal standpoint-likely in 24-48 hours,  for

## 2024-01-10 NOTE — PROGRESS NOTES
Pt arrived from endo to PACU bay 10. Reported received from endo rn/crna staff. Pt  arousable to voice. Pt on 6L simple mask, Afib , and VSS. Will continue to monitor.

## 2024-01-10 NOTE — PROGRESS NOTES
[facility]    1/10/2024 11:04 AM        Name: Ishmael Gr .              Admitted: 12/20/2023  Primary Care Provider: Anthony Bravo MD (Tel: [phone])    Globin stable no nausea vomiting diuresing well  Current Medications  miconazole (MICOTIN) 2 % powder, BID  0.9 % sodium chloride infusion, PRN  [Held by provider] ferrous sulfate (IRON 325) tablet 325 mg, BID WC  vitamin B-12 (CYANOCOBALAMIN) tablet 500 mcg, Daily  folic acid (FOLVITE) tablet 1 mg, Daily  furosemide (LASIX) 100 mg in sodium chloride 0.9 % 100 mL infusion, Continuous  oxyCODONE-acetaminophen (PERCOCET) 5-325 MG per tablet 1 tablet, Q6H PRN  midodrine (PROAMATINE) tablet 5 mg, TID PRN  metoprolol tartrate (LOPRESSOR) tablet 12.5 mg, BID  melatonin tablet 3 mg, Nightly PRN  diazePAM (VALIUM) tablet 2 mg, Q6H PRN  sennosides-docusate sodium (SENOKOT-S) 8.6-50 MG tablet 2 tablet, Daily  pantoprazole (PROTONIX) injection 40 mg, BID  perflutren lipid microspheres (DEFINITY) injection 1.5 mL, ONCE PRN  ceFAZolin (ANCEF) 2,000 mg in sodium chloride 0.9 % 50 mL IVPB (mini-bag), Q8H  sodium chloride flush 0.9 % injection 5-40 mL, 2 times per day  sodium chloride flush 0.9 % injection 5-40 mL, PRN  0.9 % sodium chloride infusion, PRN  albuterol (PROVENTIL) (2.5 MG/3ML) 0.083% nebulizer solution 2.5 mg, 4x Daily RT  lactobacillus (CULTURELLE) capsule 1 capsule, BID WC  insulin lispro (HUMALOG) injection vial 5 Units, TID WC  insulin lispro (HUMALOG) injection vial 0-4 Units, Nightly  dextrose bolus 10% 125 mL, PRN   Or  dextrose bolus 10% 250 mL, PRN  glucagon injection 1 mg, PRN  dextrose 10 % infusion, Continuous PRN  insulin lispro (HUMALOG) injection vial 0-8 Units, TID WC  simethicone (MYLICON) chewable tablet 80 mg, Q6H PRN  arformoterol tartrate (BROVANA) nebulizer solution 15 mcg, BID RT  budesonide (PULMICORT) nebulizer suspension 500 mcg,

## 2024-01-10 NOTE — PROGRESS NOTES
Pt stable and able to be transferred from PACU to room 5903. A&O , VSS, with no complaints at this time. 5C called and notified that pt is being transferred out of PACU and to room.

## 2024-01-10 NOTE — FLOWSHEET NOTE
Post procedure chart reviewed, please call radiology RN with any questions/concerns r/t bone marrow bx on 1/9/23

## 2024-01-10 NOTE — H&P
Gastroenterology Note             Pre-operative History and Physical    Patient: Ishmael Gr  : 1945  CSN:     History Obtained From:  patient and/or guardian.     HISTORY OF PRESENT ILLNESS:    The patient is a 78 y.o. male  here for EGD/colonoscopy.  Persistent anemia.  See consult note for details    Past Medical History:    Past Medical History:   Diagnosis Date    Anemia     COPD (chronic obstructive pulmonary disease) (HCC)     Diabetes mellitus (HCC)     Edema     GI (gastrointestinal bleed)     Gout     Hypertension     Morbid obesity (HCC)     Neuropathy     Obstructive sleep apnea     uses CPAP    Peripheral vascular disease (HCC)      Past Surgical History:    Past Surgical History:   Procedure Laterality Date    APPENDECTOMY      COLONOSCOPY N/A 2019    COLONOSCOPY DIAGNOSTIC performed by Anthony Yoon MD at [facility] ENDOSCOPY    CT BONE MARROW BIOPSY  2024    CT BONE MARROW BIOPSY 2024 [facility] CT SCAN    TOTAL HIP ARTHROPLASTY      bilateral    UPPER GASTROINTESTINAL ENDOSCOPY N/A 2023    EGD BIOPSY and rectal exam performed by Alexsander Nj MD at [facility] ENDOSCOPY     Medications Prior to Admission:   No current facility-administered medications on file prior to encounter.     Current Outpatient Medications on File Prior to Encounter   Medication Sig Dispense Refill    lidocaine 4 % external patch Place 1 patch onto the skin daily 30 patch 0    ibuprofen (ADVIL;MOTRIN) 600 MG tablet Take 1 tablet by mouth 4 times daily as needed for Pain 40 tablet 0    ipratropium 0.5 mg-albuterol 2.5 mg (DUONEB) 0.5-2.5 (3) MG/3ML SOLN nebulizer solution Inhale 3 mLs into the lungs every 4 hours      Lift Chair MISC by Does not apply route Please dispense a lift chair 1 each 0    furosemide (LASIX) 40 MG tablet Take 1 tablet by mouth in the morning and 1 tablet in the evening. 60 tablet 3    aspirin 81 MG chewable tablet Take 1 tablet by mouth daily 30 tablet 3    apixaban (ELIQUIS) 5  allergies.      Social History:   Social History     Tobacco Use    Smoking status: Former     Current packs/day: 0.00     Average packs/day: 1 pack/day for 50.0 years (50.0 ttl pk-yrs)     Types: Cigarettes     Start date: 1962     Quit date: 2012     Years since quittin.9    Smokeless tobacco: Never    Tobacco comments:     H.O. smoking 1 p.p.d. Quit 9 months ago    Substance Use Topics    Alcohol use: Yes     Alcohol/week: 14.0 standard drinks of alcohol     Types: 14 Shots of liquor per week     Comment: 3 cans of beer every night     Family History:   Family History   Problem Relation Age of Onset    High Blood Pressure Mother     High Cholesterol Mother     Heart Disease Mother     Stroke Mother     Diabetes Mother     Depression Mother     Mental Illness Mother     Cancer Father        PHYSICAL EXAM:      /74   Pulse 90   Temp 97.1 °F (36.2 °C) (Temporal)   Resp 22   Ht 1.854 m (6' 1\")   Wt (!) 147.9 kg (326 lb)   SpO2 99%   BMI 43.01 kg/m²  I        Heart:   RRR, normal s1s2    Lungs:  CTA bilat,  Normal effort    Abdomen:   NT, ND      ASA Grade:  ASA 4 - Patient with severe systemic disease that is a constant threat to life    Mallampati Class: 2          ASSESSMENT AND PLAN:    1.  Patient is a 78 y.o. male here for EGD/Colonoscopy with MAC.   2.  Procedure options, risks and benefits reviewed with patient.  Patient expresses understanding.    ZANDRA TONY MD,   [facility]  1/10/2024

## 2024-01-10 NOTE — PROGRESS NOTES
Pt transferred to room 5903 at this time. A&O with no signs of distress. Report given to Karlo HEWITT. V/u and denies questions or further needs at this time.

## 2024-01-11 LAB
ALBUMIN SERPL-MCNC: 3.2 G/DL (ref 3.4–5)
ANION GAP SERPL CALCULATED.3IONS-SCNC: 10 MMOL/L (ref 3–16)
BASOPHILS # BLD: 0 K/UL (ref 0–0.2)
BASOPHILS NFR BLD: 0.5 %
BUN SERPL-MCNC: 23 MG/DL (ref 7–20)
CALCIUM SERPL-MCNC: 9.1 MG/DL (ref 8.3–10.6)
CHLORIDE SERPL-SCNC: 91 MMOL/L (ref 99–110)
CO2 SERPL-SCNC: 37 MMOL/L (ref 21–32)
CREAT SERPL-MCNC: 1.6 MG/DL (ref 0.8–1.3)
DEPRECATED RDW RBC AUTO: 17.4 % (ref 12.4–15.4)
EOSINOPHIL # BLD: 0.3 K/UL (ref 0–0.6)
EOSINOPHIL NFR BLD: 5.3 %
GFR SERPLBLD CREATININE-BSD FMLA CKD-EPI: 44 ML/MIN/{1.73_M2}
GLUCOSE BLD-MCNC: 120 MG/DL (ref 70–99)
GLUCOSE BLD-MCNC: 129 MG/DL (ref 70–99)
GLUCOSE BLD-MCNC: 144 MG/DL (ref 70–99)
GLUCOSE BLD-MCNC: 177 MG/DL (ref 70–99)
GLUCOSE SERPL-MCNC: 112 MG/DL (ref 70–99)
HCT VFR BLD AUTO: 24.1 % (ref 40.5–52.5)
HGB BLD-MCNC: 8 G/DL (ref 13.5–17.5)
LYMPHOCYTES # BLD: 0.9 K/UL (ref 1–5.1)
LYMPHOCYTES NFR BLD: 16.9 %
MAGNESIUM SERPL-MCNC: 1.7 MG/DL (ref 1.8–2.4)
MCH RBC QN AUTO: 31 PG (ref 26–34)
MCHC RBC AUTO-ENTMCNC: 33.3 G/DL (ref 31–36)
MCV RBC AUTO: 93.1 FL (ref 80–100)
MONOCYTES # BLD: 0.6 K/UL (ref 0–1.3)
MONOCYTES NFR BLD: 11.5 %
NEUTROPHILS # BLD: 3.5 K/UL (ref 1.7–7.7)
NEUTROPHILS NFR BLD: 65.8 %
PERFORMED ON: ABNORMAL
PHOSPHATE SERPL-MCNC: 3.4 MG/DL (ref 2.5–4.9)
PLATELET # BLD AUTO: 284 K/UL (ref 135–450)
PMV BLD AUTO: 6.2 FL (ref 5–10.5)
POTASSIUM SERPL-SCNC: 3.4 MMOL/L (ref 3.5–5.1)
RBC # BLD AUTO: 2.59 M/UL (ref 4.2–5.9)
SODIUM SERPL-SCNC: 138 MMOL/L (ref 136–145)
WBC # BLD AUTO: 5.3 K/UL (ref 4–11)

## 2024-01-11 PROCEDURE — 6360000002 HC RX W HCPCS: Performed by: INTERNAL MEDICINE

## 2024-01-11 PROCEDURE — 2060000000 HC ICU INTERMEDIATE R&B

## 2024-01-11 PROCEDURE — 2580000003 HC RX 258: Performed by: INTERNAL MEDICINE

## 2024-01-11 PROCEDURE — 6370000000 HC RX 637 (ALT 250 FOR IP): Performed by: INTERNAL MEDICINE

## 2024-01-11 PROCEDURE — C9113 INJ PANTOPRAZOLE SODIUM, VIA: HCPCS | Performed by: INTERNAL MEDICINE

## 2024-01-11 PROCEDURE — 83735 ASSAY OF MAGNESIUM: CPT

## 2024-01-11 PROCEDURE — 6370000000 HC RX 637 (ALT 250 FOR IP): Performed by: NURSE PRACTITIONER

## 2024-01-11 PROCEDURE — 94640 AIRWAY INHALATION TREATMENT: CPT

## 2024-01-11 PROCEDURE — 99231 SBSQ HOSP IP/OBS SF/LOW 25: CPT | Performed by: NURSE PRACTITIONER

## 2024-01-11 PROCEDURE — 85025 COMPLETE CBC W/AUTO DIFF WBC: CPT

## 2024-01-11 PROCEDURE — 94761 N-INVAS EAR/PLS OXIMETRY MLT: CPT

## 2024-01-11 PROCEDURE — 2700000000 HC OXYGEN THERAPY PER DAY

## 2024-01-11 PROCEDURE — 80069 RENAL FUNCTION PANEL: CPT

## 2024-01-11 PROCEDURE — 99232 SBSQ HOSP IP/OBS MODERATE 35: CPT | Performed by: INTERNAL MEDICINE

## 2024-01-11 PROCEDURE — 36415 COLL VENOUS BLD VENIPUNCTURE: CPT

## 2024-01-11 RX ORDER — POTASSIUM CHLORIDE 20 MEQ/1
40 TABLET, EXTENDED RELEASE ORAL ONCE
Status: COMPLETED | OUTPATIENT
Start: 2024-01-11 | End: 2024-01-11

## 2024-01-11 RX ORDER — MAGNESIUM SULFATE IN WATER 40 MG/ML
2000 INJECTION, SOLUTION INTRAVENOUS ONCE
Status: COMPLETED | OUTPATIENT
Start: 2024-01-11 | End: 2024-01-11

## 2024-01-11 RX ORDER — ACETAZOLAMIDE 250 MG/1
250 TABLET ORAL DAILY
Status: DISCONTINUED | OUTPATIENT
Start: 2024-01-11 | End: 2024-01-13

## 2024-01-11 RX ADMIN — THIAMINE HCL TAB 100 MG 100 MG: 100 TAB at 09:14

## 2024-01-11 RX ADMIN — BUDESONIDE INHALATION 500 MCG: 0.5 SUSPENSION RESPIRATORY (INHALATION) at 07:32

## 2024-01-11 RX ADMIN — ALBUTEROL SULFATE 2.5 MG: 2.5 SOLUTION RESPIRATORY (INHALATION) at 20:40

## 2024-01-11 RX ADMIN — CYANOCOBALAMIN TAB 1000 MCG 500 MCG: 1000 TAB at 09:13

## 2024-01-11 RX ADMIN — Medication 10 ML: at 09:16

## 2024-01-11 RX ADMIN — MICONAZOLE NITRATE: 20 POWDER TOPICAL at 21:00

## 2024-01-11 RX ADMIN — PANTOPRAZOLE SODIUM 40 MG: 40 INJECTION, POWDER, FOR SOLUTION INTRAVENOUS at 19:56

## 2024-01-11 RX ADMIN — ALBUTEROL SULFATE 2.5 MG: 2.5 SOLUTION RESPIRATORY (INHALATION) at 07:32

## 2024-01-11 RX ADMIN — POTASSIUM CHLORIDE 40 MEQ: 1500 TABLET, EXTENDED RELEASE ORAL at 10:13

## 2024-01-11 RX ADMIN — SODIUM CHLORIDE, PRESERVATIVE FREE 10 ML: 5 INJECTION INTRAVENOUS at 19:56

## 2024-01-11 RX ADMIN — FUROSEMIDE 10 MG/HR: 10 INJECTION, SOLUTION INTRAMUSCULAR; INTRAVENOUS at 16:37

## 2024-01-11 RX ADMIN — ARFORMOTEROL TARTRATE 15 MCG: 15 SOLUTION RESPIRATORY (INHALATION) at 20:40

## 2024-01-11 RX ADMIN — Medication 1 CAPSULE: at 09:12

## 2024-01-11 RX ADMIN — FOLIC ACID 1 MG: 1 TABLET ORAL at 09:14

## 2024-01-11 RX ADMIN — FUROSEMIDE 10 MG/HR: 10 INJECTION, SOLUTION INTRAMUSCULAR; INTRAVENOUS at 05:30

## 2024-01-11 RX ADMIN — PANTOPRAZOLE SODIUM 40 MG: 40 INJECTION, POWDER, FOR SOLUTION INTRAVENOUS at 09:17

## 2024-01-11 RX ADMIN — ALLOPURINOL 200 MG: 100 TABLET ORAL at 09:13

## 2024-01-11 RX ADMIN — Medication 1 CAPSULE: at 17:24

## 2024-01-11 RX ADMIN — CEFAZOLIN 2000 MG: 2 INJECTION, POWDER, FOR SOLUTION INTRAMUSCULAR; INTRAVENOUS at 05:31

## 2024-01-11 RX ADMIN — CEFAZOLIN 2000 MG: 2 INJECTION, POWDER, FOR SOLUTION INTRAMUSCULAR; INTRAVENOUS at 13:14

## 2024-01-11 RX ADMIN — SIMETHICONE 80 MG: 80 TABLET, CHEWABLE ORAL at 05:35

## 2024-01-11 RX ADMIN — MICONAZOLE NITRATE: 20 POWDER TOPICAL at 09:13

## 2024-01-11 RX ADMIN — ACETAZOLAMIDE 250 MG: 250 TABLET ORAL at 13:10

## 2024-01-11 RX ADMIN — ALBUTEROL SULFATE 2.5 MG: 2.5 SOLUTION RESPIRATORY (INHALATION) at 11:56

## 2024-01-11 RX ADMIN — METOPROLOL TARTRATE 12.5 MG: 25 TABLET, FILM COATED ORAL at 09:13

## 2024-01-11 RX ADMIN — INSULIN LISPRO 5 UNITS: 100 INJECTION, SOLUTION INTRAVENOUS; SUBCUTANEOUS at 11:57

## 2024-01-11 RX ADMIN — MAGNESIUM SULFATE HEPTAHYDRATE 2000 MG: 40 INJECTION, SOLUTION INTRAVENOUS at 10:15

## 2024-01-11 RX ADMIN — METOPROLOL TARTRATE 12.5 MG: 25 TABLET, FILM COATED ORAL at 19:56

## 2024-01-11 RX ADMIN — ARFORMOTEROL TARTRATE 15 MCG: 15 SOLUTION RESPIRATORY (INHALATION) at 07:32

## 2024-01-11 RX ADMIN — ALBUTEROL SULFATE 2.5 MG: 2.5 SOLUTION RESPIRATORY (INHALATION) at 15:18

## 2024-01-11 RX ADMIN — OXYCODONE AND ACETAMINOPHEN 1 TABLET: 5; 325 TABLET ORAL at 23:47

## 2024-01-11 RX ADMIN — CEFAZOLIN 2000 MG: 2 INJECTION, POWDER, FOR SOLUTION INTRAMUSCULAR; INTRAVENOUS at 19:58

## 2024-01-11 ASSESSMENT — PAIN DESCRIPTION - DESCRIPTORS: DESCRIPTORS: DISCOMFORT

## 2024-01-11 ASSESSMENT — PAIN SCALES - GENERAL
PAINLEVEL_OUTOF10: 7
PAINLEVEL_OUTOF10: 0
PAINLEVEL_OUTOF10: 0

## 2024-01-11 ASSESSMENT — PAIN DESCRIPTION - LOCATION: LOCATION: BACK

## 2024-01-11 ASSESSMENT — PAIN DESCRIPTION - ORIENTATION: ORIENTATION: LOWER

## 2024-01-11 NOTE — CARE COORDINATION
Discharge Planning;  YENNIFER received a call from Stella - [phone] with [facility] admissions  stating that, still following patient for SNF placement. She reported that cannot restart the precert because patient is on 6 ltr of oxygen via High flow Nasal cannula . Patient's baseline is 6 ltr of oxygen via  regular Nasal Cannula. Patient will also needs updated PT & OT notes  to restart the precert.

## 2024-01-11 NOTE — PLAN OF CARE
Problem: Safety - Adult  Goal: Free from fall injury  1/11/2024 0950 by Eve Miller RN  Outcome: Progressing     Problem: Discharge Planning  Goal: Discharge to home or other facility with appropriate resources  Outcome: Progressing     Problem: Pain  Goal: Verbalizes/displays adequate comfort level or baseline comfort level  1/11/2024 0950 by Eve Miller RN  Outcome: Progressing     Problem: Skin/Tissue Integrity  Goal: Absence of new skin breakdown  Description: 1.  Monitor for areas of redness and/or skin breakdown  2.  Assess vascular access sites hourly  3.  Every 4-6 hours minimum:  Change oxygen saturation probe site  4.  Every 4-6 hours:  If on nasal continuous positive airway pressure, respiratory therapy assess nares and determine need for appliance change or resting period.  1/11/2024 0950 by Eve Miller RN  Outcome: Progressing     Problem: ABCDS Injury Assessment  Goal: Absence of physical injury  Outcome: Progressing     Problem: Neurosensory - Adult  Goal: Achieves stable or improved neurological status  Outcome: Progressing     Problem: Neurosensory - Adult  Goal: Achieves maximal functionality and self care  Outcome: Progressing     Problem: Respiratory - Adult  Goal: Achieves optimal ventilation and oxygenation  Outcome: Progressing     Problem: Skin/Tissue Integrity - Adult  Goal: Skin integrity remains intact  Outcome: Progressing

## 2024-01-11 NOTE — PROGRESS NOTES
[facility]  DAILY PROGRESS NOTE    Admit Date: 12/20/2023       Chief Complaint: fall     Interval History:   Patient seen and examined and notes reviewed. Patient is being followed for AF and edema. Today he feels about the same. He is sitting in the chair and his edema continues to improve. He denies CP, palpitations or SOB.     In: 1860.4 [P.O.:240; I.V.:679.5]  Out: 1350    Wt Readings from Last 2 Encounters:   01/11/24 (!) 149.5 kg (329 lb 9.6 oz)   12/18/23 (!) 153.6 kg (338 lb 9.6 oz)         Data:   Scheduled Meds:   Scheduled Meds:   miconazole   Topical BID    [Held by provider] ferrous sulfate  325 mg Oral BID WC    vitamin B-12  500 mcg Oral Daily    folic acid  1 mg Oral Daily    metoprolol tartrate  12.5 mg Oral BID    sennosides-docusate sodium  2 tablet Oral Daily    pantoprazole  40 mg IntraVENous BID    ceFAZolin  2,000 mg IntraVENous Q8H    sodium chloride flush  5-40 mL IntraVENous 2 times per day    albuterol  2.5 mg Nebulization 4x Daily RT    lactobacillus  1 capsule Oral BID WC    insulin lispro  5 Units SubCUTAneous TID WC    insulin lispro  0-4 Units SubCUTAneous Nightly    insulin lispro  0-8 Units SubCUTAneous TID WC    arformoterol tartrate  15 mcg Nebulization BID RT    budesonide  0.5 mg Nebulization BID RT    allopurinol  200 mg Oral Daily    [Held by provider] apixaban  5 mg Oral BID    [Held by provider] aspirin  81 mg Oral Daily    vitamin B-1  100 mg Oral Daily    sodium chloride flush  5-40 mL IntraVENous 2 times per day    tiotropium  2 puff Inhalation Daily RT     Continuous Infusions:   sodium chloride      furosemide (LASIX) 100 mg in sodium chloride 0.9 % 100 mL infusion 10 mg/hr (01/11/24 0530)    sodium chloride Stopped (01/10/24 1213)    dextrose      sodium chloride Stopped (01/03/24 2304)     PRN Meds:.sodium chloride, oxyCODONE-acetaminophen, midodrine, melatonin, diazePAM, perflutren lipid microspheres, sodium chloride flush, sodium chloride, dextrose bolus  Syncope     Diabetes mellitus type 2 in obese ([facility])     Arthritis     Chronic respiratory failure ([facility])     STEF (obstructive sleep apnea)     Venous insufficiency     Acute sinusitis     Morbid obesity due to excess calories ([facility])     Vitamin D deficiency     Open back wound     COPD, severe ([facility])     Hyperlipidemia associated with type 2 diabetes mellitus ([facility])     Diastolic dysfunction     Aneurysm of abdominal aorta ([facility])     Personal history of tobacco use, presenting hazards to health     Encephalomalacia     Altered mental status     Cerebrovascular accident (CVA) due to thrombosis of left middle cerebral artery ([facility])     Acute respiratory failure with hypercapnia ([facility])     Cerebrovascular accident (CVA) ([facility])     Chronic heart failure with preserved ejection fraction ([facility])     Ventricular ectopy     Leg swelling     TGA (transient global amnesia)     Metabolic encephalopathy     PFO (patent foramen ovale)     Peripheral edema     Tachycardia     Diabetes education, encounter for     Pulmonary nodule     PVD (peripheral vascular disease) ([facility])     Sepsis ([facility])     NSTEMI (non-ST elevated myocardial infarction) ([facility])     Acute on chronic respiratory failure with hypercapnia ([facility])     Coronary artery disease due to calcified coronary lesion     Coronary artery calcification seen on CT scan     Pneumonia of both lower lobes due to infectious organism     Acute on chronic heart failure with preserved ejection fraction ([facility])     Chronic obstructive pulmonary disease ([facility])     ABRAHAM (acute kidney injury) ([facility])     Fall     Acute right-sided low back pain with right-sided sciatica     Right hip pain     Fall at home     Scoliosis of lumbar spine     Pain due to hip joint prosthesis, initial encounter ([facility])     Aspiration pneumonia ([facility])     Bacteremia due to methicillin susceptible Staphylococcus aureus (MSSA)     Receiving intravenous antibiotic treatment as outpatient     Paroxysmal atrial fibrillation ([facility])

## 2024-01-11 NOTE — CARE COORDINATION
Chart reviewed for discharge planning    CM  has continued to follow patient progress to anticipate potential discharge needs. At this time, patient is not ready for discharge.     Inpatient day #-  22    Barrier(s) to discharge-patient-   ABRAHAM: albumin tid and lasix  drip.  Staph bacteremiea ;  IV Antibiotics until 01/19/2024  Acute gi bleed;  S/p 4units prbcs  EGD -  multiple antral erosions Gastric biopsies to check for H. pylori    Tentative discharge plan- Patient  will  discharge to [facility] when medically stable, Will need a New precert .    Tentative discharge date-TBD    Case management will continue to follow progress and update discharge plan as needed.

## 2024-01-11 NOTE — PROGRESS NOTES
MD Leandra Sandoval MD Samir Brahmbhatt, MD                  Office: [phone]                 Fax: [phone]         [facility]                          NEPHROLOGY INPATIENT PROGRESS  NOTE:     PATIENT NAME: Ishmael Gr  : 1945  MRN: [id number]      IMPRESSION/RECOMMENDATIONS:    Right hip pain [M25.551]  ABRAHAM (acute kidney injury) (HCC) [N17.9]  Fall, initial encounter [W19.XXXA]  Acute right-sided low back pain with right-sided sciatica [M54.41]    ABRAHAM-baseline creatinine 1.1.  Probably from prerenal azotemia in the setting of relative hypotension plus contrast exposure plus COPD exacerbation.  Now probable ATN.  Nonoliguric. -3L since admission.  Still hypervolemic.   Crea 1.9 from 2 from 1.9.       Subjective / interval history / nephrology update / medical decision making:   Resting in chair   S/P Endoscopy on 1-    Reported no active complaints/distress,   Renal labs noted.     No SOB  Stable on NC but still 6 L, similar dose as at home, as per patient    Said leg edema -still present currently, but improving, he thinks it needs still improvement     Was changed to Lasix drip 10mg/hr .   Given Albumin 25g to 12.5g TID.    Given Metolazone daily  Use Midodrine PRN    Add diamox, d/to [place].alkalosis, but still fluid overloaded    Potassium repletion and monitoring  Hypomagnesemia Monitor also needing repletion    Follow Hgb.  RAJESH subQ    Checked for paraproteinemia.-Negative SPEP, negative UPEP,   UA neg protein.  Has moderate then worsening to large, blood with 17rbc.  Albumin 2.2.  SPEP neg.  Elevated kappa and lambda but normal ratio.    Iron stores -recently, not lower, could have likely underlying anemia of chronic disease-given RAJESH 1/3/24  -consulted hematology    Follow  24H urine protein. -Only 0.6  g    May need renal biopsy. -With improving renal function can be done as an outpatient.  If still indicated.    S/p Successful CT guided bone marrow aspiration  PRN  metoprolol tartrate (LOPRESSOR) tablet 12.5 mg, 12.5 mg, Oral, BID  melatonin tablet 3 mg, 3 mg, Oral, Nightly PRN  diazePAM (VALIUM) tablet 2 mg, 2 mg, Oral, Q6H PRN  sennosides-docusate sodium (SENOKOT-S) 8.6-50 MG tablet 2 tablet, 2 tablet, Oral, Daily  pantoprazole (PROTONIX) injection 40 mg, 40 mg, IntraVENous, BID  perflutren lipid microspheres (DEFINITY) injection 1.5 mL, 1.5 mL, IntraVENous, ONCE PRN  ceFAZolin (ANCEF) 2,000 mg in sodium chloride 0.9 % 50 mL IVPB (mini-bag), 2,000 mg, IntraVENous, Q8H  sodium chloride flush 0.9 % injection 5-40 mL, 5-40 mL, IntraVENous, 2 times per day  sodium chloride flush 0.9 % injection 5-40 mL, 5-40 mL, IntraVENous, PRN  0.9 % sodium chloride infusion, 25 mL, IntraVENous, PRN  albuterol (PROVENTIL) (2.5 MG/3ML) 0.083% nebulizer solution 2.5 mg, 2.5 mg, Nebulization, 4x Daily RT  lactobacillus (CULTURELLE) capsule 1 capsule, 1 capsule, Oral, BID WC  insulin lispro (HUMALOG) injection vial 5 Units, 5 Units, SubCUTAneous, TID WC  insulin lispro (HUMALOG) injection vial 0-4 Units, 0-4 Units, SubCUTAneous, Nightly  dextrose bolus 10% 125 mL, 125 mL, IntraVENous, PRN **OR** dextrose bolus 10% 250 mL, 250 mL, IntraVENous, PRN  glucagon injection 1 mg, 1 mg, SubCUTAneous, PRN  dextrose 10 % infusion, , IntraVENous, Continuous PRN  insulin lispro (HUMALOG) injection vial 0-8 Units, 0-8 Units, SubCUTAneous, TID WC  simethicone (MYLICON) chewable tablet 80 mg, 80 mg, Oral, Q6H PRN  arformoterol tartrate (BROVANA) nebulizer solution 15 mcg, 15 mcg, Nebulization, BID RT  budesonide (PULMICORT) nebulizer suspension 500 mcg, 0.5 mg, Nebulization, BID RT  albuterol sulfate HFA (PROVENTIL;VENTOLIN;PROAIR) 108 (90 Base) MCG/ACT inhaler 2 puff, 2 puff, Inhalation, Q6H PRN  allopurinol (ZYLOPRIM) tablet 200 mg, 200 mg, Oral, Daily  [Held by provider] apixaban (ELIQUIS) tablet 5 mg, 5 mg, Oral, BID  [Held by provider] aspirin chewable tablet 81 mg, 81 mg, Oral, Daily  thiamine

## 2024-01-11 NOTE — PLAN OF CARE
Problem: Safety - Adult  Goal: Free from fall injury  Outcome: Progressing     Problem: Pain  Goal: Verbalizes/displays adequate comfort level or baseline comfort level  Outcome: Progressing     Problem: Skin/Tissue Integrity  Goal: Absence of new skin breakdown  Description: 1.  Monitor for areas of redness and/or skin breakdown  2.  Assess vascular access sites hourly  3.  Every 4-6 hours minimum:  Change oxygen saturation probe site  4.  Every 4-6 hours:  If on nasal continuous positive airway pressure, respiratory therapy assess nares and determine need for appliance change or resting period.  Outcome: Progressing     Problem: Cardiovascular - Adult  Goal: Maintains optimal cardiac output and hemodynamic stability  Outcome: Progressing     Problem: Metabolic/Fluid and Electrolytes - Adult  Goal: Hemodynamic stability and optimal renal function maintained  Outcome: Progressing     Problem: Hematologic - Adult  Goal: Maintains hematologic stability  Outcome: Progressing

## 2024-01-11 NOTE — PROGRESS NOTES
ONCOLOGY HEMATOLOGY CARE PROGRESS NOTE      SUBJECTIVE:  No new complaints.  Did well with his bone marrow aspiration and biopsy yesterday.    ROS:     Constitutional:  No weight loss, No fever, No chills, No night sweats.  Energy level decreased.   Eyes:  No impairment or change in vision  ENT / Mouth:  No pain, abnormal ulceration, bleeding, nasal drip or change in voice or hearing  Cardiovascular:  No chest pain, palpitations, new edema, or calf discomfort  Respiratory:  No pain, hemoptysis, change to breathing  Breast:  No pain, discharge, change in appearance or texture  Gastrointestinal:  No pain, cramping, jaundice, change to eating and bowel habits  Urinary:  No pain, bleeding or change in continence  Genitalia: No pain, bleeding or discharge  Musculoskeletal:  No redness, pain, edema or weakness  Skin:  No pruritus, rash, change to nodules or lesions  Neurologic:  No discomfort, change in mental status, speech, sensory or motor activity  Psychiatric:  No change in concentration or change to affect or mood  Endocrine:  No hot flashes, increased thirst, or change to urine production  Hematologic: No petechiae, ecchymosis or bleeding  Lymphatic:  No lymphadenopathy or lymphedema  Allergy / Immunologic:  No eczema, hives, frequent or recurrent infections    OBJECTIVE        Physical    VITALS:  Patient Vitals for the past 24 hrs:   BP Temp Temp src Pulse Resp SpO2 Height Weight   01/11/24 0742 -- -- -- 99 -- 100 % -- --   01/11/24 0737 -- -- -- (!) 105 -- 100 % -- --   01/11/24 0732 -- -- -- 96 18 97 % -- --   01/11/24 0515 118/61 97.3 °F (36.3 °C) Temporal -- 16 97 % -- --   01/11/24 0037 -- -- -- -- -- -- -- (!) 149.5 kg (329 lb 9.6 oz)   01/10/24 2312 (!) 100/47 98.6 °F (37 °C) Temporal -- 18 94 % -- --   01/10/24 2139 -- -- -- 95 18 94 % -- --   01/10/24 2100 (!) 116/45 97.9 °F (36.6 °C) Temporal -- 17 -- -- --   01/10/24 1801 (!) 120/45 97.8 °F (36.6 °C) Temporal -- 20  melena    Anemia    Stage 3 chronic kidney disease (HCC)    Localized edema       ASSESSMENT AND PLAN:  Chronic macrocytic anemia.  EGD on 12/29/23 revealed gastric erosions.  Had PRBC transfusion at that time.  Now worsening anemia.  Labs showed no iron, B12 or folate deficiency.  Bone marrow aspiration and biopsy on 1/10/2024.  COPD exacerbation  CKD.  No evidence of monoclonal gammopathy.  CAD  Elevated lambda and kappa light chains due to kidney disease.  CVA    Reviewed his labs.  His hemoglobin has remained stable.  No signs of active bleeding.  Await pathology from his bone marrow aspiration and biopsy.  Likely will be discharged to [facility].  Follow-up as outpatient.    ONCOLOGIC DISPOSITION:      David Virk MD  Please contact through [employer]

## 2024-01-11 NOTE — PROGRESS NOTES
[facility] Daily Progress Note      Admit Date:  12/20/2023    Chief Complaint: Shortness of breath    Subjective:  Mr. Gr is well-known to me from prior care.  No complaints of chest discomfort.  Still some shortness of breath.  Continues to have leg swelling.     Objective:   BP (!) 94/52   Pulse (!) 109   Temp 98 °F (36.7 °C) (Temporal)   Resp 16   Ht 1.854 m (6' 1\")   Wt (!) 149.5 kg (329 lb 9.6 oz)   SpO2 100%   BMI 43.49 kg/m²     Intake/Output Summary (Last 24 hours) at 1/11/2024 1744  Last data filed at 1/11/2024 1712  Gross per 24 hour   Intake 720 ml   Output 1600 ml   Net -880 ml       Physical Exam:  General:  Awake, alert, oriented x 3, NAD  Skin:  Warm and dry  Neck:  JVD is difficult to assess  Chest: Scattered wheezes bilaterally  Cardiovascular:  RRR S1S2, no S3,   Abdomen:  Soft, ND, NT, No HSM  Extremities:  3+ edema into thighs.      Medications:    acetaZOLAMIDE  250 mg Oral Daily    miconazole   Topical BID    [Held by provider] ferrous sulfate  325 mg Oral BID WC    vitamin B-12  500 mcg Oral Daily    folic acid  1 mg Oral Daily    metoprolol tartrate  12.5 mg Oral BID    sennosides-docusate sodium  2 tablet Oral Daily    pantoprazole  40 mg IntraVENous BID    ceFAZolin  2,000 mg IntraVENous Q8H    sodium chloride flush  5-40 mL IntraVENous 2 times per day    albuterol  2.5 mg Nebulization 4x Daily RT    lactobacillus  1 capsule Oral BID WC    insulin lispro  5 Units SubCUTAneous TID WC    insulin lispro  0-4 Units SubCUTAneous Nightly    insulin lispro  0-8 Units SubCUTAneous TID     arformoterol tartrate  15 mcg Nebulization BID RT    budesonide  0.5 mg Nebulization BID RT    allopurinol  200 mg Oral Daily    [Held by provider] apixaban  5 mg Oral BID    [Held by provider] aspirin  81 mg Oral Daily    vitamin B-1  100 mg Oral Daily    sodium chloride flush  5-40 mL IntraVENous 2 times per day    tiotropium  2 puff Inhalation Daily RT      sodium chloride

## 2024-01-11 NOTE — PROGRESS NOTES
1.6*   GLUCOSE 110* 123* 112*       Hepatic:   No results for input(s): \"AST\", \"ALT\", \"ALB\", \"BILITOT\", \"ALKPHOS\" in the last 72 hours.    CT BIOPSY BONE MARROW   Final Result   Successful CT guided bone marrow aspiration from the iliac bone.      If core biopsy is required for further workup, will plan for repeat bone   marrow biopsy procedure in the near future.         XR CHEST PORTABLE   Final Result   Left lower lobe infiltrate likely represents pneumonia.  Aspiration is   possible.         US RENAL COMPLETE   Final Result   No hydronephrosis or nephrolithiasis identified sonographically.      Increased renal parenchymal echogenicity bilaterally suggesting medical renal   disease.         CT PELVIS WO CONTRAST Additional Contrast? None   Final Result   Bilateral hip prostheses.      No periprosthetic fracture.         CT LUMBAR SPINE WO CONTRAST   Final Result   Levoconvex scoliosis with moderate to advanced degenerative disc disease and   facet arthropathy.      No fracture.         CT Head W/O Contrast   Final Result   No acute intracranial abnormality.         XR CHEST PORTABLE   Final Result   1. Improved aeration.             Recent imaging reviewed    Problem List  Principal Problem:    ABRAHAM (acute kidney injury) ([facility])  Active Problems:    Encephalomalacia    Essential hypertension    Diabetes mellitus type 2 in obese ([facility])    Morbid obesity due to excess calories ([facility])    Diabetes education, encounter for    PVD (peripheral vascular disease) ([facility])    Chronic obstructive pulmonary disease ([facility])    Fall    Acute right-sided low back pain with right-sided sciatica    Right hip pain    Fall at home    Scoliosis of lumbar spine    Pain due to hip joint prosthesis, initial encounter ([facility])    Aspiration pneumonia ([facility])    Bacteremia due to methicillin susceptible Staphylococcus aureus (MSSA)    Receiving intravenous antibiotic treatment as outpatient    Paroxysmal atrial fibrillation ([facility])    Gastrointestinal

## 2024-01-12 LAB
ABO + RH BLD: NORMAL
ALBUMIN SERPL-MCNC: 3.3 G/DL (ref 3.4–5)
ANION GAP SERPL CALCULATED.3IONS-SCNC: 10 MMOL/L (ref 3–16)
BASOPHILS # BLD: 0 K/UL (ref 0–0.2)
BASOPHILS NFR BLD: 0.7 %
BLD GP AB SCN SERPL QL: NORMAL
BLOOD BANK DISPENSE STATUS: NORMAL
BLOOD BANK PRODUCT CODE: NORMAL
BPU ID: NORMAL
BUN SERPL-MCNC: 28 MG/DL (ref 7–20)
CALCIUM SERPL-MCNC: 9.2 MG/DL (ref 8.3–10.6)
CHLORIDE SERPL-SCNC: 88 MMOL/L (ref 99–110)
CO2 SERPL-SCNC: 38 MMOL/L (ref 21–32)
CREAT SERPL-MCNC: 1.8 MG/DL (ref 0.8–1.3)
DEPRECATED RDW RBC AUTO: 17.6 % (ref 12.4–15.4)
DESCRIPTION BLOOD BANK: NORMAL
EOSINOPHIL # BLD: 0.4 K/UL (ref 0–0.6)
EOSINOPHIL NFR BLD: 8.7 %
GFR SERPLBLD CREATININE-BSD FMLA CKD-EPI: 38 ML/MIN/{1.73_M2}
GLUCOSE BLD-MCNC: 108 MG/DL (ref 70–99)
GLUCOSE BLD-MCNC: 113 MG/DL (ref 70–99)
GLUCOSE BLD-MCNC: 157 MG/DL (ref 70–99)
GLUCOSE BLD-MCNC: 89 MG/DL (ref 70–99)
GLUCOSE SERPL-MCNC: 110 MG/DL (ref 70–99)
HCT VFR BLD AUTO: 23.7 % (ref 40.5–52.5)
HCT VFR BLD AUTO: 26 % (ref 40.5–52.5)
HGB BLD-MCNC: 7.8 G/DL (ref 13.5–17.5)
HGB BLD-MCNC: 8.5 G/DL (ref 13.5–17.5)
LYMPHOCYTES # BLD: 0.8 K/UL (ref 1–5.1)
LYMPHOCYTES NFR BLD: 17.9 %
MAGNESIUM SERPL-MCNC: 2 MG/DL (ref 1.8–2.4)
MCH RBC QN AUTO: 30.7 PG (ref 26–34)
MCHC RBC AUTO-ENTMCNC: 32.9 G/DL (ref 31–36)
MCV RBC AUTO: 93.4 FL (ref 80–100)
MONOCYTES # BLD: 0.4 K/UL (ref 0–1.3)
MONOCYTES NFR BLD: 9 %
NEUTROPHILS # BLD: 2.8 K/UL (ref 1.7–7.7)
NEUTROPHILS NFR BLD: 63.7 %
PERFORMED ON: ABNORMAL
PERFORMED ON: NORMAL
PHOSPHATE SERPL-MCNC: 4 MG/DL (ref 2.5–4.9)
PLATELET # BLD AUTO: 301 K/UL (ref 135–450)
PMV BLD AUTO: 6.3 FL (ref 5–10.5)
POTASSIUM SERPL-SCNC: 3 MMOL/L (ref 3.5–5.1)
RBC # BLD AUTO: 2.54 M/UL (ref 4.2–5.9)
SODIUM SERPL-SCNC: 136 MMOL/L (ref 136–145)
WBC # BLD AUTO: 4.4 K/UL (ref 4–11)

## 2024-01-12 PROCEDURE — 6370000000 HC RX 637 (ALT 250 FOR IP): Performed by: INTERNAL MEDICINE

## 2024-01-12 PROCEDURE — 6360000002 HC RX W HCPCS: Performed by: INTERNAL MEDICINE

## 2024-01-12 PROCEDURE — 2700000000 HC OXYGEN THERAPY PER DAY

## 2024-01-12 PROCEDURE — 36415 COLL VENOUS BLD VENIPUNCTURE: CPT

## 2024-01-12 PROCEDURE — 86901 BLOOD TYPING SEROLOGIC RH(D): CPT

## 2024-01-12 PROCEDURE — 94761 N-INVAS EAR/PLS OXIMETRY MLT: CPT

## 2024-01-12 PROCEDURE — 86850 RBC ANTIBODY SCREEN: CPT

## 2024-01-12 PROCEDURE — 2060000000 HC ICU INTERMEDIATE R&B

## 2024-01-12 PROCEDURE — 2580000003 HC RX 258: Performed by: INTERNAL MEDICINE

## 2024-01-12 PROCEDURE — 6370000000 HC RX 637 (ALT 250 FOR IP): Performed by: NURSE PRACTITIONER

## 2024-01-12 PROCEDURE — 94640 AIRWAY INHALATION TREATMENT: CPT

## 2024-01-12 PROCEDURE — 97116 GAIT TRAINING THERAPY: CPT

## 2024-01-12 PROCEDURE — P9016 RBC LEUKOCYTES REDUCED: HCPCS

## 2024-01-12 PROCEDURE — 97110 THERAPEUTIC EXERCISES: CPT

## 2024-01-12 PROCEDURE — 99232 SBSQ HOSP IP/OBS MODERATE 35: CPT | Performed by: NURSE PRACTITIONER

## 2024-01-12 PROCEDURE — 83735 ASSAY OF MAGNESIUM: CPT

## 2024-01-12 PROCEDURE — C9113 INJ PANTOPRAZOLE SODIUM, VIA: HCPCS | Performed by: INTERNAL MEDICINE

## 2024-01-12 PROCEDURE — 97530 THERAPEUTIC ACTIVITIES: CPT

## 2024-01-12 PROCEDURE — 85018 HEMOGLOBIN: CPT

## 2024-01-12 PROCEDURE — 80069 RENAL FUNCTION PANEL: CPT

## 2024-01-12 PROCEDURE — 86900 BLOOD TYPING SEROLOGIC ABO: CPT

## 2024-01-12 PROCEDURE — 85025 COMPLETE CBC W/AUTO DIFF WBC: CPT

## 2024-01-12 PROCEDURE — 86923 COMPATIBILITY TEST ELECTRIC: CPT

## 2024-01-12 PROCEDURE — 85014 HEMATOCRIT: CPT

## 2024-01-12 PROCEDURE — 36430 TRANSFUSION BLD/BLD COMPNT: CPT

## 2024-01-12 RX ORDER — POTASSIUM CHLORIDE 20 MEQ/1
60 TABLET, EXTENDED RELEASE ORAL 2 TIMES DAILY WITH MEALS
Status: COMPLETED | OUTPATIENT
Start: 2024-01-12 | End: 2024-01-12

## 2024-01-12 RX ORDER — SODIUM CHLORIDE 9 MG/ML
INJECTION, SOLUTION INTRAVENOUS PRN
Status: DISCONTINUED | OUTPATIENT
Start: 2024-01-12 | End: 2024-01-15 | Stop reason: HOSPADM

## 2024-01-12 RX ADMIN — INSULIN LISPRO 5 UNITS: 100 INJECTION, SOLUTION INTRAVENOUS; SUBCUTANEOUS at 14:28

## 2024-01-12 RX ADMIN — CEFAZOLIN 2000 MG: 2 INJECTION, POWDER, FOR SOLUTION INTRAMUSCULAR; INTRAVENOUS at 20:06

## 2024-01-12 RX ADMIN — MICONAZOLE NITRATE: 20 POWDER TOPICAL at 08:45

## 2024-01-12 RX ADMIN — METOPROLOL TARTRATE 12.5 MG: 25 TABLET, FILM COATED ORAL at 20:03

## 2024-01-12 RX ADMIN — INSULIN LISPRO 5 UNITS: 100 INJECTION, SOLUTION INTRAVENOUS; SUBCUTANEOUS at 08:40

## 2024-01-12 RX ADMIN — THIAMINE HCL TAB 100 MG 100 MG: 100 TAB at 08:40

## 2024-01-12 RX ADMIN — SODIUM CHLORIDE, PRESERVATIVE FREE 10 ML: 5 INJECTION INTRAVENOUS at 20:03

## 2024-01-12 RX ADMIN — BUDESONIDE INHALATION 500 MCG: 0.5 SUSPENSION RESPIRATORY (INHALATION) at 21:46

## 2024-01-12 RX ADMIN — FOLIC ACID 1 MG: 1 TABLET ORAL at 08:39

## 2024-01-12 RX ADMIN — ALBUTEROL SULFATE 2.5 MG: 2.5 SOLUTION RESPIRATORY (INHALATION) at 15:34

## 2024-01-12 RX ADMIN — FUROSEMIDE 8 MG/HR: 10 INJECTION, SOLUTION INTRAMUSCULAR; INTRAVENOUS at 14:33

## 2024-01-12 RX ADMIN — ALBUTEROL SULFATE 2.5 MG: 2.5 SOLUTION RESPIRATORY (INHALATION) at 11:44

## 2024-01-12 RX ADMIN — SODIUM CHLORIDE 25 ML: 9 INJECTION, SOLUTION INTRAVENOUS at 14:00

## 2024-01-12 RX ADMIN — PANTOPRAZOLE SODIUM 40 MG: 40 INJECTION, POWDER, FOR SOLUTION INTRAVENOUS at 20:02

## 2024-01-12 RX ADMIN — MICONAZOLE NITRATE: 20 POWDER TOPICAL at 20:04

## 2024-01-12 RX ADMIN — Medication 1 CAPSULE: at 18:37

## 2024-01-12 RX ADMIN — CEFAZOLIN 2000 MG: 2 INJECTION, POWDER, FOR SOLUTION INTRAMUSCULAR; INTRAVENOUS at 05:10

## 2024-01-12 RX ADMIN — ACETAZOLAMIDE 250 MG: 250 TABLET ORAL at 08:39

## 2024-01-12 RX ADMIN — Medication 10 ML: at 08:46

## 2024-01-12 RX ADMIN — CYANOCOBALAMIN TAB 1000 MCG 500 MCG: 1000 TAB at 08:39

## 2024-01-12 RX ADMIN — Medication 1 CAPSULE: at 08:40

## 2024-01-12 RX ADMIN — ALBUTEROL SULFATE 2.5 MG: 2.5 SOLUTION RESPIRATORY (INHALATION) at 21:44

## 2024-01-12 RX ADMIN — METOPROLOL TARTRATE 12.5 MG: 25 TABLET, FILM COATED ORAL at 08:39

## 2024-01-12 RX ADMIN — OXYCODONE AND ACETAMINOPHEN 1 TABLET: 5; 325 TABLET ORAL at 22:39

## 2024-01-12 RX ADMIN — PANTOPRAZOLE SODIUM 40 MG: 40 INJECTION, POWDER, FOR SOLUTION INTRAVENOUS at 08:39

## 2024-01-12 RX ADMIN — ARFORMOTEROL TARTRATE 15 MCG: 15 SOLUTION RESPIRATORY (INHALATION) at 21:44

## 2024-01-12 RX ADMIN — POTASSIUM CHLORIDE 60 MEQ: 1500 TABLET, EXTENDED RELEASE ORAL at 18:37

## 2024-01-12 RX ADMIN — BUDESONIDE INHALATION 500 MCG: 0.5 SUSPENSION RESPIRATORY (INHALATION) at 07:49

## 2024-01-12 RX ADMIN — FUROSEMIDE 10 MG/HR: 10 INJECTION, SOLUTION INTRAMUSCULAR; INTRAVENOUS at 04:01

## 2024-01-12 RX ADMIN — POTASSIUM CHLORIDE 60 MEQ: 1500 TABLET, EXTENDED RELEASE ORAL at 09:21

## 2024-01-12 RX ADMIN — ALBUTEROL SULFATE 2.5 MG: 2.5 SOLUTION RESPIRATORY (INHALATION) at 07:51

## 2024-01-12 RX ADMIN — CEFAZOLIN 2000 MG: 2 INJECTION, POWDER, FOR SOLUTION INTRAMUSCULAR; INTRAVENOUS at 14:05

## 2024-01-12 RX ADMIN — TIOTROPIUM BROMIDE INHALATION SPRAY 2 PUFF: 3.12 SPRAY, METERED RESPIRATORY (INHALATION) at 07:50

## 2024-01-12 RX ADMIN — ARFORMOTEROL TARTRATE 15 MCG: 15 SOLUTION RESPIRATORY (INHALATION) at 08:02

## 2024-01-12 RX ADMIN — ALLOPURINOL 200 MG: 100 TABLET ORAL at 08:39

## 2024-01-12 RX ADMIN — SODIUM CHLORIDE, PRESERVATIVE FREE 10 ML: 5 INJECTION INTRAVENOUS at 08:45

## 2024-01-12 RX ADMIN — STANDARDIZED SENNA CONCENTRATE AND DOCUSATE SODIUM 2 TABLET: 8.6; 5 TABLET ORAL at 08:40

## 2024-01-12 ASSESSMENT — PAIN SCALES - GENERAL
PAINLEVEL_OUTOF10: 0
PAINLEVEL_OUTOF10: 0
PAINLEVEL_OUTOF10: 8
PAINLEVEL_OUTOF10: 0

## 2024-01-12 ASSESSMENT — PAIN DESCRIPTION - LOCATION: LOCATION: BACK;SHOULDER

## 2024-01-12 ASSESSMENT — PAIN DESCRIPTION - ORIENTATION: ORIENTATION: LEFT

## 2024-01-12 NOTE — PROGRESS NOTES
Occupational Therapy    Attempted to see patient for occupational therapy treatment.  Patient pleasantly declining therapy at this time due to feeling cold and already completed most ADL this am, offered multiple activities but pt states he prefers to rest at the moment. Will reattempt later today   Will re-attempt as schedule and patient's medical status permits.     Thanks,  Jaren Bowie OTR/L [id number]

## 2024-01-12 NOTE — PROGRESS NOTES
[facility]    1/12/2024 11:11 AM        Name: Ishmael Gr .              Admitted: 12/20/2023  Primary Care Provider: Anthony Bravo MD (Tel: [phone])    Wound 7.8 patient feels about the same leg swelling is about the same as yesterday  0.9 % sodium chloride infusion, PRN  potassium chloride (KLOR-CON M) extended release tablet 60 mEq, BID WC  acetaZOLAMIDE (DIAMOX) tablet 250 mg, Daily  miconazole (MICOTIN) 2 % powder, BID  0.9 % sodium chloride infusion, PRN  [Held by provider] ferrous sulfate (IRON 325) tablet 325 mg, BID WC  vitamin B-12 (CYANOCOBALAMIN) tablet 500 mcg, Daily  folic acid (FOLVITE) tablet 1 mg, Daily  furosemide (LASIX) 100 mg in sodium chloride 0.9 % 100 mL infusion, Continuous  oxyCODONE-acetaminophen (PERCOCET) 5-325 MG per tablet 1 tablet, Q6H PRN  midodrine (PROAMATINE) tablet 5 mg, TID PRN  metoprolol tartrate (LOPRESSOR) tablet 12.5 mg, BID  melatonin tablet 3 mg, Nightly PRN  diazePAM (VALIUM) tablet 2 mg, Q6H PRN  sennosides-docusate sodium (SENOKOT-S) 8.6-50 MG tablet 2 tablet, Daily  pantoprazole (PROTONIX) injection 40 mg, BID  perflutren lipid microspheres (DEFINITY) injection 1.5 mL, ONCE PRN  ceFAZolin (ANCEF) 2,000 mg in sodium chloride 0.9 % 50 mL IVPB (mini-bag), Q8H  sodium chloride flush 0.9 % injection 5-40 mL, 2 times per day  sodium chloride flush 0.9 % injection 5-40 mL, PRN  0.9 % sodium chloride infusion, PRN  albuterol (PROVENTIL) (2.5 MG/3ML) 0.083% nebulizer solution 2.5 mg, 4x Daily RT  lactobacillus (CULTURELLE) capsule 1 capsule, BID WC  insulin lispro (HUMALOG) injection vial 5 Units, TID WC  insulin lispro (HUMALOG) injection vial 0-4 Units, Nightly  dextrose bolus 10% 125 mL, PRN   Or  dextrose bolus 10% 250 mL, PRN  glucagon injection 1 mg, PRN  dextrose 10 % infusion, Continuous PRN  insulin lispro (HUMALOG) injection vial 0-8 Units, TID  01/10/24  0423 01/11/24  0536 01/12/24  0528    138 136   K 3.2* 3.4* 3.0*   CL 92* 91* 88*   CO2 38* 37* 38*   BUN 24* 23* 28*   CREATININE 1.5* 1.6* 1.8*   GLUCOSE 123* 112* 110*       Hepatic:   No results for input(s): \"AST\", \"ALT\", \"ALB\", \"BILITOT\", \"ALKPHOS\" in the last 72 hours.    CT BIOPSY BONE MARROW   Final Result   Successful CT guided bone marrow aspiration from the iliac bone.      If core biopsy is required for further workup, will plan for repeat bone   marrow biopsy procedure in the near future.         XR CHEST PORTABLE   Final Result   Left lower lobe infiltrate likely represents pneumonia.  Aspiration is   possible.         US RENAL COMPLETE   Final Result   No hydronephrosis or nephrolithiasis identified sonographically.      Increased renal parenchymal echogenicity bilaterally suggesting medical renal   disease.         CT PELVIS WO CONTRAST Additional Contrast? None   Final Result   Bilateral hip prostheses.      No periprosthetic fracture.         CT LUMBAR SPINE WO CONTRAST   Final Result   Levoconvex scoliosis with moderate to advanced degenerative disc disease and   facet arthropathy.      No fracture.         CT Head W/O Contrast   Final Result   No acute intracranial abnormality.         XR CHEST PORTABLE   Final Result   1. Improved aeration.             Recent imaging reviewed    Problem List  Principal Problem:    ABRAHAM (acute kidney injury) ([facility])  Active Problems:    Encephalomalacia    Essential hypertension    Diabetes mellitus type 2 in obese ([facility])    Morbid obesity due to excess calories ([facility])    Diabetes education, encounter for    PVD (peripheral vascular disease) ([facility])    Chronic obstructive pulmonary disease ([facility])    Fall    Acute right-sided low back pain with right-sided sciatica    Right hip pain    Fall at home    Scoliosis of lumbar spine    Pain due to hip joint prosthesis, initial encounter ([facility])    Aspiration pneumonia ([facility])    Bacteremia due to methicillin

## 2024-01-12 NOTE — PROGRESS NOTES
[facility]  DAILY PROGRESS NOTE    Admit Date: 12/20/2023       Chief Complaint: fall     Interval History:   Patient seen and examined and notes reviewed. Patient is being followed for AF and edema. Today he feels about the same. He is sitting in the chair and his edema seems to be stable.  He denies CP, palpitations or SOB.     In: 480 [P.O.:480]  Out: 1800    Wt Readings from Last 2 Encounters:   01/11/24 (!) 149.5 kg (329 lb 9.6 oz)   12/18/23 (!) 153.6 kg (338 lb 9.6 oz)         Data:   Scheduled Meds:   Scheduled Meds:   potassium chloride  60 mEq Oral BID WC    acetaZOLAMIDE  250 mg Oral Daily    miconazole   Topical BID    [Held by provider] ferrous sulfate  325 mg Oral BID WC    vitamin B-12  500 mcg Oral Daily    folic acid  1 mg Oral Daily    metoprolol tartrate  12.5 mg Oral BID    sennosides-docusate sodium  2 tablet Oral Daily    pantoprazole  40 mg IntraVENous BID    ceFAZolin  2,000 mg IntraVENous Q8H    sodium chloride flush  5-40 mL IntraVENous 2 times per day    albuterol  2.5 mg Nebulization 4x Daily RT    lactobacillus  1 capsule Oral BID WC    insulin lispro  5 Units SubCUTAneous TID WC    insulin lispro  0-4 Units SubCUTAneous Nightly    insulin lispro  0-8 Units SubCUTAneous TID WC    arformoterol tartrate  15 mcg Nebulization BID RT    budesonide  0.5 mg Nebulization BID RT    allopurinol  200 mg Oral Daily    [Held by provider] apixaban  5 mg Oral BID    [Held by provider] aspirin  81 mg Oral Daily    vitamin B-1  100 mg Oral Daily    sodium chloride flush  5-40 mL IntraVENous 2 times per day    tiotropium  2 puff Inhalation Daily RT     Continuous Infusions:   sodium chloride      sodium chloride      furosemide (LASIX) 100 mg in sodium chloride 0.9 % 100 mL infusion 10 mg/hr (01/12/24 0401)    sodium chloride Stopped (01/10/24 1213)    dextrose      sodium chloride Stopped (01/03/24 2304)     PRN Meds:.sodium chloride, sodium chloride, oxyCODONE-acetaminophen, midodrine,

## 2024-01-12 NOTE — PROGRESS NOTES
oxygen line         General:  Pt supine seated in recliner upon arrival, agreeable to OT treatment session - reports fatigue    Pain: 0/10,  Pain Interventions: not applicable         Activities of Daily Living    Basic Activities of Daily Living  General Comments: declines ADLs  Instrumental Activities of Daily Living  No IADL completed on this date.     Functional Mobility    Bed Mobility:  Bed mobility not completed on this date.  Comments:   Transfers:  Sit to stand transfer:maximum assistance, unable to attain full stand with two trials using rocking technique- pt did not wish to attempt again but did wish to remain in chair for pending blood transfusion    Comments: attempted STS from recliner to FWW   Functional Mobility  No functional mobility completed on this date secondary to unable.  Balance:  Static Sitting Balance: fair (+): maintains balance at SBA/supervision without use of UE support  Dynamic Sitting Balance: fair (+): maintains balance at SBA/supervision without use of UE support  Static Standing Balance: fair (-): maintains balance at CGA with use of UE support  Dynamic Standing Balance: poor (+): requires min (A) to maintain balance  Comments: no LOB noted    Other Therapeutic Interventions    Provided pt with green theraband and reviewed [employer] handout for UE exercises to promote strength and activity tolerance for ADLs/functional mobility: bicep/tricep curl, horizontal abd/add, punches. Pt completes each exercise x10, knee extension with therapist assist full increased ROM at end range 1x10    Functional Outcomes       Cognition  WFL  Insights: decreased awareness of deficits  Orientation:    alert and oriented x 4  Command Following:   accurately follows one step commands     Education  Barriers To Learning: none  Patient Education: patient educated on goals, OT role and benefits, plan of care, ADL adaptive strategies, energy conservation, transfer training, discharge  recommendations  Learning Assessment:  patient verbalizes understanding, would benefit from continued reinforcement    Assessment  Activity Tolerance: Limited by fatigue - 02 saturation in 90s on 6L   Impairments Requiring Therapeutic Intervention: decreased functional mobility, decreased ADL status, decreased strength, decreased endurance, decreased balance, increased pain, decreased posture  Prognosis: fair  Clinical Assessment: Patient presenting below baseline function secondary to fall at home. Patient reporting he is typically independent with ADLs and mobility with RW. Patient with increased fatigue this PM, unable to attain stand, did participate in exercises in sitting.  Pt will need continued inpt therapy at d/c and is not safe to d/c home. Continue with POC.  Safety Interventions: patient left in chair, chair alarm in place, call light within reach, patient at risk for falls, and nurse notified    Plan  Frequency: 3-5 x/per week  Current Treatment Recommendations: strengthening, balance training, functional mobility training, transfer training, endurance training, patient/caregiver education, ADL/self-care training, pain management, home exercise program, and safety education    Goals  Patient Goals: return to PLOF   Short Term Goals:  Time Frame: discharge    Patient will complete upper body ADL at set up assistance   Patient will complete lower body ADL at maximum assistance   Patient will complete toileting at maximum assistance   Patient will complete functional transfers at maximum assistance, goal met 1/10, upgrade min A  Patient will increase [facility] ADL score = to or > than 17/24      Above goals reviewed on 1/12/2024.  All goals are ongoing at this time unless indicated above.       Therapy Session Time     Individual Group Co-treatment   Time In 1444     Time Out 1509     Minutes 25           Timed Code Treatment Minutes: 25 Minutes  Total Treatment Minutes:  25 minutes        Electronically

## 2024-01-12 NOTE — PLAN OF CARE
Problem: Safety - Adult  Goal: Free from fall injury  1/11/2024 2111 by Amarilis Coto, RN  Outcome: Progressing     Problem: Pain  Goal: Verbalizes/displays adequate comfort level or baseline comfort level  1/11/2024 2111 by Amarilis Coto, RN  Outcome: Progressing     Problem: Skin/Tissue Integrity  Goal: Absence of new skin breakdown  Description: 1.  Monitor for areas of redness and/or skin breakdown  2.  Assess vascular access sites hourly  3.  Every 4-6 hours minimum:  Change oxygen saturation probe site  4.  Every 4-6 hours:  If on nasal continuous positive airway pressure, respiratory therapy assess nares and determine need for appliance change or resting period.  1/11/2024 2111 by Amarilis Coto, RN  Outcome: Progressing     Problem: Cardiovascular - Adult  Goal: Maintains optimal cardiac output and hemodynamic stability  Outcome: Progressing     Problem: Metabolic/Fluid and Electrolytes - Adult  Goal: Electrolytes maintained within normal limits  Outcome: Progressing

## 2024-01-12 NOTE — PROGRESS NOTES
ONCOLOGY HEMATOLOGY CARE PROGRESS NOTE      SUBJECTIVE:  Feeling about the same.  Denies chest pain or shortness of breath.  Getting breathing treatment.    ROS:     Constitutional:  No weight loss, No fever, No chills, No night sweats.  Energy level decreased.   Eyes:  No impairment or change in vision  ENT / Mouth:  No pain, abnormal ulceration, bleeding, nasal drip or change in voice or hearing  Cardiovascular:  No chest pain, palpitations, new edema, or calf discomfort  Respiratory:  No pain, hemoptysis, change to breathing  Breast:  No pain, discharge, change in appearance or texture  Gastrointestinal:  No pain, cramping, jaundice, change to eating and bowel habits  Urinary:  No pain, bleeding or change in continence  Genitalia: No pain, bleeding or discharge  Musculoskeletal:  No redness, pain, edema or weakness  Skin:  No pruritus, rash, change to nodules or lesions  Neurologic:  No discomfort, change in mental status, speech, sensory or motor activity  Psychiatric:  No change in concentration or change to affect or mood  Endocrine:  No hot flashes, increased thirst, or change to urine production  Hematologic: No petechiae, ecchymosis or bleeding  Lymphatic:  No lymphadenopathy or lymphedema  Allergy / Immunologic:  No eczema, hives, frequent or recurrent infections    OBJECTIVE        Physical    VITALS:  Patient Vitals for the past 24 hrs:   BP Temp Temp src Pulse Resp SpO2   01/12/24 0923 (!) 104/51 -- -- -- -- --   01/12/24 0915 (!) 93/45 96.8 °F (36 °C) -- 98 18 94 %   01/12/24 0752 -- -- -- -- -- 97 %   01/12/24 0345 -- 97.2 °F (36.2 °C) Temporal -- 17 99 %   01/11/24 2342 (!) 113/50 97.8 °F (36.6 °C) Temporal -- 16 --   01/11/24 2040 -- -- -- 89 18 98 %   01/11/24 1952 (!) 117/52 97.8 °F (36.6 °C) Temporal -- 18 100 %   01/11/24 1637 (!) 94/52 98 °F (36.7 °C) Temporal (!) 109 16 100 %   01/11/24 1518 -- -- -- 87 20 94 %   01/11/24 1157 -- -- -- 87 18 100 %  12/21/23  0535 12/20/23  0522 12/16/23  1508    138 142 140   < > 139   < >  --    < > 139   < > 140 143   K 3.0* 3.4* 3.2* 3.5   < > 3.4*   < >  --    < > 4.1   < > 4.6 4.0   CL 88* 91* 92* 91*   < > 95*   < >  --    < > 99   < > 100 104   CO2 38* 37* 38* 37*   < > 33*   < >  --    < > 34*   < > 34* 33*   GLUCOSE 110* 112* 123* 110*   < > 101*   < >  --    < > 170*   < > 153* 108*   BUN 28* 23* 24* 26*   < > 30*   < >  --    < > 71*   < > 79* 30*   CREATININE 1.8* 1.6* 1.5* 1.6*   < > 1.9*   < >  --    < > 1.5*   < > 1.7* 1.1   LABGLOM 38* 44* 48* 44*   < > 36*   < >  --    < > 48*   < > 41* >60   CALCIUM 9.2 9.1 9.4 9.3   < > 8.9   < >  --    < > 8.8   < > 9.1 7.8*   PROT  --   --   --   --   --   --   --  6.4  --  5.9*  --  6.9 6.4   LABALBU 3.3* 3.2* 3.5 3.5  --   --   --  2.2*   < > 2.9*  2.9*  --  3.3* 3.2*   AGRATIO  --   --   --   --   --   --   --   --   --   --   --  0.9*  --    BILITOT  --   --   --   --   --   --   --   --   --  0.3  --  <0.2 0.4   ALKPHOS  --   --   --   --   --   --   --   --   --  62  --  116 46   ALT  --   --   --   --   --   --   --   --   --  31  --  75* 12   AST  --   --   --   --   --   --   --   --   --  13*  --  81* 13*   MG 2.00 1.70*  --  1.70*  --  2.00   < >  --    < > 2.00  --   --   --     < > = values in this interval not displayed.       Lab Results   Component Value Date    CALCIUM 9.2 01/12/2024    PHOS 4.0 01/12/2024       LDH:No results for input(s): \"LDH\" in the last 720 hours.    Radiology Review:  CT BIOPSY BONE MARROW  Narrative: PROCEDURE:  CT GUIDED BONE MARROW ASPIRATION OF THE RIGHT ILIAC BONE.    MODERATE CONSCIOUS SEDATION    1/9/2024    HISTORY:  ORDERING SYSTEM PROVIDED HISTORY: Anemia  TECHNOLOGIST PROVIDED HISTORY:  Bone marrow biopsy, flow cytometry, FISH and chromosome studies.  Reason for exam:->Anemia    SEDATION:  0.5 mg versed and 25 mcg fentanyl were titrated intravenously for moderate  sedation monitored under my direction.  Total

## 2024-01-12 NOTE — PROGRESS NOTES
[facility] - Inpatient Rehabilitation Department   Phone: [phone]    Physical Therapy                [x] Daily Treatment Note         [] Discharge Summary      Patient: Ishmael Gr   : 1945   MRN: [id number]   Date of Service:  2024  Admitting Diagnosis: ABRAHAM (acute kidney injury) (HCC)    Current Admission Summary: The pt was admitted s/p a fall.  He has severe back and R hip pain.  He discharged home from the hospital a few days before he was readmitted.     Updated 24: acute GI bleed, s/p 3 units PRBC, EGD (multiple antral erosions that could have bled if patient is on blood thinners. Gastric biopsies to check for H. Pylori); ABRAHAM; acute on chronic hypoxic and hypercapnic respiratory failure secondary to COPD exacerbation    Past Medical History:  has a past medical history of Anemia, COPD (chronic obstructive pulmonary disease) ([facility]), Diabetes mellitus (HCC), Edema, GI (gastrointestinal bleed), Gout, Hypertension, Morbid obesity ([facility]), Neuropathy, Obstructive sleep apnea, and Peripheral vascular disease ([facility]).  Past Surgical History:  has a past surgical history that includes Appendectomy; Total hip arthroplasty; Colonoscopy (N/A, 2019); Upper gastrointestinal endoscopy (N/A, 2023); CT BIOPSY BONE MARROW (2024); Upper gastrointestinal endoscopy (N/A, 1/10/2024); and Colonoscopy (N/A, 1/10/2024).    Discharge Recommendations: Ishmael Gr scored a 15/24 on the AM-PAC short mobility form. Current research shows that an AM-PAC score of 17 or less is typically not associated with a discharge to the patient's home setting. Based on the patient's AM-PAC score and their current functional mobility deficits, it is recommended that the patient have 3-5 sessions per week of Physical Therapy at d/c to increase the patient's independence.  Please see assessment section for further patient specific details.  If patient discharges prior to next session this note will serve as a  assist seated in reclined chair with knees flexed. Inner dry and medicated powder applied, nursing notified. Pt positioned in chair for postural support and comfort with needs placed in reach.  Vitals 95% at rest and 91% after activity with SOB and long rest breaks to recover. HR up to 115 with activity. LE there ex including AP, QS, GS x 10 each bilat.     Functional Outcomes  AM-PAC Inpatient Mobility Raw Score : 15           Cognition  WFL  Orientation:    alert and oriented x 4  Command Following:   WFL    Education  Barriers To Learning: none  Patient Education: patient educated on PT role and benefits, plan of care, transfer training, discharge recommendations, use of call light, PT recommendations   Learning Assessment:  patient verbalizes understanding, would benefit from continued reinforcement    Assessment  Activity Tolerance: Poor; limited by edema in B UE/LE and generalized weakness   Impairments Requiring Therapeutic Intervention: decreased functional mobility, decreased ROM, decreased strength, decreased endurance, decreased sensation, decreased balance, increased pain  Prognosis: fair, guarded secondary to readmission to hospital  Clinical Assessment: Mod A for transfers and gait with RW. Patient slowly improving, still limited by weakness and fatigue with very poor endurance.  Recommend 24 hour assist and continued PT at d/c.  Patient plans to go to [place] for therapy post acute stay.    Safety Interventions: patient left in chair, chair alarm in place, call light within reach, gait belt, patient at risk for falls, and nurse notified    Plan  Frequency: 3-5 x/per week  Current Treatment Recommendations: strengthening, ROM, balance training, functional mobility training, transfer training, gait training, endurance training, neuromuscular re-education, and safety education    Goals  Patient Goals: Improve breathing, return home.  Short Term Goals:  Time Frame: To be met prior to DC  Patient will

## 2024-01-12 NOTE — PROGRESS NOTES
[facility]   Cardiology Progress Note     Date: 1/13/2024  Admit Date: 12/20/2023     Reason for consultation: CHF    Chief Complaint:   Chief Complaint   Patient presents with    Fall     Pt arrived to ED via [facility] ems from home after a fall. Per pt he was getting up to go to the bathroom and fell. Pt has knot above left eye and skin tear to left forearm. Pt on blood thinners. Per pt he is on 6L of oxygen at all times. Pt A&Ox4       History of Present Illness: History obtained from patient and medical record.     Ishmael Gr is a 78 y.o. male with a past medical history of history of CVA, CAD, hypertension, diastolic heart failure, STEF, DM, COPD, chronic hypoxic respiratory failure on 6 L O2, CVA in September 2022, no intervention, echo demonstrated a small PFO.    Interval Hx: Today, he is being seen for follow up. He is feeling fair. He remains very weak and fatigued. He is in sinus, but does have short runs of PAT. His BP is stable. Pt denies any further known GIB.     Patient seen and examined. Clinical notes reviewed. Telemetry reviewed.  No new complaints today. No major events overnight.   Denies having chest pain, palpitations, shortness of breath, orthopnea/PND, cough, or dizziness at the time of this visit.    Allergies:  No Known Allergies    Home Meds:  Prior to Visit Medications    Medication Sig Taking? Authorizing Provider   lactobacillus (CULTURELLE) capsule Take 1 capsule by mouth 2 times daily (with meals) Yes Davey Farias MD   lidocaine 4 % external patch Place 1 patch onto the skin daily  Katharina Hui APRN - CNP   ibuprofen (ADVIL;MOTRIN) 600 MG tablet Take 1 tablet by mouth 4 times daily as needed for Pain  Katharina Hui APRN - CNP   ipratropium 0.5 mg-albuterol 2.5 mg (DUONEB) 0.5-2.5 (3) MG/3ML SOLN nebulizer solution Inhale 3 mLs into the lungs every 4 hours  ProviderRadha MD   Lift Chair MISC by Does not apply route Please dispense a lift  failure (NYHA Class III)  - Appears fairly compensated   ~ EF 55% per echo  - Continue BB, diamox  - Continue diuresis with lasix gtt per nephro. Switch to PO ?   ~ No ACE/ARB/ARNI or SGLT2 due to CKD  - Aggressive medical therapy with risk factor modification  - Discussed with patient importance of monitoring weight, low sodium diet and fluid restriction  - Monitor I&O's, Daily weights    3.HTN  - Stable  ~ Goal <130/80  - Continue current medications    4. ABRAHAM on CKD   - Stable   - Monitor closely with diuresis    5. Acute Anemia   - S/p multiple units of blood this admit  - Stable, but low. 8.5/25.8 this AM   - EGD this admit with multiple antral erosions    6. COPD   - Severe, wears 6L at baseline    Multiple medical conditions with risk of decompensation.     All pertinent information and plan of care discussed with the rounding physician. Case discussed with hospitalist provider    All questions and concerns were addressed to the patient. Alternatives to my treatment were discussed. I have discussed the above stated plan with patient and the nurse. The patient verbalized understanding and agreed with the plan.    Thank you for allowing to us to participate in the care of Ishmael Gr    The patient was seen for >35 minutes. I reviewed interval history, physical exam, review of data including labs, imaging, development and implementation of treatment plan and coordination of complex care. More than 50% of the time was devoted to counseling the patient on their diagnoses/treatments, as well as coordination of their care    THU Forman-CNP  [facility]   Office: [phone]

## 2024-01-12 NOTE — CARE COORDINATION
Discharge Planning;  YENNIFER spoke with   Stella  - [phone] with [facility] regarding restarting the precert for SNF placement. She stated patient is still on Lasix drip , wanted to know for how long patient will continue with the lasix drip because cannot accept patient in SNF while on Lasix drip. CM will call her with updates after patient has been reviewed by the doctors.    Updates ;  CM called Stella updating her that patient is still on Lasix drip for at least 24-48 and it is ok to start planning for SNF as per the  Nephrology's documentation. Stella stated the precert will be restated today (9 1/12/24) for the third time.   She advised  the CM to call Sary- ( [phone] ) on Saturday and Sunday who will be call between 8.00 am and 3.00 pm and on Monday  (1/15/14) -8.00 am to 3.00 pm to call her with any updates.

## 2024-01-12 NOTE — CARE COORDINATION
Discharge Planning;  CM received a call from  Stella -[phone] with [facility] stating that patient has been approved for SNF placement as from today, the precert is good until Monday- 01/15/2024( 01/12- 01/15/2024)

## 2024-01-12 NOTE — PLAN OF CARE
Problem: Safety - Adult  Goal: Free from fall injury  1/12/2024 0936 by Sunshine Murdock RN  Outcome: Progressing  Problem: Discharge Planning  Goal: Discharge to home or other facility with appropriate resources  Outcome: Progressing     Problem: Pain  Goal: Verbalizes/displays adequate comfort level or baseline comfort level  1/12/2024 0936 by Sunshine Murdock RN  Outcome: Progressing  1/11/2024 2111 by Amarilis Coto RN  Outcome: Progressing     Problem: Skin/Tissue Integrity  Goal: Absence of new skin breakdown  Description: 1.  Monitor for areas of redness and/or skin breakdown  2.  Assess vascular access sites hourly  3.  Every 4-6 hours minimum:  Change oxygen saturation probe site  4.  Every 4-6 hours:  If on nasal continuous positive airway pressure, respiratory therapy assess nares and determine need for appliance change or resting period.  1/12/2024 0936 by Sunshine Murdock RN  Outcome: Progressing  1/11/2024 2111 by Amarilis Coto RN  Outcome: Progressing     Problem: ABCDS Injury Assessment  Goal: Absence of physical injury  Outcome: Progressing     Problem: Neurosensory - Adult  Goal: Achieves stable or improved neurological status  Outcome: Progressing  Goal: Achieves maximal functionality and self care  Outcome: Progressing     Problem: Respiratory - Adult  Goal: Achieves optimal ventilation and oxygenation  Outcome: Progressing     Problem: Cardiovascular - Adult  Goal: Maintains optimal cardiac output and hemodynamic stability  1/12/2024 0936 by Sunshine Murdock RN  Outcome: Progressing  1/11/2024 2111 by Amarilis Coto RN  Outcome: Progressing  Goal: Absence of cardiac dysrhythmias or at baseline  Outcome: Progressing     Problem: Skin/Tissue Integrity - Adult  Goal: Skin integrity remains intact  Outcome: Progressing  Goal: Incisions, wounds, or drain sites healing without S/S of infection  Outcome: Progressing  Goal: Oral mucous membranes remain intact  Outcome: Progressing     Problem:

## 2024-01-12 NOTE — PROGRESS NOTES
MD Leandra Sandoval MD Samir Brahmbhatt, MD                  Office: [phone]                 Fax: [phone]         [facility]                          NEPHROLOGY INPATIENT PROGRESS  NOTE:     PATIENT NAME: Ishmael Gr  : 1945  MRN: [id number]      IMPRESSION/RECOMMENDATIONS:    Right hip pain [M25.551]  ABRAHAM (acute kidney injury) (HCC) [N17.9]  Fall, initial encounter [W19.XXXA]  Acute right-sided low back pain with right-sided sciatica [M54.41]    ABRAHAM-baseline creatinine 1.1.  Probably from prerenal azotemia in the setting of relative hypotension plus contrast exposure plus COPD exacerbation.  Now probable ATN.  Nonoliguric. -3L since admission.  Still hypervolemic.   Crea 1.9 from 2 from 1.9.       Subjective / interval history / nephrology update / medical decision making:   Resting in chair during rounds,  S/P Endoscopy on 1-    Reported no active complaints/distress,   Renal labs noted.     No SOB  Stable on NC but still 6 L, similar dose as at home, as per patient    Said leg edema -still present currently, but improving, he thinks it needs still improvement       Weight change:   Last Weight Metrics:      2024    12:37 AM 1/10/2024    10:33 AM 1/10/2024     4:30 AM 2024     5:33 AM 2024     7:45 AM 2024     6:29 AM 2024    10:35 AM   Weight Loss Metrics   Height  6' 1\"     6' 0\"   Weight - Scale 329 lbs 10 oz  326 lbs 330 lbs 328 lbs 11 oz 330 lbs 2 oz    BMI (Calculated) 43.6 kg/m2  44.3 kg/m2 44.8 kg/m2 44.7 kg/m2 44.9 kg/m2        Was changed to Lasix drip 10mg/hr .  With worsening creatinine, up to 1.8, decrease rate to 8 mg/h  Given Albumin 25g to 12.5g TID.    Given Metolazone daily  Use Midodrine PRN    Added daily diamox, d/to [employer].alkalosis, but still fluid overloaded    Requested nurse for Ace wrap being  Patient refusing for leg elevation,    Potassium repletion and monitoring  Hypomagnesemia Monitor also needing  01/12/2024 05:28 AM    RBC 2.54 01/12/2024 05:28 AM    HGB 7.8 01/12/2024 05:28 AM    HCT 23.7 01/12/2024 05:28 AM    MCV 93.4 01/12/2024 05:28 AM    MCH 30.7 01/12/2024 05:28 AM    MCHC 32.9 01/12/2024 05:28 AM    RDW 17.6 01/12/2024 05:28 AM     01/12/2024 05:28 AM    MPV 6.3 01/12/2024 05:28 AM     BMP:   Lab Results   Component Value Date/Time     01/12/2024 05:28 AM    K 3.0 01/12/2024 05:28 AM    K 3.7 01/08/2024 02:51 AM    CL 88 01/12/2024 05:28 AM    CO2 38 01/12/2024 05:28 AM    BUN 28 01/12/2024 05:28 AM    CREATININE 1.8 01/12/2024 05:28 AM    CALCIUM 9.2 01/12/2024 05:28 AM    GFRAA 60 09/27/2022 10:30 AM    GFRAA >60 04/17/2013 08:55 AM    LABGLOM 38 01/12/2024 05:28 AM    GLUCOSE 110 01/12/2024 05:28 AM   Magnesium:    Lab Results   Component Value Date/Time    MG 2.00 01/12/2024 05:28 AM   Phosphorus:    Lab Results   Component Value Date/Time    PHOS 4.0 01/12/2024 05:28 AM     proBNP 5515  Albumin 3.3    Urinalysis shows specific gravity of 1.015, pH 5, blood large, protein trace, small leukocyte esterase, WBC 11, RBC 4-5    VBG pH 7.26/pCO2 75    Cxray  IMPRESSION:  Stable cardiomegaly with mild central pulmonary congestion or pulmonary  artery hypertension which is more prominent.    12/16 CT PE  IMPRESSION:  Limited exam due to the extensive motion artifact and decreased opacification  of the distal pulmonary arteries which is limiting evaluation of the distal  pulmonary arteries along the lung bases.  Within the limitations of the exam,  no obvious acute pulmonary embolus can be seen     Mildly dilated and atherosclerotic thoracic aorta with no aneurysm or  dissection and no mediastinal mass or adenopathy.     Chronic obstructive lung changes with mild subsegmental atelectasis vs early  infiltrate or scarring along the right lung base posteriorly      Renal us -     IMPRESSION:  No hydronephrosis or nephrolithiasis identified sonographically.     Increased renal parenchymal

## 2024-01-13 LAB
ALBUMIN SERPL-MCNC: 3.5 G/DL (ref 3.4–5)
ANION GAP SERPL CALCULATED.3IONS-SCNC: 10 MMOL/L (ref 3–16)
BASOPHILS # BLD: 0 K/UL (ref 0–0.2)
BASOPHILS NFR BLD: 0.5 %
BUN SERPL-MCNC: 32 MG/DL (ref 7–20)
CALCIUM SERPL-MCNC: 9.4 MG/DL (ref 8.3–10.6)
CHLORIDE SERPL-SCNC: 92 MMOL/L (ref 99–110)
CO2 SERPL-SCNC: 39 MMOL/L (ref 21–32)
CREAT SERPL-MCNC: 1.8 MG/DL (ref 0.8–1.3)
DEPRECATED RDW RBC AUTO: 17.9 % (ref 12.4–15.4)
EOSINOPHIL # BLD: 0.5 K/UL (ref 0–0.6)
EOSINOPHIL NFR BLD: 8.7 %
GFR SERPLBLD CREATININE-BSD FMLA CKD-EPI: 38 ML/MIN/{1.73_M2}
GLUCOSE BLD-MCNC: 117 MG/DL (ref 70–99)
GLUCOSE BLD-MCNC: 126 MG/DL (ref 70–99)
GLUCOSE BLD-MCNC: 168 MG/DL (ref 70–99)
GLUCOSE BLD-MCNC: 89 MG/DL (ref 70–99)
GLUCOSE SERPL-MCNC: 113 MG/DL (ref 70–99)
HCT VFR BLD AUTO: 25.8 % (ref 40.5–52.5)
HCT VFR BLD AUTO: 27.3 % (ref 40.5–52.5)
HGB BLD-MCNC: 8.5 G/DL (ref 13.5–17.5)
HGB BLD-MCNC: 8.8 G/DL (ref 13.5–17.5)
LYMPHOCYTES # BLD: 0.8 K/UL (ref 1–5.1)
LYMPHOCYTES NFR BLD: 15.3 %
MAGNESIUM SERPL-MCNC: 2 MG/DL (ref 1.8–2.4)
MCH RBC QN AUTO: 30.7 PG (ref 26–34)
MCHC RBC AUTO-ENTMCNC: 32.9 G/DL (ref 31–36)
MCV RBC AUTO: 93.3 FL (ref 80–100)
MONOCYTES # BLD: 0.5 K/UL (ref 0–1.3)
MONOCYTES NFR BLD: 8.5 %
NEUTROPHILS # BLD: 3.5 K/UL (ref 1.7–7.7)
NEUTROPHILS NFR BLD: 67 %
PERFORMED ON: ABNORMAL
PERFORMED ON: NORMAL
PHOSPHATE SERPL-MCNC: 3.6 MG/DL (ref 2.5–4.9)
PLATELET # BLD AUTO: 312 K/UL (ref 135–450)
PMV BLD AUTO: 6 FL (ref 5–10.5)
POTASSIUM SERPL-SCNC: 4 MMOL/L (ref 3.5–5.1)
RBC # BLD AUTO: 2.77 M/UL (ref 4.2–5.9)
SODIUM SERPL-SCNC: 141 MMOL/L (ref 136–145)
WBC # BLD AUTO: 5.3 K/UL (ref 4–11)

## 2024-01-13 PROCEDURE — 6360000002 HC RX W HCPCS: Performed by: INTERNAL MEDICINE

## 2024-01-13 PROCEDURE — 85025 COMPLETE CBC W/AUTO DIFF WBC: CPT

## 2024-01-13 PROCEDURE — 2580000003 HC RX 258: Performed by: INTERNAL MEDICINE

## 2024-01-13 PROCEDURE — P9045 ALBUMIN (HUMAN), 5%, 250 ML: HCPCS | Performed by: INTERNAL MEDICINE

## 2024-01-13 PROCEDURE — 83735 ASSAY OF MAGNESIUM: CPT

## 2024-01-13 PROCEDURE — 6370000000 HC RX 637 (ALT 250 FOR IP): Performed by: NURSE PRACTITIONER

## 2024-01-13 PROCEDURE — 85014 HEMATOCRIT: CPT

## 2024-01-13 PROCEDURE — 6370000000 HC RX 637 (ALT 250 FOR IP): Performed by: INTERNAL MEDICINE

## 2024-01-13 PROCEDURE — C9113 INJ PANTOPRAZOLE SODIUM, VIA: HCPCS | Performed by: INTERNAL MEDICINE

## 2024-01-13 PROCEDURE — 80069 RENAL FUNCTION PANEL: CPT

## 2024-01-13 PROCEDURE — 2060000000 HC ICU INTERMEDIATE R&B

## 2024-01-13 PROCEDURE — 85018 HEMOGLOBIN: CPT

## 2024-01-13 PROCEDURE — 99232 SBSQ HOSP IP/OBS MODERATE 35: CPT | Performed by: NURSE PRACTITIONER

## 2024-01-13 PROCEDURE — 2700000000 HC OXYGEN THERAPY PER DAY

## 2024-01-13 PROCEDURE — 36415 COLL VENOUS BLD VENIPUNCTURE: CPT

## 2024-01-13 PROCEDURE — 94761 N-INVAS EAR/PLS OXIMETRY MLT: CPT

## 2024-01-13 PROCEDURE — 94640 AIRWAY INHALATION TREATMENT: CPT

## 2024-01-13 RX ORDER — ALBUMIN, HUMAN INJ 5% 5 %
25 SOLUTION INTRAVENOUS EVERY 24 HOURS
Status: DISCONTINUED | OUTPATIENT
Start: 2024-01-13 | End: 2024-01-15

## 2024-01-13 RX ORDER — ACETAZOLAMIDE 250 MG/1
250 TABLET ORAL 2 TIMES DAILY
Status: DISCONTINUED | OUTPATIENT
Start: 2024-01-13 | End: 2024-01-14

## 2024-01-13 RX ADMIN — MELATONIN TAB 3 MG 3 MG: 3 TAB at 23:15

## 2024-01-13 RX ADMIN — ACETAZOLAMIDE 250 MG: 250 TABLET ORAL at 08:58

## 2024-01-13 RX ADMIN — Medication 10 ML: at 22:19

## 2024-01-13 RX ADMIN — ALBUTEROL SULFATE 2.5 MG: 2.5 SOLUTION RESPIRATORY (INHALATION) at 22:20

## 2024-01-13 RX ADMIN — SODIUM CHLORIDE, PRESERVATIVE FREE 10 ML: 5 INJECTION INTRAVENOUS at 20:49

## 2024-01-13 RX ADMIN — MICONAZOLE NITRATE: 20 POWDER TOPICAL at 20:48

## 2024-01-13 RX ADMIN — PANTOPRAZOLE SODIUM 40 MG: 40 INJECTION, POWDER, FOR SOLUTION INTRAVENOUS at 20:49

## 2024-01-13 RX ADMIN — CEFAZOLIN 2000 MG: 2 INJECTION, POWDER, FOR SOLUTION INTRAMUSCULAR; INTRAVENOUS at 04:54

## 2024-01-13 RX ADMIN — ALBUMIN (HUMAN) 25 G: 12.5 INJECTION, SOLUTION INTRAVENOUS at 17:03

## 2024-01-13 RX ADMIN — Medication 1 CAPSULE: at 08:57

## 2024-01-13 RX ADMIN — ALBUTEROL SULFATE 2.5 MG: 2.5 SOLUTION RESPIRATORY (INHALATION) at 08:23

## 2024-01-13 RX ADMIN — ARFORMOTEROL TARTRATE 15 MCG: 15 SOLUTION RESPIRATORY (INHALATION) at 22:20

## 2024-01-13 RX ADMIN — Medication 1 CAPSULE: at 17:04

## 2024-01-13 RX ADMIN — ALLOPURINOL 200 MG: 100 TABLET ORAL at 08:58

## 2024-01-13 RX ADMIN — FOLIC ACID 1 MG: 1 TABLET ORAL at 08:58

## 2024-01-13 RX ADMIN — DIAZEPAM 2 MG: 2 TABLET ORAL at 23:15

## 2024-01-13 RX ADMIN — CYANOCOBALAMIN TAB 1000 MCG 500 MCG: 1000 TAB at 08:58

## 2024-01-13 RX ADMIN — CEFAZOLIN 2000 MG: 2 INJECTION, POWDER, FOR SOLUTION INTRAMUSCULAR; INTRAVENOUS at 13:00

## 2024-01-13 RX ADMIN — SODIUM CHLORIDE 25 ML: 9 INJECTION, SOLUTION INTRAVENOUS at 20:50

## 2024-01-13 RX ADMIN — CEFAZOLIN 2000 MG: 2 INJECTION, POWDER, FOR SOLUTION INTRAMUSCULAR; INTRAVENOUS at 20:54

## 2024-01-13 RX ADMIN — METOPROLOL TARTRATE 12.5 MG: 25 TABLET, FILM COATED ORAL at 08:57

## 2024-01-13 RX ADMIN — FUROSEMIDE 8 MG/HR: 10 INJECTION, SOLUTION INTRAMUSCULAR; INTRAVENOUS at 03:54

## 2024-01-13 RX ADMIN — TIOTROPIUM BROMIDE INHALATION SPRAY 2 PUFF: 3.12 SPRAY, METERED RESPIRATORY (INHALATION) at 08:23

## 2024-01-13 RX ADMIN — INSULIN LISPRO 5 UNITS: 100 INJECTION, SOLUTION INTRAVENOUS; SUBCUTANEOUS at 08:57

## 2024-01-13 RX ADMIN — MICONAZOLE NITRATE: 20 POWDER TOPICAL at 08:58

## 2024-01-13 RX ADMIN — ARFORMOTEROL TARTRATE 15 MCG: 15 SOLUTION RESPIRATORY (INHALATION) at 08:23

## 2024-01-13 RX ADMIN — INSULIN LISPRO 5 UNITS: 100 INJECTION, SOLUTION INTRAVENOUS; SUBCUTANEOUS at 17:45

## 2024-01-13 RX ADMIN — OXYCODONE AND ACETAMINOPHEN 1 TABLET: 5; 325 TABLET ORAL at 23:14

## 2024-01-13 RX ADMIN — ALBUTEROL SULFATE 2.5 MG: 2.5 SOLUTION RESPIRATORY (INHALATION) at 12:07

## 2024-01-13 RX ADMIN — STANDARDIZED SENNA CONCENTRATE AND DOCUSATE SODIUM 2 TABLET: 8.6; 5 TABLET ORAL at 08:57

## 2024-01-13 RX ADMIN — THIAMINE HCL TAB 100 MG 100 MG: 100 TAB at 08:58

## 2024-01-13 RX ADMIN — BUDESONIDE INHALATION 500 MCG: 0.5 SUSPENSION RESPIRATORY (INHALATION) at 08:23

## 2024-01-13 RX ADMIN — BUDESONIDE INHALATION 500 MCG: 0.5 SUSPENSION RESPIRATORY (INHALATION) at 22:20

## 2024-01-13 RX ADMIN — PANTOPRAZOLE SODIUM 40 MG: 40 INJECTION, POWDER, FOR SOLUTION INTRAVENOUS at 08:57

## 2024-01-13 RX ADMIN — FUROSEMIDE 8 MG/HR: 10 INJECTION, SOLUTION INTRAMUSCULAR; INTRAVENOUS at 23:20

## 2024-01-13 RX ADMIN — ACETAZOLAMIDE 250 MG: 250 TABLET ORAL at 17:04

## 2024-01-13 ASSESSMENT — PAIN SCALES - GENERAL
PAINLEVEL_OUTOF10: 0
PAINLEVEL_OUTOF10: 0
PAINLEVEL_OUTOF10: 6
PAINLEVEL_OUTOF10: 6

## 2024-01-13 ASSESSMENT — PAIN DESCRIPTION - DESCRIPTORS
DESCRIPTORS: ACHING
DESCRIPTORS: ACHING

## 2024-01-13 ASSESSMENT — PAIN DESCRIPTION - ONSET
ONSET: ON-GOING
ONSET: ON-GOING

## 2024-01-13 ASSESSMENT — PAIN DESCRIPTION - PAIN TYPE
TYPE: CHRONIC PAIN
TYPE: CHRONIC PAIN

## 2024-01-13 ASSESSMENT — PAIN DESCRIPTION - FREQUENCY
FREQUENCY: CONTINUOUS
FREQUENCY: CONTINUOUS

## 2024-01-13 ASSESSMENT — PAIN DESCRIPTION - LOCATION
LOCATION: BACK
LOCATION: BACK

## 2024-01-13 ASSESSMENT — PAIN - FUNCTIONAL ASSESSMENT
PAIN_FUNCTIONAL_ASSESSMENT: PREVENTS OR INTERFERES WITH MANY ACTIVE NOT PASSIVE ACTIVITIES
PAIN_FUNCTIONAL_ASSESSMENT: PREVENTS OR INTERFERES WITH MANY ACTIVE NOT PASSIVE ACTIVITIES

## 2024-01-13 ASSESSMENT — PAIN DESCRIPTION - ORIENTATION
ORIENTATION: LOWER
ORIENTATION: LOWER

## 2024-01-13 NOTE — PROGRESS NOTES
[facility]    1/13/2024 12:13 PM        Name: Ishmael rG .              Admitted: 12/20/2023  Primary Care Provider: Anthony Bravo MD (Tel: [phone])    Is net positive yesterday no nausea vomiting or chest pain  0.9 % sodium chloride infusion, PRN  acetaZOLAMIDE (DIAMOX) tablet 250 mg, Daily  miconazole (MICOTIN) 2 % powder, BID  0.9 % sodium chloride infusion, PRN  [Held by provider] ferrous sulfate (IRON 325) tablet 325 mg, BID WC  vitamin B-12 (CYANOCOBALAMIN) tablet 500 mcg, Daily  folic acid (FOLVITE) tablet 1 mg, Daily  furosemide (LASIX) 100 mg in sodium chloride 0.9 % 100 mL infusion, Continuous  oxyCODONE-acetaminophen (PERCOCET) 5-325 MG per tablet 1 tablet, Q6H PRN  midodrine (PROAMATINE) tablet 5 mg, TID PRN  metoprolol tartrate (LOPRESSOR) tablet 12.5 mg, BID  melatonin tablet 3 mg, Nightly PRN  diazePAM (VALIUM) tablet 2 mg, Q6H PRN  sennosides-docusate sodium (SENOKOT-S) 8.6-50 MG tablet 2 tablet, Daily  pantoprazole (PROTONIX) injection 40 mg, BID  perflutren lipid microspheres (DEFINITY) injection 1.5 mL, ONCE PRN  ceFAZolin (ANCEF) 2,000 mg in sodium chloride 0.9 % 50 mL IVPB (mini-bag), Q8H  sodium chloride flush 0.9 % injection 5-40 mL, 2 times per day  sodium chloride flush 0.9 % injection 5-40 mL, PRN  0.9 % sodium chloride infusion, PRN  albuterol (PROVENTIL) (2.5 MG/3ML) 0.083% nebulizer solution 2.5 mg, 4x Daily RT  lactobacillus (CULTURELLE) capsule 1 capsule, BID WC  insulin lispro (HUMALOG) injection vial 5 Units, TID WC  insulin lispro (HUMALOG) injection vial 0-4 Units, Nightly  dextrose bolus 10% 125 mL, PRN   Or  dextrose bolus 10% 250 mL, PRN  glucagon injection 1 mg, PRN  dextrose 10 % infusion, Continuous PRN  insulin lispro (HUMALOG) injection vial 0-8 Units, TID WC  simethicone (MYLICON) chewable tablet 80 mg, Q6H PRN  arformoterol tartrate (BROVANA) nebulizer  solution 15 mcg, BID RT  budesonide (PULMICORT) nebulizer suspension 500 mcg, BID RT  albuterol sulfate HFA (PROVENTIL;VENTOLIN;PROAIR) 108 (90 Base) MCG/ACT inhaler 2 puff, Q6H PRN  allopurinol (ZYLOPRIM) tablet 200 mg, Daily  [Held by provider] apixaban (ELIQUIS) tablet 5 mg, BID  [Held by provider] aspirin chewable tablet 81 mg, Daily  thiamine mononitrate tablet 100 mg, Daily  sodium chloride flush 0.9 % injection 5-40 mL, 2 times per day  sodium chloride flush 0.9 % injection 5-40 mL, PRN  0.9 % sodium chloride infusion, PRN  polyethylene glycol (GLYCOLAX) packet 17 g, Daily PRN  acetaminophen (TYLENOL) tablet 650 mg, Q6H PRN   Or  acetaminophen (TYLENOL) suppository 650 mg, Q6H PRN  tiotropium (SPIRIVA RESPIMAT) 2.5 MCG/ACT inhaler 2 puff, Daily RT        Objective:  /84   Pulse 85   Temp 97.3 °F (36.3 °C) (Temporal)   Resp 18   Ht 1.854 m (6' 1\")   Wt (!) 149.4 kg (329 lb 5.9 oz)   SpO2 97%   BMI 43.45 kg/m²     Intake/Output Summary (Last 24 hours) at 1/13/2024 1213  Last data filed at 1/13/2024 0100  Gross per 24 hour   Intake 540 ml   Output 1200 ml   Net -660 ml        Wt Readings from Last 3 Encounters:   01/13/24 (!) 149.4 kg (329 lb 5.9 oz)   12/18/23 (!) 153.6 kg (338 lb 9.6 oz)   12/01/23 (!) 154.8 kg (341 lb 3.2 oz)       General appearance:  Appears comfortable. AAOx3  HEENT: atraumatic, Pupils equal, muscous membranes moist, no masses appreciated  Cardiovascular: rrr no murmurs appreciated  Respiratory: dimished breath sounds  Gastrointestinal: Abdomen soft, non-tender, BS+  EXT +  edema  Neurology: no gross focal deficts  Psychiatry: Appropriate affect. Not agitated  Skin: Warm, dry, no rashes appreciated    Labs and Tests:  CBC:   Recent Labs     01/11/24  0536 01/12/24  0528 01/12/24 1951 01/13/24 0130 01/13/24  0551   WBC 5.3 4.4  --   --  5.3   HGB 8.0* 7.8* 8.5* 8.8* 8.5*    301  --   --  312       BMP:    Recent Labs     01/11/24  0536 01/12/24  0528 01/13/24  0551

## 2024-01-13 NOTE — PLAN OF CARE
Problem: Safety - Adult  Goal: Free from fall injury  1/12/2024 2150 by Esther Adam RN  Outcome: Progressing  Flowsheets (Taken 1/12/2024 2100)  Free From Fall Injury: Instruct family/caregiver on patient safety  1/12/2024 0936 by Sunshine Murdock RN  Outcome: Progressing     Problem: Discharge Planning  Goal: Discharge to home or other facility with appropriate resources  1/12/2024 2150 by Esther Adam RN  Outcome: Progressing  Flowsheets (Taken 1/12/2024 2000)  Discharge to home or other facility with appropriate resources:   Identify barriers to discharge with patient and caregiver   Arrange for needed discharge resources and transportation as appropriate   Identify discharge learning needs (meds, wound care, etc)   Refer to discharge planning if patient needs post-hospital services based on physician order or complex needs related to functional status, cognitive ability or social support system  1/12/2024 0936 by Sunshine Murdock RN  Outcome: Progressing     Problem: Pain  Goal: Verbalizes/displays adequate comfort level or baseline comfort level  1/12/2024 2150 by Esther Adam RN  Outcome: Progressing  1/12/2024 0936 by Sunshine Murdock RN  Outcome: Progressing     Problem: Skin/Tissue Integrity  Goal: Absence of new skin breakdown  Description: 1.  Monitor for areas of redness and/or skin breakdown  2.  Assess vascular access sites hourly  3.  Every 4-6 hours minimum:  Change oxygen saturation probe site  4.  Every 4-6 hours:  If on nasal continuous positive airway pressure, respiratory therapy assess nares and determine need for appliance change or resting period.  1/12/2024 2150 by Esther Adam RN  Outcome: Progressing  1/12/2024 0936 by Sunshine Murdock RN  Outcome: Progressing     Problem: ABCDS Injury Assessment  Goal: Absence of physical injury  1/12/2024 2150 by Esther Adam RN  Outcome: Progressing  Flowsheets (Taken 1/12/2024 2100)  Absence of Physical Injury: Implement safety measures based on patient  Worker, Psychosocial CNS, Spiritual Care as appropriate  1/12/2024 2150 by Esther Adam, RN  Outcome: Progressing  Flowsheets (Taken 1/12/2024 2000)  Patient/family maintains appropriate behavior and adheres to behavioral management agreement, if implemented:   Assess patient/family’s coping skills and  non-compliant behavior (including use of illegal substances)   Utilize positive, consistent limit setting strategies supporting safety of patient, staff and others   Encourage participation in the decision making process about the behavioral management agreement  1/12/2024 0936 by Sunshine Murdock, RN  Outcome: Progressing     Problem: Chronic Conditions and Co-morbidities  Goal: Patient's chronic conditions and co-morbidity symptoms are monitored and maintained or improved  1/12/2024 2150 by Esther Adam RN  Outcome: Progressing  Flowsheets (Taken 1/12/2024 2000)  Care Plan - Patient's Chronic Conditions and Co-Morbidity Symptoms are Monitored and Maintained or Improved:   Monitor and assess patient's chronic conditions and comorbid symptoms for stability, deterioration, or improvement   Collaborate with multidisciplinary team to address chronic and comorbid conditions and prevent exacerbation or deterioration   Update acute care plan with appropriate goals if chronic or comorbid symptoms are exacerbated and prevent overall improvement and discharge  1/12/2024 0936 by Sunshine Murdock, RN  Outcome: Progressing

## 2024-01-13 NOTE — PROGRESS NOTES
MD Leandra Sandoval MD Samir Brahmbhatt, MD                  Office: [phone]                 Fax: [phone]         [employer]                          NEPHROLOGY INPATIENT PROGRESS  NOTE:     PATIENT NAME: Ishmael Gr  : 1945  MRN: [id number]      IMPRESSION/RECOMMENDATIONS:    Right hip pain [M25.551]  ABRAHAM (acute kidney injury) (HCC) [N17.9]  Fall, initial encounter [W19.XXXA]  Acute right-sided low back pain with right-sided sciatica [M54.41]    ABRAHAM-baseline creatinine 1.1.  Probably from prerenal azotemia in the setting of relative hypotension plus contrast exposure plus COPD exacerbation.  Now probable ATN.  Nonoliguric. -3L since admission.  Still hypervolemic.   Crea 1.9 from 2 from 1.9.       Subjective / interval history / nephrology update / medical decision making:   Resting in chair during rounds,   Denies any active shortness of breath  S/P Endoscopy on 1-    Reported no active complaints/distress,     Stable on NC but still 6 L, similar dose as at home, as per patient    Said leg edema -still present currently, but improving, he thinks better    Renal labs noted.     Weight change:   Last Weight Metrics:      2024     6:00 AM 2024    12:37 AM 1/10/2024    10:33 AM 1/10/2024     4:30 AM 2024     5:33 AM 2024     7:45 AM 2024     6:29 AM   Weight Loss Metrics   Height   6' 1\"       Weight - Scale 329 lbs 6 oz 329 lbs 10 oz  326 lbs 330 lbs 328 lbs 11 oz 330 lbs 2 oz   BMI (Calculated) 43.5 kg/m2 43.6 kg/m2  44.3 kg/m2 44.8 kg/m2 44.7 kg/m2 44.9 kg/m2       Was changed to Lasix drip 10mg/hr .    -With worsening creatinine, up to 1.8, but needs diuretic, so continue decreased rate to 8 mg/h  Given Albumin 25g to 12.5g TID.    Given Metolazone daily  Use Midodrine PRN    Added daily diamox, d/to [employer].alkalosis, but still fluid overloaded  - Increase from 25 daily to 25 twice daily    Requested nurse for Ace wrap being  Patient refusing

## 2024-01-13 NOTE — CARE COORDINATION
Received a call from [facility] liaison Angelina Weathersbrenda [phone].  She called to give a correct phone number that she noted was listed incorrectly in the chart.   She states pt cannot come until off Lasix gtt for 24 hours.    Electronically signed by Marjorie Cifuentes RN on 1/13/2024 at 9:24 AM

## 2024-01-14 PROBLEM — E87.6 HYPOKALEMIA: Status: ACTIVE | Noted: 2024-01-14

## 2024-01-14 LAB
ALBUMIN SERPL-MCNC: 3.3 G/DL (ref 3.4–5)
ANION GAP SERPL CALCULATED.3IONS-SCNC: 12 MMOL/L (ref 3–16)
BASOPHILS # BLD: 0 K/UL (ref 0–0.2)
BASOPHILS NFR BLD: 0.6 %
BUN SERPL-MCNC: 35 MG/DL (ref 7–20)
CALCIUM SERPL-MCNC: 9 MG/DL (ref 8.3–10.6)
CHLORIDE SERPL-SCNC: 92 MMOL/L (ref 99–110)
CO2 SERPL-SCNC: 36 MMOL/L (ref 21–32)
CREAT SERPL-MCNC: 1.9 MG/DL (ref 0.8–1.3)
DEPRECATED RDW RBC AUTO: 17.3 % (ref 12.4–15.4)
EOSINOPHIL # BLD: 0.5 K/UL (ref 0–0.6)
EOSINOPHIL NFR BLD: 11.4 %
GFR SERPLBLD CREATININE-BSD FMLA CKD-EPI: 36 ML/MIN/{1.73_M2}
GLUCOSE BLD-MCNC: 101 MG/DL (ref 70–99)
GLUCOSE BLD-MCNC: 132 MG/DL (ref 70–99)
GLUCOSE BLD-MCNC: 162 MG/DL (ref 70–99)
GLUCOSE BLD-MCNC: 193 MG/DL (ref 70–99)
GLUCOSE SERPL-MCNC: 114 MG/DL (ref 70–99)
HCT VFR BLD AUTO: 25.8 % (ref 40.5–52.5)
HGB BLD-MCNC: 8.4 G/DL (ref 13.5–17.5)
LYMPHOCYTES # BLD: 0.9 K/UL (ref 1–5.1)
LYMPHOCYTES NFR BLD: 19.8 %
MAGNESIUM SERPL-MCNC: 1.9 MG/DL (ref 1.8–2.4)
MCH RBC QN AUTO: 30.5 PG (ref 26–34)
MCHC RBC AUTO-ENTMCNC: 32.7 G/DL (ref 31–36)
MCV RBC AUTO: 93.5 FL (ref 80–100)
MONOCYTES # BLD: 0.4 K/UL (ref 0–1.3)
MONOCYTES NFR BLD: 9.3 %
NEUTROPHILS # BLD: 2.6 K/UL (ref 1.7–7.7)
NEUTROPHILS NFR BLD: 58.9 %
PERFORMED ON: ABNORMAL
PHOSPHATE SERPL-MCNC: 4.3 MG/DL (ref 2.5–4.9)
PLATELET # BLD AUTO: 324 K/UL (ref 135–450)
PMV BLD AUTO: 6.2 FL (ref 5–10.5)
POTASSIUM SERPL-SCNC: 3.3 MMOL/L (ref 3.5–5.1)
RBC # BLD AUTO: 2.76 M/UL (ref 4.2–5.9)
SODIUM SERPL-SCNC: 140 MMOL/L (ref 136–145)
WBC # BLD AUTO: 4.5 K/UL (ref 4–11)

## 2024-01-14 PROCEDURE — 6370000000 HC RX 637 (ALT 250 FOR IP): Performed by: INTERNAL MEDICINE

## 2024-01-14 PROCEDURE — 6360000002 HC RX W HCPCS: Performed by: INTERNAL MEDICINE

## 2024-01-14 PROCEDURE — 2580000003 HC RX 258: Performed by: INTERNAL MEDICINE

## 2024-01-14 PROCEDURE — 84630 ASSAY OF ZINC: CPT

## 2024-01-14 PROCEDURE — 2700000000 HC OXYGEN THERAPY PER DAY

## 2024-01-14 PROCEDURE — 2060000000 HC ICU INTERMEDIATE R&B

## 2024-01-14 PROCEDURE — 36415 COLL VENOUS BLD VENIPUNCTURE: CPT

## 2024-01-14 PROCEDURE — 94640 AIRWAY INHALATION TREATMENT: CPT

## 2024-01-14 PROCEDURE — 82525 ASSAY OF COPPER: CPT

## 2024-01-14 PROCEDURE — 80069 RENAL FUNCTION PANEL: CPT

## 2024-01-14 PROCEDURE — 99223 1ST HOSP IP/OBS HIGH 75: CPT | Performed by: INTERNAL MEDICINE

## 2024-01-14 PROCEDURE — C9113 INJ PANTOPRAZOLE SODIUM, VIA: HCPCS | Performed by: INTERNAL MEDICINE

## 2024-01-14 PROCEDURE — 85025 COMPLETE CBC W/AUTO DIFF WBC: CPT

## 2024-01-14 PROCEDURE — 83735 ASSAY OF MAGNESIUM: CPT

## 2024-01-14 PROCEDURE — P9045 ALBUMIN (HUMAN), 5%, 250 ML: HCPCS | Performed by: INTERNAL MEDICINE

## 2024-01-14 PROCEDURE — 94761 N-INVAS EAR/PLS OXIMETRY MLT: CPT

## 2024-01-14 RX ORDER — TORSEMIDE 20 MG/1
60 TABLET ORAL DAILY
Status: DISCONTINUED | OUTPATIENT
Start: 2024-01-15 | End: 2024-01-15 | Stop reason: HOSPADM

## 2024-01-14 RX ORDER — SPIRONOLACTONE 25 MG/1
25 TABLET ORAL DAILY
Status: DISCONTINUED | OUTPATIENT
Start: 2024-01-14 | End: 2024-01-15 | Stop reason: HOSPADM

## 2024-01-14 RX ORDER — POTASSIUM CHLORIDE 20 MEQ/1
40 TABLET, EXTENDED RELEASE ORAL PRN
Status: DISCONTINUED | OUTPATIENT
Start: 2024-01-14 | End: 2024-01-15 | Stop reason: HOSPADM

## 2024-01-14 RX ORDER — ACETAZOLAMIDE 250 MG/1
250 TABLET ORAL 2 TIMES DAILY
Status: COMPLETED | OUTPATIENT
Start: 2024-01-14 | End: 2024-01-15

## 2024-01-14 RX ORDER — POTASSIUM CHLORIDE 7.45 MG/ML
10 INJECTION INTRAVENOUS PRN
Status: DISCONTINUED | OUTPATIENT
Start: 2024-01-14 | End: 2024-01-15 | Stop reason: HOSPADM

## 2024-01-14 RX ADMIN — SODIUM CHLORIDE, PRESERVATIVE FREE 10 ML: 5 INJECTION INTRAVENOUS at 20:56

## 2024-01-14 RX ADMIN — INSULIN LISPRO 5 UNITS: 100 INJECTION, SOLUTION INTRAVENOUS; SUBCUTANEOUS at 18:30

## 2024-01-14 RX ADMIN — ALLOPURINOL 200 MG: 100 TABLET ORAL at 09:25

## 2024-01-14 RX ADMIN — Medication 10 ML: at 09:29

## 2024-01-14 RX ADMIN — CEFAZOLIN 2000 MG: 2 INJECTION, POWDER, FOR SOLUTION INTRAMUSCULAR; INTRAVENOUS at 13:02

## 2024-01-14 RX ADMIN — ARFORMOTEROL TARTRATE 15 MCG: 15 SOLUTION RESPIRATORY (INHALATION) at 09:30

## 2024-01-14 RX ADMIN — BUDESONIDE INHALATION 500 MCG: 0.5 SUSPENSION RESPIRATORY (INHALATION) at 09:30

## 2024-01-14 RX ADMIN — ALBUTEROL SULFATE 2.5 MG: 2.5 SOLUTION RESPIRATORY (INHALATION) at 16:41

## 2024-01-14 RX ADMIN — ACETAZOLAMIDE 250 MG: 250 TABLET ORAL at 09:26

## 2024-01-14 RX ADMIN — CYANOCOBALAMIN TAB 1000 MCG 500 MCG: 1000 TAB at 09:26

## 2024-01-14 RX ADMIN — ALBUTEROL SULFATE 2.5 MG: 2.5 SOLUTION RESPIRATORY (INHALATION) at 12:46

## 2024-01-14 RX ADMIN — THIAMINE HCL TAB 100 MG 100 MG: 100 TAB at 09:26

## 2024-01-14 RX ADMIN — STANDARDIZED SENNA CONCENTRATE AND DOCUSATE SODIUM 2 TABLET: 8.6; 5 TABLET ORAL at 09:26

## 2024-01-14 RX ADMIN — MICONAZOLE NITRATE: 20 POWDER TOPICAL at 20:57

## 2024-01-14 RX ADMIN — ALBUTEROL SULFATE 2.5 MG: 2.5 SOLUTION RESPIRATORY (INHALATION) at 22:09

## 2024-01-14 RX ADMIN — FUROSEMIDE 8 MG/HR: 10 INJECTION, SOLUTION INTRAMUSCULAR; INTRAVENOUS at 09:03

## 2024-01-14 RX ADMIN — PANTOPRAZOLE SODIUM 40 MG: 40 INJECTION, POWDER, FOR SOLUTION INTRAVENOUS at 09:26

## 2024-01-14 RX ADMIN — TIOTROPIUM BROMIDE INHALATION SPRAY 2 PUFF: 3.12 SPRAY, METERED RESPIRATORY (INHALATION) at 09:30

## 2024-01-14 RX ADMIN — CEFAZOLIN 2000 MG: 2 INJECTION, POWDER, FOR SOLUTION INTRAMUSCULAR; INTRAVENOUS at 20:50

## 2024-01-14 RX ADMIN — INSULIN LISPRO 5 UNITS: 100 INJECTION, SOLUTION INTRAVENOUS; SUBCUTANEOUS at 13:01

## 2024-01-14 RX ADMIN — ARFORMOTEROL TARTRATE 15 MCG: 15 SOLUTION RESPIRATORY (INHALATION) at 22:10

## 2024-01-14 RX ADMIN — POTASSIUM CHLORIDE 40 MEQ: 1500 TABLET, EXTENDED RELEASE ORAL at 13:15

## 2024-01-14 RX ADMIN — CEFAZOLIN 2000 MG: 2 INJECTION, POWDER, FOR SOLUTION INTRAMUSCULAR; INTRAVENOUS at 06:31

## 2024-01-14 RX ADMIN — MICONAZOLE NITRATE: 20 POWDER TOPICAL at 09:27

## 2024-01-14 RX ADMIN — SODIUM CHLORIDE, PRESERVATIVE FREE 10 ML: 5 INJECTION INTRAVENOUS at 09:26

## 2024-01-14 RX ADMIN — FOLIC ACID 1 MG: 1 TABLET ORAL at 09:26

## 2024-01-14 RX ADMIN — OXYCODONE AND ACETAMINOPHEN 1 TABLET: 5; 325 TABLET ORAL at 20:56

## 2024-01-14 RX ADMIN — BUDESONIDE INHALATION 500 MCG: 0.5 SUSPENSION RESPIRATORY (INHALATION) at 22:10

## 2024-01-14 RX ADMIN — ALBUTEROL SULFATE 2.5 MG: 2.5 SOLUTION RESPIRATORY (INHALATION) at 09:30

## 2024-01-14 RX ADMIN — INSULIN LISPRO 5 UNITS: 100 INJECTION, SOLUTION INTRAVENOUS; SUBCUTANEOUS at 09:27

## 2024-01-14 RX ADMIN — Medication 1 CAPSULE: at 09:26

## 2024-01-14 RX ADMIN — Medication 1 CAPSULE: at 17:55

## 2024-01-14 RX ADMIN — SPIRONOLACTONE 25 MG: 25 TABLET ORAL at 17:55

## 2024-01-14 RX ADMIN — ALBUMIN (HUMAN) 25 G: 12.5 INJECTION, SOLUTION INTRAVENOUS at 18:06

## 2024-01-14 RX ADMIN — PANTOPRAZOLE SODIUM 40 MG: 40 INJECTION, POWDER, FOR SOLUTION INTRAVENOUS at 20:56

## 2024-01-14 RX ADMIN — ACETAZOLAMIDE 250 MG: 250 TABLET ORAL at 20:56

## 2024-01-14 ASSESSMENT — PAIN SCALES - GENERAL
PAINLEVEL_OUTOF10: 2
PAINLEVEL_OUTOF10: 0
PAINLEVEL_OUTOF10: 7
PAINLEVEL_OUTOF10: 0
PAINLEVEL_OUTOF10: 7
PAINLEVEL_OUTOF10: 0

## 2024-01-14 ASSESSMENT — PAIN DESCRIPTION - LOCATION
LOCATION: BACK
LOCATION: BACK

## 2024-01-14 ASSESSMENT — PAIN DESCRIPTION - ORIENTATION: ORIENTATION: LOWER

## 2024-01-14 ASSESSMENT — PAIN DESCRIPTION - DESCRIPTORS: DESCRIPTORS: ACHING

## 2024-01-14 NOTE — CONSULTS
[facility]   Electrophysiology Consultation   Date: 1/14/2024  Reason for Consultation: Paroxysmal atrial fibrillation  Consult Requesting Physician: Davey Farias MD     Chief Complaint   Patient presents with    Fall     Pt arrived to ED via [facility] ems from home after a fall. Per pt he was getting up to go to the bathroom and fell. Pt has knot above left eye and skin tear to left forearm. Pt on blood thinners. Per pt he is on 6L of oxygen at all times. Pt A&Ox4     HPI: Ishmael Gr is a 78 y.o. male with history of CVA, CAD, hypertension, diastolic heart failure, STEF, COPD, chronic hypoxic respiratory failure on 6 L oxygen, status post ILR implant which showed paroxysmal atrial fibrillation, PFO, previously on Eliquis due to A-fib.  He was admitted last month due to fall.  He was noted to be anemic and GI workup showed antral erosions and internal hemorrhoids and diverticulosis.  Bone biopsy biopsy was done as well.       Past Medical History:   Diagnosis Date    Anemia     COPD (chronic obstructive pulmonary disease) (HCC)     Diabetes mellitus (HCC)     Edema     GI (gastrointestinal bleed)     Gout     Hypertension     Morbid obesity (HCC)     Neuropathy     Obstructive sleep apnea     uses CPAP    Peripheral vascular disease (HCC)         Past Surgical History:   Procedure Laterality Date    APPENDECTOMY      COLONOSCOPY N/A 5/7/2019    COLONOSCOPY DIAGNOSTIC performed by Anthony Yoon MD at [facility] ENDOSCOPY    COLONOSCOPY N/A 1/10/2024    COLONOSCOPY DIAGNOSTIC performed by Lenin Gomez MD at [facility] ENDOSCOPY    CT BONE MARROW BIOPSY  1/9/2024    CT BONE MARROW BIOPSY 1/9/2024 [facility] CT SCAN    TOTAL HIP ARTHROPLASTY      bilateral    UPPER GASTROINTESTINAL ENDOSCOPY N/A 12/29/2023    EGD BIOPSY and rectal exam performed by Alexsander Nj MD at [facility] ENDOSCOPY    UPPER GASTROINTESTINAL ENDOSCOPY N/A 1/10/2024    EGD BIOPSY performed by Lenin Gomez MD at [facility] ENDOSCOPY

## 2024-01-14 NOTE — PROGRESS NOTES
MD Leandra Sandoval MD Samir Brahmbhatt, MD                  Office: [phone]                 Fax: [phone]         [employer]                          NEPHROLOGY INPATIENT PROGRESS  NOTE:     PATIENT NAME: Ishmael Gr  : 1945  MRN: [id number]      IMPRESSION/RECOMMENDATIONS:    Right hip pain [M25.551]  ABRAHAM (acute kidney injury) (HCC) [N17.9]  Fall, initial encounter [W19.XXXA]  Acute right-sided low back pain with right-sided sciatica [M54.41]    ABRAHAM-baseline creatinine 1.1.  Probably from prerenal azotemia in the setting of relative hypotension plus contrast exposure plus COPD exacerbation.  Now probable ATN.  Nonoliguric. -3L since admission.  Still hypervolemic.   Crea 1.9 from 2 from 1.9.       Subjective / interval history / nephrology update / medical decision making:   Resting in chair during rounds,   Denies any active shortness of breath  S/P Endoscopy on 1-    Reported no active complaints/distress,     Stable on NC but still 6 L, similar dose as at home, as per patient    Said leg edema -still present currently,  -improving slowly    Renal labs noted.     Weight change: -2.435 kg (-5 lb 5.9 oz)  Last Weight Metrics:      2024     8:04 AM 2024     6:00 AM 2024     6:00 AM 2024    12:37 AM 1/10/2024    10:33 AM 1/10/2024     4:30 AM 2024     5:33 AM   Weight Loss Metrics   Height     6' 1\"     Weight - Scale 324 lbs 10 oz 324 lbs 329 lbs 6 oz 329 lbs 10 oz  326 lbs 330 lbs   BMI (Calculated) 42.9 kg/m2 42.8 kg/m2 43.5 kg/m2 43.6 kg/m2  44.3 kg/m2 44.8 kg/m2       Was changed to Lasix drip 10mg/hr .    -With worsening creatinine, up to 1.8, but needs diuretic,  -Will stop today, requested nurse to stop Lasix drip when the current bag expires.  - Changed to torsemide 60  mg a day  -might work better with bowel edema    Given Albumin 25g to 12.5g TID.    Given Metolazone daily  Use Midodrine PRN    Added daily diamox, d/to  apnea, and Peripheral vascular disease (HCC).   Past Surgical History:     has a past surgical history that includes Appendectomy; Total hip arthroplasty; Colonoscopy (N/A, 5/7/2019); Upper gastrointestinal endoscopy (N/A, 12/29/2023); CT BIOPSY BONE MARROW (1/9/2024); Upper gastrointestinal endoscopy (N/A, 1/10/2024); and Colonoscopy (N/A, 1/10/2024).   Current Medications:    Current Facility-Administered Medications: potassium chloride (KLOR-CON M) extended release tablet 40 mEq, 40 mEq, Oral, PRN **OR** potassium bicarb-citric acid (EFFER-K) effervescent tablet 40 mEq, 40 mEq, Oral, PRN **OR** potassium chloride 10 mEq/100 mL IVPB (Peripheral Line), 10 mEq, IntraVENous, PRN  acetaZOLAMIDE (DIAMOX) tablet 250 mg, 250 mg, Oral, BID  albumin human 5% IV solution 25 g, 25 g, IntraVENous, Q24H  0.9 % sodium chloride infusion, , IntraVENous, PRN  miconazole (MICOTIN) 2 % powder, , Topical, BID  0.9 % sodium chloride infusion, , IntraVENous, PRN  [Held by provider] ferrous sulfate (IRON 325) tablet 325 mg, 325 mg, Oral, BID WC  vitamin B-12 (CYANOCOBALAMIN) tablet 500 mcg, 500 mcg, Oral, Daily  folic acid (FOLVITE) tablet 1 mg, 1 mg, Oral, Daily  furosemide (LASIX) 100 mg in sodium chloride 0.9 % 100 mL infusion, 8 mg/hr, IntraVENous, Continuous  oxyCODONE-acetaminophen (PERCOCET) 5-325 MG per tablet 1 tablet, 1 tablet, Oral, Q6H PRN  midodrine (PROAMATINE) tablet 5 mg, 5 mg, Oral, TID PRN  [Held by provider] metoprolol tartrate (LOPRESSOR) tablet 12.5 mg, 12.5 mg, Oral, BID  melatonin tablet 3 mg, 3 mg, Oral, Nightly PRN  diazePAM (VALIUM) tablet 2 mg, 2 mg, Oral, Q6H PRN  sennosides-docusate sodium (SENOKOT-S) 8.6-50 MG tablet 2 tablet, 2 tablet, Oral, Daily  pantoprazole (PROTONIX) injection 40 mg, 40 mg, IntraVENous, BID  perflutren lipid microspheres (DEFINITY) injection 1.5 mL, 1.5 mL, IntraVENous, ONCE PRN  ceFAZolin (ANCEF) 2,000 mg in sodium chloride 0.9 % 50 mL IVPB (mini-bag), 2,000 mg, IntraVENous,

## 2024-01-14 NOTE — PROGRESS NOTES
External catheter replaced and steve care performed. Inner dry placed under stomach and groin to prevent chaffing.

## 2024-01-14 NOTE — PROGRESS NOTES
[facility]    1/14/2024 3:25 PM        Name: Ishmael Gr .              Admitted: 12/20/2023  Primary Care Provider: Anthony Bravo MD (Tel: [phone])      Bed shortness of breath is similar no fevers chills or chest  potassium chloride (KLOR-CON M) extended release tablet 40 mEq, PRN   Or  potassium bicarb-citric acid (EFFER-K) effervescent tablet 40 mEq, PRN   Or  potassium chloride 10 mEq/100 mL IVPB (Peripheral Line), PRN  acetaZOLAMIDE (DIAMOX) tablet 250 mg, BID  albumin human 5% IV solution 25 g, Q24H  0.9 % sodium chloride infusion, PRN  miconazole (MICOTIN) 2 % powder, BID  0.9 % sodium chloride infusion, PRN  [Held by provider] ferrous sulfate (IRON 325) tablet 325 mg, BID WC  vitamin B-12 (CYANOCOBALAMIN) tablet 500 mcg, Daily  folic acid (FOLVITE) tablet 1 mg, Daily  furosemide (LASIX) 100 mg in sodium chloride 0.9 % 100 mL infusion, Continuous  oxyCODONE-acetaminophen (PERCOCET) 5-325 MG per tablet 1 tablet, Q6H PRN  midodrine (PROAMATINE) tablet 5 mg, TID PRN  [Held by provider] metoprolol tartrate (LOPRESSOR) tablet 12.5 mg, BID  melatonin tablet 3 mg, Nightly PRN  diazePAM (VALIUM) tablet 2 mg, Q6H PRN  sennosides-docusate sodium (SENOKOT-S) 8.6-50 MG tablet 2 tablet, Daily  pantoprazole (PROTONIX) injection 40 mg, BID  perflutren lipid microspheres (DEFINITY) injection 1.5 mL, ONCE PRN  ceFAZolin (ANCEF) 2,000 mg in sodium chloride 0.9 % 50 mL IVPB (mini-bag), Q8H  sodium chloride flush 0.9 % injection 5-40 mL, 2 times per day  sodium chloride flush 0.9 % injection 5-40 mL, PRN  0.9 % sodium chloride infusion, PRN  albuterol (PROVENTIL) (2.5 MG/3ML) 0.083% nebulizer solution 2.5 mg, 4x Daily RT  lactobacillus (CULTURELLE) capsule 1 capsule, BID WC  insulin lispro (HUMALOG) injection vial 5 Units, TID WC  insulin lispro (HUMALOG) injection vial 0-4 Units, Nightly  dextrose bolus 10% 125 mL,  appreciated    Labs and Tests:  CBC:   Recent Labs     01/12/24  0528 01/12/24  1951 01/13/24  0130 01/13/24  0551 01/14/24  0440   WBC 4.4  --   --  5.3 4.5   HGB 7.8*   < > 8.8* 8.5* 8.4*     --   --  312 324    < > = values in this interval not displayed.       BMP:    Recent Labs     01/12/24  0528 01/13/24  0551 01/14/24  0440    141 140   K 3.0* 4.0 3.3*   CL 88* 92* 92*   CO2 38* 39* 36*   BUN 28* 32* 35*   CREATININE 1.8* 1.8* 1.9*   GLUCOSE 110* 113* 114*       Hepatic:   No results for input(s): \"AST\", \"ALT\", \"ALB\", \"BILITOT\", \"ALKPHOS\" in the last 72 hours.    CT BIOPSY BONE MARROW   Final Result   Successful CT guided bone marrow aspiration from the iliac bone.      If core biopsy is required for further workup, will plan for repeat bone   marrow biopsy procedure in the near future.         XR CHEST PORTABLE   Final Result   Left lower lobe infiltrate likely represents pneumonia.  Aspiration is   possible.         US RENAL COMPLETE   Final Result   No hydronephrosis or nephrolithiasis identified sonographically.      Increased renal parenchymal echogenicity bilaterally suggesting medical renal   disease.         CT PELVIS WO CONTRAST Additional Contrast? None   Final Result   Bilateral hip prostheses.      No periprosthetic fracture.         CT LUMBAR SPINE WO CONTRAST   Final Result   Levoconvex scoliosis with moderate to advanced degenerative disc disease and   facet arthropathy.      No fracture.         CT Head W/O Contrast   Final Result   No acute intracranial abnormality.         XR CHEST PORTABLE   Final Result   1. Improved aeration.             Recent imaging reviewed    Problem List  Principal Problem:    ABRAHAM (acute kidney injury) ([facility])  Active Problems:    Encephalomalacia    Benign essential HTN    Diabetes mellitus type 2 in obese (HCC)    Morbid obesity due to excess calories (HCC)    Diabetes education, encounter for    PVD (peripheral vascular disease) ([facility])    Chronic

## 2024-01-15 VITALS
DIASTOLIC BLOOD PRESSURE: 55 MMHG | HEIGHT: 73 IN | OXYGEN SATURATION: 97 % | HEART RATE: 99 BPM | BODY MASS INDEX: 41.75 KG/M2 | RESPIRATION RATE: 20 BRPM | TEMPERATURE: 97.2 F | WEIGHT: 315 LBS | SYSTOLIC BLOOD PRESSURE: 112 MMHG

## 2024-01-15 LAB
ALBUMIN SERPL-MCNC: 3.6 G/DL (ref 3.4–5)
ANION GAP SERPL CALCULATED.3IONS-SCNC: 12 MMOL/L (ref 3–16)
BUN SERPL-MCNC: 36 MG/DL (ref 7–20)
CALCIUM SERPL-MCNC: 9.3 MG/DL (ref 8.3–10.6)
CHLORIDE SERPL-SCNC: 89 MMOL/L (ref 99–110)
CO2 SERPL-SCNC: 37 MMOL/L (ref 21–32)
CREAT SERPL-MCNC: 1.8 MG/DL (ref 0.8–1.3)
GFR SERPLBLD CREATININE-BSD FMLA CKD-EPI: 38 ML/MIN/{1.73_M2}
GLUCOSE BLD-MCNC: 146 MG/DL (ref 70–99)
GLUCOSE BLD-MCNC: 67 MG/DL (ref 70–99)
GLUCOSE BLD-MCNC: 78 MG/DL (ref 70–99)
GLUCOSE SERPL-MCNC: 110 MG/DL (ref 70–99)
MAGNESIUM SERPL-MCNC: 1.9 MG/DL (ref 1.8–2.4)
PERFORMED ON: ABNORMAL
PERFORMED ON: ABNORMAL
PERFORMED ON: NORMAL
PHOSPHATE SERPL-MCNC: 4 MG/DL (ref 2.5–4.9)
POTASSIUM SERPL-SCNC: 3.4 MMOL/L (ref 3.5–5.1)
SODIUM SERPL-SCNC: 138 MMOL/L (ref 136–145)

## 2024-01-15 PROCEDURE — 99233 SBSQ HOSP IP/OBS HIGH 50: CPT | Performed by: NURSE PRACTITIONER

## 2024-01-15 PROCEDURE — 97110 THERAPEUTIC EXERCISES: CPT

## 2024-01-15 PROCEDURE — 6370000000 HC RX 637 (ALT 250 FOR IP): Performed by: INTERNAL MEDICINE

## 2024-01-15 PROCEDURE — 94640 AIRWAY INHALATION TREATMENT: CPT

## 2024-01-15 PROCEDURE — 6360000002 HC RX W HCPCS: Performed by: INTERNAL MEDICINE

## 2024-01-15 PROCEDURE — 97535 SELF CARE MNGMENT TRAINING: CPT

## 2024-01-15 PROCEDURE — 2580000003 HC RX 258: Performed by: INTERNAL MEDICINE

## 2024-01-15 PROCEDURE — 94761 N-INVAS EAR/PLS OXIMETRY MLT: CPT

## 2024-01-15 PROCEDURE — 97530 THERAPEUTIC ACTIVITIES: CPT

## 2024-01-15 PROCEDURE — 83735 ASSAY OF MAGNESIUM: CPT

## 2024-01-15 PROCEDURE — 2700000000 HC OXYGEN THERAPY PER DAY

## 2024-01-15 PROCEDURE — C9113 INJ PANTOPRAZOLE SODIUM, VIA: HCPCS | Performed by: INTERNAL MEDICINE

## 2024-01-15 PROCEDURE — 80069 RENAL FUNCTION PANEL: CPT

## 2024-01-15 RX ORDER — SPIRONOLACTONE 25 MG/1
25 TABLET ORAL DAILY
Qty: 30 TABLET | Refills: 3 | Status: SHIPPED | OUTPATIENT
Start: 2024-01-16

## 2024-01-15 RX ORDER — FOLIC ACID 1 MG/1
1 TABLET ORAL DAILY
Qty: 30 TABLET | Refills: 3 | Status: SHIPPED | OUTPATIENT
Start: 2024-01-16

## 2024-01-15 RX ORDER — MIDODRINE HYDROCHLORIDE 5 MG/1
5 TABLET ORAL 3 TIMES DAILY PRN
Qty: 90 TABLET | Refills: 3 | Status: SHIPPED | OUTPATIENT
Start: 2024-01-15

## 2024-01-15 RX ORDER — POTASSIUM CHLORIDE 20 MEQ/1
40 TABLET, EXTENDED RELEASE ORAL ONCE
Status: COMPLETED | OUTPATIENT
Start: 2024-01-15 | End: 2024-01-15

## 2024-01-15 RX ORDER — PANTOPRAZOLE SODIUM 40 MG/1
40 TABLET, DELAYED RELEASE ORAL
Qty: 60 TABLET | Refills: 0 | Status: SHIPPED | OUTPATIENT
Start: 2024-01-15

## 2024-01-15 RX ORDER — SENNA AND DOCUSATE SODIUM 50; 8.6 MG/1; MG/1
2 TABLET, FILM COATED ORAL DAILY
Qty: 60 TABLET | Refills: 0 | Status: SHIPPED | OUTPATIENT
Start: 2024-01-16 | End: 2024-02-15

## 2024-01-15 RX ORDER — FERROUS SULFATE 325(65) MG
325 TABLET ORAL 2 TIMES DAILY WITH MEALS
Qty: 30 TABLET | Refills: 3 | Status: SHIPPED | OUTPATIENT
Start: 2024-01-15

## 2024-01-15 RX ADMIN — CEFAZOLIN 2000 MG: 2 INJECTION, POWDER, FOR SOLUTION INTRAMUSCULAR; INTRAVENOUS at 14:18

## 2024-01-15 RX ADMIN — STANDARDIZED SENNA CONCENTRATE AND DOCUSATE SODIUM 2 TABLET: 8.6; 5 TABLET ORAL at 09:43

## 2024-01-15 RX ADMIN — POTASSIUM CHLORIDE 40 MEQ: 1500 TABLET, EXTENDED RELEASE ORAL at 12:42

## 2024-01-15 RX ADMIN — INSULIN LISPRO 5 UNITS: 100 INJECTION, SOLUTION INTRAVENOUS; SUBCUTANEOUS at 09:45

## 2024-01-15 RX ADMIN — CEFAZOLIN 2000 MG: 2 INJECTION, POWDER, FOR SOLUTION INTRAMUSCULAR; INTRAVENOUS at 04:46

## 2024-01-15 RX ADMIN — FOLIC ACID 1 MG: 1 TABLET ORAL at 09:44

## 2024-01-15 RX ADMIN — TORSEMIDE 60 MG: 20 TABLET ORAL at 15:32

## 2024-01-15 RX ADMIN — Medication 1 CAPSULE: at 09:43

## 2024-01-15 RX ADMIN — Medication 10 ML: at 09:44

## 2024-01-15 RX ADMIN — PANTOPRAZOLE SODIUM 40 MG: 40 INJECTION, POWDER, FOR SOLUTION INTRAVENOUS at 09:44

## 2024-01-15 RX ADMIN — SPIRONOLACTONE 25 MG: 25 TABLET ORAL at 09:44

## 2024-01-15 RX ADMIN — SODIUM CHLORIDE, PRESERVATIVE FREE 10 ML: 5 INJECTION INTRAVENOUS at 09:44

## 2024-01-15 RX ADMIN — THIAMINE HCL TAB 100 MG 100 MG: 100 TAB at 09:44

## 2024-01-15 RX ADMIN — ALBUTEROL SULFATE 2.5 MG: 2.5 SOLUTION RESPIRATORY (INHALATION) at 15:36

## 2024-01-15 RX ADMIN — ARFORMOTEROL TARTRATE 15 MCG: 15 SOLUTION RESPIRATORY (INHALATION) at 07:58

## 2024-01-15 RX ADMIN — ACETAZOLAMIDE 250 MG: 250 TABLET ORAL at 09:43

## 2024-01-15 RX ADMIN — ALBUTEROL SULFATE 2.5 MG: 2.5 SOLUTION RESPIRATORY (INHALATION) at 11:44

## 2024-01-15 RX ADMIN — BUDESONIDE INHALATION 500 MCG: 0.5 SUSPENSION RESPIRATORY (INHALATION) at 07:58

## 2024-01-15 RX ADMIN — ALBUTEROL SULFATE 2.5 MG: 2.5 SOLUTION RESPIRATORY (INHALATION) at 07:58

## 2024-01-15 RX ADMIN — CYANOCOBALAMIN TAB 1000 MCG 500 MCG: 1000 TAB at 09:43

## 2024-01-15 RX ADMIN — ALLOPURINOL 200 MG: 100 TABLET ORAL at 09:44

## 2024-01-15 ASSESSMENT — PAIN SCALES - GENERAL
PAINLEVEL_OUTOF10: 0

## 2024-01-15 NOTE — CARE COORDINATION
Case Management -  Discharge Note      Patient Name: Ishmael Gr                   YOB: 1945            Readmission Risk (Low < 19, Mod (19-27), High > 27): Readmission Risk Score: 27.3    Current PCP: Anthony Bravo MD    ([facility]) Important Message from Medicare:    Date: 01/15/2024    PT AM-PAC: 14 /24  OT AM-PAC: 15 /24    Patient noted to have a discharge order.  Pt has been medically cleared for transition to Skilled Nursing Rehab Facility    Patient discharged to   [facility]  [address] [place]  [address]  Phone: [phone]  Fax: [phone]        HENS Completed:  Yes - ID  [id number]  Pre-cert required/obtained:  Yes    Transportation scheduled for 1700  Transportation provided by [employer]  ([phone])   AVS faxed and agency notified:  Yes  The following prescriptions sent with pt: None  Family Notified:  Left voice message for patient's daughter - Makayla   Patient reports he has informed his family.    Nurse Brock  to call report to facility      Financial    Payor: [facility] MEDICARE / Plan: ANTHEM MEDIBLUE ESSENTIAL/PLUS / Product Type: *No Product type* /     Pharmacy:  Potential assistance Purchasing Medications: No  Meds-to-Beds request:        [facility] [id number] - [place], [place] - [address] - P [phone] - F [phone]  [address] [address]  Phone: [phone] Fax: [phone]      Notes:    Additional Case Management Notes:   Stella with [facility] Admissions was updated of the pickup time.     Electronically signed by YADIRA AWAD RN on 1/15/2024 at 3:17 PM

## 2024-01-15 NOTE — PROGRESS NOTES
[facility] - Inpatient Rehabilitation Department   Phone: [phone]    Physical Therapy                [x] Daily Treatment Note         [] Discharge Summary      Patient: Ishmael Gr   : 1945   MRN: [id number]   Date of Service:  1/15/2024  Admitting Diagnosis: ABRAHAM (acute kidney injury) (HCC)    Current Admission Summary: The pt was admitted s/p a fall.  He has severe back and R hip pain.  He discharged home from the hospital a few days before he was readmitted.     Updated 24: acute GI bleed, s/p 3 units PRBC, EGD (multiple antral erosions that could have bled if patient is on blood thinners. Gastric biopsies to check for H. Pylori); ABRAHAM; acute on chronic hypoxic and hypercapnic respiratory failure secondary to COPD exacerbation    Past Medical History:  has a past medical history of Anemia, COPD (chronic obstructive pulmonary disease) ([facility]), Diabetes mellitus (HCC), Edema, GI (gastrointestinal bleed), Gout, Hypertension, Morbid obesity ([facility]), Neuropathy, Obstructive sleep apnea, and Peripheral vascular disease ([facility]).  Past Surgical History:  has a past surgical history that includes Appendectomy; Total hip arthroplasty; Colonoscopy (N/A, 2019); Upper gastrointestinal endoscopy (N/A, 2023); CT BIOPSY BONE MARROW (2024); Upper gastrointestinal endoscopy (N/A, 1/10/2024); and Colonoscopy (N/A, 1/10/2024).    Discharge Recommendations: Ishmael Gr scored a 14/24 on the AM-PAC short mobility form. Current research shows that an AM-PAC score of 17 or less is typically not associated with a discharge to the patient's home setting. Based on the patient's AM-PAC score and their current functional mobility deficits, it is recommended that the patient have 3-5 sessions per week of Physical Therapy at d/c to increase the patient's independence.  Please see assessment section for further patient specific details.  If patient discharges prior to next session this note will serve as a

## 2024-01-15 NOTE — PROGRESS NOTES
Data- discharge order received, pt , disposition to [facility], ABRAN reviewed and signed by physician.    Action- AVS prepared, ABRAN completed/ reported faxed by case management/. Discharge instruction summary: Diet- carb con, Activity- as jonathan, immunizations reviewed, medications prescriptions to be filled at receiving facility except for the controlled prescriptions to be sent: none, Transfer code status: DNR-CC, LDAs to remain with discharge: midline.   DME used: oxygen.     Response- Bedside RN to call report to receiving facility. Pt belongings gathered, peripheral IV and cardiac monitoring removed. Disposition to Discharged via cart/stretcher to skilled nursing by EMS transportation, no complications reported.       1. WEIGHT: Admit Weight - Scale: (!) 153.3 kg (338 lb) (12/20/23 0451)        Today  Weight - Scale: (!) 146 kg (321 lb 14 oz) (01/15/24 0400)       2. O2 SAT.: SpO2: 97 % (01/15/24 1220)

## 2024-01-15 NOTE — PLAN OF CARE
Problem: Safety - Adult  Goal: Free from fall injury  Outcome: Progressing     Problem: Discharge Planning  Goal: Discharge to home or other facility with appropriate resources  Outcome: Progressing     Problem: Pain  Goal: Verbalizes/displays adequate comfort level or baseline comfort level  Outcome: Progressing     Problem: Skin/Tissue Integrity  Goal: Absence of new skin breakdown  Description: 1.  Monitor for areas of redness and/or skin breakdown  2.  Assess vascular access sites hourly  3.  Every 4-6 hours minimum:  Change oxygen saturation probe site  4.  Every 4-6 hours:  If on nasal continuous positive airway pressure, respiratory therapy assess nares and determine need for appliance change or resting period.  Outcome: Progressing     Problem: ABCDS Injury Assessment  Goal: Absence of physical injury  Outcome: Progressing     Problem: Neurosensory - Adult  Goal: Achieves stable or improved neurological status  Outcome: Progressing  Goal: Achieves maximal functionality and self care  Outcome: Progressing     Problem: Respiratory - Adult  Goal: Achieves optimal ventilation and oxygenation  Outcome: Progressing     Problem: Cardiovascular - Adult  Goal: Maintains optimal cardiac output and hemodynamic stability  Outcome: Progressing  Goal: Absence of cardiac dysrhythmias or at baseline  Outcome: Progressing     Problem: Skin/Tissue Integrity - Adult  Goal: Skin integrity remains intact  Outcome: Progressing  Goal: Incisions, wounds, or drain sites healing without S/S of infection  Outcome: Progressing  Goal: Oral mucous membranes remain intact  Outcome: Progressing     Problem: Musculoskeletal - Adult  Goal: Return mobility to safest level of function  Outcome: Progressing  Goal: Maintain proper alignment of affected body part  Outcome: Progressing  Goal: Return ADL status to a safe level of function  Outcome: Progressing     Problem: Gastrointestinal - Adult  Goal: Minimal or absence of nausea and  baseline  Description: INTERVENTIONS:  1. Assess for possible contributors to thought disturbance, including medications, impaired vision or hearing, underlying metabolic abnormalities, dehydration, psychiatric diagnoses, and notify attending LIP  2. [place] high risk fall precautions, as indicated  3. Provide frequent short contacts to provide reality reorientation, refocusing and direction  4. Decrease environmental stimuli, including noise as appropriate  5. Monitor and intervene to maintain adequate nutrition, hydration, elimination, sleep and activity  6. If unable to ensure safety without constant attention obtain sitter and review sitter guidelines with assigned personnel  7. Initiate Psychosocial CNS and Spiritual Care consult, as indicated  Outcome: Progressing     Problem: Behavior  Goal: Pt/Family maintain appropriate behavior and adhere to behavioral management agreement, if implemented  Description: INTERVENTIONS:  1. Assess patient/family's coping skills and  non-compliant behavior (including use of illegal substances)  2. Notify security of behavior or suspected illegal substances which indicate the need for search of the family and/or belongings  3. Encourage verbalization of thoughts and concerns in a socially appropriate manner  4. Utilize positive, consistent limit setting strategies supporting safety of patient, staff and others  5. Encourage participation in the decision making process about the behavioral management agreement  6. If a visitor's behavior poses a threat to safety call refer to organization policy.  7. Initiate consult with , Psychosocial CNS, Spiritual Care as appropriate  Outcome: Progressing     Problem: Chronic Conditions and Co-morbidities  Goal: Patient's chronic conditions and co-morbidity symptoms are monitored and maintained or improved  Outcome: Progressing

## 2024-01-15 NOTE — PROGRESS NOTES
[facility] - Inpatient Rehabilitation Department   Phone: [phone]    Occupational Therapy    [] Initial Evaluation            [x] Daily Treatment Note         [] Discharge Summary      Patient: Ishmael Gr   : 1945   MRN: [id number]   Date of Service:  1/15/2024    Admitting Diagnosis:  ABRAHAM (acute kidney injury) (HCC)  Current Admission Summary:   Ishmael Gr is a 78 y.o. male who presents to the ED for evaluation for fall.  Patient was just discharged from the hospital after being brought in for respiratory issues and a fall at that time.  States today he fell and hit his head.  Does not fully remember it.  He was on his way to the bathroom when it happened.  States the only pain he is feeling is in his low back and going down his right leg.  Denies any new numbness or tingling or weakness.  Does have significant respiratory issues and feels short of breath initially with it but states he feels back to normal at this time.  He is on 6 L normally.   Past Medical History:  has a past medical history of Anemia, COPD (chronic obstructive pulmonary disease) ([facility]), Diabetes mellitus (HCC), Edema, GI (gastrointestinal bleed), Gout, Hypertension, Morbid obesity (HCC), Neuropathy, Obstructive sleep apnea, and Peripheral vascular disease ([facility]).  Past Surgical History:  has a past surgical history that includes Appendectomy; Total hip arthroplasty; Colonoscopy (N/A, 2019); Upper gastrointestinal endoscopy (N/A, 2023); CT BIOPSY BONE MARROW (2024); Upper gastrointestinal endoscopy (N/A, 1/10/2024); and Colonoscopy (N/A, 1/10/2024).    Discharge Recommendations: Ishmael Gr scored a 15/24 on the AM-PAC ADL Inpatient form. Current research shows that an AM-PAC score of 17 or less is typically not associated with a discharge to the patient's home setting. Based on the patient's AM-PAC score and their current ADL deficits, it is recommended that the patient have 3-5 sessions per week of  fair (-): maintains balance at CGA with use of UE support  Comments: no LOB noted    Other Therapeutic Interventions    Provided pt with further education on exercises using green theraband and reviewed [employer] handout for UE exercises to promote strength and activity tolerance for ADLs/functional mobility: Pt completed tricep curl exercise 2x10 on each side.   AM-PAC Inpatient Daily Activity Raw Score: 15    Cognition  WFL  Insights: decreased awareness of deficits  Orientation:    alert and oriented x 4  Command Following:   accurately follows one step commands     Education  Barriers To Learning: none  Patient Education: patient educated on goals, OT role and benefits, plan of care, ADL adaptive strategies, energy conservation, transfer training, discharge recommendations  Learning Assessment:  patient verbalizes understanding, would benefit from continued reinforcement    Assessment  Activity Tolerance: Limited by fatigue - O2 saturation in 90s on 6L - does desat to ~87% once seated at recliner after walking back from bathroom, requires seated rest break to recover to 90s  Impairments Requiring Therapeutic Intervention: decreased functional mobility, decreased ADL status, decreased strength, decreased endurance, decreased balance, increased pain, decreased posture  Prognosis: fair  Clinical Assessment: Patient presenting below baseline function secondary to fall at home. Patient reporting he is typically independent with ADLs and mobility with RW. Patient able to achieve stand this date and noted to require decreased levels of assist if using BUEs to pull self up. Pt noted with increased fatigue t/o session and reported pain in R knee. Pt educated on UE exercises using green theraband and completed exercises while seated in recliner. Pt would benefit from continued inpt therapy and would benefit from d/c to a SNF. Continue with POC.  Safety Interventions: patient left in chair, chair alarm in place, call light

## 2024-01-15 NOTE — PROGRESS NOTES
Nutrition Note    RECOMMENDATIONS  PO Diet: CCC, 1500 ml/day fluid restriction  ONS: N/A     NUTRITION ASSESSMENT   Pt remains on a CCC diet & reports is eating well, has no appetite issues.  Po intake % of meals.  Wt is down 6 lb from admission, likely d/t diuresis as pt -3L per I/O's.  No further nutrition concerns expressed @ this time. Pt is at low risk for nutrition compromise.     Nutrition Related Findings: Edema: +1 pitting BUE, +4 pitting BLE; LBM 1/13; labs: low k+ 3.4, gluc 110; POCG 146; Na, Mg & phos WNL  Wounds: Skin Tears (left arm)  Nutrition Education:  Education not indicated   Nutrition Goals: PO intake 50% or greater     MALNUTRITION ASSESSMENT      Malnutrition Status: No malnutrition    NUTRITION DIAGNOSIS   No nutrition diagnosis at this time     CURRENT NUTRITION THERAPIES  ADULT DIET; Regular; 5 carb choices (75 gm/meal); 1500 ml     PO Intake: %   PO Supplement Intake:None Ordered    ANTHROPOMETRICS  Current Height: 185.4 cm (6' 1\")  Current Weight - Scale: (!) 146 kg (321 lb 14 oz)    Ideal Body Weight (IBW): 184 lbs  (84 kg)        BMI: 42.4    The patient will be monitored per nutrition standards of care. Consult dietitian if additional nutrition interventions are needed prior to RD reassessment.     Marjorie Oakley, MAN[place]OR, LD    Contact: [phone]

## 2024-01-15 NOTE — CARE COORDINATION
Discharge Planning;  YENNIFER spoke with  Stella -[phone] with [facility] admissions  updating her that patient is no longer on Lasix drip or IV Albumin and has a discharge order for SNF placement.  She reported able to accept patient today.  Patient was informed and was in agreement. YENNIFER left a voice message and call back number for patient's daughter ( Makayla).  Transport was scheduled with pickup time of 1700 via [facility].  Stella with [facility]  Admissions was updated of the pickup time.   She requested Nurse to Nurse report called to [phone]  and AVS/ABRAN  faxed to [phone].

## 2024-01-15 NOTE — PROGRESS NOTES
MD Leandra Sandoval MD Samir Brahmbhatt, MD                  Office: [phone]                 Fax: [phone]         [facility]                          NEPHROLOGY INPATIENT PROGRESS  NOTE:     PATIENT NAME: Ishmael Gr  : 1945  MRN: [id number]      IMPRESSION/RECOMMENDATIONS:    Right hip pain [M25.551]  ABRAHAM (acute kidney injury) (HCC) [N17.9]  Fall, initial encounter [W19.XXXA]  Acute right-sided low back pain with right-sided sciatica [M54.41]    ABRAHAM-baseline creatinine 1.1.  Probably from prerenal azotemia in the setting of relative hypotension plus contrast exposure plus COPD exacerbation.  Now probable ATN.  Nonoliguric. ? +10L since admission.  Still hypervolemic.   Crea 1.8 from 1.9 from 2 from 1.9.     Edema - Was on Lasix drip.    - Changed to torsemide 60  mg a day  -might work better with bowel edema.  Also on SPLT 25mg daily. Added daily diamox, d/to [place].alkalosis, but still fluid overloaded.  Increased from 25 daily to 25 twice daily.  No need on D/C.   Requested nurse for Ace wrap being  Patient refusing for leg elevation,    Tachycardia-restart metoprolol bid  Hypokalemia-replace.   Hypomagnesemia -improved   Anemia- Follow Hgb.    Checked for paraproteinemia.-Negative SPEP, negative UPEP,  UA neg protein.  Has moderate then worsening to large, blood with 17rbc.  Albumin 2.2.  SPEP neg.  Elevated kappa and lambda but normal ratio.    Iron stores -recently, not lower, could have likely underlying anemia of chronic disease-given RAJESH 1/3/24.  Seen by Hematology.   Follow  24H urine protein. -Only 0.6  g    May need renal biopsy. -With improving renal function can be done as an outpatient.  If still indicated.  S/p Successful CT guided bone marrow aspiration from the iliac bone. On 24      D/c plans -from renal standpoint.  Okay for discharge from renal perspective.   Ok to start planning for ELKIN     - Patient was lost for follow-up with nephrology  -:

## 2024-01-15 NOTE — PROGRESS NOTES
labs, diagnostic, device, and imaging results reviewed as a part of this visit    Labs:    BMP:   Recent Labs     24  0551 24  0440 01/15/24  0512    140 138   K 4.0 3.3* 3.4*   CL 92* 92* 89*   CO2 39* 36* 37*   PHOS 3.6 4.3 4.0   BUN 32* 35* 36*   CREATININE 1.8* 1.9* 1.8*   MG 2.00 1.90 1.90     Estimated Creatinine Clearance: 52 mL/min (A) (based on SCr of 1.8 mg/dL (H)).   CBC:   Recent Labs     24  0130 24  0551 24   WBC  --  5.3 4.5   HGB 8.8* 8.5* 8.4*   HCT 27.3* 25.8* 25.8*   MCV  --  93.3 93.5   PLT  --  312 324     Thyroid:   Lab Results   Component Value Date/Time    TSH 2.83 10/03/2013 08:57 AM     Lipids:   Lab Results   Component Value Date/Time    CHOL 108 2023 04:33 AM    HDL 64 2023 04:33 AM    HDL 76 10/25/2010 09:23 AM    TRIG 97 2023 04:33 AM     LFTS:   Lab Results   Component Value Date/Time    ALT 31 2023 04:50 AM    AST 13 2023 04:50 AM    ALKPHOS 62 2023 04:50 AM    PROT 6.4 2024 02:11 PM    PROT 7.0 10/25/2010 09:23 AM    AGRATIO 0.9 2023 05:22 AM    BILITOT 0.3 2023 04:50 AM     Cardiac Enzymes:   Lab Results   Component Value Date/Time    CKTOTAL 172 2023 02:37 PM    TROPONINI 0.04 2023 12:04 PM    TROPONINI <0.01 2022 02:26 PM     Coags:   Lab Results   Component Value Date/Time    PROTIME 16.7 2024 07:54 AM    INR 1.36 2024 07:54 AM       EC23  Sinus rhythm with 1st degree A-V block with occasional Premature ventricular complexes  Left axis deviation  Incomplete left bundle branch block  Prolonged QT  Abnormal ECG  When compared with ECG of 20-DEC-2023 04:50,  Premature ventricular complexes are now Present  OK interval has increased  Incomplete left bundle branch block is now Present  Criteria for Septal infarct are no longer Present    Echo 2/15/23  Summary   Technically difficult study. Definity was used to assist with endocardial border  type 2 diabetes mellitus ([facility]) 11/15/2016    Diastolic dysfunction 11/15/2016    COPD, severe ([facility]) 10/21/2015    Open back wound 07/07/2014    Vitamin D deficiency 10/30/2013    Morbid obesity due to excess calories ([facility])     Acute sinusitis 11/30/2012    Venous insufficiency 11/05/2012    Chronic respiratory failure ([facility]) 07/31/2012    STEF (obstructive sleep apnea) 07/31/2012    Arthritis 10/20/2010    Diabetes mellitus type 2 in obese ([facility]) 07/16/2010    Benign essential HTN 02/26/2010    Syncope 02/26/2010        Assessment and Plan:     PAF  ~ hold anticoagulation d/t gi bleeding.  Shared decision making for watchman started.  Consider restarting when ok with GI and hgb stable.    ~ bb held d/t soft BP      Anemia / GIB  ~ Received multiple units of blood this admit. Hgb 8.4 yesterday   ~ Recommend hemoglobin above 8 from a cardiac standpoint  ~ EGD no active bleed, antral erosions, bx taken 12/29/23    Elevated troponin   ~ secondary to anemia and ckd     HFpEF  ~ last echo 12/2023 with EF 50-55%  ~ diuresed with lasix gtt. Now on torsemide 60mg daily, spironolactone, diamox. Appreciate nephrology recommendations.   ~ Wrap and elevate extremities.     ABRAHAM/ CKD   ~ creatinine 1.8 today  ~ per nephrology     COPD- on chronic oxygen at 6L   HTN- controlled   DM   STEF  Obesity     Multiple medical conditions with risk of decompensation.   All pertinent information and plan of care discussed with the physician.  All questions and concerns were addressed to the patient. Alternatives to my treatment were discussed. I have discussed the above stated plan with patient and the nurse. The patient verbalized understanding and agreed with the plan.    Thank you for allowing to us to participate in the care of Ishmael Gr.    Total visit time > 55 minutes; > 50% spend counseling / coordinating care. I reviewed interval history, physical exam, review of data including labs, imaging, development and implementation of

## 2024-01-15 NOTE — PROGRESS NOTES
ONCOLOGY HEMATOLOGY CARE PROGRESS NOTE      SUBJECTIVE:    Feels better, blood counts stable      ROS:     14 point review of systems performed negative except for as documented above    OBJECTIVE        Physical    VITALS:  Patient Vitals for the past 24 hrs:   BP Temp Temp src Pulse Resp SpO2 Weight   01/15/24 1220 -- -- -- -- -- 97 % --   01/15/24 1200 -- -- -- 89 -- -- --   01/15/24 1146 -- -- -- (!) 101 -- 96 % --   01/15/24 1100 -- -- -- (!) 111 -- -- --   01/15/24 0934 (!) 112/54 -- -- -- -- -- --   01/15/24 0931 (!) 104/50 97.2 °F (36.2 °C) Temporal 92 20 96 % --   01/15/24 0759 -- -- -- 100 18 95 % --   01/15/24 0445 118/66 98 °F (36.7 °C) Temporal 93 18 95 % --   01/15/24 0400 -- -- -- -- -- -- (!) 146 kg (321 lb 14 oz)   01/15/24 0005 (!) 111/47 98 °F (36.7 °C) Temporal (!) 107 18 95 % --   01/14/24 2210 -- -- -- (!) 101 18 95 % --   01/14/24 2056 -- -- -- -- 16 -- --   01/14/24 2045 117/62 98 °F (36.7 °C) Temporal (!) 106 16 95 % --   01/14/24 1945 -- -- -- 97 -- -- --   01/14/24 1900 -- -- -- 93 -- -- --   01/14/24 1700 -- -- -- 95 -- -- --   01/14/24 1645 -- -- -- 98 18 95 % --   01/14/24 1545 (!) 111/54 97.9 °F (36.6 °C) Temporal 100 20 98 % --       24HR INTAKE/OUTPUT:    Intake/Output Summary (Last 24 hours) at 1/15/2024 1301  Last data filed at 1/15/2024 0759  Gross per 24 hour   Intake 1202.15 ml   Output 800 ml   Net 402.15 ml       CONSTITUTIONAL: awake, alert, cooperative, no apparent distress   LUNGS: no increased work of breathing and clear to auscultation   CARDIOVASCULAR: regular rate and rhythm, normal S1 and S2, no murmur noted  ABDOMEN: normal bowel sounds x 4, soft, non-distended, non-tender, no masses palpated, no hepatosplenomgaly   EXTREMITIES: no LE edema     DATA:  CBC:    Recent Labs     01/14/24  0440 01/13/24  0551 01/13/24  0130 01/12/24  1951 01/12/24  0528 01/11/24  0536   WBC 4.5 5.3  --   --  4.4 5.3   NEUTROABS 2.6 3.5  --   --  2.8        ASSESSMENT AND PLAN:    Chronic macrocytic anemia.  EGD on 12/29/23 revealed gastric erosions.  Had PRBC transfusion at that time.  Now worsening anemia.  Labs showed no iron, B12 or folate deficiency.  Bone marrow aspiration and biopsy on 1/10/2024.  COPD exacerbation  CKD.  No evidence of monoclonal gammopathy.  CAD  Elevated lambda and kappa light chains due to kidney disease.  CVA     Reviewed his labs.  Bone marrow biopsy results are pending. He will need follow up with Dr. Virk as an outpatient to follow up on his BMB results and potentially treatment        ONCOLOGIC DISPOSITION:      Dusty Benitez MD  Please contact through [employer]

## 2024-01-15 NOTE — CARE COORDINATION
01/15/24 1336   IMM Letter   IMM Letter given to Patient/Family/Significant other/Guardian/POA/by: Case Management - pt ok with given less than 4 hours   IMM Letter date given: 01/15/24   IMM Letter time given: 1325     Electronically signed by FRANCA Tavera, LSW on 1/15/2024 at 1:36 PM

## 2024-01-16 ENCOUNTER — TELEPHONE (OUTPATIENT)
Dept: INFECTIOUS DISEASES | Age: 79
End: 2024-01-16

## 2024-01-16 LAB
COPPER SERPL-MCNC: 128 UG/DL (ref 70–140)
ZINC SERPL-MCNC: 71.7 UG/DL (ref 60–120)

## 2024-01-16 NOTE — TELEPHONE ENCOUNTER
Spoke with patient's nurse Amarilis at [facility], verified OPAT orders. She states ID consult was placed yesterday upon patients arrival, she is unsure what the physicians name is that will follow. Advised we will sign off and to notify office if any further needs   denies tobacco, alcohol or drug use.

## 2024-01-18 PROCEDURE — 93298 REM INTERROG DEV EVAL SCRMS: CPT | Performed by: INTERNAL MEDICINE

## 2024-01-19 ENCOUNTER — TELEPHONE (OUTPATIENT)
Dept: PULMONOLOGY | Age: 79
End: 2024-01-19

## 2024-01-19 NOTE — TELEPHONE ENCOUNTER
----- Message from Keo Bourgeois MD sent at 12/22/2023 12:52 PM EST -----  Schedule with Dr. Tillman 4-6 weeks  Thanks,  ZA
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
## 2024-01-23 NOTE — PROGRESS NOTES
Nursing assessment completed. Neuro intact. No deficits. States he had a small stroke 14 months ago and the only residual was trouble getting words out just very occasionally. Speech clear and appropriate at this time. Vital Signs:  Afebrile. Temp 97.1. HR 87 NSR. RR 20. BP 75435 (90). Denies pain. Sitting up in chair. Remains on Heparin Drip. A&Ox4. Speech clear. Bilateral lobe wheezes posterior lobes more prominent and left lobe a few scattered fine crackles. Patient reports moist productive cough clear sputum small amount occasional.  Assisted up and straightened out his linens in the chair. Tells nurse he prefers to sit in chair tonight versus lay in the bed. States he breathes better. BiPAP in room. SpO2 98% on [id number]. [place] 900 cc polo cloudy urine in bedside drain at this time. Patient self-directed with adjusting his [place]. Chair alarm on. Call light in reach. Fresh fluids provided. Blood Sugar 212. Covered with 2 units Humalog SC left lower quad ABD. Patient verbalized satisfaction at this time. No acute distress. 108

## 2024-02-04 LAB
Lab: NORMAL
REPORT: NORMAL
THIS TEST SENT TO: NORMAL

## 2024-04-25 PROBLEM — Z51.5 HOSPICE CARE PATIENT: Status: ACTIVE | Noted: 2024-04-25

## 2024-04-25 PROBLEM — Z51.5 HOSPICE CARE PATIENT: Status: ACTIVE | Noted: 2024-02-12

## 2024-04-25 PROBLEM — Z51.5 HOSPICE CARE PATIENT: Chronic | Status: ACTIVE | Noted: 2024-02-12

## 2024-07-11 PROCEDURE — 93298 REM INTERROG DEV EVAL SCRMS: CPT | Performed by: INTERNAL MEDICINE

## 2024-09-03 ENCOUNTER — TELEPHONE (OUTPATIENT)
Dept: CARDIOLOGY CLINIC | Age: 79
End: 2024-09-03

## 2024-11-14 NOTE — PLAN OF CARE
Problem: Safety - Adult  Goal: Free from fall injury  Outcome: Progressing     Problem: Pain  Goal: Verbalizes/displays adequate comfort level or baseline comfort level  Outcome: Progressing     Problem: Skin/Tissue Integrity  Goal: Absence of new skin breakdown  Description: 1.  Monitor for areas of redness and/or skin breakdown  2.  Assess vascular access sites hourly  3.  Every 4-6 hours minimum:  Change oxygen saturation probe site  4.  Every 4-6 hours:  If on nasal continuous positive airway pressure, respiratory therapy assess nares and determine need for appliance change or resting period.  Outcome: Progressing     Problem: Metabolic/Fluid and Electrolytes - Adult  Goal: Electrolytes maintained within normal limits  Outcome: Progressing     Problem: Cardiovascular - Adult  Goal: Maintains optimal cardiac output and hemodynamic stability  Outcome: Progressing      Called patient discuss result of us kidney and bladder. Recommend to see urologist referral given.

## 2025-05-19 NOTE — PROGRESS NOTES
Department of Anesthesiology  Preprocedure Note       Name:  Law Osorio   Age:  81 y.o.  :  1943                                          MRN:  [id number]         Date:  2025      Surgeon: Surgeon(s):  Karen Jolley MD    Procedure: Procedure(s):  UPPER EXTREMITY FISTULAGRAM, POSSIBLE OPEN THROMBECTOMY    Medications prior to admission:   Prior to Admission medications    Medication Sig Start Date End Date Taking? Authorizing Provider   finasteride (PROSCAR) 5 MG tablet Take 1 tablet by mouth daily 24   Lenny Lebron MD   atorvastatin (LIPITOR) 10 MG tablet Take 1 tablet by mouth daily 10/1/24   Estephania Blair DO   lidocaine-prilocaine (EMLA) 2.5-2.5 % cream Apply topically as needed. 23   Michael Paul MD   LOKELMA 10 g PACK oral suspension use 1 packet once daily ON NON-DIALYSIS DAYS 11/10/23   Lenny Lebron MD   montelukast (SINGULAIR) 10 MG tablet Take 2 tablets by mouth nightly    Lenny Lebron MD   NIFEdipine (PROCARDIA XL) 60 MG extended release tablet Take 1 tablet by mouth Daily with lunch 23   Lenny Lebron MD   terazosin (HYTRIN) 10 MG capsule Take 5 mg by mouth nightly 23   Lenny Lebron MD   valACYclovir (VALTREX) 500 MG tablet take 1 tablet by mouth ON  AND FRIDAY. 23   Lenny Lberon MD   sevelamer (RENVELA) 800 MG tablet Take 1 tablet by mouth 2 times daily (with meals)    Lenny Lebron MD   losartan (COZAAR) 25 MG tablet Take 2 tablets by mouth in the morning and at bedtime    Lenny Lebron MD   midodrine (PROAMATINE) 10 MG tablet Take 1 tablet by mouth 2 times daily PRN ( during Dialysis)    Lenny Lebron MD RA VITAMIN D-3 50 MCG (2000) CAPS Take 1 capsule by mouth daily 21   Lenny Lebron MD   magnesium oxide (MAG-OX) 400 MG tablet Take 1 tablet by mouth Daily with lunch 21   Lenny Lebron MD   pantoprazole (PROTONIX) 40 MG  Peter Valera  9/18/2022  [id number]    Chief Complaint: Metabolic encephalopathy    Subjective:   Patient seen resting in bedside chair. Reports that he is feeling well. ROS: No CP. Mild SOB and dyspnea with exertion    Objective:  Patient Vitals for the past 24 hrs:   BP Temp Temp src Pulse Resp SpO2 Weight   09/18/22 1045 (!) 101/58 -- -- 76 -- -- --   09/18/22 0911 -- -- -- 76 20 90 % --   09/18/22 0805 111/67 -- -- 76 -- -- --   09/18/22 0715 (!) 113/58 98.3 °F (36.8 °C) Oral 83 21 95 % --   09/18/22 0600 -- -- -- -- -- -- (!) 328 lb 12.8 oz (149.1 kg)   09/18/22 0032 -- -- -- -- 20 -- --   09/17/22 2053 -- -- -- 78 18 97 % --   09/17/22 2036 121/61 98 °F (36.7 °C) Oral 80 16 95 % --   09/17/22 1527 -- -- -- -- -- 92 % --   09/17/22 1239 -- -- -- -- -- 99 % --       Gen: No distress, pleasant. Resting in bedside chair  HEENT: Normocephalic, atraumatic. CV: Regular rate and rhythm. No MRG   Resp: No respiratory distress. Decreased BS diffusely, 4L O2 per NC  Abd: Soft, nontender nondistended  Ext: +3 pitting BLE edema. Neuro: Alert, oriented, appropriately interactive. Laboratory data: Available via EMR.      Therapy progress:    PT    Supine to Sit:    Sit to Supine:     Sit to Stand: Supervision or touching assistance  Chair/Bed to Chair Transfer: Supervision or touching assistance  Car Transfer: Partial/moderate assistance  Ambulation 10 ft: Supervision or touching assistance  Ambulation 50 ft: Supervision or touching assistance  Ambulation 150 ft: Supervision or touching assistance  Stairs - 1 Step: Supervision or touching assistance  Stairs - 4 Step: Supervision or touching assistance  Stairs - 12 Step: Supervision or touching assistance    OT    Eating: Independent  Oral Hygiene: Supervision or touching assistance  Bathing: Supervision or touching assistance  Upper Body Dressing: Supervision or touching assistance  Lower Body Dressing: Partial/moderate assistance  Toilet Transfer: Supervision or TGA (transient global amnesia)    Metabolic encephalopathy       Plan:   Acute left temporal lobe infarct  - ASA, statin. PT/OT/SLP     Chronic hypoxia/hypercapnia  - NIV/AVAPs ventilation to be organized at the time of discharge. Not using BiPAP overnight     COPD  - inhalers as ordered. - Pulm consult     PFO/ASD  - Outpatient follow up. [facility]     HTN  - lisinopril 2.5, toprol 12.5     DM  - DM diet. consider resumption of metformin if needed     EOH abuse  - no evidence of withdrawal    BLE edema  - PO 40 BID increase to 60 BID PO.  - s/p 40 lasix x1    Bowel: Per protocol  Bladder: Per protocol  Sleep: Trazodone PRN  Pain: tylenol PRN  DVT PPx: lovenox  PAULINA: 9/21    Electronically signed by Shirley Minor MD on 9/18/2022 at 10:49 AM      * This document was created using dictation software. While all precautions were taken to ensure accuracy, errors may have occurred. Please disregard any typographical errors.

## (undated) DEVICE — GOWN AURORA NONREINF LG: Brand: MEDLINE INDUSTRIES, INC.

## (undated) DEVICE — 60 ML SYRINGE,REGULAR TIP: Brand: MONOJECT

## (undated) DEVICE — SOLUTION IV IRRIG WATER 500ML POUR BRL ST 2F7113

## (undated) DEVICE — VALVE SUCTION AIR H2O SET ORCA POD + DISP

## (undated) DEVICE — BW-412T DISP COMBO CLEANING BRUSH: Brand: SINGLE USE COMBINATION CLEANING BRUSH

## (undated) DEVICE — FORCEPS BX L240CM WRK CHN 2.8MM STD CAP W/ NDL MIC MESH

## (undated) DEVICE — SET VLV 3 PC AWS DISPOSABLE GRDIAN SCOPEVALET

## (undated) DEVICE — AIR/WATER CLEANING ADAPTER FOR OLYMPUS® GI ENDOSCOPE: Brand: BULLDOG®

## (undated) DEVICE — MOUTHPIECE ENDOSCP L CTRL OPN AND SIDE PORTS DISP

## (undated) DEVICE — ENDOSCOPIC KIT 6X3/16 FT COLON W/ 1.1 OZ 2 GWN W/O BRSH

## (undated) DEVICE — PROCEDURE KIT ENDOSCP CUST